# Patient Record
Sex: FEMALE | Race: WHITE | Employment: OTHER | URBAN - NONMETROPOLITAN AREA
[De-identification: names, ages, dates, MRNs, and addresses within clinical notes are randomized per-mention and may not be internally consistent; named-entity substitution may affect disease eponyms.]

---

## 2017-04-12 LAB
BUN BLDV-MCNC: 26 MG/DL
CALCIUM SERPL-MCNC: 10 MG/DL
CHLORIDE BLD-SCNC: 98 MMOL/L
CO2: 23.5 MMOL/L
CREAT SERPL-MCNC: 1.5 MG/DL
GFR CALCULATED: 37
GLUCOSE BLD-MCNC: 218 MG/DL
POTASSIUM SERPL-SCNC: 4.6 MMOL/L
SODIUM BLD-SCNC: 137 MMOL/L

## 2017-05-03 ENCOUNTER — OFFICE VISIT (OUTPATIENT)
Dept: NEPHROLOGY | Age: 67
End: 2017-05-03

## 2017-05-03 VITALS
SYSTOLIC BLOOD PRESSURE: 158 MMHG | HEART RATE: 65 BPM | DIASTOLIC BLOOD PRESSURE: 68 MMHG | OXYGEN SATURATION: 97 % | WEIGHT: 199 LBS | BODY MASS INDEX: 32.12 KG/M2

## 2017-05-03 DIAGNOSIS — N18.30 CHRONIC KIDNEY DISEASE, STAGE III (MODERATE) (HCC): Primary | ICD-10-CM

## 2017-05-03 PROCEDURE — 1090F PRES/ABSN URINE INCON ASSESS: CPT | Performed by: INTERNAL MEDICINE

## 2017-05-03 PROCEDURE — 4040F PNEUMOC VAC/ADMIN/RCVD: CPT | Performed by: INTERNAL MEDICINE

## 2017-05-03 PROCEDURE — 3017F COLORECTAL CA SCREEN DOC REV: CPT | Performed by: INTERNAL MEDICINE

## 2017-05-03 PROCEDURE — 3014F SCREEN MAMMO DOC REV: CPT | Performed by: INTERNAL MEDICINE

## 2017-05-03 PROCEDURE — 1123F ACP DISCUSS/DSCN MKR DOCD: CPT | Performed by: INTERNAL MEDICINE

## 2017-05-03 PROCEDURE — 99213 OFFICE O/P EST LOW 20 MIN: CPT | Performed by: INTERNAL MEDICINE

## 2017-05-03 PROCEDURE — 1036F TOBACCO NON-USER: CPT | Performed by: INTERNAL MEDICINE

## 2017-05-03 PROCEDURE — G8427 DOCREV CUR MEDS BY ELIG CLIN: HCPCS | Performed by: INTERNAL MEDICINE

## 2017-05-03 PROCEDURE — G8419 CALC BMI OUT NRM PARAM NOF/U: HCPCS | Performed by: INTERNAL MEDICINE

## 2017-05-03 PROCEDURE — G8400 PT W/DXA NO RESULTS DOC: HCPCS | Performed by: INTERNAL MEDICINE

## 2017-05-03 RX ORDER — CLONIDINE HYDROCHLORIDE 0.1 MG/1
0.2 TABLET ORAL 3 TIMES DAILY
Status: ON HOLD | COMMUNITY
Start: 2017-04-24 | End: 2018-12-07 | Stop reason: CLARIF

## 2017-05-03 RX ORDER — PRAVASTATIN SODIUM 40 MG
40 TABLET ORAL NIGHTLY
COMMUNITY

## 2017-05-03 RX ORDER — BUPROPION HYDROCHLORIDE 150 MG/1
150 TABLET ORAL EVERY MORNING
COMMUNITY
End: 2018-11-02 | Stop reason: ALTCHOICE

## 2017-05-03 RX ORDER — HYDRALAZINE HYDROCHLORIDE 25 MG/1
100 TABLET, FILM COATED ORAL 3 TIMES DAILY
Status: ON HOLD | COMMUNITY
Start: 2017-04-24 | End: 2018-12-07 | Stop reason: CLARIF

## 2017-05-03 RX ORDER — ZIPRASIDONE HYDROCHLORIDE 60 MG/1
40 CAPSULE ORAL DAILY
Status: ON HOLD | COMMUNITY
End: 2018-12-07 | Stop reason: CLARIF

## 2017-05-03 RX ORDER — BUSPIRONE HYDROCHLORIDE 10 MG/1
10 TABLET ORAL 2 TIMES DAILY
Status: ON HOLD | COMMUNITY
End: 2019-05-19

## 2017-05-03 RX ORDER — INSULIN ASPART 100 [IU]/ML
INJECTION, SOLUTION INTRAVENOUS; SUBCUTANEOUS
Status: ON HOLD | COMMUNITY
Start: 2017-04-25 | End: 2018-12-07 | Stop reason: DRUGHIGH

## 2017-05-03 RX ORDER — RANITIDINE 150 MG/1
TABLET ORAL
COMMUNITY
Start: 2017-05-02 | End: 2018-11-02 | Stop reason: ALTCHOICE

## 2017-05-03 RX ORDER — BETHANECHOL CHLORIDE 25 MG/1
25 TABLET ORAL 3 TIMES DAILY
COMMUNITY
Start: 2017-04-24 | End: 2018-11-02 | Stop reason: ALTCHOICE

## 2017-05-03 RX ORDER — ERGOCALCIFEROL 1.25 MG/1
50000 CAPSULE ORAL DAILY
COMMUNITY
Start: 2017-02-28 | End: 2018-11-02 | Stop reason: ALTCHOICE

## 2017-05-03 RX ORDER — ALLOPURINOL 300 MG/1
TABLET ORAL
COMMUNITY
Start: 2017-05-02 | End: 2017-05-03 | Stop reason: DRUGHIGH

## 2017-05-03 RX ORDER — INSULIN GLARGINE 100 [IU]/ML
INJECTION, SOLUTION SUBCUTANEOUS
COMMUNITY
End: 2018-11-02 | Stop reason: ALTCHOICE

## 2017-05-03 RX ORDER — HYDROXYZINE HYDROCHLORIDE 25 MG/1
25 TABLET, FILM COATED ORAL 2 TIMES DAILY PRN
Status: ON HOLD | COMMUNITY
End: 2018-12-11 | Stop reason: SDUPTHER

## 2017-05-03 ASSESSMENT — ENCOUNTER SYMPTOMS
NAUSEA: 0
SORE THROAT: 0
ABDOMINAL DISTENTION: 0
BLOOD IN STOOL: 0
COUGH: 0
SHORTNESS OF BREATH: 0
FACIAL SWELLING: 0
VOMITING: 0
RESPIRATORY NEGATIVE: 1
BACK PAIN: 0
CONSTIPATION: 0
CHEST TIGHTNESS: 0
COLOR CHANGE: 0
WHEEZING: 0
DIARRHEA: 0
ABDOMINAL PAIN: 0

## 2018-06-04 DIAGNOSIS — N18.30 CHRONIC KIDNEY DISEASE, STAGE III (MODERATE) (HCC): Primary | ICD-10-CM

## 2018-06-05 LAB
BUN BLDV-MCNC: 30 MG/DL
CALCIUM SERPL-MCNC: 8.9 MG/DL
CHLORIDE BLD-SCNC: 104 MMOL/L
CO2: 22 MMOL/L
CREAT SERPL-MCNC: 2.35 MG/DL
GFR CALCULATED: 21
GLUCOSE BLD-MCNC: 273 MG/DL
POTASSIUM SERPL-SCNC: 4 MMOL/L
SODIUM BLD-SCNC: 137 MMOL/L

## 2018-06-06 ENCOUNTER — NURSE TRIAGE (OUTPATIENT)
Dept: ADMINISTRATIVE | Age: 68
End: 2018-06-06

## 2018-10-30 LAB
BUN BLDV-MCNC: 38 MG/DL
CALCIUM SERPL-MCNC: 9.8 MG/DL
CHLORIDE BLD-SCNC: 95 MMOL/L
CO2: 29 MMOL/L
CREAT SERPL-MCNC: 2.8 MG/DL
GFR CALCULATED: 18
GLUCOSE BLD-MCNC: 227 MG/DL
POTASSIUM SERPL-SCNC: 4.5 MMOL/L
SODIUM BLD-SCNC: 139 MMOL/L

## 2018-11-02 ENCOUNTER — OFFICE VISIT (OUTPATIENT)
Dept: NEPHROLOGY | Age: 68
End: 2018-11-02
Payer: MEDICARE

## 2018-11-02 ENCOUNTER — TELEPHONE (OUTPATIENT)
Dept: NEPHROLOGY | Age: 68
End: 2018-11-02

## 2018-11-02 VITALS
HEART RATE: 61 BPM | OXYGEN SATURATION: 92 % | SYSTOLIC BLOOD PRESSURE: 138 MMHG | WEIGHT: 173.8 LBS | DIASTOLIC BLOOD PRESSURE: 78 MMHG | BODY MASS INDEX: 28.05 KG/M2

## 2018-11-02 DIAGNOSIS — I10 ESSENTIAL (PRIMARY) HYPERTENSION: Primary | ICD-10-CM

## 2018-11-02 DIAGNOSIS — E11.21 DIABETIC NEPHROPATHY WITH PROTEINURIA (HCC): ICD-10-CM

## 2018-11-02 DIAGNOSIS — N18.4 CKD (CHRONIC KIDNEY DISEASE), STAGE IV (HCC): ICD-10-CM

## 2018-11-02 PROCEDURE — G8400 PT W/DXA NO RESULTS DOC: HCPCS | Performed by: INTERNAL MEDICINE

## 2018-11-02 PROCEDURE — 4040F PNEUMOC VAC/ADMIN/RCVD: CPT | Performed by: INTERNAL MEDICINE

## 2018-11-02 PROCEDURE — G8484 FLU IMMUNIZE NO ADMIN: HCPCS | Performed by: INTERNAL MEDICINE

## 2018-11-02 PROCEDURE — 3046F HEMOGLOBIN A1C LEVEL >9.0%: CPT | Performed by: INTERNAL MEDICINE

## 2018-11-02 PROCEDURE — 1036F TOBACCO NON-USER: CPT | Performed by: INTERNAL MEDICINE

## 2018-11-02 PROCEDURE — 1123F ACP DISCUSS/DSCN MKR DOCD: CPT | Performed by: INTERNAL MEDICINE

## 2018-11-02 PROCEDURE — 2022F DILAT RTA XM EVC RTNOPTHY: CPT | Performed by: INTERNAL MEDICINE

## 2018-11-02 PROCEDURE — 1090F PRES/ABSN URINE INCON ASSESS: CPT | Performed by: INTERNAL MEDICINE

## 2018-11-02 PROCEDURE — 1101F PT FALLS ASSESS-DOCD LE1/YR: CPT | Performed by: INTERNAL MEDICINE

## 2018-11-02 PROCEDURE — G8427 DOCREV CUR MEDS BY ELIG CLIN: HCPCS | Performed by: INTERNAL MEDICINE

## 2018-11-02 PROCEDURE — 99213 OFFICE O/P EST LOW 20 MIN: CPT | Performed by: INTERNAL MEDICINE

## 2018-11-02 PROCEDURE — G8419 CALC BMI OUT NRM PARAM NOF/U: HCPCS | Performed by: INTERNAL MEDICINE

## 2018-11-02 PROCEDURE — 3017F COLORECTAL CA SCREEN DOC REV: CPT | Performed by: INTERNAL MEDICINE

## 2018-11-02 RX ORDER — GABAPENTIN 100 MG/1
100 CAPSULE ORAL 3 TIMES DAILY
Status: ON HOLD | COMMUNITY
End: 2018-12-17 | Stop reason: HOSPADM

## 2018-11-02 RX ORDER — LAMOTRIGINE 25 MG/1
50 TABLET ORAL 2 TIMES DAILY
Status: ON HOLD | COMMUNITY
End: 2018-12-17 | Stop reason: HOSPADM

## 2018-11-02 RX ORDER — PRAZOSIN HYDROCHLORIDE 2 MG/1
2 CAPSULE ORAL NIGHTLY
Status: ON HOLD | COMMUNITY
End: 2018-12-17 | Stop reason: HOSPADM

## 2018-11-02 RX ORDER — DULOXETIN HYDROCHLORIDE 30 MG/1
30 CAPSULE, DELAYED RELEASE ORAL DAILY
Status: ON HOLD | COMMUNITY
End: 2018-12-17 | Stop reason: HOSPADM

## 2018-11-02 RX ORDER — LEVOTHYROXINE SODIUM 0.03 MG/1
25 TABLET ORAL DAILY
COMMUNITY

## 2018-11-02 RX ORDER — ACETAMINOPHEN 160 MG
50000 TABLET,DISINTEGRATING ORAL
Status: ON HOLD | COMMUNITY
End: 2019-05-19

## 2018-11-02 RX ORDER — PANTOPRAZOLE SODIUM 40 MG/1
40 TABLET, DELAYED RELEASE ORAL DAILY
COMMUNITY
End: 2019-03-13

## 2018-11-02 RX ORDER — AMLODIPINE BESYLATE 10 MG/1
10 TABLET ORAL DAILY
Status: ON HOLD | COMMUNITY
End: 2018-12-17 | Stop reason: HOSPADM

## 2018-11-02 RX ORDER — MAGNESIUM OXIDE 400 MG/1
400 TABLET ORAL DAILY
COMMUNITY
End: 2019-03-13

## 2018-11-02 RX ORDER — OXYBUTYNIN CHLORIDE 5 MG/1
5 TABLET ORAL DAILY
Status: ON HOLD | COMMUNITY
End: 2018-12-07 | Stop reason: DRUGHIGH

## 2018-11-02 RX ORDER — FUROSEMIDE 20 MG/1
20 TABLET ORAL DAILY
Status: ON HOLD | COMMUNITY
End: 2018-12-07

## 2018-12-07 ENCOUNTER — APPOINTMENT (OUTPATIENT)
Dept: GENERAL RADIOLOGY | Age: 68
DRG: 208 | End: 2018-12-07
Attending: HOSPITALIST
Payer: MEDICARE

## 2018-12-07 ENCOUNTER — HOSPITAL ENCOUNTER (INPATIENT)
Age: 68
LOS: 10 days | Discharge: NURSING, REHAB OR LONG-TERM CARE | DRG: 208 | End: 2018-12-17
Attending: HOSPITALIST | Admitting: HOSPITALIST
Payer: MEDICARE
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
## 2018-12-07 PROBLEM — E11.65 UNCONTROLLED TYPE 2 DIABETES MELLITUS WITH HYPERGLYCEMIA (HCC): Chronic | Status: ACTIVE | Noted: 2018-12-07

## 2018-12-07 PROBLEM — I10 ESSENTIAL (PRIMARY) HYPERTENSION: Chronic | Status: ACTIVE | Noted: 2018-12-07

## 2018-12-07 PROBLEM — J96.90 RESPIRATORY FAILURE (HCC): Status: ACTIVE | Noted: 2018-12-07

## 2018-12-07 PROBLEM — E03.9 ACQUIRED HYPOTHYROIDISM: Chronic | Status: ACTIVE | Noted: 2018-12-07

## 2018-12-07 PROBLEM — I50.33 ACUTE ON CHRONIC DIASTOLIC HEART FAILURE (HCC): Status: ACTIVE | Noted: 2018-12-07

## 2018-12-07 PROBLEM — J96.01 ACUTE RESPIRATORY FAILURE WITH HYPOXIA (HCC): Status: ACTIVE | Noted: 2018-12-07

## 2018-12-07 PROBLEM — N17.9 AKI (ACUTE KIDNEY INJURY) (HCC): Status: ACTIVE | Noted: 2018-12-07

## 2018-12-07 PROBLEM — J81.0 ACUTE PULMONARY EDEMA (HCC): Status: ACTIVE | Noted: 2018-12-07

## 2018-12-07 LAB
ANION GAP SERPL CALCULATED.3IONS-SCNC: 14 MEQ/L (ref 8–16)
BUN BLDV-MCNC: 47 MG/DL (ref 7–22)
CALCIUM SERPL-MCNC: 8.9 MG/DL (ref 8.5–10.5)
CHLORIDE BLD-SCNC: 98 MEQ/L (ref 98–111)
CO2: 22 MEQ/L (ref 23–33)
CREAT SERPL-MCNC: 3.5 MG/DL (ref 0.4–1.2)
GFR SERPL CREATININE-BSD FRML MDRD: 13 ML/MIN/1.73M2
GLUCOSE BLD-MCNC: 130 MG/DL (ref 70–108)
GLUCOSE BLD-MCNC: 144 MG/DL (ref 70–108)
MRSA SCREEN RT-PCR: NEGATIVE
POTASSIUM SERPL-SCNC: 5.1 MEQ/L (ref 3.5–5.2)
PROCALCITONIN: 0.26 NG/ML (ref 0.01–0.09)
SODIUM BLD-SCNC: 134 MEQ/L (ref 135–145)
TSH SERPL DL<=0.05 MIU/L-ACNC: 1.6 UIU/ML (ref 0.4–4.2)
VANCOMYCIN RESISTANT ENTEROCOCCUS: NEGATIVE

## 2018-12-07 PROCEDURE — 2500000003 HC RX 250 WO HCPCS: Performed by: INTERNAL MEDICINE

## 2018-12-07 PROCEDURE — 6370000000 HC RX 637 (ALT 250 FOR IP): Performed by: NURSE PRACTITIONER

## 2018-12-07 PROCEDURE — 2709999900 HC NON-CHARGEABLE SUPPLY

## 2018-12-07 PROCEDURE — APPSS180 APP SPLIT SHARED TIME > 60 MINUTES: Performed by: NURSE PRACTITIONER

## 2018-12-07 PROCEDURE — 87641 MR-STAPH DNA AMP PROBE: CPT

## 2018-12-07 PROCEDURE — 6360000002 HC RX W HCPCS

## 2018-12-07 PROCEDURE — 6360000002 HC RX W HCPCS: Performed by: INTERNAL MEDICINE

## 2018-12-07 PROCEDURE — 6360000002 HC RX W HCPCS: Performed by: NURSE PRACTITIONER

## 2018-12-07 PROCEDURE — 87147 CULTURE TYPE IMMUNOLOGIC: CPT

## 2018-12-07 PROCEDURE — 2580000003 HC RX 258: Performed by: NURSE PRACTITIONER

## 2018-12-07 PROCEDURE — 71045 X-RAY EXAM CHEST 1 VIEW: CPT

## 2018-12-07 PROCEDURE — 94002 VENT MGMT INPAT INIT DAY: CPT

## 2018-12-07 PROCEDURE — 87205 SMEAR GRAM STAIN: CPT

## 2018-12-07 PROCEDURE — 87070 CULTURE OTHR SPECIMN AEROBIC: CPT

## 2018-12-07 PROCEDURE — 36556 INSERT NON-TUNNEL CV CATH: CPT | Performed by: INTERNAL MEDICINE

## 2018-12-07 PROCEDURE — C1751 CATH, INF, PER/CENT/MIDLINE: HCPCS

## 2018-12-07 PROCEDURE — 87081 CULTURE SCREEN ONLY: CPT

## 2018-12-07 PROCEDURE — 99223 1ST HOSP IP/OBS HIGH 75: CPT | Performed by: INTERNAL MEDICINE

## 2018-12-07 PROCEDURE — 82948 REAGENT STRIP/BLOOD GLUCOSE: CPT

## 2018-12-07 PROCEDURE — 84145 PROCALCITONIN (PCT): CPT

## 2018-12-07 PROCEDURE — 2580000003 HC RX 258: Performed by: INTERNAL MEDICINE

## 2018-12-07 PROCEDURE — 02HV33Z INSERTION OF INFUSION DEVICE INTO SUPERIOR VENA CAVA, PERCUTANEOUS APPROACH: ICD-10-PCS | Performed by: INTERNAL MEDICINE

## 2018-12-07 PROCEDURE — 36415 COLL VENOUS BLD VENIPUNCTURE: CPT

## 2018-12-07 PROCEDURE — 87500 VANOMYCIN DNA AMP PROBE: CPT

## 2018-12-07 PROCEDURE — 84443 ASSAY THYROID STIM HORMONE: CPT

## 2018-12-07 PROCEDURE — 2000000000 HC ICU R&B

## 2018-12-07 PROCEDURE — 5A1945Z RESPIRATORY VENTILATION, 24-96 CONSECUTIVE HOURS: ICD-10-PCS | Performed by: INTERNAL MEDICINE

## 2018-12-07 PROCEDURE — 89220 SPUTUM SPECIMEN COLLECTION: CPT

## 2018-12-07 PROCEDURE — 80048 BASIC METABOLIC PNL TOTAL CA: CPT

## 2018-12-07 PROCEDURE — 36556 INSERT NON-TUNNEL CV CATH: CPT

## 2018-12-07 PROCEDURE — 87086 URINE CULTURE/COLONY COUNT: CPT

## 2018-12-07 RX ORDER — SODIUM CHLORIDE 0.9 % (FLUSH) 0.9 %
10 SYRINGE (ML) INJECTION PRN
Status: DISCONTINUED | OUTPATIENT
Start: 2018-12-07 | End: 2018-12-17 | Stop reason: HOSPADM

## 2018-12-07 RX ORDER — DOCUSATE SODIUM 100 MG/1
100 CAPSULE, LIQUID FILLED ORAL 2 TIMES DAILY
Status: DISCONTINUED | OUTPATIENT
Start: 2018-12-07 | End: 2018-12-17 | Stop reason: HOSPADM

## 2018-12-07 RX ORDER — HEPARIN SODIUM 5000 [USP'U]/ML
5000 INJECTION, SOLUTION INTRAVENOUS; SUBCUTANEOUS EVERY 8 HOURS SCHEDULED
Status: DISCONTINUED | OUTPATIENT
Start: 2018-12-07 | End: 2018-12-17 | Stop reason: HOSPADM

## 2018-12-07 RX ORDER — FUROSEMIDE 20 MG/1
20 TABLET ORAL DAILY
Status: ON HOLD | COMMUNITY
End: 2018-12-17

## 2018-12-07 RX ORDER — BUSPIRONE HYDROCHLORIDE 10 MG/1
10 TABLET ORAL 3 TIMES DAILY PRN
Status: ON HOLD | COMMUNITY
End: 2018-12-11

## 2018-12-07 RX ORDER — LEVOTHYROXINE SODIUM 0.03 MG/1
25 TABLET ORAL DAILY
Status: DISCONTINUED | OUTPATIENT
Start: 2018-12-07 | End: 2018-12-17 | Stop reason: HOSPADM

## 2018-12-07 RX ORDER — ACETAMINOPHEN 325 MG/1
650 TABLET ORAL EVERY 4 HOURS PRN
Status: DISCONTINUED | OUTPATIENT
Start: 2018-12-07 | End: 2018-12-17 | Stop reason: HOSPADM

## 2018-12-07 RX ORDER — PROPOFOL 10 MG/ML
10 INJECTION, EMULSION INTRAVENOUS
Status: DISCONTINUED | OUTPATIENT
Start: 2018-12-07 | End: 2018-12-09

## 2018-12-07 RX ORDER — CLONIDINE HYDROCHLORIDE 0.2 MG/1
0.2 TABLET ORAL 3 TIMES DAILY
COMMUNITY
End: 2019-01-04 | Stop reason: ALTCHOICE

## 2018-12-07 RX ORDER — HYDRALAZINE HYDROCHLORIDE 100 MG/1
50 TABLET, FILM COATED ORAL 2 TIMES DAILY
Status: ON HOLD | COMMUNITY
End: 2019-05-23 | Stop reason: HOSPADM

## 2018-12-07 RX ORDER — DEXTROSE MONOHYDRATE 25 G/50ML
12.5 INJECTION, SOLUTION INTRAVENOUS PRN
Status: DISCONTINUED | OUTPATIENT
Start: 2018-12-07 | End: 2018-12-17 | Stop reason: HOSPADM

## 2018-12-07 RX ORDER — INSULIN GLARGINE 100 [IU]/ML
5 INJECTION, SOLUTION SUBCUTANEOUS 2 TIMES DAILY
Status: DISCONTINUED | OUTPATIENT
Start: 2018-12-07 | End: 2018-12-08

## 2018-12-07 RX ORDER — PRAZOSIN HYDROCHLORIDE 1 MG/1
1 CAPSULE ORAL 2 TIMES DAILY
Status: DISCONTINUED | OUTPATIENT
Start: 2018-12-07 | End: 2018-12-15

## 2018-12-07 RX ORDER — HYDRALAZINE HYDROCHLORIDE 25 MG/1
25 TABLET, FILM COATED ORAL 3 TIMES DAILY
Status: DISCONTINUED | OUTPATIENT
Start: 2018-12-07 | End: 2018-12-09

## 2018-12-07 RX ORDER — ONDANSETRON 2 MG/ML
4 INJECTION INTRAMUSCULAR; INTRAVENOUS EVERY 6 HOURS PRN
Status: DISCONTINUED | OUTPATIENT
Start: 2018-12-07 | End: 2018-12-13

## 2018-12-07 RX ORDER — DEXTROSE MONOHYDRATE 50 MG/ML
100 INJECTION, SOLUTION INTRAVENOUS PRN
Status: DISCONTINUED | OUTPATIENT
Start: 2018-12-07 | End: 2018-12-17 | Stop reason: HOSPADM

## 2018-12-07 RX ORDER — OXYBUTYNIN CHLORIDE 5 MG/1
5 TABLET, EXTENDED RELEASE ORAL DAILY
Status: ON HOLD | COMMUNITY
End: 2018-12-17 | Stop reason: HOSPADM

## 2018-12-07 RX ORDER — PROPOFOL 10 MG/ML
INJECTION, EMULSION INTRAVENOUS
Status: DISPENSED
Start: 2018-12-07 | End: 2018-12-07

## 2018-12-07 RX ORDER — ZIPRASIDONE HYDROCHLORIDE 40 MG/1
40 CAPSULE ORAL DAILY
COMMUNITY
End: 2019-03-13

## 2018-12-07 RX ORDER — LORAZEPAM 2 MG/ML
INJECTION INTRAMUSCULAR
Status: COMPLETED
Start: 2018-12-07 | End: 2018-12-07

## 2018-12-07 RX ORDER — NICOTINE POLACRILEX 4 MG
15 LOZENGE BUCCAL PRN
Status: DISCONTINUED | OUTPATIENT
Start: 2018-12-07 | End: 2018-12-17 | Stop reason: HOSPADM

## 2018-12-07 RX ORDER — HYDRALAZINE HYDROCHLORIDE 20 MG/ML
10 INJECTION INTRAMUSCULAR; INTRAVENOUS EVERY 6 HOURS PRN
Status: DISCONTINUED | OUTPATIENT
Start: 2018-12-07 | End: 2018-12-17 | Stop reason: HOSPADM

## 2018-12-07 RX ORDER — ONDANSETRON 4 MG/1
4 TABLET, FILM COATED ORAL EVERY 4 HOURS PRN
Status: ON HOLD | COMMUNITY
End: 2019-06-11 | Stop reason: SDUPTHER

## 2018-12-07 RX ORDER — BUMETANIDE 0.25 MG/ML
2 INJECTION, SOLUTION INTRAMUSCULAR; INTRAVENOUS ONCE
Status: COMPLETED | OUTPATIENT
Start: 2018-12-07 | End: 2018-12-07

## 2018-12-07 RX ORDER — SODIUM CHLORIDE 0.9 % (FLUSH) 0.9 %
10 SYRINGE (ML) INJECTION EVERY 12 HOURS SCHEDULED
Status: DISCONTINUED | OUTPATIENT
Start: 2018-12-07 | End: 2018-12-17 | Stop reason: HOSPADM

## 2018-12-07 RX ADMIN — PROPOFOL 45 MCG/KG/MIN: 10 INJECTION, EMULSION INTRAVENOUS at 11:27

## 2018-12-07 RX ADMIN — PRAZOSIN HYDROCHLORIDE 1 MG: 1 CAPSULE ORAL at 20:40

## 2018-12-07 RX ADMIN — LORAZEPAM: 2 INJECTION INTRAMUSCULAR; INTRAVENOUS at 09:41

## 2018-12-07 RX ADMIN — PROPOFOL 25 MCG/KG/MIN: 10 INJECTION, EMULSION INTRAVENOUS at 21:23

## 2018-12-07 RX ADMIN — Medication 1 UNITS: at 16:32

## 2018-12-07 RX ADMIN — HYDRALAZINE HYDROCHLORIDE 10 MG: 20 INJECTION INTRAMUSCULAR; INTRAVENOUS at 15:21

## 2018-12-07 RX ADMIN — INSULIN GLARGINE 5 UNITS: 100 INJECTION, SOLUTION SUBCUTANEOUS at 20:50

## 2018-12-07 RX ADMIN — HEPARIN SODIUM 5000 UNITS: 5000 INJECTION INTRAVENOUS; SUBCUTANEOUS at 16:32

## 2018-12-07 RX ADMIN — BUMETANIDE 0.2 MG/HR: 0.25 INJECTION INTRAMUSCULAR; INTRAVENOUS at 15:21

## 2018-12-07 RX ADMIN — HEPARIN SODIUM 5000 UNITS: 5000 INJECTION INTRAVENOUS; SUBCUTANEOUS at 22:13

## 2018-12-07 RX ADMIN — Medication 10 ML: at 20:41

## 2018-12-07 RX ADMIN — LEVOTHYROXINE SODIUM 25 MCG: 25 TABLET ORAL at 16:32

## 2018-12-07 RX ADMIN — PROPOFOL 45 MCG/KG/MIN: 10 INJECTION, EMULSION INTRAVENOUS at 08:58

## 2018-12-07 RX ADMIN — BUMETANIDE 2 MG: 0.25 INJECTION INTRAMUSCULAR; INTRAVENOUS at 15:21

## 2018-12-07 RX ADMIN — HYDRALAZINE HYDROCHLORIDE 25 MG: 25 TABLET, FILM COATED ORAL at 17:41

## 2018-12-07 RX ADMIN — HYDRALAZINE HYDROCHLORIDE 25 MG: 25 TABLET, FILM COATED ORAL at 20:40

## 2018-12-07 ASSESSMENT — PULMONARY FUNCTION TESTS
PIF_VALUE: 31
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
## 2018-12-08 ENCOUNTER — APPOINTMENT (OUTPATIENT)
Dept: GENERAL RADIOLOGY | Age: 68
DRG: 208 | End: 2018-12-08
Attending: HOSPITALIST
Payer: MEDICARE

## 2018-12-08 LAB
ALBUMIN SERPL-MCNC: 2.6 G/DL (ref 3.5–5.1)
ALLEN TEST: POSITIVE
ALP BLD-CCNC: 127 U/L (ref 38–126)
ALT SERPL-CCNC: 9 U/L (ref 11–66)
ANION GAP SERPL CALCULATED.3IONS-SCNC: 12 MEQ/L (ref 8–16)
AST SERPL-CCNC: 12 U/L (ref 5–40)
BASE EXCESS (CALCULATED): 1.5 MMOL/L (ref -2.5–2.5)
BASOPHILS # BLD: 0.4 %
BASOPHILS ABSOLUTE: 0 THOU/MM3 (ref 0–0.1)
BILIRUB SERPL-MCNC: 0.3 MG/DL (ref 0.3–1.2)
BILIRUBIN DIRECT: < 0.2 MG/DL (ref 0–0.3)
BUN BLDV-MCNC: 48 MG/DL (ref 7–22)
CALCIUM SERPL-MCNC: 8.9 MG/DL (ref 8.5–10.5)
CHLORIDE BLD-SCNC: 98 MEQ/L (ref 98–111)
CHOLESTEROL, TOTAL: 133 MG/DL (ref 100–199)
CO2: 24 MEQ/L (ref 23–33)
COLLECTED BY:: ABNORMAL
CREAT SERPL-MCNC: 3.6 MG/DL (ref 0.4–1.2)
DEVICE: ABNORMAL
EOSINOPHIL # BLD: 1.6 %
EOSINOPHILS ABSOLUTE: 0.2 THOU/MM3 (ref 0–0.4)
ERYTHROCYTE [DISTWIDTH] IN BLOOD BY AUTOMATED COUNT: 14.6 % (ref 11.5–14.5)
ERYTHROCYTE [DISTWIDTH] IN BLOOD BY AUTOMATED COUNT: 46.3 FL (ref 35–45)
GFR SERPL CREATININE-BSD FRML MDRD: 13 ML/MIN/1.73M2
GLUCOSE BLD-MCNC: 103 MG/DL (ref 70–108)
GLUCOSE BLD-MCNC: 125 MG/DL (ref 70–108)
GLUCOSE BLD-MCNC: 133 MG/DL (ref 70–108)
GLUCOSE BLD-MCNC: 150 MG/DL (ref 70–108)
GLUCOSE BLD-MCNC: 67 MG/DL (ref 70–108)
GLUCOSE BLD-MCNC: 71 MG/DL (ref 70–108)
GLUCOSE BLD-MCNC: 71 MG/DL (ref 70–108)
HCO3: 25 MMOL/L (ref 23–28)
HCT VFR BLD CALC: 27.5 % (ref 37–47)
HDLC SERPL-MCNC: 56 MG/DL
HEMOGLOBIN: 9 GM/DL (ref 12–16)
IFIO2: 30
IMMATURE GRANS (ABS): 0.04 THOU/MM3 (ref 0–0.07)
IMMATURE GRANULOCYTES: 0.3 %
LDL CHOLESTEROL CALCULATED: 57 MG/DL
LV EF: 53 %
LVEF MODALITY: NORMAL
LYMPHOCYTES # BLD: 13.8 %
LYMPHOCYTES ABSOLUTE: 1.6 THOU/MM3 (ref 1–4.8)
MAGNESIUM: 2.2 MG/DL (ref 1.6–2.4)
MCH RBC QN AUTO: 28.6 PG (ref 26–33)
MCHC RBC AUTO-ENTMCNC: 32.7 GM/DL (ref 32.2–35.5)
MCV RBC AUTO: 87.3 FL (ref 81–99)
MODE: ABNORMAL
MONOCYTES # BLD: 9 %
MONOCYTES ABSOLUTE: 1.1 THOU/MM3 (ref 0.4–1.3)
NUCLEATED RED BLOOD CELLS: 0 /100 WBC
O2 SATURATION: 94 %
PCO2: 34 MMHG (ref 35–45)
PH BLOOD GAS: 7.48 (ref 7.35–7.45)
PIP: 32 CMH2O
PLATELET # BLD: 343 THOU/MM3 (ref 130–400)
PMV BLD AUTO: 10.9 FL (ref 9.4–12.4)
PO2: 67 MMHG (ref 71–104)
POTASSIUM SERPL-SCNC: 4.4 MEQ/L (ref 3.5–5.2)
PRO-BNP: 2003 PG/ML (ref 0–900)
RBC # BLD: 3.15 MILL/MM3 (ref 4.2–5.4)
SEG NEUTROPHILS: 74.9 %
SEGMENTED NEUTROPHILS ABSOLUTE COUNT: 8.9 THOU/MM3 (ref 1.8–7.7)
SET PEEP: 10 MMHG
SET RESPIRATORY RATE: 12 BPM
SODIUM BLD-SCNC: 134 MEQ/L (ref 135–145)
SOURCE, BLOOD GAS: ABNORMAL
TOTAL PROTEIN: 4.9 G/DL (ref 6.1–8)
TRIGL SERPL-MCNC: 98 MG/DL (ref 0–199)
WBC # BLD: 11.9 THOU/MM3 (ref 4.8–10.8)

## 2018-12-08 PROCEDURE — 80061 LIPID PANEL: CPT

## 2018-12-08 PROCEDURE — 85025 COMPLETE CBC W/AUTO DIFF WBC: CPT

## 2018-12-08 PROCEDURE — APPSS180 APP SPLIT SHARED TIME > 60 MINUTES: Performed by: NURSE PRACTITIONER

## 2018-12-08 PROCEDURE — 2000000000 HC ICU R&B

## 2018-12-08 PROCEDURE — C9113 INJ PANTOPRAZOLE SODIUM, VIA: HCPCS | Performed by: NURSE PRACTITIONER

## 2018-12-08 PROCEDURE — 99291 CRITICAL CARE FIRST HOUR: CPT | Performed by: INTERNAL MEDICINE

## 2018-12-08 PROCEDURE — 93306 TTE W/DOPPLER COMPLETE: CPT

## 2018-12-08 PROCEDURE — 6360000002 HC RX W HCPCS

## 2018-12-08 PROCEDURE — 6360000002 HC RX W HCPCS: Performed by: NURSE PRACTITIONER

## 2018-12-08 PROCEDURE — 82948 REAGENT STRIP/BLOOD GLUCOSE: CPT

## 2018-12-08 PROCEDURE — 36600 WITHDRAWAL OF ARTERIAL BLOOD: CPT

## 2018-12-08 PROCEDURE — 80053 COMPREHEN METABOLIC PANEL: CPT

## 2018-12-08 PROCEDURE — 36415 COLL VENOUS BLD VENIPUNCTURE: CPT

## 2018-12-08 PROCEDURE — 83880 ASSAY OF NATRIURETIC PEPTIDE: CPT

## 2018-12-08 PROCEDURE — 36592 COLLECT BLOOD FROM PICC: CPT

## 2018-12-08 PROCEDURE — 83735 ASSAY OF MAGNESIUM: CPT

## 2018-12-08 PROCEDURE — 82803 BLOOD GASES ANY COMBINATION: CPT

## 2018-12-08 PROCEDURE — 6360000002 HC RX W HCPCS: Performed by: INTERNAL MEDICINE

## 2018-12-08 PROCEDURE — 71045 X-RAY EXAM CHEST 1 VIEW: CPT

## 2018-12-08 PROCEDURE — 2700000000 HC OXYGEN THERAPY PER DAY

## 2018-12-08 PROCEDURE — 2709999900 HC NON-CHARGEABLE SUPPLY

## 2018-12-08 PROCEDURE — 94003 VENT MGMT INPAT SUBQ DAY: CPT

## 2018-12-08 PROCEDURE — 6370000000 HC RX 637 (ALT 250 FOR IP): Performed by: NURSE PRACTITIONER

## 2018-12-08 PROCEDURE — 82248 BILIRUBIN DIRECT: CPT

## 2018-12-08 PROCEDURE — 2580000003 HC RX 258: Performed by: NURSE PRACTITIONER

## 2018-12-08 RX ORDER — PANTOPRAZOLE SODIUM 40 MG/10ML
40 INJECTION, POWDER, LYOPHILIZED, FOR SOLUTION INTRAVENOUS DAILY
Status: DISCONTINUED | OUTPATIENT
Start: 2018-12-08 | End: 2018-12-12

## 2018-12-08 RX ORDER — FENTANYL CITRATE 50 UG/ML
25 INJECTION, SOLUTION INTRAMUSCULAR; INTRAVENOUS
Status: DISCONTINUED | OUTPATIENT
Start: 2018-12-08 | End: 2018-12-17 | Stop reason: HOSPADM

## 2018-12-08 RX ORDER — FENTANYL CITRATE 50 UG/ML
50 INJECTION, SOLUTION INTRAMUSCULAR; INTRAVENOUS
Status: DISCONTINUED | OUTPATIENT
Start: 2018-12-08 | End: 2018-12-17 | Stop reason: HOSPADM

## 2018-12-08 RX ORDER — FENTANYL CITRATE 50 UG/ML
INJECTION, SOLUTION INTRAMUSCULAR; INTRAVENOUS
Status: COMPLETED
Start: 2018-12-08 | End: 2018-12-08

## 2018-12-08 RX ADMIN — HEPARIN SODIUM 5000 UNITS: 5000 INJECTION INTRAVENOUS; SUBCUTANEOUS at 23:42

## 2018-12-08 RX ADMIN — HEPARIN SODIUM 5000 UNITS: 5000 INJECTION INTRAVENOUS; SUBCUTANEOUS at 06:39

## 2018-12-08 RX ADMIN — HYDRALAZINE HYDROCHLORIDE 25 MG: 25 TABLET, FILM COATED ORAL at 08:06

## 2018-12-08 RX ADMIN — Medication 1 UNITS: at 23:42

## 2018-12-08 RX ADMIN — Medication 10 ML: at 23:45

## 2018-12-08 RX ADMIN — PANTOPRAZOLE SODIUM 40 MG: 40 INJECTION, POWDER, FOR SOLUTION INTRAVENOUS at 14:23

## 2018-12-08 RX ADMIN — HEPARIN SODIUM 5000 UNITS: 5000 INJECTION INTRAVENOUS; SUBCUTANEOUS at 14:25

## 2018-12-08 RX ADMIN — FENTANYL CITRATE 50 MCG: 50 INJECTION, SOLUTION INTRAMUSCULAR; INTRAVENOUS at 12:29

## 2018-12-08 RX ADMIN — FENTANYL CITRATE 25 MCG: 50 INJECTION, SOLUTION INTRAMUSCULAR; INTRAVENOUS at 16:20

## 2018-12-08 RX ADMIN — PRAZOSIN HYDROCHLORIDE 1 MG: 1 CAPSULE ORAL at 23:39

## 2018-12-08 RX ADMIN — DEXTROSE MONOHYDRATE 12.5 G: 25 INJECTION, SOLUTION INTRAVENOUS at 05:57

## 2018-12-08 RX ADMIN — HYDRALAZINE HYDROCHLORIDE 25 MG: 25 TABLET, FILM COATED ORAL at 23:39

## 2018-12-08 RX ADMIN — Medication 10 ML: at 08:06

## 2018-12-08 RX ADMIN — PRAZOSIN HYDROCHLORIDE 1 MG: 1 CAPSULE ORAL at 08:06

## 2018-12-08 RX ADMIN — PROPOFOL 25 MCG/KG/MIN: 10 INJECTION, EMULSION INTRAVENOUS at 11:30

## 2018-12-08 RX ADMIN — PROPOFOL 25 MCG/KG/MIN: 10 INJECTION, EMULSION INTRAVENOUS at 03:19

## 2018-12-08 RX ADMIN — HYDRALAZINE HYDROCHLORIDE 25 MG: 25 TABLET, FILM COATED ORAL at 14:29

## 2018-12-08 RX ADMIN — LEVOTHYROXINE SODIUM 25 MCG: 25 TABLET ORAL at 08:06

## 2018-12-08 RX ADMIN — PROPOFOL 25 MCG/KG/MIN: 10 INJECTION, EMULSION INTRAVENOUS at 18:02

## 2018-12-08 ASSESSMENT — PULMONARY FUNCTION TESTS
PIF_VALUE: 27
PIF_VALUE: 28
PIF_VALUE: 26
PIF_VALUE: 31
PIF_VALUE: 32
PIF_VALUE: 27
PIF_VALUE: 27

## 2018-12-08 ASSESSMENT — PAIN SCALES - GENERAL
PAINLEVEL_OUTOF10: 7
PAINLEVEL_OUTOF10: 5

## 2018-12-08 NOTE — PROGRESS NOTES
Care Facility       [] Other-    Chief Complaint: shortness of breath    Hospital Course:   pt lives at [address] [facility] home (admitted Nov 28, 2018 ); on 12/5/2018 she related to shortness of breath which increased; F nursing notes reviewed and appears issues with oxygenation on 12/1/2018 (88% on RA) and Lasix increased to 40 mg daily from 20 mg daily on 12/3/2018; she went to [facility] and was admitted; she was found to be in HF and was diuresed with Lasix 40 mg IVP TID; her creatinine was at 3.24 and baseline 2.3-2.8; ~0300 she desatted into the 70's and was intubated; she was started on a Diprovan gtt and ws sent to [facility] for eval; no family here; pt is sedated and intubated upon arrival here.     Per H&P from [facility] she has had ~21 kg weight gain; she follows with Dr George Santos for nephrology and Dr Erica Pozo for cardiology.  Echo from 1/2/2018 noted with EF 60%    She was seen by Dr. George Santos on 11/2/2018; per his note creatinine at 3.5 at [facility] stay; weight on 11/2/2018 was 78.8 kg; at that appt she was only taking Lasix 20 mg PO daily and that was continued      12/8-->remains on vent; TF started; son at bedside and states the past few days that he spoke to her she c/o shortness of breath and states on 12/6 that pt was unable to speak in complete sentences 2nd to severe dyspnea; voiding very well; echo report received from [facility] from 1/2/2018     Subjective (past 24 hours): sedated and intubated but follows all commands    SUPPLEMENTAL O2:       Vent:  Intubation Date:   Vent Day: # 2  Vent Settings:  Vent Mode: PCV+ Rate Set: 12 bmp/ / /FiO2 : 30 %      CURRENT PARENTERAL VASOACTIVE / INOTROPIC AGENTS:  [x] None Vasopressors:  [] Norepinephrine  [] Dopamine  [] Phenylephrine  [] Vasopressin  [] Epinephrine  [] Other: Antihypertensives  [] Ca Channel Antagonist:  [] Beta-blocker:  [] ACE-I:  [] ARB:  [] Nitroglycerin  [] Nitroprusside  [] Other:     SEDATION/ANALGESIA (Specifiy):

## 2018-12-09 ENCOUNTER — APPOINTMENT (OUTPATIENT)
Dept: GENERAL RADIOLOGY | Age: 68
DRG: 208 | End: 2018-12-09
Attending: HOSPITALIST
Payer: MEDICARE

## 2018-12-09 LAB
ALLEN TEST: POSITIVE
ALLEN TEST: POSITIVE
ANION GAP SERPL CALCULATED.3IONS-SCNC: 14 MEQ/L (ref 8–16)
BASE EXCESS (CALCULATED): 2.2 MMOL/L (ref -2.5–2.5)
BASE EXCESS (CALCULATED): 3.2 MMOL/L (ref -2.5–2.5)
BUN BLDV-MCNC: 50 MG/DL (ref 7–22)
CALCIUM SERPL-MCNC: 8.5 MG/DL (ref 8.5–10.5)
CHLORIDE BLD-SCNC: 97 MEQ/L (ref 98–111)
CO2: 25 MEQ/L (ref 23–33)
COLLECTED BY:: ABNORMAL
COLLECTED BY:: NORMAL
CREAT SERPL-MCNC: 3.3 MG/DL (ref 0.4–1.2)
DEVICE: ABNORMAL
DEVICE: NORMAL
ERYTHROCYTE [DISTWIDTH] IN BLOOD BY AUTOMATED COUNT: 14.5 % (ref 11.5–14.5)
ERYTHROCYTE [DISTWIDTH] IN BLOOD BY AUTOMATED COUNT: 46.6 FL (ref 35–45)
GFR SERPL CREATININE-BSD FRML MDRD: 14 ML/MIN/1.73M2
GLUCOSE BLD-MCNC: 135 MG/DL (ref 70–108)
GLUCOSE BLD-MCNC: 138 MG/DL (ref 70–108)
GLUCOSE BLD-MCNC: 153 MG/DL (ref 70–108)
GLUCOSE BLD-MCNC: 165 MG/DL (ref 70–108)
GLUCOSE BLD-MCNC: 204 MG/DL (ref 70–108)
GRAM STAIN RESULT: NORMAL
HCO3: 27 MMOL/L (ref 23–28)
HCO3: 27 MMOL/L (ref 23–28)
HCT VFR BLD CALC: 26.3 % (ref 37–47)
HEMOGLOBIN: 8.6 GM/DL (ref 12–16)
IFIO2: 30
IFIO2: 30
MAGNESIUM: 2.1 MG/DL (ref 1.6–2.4)
MCH RBC QN AUTO: 28.9 PG (ref 26–33)
MCHC RBC AUTO-ENTMCNC: 32.7 GM/DL (ref 32.2–35.5)
MCV RBC AUTO: 88.3 FL (ref 81–99)
MODE: ABNORMAL
MODE: NORMAL
O2 SATURATION: 96 %
O2 SATURATION: 98 %
PCO2: 38 MMHG (ref 35–45)
PCO2: 41 MMHG (ref 35–45)
PH BLOOD GAS: 7.43 (ref 7.35–7.45)
PH BLOOD GAS: 7.46 (ref 7.35–7.45)
PHOSPHORUS: 4.1 MG/DL (ref 2.4–4.7)
PIP: 28 CMH2O
PLATELET # BLD: 325 THOU/MM3 (ref 130–400)
PMV BLD AUTO: 10.6 FL (ref 9.4–12.4)
PO2: 102 MMHG (ref 71–104)
PO2: 81 MMHG (ref 71–104)
POTASSIUM SERPL-SCNC: 3.8 MEQ/L (ref 3.5–5.2)
RBC # BLD: 2.98 MILL/MM3 (ref 4.2–5.4)
RESPIRATORY CULTURE: NORMAL
SET PEEP: 10 MMHG
SET PEEP: 5 MMHG
SET PRESS SUPP: 5 CMH2O
SET RESPIRATORY RATE: 12 BPM
SET RESPIRATORY RATE: 24 BPM
SET TIDAL VOLUME: 293 ML
SODIUM BLD-SCNC: 136 MEQ/L (ref 135–145)
SOURCE, BLOOD GAS: ABNORMAL
SOURCE, BLOOD GAS: NORMAL
URINE CULTURE, ROUTINE: NORMAL
WBC # BLD: 8.6 THOU/MM3 (ref 4.8–10.8)

## 2018-12-09 PROCEDURE — 99291 CRITICAL CARE FIRST HOUR: CPT | Performed by: INTERNAL MEDICINE

## 2018-12-09 PROCEDURE — 36600 WITHDRAWAL OF ARTERIAL BLOOD: CPT

## 2018-12-09 PROCEDURE — C9113 INJ PANTOPRAZOLE SODIUM, VIA: HCPCS | Performed by: NURSE PRACTITIONER

## 2018-12-09 PROCEDURE — 2580000003 HC RX 258: Performed by: INTERNAL MEDICINE

## 2018-12-09 PROCEDURE — 2500000003 HC RX 250 WO HCPCS: Performed by: INTERNAL MEDICINE

## 2018-12-09 PROCEDURE — 36592 COLLECT BLOOD FROM PICC: CPT

## 2018-12-09 PROCEDURE — 36415 COLL VENOUS BLD VENIPUNCTURE: CPT

## 2018-12-09 PROCEDURE — 94003 VENT MGMT INPAT SUBQ DAY: CPT

## 2018-12-09 PROCEDURE — 6360000002 HC RX W HCPCS: Performed by: INTERNAL MEDICINE

## 2018-12-09 PROCEDURE — 83735 ASSAY OF MAGNESIUM: CPT

## 2018-12-09 PROCEDURE — 2709999900 HC NON-CHARGEABLE SUPPLY

## 2018-12-09 PROCEDURE — 6370000000 HC RX 637 (ALT 250 FOR IP): Performed by: NURSE PRACTITIONER

## 2018-12-09 PROCEDURE — 82948 REAGENT STRIP/BLOOD GLUCOSE: CPT

## 2018-12-09 PROCEDURE — 2000000000 HC ICU R&B

## 2018-12-09 PROCEDURE — 71045 X-RAY EXAM CHEST 1 VIEW: CPT

## 2018-12-09 PROCEDURE — 84100 ASSAY OF PHOSPHORUS: CPT

## 2018-12-09 PROCEDURE — 6360000002 HC RX W HCPCS: Performed by: NURSE PRACTITIONER

## 2018-12-09 PROCEDURE — APPSS180 APP SPLIT SHARED TIME > 60 MINUTES: Performed by: NURSE PRACTITIONER

## 2018-12-09 PROCEDURE — 80048 BASIC METABOLIC PNL TOTAL CA: CPT

## 2018-12-09 PROCEDURE — 85027 COMPLETE CBC AUTOMATED: CPT

## 2018-12-09 PROCEDURE — 82803 BLOOD GASES ANY COMBINATION: CPT

## 2018-12-09 PROCEDURE — 2580000003 HC RX 258: Performed by: NURSE PRACTITIONER

## 2018-12-09 RX ORDER — CLONIDINE HYDROCHLORIDE 0.2 MG/1
0.2 TABLET ORAL 3 TIMES DAILY
Status: DISCONTINUED | OUTPATIENT
Start: 2018-12-09 | End: 2018-12-14

## 2018-12-09 RX ORDER — HYDRALAZINE HYDROCHLORIDE 50 MG/1
50 TABLET, FILM COATED ORAL 3 TIMES DAILY
Status: DISCONTINUED | OUTPATIENT
Start: 2018-12-09 | End: 2018-12-17 | Stop reason: HOSPADM

## 2018-12-09 RX ORDER — INSULIN GLARGINE 100 [IU]/ML
5 INJECTION, SOLUTION SUBCUTANEOUS 2 TIMES DAILY
Status: DISCONTINUED | OUTPATIENT
Start: 2018-12-09 | End: 2018-12-17 | Stop reason: HOSPADM

## 2018-12-09 RX ADMIN — INSULIN LISPRO 2 UNITS: 100 INJECTION, SOLUTION INTRAVENOUS; SUBCUTANEOUS at 20:03

## 2018-12-09 RX ADMIN — HYDRALAZINE HYDROCHLORIDE 10 MG: 20 INJECTION INTRAMUSCULAR; INTRAVENOUS at 10:08

## 2018-12-09 RX ADMIN — PANTOPRAZOLE SODIUM 40 MG: 40 INJECTION, POWDER, FOR SOLUTION INTRAVENOUS at 08:02

## 2018-12-09 RX ADMIN — Medication 10 ML: at 20:08

## 2018-12-09 RX ADMIN — FENTANYL CITRATE 25 MCG: 50 INJECTION, SOLUTION INTRAMUSCULAR; INTRAVENOUS at 08:02

## 2018-12-09 RX ADMIN — PRAZOSIN HYDROCHLORIDE 1 MG: 1 CAPSULE ORAL at 08:11

## 2018-12-09 RX ADMIN — LEVOTHYROXINE SODIUM 25 MCG: 25 TABLET ORAL at 06:53

## 2018-12-09 RX ADMIN — CLONIDINE HYDROCHLORIDE 0.2 MG: 0.2 TABLET ORAL at 20:03

## 2018-12-09 RX ADMIN — CLONIDINE HYDROCHLORIDE 0.2 MG: 0.2 TABLET ORAL at 16:07

## 2018-12-09 RX ADMIN — Medication 1 UNITS: at 10:55

## 2018-12-09 RX ADMIN — DOCUSATE SODIUM 100 MG: 100 CAPSULE, LIQUID FILLED ORAL at 20:02

## 2018-12-09 RX ADMIN — PROPOFOL 25 MCG/KG/MIN: 10 INJECTION, EMULSION INTRAVENOUS at 00:33

## 2018-12-09 RX ADMIN — HEPARIN SODIUM 5000 UNITS: 5000 INJECTION INTRAVENOUS; SUBCUTANEOUS at 14:31

## 2018-12-09 RX ADMIN — HEPARIN SODIUM 5000 UNITS: 5000 INJECTION INTRAVENOUS; SUBCUTANEOUS at 21:29

## 2018-12-09 RX ADMIN — FENTANYL CITRATE 50 MCG: 50 INJECTION, SOLUTION INTRAMUSCULAR; INTRAVENOUS at 00:33

## 2018-12-09 RX ADMIN — HYDRALAZINE HYDROCHLORIDE 25 MG: 25 TABLET, FILM COATED ORAL at 14:32

## 2018-12-09 RX ADMIN — ONDANSETRON 4 MG: 2 INJECTION INTRAMUSCULAR; INTRAVENOUS at 18:59

## 2018-12-09 RX ADMIN — HYDRALAZINE HYDROCHLORIDE 25 MG: 25 TABLET, FILM COATED ORAL at 08:11

## 2018-12-09 RX ADMIN — INSULIN LISPRO 2 UNITS: 100 INJECTION, SOLUTION INTRAVENOUS; SUBCUTANEOUS at 18:18

## 2018-12-09 RX ADMIN — HYDRALAZINE HYDROCHLORIDE 50 MG: 50 TABLET, FILM COATED ORAL at 20:02

## 2018-12-09 RX ADMIN — INSULIN GLARGINE 5 UNITS: 100 INJECTION, SOLUTION SUBCUTANEOUS at 20:03

## 2018-12-09 RX ADMIN — HEPARIN SODIUM 5000 UNITS: 5000 INJECTION INTRAVENOUS; SUBCUTANEOUS at 05:55

## 2018-12-09 RX ADMIN — PRAZOSIN HYDROCHLORIDE 1 MG: 1 CAPSULE ORAL at 20:03

## 2018-12-09 RX ADMIN — Medication 10 ML: at 08:02

## 2018-12-09 RX ADMIN — BUMETANIDE 0.2 MG/HR: 0.25 INJECTION INTRAMUSCULAR; INTRAVENOUS at 18:55

## 2018-12-09 ASSESSMENT — PAIN SCALES - GENERAL
PAINLEVEL_OUTOF10: 0
PAINLEVEL_OUTOF10: 0
PAINLEVEL_OUTOF10: 4

## 2018-12-09 ASSESSMENT — PULMONARY FUNCTION TESTS
PIF_VALUE: 9
PIF_VALUE: 27
PIF_VALUE: 27

## 2018-12-09 NOTE — PROGRESS NOTES
Psych       [x] SNF       [] [facility]       [] Other-    Chief Complaint: shortness of breath    Hospital Course:   pt lives at [address] [facility] home (admitted Nov 28, 2018 ); on 12/5/2018 she related to shortness of breath which increased; [facility] nursing notes reviewed and appears issues with oxygenation on 12/1/2018 (88% on RA) and Lasix increased to 40 mg daily from 20 mg daily on 12/3/2018; she went to [facility] and was admitted; she was found to be in HF and was diuresed with Lasix 40 mg IVP TID; her creatinine was at 3.24 and baseline 2.3-2.8; ~0300 she desatted into the 70's and was intubated; she was started on a Diprovan gtt and ws sent to [address] [place] [address] for eval; no family here; pt is sedated and intubated upon arrival here.     Per H&P from [facility] she has had ~21 kg weight gain; she follows with Dr Ricardo Duverney for nephrology and Dr Jory Gant for cardiology.  Echo from 1/2/2018 noted with EF 60%    She was seen by Dr. Ricardo Duverney on 11/2/2018; per his note creatinine at 3.5 at [facility] stay; weight on 11/2/2018 was 78.8 kg; at that appt she was only taking Lasix 20 mg PO daily and that was continued      12/8-->remains on vent; TF started; son at bedside and states the past few days that he spoke to her she c/o shortness of breath and states on 12/6 that pt was unable to speak in complete sentences 2nd to severe dyspnea; voiding very well; [facility] 12/9-->had a good night; diuresed well; on a spontan breathing now     Subjective (past 24 hours): sedated and intubated but follows all commands; does not appear near as puffy as past 2 days    SUPPLEMENTAL O2:       Vent:  Intubation Date:   Vent Day: # 3  Vent Settings:  Vent Mode: PCV+ Rate Set: 12 bmp/ / /FiO2 : 30 %      CURRENT PARENTERAL VASOACTIVE / INOTROPIC AGENTS:  [x] None Vasopressors:  [] Norepinephrine  [] Dopamine  [] Phenylephrine  [] Vasopressin  [] Epinephrine  [] Other:

## 2018-12-10 ENCOUNTER — APPOINTMENT (OUTPATIENT)
Dept: GENERAL RADIOLOGY | Age: 68
DRG: 208 | End: 2018-12-10
Attending: HOSPITALIST
Payer: MEDICARE

## 2018-12-10 ENCOUNTER — APPOINTMENT (OUTPATIENT)
Dept: ULTRASOUND IMAGING | Age: 68
DRG: 208 | End: 2018-12-10
Attending: HOSPITALIST
Payer: MEDICARE

## 2018-12-10 LAB
ANION GAP SERPL CALCULATED.3IONS-SCNC: 13 MEQ/L (ref 8–16)
BUN BLDV-MCNC: 46 MG/DL (ref 7–22)
CALCIUM IONIZED: 1.19 MMOL/L (ref 1.12–1.32)
CALCIUM SERPL-MCNC: 8.8 MG/DL (ref 8.5–10.5)
CHLORIDE BLD-SCNC: 99 MEQ/L (ref 98–111)
CO2: 28 MEQ/L (ref 23–33)
CREAT SERPL-MCNC: 3.1 MG/DL (ref 0.4–1.2)
ERYTHROCYTE [DISTWIDTH] IN BLOOD BY AUTOMATED COUNT: 14.2 % (ref 11.5–14.5)
ERYTHROCYTE [DISTWIDTH] IN BLOOD BY AUTOMATED COUNT: 46.1 FL (ref 35–45)
GFR SERPL CREATININE-BSD FRML MDRD: 15 ML/MIN/1.73M2
GLUCOSE BLD-MCNC: 101 MG/DL (ref 70–108)
GLUCOSE BLD-MCNC: 106 MG/DL (ref 70–108)
GLUCOSE BLD-MCNC: 128 MG/DL (ref 70–108)
GLUCOSE BLD-MCNC: 138 MG/DL (ref 70–108)
GLUCOSE BLD-MCNC: 141 MG/DL (ref 70–108)
GLUCOSE BLD-MCNC: 215 MG/DL (ref 70–108)
GLUCOSE, FLUID: 137 MG/DL
HCT VFR BLD CALC: 29.7 % (ref 37–47)
HEMOGLOBIN: 9.7 GM/DL (ref 12–16)
LD, FLUID: 64 U/L
LD: 161 U/L (ref 100–190)
MAGNESIUM: 2 MG/DL (ref 1.6–2.4)
MCH RBC QN AUTO: 28.6 PG (ref 26–33)
MCHC RBC AUTO-ENTMCNC: 32.7 GM/DL (ref 32.2–35.5)
MCV RBC AUTO: 87.6 FL (ref 81–99)
MRSA SCREEN: NORMAL
PHOSPHORUS: 4 MG/DL (ref 2.4–4.7)
PLATELET # BLD: 340 THOU/MM3 (ref 130–400)
PMV BLD AUTO: 10.3 FL (ref 9.4–12.4)
POTASSIUM SERPL-SCNC: 3.9 MEQ/L (ref 3.5–5.2)
PROTEIN FLUID: 1.8 GM/DL
RBC # BLD: 3.39 MILL/MM3 (ref 4.2–5.4)
SODIUM BLD-SCNC: 140 MEQ/L (ref 135–145)
TOTAL PROTEIN: 5.2 G/DL (ref 6.1–8)
WBC # BLD: 9.5 THOU/MM3 (ref 4.8–10.8)

## 2018-12-10 PROCEDURE — C9113 INJ PANTOPRAZOLE SODIUM, VIA: HCPCS | Performed by: NURSE PRACTITIONER

## 2018-12-10 PROCEDURE — 84100 ASSAY OF PHOSPHORUS: CPT

## 2018-12-10 PROCEDURE — 36592 COLLECT BLOOD FROM PICC: CPT

## 2018-12-10 PROCEDURE — 6360000002 HC RX W HCPCS: Performed by: NURSE PRACTITIONER

## 2018-12-10 PROCEDURE — 82948 REAGENT STRIP/BLOOD GLUCOSE: CPT

## 2018-12-10 PROCEDURE — 87075 CULTR BACTERIA EXCEPT BLOOD: CPT

## 2018-12-10 PROCEDURE — 97162 PT EVAL MOD COMPLEX 30 MIN: CPT

## 2018-12-10 PROCEDURE — 82945 GLUCOSE OTHER FLUID: CPT

## 2018-12-10 PROCEDURE — G8987 SELF CARE CURRENT STATUS: HCPCS

## 2018-12-10 PROCEDURE — G8978 MOBILITY CURRENT STATUS: HCPCS

## 2018-12-10 PROCEDURE — C1729 CATH, DRAINAGE: HCPCS

## 2018-12-10 PROCEDURE — 87205 SMEAR GRAM STAIN: CPT

## 2018-12-10 PROCEDURE — 2580000003 HC RX 258: Performed by: NURSE PRACTITIONER

## 2018-12-10 PROCEDURE — 36415 COLL VENOUS BLD VENIPUNCTURE: CPT

## 2018-12-10 PROCEDURE — 83735 ASSAY OF MAGNESIUM: CPT

## 2018-12-10 PROCEDURE — 87070 CULTURE OTHR SPECIMN AEROBIC: CPT

## 2018-12-10 PROCEDURE — G8979 MOBILITY GOAL STATUS: HCPCS

## 2018-12-10 PROCEDURE — 2060000000 HC ICU INTERMEDIATE R&B

## 2018-12-10 PROCEDURE — 6370000000 HC RX 637 (ALT 250 FOR IP): Performed by: NURSE PRACTITIONER

## 2018-12-10 PROCEDURE — 32555 ASPIRATE PLEURA W/ IMAGING: CPT

## 2018-12-10 PROCEDURE — G8988 SELF CARE GOAL STATUS: HCPCS

## 2018-12-10 PROCEDURE — 99233 SBSQ HOSP IP/OBS HIGH 50: CPT | Performed by: INTERNAL MEDICINE

## 2018-12-10 PROCEDURE — 97166 OT EVAL MOD COMPLEX 45 MIN: CPT

## 2018-12-10 PROCEDURE — 85027 COMPLETE CBC AUTOMATED: CPT

## 2018-12-10 PROCEDURE — 82330 ASSAY OF CALCIUM: CPT

## 2018-12-10 PROCEDURE — 84157 ASSAY OF PROTEIN OTHER: CPT

## 2018-12-10 PROCEDURE — 32555 ASPIRATE PLEURA W/ IMAGING: CPT | Performed by: INTERNAL MEDICINE

## 2018-12-10 PROCEDURE — 0W993ZZ DRAINAGE OF RIGHT PLEURAL CAVITY, PERCUTANEOUS APPROACH: ICD-10-PCS | Performed by: INTERNAL MEDICINE

## 2018-12-10 PROCEDURE — 89050 BODY FLUID CELL COUNT: CPT

## 2018-12-10 PROCEDURE — 97110 THERAPEUTIC EXERCISES: CPT

## 2018-12-10 PROCEDURE — 71045 X-RAY EXAM CHEST 1 VIEW: CPT

## 2018-12-10 PROCEDURE — 83615 LACTATE (LD) (LDH) ENZYME: CPT

## 2018-12-10 PROCEDURE — 80048 BASIC METABOLIC PNL TOTAL CA: CPT

## 2018-12-10 PROCEDURE — 2709999900 HC NON-CHARGEABLE SUPPLY

## 2018-12-10 PROCEDURE — 84155 ASSAY OF PROTEIN SERUM: CPT

## 2018-12-10 PROCEDURE — 2700000000 HC OXYGEN THERAPY PER DAY

## 2018-12-10 PROCEDURE — 97530 THERAPEUTIC ACTIVITIES: CPT

## 2018-12-10 RX ORDER — POLYETHYLENE GLYCOL 3350 17 G/17G
17 POWDER, FOR SOLUTION ORAL DAILY
Status: DISCONTINUED | OUTPATIENT
Start: 2018-12-10 | End: 2018-12-17 | Stop reason: HOSPADM

## 2018-12-10 RX ADMIN — CLONIDINE HYDROCHLORIDE 0.2 MG: 0.2 TABLET ORAL at 12:58

## 2018-12-10 RX ADMIN — HYDRALAZINE HYDROCHLORIDE 50 MG: 50 TABLET, FILM COATED ORAL at 12:58

## 2018-12-10 RX ADMIN — ACETAMINOPHEN 650 MG: 325 TABLET ORAL at 16:21

## 2018-12-10 RX ADMIN — LEVOTHYROXINE SODIUM 25 MCG: 25 TABLET ORAL at 06:18

## 2018-12-10 RX ADMIN — HYDRALAZINE HYDROCHLORIDE 10 MG: 20 INJECTION INTRAMUSCULAR; INTRAVENOUS at 07:03

## 2018-12-10 RX ADMIN — INSULIN LISPRO 2 UNITS: 100 INJECTION, SOLUTION INTRAVENOUS; SUBCUTANEOUS at 21:58

## 2018-12-10 RX ADMIN — DOCUSATE SODIUM 100 MG: 100 CAPSULE, LIQUID FILLED ORAL at 19:38

## 2018-12-10 RX ADMIN — INSULIN GLARGINE 5 UNITS: 100 INJECTION, SOLUTION SUBCUTANEOUS at 08:27

## 2018-12-10 RX ADMIN — HEPARIN SODIUM 5000 UNITS: 5000 INJECTION INTRAVENOUS; SUBCUTANEOUS at 21:43

## 2018-12-10 RX ADMIN — HYDRALAZINE HYDROCHLORIDE 50 MG: 50 TABLET, FILM COATED ORAL at 19:37

## 2018-12-10 RX ADMIN — CLONIDINE HYDROCHLORIDE 0.2 MG: 0.2 TABLET ORAL at 19:37

## 2018-12-10 RX ADMIN — POLYETHYLENE GLYCOL 3350 17 G: 17 POWDER, FOR SOLUTION ORAL at 16:21

## 2018-12-10 RX ADMIN — Medication 10 ML: at 19:39

## 2018-12-10 RX ADMIN — Medication 10 ML: at 08:27

## 2018-12-10 RX ADMIN — PRAZOSIN HYDROCHLORIDE 1 MG: 1 CAPSULE ORAL at 08:26

## 2018-12-10 RX ADMIN — HYDRALAZINE HYDROCHLORIDE 50 MG: 50 TABLET, FILM COATED ORAL at 08:26

## 2018-12-10 RX ADMIN — DOCUSATE SODIUM 100 MG: 100 CAPSULE, LIQUID FILLED ORAL at 08:26

## 2018-12-10 RX ADMIN — HEPARIN SODIUM 5000 UNITS: 5000 INJECTION INTRAVENOUS; SUBCUTANEOUS at 06:18

## 2018-12-10 RX ADMIN — ACETAMINOPHEN 650 MG: 325 TABLET ORAL at 04:18

## 2018-12-10 RX ADMIN — INSULIN GLARGINE 5 UNITS: 100 INJECTION, SOLUTION SUBCUTANEOUS at 21:57

## 2018-12-10 RX ADMIN — PANTOPRAZOLE SODIUM 40 MG: 40 INJECTION, POWDER, FOR SOLUTION INTRAVENOUS at 08:26

## 2018-12-10 RX ADMIN — PRAZOSIN HYDROCHLORIDE 1 MG: 1 CAPSULE ORAL at 19:37

## 2018-12-10 RX ADMIN — CLONIDINE HYDROCHLORIDE 0.2 MG: 0.2 TABLET ORAL at 08:26

## 2018-12-10 RX ADMIN — HYDRALAZINE HYDROCHLORIDE 10 MG: 20 INJECTION INTRAMUSCULAR; INTRAVENOUS at 01:09

## 2018-12-10 ASSESSMENT — PAIN SCALES - GENERAL
PAINLEVEL_OUTOF10: 5
PAINLEVEL_OUTOF10: 8

## 2018-12-10 ASSESSMENT — PAIN DESCRIPTION - PAIN TYPE
TYPE: CHRONIC PAIN
TYPE: CHRONIC PAIN

## 2018-12-10 ASSESSMENT — PAIN DESCRIPTION - ORIENTATION
ORIENTATION: LOWER
ORIENTATION: LOWER

## 2018-12-10 ASSESSMENT — PAIN DESCRIPTION - LOCATION
LOCATION: BACK
LOCATION: BACK

## 2018-12-10 NOTE — PROCEDURES
Thoracentesis Procedure Note    Pre-operative Diagnosis: Pleural Effusion, right     Post-operative Diagnosis: Same    Indications: Bilateral pleural effusions     US interpretation:  US was utilized to identify pocket of fluid. Procedure Details:  Informed consent was obtained. The risks of the procedure were discussed including: infection, bleeding, pain, pneumothorax and failure to remove fluid at all. Under sterile conditions the patient was properly positioned. A standard kit was used with Chorhexidine solution and sterile drapes. The entry site had been previously identified by ultrasound and marked. 2% Lidocaine was used to anesthetize the rib space at the entry site. A 16 gauge Angiocath on a syringe was inserted into the right pleural space above the rib margin with good return of fluid followed by catheter advancement and removal of needle. Fluid was removed through the catheter connected to a vacutainer bottle without any difficulties. No blood was noted. After the fluid was drained to completion, the catheter was removed and pressure held over the entry site. A Band-aid was applied to the wound and the procedure completed. EBL < 5 ml. Findings: 1200 ml of slightly yellow pleural fluid was removed. Fluid Color:  Clear and yellow    Complications:  None; patient tolerated the procedure well.           Condition: Stable      Electronically signed by     Antonio Lambert MD on 12/10/2018 at 2:25 PM

## 2018-12-10 NOTE — FLOWSHEET NOTE
1324: Dr. Bienvenido Song at bedside preparing for Thoracentesis. 1336: thoracentesis complete. X-ray ordered.  1.2L removed

## 2018-12-10 NOTE — PROGRESS NOTES
Arrived to [phone] from ICU. IV Bumex at 2 ml/hour. Oriented to room and call light in reach.  No complaints offered

## 2018-12-11 ENCOUNTER — APPOINTMENT (OUTPATIENT)
Dept: ULTRASOUND IMAGING | Age: 68
DRG: 208 | End: 2018-12-11
Attending: HOSPITALIST
Payer: MEDICARE

## 2018-12-11 LAB
ANION GAP SERPL CALCULATED.3IONS-SCNC: 9 MEQ/L (ref 8–16)
BODY FLUID RBC: < 2000 /CUMM
BUN BLDV-MCNC: 42 MG/DL (ref 7–22)
CALCIUM SERPL-MCNC: 8.7 MG/DL (ref 8.5–10.5)
CHARACTER, BODY FLUID: NORMAL
CHLORIDE BLD-SCNC: 94 MEQ/L (ref 98–111)
CO2: 31 MEQ/L (ref 23–33)
COLOR: YELLOW
CREAT SERPL-MCNC: 3 MG/DL (ref 0.4–1.2)
GFR SERPL CREATININE-BSD FRML MDRD: 16 ML/MIN/1.73M2
GLUCOSE BLD-MCNC: 120 MG/DL (ref 70–108)
GLUCOSE BLD-MCNC: 128 MG/DL (ref 70–108)
GLUCOSE BLD-MCNC: 147 MG/DL (ref 70–108)
GLUCOSE BLD-MCNC: 182 MG/DL (ref 70–108)
GLUCOSE BLD-MCNC: 245 MG/DL (ref 70–108)
MESOTHELIAL CELLS BODY FLUID: NORMAL
MONONUCLEAR CELLS BODY FLUID: 86.1 %
PATHOLOGIST REVIEW: NORMAL
POLYMORPHONUCLEAR CELLS BODY FLUID: 13.9 %
POTASSIUM SERPL-SCNC: 3.7 MEQ/L (ref 3.5–5.2)
SODIUM BLD-SCNC: 134 MEQ/L (ref 135–145)
SPECIMEN: NORMAL
TOTAL NUCLEATED CELLS BODY FLUID: 338 /CUMM (ref 0–500)
TOTAL VOLUME RECEIVED BODY FLUID: 22 ML

## 2018-12-11 PROCEDURE — 97530 THERAPEUTIC ACTIVITIES: CPT

## 2018-12-11 PROCEDURE — 80048 BASIC METABOLIC PNL TOTAL CA: CPT

## 2018-12-11 PROCEDURE — 97110 THERAPEUTIC EXERCISES: CPT

## 2018-12-11 PROCEDURE — 6370000000 HC RX 637 (ALT 250 FOR IP): Performed by: NURSE PRACTITIONER

## 2018-12-11 PROCEDURE — 2060000000 HC ICU INTERMEDIATE R&B

## 2018-12-11 PROCEDURE — 94667 MNPJ CHEST WALL 1ST: CPT

## 2018-12-11 PROCEDURE — C9113 INJ PANTOPRAZOLE SODIUM, VIA: HCPCS | Performed by: NURSE PRACTITIONER

## 2018-12-11 PROCEDURE — 2500000003 HC RX 250 WO HCPCS: Performed by: INTERNAL MEDICINE

## 2018-12-11 PROCEDURE — 99233 SBSQ HOSP IP/OBS HIGH 50: CPT | Performed by: INTERNAL MEDICINE

## 2018-12-11 PROCEDURE — 2709999900 HC NON-CHARGEABLE SUPPLY

## 2018-12-11 PROCEDURE — 32555 ASPIRATE PLEURA W/ IMAGING: CPT | Performed by: INTERNAL MEDICINE

## 2018-12-11 PROCEDURE — 32555 ASPIRATE PLEURA W/ IMAGING: CPT

## 2018-12-11 PROCEDURE — 36592 COLLECT BLOOD FROM PICC: CPT

## 2018-12-11 PROCEDURE — 82948 REAGENT STRIP/BLOOD GLUCOSE: CPT

## 2018-12-11 PROCEDURE — 36415 COLL VENOUS BLD VENIPUNCTURE: CPT

## 2018-12-11 PROCEDURE — 6360000002 HC RX W HCPCS: Performed by: NURSE PRACTITIONER

## 2018-12-11 PROCEDURE — 2580000003 HC RX 258: Performed by: NURSE PRACTITIONER

## 2018-12-11 PROCEDURE — 0W9B3ZZ DRAINAGE OF LEFT PLEURAL CAVITY, PERCUTANEOUS APPROACH: ICD-10-PCS | Performed by: INTERNAL MEDICINE

## 2018-12-11 PROCEDURE — 6370000000 HC RX 637 (ALT 250 FOR IP): Performed by: INTERNAL MEDICINE

## 2018-12-11 PROCEDURE — 99222 1ST HOSP IP/OBS MODERATE 55: CPT | Performed by: INTERNAL MEDICINE

## 2018-12-11 RX ORDER — LIDOCAINE HYDROCHLORIDE 20 MG/ML
15 INJECTION, SOLUTION INFILTRATION; PERINEURAL ONCE
Status: COMPLETED | OUTPATIENT
Start: 2018-12-11 | End: 2018-12-11

## 2018-12-11 RX ORDER — ISOSORBIDE DINITRATE 20 MG/1
20 TABLET ORAL 3 TIMES DAILY
Status: DISCONTINUED | OUTPATIENT
Start: 2018-12-11 | End: 2018-12-11

## 2018-12-11 RX ORDER — HYDROXYZINE PAMOATE 25 MG/1
25 CAPSULE ORAL 2 TIMES DAILY PRN
COMMUNITY
End: 2019-03-13

## 2018-12-11 RX ADMIN — HYDRALAZINE HYDROCHLORIDE 50 MG: 50 TABLET, FILM COATED ORAL at 08:23

## 2018-12-11 RX ADMIN — INSULIN LISPRO 1 UNITS: 100 INJECTION, SOLUTION INTRAVENOUS; SUBCUTANEOUS at 20:44

## 2018-12-11 RX ADMIN — CLONIDINE HYDROCHLORIDE 0.2 MG: 0.2 TABLET ORAL at 08:23

## 2018-12-11 RX ADMIN — LIDOCAINE HYDROCHLORIDE 10 ML: 20 INJECTION, SOLUTION INFILTRATION; PERINEURAL at 13:30

## 2018-12-11 RX ADMIN — POLYETHYLENE GLYCOL 3350 17 G: 17 POWDER, FOR SOLUTION ORAL at 08:26

## 2018-12-11 RX ADMIN — CLONIDINE HYDROCHLORIDE 0.2 MG: 0.2 TABLET ORAL at 14:34

## 2018-12-11 RX ADMIN — INSULIN GLARGINE 5 UNITS: 100 INJECTION, SOLUTION SUBCUTANEOUS at 22:27

## 2018-12-11 RX ADMIN — Medication 10 ML: at 08:24

## 2018-12-11 RX ADMIN — LEVOTHYROXINE SODIUM 25 MCG: 25 TABLET ORAL at 07:07

## 2018-12-11 RX ADMIN — ISOSORBIDE DINITRATE 20 MG: 20 TABLET ORAL at 09:57

## 2018-12-11 RX ADMIN — PRAZOSIN HYDROCHLORIDE 1 MG: 1 CAPSULE ORAL at 08:23

## 2018-12-11 RX ADMIN — INSULIN GLARGINE 5 UNITS: 100 INJECTION, SOLUTION SUBCUTANEOUS at 08:28

## 2018-12-11 RX ADMIN — PRAZOSIN HYDROCHLORIDE 1 MG: 1 CAPSULE ORAL at 20:42

## 2018-12-11 RX ADMIN — PANTOPRAZOLE SODIUM 40 MG: 40 INJECTION, POWDER, FOR SOLUTION INTRAVENOUS at 08:23

## 2018-12-11 RX ADMIN — ACETAMINOPHEN 650 MG: 325 TABLET ORAL at 20:42

## 2018-12-11 RX ADMIN — Medication 10 ML: at 20:42

## 2018-12-11 RX ADMIN — DOCUSATE SODIUM 100 MG: 100 CAPSULE, LIQUID FILLED ORAL at 20:42

## 2018-12-11 RX ADMIN — INSULIN LISPRO 2 UNITS: 100 INJECTION, SOLUTION INTRAVENOUS; SUBCUTANEOUS at 16:54

## 2018-12-11 RX ADMIN — INSULIN LISPRO 4 UNITS: 100 INJECTION, SOLUTION INTRAVENOUS; SUBCUTANEOUS at 11:55

## 2018-12-11 RX ADMIN — HYDRALAZINE HYDROCHLORIDE 10 MG: 20 INJECTION INTRAMUSCULAR; INTRAVENOUS at 02:56

## 2018-12-11 RX ADMIN — DOCUSATE SODIUM 100 MG: 100 CAPSULE, LIQUID FILLED ORAL at 08:26

## 2018-12-11 RX ADMIN — HEPARIN SODIUM 5000 UNITS: 5000 INJECTION INTRAVENOUS; SUBCUTANEOUS at 20:44

## 2018-12-11 RX ADMIN — ACETAMINOPHEN 650 MG: 325 TABLET ORAL at 00:05

## 2018-12-11 RX ADMIN — CLONIDINE HYDROCHLORIDE 0.2 MG: 0.2 TABLET ORAL at 20:42

## 2018-12-11 RX ADMIN — HYDRALAZINE HYDROCHLORIDE 50 MG: 50 TABLET, FILM COATED ORAL at 20:42

## 2018-12-11 RX ADMIN — HEPARIN SODIUM 5000 UNITS: 5000 INJECTION INTRAVENOUS; SUBCUTANEOUS at 14:13

## 2018-12-11 RX ADMIN — ONDANSETRON 4 MG: 2 INJECTION INTRAMUSCULAR; INTRAVENOUS at 00:05

## 2018-12-11 ASSESSMENT — PAIN SCALES - GENERAL
PAINLEVEL_OUTOF10: 0
PAINLEVEL_OUTOF10: 0
PAINLEVEL_OUTOF10: 5
PAINLEVEL_OUTOF10: 2

## 2018-12-11 ASSESSMENT — ENCOUNTER SYMPTOMS
EYES NEGATIVE: 1
SORE THROAT: 0
BACK PAIN: 0
COUGH: 1
NAUSEA: 0
SHORTNESS OF BREATH: 1
VOMITING: 0

## 2018-12-11 NOTE — CONSULTS
warm and dry. No rash noted. She is not diaphoretic. No erythema. Psychiatric: She has a normal mood and affect. Her behavior is normal.   Vitals reviewed. Vitals:    12/11/18 1138   BP: 125/60   Pulse: 72   Resp: 16   Temp: 99 °F (37.2 °C)   SpO2: 93%     Labs, Radiology and Tests       Recent Labs      12/09/18   0552  12/10/18   0510   WBC  8.6  9.5   RBC  2.98*  3.39*   HGB  8.6*  9.7*   HCT  26.3*  29.7*   MCV  88.3  87.6   MCH  28.9  28.6   MCHC  32.7  32.7   PLT  325  340     Recent Labs      12/09/18   0552  12/10/18   0510  12/10/18   1450  12/11/18   0620   NA  136  140   --   134*   K  3.8  3.9   --   3.7   CL  97*  99   --   94*   CO2  25  28   --   31   BUN  50*  46*   --   42*   CREATININE  3.3*  3.1*   --   3.0*   CALCIUM  8.5  8.8   --   8.7   PROT   --    --   5.2*   --        Radiology :Chest x-ray reviewed by me shows bilateral pleural effusion left greater than right, improving pulmonary vascular congestion. Other : All laboratory data have been reviewed and records from my office have been reviewed and noted that patient's based and creatinine in October has stabilized around 2.8    Echocardiogram-12/18 shows an ejection fraction 50-55%    Assessment    Renal - Mild acute kidney injury on chronic kidney disease mostly secondary to cardiorenal etiology  - Overall renal function appears to be stable at baseline.  - Patient's volume status is doing well. Her weight is down significantly.        -     We'll stop the Bumex drip later this afternoon and start intermittent dosing may be from tomorrow based on the lab work    Mild hyponatremia overall stable  Acid-base status stable however the bicarbonate is rising to 31 still reasonable limits.   This discontinuation of the diuretic drip will help  Anemia stable  Congestive heart failure-currently resolved plan as mentioned above  Essential hypertension running well continue current medications  Diabetes mellitus  meds reviewed and D/W

## 2018-12-12 LAB
ALBUMIN SERPL-MCNC: 2.6 G/DL (ref 3.5–5.1)
ALP BLD-CCNC: 105 U/L (ref 38–126)
ALT SERPL-CCNC: 8 U/L (ref 11–66)
ANION GAP SERPL CALCULATED.3IONS-SCNC: 9 MEQ/L (ref 8–16)
AST SERPL-CCNC: 16 U/L (ref 5–40)
BASOPHILS # BLD: 0.5 %
BASOPHILS ABSOLUTE: 0.1 THOU/MM3 (ref 0–0.1)
BILIRUB SERPL-MCNC: 0.3 MG/DL (ref 0.3–1.2)
BUN BLDV-MCNC: 40 MG/DL (ref 7–22)
CALCIUM SERPL-MCNC: 8.5 MG/DL (ref 8.5–10.5)
CHLORIDE BLD-SCNC: 93 MEQ/L (ref 98–111)
CO2: 32 MEQ/L (ref 23–33)
CREAT SERPL-MCNC: 2.9 MG/DL (ref 0.4–1.2)
EOSINOPHIL # BLD: 5.6 %
EOSINOPHILS ABSOLUTE: 0.6 THOU/MM3 (ref 0–0.4)
ERYTHROCYTE [DISTWIDTH] IN BLOOD BY AUTOMATED COUNT: 14 % (ref 11.5–14.5)
ERYTHROCYTE [DISTWIDTH] IN BLOOD BY AUTOMATED COUNT: 45 FL (ref 35–45)
GFR SERPL CREATININE-BSD FRML MDRD: 16 ML/MIN/1.73M2
GLUCOSE BLD-MCNC: 133 MG/DL (ref 70–108)
GLUCOSE BLD-MCNC: 136 MG/DL (ref 70–108)
GLUCOSE BLD-MCNC: 154 MG/DL (ref 70–108)
GLUCOSE BLD-MCNC: 166 MG/DL (ref 70–108)
GLUCOSE BLD-MCNC: 208 MG/DL (ref 70–108)
HCT VFR BLD CALC: 27.5 % (ref 37–47)
HEMOGLOBIN: 8.9 GM/DL (ref 12–16)
IMMATURE GRANS (ABS): 0.1 THOU/MM3 (ref 0–0.07)
IMMATURE GRANULOCYTES: 1 %
LYMPHOCYTES # BLD: 17 %
LYMPHOCYTES ABSOLUTE: 1.8 THOU/MM3 (ref 1–4.8)
MAGNESIUM: 1.7 MG/DL (ref 1.6–2.4)
MCH RBC QN AUTO: 28.6 PG (ref 26–33)
MCHC RBC AUTO-ENTMCNC: 32.4 GM/DL (ref 32.2–35.5)
MCV RBC AUTO: 88.4 FL (ref 81–99)
MONOCYTES # BLD: 8.6 %
MONOCYTES ABSOLUTE: 0.9 THOU/MM3 (ref 0.4–1.3)
NUCLEATED RED BLOOD CELLS: 0 /100 WBC
PLATELET # BLD: 323 THOU/MM3 (ref 130–400)
PMV BLD AUTO: 10.6 FL (ref 9.4–12.4)
POTASSIUM SERPL-SCNC: 3.8 MEQ/L (ref 3.5–5.2)
RBC # BLD: 3.11 MILL/MM3 (ref 4.2–5.4)
SEG NEUTROPHILS: 67.3 %
SEGMENTED NEUTROPHILS ABSOLUTE COUNT: 6.9 THOU/MM3 (ref 1.8–7.7)
SODIUM BLD-SCNC: 134 MEQ/L (ref 135–145)
TOTAL PROTEIN: 5 G/DL (ref 6.1–8)
WBC # BLD: 10.3 THOU/MM3 (ref 4.8–10.8)

## 2018-12-12 PROCEDURE — 97116 GAIT TRAINING THERAPY: CPT

## 2018-12-12 PROCEDURE — 6370000000 HC RX 637 (ALT 250 FOR IP): Performed by: NURSE PRACTITIONER

## 2018-12-12 PROCEDURE — 2580000003 HC RX 258: Performed by: NURSE PRACTITIONER

## 2018-12-12 PROCEDURE — 6370000000 HC RX 637 (ALT 250 FOR IP): Performed by: INTERNAL MEDICINE

## 2018-12-12 PROCEDURE — 97110 THERAPEUTIC EXERCISES: CPT

## 2018-12-12 PROCEDURE — 85025 COMPLETE CBC W/AUTO DIFF WBC: CPT

## 2018-12-12 PROCEDURE — 99233 SBSQ HOSP IP/OBS HIGH 50: CPT | Performed by: INTERNAL MEDICINE

## 2018-12-12 PROCEDURE — 82948 REAGENT STRIP/BLOOD GLUCOSE: CPT

## 2018-12-12 PROCEDURE — 99232 SBSQ HOSP IP/OBS MODERATE 35: CPT | Performed by: INTERNAL MEDICINE

## 2018-12-12 PROCEDURE — 36592 COLLECT BLOOD FROM PICC: CPT

## 2018-12-12 PROCEDURE — 80053 COMPREHEN METABOLIC PANEL: CPT

## 2018-12-12 PROCEDURE — 83735 ASSAY OF MAGNESIUM: CPT

## 2018-12-12 PROCEDURE — 36415 COLL VENOUS BLD VENIPUNCTURE: CPT

## 2018-12-12 PROCEDURE — 2060000000 HC ICU INTERMEDIATE R&B

## 2018-12-12 PROCEDURE — 6360000002 HC RX W HCPCS: Performed by: NURSE PRACTITIONER

## 2018-12-12 PROCEDURE — 94760 N-INVAS EAR/PLS OXIMETRY 1: CPT

## 2018-12-12 RX ORDER — PANTOPRAZOLE SODIUM 40 MG/1
40 TABLET, DELAYED RELEASE ORAL
Status: DISCONTINUED | OUTPATIENT
Start: 2018-12-12 | End: 2018-12-17 | Stop reason: HOSPADM

## 2018-12-12 RX ORDER — FUROSEMIDE 40 MG/1
40 TABLET ORAL DAILY
Status: DISCONTINUED | OUTPATIENT
Start: 2018-12-13 | End: 2018-12-13

## 2018-12-12 RX ORDER — ISOSORBIDE DINITRATE 20 MG/1
20 TABLET ORAL 3 TIMES DAILY
Status: DISCONTINUED | OUTPATIENT
Start: 2018-12-12 | End: 2018-12-13

## 2018-12-12 RX ADMIN — ISOSORBIDE DINITRATE 20 MG: 20 TABLET ORAL at 14:34

## 2018-12-12 RX ADMIN — LEVOTHYROXINE SODIUM 25 MCG: 25 TABLET ORAL at 05:04

## 2018-12-12 RX ADMIN — CLONIDINE HYDROCHLORIDE 0.2 MG: 0.2 TABLET ORAL at 09:10

## 2018-12-12 RX ADMIN — PRAZOSIN HYDROCHLORIDE 1 MG: 1 CAPSULE ORAL at 09:10

## 2018-12-12 RX ADMIN — ONDANSETRON 4 MG: 2 INJECTION INTRAMUSCULAR; INTRAVENOUS at 22:16

## 2018-12-12 RX ADMIN — DOCUSATE SODIUM 100 MG: 100 CAPSULE, LIQUID FILLED ORAL at 20:52

## 2018-12-12 RX ADMIN — HYDRALAZINE HYDROCHLORIDE 50 MG: 50 TABLET, FILM COATED ORAL at 20:48

## 2018-12-12 RX ADMIN — HYDRALAZINE HYDROCHLORIDE 50 MG: 50 TABLET, FILM COATED ORAL at 09:10

## 2018-12-12 RX ADMIN — CLONIDINE HYDROCHLORIDE 0.2 MG: 0.2 TABLET ORAL at 20:48

## 2018-12-12 RX ADMIN — ISOSORBIDE DINITRATE 20 MG: 20 TABLET ORAL at 09:10

## 2018-12-12 RX ADMIN — ONDANSETRON 4 MG: 2 INJECTION INTRAMUSCULAR; INTRAVENOUS at 14:32

## 2018-12-12 RX ADMIN — HYDRALAZINE HYDROCHLORIDE 50 MG: 50 TABLET, FILM COATED ORAL at 14:34

## 2018-12-12 RX ADMIN — ISOSORBIDE DINITRATE 20 MG: 20 TABLET ORAL at 20:48

## 2018-12-12 RX ADMIN — PANTOPRAZOLE SODIUM 40 MG: 40 TABLET, DELAYED RELEASE ORAL at 09:37

## 2018-12-12 RX ADMIN — HEPARIN SODIUM 5000 UNITS: 5000 INJECTION INTRAVENOUS; SUBCUTANEOUS at 23:34

## 2018-12-12 RX ADMIN — ACETAMINOPHEN 650 MG: 325 TABLET ORAL at 22:15

## 2018-12-12 RX ADMIN — PRAZOSIN HYDROCHLORIDE 1 MG: 1 CAPSULE ORAL at 20:48

## 2018-12-12 RX ADMIN — HYDRALAZINE HYDROCHLORIDE 10 MG: 20 INJECTION INTRAMUSCULAR; INTRAVENOUS at 03:27

## 2018-12-12 RX ADMIN — INSULIN LISPRO 4 UNITS: 100 INJECTION, SOLUTION INTRAVENOUS; SUBCUTANEOUS at 12:17

## 2018-12-12 RX ADMIN — CLONIDINE HYDROCHLORIDE 0.2 MG: 0.2 TABLET ORAL at 14:34

## 2018-12-12 RX ADMIN — Medication 10 ML: at 20:51

## 2018-12-12 RX ADMIN — INSULIN GLARGINE 5 UNITS: 100 INJECTION, SOLUTION SUBCUTANEOUS at 09:10

## 2018-12-12 RX ADMIN — ACETAMINOPHEN 650 MG: 325 TABLET ORAL at 03:30

## 2018-12-12 RX ADMIN — DOCUSATE SODIUM 100 MG: 100 CAPSULE, LIQUID FILLED ORAL at 09:10

## 2018-12-12 RX ADMIN — HEPARIN SODIUM 5000 UNITS: 5000 INJECTION INTRAVENOUS; SUBCUTANEOUS at 05:04

## 2018-12-12 RX ADMIN — INSULIN LISPRO 1 UNITS: 100 INJECTION, SOLUTION INTRAVENOUS; SUBCUTANEOUS at 20:49

## 2018-12-12 RX ADMIN — HEPARIN SODIUM 5000 UNITS: 5000 INJECTION INTRAVENOUS; SUBCUTANEOUS at 14:34

## 2018-12-12 RX ADMIN — Medication 10 ML: at 09:37

## 2018-12-12 RX ADMIN — INSULIN GLARGINE 5 UNITS: 100 INJECTION, SOLUTION SUBCUTANEOUS at 22:14

## 2018-12-12 RX ADMIN — ONDANSETRON 4 MG: 2 INJECTION INTRAMUSCULAR; INTRAVENOUS at 14:34

## 2018-12-12 RX ADMIN — INSULIN LISPRO 2 UNITS: 100 INJECTION, SOLUTION INTRAVENOUS; SUBCUTANEOUS at 17:11

## 2018-12-12 RX ADMIN — POLYETHYLENE GLYCOL 3350 17 G: 17 POWDER, FOR SOLUTION ORAL at 09:10

## 2018-12-12 ASSESSMENT — PAIN DESCRIPTION - DESCRIPTORS
DESCRIPTORS: ACHING;DISCOMFORT
DESCRIPTORS: ACHING;DISCOMFORT

## 2018-12-12 ASSESSMENT — PAIN DESCRIPTION - ORIENTATION
ORIENTATION: ANTERIOR
ORIENTATION: ANTERIOR

## 2018-12-12 ASSESSMENT — PAIN DESCRIPTION - FREQUENCY
FREQUENCY: CONTINUOUS
FREQUENCY: INTERMITTENT

## 2018-12-12 ASSESSMENT — PAIN SCALES - GENERAL
PAINLEVEL_OUTOF10: 5
PAINLEVEL_OUTOF10: 1
PAINLEVEL_OUTOF10: 0
PAINLEVEL_OUTOF10: 5
PAINLEVEL_OUTOF10: 0

## 2018-12-12 ASSESSMENT — PAIN DESCRIPTION - LOCATION
LOCATION: NECK
LOCATION: NECK

## 2018-12-12 ASSESSMENT — PAIN DESCRIPTION - PAIN TYPE
TYPE: ACUTE PAIN
TYPE: ACUTE PAIN

## 2018-12-12 ASSESSMENT — PAIN DESCRIPTION - ONSET
ONSET: ON-GOING
ONSET: PROGRESSIVE

## 2018-12-12 ASSESSMENT — PAIN DESCRIPTION - PROGRESSION
CLINICAL_PROGRESSION: GRADUALLY WORSENING
CLINICAL_PROGRESSION: NOT CHANGED

## 2018-12-12 NOTE — PROGRESS NOTES
Lantus. 7. Essential HTN, uncontrolled. Improved. on Bumex gtt; on Hydralazine, Minipress, clonidine, and Isordil. 8. Mild MVR/TVR  9. Hypothyroidism. Euthyroid, treated. 10. Normocytic anemia. HH stable. 11. Leukocytosis, resolved. 12. Hyponatremia, resolved. 13. History of Seizure Disorder, New-onset in January 2018.  Type of seizure activity unknown. Continue to monitor off seizure medications at this time. 14. Obesity with BMI 31.4  15. Constipation, add Miralax. Dispo: just weaned to RA today, will monitor overnight and if respiratory status remains appropriate will discharge tomorrow to nursing home in [facility]".        Electronically signed by Madison Echevarria MD on 12/12/2018 at 10:26 AM

## 2018-12-13 LAB
ALBUMIN SERPL-MCNC: 2.8 G/DL (ref 3.5–5.1)
ALP BLD-CCNC: 108 U/L (ref 38–126)
ALT SERPL-CCNC: 12 U/L (ref 11–66)
AMMONIA: 21 UMOL/L (ref 11–60)
ANION GAP SERPL CALCULATED.3IONS-SCNC: 11 MEQ/L (ref 8–16)
AST SERPL-CCNC: 19 U/L (ref 5–40)
BASOPHILS # BLD: 0.7 %
BASOPHILS ABSOLUTE: 0.1 THOU/MM3 (ref 0–0.1)
BILIRUB SERPL-MCNC: 0.3 MG/DL (ref 0.3–1.2)
BILIRUBIN DIRECT: < 0.2 MG/DL (ref 0–0.3)
BUN BLDV-MCNC: 40 MG/DL (ref 7–22)
CALCIUM SERPL-MCNC: 8.8 MG/DL (ref 8.5–10.5)
CHLORIDE BLD-SCNC: 94 MEQ/L (ref 98–111)
CO2: 30 MEQ/L (ref 23–33)
CREAT SERPL-MCNC: 3.1 MG/DL (ref 0.4–1.2)
EOSINOPHIL # BLD: 4.4 %
EOSINOPHILS ABSOLUTE: 0.4 THOU/MM3 (ref 0–0.4)
ERYTHROCYTE [DISTWIDTH] IN BLOOD BY AUTOMATED COUNT: 13.7 % (ref 11.5–14.5)
ERYTHROCYTE [DISTWIDTH] IN BLOOD BY AUTOMATED COUNT: 44.5 FL (ref 35–45)
GFR SERPL CREATININE-BSD FRML MDRD: 15 ML/MIN/1.73M2
GLUCOSE BLD-MCNC: 145 MG/DL (ref 70–108)
GLUCOSE BLD-MCNC: 150 MG/DL (ref 70–108)
GLUCOSE BLD-MCNC: 162 MG/DL (ref 70–108)
GLUCOSE BLD-MCNC: 187 MG/DL (ref 70–108)
GLUCOSE BLD-MCNC: 204 MG/DL (ref 70–108)
HCT VFR BLD CALC: 28 % (ref 37–47)
HEMOGLOBIN: 8.9 GM/DL (ref 12–16)
IMMATURE GRANS (ABS): 0.11 THOU/MM3 (ref 0–0.07)
IMMATURE GRANULOCYTES: 1.1 %
LYMPHOCYTES # BLD: 18.7 %
LYMPHOCYTES ABSOLUTE: 1.8 THOU/MM3 (ref 1–4.8)
MAGNESIUM: 1.8 MG/DL (ref 1.6–2.4)
MCH RBC QN AUTO: 28.1 PG (ref 26–33)
MCHC RBC AUTO-ENTMCNC: 31.8 GM/DL (ref 32.2–35.5)
MCV RBC AUTO: 88.3 FL (ref 81–99)
MONOCYTES # BLD: 7.5 %
MONOCYTES ABSOLUTE: 0.7 THOU/MM3 (ref 0.4–1.3)
NUCLEATED RED BLOOD CELLS: 0 /100 WBC
PLATELET # BLD: 329 THOU/MM3 (ref 130–400)
PMV BLD AUTO: 11.2 FL (ref 9.4–12.4)
POTASSIUM SERPL-SCNC: 4 MEQ/L (ref 3.5–5.2)
RBC # BLD: 3.17 MILL/MM3 (ref 4.2–5.4)
SEG NEUTROPHILS: 67.6 %
SEGMENTED NEUTROPHILS ABSOLUTE COUNT: 6.6 THOU/MM3 (ref 1.8–7.7)
SODIUM BLD-SCNC: 135 MEQ/L (ref 135–145)
TOTAL PROTEIN: 5.3 G/DL (ref 6.1–8)
WBC # BLD: 9.7 THOU/MM3 (ref 4.8–10.8)

## 2018-12-13 PROCEDURE — 2709999900 HC NON-CHARGEABLE SUPPLY

## 2018-12-13 PROCEDURE — 2580000003 HC RX 258: Performed by: NURSE PRACTITIONER

## 2018-12-13 PROCEDURE — 6370000000 HC RX 637 (ALT 250 FOR IP): Performed by: NURSE PRACTITIONER

## 2018-12-13 PROCEDURE — 83735 ASSAY OF MAGNESIUM: CPT

## 2018-12-13 PROCEDURE — 80053 COMPREHEN METABOLIC PANEL: CPT

## 2018-12-13 PROCEDURE — 99233 SBSQ HOSP IP/OBS HIGH 50: CPT | Performed by: INTERNAL MEDICINE

## 2018-12-13 PROCEDURE — 82140 ASSAY OF AMMONIA: CPT

## 2018-12-13 PROCEDURE — 6370000000 HC RX 637 (ALT 250 FOR IP): Performed by: INTERNAL MEDICINE

## 2018-12-13 PROCEDURE — 6360000002 HC RX W HCPCS: Performed by: NURSE PRACTITIONER

## 2018-12-13 PROCEDURE — 82948 REAGENT STRIP/BLOOD GLUCOSE: CPT

## 2018-12-13 PROCEDURE — 82248 BILIRUBIN DIRECT: CPT

## 2018-12-13 PROCEDURE — 99232 SBSQ HOSP IP/OBS MODERATE 35: CPT | Performed by: INTERNAL MEDICINE

## 2018-12-13 PROCEDURE — 36592 COLLECT BLOOD FROM PICC: CPT

## 2018-12-13 PROCEDURE — 85025 COMPLETE CBC W/AUTO DIFF WBC: CPT

## 2018-12-13 PROCEDURE — 2060000000 HC ICU INTERMEDIATE R&B

## 2018-12-13 PROCEDURE — 6360000002 HC RX W HCPCS: Performed by: INTERNAL MEDICINE

## 2018-12-13 PROCEDURE — 36415 COLL VENOUS BLD VENIPUNCTURE: CPT

## 2018-12-13 RX ORDER — FUROSEMIDE 40 MG/1
40 TABLET ORAL DAILY
Status: DISCONTINUED | OUTPATIENT
Start: 2018-12-15 | End: 2018-12-17 | Stop reason: HOSPADM

## 2018-12-13 RX ORDER — ONDANSETRON 2 MG/ML
4 INJECTION INTRAMUSCULAR; INTRAVENOUS EVERY 6 HOURS PRN
Status: DISCONTINUED | OUTPATIENT
Start: 2018-12-13 | End: 2018-12-15

## 2018-12-13 RX ADMIN — CLONIDINE HYDROCHLORIDE 0.2 MG: 0.2 TABLET ORAL at 21:58

## 2018-12-13 RX ADMIN — Medication 10 ML: at 21:59

## 2018-12-13 RX ADMIN — HEPARIN SODIUM 5000 UNITS: 5000 INJECTION INTRAVENOUS; SUBCUTANEOUS at 21:56

## 2018-12-13 RX ADMIN — POLYETHYLENE GLYCOL 3350 17 G: 17 POWDER, FOR SOLUTION ORAL at 06:53

## 2018-12-13 RX ADMIN — CLONIDINE HYDROCHLORIDE 0.2 MG: 0.2 TABLET ORAL at 09:38

## 2018-12-13 RX ADMIN — HEPARIN SODIUM 5000 UNITS: 5000 INJECTION INTRAVENOUS; SUBCUTANEOUS at 14:40

## 2018-12-13 RX ADMIN — ACETAMINOPHEN 650 MG: 325 TABLET ORAL at 12:35

## 2018-12-13 RX ADMIN — DOCUSATE SODIUM 100 MG: 100 CAPSULE, LIQUID FILLED ORAL at 21:58

## 2018-12-13 RX ADMIN — Medication 10 ML: at 09:38

## 2018-12-13 RX ADMIN — ISOSORBIDE DINITRATE 20 MG: 20 TABLET ORAL at 09:38

## 2018-12-13 RX ADMIN — ONDANSETRON 4 MG: 2 INJECTION INTRAMUSCULAR; INTRAVENOUS at 08:35

## 2018-12-13 RX ADMIN — INSULIN GLARGINE 5 UNITS: 100 INJECTION, SOLUTION SUBCUTANEOUS at 21:55

## 2018-12-13 RX ADMIN — ISOSORBIDE DINITRATE 20 MG: 20 TABLET ORAL at 14:40

## 2018-12-13 RX ADMIN — PRAZOSIN HYDROCHLORIDE 1 MG: 1 CAPSULE ORAL at 21:58

## 2018-12-13 RX ADMIN — INSULIN LISPRO 2 UNITS: 100 INJECTION, SOLUTION INTRAVENOUS; SUBCUTANEOUS at 09:36

## 2018-12-13 RX ADMIN — HYDRALAZINE HYDROCHLORIDE 50 MG: 50 TABLET, FILM COATED ORAL at 14:40

## 2018-12-13 RX ADMIN — HYDRALAZINE HYDROCHLORIDE 10 MG: 20 INJECTION INTRAMUSCULAR; INTRAVENOUS at 05:22

## 2018-12-13 RX ADMIN — FUROSEMIDE 40 MG: 40 TABLET ORAL at 09:38

## 2018-12-13 RX ADMIN — DOCUSATE SODIUM 100 MG: 100 CAPSULE, LIQUID FILLED ORAL at 06:53

## 2018-12-13 RX ADMIN — INSULIN LISPRO 1 UNITS: 100 INJECTION, SOLUTION INTRAVENOUS; SUBCUTANEOUS at 21:55

## 2018-12-13 RX ADMIN — LEVOTHYROXINE SODIUM 25 MCG: 25 TABLET ORAL at 05:37

## 2018-12-13 RX ADMIN — HYDRALAZINE HYDROCHLORIDE 50 MG: 50 TABLET, FILM COATED ORAL at 09:38

## 2018-12-13 RX ADMIN — INSULIN GLARGINE 5 UNITS: 100 INJECTION, SOLUTION SUBCUTANEOUS at 09:36

## 2018-12-13 RX ADMIN — CLONIDINE HYDROCHLORIDE 0.2 MG: 0.2 TABLET ORAL at 14:40

## 2018-12-13 RX ADMIN — PRAZOSIN HYDROCHLORIDE 1 MG: 1 CAPSULE ORAL at 09:38

## 2018-12-13 RX ADMIN — PANTOPRAZOLE SODIUM 40 MG: 40 TABLET, DELAYED RELEASE ORAL at 05:37

## 2018-12-13 RX ADMIN — HEPARIN SODIUM 5000 UNITS: 5000 INJECTION INTRAVENOUS; SUBCUTANEOUS at 05:37

## 2018-12-13 RX ADMIN — ONDANSETRON 4 MG: 2 INJECTION INTRAMUSCULAR; INTRAVENOUS at 21:58

## 2018-12-13 RX ADMIN — INSULIN LISPRO 4 UNITS: 100 INJECTION, SOLUTION INTRAVENOUS; SUBCUTANEOUS at 17:54

## 2018-12-13 RX ADMIN — HYDRALAZINE HYDROCHLORIDE 50 MG: 50 TABLET, FILM COATED ORAL at 21:58

## 2018-12-13 ASSESSMENT — PAIN SCALES - GENERAL
PAINLEVEL_OUTOF10: 5
PAINLEVEL_OUTOF10: 0
PAINLEVEL_OUTOF10: 6

## 2018-12-13 ASSESSMENT — PAIN DESCRIPTION - ORIENTATION: ORIENTATION: LEFT

## 2018-12-13 ASSESSMENT — PAIN DESCRIPTION - LOCATION: LOCATION: HIP

## 2018-12-13 ASSESSMENT — PAIN DESCRIPTION - PAIN TYPE: TYPE: ACUTE PAIN

## 2018-12-13 NOTE — PROGRESS NOTES
Consulted to assess patients buttocks. Patient noted to be sitting up in the chair. 2 assist with walker and gait belt to stand. Noted to bilateral buttocks chronic discoloration and MASD. Will recommend zinc cream and a waffle cushion to the chair. Will sign off.

## 2018-12-13 NOTE — PROGRESS NOTES
9. 7   HGB  8.9*  8.9*   HCT  27.5*  28.0*   PLT  323  329     Recent Labs      12/11/18   0620  12/12/18   0322  12/13/18   0523   NA  134*  134*  135   K  3.7  3.8  4.0   CL  94*  93*  94*   CO2  31  32  30   BUN  42*  40*  40*   CREATININE  3.0*  2.9*  3.1*   CALCIUM  8.7  8.5  8.8     Recent Labs      12/12/18   0322   AST  16   ALT  8*   BILITOT  0.3   ALKPHOS  105     No results for input(s): INR in the last 72 hours. No results for input(s): Nakita Stein in the last 72 hours. Urinalysis:    Lab Results   Component Value Date    NITRU NEGATIVE 06/07/2016    WBCUA 0-2 06/07/2016    BACTERIA NONE 06/07/2016    RBCUA 0-2 06/07/2016    BLOODU TRACE 06/07/2016    GLUCOSEU >= 1000 06/07/2016       Radiology:  US THORACENTESIS   Final Result   Small pleural effusion left side. **This report has been created using voice recognition software. It may contain minor errors which are inherent in voice recognition technology. **      Final report electronically signed by Dr. Sarah Ryan on 12/11/2018 3:46 PM      [address]   Final Result      There is a moderate right-sided pleural effusion. Please also refer to the operative report for further details. **This report has been created using voice recognition software. It may contain minor errors which are inherent in voice recognition technology. **      Final report electronically signed by Dr. Anton Castorena on 12/10/2018 7:11 PM      XR CHEST PORTABLE   Final Result   Small bilateral pleural effusions. Retrocardiac opacity atelectasis versus infiltrate. No pneumothorax. **This report has been created using voice recognition software. It may contain minor errors which are inherent in voice recognition technology. **      Final report electronically signed by Dr. Roger Little on 12/10/2018 2:36 PM      XR CHEST PORTABLE   Final Result   Worsening congestive failure and atelectasis of the lung bases following extubation and removal of nasogastric tube. **This report has been created using voice recognition software. It may contain minor errors which are inherent in voice recognition technology. **      Final report electronically signed by Dr. Wilfredo Delgado on 12/10/2018 6:28 AM      XR CHEST PORTABLE   Final Result   Stable findings of diffuse moderate severe congestive heart failure with basilar atelectasis and pleural effusions. Stable location and  appearance of supportive devices         **This report has been created using voice recognition software. It may contain minor errors which are inherent in voice recognition technology. **      Final report electronically signed by Dr. Wilfredo Delgado on 12/9/2018 6:16 AM      XR CHEST PORTABLE   Final Result   1. Persistent diffuse congestive failure. 2. Stable position of supportive devices. **This report has been created using voice recognition software. It may contain minor errors which are inherent in voice recognition technology. **      Final report electronically signed by Dr. Wilfredo Delgado on 12/8/2018 4:59 AM      XR CHEST PORTABLE   Final Result   1. The distal tip of the endotracheal tube is 2.9 cm above the level the rubi. 2. The distal tip of the esophageal tube projects over the gastric body. 3. The distal tip of a right subclavian central venous catheter projects over the superior vena cava. 4. There are bilateral perihilar infiltrates suggesting edema and or pneumonia, small bilateral pleural effusions and additional opacity at both lung bases suggesting atelectasis and/or infiltrates. There are air bronchograms within the left infrahilar    region. **This report has been created using voice recognition software. It may contain minor errors which are inherent in voice recognition technology. **      Final report electronically signed by Dr. Tammie Moraes on 12/7/2018 10:25 AM          Diet: DIET CARB CONTROL;   Dietary

## 2018-12-14 LAB
ANION GAP SERPL CALCULATED.3IONS-SCNC: 13 MEQ/L (ref 8–16)
BASOPHILS # BLD: 0.7 %
BASOPHILS ABSOLUTE: 0.1 THOU/MM3 (ref 0–0.1)
BUN BLDV-MCNC: 39 MG/DL (ref 7–22)
CALCIUM SERPL-MCNC: 9.1 MG/DL (ref 8.5–10.5)
CHLORIDE BLD-SCNC: 94 MEQ/L (ref 98–111)
CO2: 29 MEQ/L (ref 23–33)
CREAT SERPL-MCNC: 2.7 MG/DL (ref 0.4–1.2)
EOSINOPHIL # BLD: 4 %
EOSINOPHILS ABSOLUTE: 0.5 THOU/MM3 (ref 0–0.4)
ERYTHROCYTE [DISTWIDTH] IN BLOOD BY AUTOMATED COUNT: 13.9 % (ref 11.5–14.5)
ERYTHROCYTE [DISTWIDTH] IN BLOOD BY AUTOMATED COUNT: 44.8 FL (ref 35–45)
GFR SERPL CREATININE-BSD FRML MDRD: 18 ML/MIN/1.73M2
GLUCOSE BLD-MCNC: 159 MG/DL (ref 70–108)
GLUCOSE BLD-MCNC: 160 MG/DL (ref 70–108)
GLUCOSE BLD-MCNC: 179 MG/DL (ref 70–108)
GLUCOSE BLD-MCNC: 211 MG/DL (ref 70–108)
GLUCOSE BLD-MCNC: 221 MG/DL (ref 70–108)
HCT VFR BLD CALC: 29.6 % (ref 37–47)
HEMOGLOBIN: 9.5 GM/DL (ref 12–16)
IMMATURE GRANS (ABS): 0.12 THOU/MM3 (ref 0–0.07)
IMMATURE GRANULOCYTES: 1.1 %
LYMPHOCYTES # BLD: 15.9 %
LYMPHOCYTES ABSOLUTE: 1.8 THOU/MM3 (ref 1–4.8)
MAGNESIUM: 1.8 MG/DL (ref 1.6–2.4)
MCH RBC QN AUTO: 28.4 PG (ref 26–33)
MCHC RBC AUTO-ENTMCNC: 32.1 GM/DL (ref 32.2–35.5)
MCV RBC AUTO: 88.4 FL (ref 81–99)
MONOCYTES # BLD: 7.1 %
MONOCYTES ABSOLUTE: 0.8 THOU/MM3 (ref 0.4–1.3)
NUCLEATED RED BLOOD CELLS: 0 /100 WBC
PHOSPHORUS: 3.5 MG/DL (ref 2.4–4.7)
PLATELET # BLD: 364 THOU/MM3 (ref 130–400)
PMV BLD AUTO: 11.5 FL (ref 9.4–12.4)
POTASSIUM SERPL-SCNC: 3.8 MEQ/L (ref 3.5–5.2)
RBC # BLD: 3.35 MILL/MM3 (ref 4.2–5.4)
SEG NEUTROPHILS: 71.2 %
SEGMENTED NEUTROPHILS ABSOLUTE COUNT: 8 THOU/MM3 (ref 1.8–7.7)
SODIUM BLD-SCNC: 136 MEQ/L (ref 135–145)
WBC # BLD: 11.3 THOU/MM3 (ref 4.8–10.8)

## 2018-12-14 PROCEDURE — 83735 ASSAY OF MAGNESIUM: CPT

## 2018-12-14 PROCEDURE — 99232 SBSQ HOSP IP/OBS MODERATE 35: CPT | Performed by: INTERNAL MEDICINE

## 2018-12-14 PROCEDURE — 82948 REAGENT STRIP/BLOOD GLUCOSE: CPT

## 2018-12-14 PROCEDURE — 85025 COMPLETE CBC W/AUTO DIFF WBC: CPT

## 2018-12-14 PROCEDURE — 2580000003 HC RX 258: Performed by: NURSE PRACTITIONER

## 2018-12-14 PROCEDURE — 2060000000 HC ICU INTERMEDIATE R&B

## 2018-12-14 PROCEDURE — 6370000000 HC RX 637 (ALT 250 FOR IP): Performed by: NURSE PRACTITIONER

## 2018-12-14 PROCEDURE — 6360000002 HC RX W HCPCS: Performed by: INTERNAL MEDICINE

## 2018-12-14 PROCEDURE — 80048 BASIC METABOLIC PNL TOTAL CA: CPT

## 2018-12-14 PROCEDURE — 84100 ASSAY OF PHOSPHORUS: CPT

## 2018-12-14 PROCEDURE — 2709999900 HC NON-CHARGEABLE SUPPLY

## 2018-12-14 PROCEDURE — 6370000000 HC RX 637 (ALT 250 FOR IP): Performed by: INTERNAL MEDICINE

## 2018-12-14 PROCEDURE — 6360000002 HC RX W HCPCS: Performed by: NURSE PRACTITIONER

## 2018-12-14 PROCEDURE — 99233 SBSQ HOSP IP/OBS HIGH 50: CPT | Performed by: INTERNAL MEDICINE

## 2018-12-14 PROCEDURE — 36415 COLL VENOUS BLD VENIPUNCTURE: CPT

## 2018-12-14 RX ORDER — CLONIDINE HYDROCHLORIDE 0.2 MG/1
0.2 TABLET ORAL EVERY 8 HOURS
Status: DISCONTINUED | OUTPATIENT
Start: 2018-12-14 | End: 2018-12-17 | Stop reason: HOSPADM

## 2018-12-14 RX ORDER — ISOSORBIDE DINITRATE 20 MG/1
20 TABLET ORAL 3 TIMES DAILY
Status: DISCONTINUED | OUTPATIENT
Start: 2018-12-14 | End: 2018-12-17 | Stop reason: HOSPADM

## 2018-12-14 RX ADMIN — Medication 10 ML: at 21:45

## 2018-12-14 RX ADMIN — HYDRALAZINE HYDROCHLORIDE 50 MG: 50 TABLET, FILM COATED ORAL at 10:17

## 2018-12-14 RX ADMIN — Medication 10 ML: at 10:17

## 2018-12-14 RX ADMIN — HYDRALAZINE HYDROCHLORIDE 50 MG: 50 TABLET, FILM COATED ORAL at 14:30

## 2018-12-14 RX ADMIN — ONDANSETRON 4 MG: 2 INJECTION INTRAMUSCULAR; INTRAVENOUS at 05:21

## 2018-12-14 RX ADMIN — HYDRALAZINE HYDROCHLORIDE 10 MG: 20 INJECTION INTRAMUSCULAR; INTRAVENOUS at 10:16

## 2018-12-14 RX ADMIN — INSULIN LISPRO 2 UNITS: 100 INJECTION, SOLUTION INTRAVENOUS; SUBCUTANEOUS at 21:42

## 2018-12-14 RX ADMIN — ONDANSETRON 4 MG: 2 INJECTION INTRAMUSCULAR; INTRAVENOUS at 18:26

## 2018-12-14 RX ADMIN — PRAZOSIN HYDROCHLORIDE 1 MG: 1 CAPSULE ORAL at 10:17

## 2018-12-14 RX ADMIN — PRAZOSIN HYDROCHLORIDE 1 MG: 1 CAPSULE ORAL at 21:41

## 2018-12-14 RX ADMIN — ACETAMINOPHEN 650 MG: 325 TABLET ORAL at 21:42

## 2018-12-14 RX ADMIN — HYDRALAZINE HYDROCHLORIDE 10 MG: 20 INJECTION INTRAMUSCULAR; INTRAVENOUS at 05:23

## 2018-12-14 RX ADMIN — LEVOTHYROXINE SODIUM 25 MCG: 25 TABLET ORAL at 05:27

## 2018-12-14 RX ADMIN — ACETAMINOPHEN 650 MG: 325 TABLET ORAL at 05:39

## 2018-12-14 RX ADMIN — PANTOPRAZOLE SODIUM 40 MG: 40 TABLET, DELAYED RELEASE ORAL at 05:27

## 2018-12-14 RX ADMIN — INSULIN GLARGINE 5 UNITS: 100 INJECTION, SOLUTION SUBCUTANEOUS at 21:42

## 2018-12-14 RX ADMIN — ISOSORBIDE DINITRATE 20 MG: 20 TABLET ORAL at 14:30

## 2018-12-14 RX ADMIN — DOCUSATE SODIUM 100 MG: 100 CAPSULE, LIQUID FILLED ORAL at 21:42

## 2018-12-14 RX ADMIN — CLONIDINE HYDROCHLORIDE 0.2 MG: 0.2 TABLET ORAL at 23:27

## 2018-12-14 RX ADMIN — CLONIDINE HYDROCHLORIDE 0.2 MG: 0.2 TABLET ORAL at 06:18

## 2018-12-14 RX ADMIN — HEPARIN SODIUM 5000 UNITS: 5000 INJECTION INTRAVENOUS; SUBCUTANEOUS at 21:42

## 2018-12-14 RX ADMIN — ISOSORBIDE DINITRATE 20 MG: 20 TABLET ORAL at 23:27

## 2018-12-14 RX ADMIN — HEPARIN SODIUM 5000 UNITS: 5000 INJECTION INTRAVENOUS; SUBCUTANEOUS at 05:30

## 2018-12-14 RX ADMIN — HYDRALAZINE HYDROCHLORIDE 50 MG: 50 TABLET, FILM COATED ORAL at 21:41

## 2018-12-14 RX ADMIN — HEPARIN SODIUM 5000 UNITS: 5000 INJECTION INTRAVENOUS; SUBCUTANEOUS at 14:30

## 2018-12-14 RX ADMIN — INSULIN GLARGINE 5 UNITS: 100 INJECTION, SOLUTION SUBCUTANEOUS at 10:16

## 2018-12-14 RX ADMIN — DOCUSATE SODIUM 100 MG: 100 CAPSULE, LIQUID FILLED ORAL at 10:16

## 2018-12-14 RX ADMIN — POLYETHYLENE GLYCOL 3350 17 G: 17 POWDER, FOR SOLUTION ORAL at 10:16

## 2018-12-14 ASSESSMENT — PAIN SCALES - GENERAL
PAINLEVEL_OUTOF10: 5
PAINLEVEL_OUTOF10: 6

## 2018-12-14 ASSESSMENT — PAIN DESCRIPTION - FREQUENCY: FREQUENCY: INTERMITTENT

## 2018-12-14 ASSESSMENT — PAIN DESCRIPTION - DESCRIPTORS: DESCRIPTORS: HEADACHE

## 2018-12-14 ASSESSMENT — PAIN DESCRIPTION - ONSET: ONSET: ON-GOING

## 2018-12-14 ASSESSMENT — PAIN DESCRIPTION - PROGRESSION: CLINICAL_PROGRESSION: GRADUALLY WORSENING

## 2018-12-14 ASSESSMENT — PAIN DESCRIPTION - PAIN TYPE: TYPE: ACUTE PAIN

## 2018-12-14 ASSESSMENT — PAIN DESCRIPTION - LOCATION: LOCATION: HEAD

## 2018-12-14 NOTE — PROGRESS NOTES
Hospitalist Progress Note    Patient:  Marilu Solders      Unit/Bed:4K-17/017-A    YOB: 1950    MRN: [id number]       Acct: [de-identified]     PCP: [id number]    Date of Admission: 12/7/2018    Chief Complaint: [address] Course:  79year old female pt who lives at [address] [place] [address] (admitted Nov 28, 2018 ); on 12/5/2018 she related to shortness of breath which increased; ECF nursing notes reviewed and appears issues with oxygenation on 12/1/2018 (88% on RA) and Lasix increased to 40 mg daily from 20 mg daily on 12/3/2018; she went to [facility] and was admitted; she was found to be in HF and was diuresed with Lasix 40 mg IVP TID; her creatinine was at 3.24 and baseline 2.3-2.8; ~0300 she desatted into the 70's and was intubated; she was started on a Diprovan gtt and ws sent to [facility] for eval; no family here; pt is sedated and intubated upon arrival here.     Per H&P from [facility] she has had ~21 kg weight gain; she follows with Dr Karrie Greenfield for nephrology and Dr Karen Bailey for cardiology. Echo from 1/2/2018 noted with EF 60%.     Pt started on bumex drip on 12/7, diuresed well. Extubated on 12/9. Found to have bilateral pleural effusions. Had right thoracentesis with 1.2L removed on 12/10. Weaning down on supplemental O2. Left thoracentesis on 12/11 with removal of 400cc fluid.      Weaned off supplemental O2 today (12/12). Will continue to monitor respiratory status. On 12/14- HTN urgency with BP recordings 216/82. No symptoms of lightheadedness, dizziness, chest pain or sob. Start Isordil 20 TID. Appreciate nephrology recs for BP control. Subjective: no acute events overnight. Improvement in nausea. Elevated BP.        Medications:  Reviewed    Infusion Medications    dextrose       Scheduled Medications    isosorbide dinitrate  20 mg Oral TID    [START ON 12/15/2018] furosemide  40 mg Oral Daily    pantoprazole  40 mg Oral QAM AC    polyethylene glycol  17 g Oral created using voice recognition software. It may contain minor errors which are inherent in voice recognition technology. **      Final report electronically signed by Dr. Vanessa Bailey on 12/7/2018 10:25 AM          Diet: DIET CARB CONTROL; Dietary Nutrition Supplements: Other Oral Supplement (see comment)    DVT prophylaxis: [] Lovenox                                 [] SCDs                                 [x] SQ Heparin                                 [] Encourage ambulation           [] Already on Anticoagulation     Disposition:    [] Home       [] TCU       [] Rehab       [] Psych       [x] SNF       [] [facility]       [] Other-    Code Status: Full Code    PT/OT Eval Status: SNF     Assessment/Plan:    Anticipated Discharge in : 12/15     Active Hospital Problems    Diagnosis Date Noted    Other fluid overload [E87.79]     Acute respiratory failure with hypoxia ([address] [J96.01] 12/07/2018    Acute pulmonary edema ([address] [J81.0] 12/07/2018    Acute on chronic diastolic heart failure ([address] [I50.33] 12/07/2018    LOUISE (acute kidney injury) ([address] [N17.9] 12/07/2018    Uncontrolled type 2 diabetes mellitus with hyperglycemia ([address] [E11.65] 12/07/2018    Essential (primary) hypertension [I10] 12/07/2018    Acquired hypothyroidism [E03.9] 12/07/2018       Assessment/Plan:     1. Acute hypoxic respiratory failure secondary to pulmonary edema and volume overload, improved. Intubated 12/7, extubated 12/9. Weaned off supplemental O2 on 12/12. 2. Acute Pulmonary Edema--Bumex gtt started 12/7 and D/C on 12/11. UOP of neg 14.5L   3. Bilateral pleural effusion. Right greater than left status post thoracentesis at bedside 12/10 with 1.2 liters tapped. Fluid specimen send for interpretation. Left thoracentesis on 12/11 with 400cc removed. 4. Acute on Chronic Diastolic HF--EF from 42/6836 was 50-55%; no BB due to bradycardia. -14.5 Liter since admission. Weight down 6.4 kg since admission.    5.  LOUISE on CKD

## 2018-12-15 LAB
ANAEROBIC CULTURE: NORMAL
ANION GAP SERPL CALCULATED.3IONS-SCNC: 13 MEQ/L (ref 8–16)
BASOPHILS # BLD: 0.9 %
BASOPHILS ABSOLUTE: 0.1 THOU/MM3 (ref 0–0.1)
BODY FLUID CULTURE, STERILE: NORMAL
BUN BLDV-MCNC: 37 MG/DL (ref 7–22)
CALCIUM SERPL-MCNC: 9.1 MG/DL (ref 8.5–10.5)
CHLORIDE BLD-SCNC: 96 MEQ/L (ref 98–111)
CO2: 28 MEQ/L (ref 23–33)
CREAT SERPL-MCNC: 3 MG/DL (ref 0.4–1.2)
EOSINOPHIL # BLD: 3.7 %
EOSINOPHILS ABSOLUTE: 0.4 THOU/MM3 (ref 0–0.4)
ERYTHROCYTE [DISTWIDTH] IN BLOOD BY AUTOMATED COUNT: 13.9 % (ref 11.5–14.5)
ERYTHROCYTE [DISTWIDTH] IN BLOOD BY AUTOMATED COUNT: 45.6 FL (ref 35–45)
GFR SERPL CREATININE-BSD FRML MDRD: 16 ML/MIN/1.73M2
GLUCOSE BLD-MCNC: 170 MG/DL (ref 70–108)
GLUCOSE BLD-MCNC: 180 MG/DL (ref 70–108)
GLUCOSE BLD-MCNC: 180 MG/DL (ref 70–108)
GLUCOSE BLD-MCNC: 185 MG/DL (ref 70–108)
GLUCOSE BLD-MCNC: 231 MG/DL (ref 70–108)
GRAM STAIN RESULT: NORMAL
HCT VFR BLD CALC: 27.1 % (ref 37–47)
HEMOGLOBIN: 8.7 GM/DL (ref 12–16)
IMMATURE GRANS (ABS): 0.11 THOU/MM3 (ref 0–0.07)
IMMATURE GRANULOCYTES: 1.2 %
LYMPHOCYTES # BLD: 17.4 %
LYMPHOCYTES ABSOLUTE: 1.7 THOU/MM3 (ref 1–4.8)
MAGNESIUM: 1.8 MG/DL (ref 1.6–2.4)
MCH RBC QN AUTO: 28.9 PG (ref 26–33)
MCHC RBC AUTO-ENTMCNC: 32.1 GM/DL (ref 32.2–35.5)
MCV RBC AUTO: 90 FL (ref 81–99)
MONOCYTES # BLD: 8.1 %
MONOCYTES ABSOLUTE: 0.8 THOU/MM3 (ref 0.4–1.3)
NUCLEATED RED BLOOD CELLS: 0 /100 WBC
PLATELET # BLD: 349 THOU/MM3 (ref 130–400)
PMV BLD AUTO: 11.4 FL (ref 9.4–12.4)
POTASSIUM SERPL-SCNC: 3.9 MEQ/L (ref 3.5–5.2)
RBC # BLD: 3.01 MILL/MM3 (ref 4.2–5.4)
SEG NEUTROPHILS: 68.7 %
SEGMENTED NEUTROPHILS ABSOLUTE COUNT: 6.5 THOU/MM3 (ref 1.8–7.7)
SODIUM BLD-SCNC: 137 MEQ/L (ref 135–145)
WBC # BLD: 9.5 THOU/MM3 (ref 4.8–10.8)

## 2018-12-15 PROCEDURE — 6370000000 HC RX 637 (ALT 250 FOR IP): Performed by: INTERNAL MEDICINE

## 2018-12-15 PROCEDURE — 6360000002 HC RX W HCPCS: Performed by: NURSE PRACTITIONER

## 2018-12-15 PROCEDURE — 80048 BASIC METABOLIC PNL TOTAL CA: CPT

## 2018-12-15 PROCEDURE — 6370000000 HC RX 637 (ALT 250 FOR IP): Performed by: NURSE PRACTITIONER

## 2018-12-15 PROCEDURE — 82948 REAGENT STRIP/BLOOD GLUCOSE: CPT

## 2018-12-15 PROCEDURE — 2060000000 HC ICU INTERMEDIATE R&B

## 2018-12-15 PROCEDURE — 99232 SBSQ HOSP IP/OBS MODERATE 35: CPT | Performed by: INTERNAL MEDICINE

## 2018-12-15 PROCEDURE — 2709999900 HC NON-CHARGEABLE SUPPLY

## 2018-12-15 PROCEDURE — 99233 SBSQ HOSP IP/OBS HIGH 50: CPT | Performed by: INTERNAL MEDICINE

## 2018-12-15 PROCEDURE — 6360000002 HC RX W HCPCS: Performed by: INTERNAL MEDICINE

## 2018-12-15 PROCEDURE — 85025 COMPLETE CBC W/AUTO DIFF WBC: CPT

## 2018-12-15 PROCEDURE — 2580000003 HC RX 258: Performed by: NURSE PRACTITIONER

## 2018-12-15 PROCEDURE — 2580000003 HC RX 258: Performed by: INTERNAL MEDICINE

## 2018-12-15 PROCEDURE — 83735 ASSAY OF MAGNESIUM: CPT

## 2018-12-15 PROCEDURE — 36415 COLL VENOUS BLD VENIPUNCTURE: CPT

## 2018-12-15 RX ORDER — NIFEDIPINE 60 MG/1
60 TABLET, EXTENDED RELEASE ORAL DAILY
Status: DISCONTINUED | OUTPATIENT
Start: 2018-12-15 | End: 2018-12-16

## 2018-12-15 RX ORDER — SODIUM CHLORIDE 9 MG/ML
INJECTION, SOLUTION INTRAVENOUS CONTINUOUS
Status: DISCONTINUED | OUTPATIENT
Start: 2018-12-15 | End: 2018-12-16

## 2018-12-15 RX ORDER — ONDANSETRON 2 MG/ML
4 INJECTION INTRAMUSCULAR; INTRAVENOUS EVERY 4 HOURS PRN
Status: DISCONTINUED | OUTPATIENT
Start: 2018-12-15 | End: 2018-12-16

## 2018-12-15 RX ADMIN — HYDRALAZINE HYDROCHLORIDE 50 MG: 50 TABLET, FILM COATED ORAL at 08:53

## 2018-12-15 RX ADMIN — DOCUSATE SODIUM 100 MG: 100 CAPSULE, LIQUID FILLED ORAL at 19:53

## 2018-12-15 RX ADMIN — HYDRALAZINE HYDROCHLORIDE 10 MG: 20 INJECTION INTRAMUSCULAR; INTRAVENOUS at 17:40

## 2018-12-15 RX ADMIN — INSULIN LISPRO 2 UNITS: 100 INJECTION, SOLUTION INTRAVENOUS; SUBCUTANEOUS at 17:37

## 2018-12-15 RX ADMIN — Medication 10 ML: at 08:43

## 2018-12-15 RX ADMIN — POLYETHYLENE GLYCOL 3350 17 G: 17 POWDER, FOR SOLUTION ORAL at 08:53

## 2018-12-15 RX ADMIN — ONDANSETRON 4 MG: 2 INJECTION INTRAMUSCULAR; INTRAVENOUS at 02:59

## 2018-12-15 RX ADMIN — INSULIN GLARGINE 5 UNITS: 100 INJECTION, SOLUTION SUBCUTANEOUS at 21:00

## 2018-12-15 RX ADMIN — DOCUSATE SODIUM 100 MG: 100 CAPSULE, LIQUID FILLED ORAL at 08:53

## 2018-12-15 RX ADMIN — HEPARIN SODIUM 5000 UNITS: 5000 INJECTION INTRAVENOUS; SUBCUTANEOUS at 21:00

## 2018-12-15 RX ADMIN — Medication 10 ML: at 19:54

## 2018-12-15 RX ADMIN — ONDANSETRON 4 MG: 2 INJECTION INTRAMUSCULAR; INTRAVENOUS at 08:41

## 2018-12-15 RX ADMIN — CLONIDINE HYDROCHLORIDE 0.2 MG: 0.2 TABLET ORAL at 21:00

## 2018-12-15 RX ADMIN — INSULIN LISPRO 4 UNITS: 100 INJECTION, SOLUTION INTRAVENOUS; SUBCUTANEOUS at 12:09

## 2018-12-15 RX ADMIN — INSULIN LISPRO 1 UNITS: 100 INJECTION, SOLUTION INTRAVENOUS; SUBCUTANEOUS at 21:00

## 2018-12-15 RX ADMIN — HEPARIN SODIUM 5000 UNITS: 5000 INJECTION INTRAVENOUS; SUBCUTANEOUS at 15:04

## 2018-12-15 RX ADMIN — ACETAMINOPHEN 650 MG: 325 TABLET ORAL at 03:04

## 2018-12-15 RX ADMIN — HYDRALAZINE HYDROCHLORIDE 50 MG: 50 TABLET, FILM COATED ORAL at 15:04

## 2018-12-15 RX ADMIN — ISOSORBIDE DINITRATE 20 MG: 20 TABLET ORAL at 15:04

## 2018-12-15 RX ADMIN — NIFEDIPINE 60 MG: 60 TABLET, FILM COATED, EXTENDED RELEASE ORAL at 21:00

## 2018-12-15 RX ADMIN — HEPARIN SODIUM 5000 UNITS: 5000 INJECTION INTRAVENOUS; SUBCUTANEOUS at 06:02

## 2018-12-15 RX ADMIN — ISOSORBIDE DINITRATE 20 MG: 20 TABLET ORAL at 08:53

## 2018-12-15 RX ADMIN — ONDANSETRON 4 MG: 2 INJECTION INTRAMUSCULAR; INTRAVENOUS at 15:03

## 2018-12-15 RX ADMIN — HYDRALAZINE HYDROCHLORIDE 50 MG: 50 TABLET, FILM COATED ORAL at 19:54

## 2018-12-15 RX ADMIN — ISOSORBIDE DINITRATE 20 MG: 20 TABLET ORAL at 19:53

## 2018-12-15 RX ADMIN — INSULIN GLARGINE 5 UNITS: 100 INJECTION, SOLUTION SUBCUTANEOUS at 08:54

## 2018-12-15 RX ADMIN — FUROSEMIDE 40 MG: 40 TABLET ORAL at 08:53

## 2018-12-15 RX ADMIN — INSULIN LISPRO 2 UNITS: 100 INJECTION, SOLUTION INTRAVENOUS; SUBCUTANEOUS at 08:54

## 2018-12-15 RX ADMIN — SODIUM CHLORIDE: 9 INJECTION, SOLUTION INTRAVENOUS at 12:06

## 2018-12-15 RX ADMIN — PANTOPRAZOLE SODIUM 40 MG: 40 TABLET, DELAYED RELEASE ORAL at 06:02

## 2018-12-15 RX ADMIN — LEVOTHYROXINE SODIUM 25 MCG: 25 TABLET ORAL at 06:02

## 2018-12-15 ASSESSMENT — PAIN SCALES - GENERAL
PAINLEVEL_OUTOF10: 0
PAINLEVEL_OUTOF10: 0
PAINLEVEL_OUTOF10: 6

## 2018-12-15 ASSESSMENT — PAIN DESCRIPTION - PAIN TYPE: TYPE: ACUTE PAIN

## 2018-12-15 ASSESSMENT — PAIN DESCRIPTION - FREQUENCY: FREQUENCY: INTERMITTENT

## 2018-12-15 ASSESSMENT — PAIN DESCRIPTION - LOCATION: LOCATION: HEAD

## 2018-12-15 ASSESSMENT — PAIN DESCRIPTION - DESCRIPTORS: DESCRIPTORS: HEADACHE

## 2018-12-15 ASSESSMENT — PAIN DESCRIPTION - ONSET: ONSET: ON-GOING

## 2018-12-15 ASSESSMENT — PAIN DESCRIPTION - PROGRESSION: CLINICAL_PROGRESSION: NOT CHANGED

## 2018-12-15 NOTE — PROGRESS NOTES
and/or infiltrates. There are air bronchograms within the left infrahilar    region. **This report has been created using voice recognition software. It may contain minor errors which are inherent in voice recognition technology. **      Final report electronically signed by Dr. Eli Davenport on 12/7/2018 10:25 AM          Diet: DIET CARB CONTROL; Dietary Nutrition Supplements: Other Oral Supplement (see comment)    DVT prophylaxis: [] Lovenox                                 [] SCDs                                 [x] SQ Heparin                                 [] Encourage ambulation           [] Already on Anticoagulation     Disposition:    [] Home       [] TCU       [] Rehab       [] Psych       [x] SNF       [] [facility]       [] Other-    Code Status: Full Code    PT/OT Eval Status: SNF     Assessment/Plan:    Anticipated Discharge in : 12/17     Active Hospital Problems    Diagnosis Date Noted    Other fluid overload [E87.79]     Acute respiratory failure with hypoxia ([address] [J96.01] 12/07/2018    Acute pulmonary edema ([address] [J81.0] 12/07/2018    Acute on chronic diastolic heart failure ([address] [I50.33] 12/07/2018    LOUISE (acute kidney injury) ([address] [N17.9] 12/07/2018    Uncontrolled type 2 diabetes mellitus with hyperglycemia ([address] [E11.65] 12/07/2018    Essential (primary) hypertension [I10] 12/07/2018    Acquired hypothyroidism [E03.9] 12/07/2018          Assessment/Plan:     1. Acute hypoxic respiratory failure secondary to pulmonary edema and volume overload, improved. Intubated 12/7, extubated 12/9. Weaned off supplemental O2 on 12/12. Resolved. 2. Acute Pulmonary Edema--Bumex gtt started 12/7 and D/C on 12/11. UOP of neg 14.5L. On PO Lasix 40 qd. RESOLVED. 3. Bilateral pleural effusion. Right greater than left status post thoracentesis at bedside 12/10 with 1.2 liters tapped. Fluid specimen send for interpretation. Left thoracentesis on 12/11 with 400cc removed.    4. Acute

## 2018-12-15 NOTE — PROGRESS NOTES
[facility]         Renal Inpatient Follow-Up note         12/15/2018 9:09 AM    Pt Name:   Marilu Bean  YOB: 1950  Attending:   Jam Dockery MD    Chief Complaint : Marilu Bean is a 79 y.o. female being followed by nephrology for LOUISE/CKD    Interval History :   Patient seen and examined by me. No distress  The patient was relaxing in bed. She complains of nausea and vomiting. She is unable to maintain   Adequate nutrition and hydration. Her eGFR has gotten worse. Scheduled Medications :    isosorbide dinitrate  20 mg Oral TID    cloNIDine  0.2 mg Oral Q8H    furosemide  40 mg Oral Daily    pantoprazole  40 mg Oral QAM AC    polyethylene glycol  17 g Oral Daily    insulin lispro  0-12 Units Subcutaneous TID WC    insulin lispro  0-6 Units Subcutaneous Nightly    hydrALAZINE  50 mg Oral TID    insulin glargine  5 Units Subcutaneous BID    levothyroxine  25 mcg Oral Daily    sodium chloride flush  10 mL Intravenous 2 times per day    heparin (porcine)  5,000 Units Subcutaneous 3 times per day    docusate sodium  100 mg Oral BID      sodium chloride      dextrose         Vitals :  BP (!) 192/58 Comment: manual  Pulse (!) 49   Temp 99 °F (37.2 °C) (Oral)   Resp 16   Ht 5' 5\" (1.651 m)   Wt 171 lb 11.2 oz (77.9 kg)   SpO2 99%   BMI 28.57 kg/m²     24HR INTAKE/OUTPUT:      Intake/Output Summary (Last 24 hours) at 12/15/18 0909  Last data filed at 12/15/18 0310   Gross per 24 hour   Intake             1550 ml   Output              750 ml   Net              800 ml     Last 3 weights  Wt Readings from Last 3 Encounters:   12/14/18 171 lb 11.2 oz (77.9 kg)   11/02/18 173 lb 12.8 oz (78.8 kg)   05/03/17 199 lb (90.3 kg)           Physical Exam :  General Appearance:  Well developed.  No distress  Mouth/Throat:  Oral mucosa moist  Neck:  Supple, no JVD  Lungs:  Breath sounds: clear  Heart[de-identified]  S1,S2 heard  Abdomen:  Soft, non - tender  Musculoskeletal: Physical Therapy Aquatic Flow Sheet   Date:  2018    Patient Name:  Benny Wright    :  1949    Restrictions/Precautions:    Pertinent Medical History:  Back pain, RA, Parkinson, Scoliosis,    Diagnosis:  Cervical Spondylosis  Treatment Diagnosis:      Insurance/Certification information: medicare  Referring Physician:  Dr Dolores Phillips of care signed (Y/N):      Visit# / total visits:    Pain level: 7/10 Left shoulder/UT      G-Code (if applicable):      Date G-Code Applied:        Progress Note: []  Yes  [x]  No  Next due by: Visit #10:      Subjective: My whole body is bad today. Objective:  Observation:  See eval  Test measurements:      Key  B= Belt DB= Dumbells T= Theratube   G=Gloves N= Noodles W= Weights   P= Paddles WW=Water Walker K= Kickboard        Transfers:   steps with bilat handrails      % Immersion: 75%            Ambulation:   Exercises UE:      Forwards 4+2 with UE circles Shoulder Shrugs 10    Lateral 2 with UE ab/ad Shoulder circles 10    Marching 1 Scapular Retraction  10    Retro   Rolling  10    [place]  Push Downs 10     IR/ER 10     Punching 10   Stretching:  Rowing 10   Gastroc/Soleus   Elbow Flex/Ext 10   Hamstring   Shldr Flex/Ext  10   Knee flex stretch   Shldr Abd/Add 10   Piriformis   Horiz Abd/Add  10   Hip flexor    Arm Circles  10 x 2   SKTC   PNF Diagonals    DKTC  UE oscillations f/b, s/s    ITB   Wall Push-ups 10   Quad  Figure 8's    Mid back   Buoy pull/push downs    UT  Tband rows    Rhom x30 sec L/R Tband lats    Post Shoulder  Lumbar Rot w/ paddles    Pec  10 sec x 3 (at steps) Small ball pull downs                   diagonals 10x   Upper back 10 sec x 3        Cervical:       AROM Flex 5      AROM Extension     LEs exercises:   AROM Side Bending 5 L/R   Marching   AROM Rotation 5 R/L   Heel Raises   Chin tucks    Toe Raises   Chin nods     Squats        Hamstring Curls        Hip Flexion   Balance:      Hip Abduction  SLS    Hip Circles  Tandem stance    TA set  NBOS eyes open    Glut Set  NBOS eyes closed    Hip Extension  Hand to Opposite Knee    Hip Adduction    Box Step     Hip IR   Noodle Stance     Hip ER  Stop/Go Gait    Fig 8's  Switch Gait                Seated:  Functional:    Ankle Pumps  Step up forward    Ankle circles  Step up lateral    Knee flex & ext  Step down    Hip Abd & Adduction  [place] squats    Bicycle   Crate Lifts    Add Set with ball  Lunges forward    LX stab with med ball throws  Lunges lateral    Ankle INV  Lunges retro    Ankle EV  Lower ab curl with noodle      Upper ab curl with ball      Med ball straight lifts      Med ball diagonal lifts      Hydrorider          Noodle:      Leg Press  Deep Water:    Noodle hang at wall  Jog    Noodle hang deep water  Jumping Jacks    Noodle Bicycle  Heel to toe      Hand to opposite knee      Cross country skier      Rocking Horse    Verbal cues for proper posture, exercise technique and exercise without increase pain. Timed Code Treatment Minutes:  30    Total Treatment Minutes:  30    Treatment/Activity Tolerance:  [x] Patient tolerated treatment well [] Patient limited by fatique  [] Patient limited by pain  [] Patient limited by other medical complications  [] Other:     Pain after aquatics: 6/10    Patient Education:  Pt will be mailed aquatic HEP, 30 day pass and pool schedule. Pt questions were answered and pt verbalized an understanding.       Plan:   [] Continue per plan of care [] Alter current plan (see comments)  [] Plan of care initiated [] Hold pending MD visit [x] Discharge    Plan for Next Session:      D/C per PT plan  Electronically signed by: Summer Barr, PTA [id number]

## 2018-12-16 LAB
ALBUMIN SERPL-MCNC: 3.2 G/DL (ref 3.5–5.1)
ALP BLD-CCNC: 113 U/L (ref 38–126)
ALT SERPL-CCNC: 13 U/L (ref 11–66)
ANION GAP SERPL CALCULATED.3IONS-SCNC: 13 MEQ/L (ref 8–16)
AST SERPL-CCNC: 20 U/L (ref 5–40)
BASOPHILS # BLD: 0.6 %
BASOPHILS ABSOLUTE: 0.1 THOU/MM3 (ref 0–0.1)
BILIRUB SERPL-MCNC: 0.3 MG/DL (ref 0.3–1.2)
BUN BLDV-MCNC: 34 MG/DL (ref 7–22)
CALCIUM SERPL-MCNC: 9.3 MG/DL (ref 8.5–10.5)
CHLORIDE BLD-SCNC: 96 MEQ/L (ref 98–111)
CO2: 26 MEQ/L (ref 23–33)
CREAT SERPL-MCNC: 2.8 MG/DL (ref 0.4–1.2)
EOSINOPHIL # BLD: 4 %
EOSINOPHILS ABSOLUTE: 0.5 THOU/MM3 (ref 0–0.4)
ERYTHROCYTE [DISTWIDTH] IN BLOOD BY AUTOMATED COUNT: 14.1 % (ref 11.5–14.5)
ERYTHROCYTE [DISTWIDTH] IN BLOOD BY AUTOMATED COUNT: 45.5 FL (ref 35–45)
GFR SERPL CREATININE-BSD FRML MDRD: 17 ML/MIN/1.73M2
GLUCOSE BLD-MCNC: 174 MG/DL (ref 70–108)
GLUCOSE BLD-MCNC: 180 MG/DL (ref 70–108)
GLUCOSE BLD-MCNC: 200 MG/DL (ref 70–108)
GLUCOSE BLD-MCNC: 202 MG/DL (ref 70–108)
GLUCOSE BLD-MCNC: 223 MG/DL (ref 70–108)
HCT VFR BLD CALC: 29.8 % (ref 37–47)
HEMOGLOBIN: 9.6 GM/DL (ref 12–16)
IMMATURE GRANS (ABS): 0.17 THOU/MM3 (ref 0–0.07)
IMMATURE GRANULOCYTES: 1.4 %
LYMPHOCYTES # BLD: 16.2 %
LYMPHOCYTES ABSOLUTE: 1.9 THOU/MM3 (ref 1–4.8)
MAGNESIUM: 1.8 MG/DL (ref 1.6–2.4)
MCH RBC QN AUTO: 28.5 PG (ref 26–33)
MCHC RBC AUTO-ENTMCNC: 32.2 GM/DL (ref 32.2–35.5)
MCV RBC AUTO: 88.4 FL (ref 81–99)
MONOCYTES # BLD: 7.4 %
MONOCYTES ABSOLUTE: 0.9 THOU/MM3 (ref 0.4–1.3)
NUCLEATED RED BLOOD CELLS: 0 /100 WBC
PHOSPHORUS: 3.7 MG/DL (ref 2.4–4.7)
PLATELET # BLD: 399 THOU/MM3 (ref 130–400)
PMV BLD AUTO: 11.3 FL (ref 9.4–12.4)
POTASSIUM SERPL-SCNC: 3.9 MEQ/L (ref 3.5–5.2)
RBC # BLD: 3.37 MILL/MM3 (ref 4.2–5.4)
SEG NEUTROPHILS: 70.4 %
SEGMENTED NEUTROPHILS ABSOLUTE COUNT: 8.3 THOU/MM3 (ref 1.8–7.7)
SODIUM BLD-SCNC: 135 MEQ/L (ref 135–145)
TOTAL PROTEIN: 6 G/DL (ref 6.1–8)
WBC # BLD: 11.8 THOU/MM3 (ref 4.8–10.8)

## 2018-12-16 PROCEDURE — 6360000002 HC RX W HCPCS: Performed by: NURSE PRACTITIONER

## 2018-12-16 PROCEDURE — 6360000002 HC RX W HCPCS: Performed by: INTERNAL MEDICINE

## 2018-12-16 PROCEDURE — 99233 SBSQ HOSP IP/OBS HIGH 50: CPT | Performed by: INTERNAL MEDICINE

## 2018-12-16 PROCEDURE — 99232 SBSQ HOSP IP/OBS MODERATE 35: CPT | Performed by: INTERNAL MEDICINE

## 2018-12-16 PROCEDURE — 84100 ASSAY OF PHOSPHORUS: CPT

## 2018-12-16 PROCEDURE — 6370000000 HC RX 637 (ALT 250 FOR IP): Performed by: INTERNAL MEDICINE

## 2018-12-16 PROCEDURE — 36592 COLLECT BLOOD FROM PICC: CPT

## 2018-12-16 PROCEDURE — 83735 ASSAY OF MAGNESIUM: CPT

## 2018-12-16 PROCEDURE — 80053 COMPREHEN METABOLIC PANEL: CPT

## 2018-12-16 PROCEDURE — 2060000000 HC ICU INTERMEDIATE R&B

## 2018-12-16 PROCEDURE — 85025 COMPLETE CBC W/AUTO DIFF WBC: CPT

## 2018-12-16 PROCEDURE — 6370000000 HC RX 637 (ALT 250 FOR IP): Performed by: NURSE PRACTITIONER

## 2018-12-16 PROCEDURE — 82948 REAGENT STRIP/BLOOD GLUCOSE: CPT

## 2018-12-16 PROCEDURE — 36415 COLL VENOUS BLD VENIPUNCTURE: CPT

## 2018-12-16 PROCEDURE — 2580000003 HC RX 258: Performed by: NURSE PRACTITIONER

## 2018-12-16 PROCEDURE — 2580000003 HC RX 258: Performed by: INTERNAL MEDICINE

## 2018-12-16 RX ORDER — NIFEDIPINE 90 MG/1
90 TABLET, EXTENDED RELEASE ORAL DAILY
Status: DISCONTINUED | OUTPATIENT
Start: 2018-12-16 | End: 2018-12-17 | Stop reason: HOSPADM

## 2018-12-16 RX ADMIN — PANTOPRAZOLE SODIUM 40 MG: 40 TABLET, DELAYED RELEASE ORAL at 06:49

## 2018-12-16 RX ADMIN — INSULIN GLARGINE 5 UNITS: 100 INJECTION, SOLUTION SUBCUTANEOUS at 08:29

## 2018-12-16 RX ADMIN — ONDANSETRON 1 MG/HR: 2 INJECTION INTRAMUSCULAR; INTRAVENOUS at 12:14

## 2018-12-16 RX ADMIN — DOCUSATE SODIUM 100 MG: 100 CAPSULE, LIQUID FILLED ORAL at 21:25

## 2018-12-16 RX ADMIN — INSULIN LISPRO 4 UNITS: 100 INJECTION, SOLUTION INTRAVENOUS; SUBCUTANEOUS at 12:34

## 2018-12-16 RX ADMIN — HEPARIN SODIUM 5000 UNITS: 5000 INJECTION INTRAVENOUS; SUBCUTANEOUS at 06:35

## 2018-12-16 RX ADMIN — ISOSORBIDE DINITRATE 20 MG: 20 TABLET ORAL at 08:25

## 2018-12-16 RX ADMIN — INSULIN GLARGINE 5 UNITS: 100 INJECTION, SOLUTION SUBCUTANEOUS at 21:25

## 2018-12-16 RX ADMIN — HYDRALAZINE HYDROCHLORIDE 50 MG: 50 TABLET, FILM COATED ORAL at 08:25

## 2018-12-16 RX ADMIN — HYDRALAZINE HYDROCHLORIDE 50 MG: 50 TABLET, FILM COATED ORAL at 14:30

## 2018-12-16 RX ADMIN — ISOSORBIDE DINITRATE 20 MG: 20 TABLET ORAL at 21:25

## 2018-12-16 RX ADMIN — POLYETHYLENE GLYCOL 3350 17 G: 17 POWDER, FOR SOLUTION ORAL at 08:25

## 2018-12-16 RX ADMIN — HEPARIN SODIUM 5000 UNITS: 5000 INJECTION INTRAVENOUS; SUBCUTANEOUS at 21:30

## 2018-12-16 RX ADMIN — NIFEDIPINE 90 MG: 90 TABLET, EXTENDED RELEASE ORAL at 10:22

## 2018-12-16 RX ADMIN — ONDANSETRON 4 MG: 2 INJECTION INTRAMUSCULAR; INTRAVENOUS at 02:16

## 2018-12-16 RX ADMIN — Medication 10 ML: at 08:25

## 2018-12-16 RX ADMIN — INSULIN LISPRO 4 UNITS: 100 INJECTION, SOLUTION INTRAVENOUS; SUBCUTANEOUS at 17:35

## 2018-12-16 RX ADMIN — FUROSEMIDE 40 MG: 40 TABLET ORAL at 08:25

## 2018-12-16 RX ADMIN — INSULIN LISPRO 4 UNITS: 100 INJECTION, SOLUTION INTRAVENOUS; SUBCUTANEOUS at 08:30

## 2018-12-16 RX ADMIN — CLONIDINE HYDROCHLORIDE 0.2 MG: 0.2 TABLET ORAL at 06:34

## 2018-12-16 RX ADMIN — CLONIDINE HYDROCHLORIDE 0.2 MG: 0.2 TABLET ORAL at 21:32

## 2018-12-16 RX ADMIN — ISOSORBIDE DINITRATE 20 MG: 20 TABLET ORAL at 14:30

## 2018-12-16 RX ADMIN — HYDRALAZINE HYDROCHLORIDE 50 MG: 50 TABLET, FILM COATED ORAL at 21:25

## 2018-12-16 RX ADMIN — LEVOTHYROXINE SODIUM 25 MCG: 25 TABLET ORAL at 06:35

## 2018-12-16 RX ADMIN — ACETAMINOPHEN 650 MG: 325 TABLET ORAL at 08:25

## 2018-12-16 RX ADMIN — HEPARIN SODIUM 5000 UNITS: 5000 INJECTION INTRAVENOUS; SUBCUTANEOUS at 14:31

## 2018-12-16 RX ADMIN — ONDANSETRON 4 MG: 2 INJECTION INTRAMUSCULAR; INTRAVENOUS at 08:11

## 2018-12-16 RX ADMIN — HYDRALAZINE HYDROCHLORIDE 10 MG: 20 INJECTION INTRAMUSCULAR; INTRAVENOUS at 04:59

## 2018-12-16 RX ADMIN — INSULIN LISPRO 1 UNITS: 100 INJECTION, SOLUTION INTRAVENOUS; SUBCUTANEOUS at 21:25

## 2018-12-16 RX ADMIN — DOCUSATE SODIUM 100 MG: 100 CAPSULE, LIQUID FILLED ORAL at 08:25

## 2018-12-16 RX ADMIN — CLONIDINE HYDROCHLORIDE 0.2 MG: 0.2 TABLET ORAL at 14:30

## 2018-12-16 ASSESSMENT — PAIN DESCRIPTION - DESCRIPTORS: DESCRIPTORS: HEADACHE

## 2018-12-16 ASSESSMENT — PAIN SCALES - GENERAL
PAINLEVEL_OUTOF10: 0
PAINLEVEL_OUTOF10: 0
PAINLEVEL_OUTOF10: 8
PAINLEVEL_OUTOF10: 0
PAINLEVEL_OUTOF10: 4

## 2018-12-16 ASSESSMENT — PAIN DESCRIPTION - PAIN TYPE: TYPE: ACUTE PAIN

## 2018-12-16 ASSESSMENT — PAIN DESCRIPTION - LOCATION: LOCATION: HEAD

## 2018-12-16 NOTE — PROGRESS NOTES
elevated. Started Procardia 90. Subjective: improvement in nausea and headaches. Denies any chest pain, sob or palpitations. Medications:  Reviewed    Infusion Medications    sodium chloride 75 mL/hr at 12/15/18 1206    dextrose       Scheduled Medications    NIFEdipine  90 mg Oral Daily    isosorbide dinitrate  20 mg Oral TID    cloNIDine  0.2 mg Oral Q8H    furosemide  40 mg Oral Daily    pantoprazole  40 mg Oral QAM AC    polyethylene glycol  17 g Oral Daily    insulin lispro  0-12 Units Subcutaneous TID WC    insulin lispro  0-6 Units Subcutaneous Nightly    hydrALAZINE  50 mg Oral TID    insulin glargine  5 Units Subcutaneous BID    levothyroxine  25 mcg Oral Daily    sodium chloride flush  10 mL Intravenous 2 times per day    heparin (porcine)  5,000 Units Subcutaneous 3 times per day    docusate sodium  100 mg Oral BID     PRN Meds: ondansetron, fentanNYL **OR** fentanNYL, hydrALAZINE, sodium chloride flush, acetaminophen, glucose, dextrose, glucagon (rDNA), dextrose      Intake/Output Summary (Last 24 hours) at 12/16/18 2143  Last data filed at 12/16/18 0502   Gross per 24 hour   Intake           2480.1 ml   Output             3000 ml   Net           -519.9 ml       Diet:  DIET CARB CONTROL; Dietary Nutrition Supplements: Other Oral Supplement (see comment)    Exam:  BP (!) 172/60   Pulse 62   Temp 99 °F (37.2 °C) (Oral)   Resp 18   Ht 5' 5\" (1.651 m)   Wt 173 lb 4.8 oz (78.6 kg)   SpO2 95%   BMI 28.84 kg/m²     General appearance: No apparent distress, appears stated age and cooperative. HEENT: Pupils equal, round, and reactive to light. Conjunctivae/corneas clear. Neck: Supple, with full range of motion. No jugular venous distention. Trachea midline. Respiratory:  Normal respiratory effort. Clear to auscultation, bilaterally without Rales/Wheezes/Rhonchi. Cardiovascular: Regular rate and rhythm with normal S1/S2 without murmurs, rubs or gallops.   Abdomen: Soft, non-tender, non-distended with normal bowel sounds. Musculoskeletal: passive and active ROM x 4 extremities. Skin: Skin color, texture, turgor normal.  No rashes or lesions. Neurologic:  Neurovascularly intact without any focal sensory/motor deficits. Cranial nerves: II-XII intact, grossly non-focal.  Psychiatric: Alert and oriented, thought content appropriate, normal insight  Capillary Refill: Brisk,< 3 seconds   Peripheral Pulses: +2 palpable, equal bilaterally       Labs:   Recent Labs      12/14/18   0535  12/15/18   0558  12/16/18   0450   WBC  11.3*  9.5  11.8*   HGB  9.5*  8.7*  9.6*   HCT  29.6*  27.1*  29.8*   PLT  364  349  399     Recent Labs      12/14/18   0535  12/15/18   0558  12/16/18   0450   NA  136  137  135   K  3.8  3.9  3.9   CL  94*  96*  96*   CO2  29  28  26   BUN  39*  37*  34*   CREATININE  2.7*  3.0*  2.8*   CALCIUM  9.1  9.1  9.3   PHOS  3.5   --   3.7     Recent Labs      12/13/18   1454  12/16/18   0450   AST  19  20   ALT  12  13   BILIDIR  <0.2   --    BILITOT  0.3  0.3   ALKPHOS  108  113     No results for input(s): INR in the last 72 hours. No results for input(s): Rishi Priscilla in the last 72 hours. Urinalysis:    Lab Results   Component Value Date    NITRU NEGATIVE 06/07/2016    WBCUA 0-2 06/07/2016    BACTERIA NONE 06/07/2016    RBCUA 0-2 06/07/2016    BLOODU TRACE 06/07/2016    GLUCOSEU >= 1000 06/07/2016       Radiology:  US THORACENTESIS   Final Result   Small pleural effusion left side. **This report has been created using voice recognition software. It may contain minor errors which are inherent in voice recognition technology. **      Final report electronically signed by Dr. Jacobo Marrero on 12/11/2018 3:46 PM      [address]   Final Result      There is a moderate right-sided pleural effusion. Please also refer to the operative report for further details. **This report has been created using voice recognition software.  It may [employer]. Plan for SNF discharge tomorrow (12/17).           Electronically signed by Elan Patel MD on 12/16/2018 at 7:28 AM

## 2018-12-16 NOTE — PLAN OF CARE
Problem: Falls - Risk of:  Goal: Will remain free from falls  Will remain free from falls   Outcome: Met This Shift  No falls    Problem: Risk for Impaired Skin Integrity  Goal: Tissue integrity - skin and mucous membranes  Structural intactness and normal physiological function of skin and  mucous membranes. Outcome: Ongoing  No skin breakdown noted    Problem: Nutrition  Goal: Optimal nutrition therapy  Outcome: Ongoing  Tolerating diet well. Denies nausea    Problem: Discharge Planning:  Goal: Participates in care planning  Participates in care planning   Outcome: Ongoing  Plan is to discharge to [facility] with son possibly tomorrow? Problem: Fluid Volume - Imbalance:  Goal: Absence of imbalanced fluid volume signs and symptoms  Absence of imbalanced fluid volume signs and symptoms   Outcome: Ongoing  Patient voiding well. Perez cathter remains in for strict output monitoring    Problem: Gas Exchange - Impaired:  Goal: Levels of oxygenation will improve  Levels of oxygenation will improve   Outcome: Ongoing  Oxygen weaned off and patient oxygen saturation greater than 90% on room air    Problem: Pain:  Goal: Pain level will decrease  Pain level will decrease   Outcome: Ongoing  Denies pain at this time    Problem: Infection - Central Venous Catheter-Associated Bloodstream Infection:  Goal: Will show no infection signs and symptoms  Will show no infection signs and symptoms   Outcome: Ongoing  Patient afebrile and no signs of infection noted    Problem: Urinary Elimination:  Goal: Signs and symptoms of infection will decrease  Signs and symptoms of infection will decrease   Outcome: Ongoing  No signs of infection noted    Comments: Care plan reviewed with patient and patient verbalizes understanding of the plan of care and contribute to goal setting.
Problem: Falls - Risk of:  Goal: Will remain free from falls  Will remain free from falls   Outcome: Met This Shift  No falls this shift  Goal: Absence of physical injury  Absence of physical injury   Outcome: Met This Shift  No physical injury this shift    Problem: Risk for Impaired Skin Integrity  Goal: Tissue integrity - skin and mucous membranes  Structural intactness and normal physiological function of skin and  mucous membranes. Outcome: Met This Shift  Structural intactness and normal physiological function of skin and mucous membranes remains unchanged from baseline    Problem: Nutrition  Goal: Optimal nutrition therapy  Outcome: Not Met This Shift  Pt remains npo this shift    Problem: Discharge Planning:  Goal: Participates in care planning  Participates in care planning  Outcome: Ongoing  Pt is sedated and no family present this shift  Goal: Discharged to appropriate level of care  Discharged to appropriate level of care  Outcome: Not Met This Shift  Remains in icu. Problem: Airway Clearance - Ineffective:  Goal: Ability to maintain a clear airway will improve  Ability to maintain a clear airway will improve  Outcome: Met This Shift  Clear airway maintained with endotracheal tube in place. Problem: Aspiration:  Goal: Absence of aspiration  Absence of aspiration  Outcome: Met This Shift  No aspiration this shift    Problem: Fluid Volume - Imbalance:  Goal: Absence of imbalanced fluid volume signs and symptoms  Absence of imbalanced fluid volume signs and symptoms  Outcome: Ongoing  Monitoring fluid balance closely    Problem: Gas Exchange - Impaired:  Goal: Levels of oxygenation will improve  Levels of oxygenation will improve  Outcome: Met This Shift  Level of oxygenation optimal on ventilator. Comments: Care plan reviewed with family. Family verbalize understanding of the plan of care and contribute to goal setting. Patient sedated, unable to verbalize.
Problem: Falls - Risk of:  Goal: Will remain free from falls  Will remain free from falls   Outcome: Ongoing  Fall risk wrist band and fall sign in place. Patient demonstrates proper use of call light at this time. Problem: Risk for Impaired Skin Integrity  Goal: Tissue integrity - skin and mucous membranes  Structural intactness and normal physiological function of skin and  mucous membranes. Outcome: Met This Shift  No new skin breakdown noted. Patient continues to be turned and repositioned every two hours. Problem: Nutrition  Goal: Optimal nutrition therapy  Outcome: Ongoing  Adequate nutritional intake noted. Problem: Impaired respiratory status  Goal: Will be able to breathe spontaneously, without ventilator support  Will be able to breathe spontaneously, without ventilator support     Outcome: Met This Shift  Patient pox above 94% on 1 L 02 nasal cannula. Problem: Pain:  Goal: Pain level will decrease  Pain level will decrease   Outcome: Ongoing  Pain Assessment: 0-10  Pain Level: 2   Pain goal:  1  Is pain goal met at this time? Yes  Pain Intervention(s): Medication (see eMar)  Additional interventions to be implemented: medications tylenol and position change      Comments: Care plan reviewed with patient. Patient verbalizes understanding of the plan of care and contributes to goal setting.
Problem: Falls - Risk of:  Goal: Will remain free from falls  Will remain free from falls   Outcome: Ongoing  Remains free from falls  Remains in bed this shift  Goal: Absence of physical injury  Absence of physical injury   Outcome: Ongoing  Remains free from injury    Problem: Risk for Impaired Skin Integrity  Goal: Tissue integrity - skin and mucous membranes  Structural intactness and normal physiological function of skin and  mucous membranes. Outcome: Ongoing  Remains free from any new skin breakdown. Problem: Nutrition  Goal: Optimal nutrition therapy  Outcome: Ongoing  Patient extubated - tolerating oral intake now. Problem: Discharge Planning:  Goal: Participates in care planning  Participates in care planning   Outcome: Ongoing  Patient and son involved and updated on plan of care. Goal: Discharged to appropriate level of care  Discharged to appropriate level of care   Outcome: Ongoing  Patient remains in ICU    Problem: Airway Clearance - Ineffective:  Goal: Ability to maintain a clear airway will improve  Ability to maintain a clear airway will improve   Outcome: Ongoing  Patient extubated and able to clear own secretions and maintain airway. Problem: Aspiration:  Goal: Absence of aspiration  Absence of aspiration   Outcome: Ongoing  Patient remains free from signs of aspiration post extubation. Problem: Fluid Volume - Imbalance:  Goal: Absence of imbalanced fluid volume signs and symptoms  Absence of imbalanced fluid volume signs and symptoms   Outcome: Ongoing  Continues to diurese well. Problem: Gas Exchange - Impaired:  Goal: Levels of oxygenation will improve  Levels of oxygenation will improve   Outcome: Ongoing  Extubated to 2L NC and maintaining oxygenation well. Comments: Care plan reviewed with patient and son at bedside. Patient and son verbalize understanding of the plan of care and contribute to goal setting.
Problem: Risk for Impaired Skin Integrity  Goal: Tissue integrity - skin and mucous membranes  Structural intactness and normal physiological function of skin and  mucous membranes. Outcome: Ongoing  Patient turns self with help every two hours. Patient taught to change position frequently to prevent breakdown. No new redness noted on pressure points such as elbows, back of head, heels, shoulder blades, or coccyx at this time. Will continue to monitor. Problem: Nutrition  Goal: Optimal nutrition therapy  Outcome: Ongoing  Patient eating three meals a day. Appetite intact. Patient tolerating PO fluids. Problem: Discharge Planning:  Goal: Participates in care planning  Participates in care planning   Outcome: Ongoing  Patient plans to return to [facility] when medically stable. Will continue to assess for additional needs. Problem: Musculor/Skeletal Functional Status  Goal: Absence of falls  Outcome: Ongoing  Patient alert and oriented x4. Bed alarmed armed. Bed wheels locked. Bedside table in reach. Patient up with assistance when ambulating. Patient verbalizes and demonstrates the use of the call light. Hourly rounding being completed. Problem: Infection - Central Venous Catheter-Associated Bloodstream Infection:  Goal: Will show no infection signs and symptoms  Will show no infection signs and symptoms   Outcome: Ongoing  Patient afebrile this shift. Last WBC count 9.7. Problem: Pain Control  Goal: Maintain pain level at or below patient's acceptable level (or 5 if patient is unable to determine acceptable level)  Outcome: Ongoing  Patient denies any pain so far this shift. Current pain level 0/10, goal is no pain. Will continue to assess with hourly rounding. Problem: Cardiovascular  Goal: No DVT, peripheral vascular complications  Outcome: Ongoing  Patient has no signs/ symptoms of DVT at this time. Patient receiving sq heparin at this time. Will continue to monitor.
Exchange - Impaired:  Goal: Levels of oxygenation will improve  Levels of oxygenation will improve   Outcome: Ongoing  Patient on 2LNC and satting very well. May be able to wean off today. Problem: Impaired respiratory status  Goal: Will be able to breathe spontaneously, without ventilator support  Will be able to breathe spontaneously, without ventilator support     Outcome: Ongoing  Lungs clear diminished and no issues at this time. Will continue to monitor. Comments: Care plan reviewed with patient. Patient verbalize understanding of the plan of care and contribute to goal setting.
interventions to be implemented: medications Tylenol, Fentanyl, position change and rest        Problem: Cardiovascular  Goal: No DVT, peripheral vascular complications  Outcome: Ongoing  No signs or symptoms of DVT this shift, remains on SQ heparin, will continue to monitor. Goal: Hemodynamic stability  Outcome: Ongoing  HR remains in the 50s, BP remains stable in 150s, will continue to administer BP medications as ordered, will continue to monitor.     Problem: Respiratory  Goal: No pulmonary complications  Outcome: Ongoing  Breathing without difficulty on room air, will continue to monitor. Goal: O2 Sat > 90%  Outcome: Ongoing  Pulse ox remains above 90% on room air, will continue to monitor.     Problem: GI  Goal: No bowel complications  Outcome: Ongoing  Pt had a few bowel movements yesterday but states her stool remains hard, continue to give colace and miralax.     Problem:   Goal: Adequate urinary output  Outcome: Ongoing  Pt has good urine output this shift, remains on PO lasix and IV fluid discontinued, will continue to monitor.       Comments: Care plan reviewed with patient and family. Patient and family verbalize understanding of the plan of care and contribute to goal setting.

## 2018-12-16 NOTE — PROGRESS NOTES
ml     Admission weight: 202 lb 9.6 oz (91.9 kg)  Wt Readings from Last 3 Encounters:   12/16/18 173 lb 4.8 oz (78.6 kg)   11/02/18 173 lb 12.8 oz (78.8 kg)   05/03/17 199 lb (90.3 kg)     Body mass index is 28.84 kg/m². Physical examination    General:  Alert and cooperative with exam  HEENT:  Head: Normocephalic, no lesions, without obvious abnormality. Neck:   no adenopathy, no carotid bruit and no JVD  Lungs:  clear to auscultation bilaterally  Heart:  regular rate and rhythm, S1, S2 normal, no murmur, click, rub or gallop  Abdomen:  soft, non-tender; bowel sounds normal; no masses,  no organomegaly  Extremities:  extremities normal, atraumatic, no cyanosis or edema  Neurologic:  Mental status: Alert, oriented, thought content appropriate  Psy:                 No evidence of depression. Mood is stable. Skin:                Warm and dry. No lesions or rashes noted. Muscles:         Hand  and leg strength are equal and strong bilaterally. Lab Data  CBC:   Recent Labs      12/14/18   0535  12/15/18   0558  12/16/18   0450   WBC  11.3*  9.5  11.8*   HGB  9.5*  8.7*  9.6*   PLT  364  349  399     BMP:  Recent Labs      12/14/18   0535  12/15/18   0558  12/16/18   0450   NA  136  137  135   K  3.8  3.9  3.9   CL  94*  96*  96*   CO2  29  28  26   BUN  39*  37*  34*   CREATININE  2.7*  3.0*  2.8*   GLUCOSE  160*  170*  180*   CALCIUM  9.1  9.1  9.3   MG  1.8  1.8  1.8   PHOS  3.5   --   3.7     TSH: No results for input(s): TSH in the last 72 hours. HgBa1c: No results for input(s): LABA1C in the last 72 hours. Hepatic: Recent Labs      12/13/18   1454  12/16/18   0450   LABALBU  2.8*  3.2*   AST  19  20   ALT  12  13   BILITOT  0.3  0.3   ALKPHOS  108  113     Troponin: No results for input(s): TROPONINI in the last 72 hours. BNP: No results for input(s): BNP in the last 72 hours. Lipids: No results for input(s): CHOL, HDL in the last 72 hours.     Invalid input(s): LDLCALCU  INR: No results

## 2018-12-17 VITALS
DIASTOLIC BLOOD PRESSURE: 69 MMHG | SYSTOLIC BLOOD PRESSURE: 165 MMHG | BODY MASS INDEX: 29.09 KG/M2 | HEART RATE: 51 BPM | TEMPERATURE: 98.8 F | OXYGEN SATURATION: 95 % | HEIGHT: 65 IN | RESPIRATION RATE: 16 BRPM | WEIGHT: 174.6 LBS

## 2018-12-17 LAB
ALBUMIN SERPL-MCNC: 2.6 G/DL (ref 3.5–5.1)
ALP BLD-CCNC: 88 U/L (ref 38–126)
ALT SERPL-CCNC: 12 U/L (ref 11–66)
ANION GAP SERPL CALCULATED.3IONS-SCNC: 11 MEQ/L (ref 8–16)
AST SERPL-CCNC: 15 U/L (ref 5–40)
BILIRUB SERPL-MCNC: 0.3 MG/DL (ref 0.3–1.2)
BUN BLDV-MCNC: 36 MG/DL (ref 7–22)
CALCIUM SERPL-MCNC: 8.7 MG/DL (ref 8.5–10.5)
CHLORIDE BLD-SCNC: 100 MEQ/L (ref 98–111)
CO2: 27 MEQ/L (ref 23–33)
CREAT SERPL-MCNC: 2.8 MG/DL (ref 0.4–1.2)
ERYTHROCYTE [DISTWIDTH] IN BLOOD BY AUTOMATED COUNT: 14.2 % (ref 11.5–14.5)
ERYTHROCYTE [DISTWIDTH] IN BLOOD BY AUTOMATED COUNT: 45.7 FL (ref 35–45)
GFR SERPL CREATININE-BSD FRML MDRD: 17 ML/MIN/1.73M2
GLUCOSE BLD-MCNC: 112 MG/DL (ref 70–108)
GLUCOSE BLD-MCNC: 121 MG/DL (ref 70–108)
GLUCOSE BLD-MCNC: 224 MG/DL (ref 70–108)
HCT VFR BLD CALC: 26.2 % (ref 37–47)
HEMOGLOBIN: 8.7 GM/DL (ref 12–16)
MAGNESIUM: 1.7 MG/DL (ref 1.6–2.4)
MCH RBC QN AUTO: 29.6 PG (ref 26–33)
MCHC RBC AUTO-ENTMCNC: 33.2 GM/DL (ref 32.2–35.5)
MCV RBC AUTO: 89.1 FL (ref 81–99)
PLATELET # BLD: 357 THOU/MM3 (ref 130–400)
PMV BLD AUTO: 11 FL (ref 9.4–12.4)
POTASSIUM SERPL-SCNC: 4.2 MEQ/L (ref 3.5–5.2)
RBC # BLD: 2.94 MILL/MM3 (ref 4.2–5.4)
SODIUM BLD-SCNC: 138 MEQ/L (ref 135–145)
TOTAL PROTEIN: 4.7 G/DL (ref 6.1–8)
WBC # BLD: 11.7 THOU/MM3 (ref 4.8–10.8)

## 2018-12-17 PROCEDURE — 6360000002 HC RX W HCPCS: Performed by: NURSE PRACTITIONER

## 2018-12-17 PROCEDURE — 6370000000 HC RX 637 (ALT 250 FOR IP): Performed by: INTERNAL MEDICINE

## 2018-12-17 PROCEDURE — 6370000000 HC RX 637 (ALT 250 FOR IP): Performed by: NURSE PRACTITIONER

## 2018-12-17 PROCEDURE — 36415 COLL VENOUS BLD VENIPUNCTURE: CPT

## 2018-12-17 PROCEDURE — 99232 SBSQ HOSP IP/OBS MODERATE 35: CPT | Performed by: INTERNAL MEDICINE

## 2018-12-17 PROCEDURE — 99239 HOSP IP/OBS DSCHRG MGMT >30: CPT | Performed by: INTERNAL MEDICINE

## 2018-12-17 PROCEDURE — 2580000003 HC RX 258: Performed by: NURSE PRACTITIONER

## 2018-12-17 PROCEDURE — 80053 COMPREHEN METABOLIC PANEL: CPT

## 2018-12-17 PROCEDURE — 97116 GAIT TRAINING THERAPY: CPT

## 2018-12-17 PROCEDURE — 83735 ASSAY OF MAGNESIUM: CPT

## 2018-12-17 PROCEDURE — 2709999900 HC NON-CHARGEABLE SUPPLY

## 2018-12-17 PROCEDURE — 82948 REAGENT STRIP/BLOOD GLUCOSE: CPT

## 2018-12-17 PROCEDURE — 85027 COMPLETE CBC AUTOMATED: CPT

## 2018-12-17 PROCEDURE — 97110 THERAPEUTIC EXERCISES: CPT

## 2018-12-17 RX ORDER — POLYETHYLENE GLYCOL 3350 17 G/17G
17 POWDER, FOR SOLUTION ORAL DAILY PRN
Qty: 527 G | Refills: 1 | DISCHARGE
Start: 2018-12-17 | End: 2019-01-16

## 2018-12-17 RX ORDER — NIFEDIPINE 90 MG/1
90 TABLET, EXTENDED RELEASE ORAL DAILY
DISCHARGE
Start: 2018-12-18 | End: 2019-03-13

## 2018-12-17 RX ORDER — ACETAMINOPHEN AND CODEINE PHOSPHATE 300; 30 MG/1; MG/1
1 TABLET ORAL EVERY 6 HOURS PRN
Qty: 12 TABLET | Refills: 0 | Status: SHIPPED | OUTPATIENT
Start: 2018-12-17 | End: 2018-12-20

## 2018-12-17 RX ORDER — NIFEDIPINE 90 MG/1
90 TABLET, EXTENDED RELEASE ORAL DAILY
Qty: 30 TABLET | Refills: 3 | Status: CANCELLED | OUTPATIENT
Start: 2018-12-18

## 2018-12-17 RX ORDER — ISOSORBIDE DINITRATE 20 MG/1
20 TABLET ORAL 3 TIMES DAILY
Refills: 0 | DISCHARGE
Start: 2018-12-17 | End: 2020-01-01 | Stop reason: HOSPADM

## 2018-12-17 RX ORDER — PSEUDOEPHEDRINE HCL 30 MG
100 TABLET ORAL 2 TIMES DAILY
DISCHARGE
Start: 2018-12-17 | End: 2019-03-13

## 2018-12-17 RX ORDER — FUROSEMIDE 20 MG/1
20 TABLET ORAL DAILY
DISCHARGE
Start: 2018-12-17 | End: 2018-12-17 | Stop reason: HOSPADM

## 2018-12-17 RX ORDER — FUROSEMIDE 40 MG/1
40 TABLET ORAL DAILY
Qty: 30 TABLET | Refills: 3 | DISCHARGE
Start: 2018-12-18 | End: 2019-03-13

## 2018-12-17 RX ADMIN — CLONIDINE HYDROCHLORIDE 0.2 MG: 0.2 TABLET ORAL at 06:00

## 2018-12-17 RX ADMIN — FUROSEMIDE 40 MG: 40 TABLET ORAL at 08:43

## 2018-12-17 RX ADMIN — POLYETHYLENE GLYCOL 3350 17 G: 17 POWDER, FOR SOLUTION ORAL at 08:43

## 2018-12-17 RX ADMIN — HYDRALAZINE HYDROCHLORIDE 50 MG: 50 TABLET, FILM COATED ORAL at 08:43

## 2018-12-17 RX ADMIN — HEPARIN SODIUM 5000 UNITS: 5000 INJECTION INTRAVENOUS; SUBCUTANEOUS at 06:02

## 2018-12-17 RX ADMIN — PANTOPRAZOLE SODIUM 40 MG: 40 TABLET, DELAYED RELEASE ORAL at 05:57

## 2018-12-17 RX ADMIN — ISOSORBIDE DINITRATE 20 MG: 20 TABLET ORAL at 08:43

## 2018-12-17 RX ADMIN — LEVOTHYROXINE SODIUM 25 MCG: 25 TABLET ORAL at 06:00

## 2018-12-17 RX ADMIN — INSULIN GLARGINE 5 UNITS: 100 INJECTION, SOLUTION SUBCUTANEOUS at 08:44

## 2018-12-17 RX ADMIN — Medication 10 ML: at 08:43

## 2018-12-17 RX ADMIN — INSULIN LISPRO 4 UNITS: 100 INJECTION, SOLUTION INTRAVENOUS; SUBCUTANEOUS at 12:42

## 2018-12-17 RX ADMIN — DOCUSATE SODIUM 100 MG: 100 CAPSULE, LIQUID FILLED ORAL at 08:43

## 2018-12-17 RX ADMIN — NIFEDIPINE 90 MG: 90 TABLET, EXTENDED RELEASE ORAL at 08:43

## 2018-12-17 ASSESSMENT — PAIN SCALES - GENERAL
PAINLEVEL_OUTOF10: 0

## 2018-12-17 NOTE — PROGRESS NOTES
[address] [address]  INPATIENT PHYSICAL THERAPY  DAILY NOTE  [facility] ICU STEPDOWN TELEMETRY 4K - 4K-17/017-A    Time In: 0940  Time Out: 1009  Timed Code Treatment Minutes: 29 Minutes  Minutes: 29          Date: 2018  Patient Name: Addi Mayes,  Gender:  female        MRN: [id number]  : 1950  (79 y.o.)     Referring Practitioner: Gabriela Camara CNP  Diagnosis: Respiratory failure  Additional Pertinent Hx: pt lives at [address] [place] [address]; on 2018 she related to shortness of breath which increased; she went to [facility] and was admitted; she was found to be in HF and was diuresed with Lasix 40 mg IVP TID; her creatinine was at 3.24 and baseline 2.3-2.8; ~0300 she desatted into the 70's and was intubated; she was started on a Diprovan gtt and ws sent to [facility] for eval. Pt extubated on 18. Past Medical History:   Diagnosis Date    Anemia     Anemia in chronic kidney disease(285.21)     Bipolar 1 disorder (HCC)     CHF (congestive heart failure) (HCC)     Chronic kidney disease, stage III (moderate) (HCC)     Depression     GERD (gastroesophageal reflux disease)     Headache(784.0)     Hyperkalemia     Hyperlipidemia     Hypertension     Hypothyroid     Neurogenic bladder     Seizure ([facility] [address] 2018    Type II or unspecified type diabetes mellitus without mention of complication, not stated as uncontrolled      Past Surgical History:   Procedure Laterality Date    BACK SURGERY      CERVICAL FUSION      CHOLECYSTECTOMY      FRACTURE SURGERY         Restrictions/Precautions:  General Precautions, Fall Risk                            Prior Level of Function:  ADL Assistance: Needs assistance  Homemaking Assistance: Needs assistance (staff completes all)  Ambulation Assistance: Needs assistance  Transfer Assistance: Needs assistance  Additional Comments: Pt reported had been able to walk in room and at times to/from dining handley with assist X1 and use of RW.  Son

## 2018-12-17 NOTE — PROGRESS NOTES
This RN removed the patients CVC. Held pressure for 10 minutes post removal. No complications.   Tele removed  Patient packed  [facility] here to transport

## 2018-12-17 NOTE — PROGRESS NOTES
Recent Labs      12/15/18   0558  12/16/18   0450  12/17/18   0600   WBC  9.5  11.8*  11.7*   RBC  3.01*  3.37*  2.94*   HGB  8.7*  9.6*  8.7*   HCT  27.1*  29.8*  26.2*   PLT  349  399  357     Last 3 CMP  Recent Labs      12/15/18   0558  12/16/18   0450  12/17/18   0600   NA  137  135  138   K  3.9  3.9  4.2   CL  96*  96*  100   CO2  28  26  27   BUN  37*  34*  36*   CREATININE  3.0*  2.8*  2.8*   CALCIUM  9.1  9.3  8.7   LABALBU   --   3.2*  2.6*   BILITOT   --   0.3  0.3             Assessment / Plan   Renal - Mild acute kidney injury on chronic kidney disease mostly secondary to cardiorenal etiology  · Overall renal function appears to be stable at baseline. · Patient's volume status is doing well. · Continue lasix and follow BMP     Mild hyponatremia overall stable, 136  Anemia stable  Congestive heart failure-currently resolved. Continue lasix  Essential hypertension running well. Continue current meds  Diabetes mellitus  Nausea - resolved  meds reviewed and D/W patient     LIANNE Orantes D.  [facility].

## 2018-12-26 LAB
AVERAGE GLUCOSE: NORMAL
BASOPHILS ABSOLUTE: ABNORMAL /ΜL
BASOPHILS RELATIVE PERCENT: ABNORMAL %
BUN BLDV-MCNC: 45 MG/DL
CALCIUM SERPL-MCNC: 9.1 MG/DL
CHLORIDE BLD-SCNC: 102 MMOL/L
CO2: 25 MMOL/L
CREAT SERPL-MCNC: 2.8 MG/DL
EOSINOPHILS ABSOLUTE: ABNORMAL /ΜL
EOSINOPHILS RELATIVE PERCENT: ABNORMAL %
GFR CALCULATED: 17
GLUCOSE BLD-MCNC: 229 MG/DL
HBA1C MFR BLD: 7.1 %
HCT VFR BLD CALC: 28.3 % (ref 36–46)
HEMOGLOBIN: 9.4 G/DL (ref 12–16)
LYMPHOCYTES ABSOLUTE: ABNORMAL /ΜL
LYMPHOCYTES RELATIVE PERCENT: ABNORMAL %
MAGNESIUM: 1.9 MG/DL
MCH RBC QN AUTO: ABNORMAL PG
MCHC RBC AUTO-ENTMCNC: ABNORMAL G/DL
MCV RBC AUTO: ABNORMAL FL
MONOCYTES ABSOLUTE: ABNORMAL /ΜL
MONOCYTES RELATIVE PERCENT: ABNORMAL %
NEUTROPHILS ABSOLUTE: ABNORMAL /ΜL
NEUTROPHILS RELATIVE PERCENT: ABNORMAL %
PLATELET # BLD: 397 K/ΜL
PMV BLD AUTO: ABNORMAL FL
POTASSIUM SERPL-SCNC: 4.6 MMOL/L
RBC # BLD: 3.24 10^6/ΜL
SODIUM BLD-SCNC: 135 MMOL/L
WBC # BLD: 11.1 10^3/ML

## 2019-01-03 LAB
BUN BLDV-MCNC: 54 MG/DL
CALCIUM SERPL-MCNC: 8.6 MG/DL
CHLORIDE BLD-SCNC: 99 MMOL/L
CO2: 25 MMOL/L
CREAT SERPL-MCNC: 3.1 MG/DL
GFR CALCULATED: 15
GLUCOSE BLD-MCNC: 188 MG/DL
POTASSIUM SERPL-SCNC: 4.7 MMOL/L
SODIUM BLD-SCNC: 133 MMOL/L

## 2019-01-04 ENCOUNTER — OFFICE VISIT (OUTPATIENT)
Dept: NEPHROLOGY | Age: 69
End: 2019-01-04
Payer: MEDICARE

## 2019-01-04 VITALS
DIASTOLIC BLOOD PRESSURE: 70 MMHG | OXYGEN SATURATION: 98 % | BODY MASS INDEX: 28.79 KG/M2 | HEART RATE: 55 BPM | SYSTOLIC BLOOD PRESSURE: 178 MMHG | WEIGHT: 173 LBS

## 2019-01-04 DIAGNOSIS — E11.21 DIABETIC NEPHROPATHY WITH PROTEINURIA (HCC): Primary | ICD-10-CM

## 2019-01-04 DIAGNOSIS — I10 ESSENTIAL (PRIMARY) HYPERTENSION: Chronic | ICD-10-CM

## 2019-01-04 DIAGNOSIS — N18.4 STAGE 4 CHRONIC KIDNEY DISEASE (HCC): ICD-10-CM

## 2019-01-04 PROBLEM — N17.9 AKI (ACUTE KIDNEY INJURY) (HCC): Status: RESOLVED | Noted: 2018-12-07 | Resolved: 2019-01-04

## 2019-01-04 PROCEDURE — 1123F ACP DISCUSS/DSCN MKR DOCD: CPT | Performed by: INTERNAL MEDICINE

## 2019-01-04 PROCEDURE — 2022F DILAT RTA XM EVC RTNOPTHY: CPT | Performed by: INTERNAL MEDICINE

## 2019-01-04 PROCEDURE — 3017F COLORECTAL CA SCREEN DOC REV: CPT | Performed by: INTERNAL MEDICINE

## 2019-01-04 PROCEDURE — G8419 CALC BMI OUT NRM PARAM NOF/U: HCPCS | Performed by: INTERNAL MEDICINE

## 2019-01-04 PROCEDURE — G8427 DOCREV CUR MEDS BY ELIG CLIN: HCPCS | Performed by: INTERNAL MEDICINE

## 2019-01-04 PROCEDURE — G8482 FLU IMMUNIZE ORDER/ADMIN: HCPCS | Performed by: INTERNAL MEDICINE

## 2019-01-04 PROCEDURE — 1101F PT FALLS ASSESS-DOCD LE1/YR: CPT | Performed by: INTERNAL MEDICINE

## 2019-01-04 PROCEDURE — 4040F PNEUMOC VAC/ADMIN/RCVD: CPT | Performed by: INTERNAL MEDICINE

## 2019-01-04 PROCEDURE — 3046F HEMOGLOBIN A1C LEVEL >9.0%: CPT | Performed by: INTERNAL MEDICINE

## 2019-01-04 PROCEDURE — G8400 PT W/DXA NO RESULTS DOC: HCPCS | Performed by: INTERNAL MEDICINE

## 2019-01-04 PROCEDURE — 1111F DSCHRG MED/CURRENT MED MERGE: CPT | Performed by: INTERNAL MEDICINE

## 2019-01-04 PROCEDURE — 1090F PRES/ABSN URINE INCON ASSESS: CPT | Performed by: INTERNAL MEDICINE

## 2019-01-04 PROCEDURE — 99213 OFFICE O/P EST LOW 20 MIN: CPT | Performed by: INTERNAL MEDICINE

## 2019-01-04 PROCEDURE — 1036F TOBACCO NON-USER: CPT | Performed by: INTERNAL MEDICINE

## 2019-01-04 RX ORDER — ACETAMINOPHEN 325 MG/1
650 TABLET ORAL EVERY 6 HOURS PRN
COMMUNITY

## 2019-01-04 RX ORDER — ACETAMINOPHEN AND CODEINE PHOSPHATE 300; 30 MG/1; MG/1
1 TABLET ORAL EVERY 6 HOURS PRN
Status: ON HOLD | COMMUNITY
End: 2021-01-01 | Stop reason: SDUPTHER

## 2019-02-08 LAB
BUN BLDV-MCNC: 45 MG/DL
CALCIUM SERPL-MCNC: 9.2 MG/DL
CHLORIDE BLD-SCNC: 94 MMOL/L
CO2: 28 MMOL/L
CREAT SERPL-MCNC: 2.8 MG/DL
GFR CALCULATED: 17
GLUCOSE BLD-MCNC: 86 MG/DL
HCT VFR BLD CALC: 33.8 % (ref 36–46)
HEMOGLOBIN: 11.5 G/DL (ref 12–16)
PLATELET # BLD: 395 K/ΜL
POTASSIUM SERPL-SCNC: 3.6 MMOL/L
SEDIMENTATION RATE, ERYTHROCYTE: 53
SODIUM BLD-SCNC: 134 MMOL/L
WBC # BLD: 9.9 10^3/ML

## 2019-03-08 LAB
ALBUMIN SERPL-MCNC: 3.1 G/DL
ALP BLD-CCNC: 151 U/L
ALT SERPL-CCNC: 36 U/L
ANION GAP SERPL CALCULATED.3IONS-SCNC: NORMAL MMOL/L
AST SERPL-CCNC: 34 U/L
BILIRUB SERPL-MCNC: 0.4 MG/DL (ref 0.1–1.4)
BUN BLDV-MCNC: 38 MG/DL
CALCIUM SERPL-MCNC: 8.7 MG/DL
CHLORIDE BLD-SCNC: 106 MMOL/L
CO2: 25 MMOL/L
CREAT SERPL-MCNC: 2.2 MG/DL
FERRITIN: 987 NG/ML (ref 9–150)
GFR CALCULATED: 22
GLUCOSE BLD-MCNC: 158 MG/DL
IRON SATURATION: 28
IRON: 67
PHOSPHORUS: 4.6 MG/DL
POTASSIUM SERPL-SCNC: 4.2 MMOL/L
PTH INTACT: 71
SODIUM BLD-SCNC: 137 MMOL/L
TOTAL IRON BINDING CAPACITY: 236
TOTAL PROTEIN: 5.1
VITAMIN D 25-HYDROXY: 18
VITAMIN D2, 25 HYDROXY: NORMAL
VITAMIN D3,25 HYDROXY: NORMAL

## 2019-03-09 LAB
BILIRUBIN, URINE: NEGATIVE
BLOOD, URINE: NEGATIVE
CLARITY: CLEAR
COLOR: YELLOW
CREATININE URINE: 45 MG/DL
GLUCOSE URINE: ABNORMAL
KETONES, URINE: NEGATIVE
LEUKOCYTE ESTERASE, URINE: NEGATIVE
MICROALBUMIN/CREAT 24H UR: 249.7 MG/G{CREAT}
NITRITE, URINE: NEGATIVE
PH UA: 5 (ref 4.5–8)
PROTEIN UA: ABNORMAL
SPECIFIC GRAVITY, URINE: 1.01
UROBILINOGEN, URINE: NORMAL

## 2019-03-13 ENCOUNTER — OFFICE VISIT (OUTPATIENT)
Dept: NEPHROLOGY | Age: 69
End: 2019-03-13
Payer: MEDICARE

## 2019-03-13 VITALS
BODY MASS INDEX: 27.67 KG/M2 | OXYGEN SATURATION: 99 % | DIASTOLIC BLOOD PRESSURE: 81 MMHG | SYSTOLIC BLOOD PRESSURE: 170 MMHG | HEART RATE: 74 BPM | WEIGHT: 166.3 LBS

## 2019-03-13 DIAGNOSIS — E11.21 DIABETIC NEPHROPATHY WITH PROTEINURIA (HCC): ICD-10-CM

## 2019-03-13 DIAGNOSIS — N18.4 STAGE 4 CHRONIC KIDNEY DISEASE (HCC): Primary | ICD-10-CM

## 2019-03-13 DIAGNOSIS — I10 ESSENTIAL (PRIMARY) HYPERTENSION: ICD-10-CM

## 2019-03-13 PROCEDURE — 99213 OFFICE O/P EST LOW 20 MIN: CPT | Performed by: INTERNAL MEDICINE

## 2019-03-13 PROCEDURE — 1036F TOBACCO NON-USER: CPT | Performed by: INTERNAL MEDICINE

## 2019-03-13 PROCEDURE — G8419 CALC BMI OUT NRM PARAM NOF/U: HCPCS | Performed by: INTERNAL MEDICINE

## 2019-03-13 PROCEDURE — G8427 DOCREV CUR MEDS BY ELIG CLIN: HCPCS | Performed by: INTERNAL MEDICINE

## 2019-03-13 PROCEDURE — 3017F COLORECTAL CA SCREEN DOC REV: CPT | Performed by: INTERNAL MEDICINE

## 2019-03-13 PROCEDURE — 3046F HEMOGLOBIN A1C LEVEL >9.0%: CPT | Performed by: INTERNAL MEDICINE

## 2019-03-13 PROCEDURE — G8482 FLU IMMUNIZE ORDER/ADMIN: HCPCS | Performed by: INTERNAL MEDICINE

## 2019-03-13 PROCEDURE — 1101F PT FALLS ASSESS-DOCD LE1/YR: CPT | Performed by: INTERNAL MEDICINE

## 2019-03-13 PROCEDURE — 1123F ACP DISCUSS/DSCN MKR DOCD: CPT | Performed by: INTERNAL MEDICINE

## 2019-03-13 PROCEDURE — 4040F PNEUMOC VAC/ADMIN/RCVD: CPT | Performed by: INTERNAL MEDICINE

## 2019-03-13 PROCEDURE — G8400 PT W/DXA NO RESULTS DOC: HCPCS | Performed by: INTERNAL MEDICINE

## 2019-03-13 PROCEDURE — 2022F DILAT RTA XM EVC RTNOPTHY: CPT | Performed by: INTERNAL MEDICINE

## 2019-03-13 PROCEDURE — 1090F PRES/ABSN URINE INCON ASSESS: CPT | Performed by: INTERNAL MEDICINE

## 2019-03-13 RX ORDER — BUMETANIDE 1 MG/1
1 TABLET ORAL 2 TIMES DAILY
Qty: 60 TABLET | Refills: 3 | Status: ON HOLD | OUTPATIENT
Start: 2019-03-13 | End: 2019-05-19

## 2019-03-13 RX ORDER — FERROUS SULFATE 325(65) MG
325 TABLET ORAL 2 TIMES DAILY
COMMUNITY
End: 2019-11-26

## 2019-03-13 RX ORDER — GABAPENTIN 300 MG/1
300 CAPSULE ORAL DAILY
COMMUNITY
Start: 2018-07-31

## 2019-03-13 RX ORDER — LISINOPRIL 20 MG/1
20 TABLET ORAL DAILY
Qty: 90 TABLET | Refills: 1 | Status: SHIPPED | OUTPATIENT
Start: 2019-03-13 | End: 2019-04-10 | Stop reason: ALTCHOICE

## 2019-03-13 RX ORDER — AMLODIPINE BESYLATE 5 MG/1
5 TABLET ORAL DAILY
COMMUNITY
End: 2020-01-01 | Stop reason: HOSPADM

## 2019-03-13 RX ORDER — BUMETANIDE 1 MG/1
1 TABLET ORAL DAILY
COMMUNITY
End: 2019-03-13

## 2019-03-19 LAB
BUN BLDV-MCNC: 45 MG/DL
CALCIUM SERPL-MCNC: 9 MG/DL
CHLORIDE BLD-SCNC: 103 MMOL/L
CO2: 28 MMOL/L
CREAT SERPL-MCNC: 2.4 MG/DL
GFR CALCULATED: 20
GLUCOSE BLD-MCNC: 76 MG/DL
POTASSIUM SERPL-SCNC: 4.4 MMOL/L
SODIUM BLD-SCNC: 137 MMOL/L

## 2019-03-27 LAB
BUN BLDV-MCNC: 53 MG/DL
CALCIUM SERPL-MCNC: 8.8 MG/DL
CHLORIDE BLD-SCNC: 105 MMOL/L
CO2: 22 MMOL/L
CREAT SERPL-MCNC: 3.1 MG/DL
GFR CALCULATED: 15
GLUCOSE BLD-MCNC: 102 MG/DL
POTASSIUM SERPL-SCNC: 4.4 MMOL/L
SODIUM BLD-SCNC: 138 MMOL/L

## 2019-04-03 LAB — ANTIBODY: NONREACTIVE

## 2019-04-05 LAB
BUN BLDV-MCNC: 55 MG/DL
CALCIUM SERPL-MCNC: 8.1 MG/DL
CHLORIDE BLD-SCNC: 105 MMOL/L
CO2: 24 MMOL/L
CREAT SERPL-MCNC: 2.9 MG/DL
GFR CALCULATED: 16
GLUCOSE BLD-MCNC: 79 MG/DL
POTASSIUM SERPL-SCNC: 4.8 MMOL/L
SODIUM BLD-SCNC: 137 MMOL/L

## 2019-04-08 ENCOUNTER — TELEPHONE (OUTPATIENT)
Dept: NEPHROLOGY | Age: 69
End: 2019-04-08

## 2019-04-08 NOTE — TELEPHONE ENCOUNTER
----- Message from Facundo Simms MD sent at 4/8/2019 11:19 AM EDT -----  Check how often she is taking  / getting bumex at the [facility].  Her weight and if she has any swelling

## 2019-04-09 NOTE — TELEPHONE ENCOUNTER
Ok weight is better. Stop lisinopril and continue current bumex dose .  Update med list and  BMP next week

## 2019-04-09 NOTE — TELEPHONE ENCOUNTER
Bumex 1 mg po qd started 4/4/19  Weight 161 lb. on 4/2/19  1+pitting edema noted @ HS when removing socks.

## 2019-04-10 ENCOUNTER — TELEPHONE (OUTPATIENT)
Dept: NEPHROLOGY | Age: 69
End: 2019-04-10

## 2019-04-10 NOTE — TELEPHONE ENCOUNTER
Contacted [facility]. Spoke to [facility]. Updated on orders, verbalized understanding. BMP to be drawn next week.

## 2019-04-10 NOTE — TELEPHONE ENCOUNTER
Cydney Gambino  Female, 79 y.o., 1950  MRN:   [id number]  Weight:   166 lb 4.8 oz (75.4 kg)  Phone:   [phone] (M)  PCP:   Srinath Pulido  Pain Agreement:   Not on File  Allergies  Eggs Or Egg-derived Products  Aspirin  Pcn [Penicillins]  Vicodin [Hydrocodone-acetaminophen]  Vilazodone  Wellbutrin [Bupropion]  Health Maintenance:   Due  Primary Cvg:   MEDICARE/MEDICARE PART A AND B  Next Appt:   06/07/2019  Pt Comm Pref:   Phone [4]  Document More Detail >>   RE: Nurys Perdomo MD   Sent: Wed April 10, 2019  1:17 PM   To: Alejandro Marx MA          Message   I informed Christa Edge of this information at [address] at night      ----- Message -----   From: Alejandro Marx MA   Sent: 4/10/2019   1:08 PM   To: Thuy Perdomo MD   Subject: Scan                                               The image below was scanned by Alejandro Marx MA [[id number]] on 4/10/2019 at 1:08 PM to the following: Isaias Max [[id number]]:       Media Information                    Notice:          Scan on 4/10/2019  1:08 PM by Alejandro Marx MA: 04/10/2019-Nathalie Wagner              Description        04/10/2019-Nathalie Wagner              Patient        Isaias Max              Document Type  1       Correspondence

## 2019-04-15 LAB
BUN BLDV-MCNC: 53 MG/DL
CALCIUM SERPL-MCNC: 8.9 MG/DL
CHLORIDE BLD-SCNC: 105 MMOL/L
CO2: 23 MMOL/L
CREAT SERPL-MCNC: 2.9 MG/DL
GFR CALCULATED: 16
GLUCOSE BLD-MCNC: 86 MG/DL
POTASSIUM SERPL-SCNC: 4.6 MMOL/L
SODIUM BLD-SCNC: 135 MMOL/L

## 2019-04-24 ENCOUNTER — TELEPHONE (OUTPATIENT)
Dept: NEPHROLOGY | Age: 69
End: 2019-04-24

## 2019-04-24 NOTE — TELEPHONE ENCOUNTER
I called and spoke to [facility] at [facility] gave her the new orders from Dr. Julio César Palma

## 2019-04-24 NOTE — TELEPHONE ENCOUNTER
Bibi Go  Female, 79 y.o., 1950  MRN:   [id number]  Weight:   166 lb 4.8 oz (75.4 kg)  Phone:   [phone] (H)  PCP:   Rochelle Pulido  Pain Agreement:   Not on File  Allergies  Eggs Or Egg-derived Products  Aspirin  Pcn [Penicillins]  Vicodin [Hydrocodone-acetaminophen]  Vilazodone  Wellbutrin [Bupropion]  Health Maintenance:   Due  Primary Cvg:   MEDICARE/MEDICARE PART A AND B  Next Appt:   06/07/2019  Pt Comm Pref:   Phone [4]  Document More Detail >>   RE: Uriel Covington MD   Sent: Wed April 24, 2019 11:27 AM   To: Gabrielle Beaulieu MA   Cc: [facility] Clinical Staff          Message     Increase bumex to 2 mg PO BID and 1500 ml fluid restriction. BMP in 1 week      ----- Message -----   From: Gabrielle Beaulieu MA   Sent: 4/24/2019  10:51 AM   To: Rere Powell MD   Subject: Scan                                               The image below was scanned by Gabrielle Beaulieu MA [[id number]] on 4/24/2019 at 10:51 AM to the following: Jaja Victor [[id number]]:       Media Information                    Notice:          Scan on 4/24/2019 10:51 AM by Gabrielle Beaulieu MA: Fabien Allen [facility] Nurse note              Description        3/15/9629-FGLPEG [place] Nurse note              Patient        Jaja Victor              Document Type  1       Progress Notes

## 2019-05-01 LAB
BUN BLDV-MCNC: 71 MG/DL
CALCIUM SERPL-MCNC: 9.6 MG/DL
CHLORIDE BLD-SCNC: 103 MMOL/L
CO2: 25 MMOL/L
CREAT SERPL-MCNC: 3.3 MG/DL
GFR CALCULATED: 14
GLUCOSE BLD-MCNC: 68 MG/DL
POTASSIUM SERPL-SCNC: 4.3 MMOL/L
SODIUM BLD-SCNC: 138 MMOL/L

## 2019-05-06 ENCOUNTER — TELEPHONE (OUTPATIENT)
Dept: NEPHROLOGY | Age: 69
End: 2019-05-06

## 2019-05-19 ENCOUNTER — APPOINTMENT (OUTPATIENT)
Dept: GENERAL RADIOLOGY | Age: 69
DRG: 291 | End: 2019-05-19
Attending: INTERNAL MEDICINE
Payer: MEDICARE

## 2019-05-19 ENCOUNTER — HOSPITAL ENCOUNTER (INPATIENT)
Age: 69
LOS: 4 days | Discharge: NURSING, REHAB OR LONG-TERM CARE | DRG: 291 | End: 2019-05-23
Attending: INTERNAL MEDICINE | Admitting: HOSPITALIST
Payer: MEDICARE

## 2019-05-19 DIAGNOSIS — N18.4 CHRONIC KIDNEY DISEASE (CKD), STAGE IV (SEVERE) (HCC): Primary | ICD-10-CM

## 2019-05-19 PROBLEM — N19 RENAL FAILURE: Status: ACTIVE | Noted: 2019-05-19

## 2019-05-19 LAB
FERRITIN: 905 NG/ML (ref 10–291)
GLUCOSE BLD-MCNC: 103 MG/DL (ref 70–108)
GLUCOSE BLD-MCNC: 103 MG/DL (ref 70–108)
GLUCOSE BLD-MCNC: 118 MG/DL (ref 70–108)
GLUCOSE BLD-MCNC: 125 MG/DL (ref 70–108)
IRON: 30 UG/DL (ref 50–170)
MRSA SCREEN RT-PCR: NEGATIVE
PRO-BNP: 3592 PG/ML (ref 0–900)
TOTAL IRON BINDING CAPACITY: 196 UG/DL (ref 171–450)
TROPONIN T: 0.03 NG/ML
TSH SERPL DL<=0.05 MIU/L-ACNC: 3 UIU/ML (ref 0.4–4.2)
VANCOMYCIN RESISTANT ENTEROCOCCUS: NEGATIVE

## 2019-05-19 PROCEDURE — 6360000002 HC RX W HCPCS: Performed by: HOSPITALIST

## 2019-05-19 PROCEDURE — 6360000002 HC RX W HCPCS: Performed by: FAMILY MEDICINE

## 2019-05-19 PROCEDURE — 2700000000 HC OXYGEN THERAPY PER DAY

## 2019-05-19 PROCEDURE — 82728 ASSAY OF FERRITIN: CPT

## 2019-05-19 PROCEDURE — 84484 ASSAY OF TROPONIN QUANT: CPT

## 2019-05-19 PROCEDURE — 83880 ASSAY OF NATRIURETIC PEPTIDE: CPT

## 2019-05-19 PROCEDURE — 1200000003 HC TELEMETRY R&B

## 2019-05-19 PROCEDURE — 87081 CULTURE SCREEN ONLY: CPT

## 2019-05-19 PROCEDURE — 83540 ASSAY OF IRON: CPT

## 2019-05-19 PROCEDURE — 6370000000 HC RX 637 (ALT 250 FOR IP): Performed by: HOSPITALIST

## 2019-05-19 PROCEDURE — 36415 COLL VENOUS BLD VENIPUNCTURE: CPT

## 2019-05-19 PROCEDURE — 99222 1ST HOSP IP/OBS MODERATE 55: CPT | Performed by: INTERNAL MEDICINE

## 2019-05-19 PROCEDURE — 94761 N-INVAS EAR/PLS OXIMETRY MLT: CPT

## 2019-05-19 PROCEDURE — 82948 REAGENT STRIP/BLOOD GLUCOSE: CPT

## 2019-05-19 PROCEDURE — 87641 MR-STAPH DNA AMP PROBE: CPT

## 2019-05-19 PROCEDURE — 84443 ASSAY THYROID STIM HORMONE: CPT

## 2019-05-19 PROCEDURE — 99223 1ST HOSP IP/OBS HIGH 75: CPT | Performed by: HOSPITALIST

## 2019-05-19 PROCEDURE — 83550 IRON BINDING TEST: CPT

## 2019-05-19 PROCEDURE — 2580000003 HC RX 258: Performed by: HOSPITALIST

## 2019-05-19 PROCEDURE — 71045 X-RAY EXAM CHEST 1 VIEW: CPT

## 2019-05-19 PROCEDURE — 87500 VANOMYCIN DNA AMP PROBE: CPT

## 2019-05-19 RX ORDER — MECLIZINE HCL 12.5 MG/1
25 TABLET ORAL 3 TIMES DAILY
Status: DISCONTINUED | OUTPATIENT
Start: 2019-05-19 | End: 2019-05-23 | Stop reason: HOSPADM

## 2019-05-19 RX ORDER — OXYBUTYNIN CHLORIDE 5 MG/1
5 TABLET ORAL DAILY
Status: DISCONTINUED | OUTPATIENT
Start: 2019-05-19 | End: 2019-05-23 | Stop reason: HOSPADM

## 2019-05-19 RX ORDER — OXYBUTYNIN CHLORIDE 5 MG/1
5 TABLET ORAL DAILY
Status: ON HOLD | COMMUNITY
End: 2020-01-01 | Stop reason: SDUPTHER

## 2019-05-19 RX ORDER — AMLODIPINE BESYLATE 10 MG/1
10 TABLET ORAL DAILY
Status: DISCONTINUED | OUTPATIENT
Start: 2019-05-19 | End: 2019-05-23 | Stop reason: HOSPADM

## 2019-05-19 RX ORDER — DEXTROSE MONOHYDRATE 50 MG/ML
100 INJECTION, SOLUTION INTRAVENOUS PRN
Status: DISCONTINUED | OUTPATIENT
Start: 2019-05-19 | End: 2019-05-23 | Stop reason: HOSPADM

## 2019-05-19 RX ORDER — DULOXETIN HYDROCHLORIDE 30 MG/1
30 CAPSULE, DELAYED RELEASE ORAL DAILY
Status: DISCONTINUED | OUTPATIENT
Start: 2019-05-19 | End: 2019-05-23 | Stop reason: HOSPADM

## 2019-05-19 RX ORDER — DEXTROSE MONOHYDRATE 25 G/50ML
12.5 INJECTION, SOLUTION INTRAVENOUS PRN
Status: DISCONTINUED | OUTPATIENT
Start: 2019-05-19 | End: 2019-05-23 | Stop reason: HOSPADM

## 2019-05-19 RX ORDER — ISOSORBIDE DINITRATE 20 MG/1
20 TABLET ORAL 3 TIMES DAILY
Status: DISCONTINUED | OUTPATIENT
Start: 2019-05-19 | End: 2019-05-23 | Stop reason: HOSPADM

## 2019-05-19 RX ORDER — NICOTINE POLACRILEX 4 MG
15 LOZENGE BUCCAL PRN
Status: DISCONTINUED | OUTPATIENT
Start: 2019-05-19 | End: 2019-05-23 | Stop reason: HOSPADM

## 2019-05-19 RX ORDER — DULOXETIN HYDROCHLORIDE 30 MG/1
30 CAPSULE, DELAYED RELEASE ORAL DAILY
COMMUNITY
End: 2019-07-30 | Stop reason: ALTCHOICE

## 2019-05-19 RX ORDER — SODIUM CHLORIDE 0.9 % (FLUSH) 0.9 %
10 SYRINGE (ML) INJECTION PRN
Status: DISCONTINUED | OUTPATIENT
Start: 2019-05-19 | End: 2019-05-23 | Stop reason: HOSPADM

## 2019-05-19 RX ORDER — ONDANSETRON 4 MG/1
4 TABLET, FILM COATED ORAL EVERY 4 HOURS PRN
Status: DISCONTINUED | OUTPATIENT
Start: 2019-05-19 | End: 2019-05-23 | Stop reason: HOSPADM

## 2019-05-19 RX ORDER — FERROUS SULFATE 325(65) MG
325 TABLET ORAL 2 TIMES DAILY
Status: DISCONTINUED | OUTPATIENT
Start: 2019-05-19 | End: 2019-05-22

## 2019-05-19 RX ORDER — HYDRALAZINE HYDROCHLORIDE 50 MG/1
50 TABLET, FILM COATED ORAL 2 TIMES DAILY
Status: DISCONTINUED | OUTPATIENT
Start: 2019-05-19 | End: 2019-05-21

## 2019-05-19 RX ORDER — MECLIZINE HYDROCHLORIDE 25 MG/1
25 TABLET ORAL 3 TIMES DAILY
COMMUNITY

## 2019-05-19 RX ORDER — FLUTICASONE PROPIONATE 50 MCG
1 SPRAY, SUSPENSION (ML) NASAL DAILY
Status: ON HOLD | COMMUNITY
Start: 2019-05-15 | End: 2019-06-11

## 2019-05-19 RX ORDER — ONDANSETRON 2 MG/ML
4 INJECTION INTRAMUSCULAR; INTRAVENOUS EVERY 6 HOURS PRN
Status: DISCONTINUED | OUTPATIENT
Start: 2019-05-19 | End: 2019-05-23 | Stop reason: HOSPADM

## 2019-05-19 RX ORDER — HEPARIN SODIUM 5000 [USP'U]/ML
5000 INJECTION, SOLUTION INTRAVENOUS; SUBCUTANEOUS EVERY 8 HOURS
Status: DISCONTINUED | OUTPATIENT
Start: 2019-05-19 | End: 2019-05-23 | Stop reason: HOSPADM

## 2019-05-19 RX ORDER — PRAVASTATIN SODIUM 40 MG
40 TABLET ORAL NIGHTLY
Status: DISCONTINUED | OUTPATIENT
Start: 2019-05-19 | End: 2019-05-23 | Stop reason: HOSPADM

## 2019-05-19 RX ORDER — SODIUM CHLORIDE 0.9 % (FLUSH) 0.9 %
10 SYRINGE (ML) INJECTION EVERY 12 HOURS SCHEDULED
Status: DISCONTINUED | OUTPATIENT
Start: 2019-05-19 | End: 2019-05-23 | Stop reason: HOSPADM

## 2019-05-19 RX ORDER — FUROSEMIDE 10 MG/ML
40 INJECTION INTRAMUSCULAR; INTRAVENOUS 2 TIMES DAILY
Status: DISCONTINUED | OUTPATIENT
Start: 2019-05-19 | End: 2019-05-22

## 2019-05-19 RX ORDER — GABAPENTIN 100 MG/1
100 CAPSULE ORAL 3 TIMES DAILY
Status: DISCONTINUED | OUTPATIENT
Start: 2019-05-19 | End: 2019-05-23 | Stop reason: HOSPADM

## 2019-05-19 RX ORDER — LEVOTHYROXINE SODIUM 0.03 MG/1
25 TABLET ORAL DAILY
Status: DISCONTINUED | OUTPATIENT
Start: 2019-05-19 | End: 2019-05-23 | Stop reason: HOSPADM

## 2019-05-19 RX ORDER — FLUTICASONE PROPIONATE 50 MCG
1 SPRAY, SUSPENSION (ML) NASAL DAILY
Status: DISCONTINUED | OUTPATIENT
Start: 2019-05-19 | End: 2019-05-23 | Stop reason: HOSPADM

## 2019-05-19 RX ORDER — INSULIN GLARGINE 100 [IU]/ML
20 INJECTION, SOLUTION SUBCUTANEOUS NIGHTLY
Status: DISCONTINUED | OUTPATIENT
Start: 2019-05-19 | End: 2019-05-23

## 2019-05-19 RX ADMIN — AMLODIPINE BESYLATE 10 MG: 10 TABLET ORAL at 09:37

## 2019-05-19 RX ADMIN — MECLIZINE HYDROCHLORIDE 25 MG: 12.5 TABLET, FILM COATED ORAL at 21:29

## 2019-05-19 RX ADMIN — HYDRALAZINE HYDROCHLORIDE 50 MG: 50 TABLET, FILM COATED ORAL at 09:37

## 2019-05-19 RX ADMIN — HEPARIN SODIUM 5000 UNITS: 5000 INJECTION INTRAVENOUS; SUBCUTANEOUS at 17:00

## 2019-05-19 RX ADMIN — ISOSORBIDE DINITRATE 20 MG: 20 TABLET ORAL at 09:38

## 2019-05-19 RX ADMIN — HYDRALAZINE HYDROCHLORIDE 50 MG: 50 TABLET, FILM COATED ORAL at 21:29

## 2019-05-19 RX ADMIN — MECLIZINE HYDROCHLORIDE 25 MG: 12.5 TABLET, FILM COATED ORAL at 09:23

## 2019-05-19 RX ADMIN — HEPARIN SODIUM 5000 UNITS: 5000 INJECTION INTRAVENOUS; SUBCUTANEOUS at 23:57

## 2019-05-19 RX ADMIN — MECLIZINE HYDROCHLORIDE 25 MG: 12.5 TABLET, FILM COATED ORAL at 14:17

## 2019-05-19 RX ADMIN — Medication 10 ML: at 21:28

## 2019-05-19 RX ADMIN — FLUTICASONE PROPIONATE 1 SPRAY: 50 SPRAY, METERED NASAL at 09:38

## 2019-05-19 RX ADMIN — LEVOTHYROXINE SODIUM 25 MCG: 25 TABLET ORAL at 09:37

## 2019-05-19 RX ADMIN — Medication 10 ML: at 09:42

## 2019-05-19 RX ADMIN — FERROUS SULFATE TAB 325 MG (65 MG ELEMENTAL FE) 325 MG: 325 (65 FE) TAB at 09:37

## 2019-05-19 RX ADMIN — OXYBUTYNIN CHLORIDE 5 MG: 5 TABLET ORAL at 09:37

## 2019-05-19 RX ADMIN — HEPARIN SODIUM 5000 UNITS: 5000 INJECTION INTRAVENOUS; SUBCUTANEOUS at 09:38

## 2019-05-19 RX ADMIN — ISOSORBIDE DINITRATE 20 MG: 20 TABLET ORAL at 14:16

## 2019-05-19 RX ADMIN — GABAPENTIN 100 MG: 100 CAPSULE ORAL at 21:29

## 2019-05-19 RX ADMIN — GABAPENTIN 100 MG: 100 CAPSULE ORAL at 09:37

## 2019-05-19 RX ADMIN — DULOXETINE HYDROCHLORIDE 30 MG: 30 CAPSULE, DELAYED RELEASE ORAL at 09:37

## 2019-05-19 RX ADMIN — PRAVASTATIN SODIUM 40 MG: 40 TABLET ORAL at 21:29

## 2019-05-19 RX ADMIN — GABAPENTIN 100 MG: 100 CAPSULE ORAL at 14:16

## 2019-05-19 RX ADMIN — FUROSEMIDE 40 MG: 10 INJECTION, SOLUTION INTRAMUSCULAR; INTRAVENOUS at 09:23

## 2019-05-19 RX ADMIN — ISOSORBIDE DINITRATE 20 MG: 20 TABLET ORAL at 21:29

## 2019-05-19 RX ADMIN — FERROUS SULFATE TAB 325 MG (65 MG ELEMENTAL FE) 325 MG: 325 (65 FE) TAB at 21:29

## 2019-05-19 RX ADMIN — FUROSEMIDE 40 MG: 10 INJECTION, SOLUTION INTRAMUSCULAR; INTRAVENOUS at 21:28

## 2019-05-19 ASSESSMENT — PAIN SCALES - GENERAL
PAINLEVEL_OUTOF10: 8
PAINLEVEL_OUTOF10: 0

## 2019-05-19 ASSESSMENT — PAIN DESCRIPTION - ORIENTATION: ORIENTATION: UPPER;POSTERIOR

## 2019-05-19 ASSESSMENT — PAIN DESCRIPTION - PAIN TYPE: TYPE: ACUTE PAIN

## 2019-05-19 ASSESSMENT — PAIN DESCRIPTION - DESCRIPTORS: DESCRIPTORS: ACHING

## 2019-05-19 ASSESSMENT — PAIN DESCRIPTION - LOCATION: LOCATION: HEAD

## 2019-05-19 ASSESSMENT — PAIN DESCRIPTION - ONSET: ONSET: ON-GOING

## 2019-05-19 ASSESSMENT — PAIN DESCRIPTION - FREQUENCY: FREQUENCY: CONTINUOUS

## 2019-05-19 NOTE — CONSULTS
[address] [place] [address]                                  CONSULTATION    PATIENT NAME: Parish Hatch                     :        1950  MED REC NO:   [id number]                           ROOM:       0013  ACCOUNT NO:   [de-identified]                           ADMIT DATE: 2019  PROVIDER:     ANNMARIE Gibbs DATE:  2019    REQUESTING PROVIDER:  Kyara Catherine MD    REASON FOR CONSULTATION:  Chronic kidney disease and need for diuretic  management. HISTORY OF PRESENT ILLNESS:  This is a 73-year-old pleasant white female  with history of chronic kidney disease, stage 3; history of diabetes  mellitus; hypertension; thyroid disorder; chronic diastolic dysfunction  of the left ventricle who is chronically dependent on diuretics, who was  sent for evaluation of shortness of breath as well as progressively  worsening weight gain. The patient reports that recently her diuretics  were adjusted. Apparently, she gained 10 pounds over the last several  days and also had worsening shortness of breath over the last 2-3 days. She says that she found it difficult to carry out any activities. She  was easily getting short of breath on minimal exertion. She went to [facility] where she was evaluated and  transferred to [address] [place] [address] for further workup and management. At outside  hospital, the patient underwent chest x-ray which revealed pulmonary  edema with moderate right and small left pleural effusions. Blood work  done at outside hospital revealed that her serum creatinine was 3.47  with a BUN of 67. Creatinine on 2019 was 3.0 and generally stays  around 2.9 to 3.0. The patient reports her symptoms did not improve  over time. She says that recently her diuretics were stopped secondary  to renal failure. As stated above, any minimal exertion made her  symptoms worse.   She denies any alleviating factors. Also had  associated nausea and fatigue. Denies any chest pain. Denies any cough  or any sputum production. Denies any palpitations. Denies any  dizziness. PAST MEDICAL HISTORY:  Significant for chronic kidney disease, stage 4;  chronic diastolic dysfunction of the left ventricle; anemia of chronic  kidney disease; gastroesophageal reflux disease; diabetes mellitus;  hypertension; proteinuria. PAST SURGICAL HISTORY  Significant for back surgery, cholecystectomy. FAMILY HISTORY  Significant for diabetes and high blood pressure. SOCIAL HISTORY:  The patient denies any alcohol, smoking or illicit drug  abuse. She is a nursing home resident. HOME MEDICATIONS:  Include Cymbalta, Flonase, Ditropan, meclizine,  Neurontin, ferrous sulfate, Norvasc, Isordil, insulin per sliding scale,  hydralazine, Zofran, Synthroid, Levemir, Pravachol, Tylenol. REVIEW OF SYSTEM:  Positive for shortness of breath associated with  lower extremity swelling and weight gain. She also reports some  associated weakness and fatigue. Denies any fever or chills. No  headaches, no blurry vision. Denies any cough or any sputum production. Denies any chest pain. Denies any nausea, vomiting or diarrhea. Denies  any dysuria or any gross hematuria. Denies any numbness or any  tingling. Denies any hematochezia, melena or any epistaxis. All other  review of systems were reviewed and negative. CURRENT MEDICATIONS:  Pravastatin, levothyroxine, hydralazine,  isosorbide dinitrate, gabapentin, ferrous sulfate, Norvasc, duloxetine,  oxybutynin, Lasix 40 mg IV b.i.d., heparin subcu. ALLERGIES:  The patient is allergic to EGGS, ASPIRIN, PENICILLIN,  VICODIN, and VILAZODONE. She is also allergic to [facility] PHYSICAL EXAMINATION:  VITAL SIGNS:  Blood pressure is 175/72 with a heart rate of 72,  temperature is 97.8, respiratory rate is 18, pulse oximetry is 92%. .  GENERAL:  The patient is alert, awake, cooperative. She is on 3 liters. She is speaking full sentences without any problems. HEENT:  Reveals some pallor conjunctivae without any icteric sclera. Oral cavity appears moist.  NECK:  Supple without any JVD. LUNGS:  Air entry diminished at the bases without any use of accessory  muscles. HEART:  S1, S2.  ABDOMEN:  Soft, nontender without any distention. EXTREMITIES:  No peripheral pitting edema, but she does have some very  trace thigh edema. SKIN:  No evidence of maculopapular rash. Warm to touch. MUSCULOSKELETAL:  Moves all four extremities. NEUROLOGIC:  No slurred speech. No facial drooping. PSYCH:  Appears to have normal affect and mood. LABORATORY DATA:  Blood work done at outside hospital shows BUN of 67,  creatinine of 3.47. Sodium of 137, potassium 4.9, chloride of 104,  bicarb of 23. .  Beta natriuretic peptide is 316. White count of 14.9,  hemoglobin of 8.7, platelet count of 535. Chest x-ray done at outside  hospital reveals pulmonary edema with bilateral pleural effusions. The patient's old records were reviewed. Serum creatinine was 3.3 on  05/01/2019 and it was 3.1 on 03/27/2019. Beta natriuretic peptide is  3592 here at [address] [facility]. Old echo done in 12/12/2018 showed ejection fraction of 50% to 55%. Urinalysis showed 2+ protein without any blood. Urine  protein-creatinine ratio showed 5.5 gm of protein back in 03/2019. In summary, a 81-year-old female with history of chronic kidney disease,  stage 4; history of proteinuria likely secondary to diabetic  nephropathy; chronic diastolic dysfunction; hypertension; dyslipidemia;  anemia of chronic kidney disease who presents with complaints of  shortness of breath associated with bilateral lower extremity swelling  progressively worsening along with weight gain of 10 pounds. The  patient says symptoms started after her diuretics were  decreased/stopped.   The patient noted to have pulmonary edema at outside  hospital.  She was transferred to [address] [place] [address]. Nephrology has been asked  to see in consultation for further workup and management. ASSESSMENT/PLAN:  1. Chronic kidney disease, stage 4, likely secondary to underlying  hypertensive nephrosclerosis and diabetic nephropathy. The patient does  have nephrotic range proteinuria as of previous analysis. Her serum  creatinine usually runs in the range of around 2.8 to 3.1. This time  her serum creatinine is 3.4 which is higher than her baseline but still  close to baseline. Overall renal function has declined progressively  over time, again likely secondary to underlying diabetic nephropathy. 2.  Acute-on-chronic diastolic congestive heart failure with pulmonary  edema and pleural effusions. Symptomatically, the patient has responded  to IV diuretics. Her beta natriuretic peptide was elevated. Chest  x-ray showed pulmonary edema with bilateral pleural effusions. Continue  with IV Lasix 40 b.i.d.  I have started her on low-salt and fluid  restriction as well. We will monitor her renal function. At this time,  peripheral edema has improved but chest x-ray findings were concerning,  and therefore we will continue with IV Lasix. Consider repeating  another chest x-ray in 24-48 hours. Monitor weights and urine output. 3.  Diabetic nephropathy with nephrotic range proteinuria. 4.  Hypertension. Advised low-salt diet. Continue with Norvasc,  hydralazine, can also consider beta blockers if no contraindication. Avoid any ACE or ARBs for now. 5.  Anemia in chronic kidney disease. We will check iron studies. We  will check creatinine level. If iron studies are appropriate and blood  pressure remains controlled then we will consider  erythropoietin-stimulating agents. 6.   Hyperlipidemia. Plan of care was reviewed with the patient, discussed with the nursing  staff. Nephrology will follow.     Thank you for allowing me to participate in the care

## 2019-05-19 NOTE — PROGRESS NOTES
[facility] [place],Dr Torrez. 81 yr old w/worsening renal failure,pulmonary edema. Pt of Dr Huitron Nephew off bumex 1 wk ago,stage 4 RF.,10lb wt gain,SOB,no CP. CXR pulm edema, trop WNL,EKG sinus,BNP elevated 300 GFR 13. 1mg Bumex given. Vitals /81,P 68,o2 6L 98% (on home O2).

## 2019-05-19 NOTE — PROGRESS NOTES
Pt admitted to room 6K13 d/t renal failure and SOB. VSS. Alert and oriented x 4. Oriented to the room and educated the pt about the policy and procedure of the unit. Skin assessment completed by NOAH Cooney and Domingo Eagle RN. Pt voices no needs at this time.

## 2019-05-19 NOTE — H&P
Provider, MD   oxybutynin (DITROPAN) 5 MG tablet Take 5 mg by mouth daily   Yes Historical Provider, MD   meclizine (ANTIVERT) 25 MG tablet Take 25 mg by mouth 3 times daily   Yes Historical Provider, MD   gabapentin (NEURONTIN) 100 MG capsule Take 100 mg by mouth 3 times daily. 7/31/18  Yes Historical Provider, MD   ferrous sulfate 325 (65 Fe) MG tablet Take 325 mg by mouth 2 times daily   Yes Historical Provider, MD   amLODIPine (NORVASC) 10 MG tablet Take 10 mg by mouth daily   Yes Historical Provider, MD   acetaminophen-codeine (TYLENOL #3) 300-30 MG per tablet Take 1 tablet by mouth every 4 hours as needed for Pain. .   Yes Historical Provider, MD   isosorbide dinitrate (ISORDIL) 20 MG tablet Take 1 tablet by mouth 3 times daily 12/17/18  Yes Ramila Jones MD   insulin lispro (HUMALOG KWIKPEN) 100 UNIT/ML pen Inject 0-9 Units into the skin 4 times daily (before meals and nightly) Inject as per sliding scale:  140-199 = 1 unit,  200-249 = 3 units,  250-299 = 5 units,  300-349 = 7 units,  350-399 = 9 units,  400-999 = call MD   Yes Historical Provider, MD   hydrALAZINE (APRESOLINE) 100 MG tablet Take 50 mg by mouth 2 times daily    Yes Historical Provider, MD   ondansetron (ZOFRAN) 4 MG tablet Take 4 mg by mouth every 4 hours as needed for Nausea   Yes Historical Provider, MD   levothyroxine (SYNTHROID) 25 MCG tablet Take 25 mcg by mouth Daily   Yes Historical Provider, MD   insulin detemir (LEVEMIR) 100 UNIT/ML injection pen Inject 10 Units into the skin 2 times daily 10/24/18  Yes Historical Provider, MD   pravastatin (PRAVACHOL) 40 MG tablet Take 40 mg by mouth daily    Yes Historical Provider, MD   acetaminophen (TYLENOL) 325 MG tablet Take 650 mg by mouth every 6 hours as needed for Pain    Historical Provider, MD   blood glucose test strips (ASCENSIA AUTODISC VI;ONE TOUCH ULTRA TEST VI) strip Patient tests blood sugar three times per day.  Diagnosis DMII E11.9 7/18/18   Historical Provider, MD   Insulin Pen Needle 31G X 8 MM MISC 1 Device 6/6/18   Historical Provider, MD       Allergies:  Eggs or egg-derived products; Aspirin; Pcn [penicillins]; Vicodin [hydrocodone-acetaminophen]; Vilazodone; and Wellbutrin [bupropion]    Social History:      The patient currently lives at [facility]:   reports that she has never smoked. She has never used smokeless tobacco.  ETOH:   reports that she does not drink alcohol. Family History:      Reviewed in detail and negative for DM, CAD, Cancer, CVA. Positive as follows:        Problem Relation Age of Onset    High Blood Pressure Mother     Diabetes Mother     Cancer Mother        Diet:  No diet orders on file    REVIEW OF SYSTEMS:   Pertinent positives as noted in the HPI. All other systems reviewed and negative. PHYSICAL EXAM:    /74   Pulse 64   Temp 98.6 °F (37 °C) (Oral)   Resp 20   Ht 5' 6\" (1.676 m)   Wt 176 lb 6.4 oz (80 kg)   SpO2 96%   BMI 28.47 kg/m²     General appearance:  No apparent distress, appears stated age and cooperative. HEENT:  Normal cephalic, atraumatic without obvious deformity. Pupils equal, round, and reactive to light. Extra ocular muscles intact. Conjunctivae/corneas clear. Neck: Supple, with full range of motion. No jugular venous distention. Trachea midline. Respiratory:  Crackle at base, mild wheezing. Cardiovascular:  Regular rate and rhythm with normal S1/S2 without murmurs, rubs or gallops. Abdomen: Soft, non-tender, non-distended with normal bowel sounds. Musculoskeletal:  No clubbing, cyanosis or edema bilaterally. Full range of motion without deformity. Skin: Skin color, texture, turgor normal.  No rashes or lesions. Neurologic:  Neurovascularly intact without any focal sensory/motor deficits.  Cranial nerves: II-XII intact, grossly non-focal.  Psychiatric:  Alert and oriented, thought content appropriate, normal insight  Capillary Refill: Brisk,< 3 seconds   Peripheral Pulses: +2 palpable, equal bilaterally       Labs:     No results for input(s): WBC, HGB, HCT, PLT in the last 72 hours. No results for input(s): NA, K, CL, CO2, BUN, CREATININE, CALCIUM, PHOS in the last 72 hours. Invalid input(s): MAGNES  No results for input(s): AST, ALT, BILIDIR, BILITOT, ALKPHOS in the last 72 hours. No results for input(s): INR in the last 72 hours. No results for input(s): Floreen Pill in the last 72 hours. Urinalysis:      Lab Results   Component Value Date    NITRU Negative 03/09/2019    WBCUA 0-2 06/07/2016    BACTERIA NONE 06/07/2016    RBCUA 0-2 06/07/2016    BLOODU Negative 03/09/2019    GLUCOSEU 2+ 03/09/2019       Intake & Output:  No intake/output data recorded. No intake/output data recorded. Radiology:     No orders to display        DVT prophylaxis: Lovenox    Code Status: Prior      [address] [place] [address] Problems    Diagnosis Date Noted    Renal failure [N19] 05/19/2019       PLAN:  81 y.o. female who presented to [address] [place] [address] with bipolar, CKD IV, chronic systolic heart failure, DMII, HTN and lipidemia is admitted for fluid overloaded. 1) Fluid overloaded: Most likely secondary to diuretics being held secondary to worsening renal function. Chest xray disclosed congestion with bilateral effusion w/ right worse than left. ECHO done in 2018 disclosed EF 50%  Will restart diuretics. Will get trops. Supportive care. 2) Nonoliguric LOUISE with superimposed CKD IV: Possible progression of CKD vs cardiorenal syndrome. Will diuresis. Consult renal.    3) Chronic issues: Chronic normocytic anemia, DMII, HTN  Resume home meds. Thank you Tiffanie Zimmerman for the opportunity to be involved in this patient's care.     Electronically signed by Armando Myers MD on 5/19/2019 at 6:10 AM

## 2019-05-19 NOTE — LETTER
Beneficiary Notification Letter     This [place] Provider is Participating in an Innovative Payment and [address] [place] [address] [place] from Leonie Bender:   Tangela is participating in a Medicare initiative called the [facility] for [facility]15 Health system. You are receiving this letter because your health care provider has identified you as a patient who is receiving care through this initiative. Health care providers participating in the [facility] Health system, including Tangela, will work with Medicare to improve care for patients. Your Medicare rights have not been changed. You still have all the same Medicare rights and protections, including the right to choose which hospital, doctor, or other health care provider you see. However, because Tangela chose to participate in the [address] [place] [address], all Medicare beneficiaries who meet the eligibility criteria of this initiative will receive care under the initiative. If you do not wish to receive care under the Bundled Payments [facility] 1815 Health system, you must choose a health care provider that does not participate in this initiative for your care. Regardless of which health care provider you see, Medicare will continue to cover all of your medically necessary services. Bundled Payments for Care Improvement Advanced aims to help improve your care     The Bundled Payments [facility] 1815 Health system is an innovative Medicare initiative that encourages your doctors, hospitals, and other health care providers to work more closely together so you get better care during and following certain hospital stays.  In this initiative, doctors and hospitals may work closely with certain health care providers and suppliers that help patients recover after discharge from the hospital, including skilled nursing facilities, home health agencies, inpatient rehabilitation facilities, and long term care hospitals. [facility] is working closely with the doctors and other health care providers that care for you during and following your hospital stay and for a period of time after you leave the hospital. By working together, the health care providers are trying to more efficiently provide well-managed, high quality, patient-centered care as you undergo treatment. Hospitals, doctors, and other health care providers that care for you following a hospital stay may receive an additional payment for providing better, more coordinated health care. Medicare will monitor your care to make sure you and others get high quality care. Your feedback is important     Medicare may also ask you to answer a survey about the services and care you received from [facility]. The survey will be mailed to you. Your feedback will improve care for all people with Medicare who receive care from [facility]. Completion of this survey is optional.     Get more information     For more information about the Bundled Payments for [address] [place] [address], you can:    · Visit the CMS BPCI Advanced Website at http://floyd-russ.net/ initiatives/bpci-advanced   · Call the [facility] BPCI-A team at (954) 997-8060. · Call 1-800-MEDICARE (1-400.330.7737). TTY users can call 5-428.140.1868     If you have concerns or complaints about your care, talk to your health care provider, or contact your Beneficiary and Family Centered Quality Improvement Organization JESSA SULLIV[facility]). To get your CC-QIO's phone number, visit Medicare.gov/contacts or call 1-800-MEDICARE. · To find a different hospital, visit www. hospitalcompare.[facility].gov or call 1-800- MEDICARE (6-224.556.7384). TTY users should call 7-367.201.9741. · To find a different doctor, visit Medicare's Physician Compare website, ShoppableTapes.co.nz, or call 1-800-MEDICARE (639 6305). TTY users should call 5-506.374.1943. · To find a different skilled nursing facility, visit [place] website, https://www.DroneCast/, or call 1-800-MEDICARE (1- 384.489.7334). TTY users should call 8-989.688.7924. · To find a different long term care hospital, visit [facility] [address] Compare website, IntexyslogRdio.be, or call 1-800- MEDICARE (528 9909). TTY users should call 4-302.891.7256. · To find a different inpatient rehabilitation facility, visit [facility] [address] Compare website, www.medicare.gov/ inpatientrehabilitation facilitycompare, or call 1-800-MEDICARE (6-440.519.6899). TTY users should call 4- 591.671.8956. · To find a different home health agency, visit [address] website, www.medicare.gov/homehealthcompare, or call 1-800-MEDICARE (8-882- 385-1720). TTY users should call 7-792.456.7911.

## 2019-05-20 ENCOUNTER — APPOINTMENT (OUTPATIENT)
Dept: GENERAL RADIOLOGY | Age: 69
DRG: 291 | End: 2019-05-20
Attending: INTERNAL MEDICINE
Payer: MEDICARE

## 2019-05-20 LAB
ANION GAP SERPL CALCULATED.3IONS-SCNC: 17 MEQ/L (ref 8–16)
BASOPHILS # BLD: 0.6 %
BASOPHILS ABSOLUTE: 0.1 THOU/MM3 (ref 0–0.1)
BUN BLDV-MCNC: 64 MG/DL (ref 7–22)
CALCIUM SERPL-MCNC: 9.6 MG/DL (ref 8.5–10.5)
CHLORIDE BLD-SCNC: 100 MEQ/L (ref 98–111)
CHOLESTEROL, TOTAL: 171 MG/DL (ref 100–199)
CO2: 20 MEQ/L (ref 23–33)
CREAT SERPL-MCNC: 3.3 MG/DL (ref 0.4–1.2)
EOSINOPHIL # BLD: 3.3 %
EOSINOPHILS ABSOLUTE: 0.4 THOU/MM3 (ref 0–0.4)
ERYTHROCYTE [DISTWIDTH] IN BLOOD BY AUTOMATED COUNT: 13.9 % (ref 11.5–14.5)
ERYTHROCYTE [DISTWIDTH] IN BLOOD BY AUTOMATED COUNT: 46.2 FL (ref 35–45)
GFR SERPL CREATININE-BSD FRML MDRD: 14 ML/MIN/1.73M2
GLUCOSE BLD-MCNC: 113 MG/DL (ref 70–108)
GLUCOSE BLD-MCNC: 136 MG/DL (ref 70–108)
GLUCOSE BLD-MCNC: 143 MG/DL (ref 70–108)
GLUCOSE BLD-MCNC: 143 MG/DL (ref 70–108)
GLUCOSE BLD-MCNC: 175 MG/DL (ref 70–108)
GLUCOSE BLD-MCNC: 194 MG/DL (ref 70–108)
HCT VFR BLD CALC: 27 % (ref 37–47)
HDLC SERPL-MCNC: 66 MG/DL
HEMOGLOBIN: 9 GM/DL (ref 12–16)
IMMATURE GRANS (ABS): 0.05 THOU/MM3 (ref 0–0.07)
IMMATURE GRANULOCYTES: 0.4 %
LDL CHOLESTEROL CALCULATED: 75 MG/DL
LYMPHOCYTES # BLD: 13.1 %
LYMPHOCYTES ABSOLUTE: 1.6 THOU/MM3 (ref 1–4.8)
MAGNESIUM: 2.2 MG/DL (ref 1.6–2.4)
MCH RBC QN AUTO: 30.2 PG (ref 26–33)
MCHC RBC AUTO-ENTMCNC: 33.3 GM/DL (ref 32.2–35.5)
MCV RBC AUTO: 90.6 FL (ref 81–99)
MONOCYTES # BLD: 6.5 %
MONOCYTES ABSOLUTE: 0.8 THOU/MM3 (ref 0.4–1.3)
NUCLEATED RED BLOOD CELLS: 0 /100 WBC
PLATELET # BLD: 311 THOU/MM3 (ref 130–400)
PMV BLD AUTO: 10.4 FL (ref 9.4–12.4)
POTASSIUM SERPL-SCNC: 5.1 MEQ/L (ref 3.5–5.2)
PROCALCITONIN: 0.29 NG/ML (ref 0.01–0.09)
RBC # BLD: 2.98 MILL/MM3 (ref 4.2–5.4)
SEG NEUTROPHILS: 76.1 %
SEGMENTED NEUTROPHILS ABSOLUTE COUNT: 9.3 THOU/MM3 (ref 1.8–7.7)
SODIUM BLD-SCNC: 137 MEQ/L (ref 135–145)
TRIGL SERPL-MCNC: 148 MG/DL (ref 0–199)
WBC # BLD: 12.2 THOU/MM3 (ref 4.8–10.8)

## 2019-05-20 PROCEDURE — 6360000002 HC RX W HCPCS: Performed by: HOSPITALIST

## 2019-05-20 PROCEDURE — 97162 PT EVAL MOD COMPLEX 30 MIN: CPT

## 2019-05-20 PROCEDURE — 99233 SBSQ HOSP IP/OBS HIGH 50: CPT | Performed by: FAMILY MEDICINE

## 2019-05-20 PROCEDURE — 80061 LIPID PANEL: CPT

## 2019-05-20 PROCEDURE — 1200000003 HC TELEMETRY R&B

## 2019-05-20 PROCEDURE — 6370000000 HC RX 637 (ALT 250 FOR IP): Performed by: HOSPITALIST

## 2019-05-20 PROCEDURE — 84145 PROCALCITONIN (PCT): CPT

## 2019-05-20 PROCEDURE — 99232 SBSQ HOSP IP/OBS MODERATE 35: CPT | Performed by: INTERNAL MEDICINE

## 2019-05-20 PROCEDURE — 97110 THERAPEUTIC EXERCISES: CPT

## 2019-05-20 PROCEDURE — 97166 OT EVAL MOD COMPLEX 45 MIN: CPT

## 2019-05-20 PROCEDURE — 2580000003 HC RX 258: Performed by: HOSPITALIST

## 2019-05-20 PROCEDURE — 6360000002 HC RX W HCPCS: Performed by: FAMILY MEDICINE

## 2019-05-20 PROCEDURE — 71046 X-RAY EXAM CHEST 2 VIEWS: CPT

## 2019-05-20 PROCEDURE — 85025 COMPLETE CBC W/AUTO DIFF WBC: CPT

## 2019-05-20 PROCEDURE — 82948 REAGENT STRIP/BLOOD GLUCOSE: CPT

## 2019-05-20 PROCEDURE — 97535 SELF CARE MNGMENT TRAINING: CPT

## 2019-05-20 PROCEDURE — 36415 COLL VENOUS BLD VENIPUNCTURE: CPT

## 2019-05-20 PROCEDURE — 80048 BASIC METABOLIC PNL TOTAL CA: CPT

## 2019-05-20 PROCEDURE — 83735 ASSAY OF MAGNESIUM: CPT

## 2019-05-20 RX ADMIN — PRAVASTATIN SODIUM 40 MG: 40 TABLET ORAL at 21:46

## 2019-05-20 RX ADMIN — GABAPENTIN 100 MG: 100 CAPSULE ORAL at 09:25

## 2019-05-20 RX ADMIN — ISOSORBIDE DINITRATE 20 MG: 20 TABLET ORAL at 21:47

## 2019-05-20 RX ADMIN — ONDANSETRON HYDROCHLORIDE 4 MG: 4 TABLET, FILM COATED ORAL at 01:13

## 2019-05-20 RX ADMIN — HEPARIN SODIUM 5000 UNITS: 5000 INJECTION INTRAVENOUS; SUBCUTANEOUS at 16:47

## 2019-05-20 RX ADMIN — OXYBUTYNIN CHLORIDE 5 MG: 5 TABLET ORAL at 09:25

## 2019-05-20 RX ADMIN — MECLIZINE HYDROCHLORIDE 25 MG: 12.5 TABLET, FILM COATED ORAL at 09:31

## 2019-05-20 RX ADMIN — FLUTICASONE PROPIONATE 1 SPRAY: 50 SPRAY, METERED NASAL at 09:26

## 2019-05-20 RX ADMIN — FERROUS SULFATE TAB 325 MG (65 MG ELEMENTAL FE) 325 MG: 325 (65 FE) TAB at 21:47

## 2019-05-20 RX ADMIN — ISOSORBIDE DINITRATE 20 MG: 20 TABLET ORAL at 15:15

## 2019-05-20 RX ADMIN — INSULIN LISPRO 1 UNITS: 100 INJECTION, SOLUTION INTRAVENOUS; SUBCUTANEOUS at 16:46

## 2019-05-20 RX ADMIN — FERROUS SULFATE TAB 325 MG (65 MG ELEMENTAL FE) 325 MG: 325 (65 FE) TAB at 09:25

## 2019-05-20 RX ADMIN — Medication 10 ML: at 21:46

## 2019-05-20 RX ADMIN — Medication 10 ML: at 09:31

## 2019-05-20 RX ADMIN — INSULIN GLARGINE 20 UNITS: 100 INJECTION, SOLUTION SUBCUTANEOUS at 21:41

## 2019-05-20 RX ADMIN — FUROSEMIDE 40 MG: 10 INJECTION, SOLUTION INTRAMUSCULAR; INTRAVENOUS at 09:31

## 2019-05-20 RX ADMIN — GABAPENTIN 100 MG: 100 CAPSULE ORAL at 15:15

## 2019-05-20 RX ADMIN — MECLIZINE HYDROCHLORIDE 25 MG: 12.5 TABLET, FILM COATED ORAL at 15:18

## 2019-05-20 RX ADMIN — HYDRALAZINE HYDROCHLORIDE 50 MG: 50 TABLET, FILM COATED ORAL at 21:47

## 2019-05-20 RX ADMIN — AMLODIPINE BESYLATE 10 MG: 10 TABLET ORAL at 09:25

## 2019-05-20 RX ADMIN — INSULIN LISPRO 1 UNITS: 100 INJECTION, SOLUTION INTRAVENOUS; SUBCUTANEOUS at 12:24

## 2019-05-20 RX ADMIN — INSULIN LISPRO 1 UNITS: 100 INJECTION, SOLUTION INTRAVENOUS; SUBCUTANEOUS at 09:26

## 2019-05-20 RX ADMIN — ISOSORBIDE DINITRATE 20 MG: 20 TABLET ORAL at 09:25

## 2019-05-20 RX ADMIN — GABAPENTIN 100 MG: 100 CAPSULE ORAL at 21:47

## 2019-05-20 RX ADMIN — DULOXETINE HYDROCHLORIDE 30 MG: 30 CAPSULE, DELAYED RELEASE ORAL at 09:25

## 2019-05-20 RX ADMIN — FUROSEMIDE 40 MG: 10 INJECTION, SOLUTION INTRAMUSCULAR; INTRAVENOUS at 21:47

## 2019-05-20 RX ADMIN — HYDRALAZINE HYDROCHLORIDE 50 MG: 50 TABLET, FILM COATED ORAL at 09:25

## 2019-05-20 RX ADMIN — HEPARIN SODIUM 5000 UNITS: 5000 INJECTION INTRAVENOUS; SUBCUTANEOUS at 09:26

## 2019-05-20 RX ADMIN — MECLIZINE HYDROCHLORIDE 25 MG: 12.5 TABLET, FILM COATED ORAL at 21:46

## 2019-05-20 RX ADMIN — LEVOTHYROXINE SODIUM 25 MCG: 25 TABLET ORAL at 05:11

## 2019-05-20 ASSESSMENT — PAIN SCALES - GENERAL
PAINLEVEL_OUTOF10: 0

## 2019-05-20 NOTE — CARE COORDINATION
5/20/19, 9:56 AM    DISCHARGE BARRIERS    SW order received that pt is from [facility]. Full assessment deferred as pt is a long-term resident. SW confirmed with pt that she will return at discharge. ABIGAIL spoke with Hudson Litten with [facility] for [facility], pt is a medicaid bed hold and can return [address] under her medicare benefit if she meets criteria.

## 2019-05-20 NOTE — PROGRESS NOTES
Orientation Status: Within Functional Limits  Follows Commands: Within Functional Limits    Vision: Within Functional Limits    Hearing: Within functional limits         Pain:  Denies. Social/Functional History:    Type of Home: Facility([facility]- since December)  Home Layout: One level  Home Access: Level entry  Home Equipment: Rolling walker      Ambulation Assistance: Independent  Transfer Assistance: Independent        Additional Comments: Per pt, has been at [facility] since Dec, has been waiting to get into [facility] in [place] with son; pt amb with RW I'ly, was not doing PT just prior to admission; per pt, has R drop foot from previous back surgery      Objective:       RLE AROM: Exceptions  RLE General AROM: hip and knee 4/5, ankle 2-/5         LLE AROM : Exceptions  LLE General AROM: hip and knee 4/5, ankle 2-/5            RLE Tone: Normotonic  LLE Tone: Normotonic            Supine to Sit: Contact guard assistance  Sit to Supine: Stand by assistance    Transfers  Sit to Stand: Contact guard assistance  Stand to sit: Contact guard assistance       Ambulation 1  Surface: level tile  Device: Rolling Walker  Other Apparatus: O2  Assistance: Contact guard assistance  Quality of Gait: Pt amb with increased trunk flexion, steppage gait on R LE, decreased speed and step length, no LOB  Distance: ~60 feet around room              Exercises:  Comments: Pt performs supine B LE AROM: ankle pumps, heel slides and hip abd/add x 10 reps to increase strength for improved mobility. Activity Tolerance:  Activity Tolerance: Patient Tolerated treatment well;Patient limited by endurance    Treatment Initiated: See above exercises. Assessment: Body structures, Functions, Activity limitations: Decreased functional mobility , Decreased endurance, Decreased strength, Decreased balance  Assessment: Pt tolerates session well, limited by impaired endurance, weakness.   Pt states h/o B foot drop, R worse than L, from previous back surgery, NH looking into AFO's. PT to continue to progress strength and functional mobility to return to PLOF. Prognosis: Good    Clinical Presentation: Moderate - Evolving with Changing Characteristics: Pt tolerates session well, limited by impaired endurance, weakness. Pt states h/o B foot drop, R worse than L, from previous back surgery, NH looking into AFO's. PT to continue to progress strength and functional mobility to return to PLOF. Decision Making: High Complexitybased on patient assessment and decision making process of determining plan of care and establishing reasonable expectations for measurable functional outcomes    REQUIRES PT FOLLOW UP: Yes    Discharge Recommendations:  Discharge Recommendations: [address]    Patient Education:  Patient Education: POC    Equipment Recommendations:  Equipment Needed: No    Safety:  Type of devices: All fall risk precautions in place, Bed alarm in place, Call light within reach, Left in bed    Plan:  Times per week: 3-5 X GM  Current Treatment Recommendations: Strengthening, Gait Training, Patient/Caregiver Education & Training, Balance Training, Functional Mobility Training, Endurance Training, Transfer Training, Safety Education & Training    Goals:  Patient goals : to get better  Short term goals  Time Frame for Short term goals: by discharge  Short term goal 1: Pt to transfer sit <--> stand SBA for increased functional mobility. Short term goal 2: Pt to ambulate >75 feet with RW SBA for household ambulation. Long term goals  Time Frame for Long term goals : NA due to short length of stay. Evaluation Complexity: Based on the findings of patient history, examination, clinical presentation, and decision making during this evaluation, the evaluation of Shubham Espinosa  is of medium complexity.             AM-PAC Inpatient Mobility without Stair Climbing Raw Score : 15  AM-PAC Inpatient without Stair Climbing T-Scale Score : 43.03  Mobility Inpatient CMS 0-100% Score: 47.43  Mobility Inpatient without Stair CMS G-Code Modifier : CK

## 2019-05-20 NOTE — PROGRESS NOTES
Nutrition Assessment    Type and Reason for Visit: Initial, Consult(heart failure diet guidelines)    Nutrition Recommendations: Diet as per doctor. Nutrition Assessment:   Pt. nutritionally compromised AEB report of poor intake for the past few days due to trouble breathing. At risk for further nutrition compromise r/t admit with renal failure, CHF, underlying medical conditions (CKD, CHF, bipolar, DM). Reports she normally eats fairly well at [facility] and selects the lower sodium, lower potassium foods as able and knows to limit sweets for her blood sugar control. Educated patient on low sodium, carbohydrate control, fluid restriction and encouraged avoiding high potassium foods. Answered patient's questions and provided information sheets. Malnutrition Assessment:  · Malnutrition Status: No malnutrition    Nutrition Risk Level:  Moderate    Nutrient Needs:  · Estimated Daily Total Kcal: 9730-2596 kcals (20-23 kcal/kgm- 80kgm 5/20)  · Estimated Daily Protein (g): 60-72 grams protein (1-1.2 grams protein/kgm -ideal 60kgm) monitoring renal status    Nutrition Diagnosis:   · Problem: Inadequate oral intake  · Etiology: related to Insufficient energy/nutrient consumption     Signs and symptoms:  as evidenced by Diet history of poor intake    Objective Information:  · Nutrition-Focused Physical Findings: does best with soft foods due to dentures ill-fitting, medication includes lasix, insulin, zofran, current BUN 64, Creatinine 3.3, potassium 5.1, glucose 143; no BM yet since admit  · Wound Type: (scattered abrasions)  · Current Nutrition Therapies:  · Oral Diet Orders: 2gm Sodium, Fluid Restriction(2000ml)   · Oral Diet intake: %  · Anthropometric Measures:  · Ht: 5' 6\" (167.6 cm)   · Current Body Wt: 176 lb 14.4 oz (80.2 kg)(5/20; +1 pitting edema)  · Admission Body Wt: 176 lb 6.4 oz (80 kg)(5/19; +1 pitting edema)  · Usual Body Wt: (per EHR: 11/2/18 173# 12.8oz, 3/13/19 166# 4.8oz)  · Ideal Body Wt: 130 lb (59 kg),  · BMI Classification: BMI 25.0 - 29.9 Overweight    Nutrition Interventions:   Continue current diet  Continued Inpatient Monitoring, Coordination of Care, Education Completed(5/20 educated patient on low sodium, fluid restriction as per doctor order, carbohyrate controlled diet and reviewed high potassium foods to avoid, information sheets provided regarding low sodium, fluid restriction with RD contact information)    Nutrition Evaluation:   · Evaluation: Goals set   · Goals: Patient will consume 75% or more of meals during LOS.      · Monitoring: Meal Intake, Diet Tolerance, Skin Integrity, Weight, Pertinent Labs, Monitor Bowel Function, Patient/Family Education      Electronically signed by Renetta Olguin RD, LD on 5/20/19 at 1:35 PM    Contact Number: [phone]

## 2019-05-20 NOTE — PROGRESS NOTES
Hospitalist Progress Note      Patient:  Jose Andres      Unit/Bed:6K-13/013-A    YOB: 1950    MRN: [id number]       Acct: [de-identified]     PCP: [address]    Date of Admission: 5/19/2019    Assessment/Plan:    1. Acute on chronic diastolic systolic CHF -- apprec renal assist for diuresis also -- cont lasix 40 mg IV Bid started 5/19 and further mgmt per renal -- BNP up to 3592 on admission (higher than 12/2018) -- low salt, fluid restriction -- monitor I/O  -- last echo 12/2018 EF 50-55%, no WMA, mildly dilated LA, mild MR, mild TR  -- ?BB but HR in 60's -- no ACE/aRB due to CKD --> on isordil/hydralazine  -- TSH 5/19 WNL  2. Bilateral R>L pleural effusions -- some chronic component and states has had thoracenteses in past -- CXr 5/20/19 with moderate effusion right, tiny on left but overall improving -- cont IV lasix per renal and await recs if ??need to tap  -- per Care Everywhere pt has had right effusion back in 1/2019, 2/2019  3. Moderate right perihilar atelectasis and both bases -- noted per CXR 5/19 --> repeat XCR -- ?need atbx - PCT 0.  4. Elevated trop -- 0.033 on admission - no cp but sob and with DM - likely up due to CKD and fluid overload -- NOT MI --> ?any hx of ischemic w/u -- Echo 12/2018 no WMA, EF 50-55%  5. Metabolic acidosis -- ?due to CKD -- ?need bicarb tabs  6. CKD stage IV -- due to DM, HTN -- c/s renal apprec -- at baseline CKD with creat 3.3 5/20 with diuresis --- no LOUISE - baseline 2.8 - 3.1  7. Leukocytosis -- afeb, ?etiology -- chronically high per Care Everywhere -- was 14.9 on 5/18 at [facility] and improving to 12 on 5/20   -- CXR with right mid/basilar atelectasis vs infiltrate -- PCT  -- ?check u/a but no sx   8. Type 2 DM -- cont lantus, SSI -- monitor and adjust prn -- carb diet. Last A1C here 7.1 on 12/2018  9. Essential HTN -- cont norvasc 10 mg daily, hydralazine 75 mg tid, isordil 20 mg tid-- no ACE/ARB with CKD  10.  Chronic normocytic anemia -- cont po iron as iron low 5/19 -- likely due to CKD -- ? EVA -- hgb 8.7 on 5/18 at [facility] -> 9.0 on 5/20 here --> was 10's at [facility] 2/2019 but 8-10 prior  11. Diabetic gastroparesis -- anibal po - monitor  12. Hypothyroidism -- cont synthroid -- TSH 5/19 WNL  13. Hx neurogenic bladder  14. HLD -- cont statin -- Lipids 5/20/19 HLD 66, LDL 75, trig 148  15. Bipolar d/o -- at [facility] chronically -- no current meds and mood stable  16. Mild MR, mild TR -- per echo 12/2018  17. Hx seizure d/o -- no seizure meds but on neurontin  18. Chronic pain -- cont neurontin, cymbalta  19. Generalized weakness -- PT/OT c/s    Dispo  -- 5/20 --> apprec renal assist -- cont on IV lasix as clinically improving - cont monitor wts, CXR's. BP up - ?increase hydralazine - ?add BB but HR runs in 60's. Cont wean O2. Increase activity. Monitor BS, lytes, renal fxn, h/h. Monitor pleural effusion with diuresis per renal.        Chief Complaint: sob    Hospital Course: Dayanna Akhtar is a 81 y.o. female with bipolar, CKD IV, CHF, DMII, HTN and lipidemia is admitted from [facility] for acute on chronic systolic heart failure. She has been SOB for the past 2 days. Follows with Dr. Jackie Phillips for CKD stage IV and pt states her diuretics were recently stopped. Has had increase wts and edema. CXR with moderate right and small left pleural effusions, increased interstitial markings. Started on IV lasix on admission and diuresing.  -- consulted renal 5/19 as follows with Dr. Jackie Phillips and with CKD stage IV, edema to assist with fluid mgmt. -- pt resides at [facility] -> SS c/s for return        Subjective:   -- 5/20 --> pt lying almost flat sleeping comfortably when entered room -- wakes easily - states breathing is much better but not normal yet. Edema legs better. Denies cp. Denies abd pain, n/v.  Denies f/c.  Anibal po. On 2L O2 -> not on O2 at [facility]. Afebrile but temp 99.3 overnight. Ambulating without difficulty.   Last BM - PTA - none since 5/19/2019 7:01 AM          Diet: DIET LOW SODIUM 2 GM; 2000 ml    Perez: no    Microbiology:  MRSA (-)    Tele:  SR, no arrhythmias    DVT prophylaxis: [] Lovenox                                 [] SCDs                                 [x] SQ Heparin                                 [] Encourage ambulation           [] Already on Anticoagulation     Disposition:    [] Home       [] TCU       [] Rehab       [] Psych       [x] SNF       [] [place]       [] Other-    Code Status: Full Code    PT/OT Eval Status: following        Electronically signed by Lon Leiva MD on 5/20/2019 at 10:47 AM when pt evaluated - final note filed late

## 2019-05-20 NOTE — PROGRESS NOTES
[facility]  INPATIENT [facility]ER[facility]  [facility] RENAL TELEMETRY 6K  EVALUATION    Time:  Time In: 1100  Time Out: 1145  Timed Code Treatment Minutes: 30 Minutes  Minutes: 45          Date: 2019  Patient Name: Reza Simeon,   Gender: female      MRN: [id number]  : 1950  (81 y.o.)  Referring Practitioner: Dr. Mamie Syed   Diagnosis: renal failure  Additional Pertinent Hx: 81 y.o. female who presented to [address] [place] [address] with bipolar, CKD IV, chronic systolic heart failure, DMII, HTN and lipidemia is admitted for acute on chronic systolic heart failure. She has been SOB for the past 2 days. She has a mild productive cough. Nothing improved or worsened her SOB. She was recently taken off lasix secondary to worsening renal function. No fever or chill. She [facility] in [place] to [facility]. Restrictions/Precautions:  Restrictions/Precautions: Fall Risk  Position Activity Restriction  Other position/activity restrictions: B drop foot from previous back surgery    Subjective  Chart Reviewed: Yes(completed medical chart review )  Patient assessed for rehabilitation services?: Yes    Subjective: Pt lying in bed and agreeable to OT session. Pt initially hesistant. Pain:  Pain Assessment  Patient Currently in Pain: Denies    Social/Functional History:  Type of Home: Facility([facility] since December)  Home Layout: One level  Home Access: Level entry  Home Equipment: Rolling walker           ADL Assistance: Independent  Ambulation Assistance: Independent  Transfer Assistance: Independent          Additional Comments: Per pt, has been at [facility] since Dec, has been waiting to get into F in [place] with son; Pt reporting she was indep with her ADL tasks at [facility]. Pt stating she has started to participate in OT approx 1 week ago d/t having difficulty with her right hand and feeding herself. Pt stating she was getting better.  pt amb with [facility], was not doing PT just prior to admission; per pt, has R drop foot from previous back surgery    Cognition/Orientation:     Overall Cognitive Status: WNL    ADL;s:  Grooming: Stand by assistance(sitting EOB )  LE Dressing: Maximum assistance       Mobility:  Bed mobility  Supine to Sit: Minimal assistance(x2 trials from each side of bed)  Sit to Supine: Minimal assistance    Functional Mobility  Functional - Mobility Device: Rolling Walker  Assist Level: Contact guard assistance  Functional Mobility Comments: from EOB to chair with no LOB. Pt aware of her bilateral foot drop during. Apparatus Needs  Apparatus Needs: O2     Balance:  Balance  Sitting Balance: Contact guard assistance(progressing to SBA d/t initial complaints of being lightheaded and her head feeling \"wobbly\" )  Standing Balance: Contact guard assistance(x 2 trials with bilateral UE support )    Transfers:  Sit to stand: Contact guard assistance(from EOB on 2 trials )  Stand to sit: Contact guard assistance       Upper Extremity Assessment:Hand Dominance: Right  LUE AROM : WNL  RUE AROM : WNL    LUE Strength  LUE Strength Comment: bilateral UE general weakness and deconditioning noted throughout     RUE Tone: Normotonic  LUE Tone: Normotonic  Hand Assessment Comment  Hand Assessment Comment: Pt with ulnar nerve distribution deficitis in bilateral hands with clawing of right 4 and 5 and contracture of left 5th digit into felxion. Activity Tolerance: Patient limited by fatigue(increased lightheadedness upon change in position )  OT Eval completed with treatment Initiated. Pt requiring icnreased time sitting EOB d/t increased complaints of lightheadedness when changing of position. Pt with increased SOB as well with increased time for recovery. Pt educated on sfety awareness during mobility and on breathing tech as well.      Assessment:  Assessment: Pt dmeo decreased activity tolerance/enduance with ADL tasks and mobility this date equirign increased

## 2019-05-20 NOTE — PROGRESS NOTES
05/20/19  0558   WBC 12.2*   RBC 2.98*   HGB 9.0*   HCT 27.0*        Last 3 CMP  Recent Labs     05/20/19  0558      K 5.1      CO2 20*   BUN 64*   CREATININE 3.3*   CALCIUM 9.6             Assessment / Plan   Renal - LOUISE on CKD Vs progressive CKD  - overall renal Fx is stable,may be developing a new baseline  - CXR reviewed by me appear much better  - I doubt this is all CHD. However can continue lasix for now and monitor renal fx    Electrolytes - WNL, K slightly higher follow  Mild acidosis  Anemia of CKD  SOB - probably multifactorial including CHF / pleural effusion . May need thoracentesis  D/W patient, nurse. meds reviewed    LIANNE Florez D.  [facility].

## 2019-05-20 NOTE — DISCHARGE INSTR - COC
chronic diastolic heart failure (HCC) I50.33    Uncontrolled type 2 diabetes mellitus with hyperglycemia (HCC) E11.65    Essential (primary) hypertension I10    Acquired hypothyroidism E03.9    Other fluid overload E87.79    Renal failure N19    Diabetic nephropathy associated with type 2 diabetes mellitus (HCC) E11.21       Isolation/Infection:   Isolation          No Isolation            Nurse Assessment:  Last Vital Signs: BP (!) 178/68 Comment: manual  Pulse 76   Temp 98.5 °F (36.9 °C) (Oral)   Resp 18   Ht 5' 6\" (1.676 m)   Wt 176 lb 14.4 oz (80.2 kg)   SpO2 91%   BMI 28.55 kg/m²     Last documented pain score (0-10 scale): Pain Level: 0  Last Weight:   Wt Readings from Last 1 Encounters:   05/20/19 176 lb 14.4 oz (80.2 kg)     Mental Status:  oriented, alert, logical, thought processes intact and able to concentrate and follow conversation    IV Access:  - None    Nursing Mobility/ADLs:  Walking   Assisted  Transfer  Assisted  Bathing  Assisted  Dressing  Assisted  Toileting  Assisted  Feeding  Independent  Med Admin  Assisted  Med Delivery   none    Wound Care Documentation and Therapy:  Wound 12/12/18 Buttocks Inner;Mid;Left;Right;Medial Non-blanchable purple area to buttocks (Active)   Number of days: 158        Elimination:  Continence:   · Bowel: Yes  · Bladder: Yes  Urinary Catheter: None   Colostomy/Ileostomy/Ileal Conduit: No       Date of Last BM: 5/22/19    Intake/Output Summary (Last 24 hours) at 5/20/2019 0953  Last data filed at 5/20/2019 0455  Gross per 24 hour   Intake 890 ml   Output 400 ml   Net 490 ml     I/O last 3 completed shifts: In: 56 [P.O.:880; I.V.:10]  Out: 400 [Urine:400]    Safety Concerns:      At Risk for Falls    Impairments/Disabilities:      None    Nutrition Therapy:  Current Nutrition Therapy:   - Oral Diet:  General    Routes of Feeding: Oral  Liquids: No Restrictions  Daily Fluid Restriction: no  Last Modified Barium Swallow with Video (Video Swallowing Test): not done    Treatments at the Time of Hospital Discharge:   Respiratory Treatments: n/a  Oxygen Therapy:  Is on oxygen at 1L/min  Ventilator:    - No ventilator support    Rehab Therapies: Physical Therapy and Occupational Therapy  Weight Bearing Status/Restrictions: No weight bearing restirctions  Other Medical Equipment (for information only, NOT a DME order):  walker  Other Treatments: n/a    Patient's personal belongings (please select all that are sent with patient):  Kunal Corey RN SIGNATURE:  Electronically signed by Sunita Alexandre RN on 5/23/19 at 3:08 PM    CASE MANAGEMENT/SOCIAL WORK SECTION    Inpatient Status Date: 05/19/2019    Readmission Risk Assessment Score:  Readmission Risk              Risk of Unplanned Readmission:        21           Discharging to Facility/ Agency   · Name: [facility]  · [address] [address] [address] [facility]  · Phone:[phone]  · Fax:[phone]    Dialysis Facility (if applicable)   · Name:  · Address:  · Dialysis Schedule:  · Phone:  · Fax:    / signature: Electronically signed by DIANA Nath on 5/20/19 at 9:55 AM    PHYSICIAN SECTION    Prognosis: Fair    Condition at Discharge: Stable    Rehab Potential (if transferring to Rehab): Fair    Recommended Labs or Other Treatments After Discharge: [facility]    Physician Certification: I certify the above information and transfer of Jaye Estrella  is necessary for the continuing treatment of the diagnosis listed and that she requires [facility] for greater 30 days.      Update Admission H&P: Changes in H&P as follows - had thoracentesis    PHYSICIAN SIGNATURE:  Electronically signed by Modena Cockayne, MD on 5/23/19 at 3:57 PM

## 2019-05-20 NOTE — CARE COORDINATION
5/20/19, 7:49 AM      Zully Auguste       Admitted from: direct admit 5/19/2019/ [address] day: 1   Location: [facility] Reason for admit: Renal failure [N19] Status: IP   Admit order signed?: yes  PMH:  has a past medical history of Anemia, Anemia in chronic kidney disease(285.21), Arthritis, Asthma, Bipolar 1 disorder ([address] CHF (congestive heart failure) ([address] Chronic kidney disease, stage III (moderate) ([address] Depression, GERD (gastroesophageal reflux disease), Headache(784.0), Hyperkalemia, Hyperlipidemia, Hypertension, Hypothyroid, Neurogenic bladder, Pneumonia, Seizure ([facility] and Type II or unspecified type diabetes mellitus without mention of complication, not stated as uncontrolled. Medications:  Scheduled Meds:   pravastatin  40 mg Oral Nightly    levothyroxine  25 mcg Oral Daily    hydrALAZINE  50 mg Oral BID    insulin lispro  0-9 Units Subcutaneous 4x Daily AC & HS    isosorbide dinitrate  20 mg Oral TID    gabapentin  100 mg Oral TID    ferrous sulfate  325 mg Oral BID    amLODIPine  10 mg Oral Daily    DULoxetine  30 mg Oral Daily    fluticasone  1 spray Each Nare Daily    oxybutynin  5 mg Oral Daily    meclizine  25 mg Oral TID    sodium chloride flush  10 mL Intravenous 2 times per day    furosemide  40 mg Intravenous BID    insulin glargine  20 Units Subcutaneous Nightly    heparin (porcine)  5,000 Units Subcutaneous Q8H     Continuous Infusions:   dextrose        Pertinent Info/Orders/Treatment Plan:  Creat 3.3, CO2 20, PCT 0.29, WBC 12.2. IV lasix bid. Nephrology following. PT/OT. Diet: DIET LOW SODIUM 2 GM; 2000 ml   Smoking status:  reports that she has never smoked.  She has never used smokeless tobacco.   PCP: Dana Philippe  Readmission: no  Readmission Risk Score: 16%    Discharge Planning  Current Residence:  Nursing Home  Living Arrangements:  Other (Comment)(SNF)   Support Systems:  Children, Family Members, Friends/Neighbors, ECF/Assisted Living  Current

## 2019-05-20 NOTE — PLAN OF CARE
Nutrition Problem: Inadequate oral intake  Intervention: Food and/or Nutrient Delivery: Continue current diet  Nutritional Goals: Patient will consume 75% or more of meals during LOS.     Problem: Nutrition  Goal: Optimal nutrition therapy  Outcome: Ongoing

## 2019-05-20 NOTE — PLAN OF CARE
Problem: Pain:  Goal: Pain level will decrease  Description  Pain level will decrease  5/20/2019 0131 by Bety Jara RN  Outcome: Ongoing  Note:   The patient has had no complaints of pain this shift. 5/19/2019 1555 by Diego Montejo RN  Outcome: Ongoing  Note:   Denies any pain this shift  Goal: Control of acute pain  Description  Control of acute pain  Outcome: Ongoing  Goal: Control of chronic pain  Description  Control of chronic pain  Outcome: Ongoing     Problem: Falls - Risk of:  Goal: Will remain free from falls  Description  Will remain free from falls  5/20/2019 0131 by Bety Jara RN  Outcome: Ongoing  Note:   The patient has been free of falls this shift. Bed is in low position, 2/4 rails are up, and alarm is active. Call light is in reach, non-slip socks are on, and hourly rounding performed. 5/19/2019 1555 by Diego Montejo RN  Outcome: Ongoing  Note:   No falls this shift, call light in reach. Bed alarm on. Up with 1 assist and walker. PT/OT consulted  Goal: Absence of physical injury  Description  Absence of physical injury  Outcome: Ongoing     Problem: Tissue Perfusion - Renal, Altered:  Goal: Electrolytes within specified parameters  Description  Electrolytes within specified parameters  5/20/2019 0131 by Bety Jara RN  Outcome: Ongoing  Note:   Pt did get Lasix this shift to get the fluid off. Pt states she is starting to feel better. Nephrology is still on the case. 5/19/2019 1555 by Diego Montejo RN  Outcome: Ongoing  Note:   Monitoring labs, nephrology following.    Goal: Urine creatinine clearance will be within specified parameters  Description  Urine creatinine clearance will be within specified parameters  Outcome: Ongoing  Goal: Serum creatinine will be within specified parameters  Description  Serum creatinine will be within specified parameters  Outcome: Ongoing  Goal: Ability to achieve a balanced intake and output will improve  Description  Ability to achieve a balanced intake and output will improve  Outcome: Ongoing     Problem: Daily Care:  Goal: Daily care needs are met  Description  Daily care needs are met  Outcome: Ongoing     Problem: Skin Integrity:  Goal: Skin integrity will stabilize  Description  Skin integrity will stabilize  5/20/2019 0131 by Morgan Cueto RN  Outcome: Ongoing  Note:   No new skin breakdown this shift. 5/19/2019 1555 by Erci Redmond RN  Outcome: Ongoing  Note:   No new skin breakdown noted, pt turns and repositions self frequently     Problem: Discharge Planning:  Goal: Patients continuum of care needs are met  Description  Patients continuum of care needs are met  5/20/2019 0131 by Morgan Cueto RN  Outcome: Ongoing  Note:   Discharge is pending at this time. Will go back to [facility] upon discharge. 5/19/2019 1555 by Eric Redmond RN  Outcome: Ongoing  Note:   Plans on returning to [facility] at discharge.  assisting with discharge needs. Care plan reviewed with patient. The patient contributes and verbalizes understanding with plan of care.

## 2019-05-21 LAB
ANION GAP SERPL CALCULATED.3IONS-SCNC: 16 MEQ/L (ref 8–16)
BUN BLDV-MCNC: 68 MG/DL (ref 7–22)
CALCIUM SERPL-MCNC: 9.5 MG/DL (ref 8.5–10.5)
CHLORIDE BLD-SCNC: 102 MEQ/L (ref 98–111)
CO2: 22 MEQ/L (ref 23–33)
CREAT SERPL-MCNC: 3.3 MG/DL (ref 0.4–1.2)
GFR SERPL CREATININE-BSD FRML MDRD: 14 ML/MIN/1.73M2
GLUCOSE BLD-MCNC: 107 MG/DL (ref 70–108)
GLUCOSE BLD-MCNC: 122 MG/DL (ref 70–108)
GLUCOSE BLD-MCNC: 122 MG/DL (ref 70–108)
GLUCOSE BLD-MCNC: 206 MG/DL (ref 70–108)
GLUCOSE BLD-MCNC: 89 MG/DL (ref 70–108)
MAGNESIUM: 2.2 MG/DL (ref 1.6–2.4)
MRSA SCREEN: NORMAL
POTASSIUM SERPL-SCNC: 4.8 MEQ/L (ref 3.5–5.2)
SODIUM BLD-SCNC: 140 MEQ/L (ref 135–145)

## 2019-05-21 PROCEDURE — 1200000003 HC TELEMETRY R&B

## 2019-05-21 PROCEDURE — 6360000002 HC RX W HCPCS: Performed by: FAMILY MEDICINE

## 2019-05-21 PROCEDURE — 2700000000 HC OXYGEN THERAPY PER DAY

## 2019-05-21 PROCEDURE — 99232 SBSQ HOSP IP/OBS MODERATE 35: CPT | Performed by: FAMILY MEDICINE

## 2019-05-21 PROCEDURE — 97110 THERAPEUTIC EXERCISES: CPT

## 2019-05-21 PROCEDURE — 82948 REAGENT STRIP/BLOOD GLUCOSE: CPT

## 2019-05-21 PROCEDURE — 6360000002 HC RX W HCPCS: Performed by: PHYSICIAN ASSISTANT

## 2019-05-21 PROCEDURE — 6360000002 HC RX W HCPCS: Performed by: INTERNAL MEDICINE

## 2019-05-21 PROCEDURE — 6360000002 HC RX W HCPCS: Performed by: HOSPITALIST

## 2019-05-21 PROCEDURE — 99232 SBSQ HOSP IP/OBS MODERATE 35: CPT | Performed by: INTERNAL MEDICINE

## 2019-05-21 PROCEDURE — 83735 ASSAY OF MAGNESIUM: CPT

## 2019-05-21 PROCEDURE — 6370000000 HC RX 637 (ALT 250 FOR IP): Performed by: INTERNAL MEDICINE

## 2019-05-21 PROCEDURE — 97116 GAIT TRAINING THERAPY: CPT

## 2019-05-21 PROCEDURE — 80048 BASIC METABOLIC PNL TOTAL CA: CPT

## 2019-05-21 PROCEDURE — 2580000003 HC RX 258: Performed by: HOSPITALIST

## 2019-05-21 PROCEDURE — 36415 COLL VENOUS BLD VENIPUNCTURE: CPT

## 2019-05-21 PROCEDURE — 6370000000 HC RX 637 (ALT 250 FOR IP): Performed by: HOSPITALIST

## 2019-05-21 RX ORDER — HYDRALAZINE HYDROCHLORIDE 50 MG/1
50 TABLET, FILM COATED ORAL EVERY 8 HOURS SCHEDULED
Status: DISCONTINUED | OUTPATIENT
Start: 2019-05-21 | End: 2019-05-22

## 2019-05-21 RX ORDER — HYDRALAZINE HYDROCHLORIDE 20 MG/ML
10 INJECTION INTRAMUSCULAR; INTRAVENOUS ONCE
Status: COMPLETED | OUTPATIENT
Start: 2019-05-21 | End: 2019-05-21

## 2019-05-21 RX ADMIN — INSULIN GLARGINE 20 UNITS: 100 INJECTION, SOLUTION SUBCUTANEOUS at 21:49

## 2019-05-21 RX ADMIN — LEVOTHYROXINE SODIUM 25 MCG: 25 TABLET ORAL at 05:35

## 2019-05-21 RX ADMIN — Medication 10 ML: at 21:49

## 2019-05-21 RX ADMIN — ISOSORBIDE DINITRATE 20 MG: 20 TABLET ORAL at 21:48

## 2019-05-21 RX ADMIN — HEPARIN SODIUM 5000 UNITS: 5000 INJECTION INTRAVENOUS; SUBCUTANEOUS at 08:09

## 2019-05-21 RX ADMIN — HEPARIN SODIUM 5000 UNITS: 5000 INJECTION INTRAVENOUS; SUBCUTANEOUS at 21:48

## 2019-05-21 RX ADMIN — HYDRALAZINE HYDROCHLORIDE 10 MG: 20 INJECTION INTRAMUSCULAR; INTRAVENOUS at 05:35

## 2019-05-21 RX ADMIN — FERROUS SULFATE TAB 325 MG (65 MG ELEMENTAL FE) 325 MG: 325 (65 FE) TAB at 21:48

## 2019-05-21 RX ADMIN — GABAPENTIN 100 MG: 100 CAPSULE ORAL at 14:12

## 2019-05-21 RX ADMIN — HYDRALAZINE HYDROCHLORIDE 50 MG: 50 TABLET, FILM COATED ORAL at 08:09

## 2019-05-21 RX ADMIN — AMLODIPINE BESYLATE 10 MG: 10 TABLET ORAL at 08:09

## 2019-05-21 RX ADMIN — HYDRALAZINE HYDROCHLORIDE 50 MG: 50 TABLET, FILM COATED ORAL at 21:49

## 2019-05-21 RX ADMIN — FLUTICASONE PROPIONATE 1 SPRAY: 50 SPRAY, METERED NASAL at 08:10

## 2019-05-21 RX ADMIN — OXYBUTYNIN CHLORIDE 5 MG: 5 TABLET ORAL at 08:09

## 2019-05-21 RX ADMIN — GABAPENTIN 100 MG: 100 CAPSULE ORAL at 21:48

## 2019-05-21 RX ADMIN — HYDRALAZINE HYDROCHLORIDE 50 MG: 50 TABLET, FILM COATED ORAL at 14:12

## 2019-05-21 RX ADMIN — Medication 10 ML: at 08:21

## 2019-05-21 RX ADMIN — HEPARIN SODIUM 5000 UNITS: 5000 INJECTION INTRAVENOUS; SUBCUTANEOUS at 01:06

## 2019-05-21 RX ADMIN — MECLIZINE HYDROCHLORIDE 25 MG: 12.5 TABLET, FILM COATED ORAL at 21:49

## 2019-05-21 RX ADMIN — MECLIZINE HYDROCHLORIDE 25 MG: 12.5 TABLET, FILM COATED ORAL at 15:58

## 2019-05-21 RX ADMIN — FUROSEMIDE 40 MG: 10 INJECTION, SOLUTION INTRAMUSCULAR; INTRAVENOUS at 08:09

## 2019-05-21 RX ADMIN — PRAVASTATIN SODIUM 40 MG: 40 TABLET ORAL at 21:48

## 2019-05-21 RX ADMIN — INSULIN LISPRO 3 UNITS: 100 INJECTION, SOLUTION INTRAVENOUS; SUBCUTANEOUS at 21:49

## 2019-05-21 RX ADMIN — FERROUS SULFATE TAB 325 MG (65 MG ELEMENTAL FE) 325 MG: 325 (65 FE) TAB at 08:09

## 2019-05-21 RX ADMIN — MECLIZINE HYDROCHLORIDE 25 MG: 12.5 TABLET, FILM COATED ORAL at 08:09

## 2019-05-21 RX ADMIN — ISOSORBIDE DINITRATE 20 MG: 20 TABLET ORAL at 08:09

## 2019-05-21 RX ADMIN — HEPARIN SODIUM 5000 UNITS: 5000 INJECTION INTRAVENOUS; SUBCUTANEOUS at 15:55

## 2019-05-21 RX ADMIN — ISOSORBIDE DINITRATE 20 MG: 20 TABLET ORAL at 14:12

## 2019-05-21 RX ADMIN — FUROSEMIDE 40 MG: 10 INJECTION, SOLUTION INTRAMUSCULAR; INTRAVENOUS at 21:49

## 2019-05-21 RX ADMIN — DULOXETINE HYDROCHLORIDE 30 MG: 30 CAPSULE, DELAYED RELEASE ORAL at 08:09

## 2019-05-21 RX ADMIN — GABAPENTIN 100 MG: 100 CAPSULE ORAL at 08:09

## 2019-05-21 RX ADMIN — DARBEPOETIN ALFA 25 MCG: 25 INJECTION, SOLUTION INTRAVENOUS; SUBCUTANEOUS at 15:56

## 2019-05-21 ASSESSMENT — PAIN SCALES - GENERAL: PAINLEVEL_OUTOF10: 0

## 2019-05-21 NOTE — PROGRESS NOTES
Hospitalist Progress Note      Patient:  Riley Torres      Unit/Bed:6K-13/013-A    YOB: 1950    MRN: [id number]       Acct: [de-identified]     PCP: [id number]    Date of Admission: 5/19/2019    Assessment/Plan:    1. Acute on chronic diastolic systolic CHF -- apprec renal assist for diuresis also -- cont lasix 40 mg IV Bid started 5/19 and further mgmt per renal -- BNP up to 3592 on admission (higher than 12/2018) -- low salt, fluid restriction -- monitor I/O  -- last echo 12/2018 EF 50-55%, no WMA, mildly dilated LA, mild MR, mild TR  -- ?BB -- no ACE/aRB due to CKD --> on isordil/hydralazine  -- TSH 5/19 WNL  2. Bilateral R>L pleural effusions -- some chronic component and states has had thoracenteses in past -- CXr 5/20/19 with moderate effusion right, tiny on left but overall improving   -- d/w renal Dr. Trish Latif -> cont IV lasix 5/21 and repeat CXR 5/22 and if still effusion then tap - pt agreeable  -- per Care Everywhere pt has had right effusion back in 1/2019, 2/2019  3. Moderate right perihilar atelectasis and both bases -- noted per CXR 5/19 and 5/20 - ?due to effusions --> encourage IS  -- ?need atbx - PCT 0.29 but has CKD -- no atbx at this time --  afebrile  4. Elevated trop -- 0.033 on admission - no cp but sob and with DM - likely up due to CKD and fluid overload -- NOT MI --> ?any hx of ischemic w/u -- Echo 12/2018 no WMA, EF 50-55% -- no BB - ??add ASA -- cont isordil, hydralazine   5. Metabolic acidosis -- ?due to CKD -- mild - improving 5/21  6. CKD stage IV -- due to DM, HTN -- c/s renal apprec -- at baseline CKD with creat 3.3 5/20 and stable 3.3 on 5/21 with diuresis --- no LOUISE - baseline 2.8 - 3.1  7. Leukocytosis -- afeb, ?etiology -- chronically high per Care Everywhere -- was 14.9 on 5/18 at [facility] and improving to 12 on 5/20   -- CXR with right mid/basilar atelectasis vs infiltrate -- PCT  -- ?check u/a but no sx   8.  Type 2 DM -- cont lantus, SSI -- monitor and adjust follows with Dr. Mike Galan and with CKD stage IV, edema to assist with fluid mgmt. -- pt resides at [facility] ->  c/s for return    Subjective:   -- 5/21 --> pt states she continues to feel better. Breathing improving daily but not normal yet. Denies cp. Denies abd pain, n/v.  Denies f/c.  Isaiah po. On 1L. Afebrile. Ambulating without difficulty. Last BM - none since admission. Wt down 7 lbs. Medications:  Reviewed    Infusion Medications    dextrose       Scheduled Medications    hydrALAZINE  50 mg Oral 3 times per day    darbepoetin stephani-polysorbate  25 mcg Subcutaneous Once    pravastatin  40 mg Oral Nightly    levothyroxine  25 mcg Oral Daily    insulin lispro  0-9 Units Subcutaneous 4x Daily AC & HS    isosorbide dinitrate  20 mg Oral TID    gabapentin  100 mg Oral TID    ferrous sulfate  325 mg Oral BID    amLODIPine  10 mg Oral Daily    DULoxetine  30 mg Oral Daily    fluticasone  1 spray Each Nare Daily    oxybutynin  5 mg Oral Daily    meclizine  25 mg Oral TID    sodium chloride flush  10 mL Intravenous 2 times per day    furosemide  40 mg Intravenous BID    insulin glargine  20 Units Subcutaneous Nightly    heparin (porcine)  5,000 Units Subcutaneous Q8H     PRN Meds: ondansetron, sodium chloride flush, magnesium hydroxide, ondansetron, glucose, dextrose, glucagon (rDNA), dextrose      Intake/Output Summary (Last 24 hours) at 5/21/2019 1427  Last data filed at 5/21/2019 1350  Gross per 24 hour   Intake 2140 ml   Output 500 ml   Net 1640 ml       Diet:  DIET LOW SODIUM 2 GM; 2000 ml    Exam:  BP (!) 155/68   Pulse 69   Temp 98.5 °F (36.9 °C) (Oral)   Resp 17   Ht 5' 6\" (1.676 m)   Wt 169 lb 4.8 oz (76.8 kg)   SpO2 93%   BMI 27.33 kg/m²     General appearance: No apparent distress, appears stated age and cooperative. HEENT: Pupils equal, round, and reactive to light. Conjunctivae/corneas clear. Neck: Supple, with full range of motion. No jugular venous distention.  Trachea midline. Respiratory:  Normal respiratory effort. Diminished in right base with crackles right mid lung, left clear. No wheezes. No respiratory distress or accessory muscle use. Cardiovascular: Regular rate and rhythm with normal S1/S2, 2/6 SHANITA, no rubs or gallops. Abdomen: Soft, non-tender, non-distended with normal bowel sounds. No rebound or guarding  Musculoskeletal: No clubbing, cyanosis or edema bilaterally. Full range of motion without deformity. No calf tenderness palpation  Skin: Skin color, texture, turgor normal.  No rashes or lesions. Neurologic:  Neurovascularly intact without any focal sensory/motor deficits. Cranial nerves: II-XII intact, grossly non-focal.  Psychiatric: Alert and oriented, thought content appropriate, normal insight  Capillary Refill: Brisk,< 3 seconds   Peripheral Pulses: +2 palpable, equal bilaterally       Labs:   Recent Labs     05/20/19  0558   WBC 12.2*   HGB 9.0*   HCT 27.0*        Recent Labs     05/20/19  0558 05/21/19  0641    140   K 5.1 4.8    102   CO2 20* 22*   BUN 64* 68*   CREATININE 3.3* 3.3*   CALCIUM 9.6 9.5     No results for input(s): AST, ALT, BILIDIR, BILITOT, ALKPHOS in the last 72 hours. No results for input(s): INR in the last 72 hours. No results for input(s): Delcie Sidney in the last 72 hours. Urinalysis:      Lab Results   Component Value Date    NITRU Negative 03/09/2019    WBCUA 0-2 06/07/2016    BACTERIA NONE 06/07/2016    RBCUA 0-2 06/07/2016    BLOODU Negative 03/09/2019    GLUCOSEU 2+ 03/09/2019       Radiology:  XR CHEST STANDARD (2 VW)   Final Result   1. Borderline heart size. Moderate-sized effusion right side. 2. Moderate bibasilar atelectasis/pneumonia. Questionable tiny effusion left side. 3. Overall appearance of chest improved from prior. **This report has been created using voice recognition software. It may contain minor errors which are inherent in voice recognition technology. ** Final report electronically signed by Dr. Patel Wilson on 5/20/2019 8:23 AM      XR CHEST PORTABLE   Final Result   1. Borderline heart size. Small bilateral pleural effusions. 2. Moderate atelectasis/pneumonia right perihilar region and both lung bases. **This report has been created using voice recognition software. It may contain minor errors which are inherent in voice recognition technology. **      Final report electronically signed by Dr. Patel Wilson on 5/19/2019 7:01 AM          Diet: DIET LOW SODIUM 2 GM; 2000 ml    Perez: no    Microbiology:  none    Tele:  SR, no arrhythmias    DVT prophylaxis: [] Lovenox                                 [] SCDs                                 [x] SQ Heparin                                 [] Encourage ambulation           [] Already on Anticoagulation     Disposition:    [] Home       [] TCU       [] Rehab       [] Psych       [x] SNF       [] [place]       [] Other-    Code Status: Full Code    PT/OT Eval Status: following          Electronically signed by Collette Hunt MD on 5/21/2019 at 2:27 PM when pt evaluated - final note filed late

## 2019-05-21 NOTE — PLAN OF CARE
Care:  Goal: Daily care needs are met  Description  Daily care needs are met  5/21/2019 1022 by Bud Romano RN  Outcome: Ongoing  Note:   Patient performs ADLs with limited assistance. 5/21/2019 0201 by Leo Kelly RN  Outcome: Ongoing     Problem: Skin Integrity:  Goal: Skin integrity will stabilize  Description  Skin integrity will stabilize  5/21/2019 1022 by Bud Romano RN  Outcome: Ongoing  Note:   Patient free from skin breakdown. Patient turns self and makes frequent positional changes. Will continue to monitor. 5/21/2019 0201 by Leo Kelly RN  Outcome: Ongoing     Problem: Discharge Planning:  Goal: Patients continuum of care needs are met  Description  Patients continuum of care needs are met  5/21/2019 1022 by Bud Romano RN  Outcome: Ongoing  Note:   Patient plans to be discharged to [facility] rehab when medically stable. 5/21/2019 0201 by Leo Kelly RN  Outcome: Ongoing     Problem: Nutrition  Goal: Optimal nutrition therapy  5/21/2019 1022 by Bud Romano RN  Outcome: Ongoing  Note:   Patient on a low sodium diet diet. Eating 100% of meals. Will continue to monitor intake and output. 5/21/2019 0201 by Leo Kelly RN  Outcome: Ongoing     Care plan reviewed with patient. Patient verbalize understanding of the plan of care and contribute to goal setting.

## 2019-05-21 NOTE — PROGRESS NOTES
Physical Therapy   [address] [place] [address]  INPATIENT PHYSICAL THERAPY  DAILYNOTE  [facility] 6K - 6K-13/013-A    Time In: 9072  Time Out: 0915  Timed Code Treatment Minutes: 25 Minutes  Minutes: 25          Date: 2019  Patient Name: Blayne Field,  Gender:  female        MRN: [id number]  : 1950  (81 y.o.)     Referring Practitioner: Rona Shepard MD  Diagnosis: Renal failure  Additional Pertinent Hx: 81 y.o. female who presented to [address] [place] [address] with bipolar, CKD IV, chronic systolic heart failure, DMII, HTN and lipidemia is admitted for acute on chronic systolic heart failure. She has been SOB for the past 2 days. She has a mild productive cough. Nothing improved or worsened her SOB. She was recently taken off lasix secondary to worsening renal function. No fever or chill. She is transfer from [facility] in [place] to [facility].      Past Medical History:   Diagnosis Date    Anemia     Anemia in chronic kidney disease(285.21)     Arthritis     Asthma     Bipolar 1 disorder (HCC)     CHF (congestive heart failure) (HCC)     Chronic [facility], stage III (moderate) ([facility])     Depression     GERD (gastroesophageal reflux disease)     Headache(784.0)     Hyperkalemia     Hyperlipidemia     Hypertension     Hypothyroid     Neurogenic bladder     Pneumonia     Seizure ([address] 2018    Type II or unspecified type diabetes mellitus without mention of complication, not stated as uncontrolled      Past Surgical History:   Procedure Laterality Date    BACK SURGERY      CERVICAL FUSION      CHOLECYSTECTOMY      FRACTURE SURGERY         Restrictions/Precautions:  Fall Risk        Other position/activity restrictions: B drop foot from previous back surgery       Prior Level of Function:  ADL Assistance: Independent  Ambulation Assistance: Independent  Transfer Assistance: Independent  Additional Comments: Per pt, has been at [facility] since Dec, has been waiting to get into ECF in [place] with son; Pt reporting she was indep with her ADL tasks at SNF. Pt stating she has started to participate in OT approx 1 week ago d/t having difficulty with her right hand and feeding herself. Pt stating she was getting better. pt amb with RW I'ly, was not doing PT just prior to admission; per pt, has R drop foot from previous back surgery    Subjective:  Chart Reviewed: Yes  Family / Caregiver Present: No  Subjective: RN approved session. Pt resting in bedside chair upon arrival, pleasant and agreeable to therapy    Pain:  Denies. Social/Functional:  Type of Home: Facility([facility] since December)  Home Layout: One level  Home Access: Level entry  Home Equipment: Rolling walker     Objective:       Transfers  Sit to Stand: Contact guard assistance(bedside chair and toilet)  Stand to sit: Stand by assistance       Ambulation 1  Surface: level tile  Device: Rolling Walker  Other Apparatus: O2(2L)  Assistance: Contact guard assistance  Quality of Gait: Slightly decreased huseyin, decreased B step lengths. Fatigues quickly, limited by SOB. B foot drop  Distance: 70 feet x1       Balance  Comments: Dynamic  bathroom to manage clothing, complete vimal clean up, and to wash hands at sink all at Stand by Assist for safety. Exercises:  Exercises  Comments: Pt completed seated there ex including BLE ankle pumps, LAQ, marches, hip abd/add, glut set all x10 reps to increase strength for improved functional mobiltiy. Activity Tolerance:  Activity Tolerance: Patient Tolerated treatment well;Patient limited by endurance    Assessment: Body structures, Functions, Activity limitations: Decreased functional mobility , Decreased endurance, Decreased strength, Decreased balance  Assessment: Pt tolerated session well. Limited by decreased strength, decreased endurance and by SOB with gait. Would benefit from continued therapy at discharge.    Prognosis: Good REQUIRES PT FOLLOW UP: Yes    Discharge Recommendations:  Discharge Recommendations: [address]    Patient Education:  Patient Education: Gait technique. POC    Equipment Recommendations:  Equipment Needed: No    Safety:  Type of devices: All fall risk precautions in place, Call light within reach, Chair alarm in place, Left in chair, Gait belt, Nurse notified    Plan:  Times per week: 3-5 X GM  Current Treatment Recommendations: Strengthening, Gait Training, Patient/Caregiver Education & Training, Balance Training, Functional Mobility Training, Endurance Training, Transfer Training, Safety Education & Training    Goals:  Patient goals : to get better    Short term goals  Time Frame for Short term goals: by discharge  Short term goal 1: Pt to transfer sit <--> stand SBA for increased functional mobility. Short term goal 2: Pt to ambulate >75 feet with RW SBA for household ambulation. Long term goals  Time Frame for Long term goals : NA due to short length of stay. If patient is discharged prior to progress note completion, this note is to serve as the discharge note with all goals being unmet unless indicated otherwise.             AM-PAC Inpatient Mobility without Stair Climbing Raw Score : 15  AM-PAC Inpatient without Stair Climbing T-Scale Score : 43.03  Mobility Inpatient CMS 0-100% Score: 47.43  Mobility Inpatient without Stair CMS G-Code Modifier : TORIN

## 2019-05-21 NOTE — PROGRESS NOTES
HS    isosorbide dinitrate  20 mg Oral TID    gabapentin  100 mg Oral TID    ferrous sulfate  325 mg Oral BID    amLODIPine  10 mg Oral Daily    DULoxetine  30 mg Oral Daily    fluticasone  1 spray Each Nare Daily    oxybutynin  5 mg Oral Daily    meclizine  25 mg Oral TID    sodium chloride flush  10 mL Intravenous 2 times per day    furosemide  40 mg Intravenous BID    insulin glargine  20 Units Subcutaneous Nightly    heparin (porcine)  5,000 Units Subcutaneous Q8H     Meds prn: ondansetron, sodium chloride flush, magnesium hydroxide, ondansetron, glucose, dextrose, glucagon (rDNA), dextrose       Impression and Plan:  1. CKd IV with HTN and DM nephropathy  - creat mostly at baseline at this time  - clinically appears stable, no peripheral edema at this time    2. Acute on chronic diastolic CHF: no peripheral edema noted at this time  - on IV lasix, change to oral lasix starting tomorrow    3. DM nephropathy with proteinuria  4. HTN: continue with norvasc, hydralazine, increase hydralazine  5. Anemia in CKD: iron stores are okay, give one dose of aranesp 25 mcg SQ    Addendum  CXR reviewed:  Moderate pleural effusion  If no improvement then thoracocentesis    D/W patient   D/W Dr. Shantal Matos MD  [facility]

## 2019-05-21 NOTE — PLAN OF CARE
Problem: Pain:  Goal: Pain level will decrease  Description  Pain level will decrease  5/21/2019 0201 by Carson Higginbotham RN  Outcome: Ongoing  Patient denied any pain throughout the shift using the 0-10 pain scale. Problem: Falls - Risk of:  Goal: Will remain free from falls  Description  Will remain free from falls  5/21/2019 0201 by Carson Higginbotham RN  Outcome: Ongoing  Patient remained free from falls throughout the shift. Bed alarm on at all times, call light within reach, patietn up with walker. Problem: Daily Care:  Goal: Daily care needs are met  Description  Daily care needs are met  5/21/2019 0201 by Carson Higginbotham RN  Outcome: Ongoing  Patient assisting with daily cares as needed. Problem: Skin Integrity:  Goal: Skin integrity will stabilize  Description  Skin integrity will stabilize  5/21/2019 0201 by Carson Higginbotham RN  Outcome: Ongoing  No new skin breakdown noted throughout the shift. Patient able to turn and reposition self as needed. Problem: Discharge Planning:  Goal: Patients continuum of care needs are met  Description  Patients continuum of care needs are met  5/21/2019 0201 by Carson Higginbotham RN  Outcome: Ongoing  Patient plans to return to [facility] after discharge and then to move to [place] with family.  on case. Care plan reviewed with patient. Patient verbalize understanding of the plan of care and contribute to goal setting.

## 2019-05-22 ENCOUNTER — APPOINTMENT (OUTPATIENT)
Dept: GENERAL RADIOLOGY | Age: 69
DRG: 291 | End: 2019-05-22
Attending: INTERNAL MEDICINE
Payer: MEDICARE

## 2019-05-22 LAB
ANION GAP SERPL CALCULATED.3IONS-SCNC: 17 MEQ/L (ref 8–16)
BUN BLDV-MCNC: 68 MG/DL (ref 7–22)
CALCIUM SERPL-MCNC: 9.3 MG/DL (ref 8.5–10.5)
CHLORIDE BLD-SCNC: 100 MEQ/L (ref 98–111)
CO2: 20 MEQ/L (ref 23–33)
CREAT SERPL-MCNC: 3.5 MG/DL (ref 0.4–1.2)
GFR SERPL CREATININE-BSD FRML MDRD: 13 ML/MIN/1.73M2
GLUCOSE BLD-MCNC: 122 MG/DL (ref 70–108)
GLUCOSE BLD-MCNC: 152 MG/DL (ref 70–108)
GLUCOSE BLD-MCNC: 197 MG/DL (ref 70–108)
GLUCOSE BLD-MCNC: 205 MG/DL (ref 70–108)
GLUCOSE BLD-MCNC: 68 MG/DL (ref 70–108)
GLUCOSE BLD-MCNC: 77 MG/DL (ref 70–108)
GLUCOSE BLD-MCNC: 83 MG/DL (ref 70–108)
GLUCOSE BLD-MCNC: 92 MG/DL (ref 70–108)
HCT VFR BLD CALC: 27.7 % (ref 37–47)
HEMOGLOBIN: 9.1 GM/DL (ref 12–16)
MAGNESIUM: 2.2 MG/DL (ref 1.6–2.4)
POTASSIUM SERPL-SCNC: 4.4 MEQ/L (ref 3.5–5.2)
SODIUM BLD-SCNC: 137 MEQ/L (ref 135–145)

## 2019-05-22 PROCEDURE — 6370000000 HC RX 637 (ALT 250 FOR IP): Performed by: INTERNAL MEDICINE

## 2019-05-22 PROCEDURE — 85014 HEMATOCRIT: CPT

## 2019-05-22 PROCEDURE — 36415 COLL VENOUS BLD VENIPUNCTURE: CPT

## 2019-05-22 PROCEDURE — 6360000002 HC RX W HCPCS: Performed by: INTERNAL MEDICINE

## 2019-05-22 PROCEDURE — 6360000002 HC RX W HCPCS: Performed by: HOSPITALIST

## 2019-05-22 PROCEDURE — 71046 X-RAY EXAM CHEST 2 VIEWS: CPT

## 2019-05-22 PROCEDURE — 6370000000 HC RX 637 (ALT 250 FOR IP): Performed by: HOSPITALIST

## 2019-05-22 PROCEDURE — 99232 SBSQ HOSP IP/OBS MODERATE 35: CPT | Performed by: INTERNAL MEDICINE

## 2019-05-22 PROCEDURE — 1200000003 HC TELEMETRY R&B

## 2019-05-22 PROCEDURE — 85018 HEMOGLOBIN: CPT

## 2019-05-22 PROCEDURE — 99233 SBSQ HOSP IP/OBS HIGH 50: CPT | Performed by: INTERNAL MEDICINE

## 2019-05-22 PROCEDURE — 82948 REAGENT STRIP/BLOOD GLUCOSE: CPT

## 2019-05-22 PROCEDURE — 83735 ASSAY OF MAGNESIUM: CPT

## 2019-05-22 PROCEDURE — 80048 BASIC METABOLIC PNL TOTAL CA: CPT

## 2019-05-22 PROCEDURE — 2580000003 HC RX 258: Performed by: HOSPITALIST

## 2019-05-22 RX ORDER — FERROUS SULFATE 325(65) MG
325 TABLET ORAL
Status: DISCONTINUED | OUTPATIENT
Start: 2019-05-23 | End: 2019-05-23 | Stop reason: HOSPADM

## 2019-05-22 RX ADMIN — GABAPENTIN 100 MG: 100 CAPSULE ORAL at 22:07

## 2019-05-22 RX ADMIN — HYDRALAZINE HYDROCHLORIDE 75 MG: 50 TABLET, FILM COATED ORAL at 14:25

## 2019-05-22 RX ADMIN — MECLIZINE HYDROCHLORIDE 25 MG: 12.5 TABLET, FILM COATED ORAL at 08:02

## 2019-05-22 RX ADMIN — DULOXETINE HYDROCHLORIDE 30 MG: 30 CAPSULE, DELAYED RELEASE ORAL at 08:01

## 2019-05-22 RX ADMIN — INSULIN GLARGINE 20 UNITS: 100 INJECTION, SOLUTION SUBCUTANEOUS at 22:06

## 2019-05-22 RX ADMIN — ONDANSETRON 4 MG: 2 INJECTION INTRAMUSCULAR; INTRAVENOUS at 08:02

## 2019-05-22 RX ADMIN — HEPARIN SODIUM 5000 UNITS: 5000 INJECTION INTRAVENOUS; SUBCUTANEOUS at 08:02

## 2019-05-22 RX ADMIN — ISOSORBIDE DINITRATE 20 MG: 20 TABLET ORAL at 22:07

## 2019-05-22 RX ADMIN — AMLODIPINE BESYLATE 10 MG: 10 TABLET ORAL at 08:02

## 2019-05-22 RX ADMIN — HYDRALAZINE HYDROCHLORIDE 75 MG: 50 TABLET, FILM COATED ORAL at 22:07

## 2019-05-22 RX ADMIN — INSULIN LISPRO 3 UNITS: 100 INJECTION, SOLUTION INTRAVENOUS; SUBCUTANEOUS at 16:58

## 2019-05-22 RX ADMIN — DEXTROSE 15 G: 15 GEL ORAL at 07:18

## 2019-05-22 RX ADMIN — OXYBUTYNIN CHLORIDE 5 MG: 5 TABLET ORAL at 08:02

## 2019-05-22 RX ADMIN — PRAVASTATIN SODIUM 40 MG: 40 TABLET ORAL at 22:07

## 2019-05-22 RX ADMIN — ISOSORBIDE DINITRATE 20 MG: 20 TABLET ORAL at 08:12

## 2019-05-22 RX ADMIN — FUROSEMIDE 40 MG: 10 INJECTION, SOLUTION INTRAMUSCULAR; INTRAVENOUS at 08:02

## 2019-05-22 RX ADMIN — MECLIZINE HYDROCHLORIDE 25 MG: 12.5 TABLET, FILM COATED ORAL at 14:30

## 2019-05-22 RX ADMIN — GABAPENTIN 100 MG: 100 CAPSULE ORAL at 08:02

## 2019-05-22 RX ADMIN — Medication 10 ML: at 22:07

## 2019-05-22 RX ADMIN — HEPARIN SODIUM 5000 UNITS: 5000 INJECTION INTRAVENOUS; SUBCUTANEOUS at 14:25

## 2019-05-22 RX ADMIN — INSULIN LISPRO 1 UNITS: 100 INJECTION, SOLUTION INTRAVENOUS; SUBCUTANEOUS at 22:01

## 2019-05-22 RX ADMIN — LEVOTHYROXINE SODIUM 25 MCG: 25 TABLET ORAL at 05:37

## 2019-05-22 RX ADMIN — ISOSORBIDE DINITRATE 20 MG: 20 TABLET ORAL at 14:24

## 2019-05-22 RX ADMIN — Medication 10 ML: at 08:13

## 2019-05-22 RX ADMIN — GABAPENTIN 100 MG: 100 CAPSULE ORAL at 14:24

## 2019-05-22 RX ADMIN — FLUTICASONE PROPIONATE 1 SPRAY: 50 SPRAY, METERED NASAL at 08:02

## 2019-05-22 RX ADMIN — HYDRALAZINE HYDROCHLORIDE 50 MG: 50 TABLET, FILM COATED ORAL at 05:37

## 2019-05-22 RX ADMIN — MECLIZINE HYDROCHLORIDE 25 MG: 12.5 TABLET, FILM COATED ORAL at 22:07

## 2019-05-22 ASSESSMENT — PAIN SCALES - GENERAL
PAINLEVEL_OUTOF10: 0
PAINLEVEL_OUTOF10: 0

## 2019-05-22 ASSESSMENT — PAIN DESCRIPTION - ONSET: ONSET: ON-GOING

## 2019-05-22 ASSESSMENT — PAIN DESCRIPTION - LOCATION: LOCATION: HEAD

## 2019-05-22 ASSESSMENT — PAIN DESCRIPTION - PROGRESSION: CLINICAL_PROGRESSION: NOT CHANGED

## 2019-05-22 ASSESSMENT — PAIN DESCRIPTION - FREQUENCY: FREQUENCY: CONTINUOUS

## 2019-05-22 ASSESSMENT — PAIN DESCRIPTION - DESCRIPTORS: DESCRIPTORS: ACHING

## 2019-05-22 ASSESSMENT — PAIN - FUNCTIONAL ASSESSMENT: PAIN_FUNCTIONAL_ASSESSMENT: ACTIVITIES ARE NOT PREVENTED

## 2019-05-22 ASSESSMENT — PAIN DESCRIPTION - PAIN TYPE: TYPE: ACUTE PAIN

## 2019-05-22 ASSESSMENT — PAIN DESCRIPTION - ORIENTATION: ORIENTATION: UPPER;POSTERIOR

## 2019-05-22 NOTE — PROGRESS NOTES
[facility] [address]  OCCUPATIONAL THERAPY MISSED TREATMENT NOTE  [facility]LEMET[facility] 6K  6K-13/013-A      Date: 2019  Patient Name: Justo Zamora        CSN: [id number]   : 1950  (81 y.o.)  Gender: female   Referring Practitioner: Dr. Dominga Baker   Diagnosis: renal failure         REASON FOR MISSED TREATMENT:  Patient refused treatment.   Pt. Reports her sugar was very low earlier and is not feeling well at this time, try back as time allows

## 2019-05-22 NOTE — PROGRESS NOTES
Hospitalist Progress Note    Patient:  Jessica Devlin      Unit/Bed:6K-13/013-A    YOB: 1950    MRN: [id number]       Acct: [de-identified]     PCP: [id number]    Date of Admission: 5/19/2019    Reason for admission: acute on chronic diastolic chf    Expected discharge date:  ? Disposition: Home? Hospital Course: pt with ckd, prior adm 84.94 for diastolic chf, needed vent; presented with several day hx of increasing sob and was in chf again. She is diuresing and lost considerable wgt. She has normal lv fx. In the Last 24 hours: felt improved. She has right pleural effusion . 21.   Had prior thoracentesis 12.18    Medications:  Reviewed    Infusion Medications    dextrose       Scheduled Medications    [START ON 5/23/2019] ferrous sulfate  325 mg Oral Q48H    hydrALAZINE  75 mg Oral 3 times per day    pravastatin  40 mg Oral Nightly    levothyroxine  25 mcg Oral Daily    insulin lispro  0-9 Units Subcutaneous 4x Daily AC & HS    isosorbide dinitrate  20 mg Oral TID    gabapentin  100 mg Oral TID    amLODIPine  10 mg Oral Daily    DULoxetine  30 mg Oral Daily    fluticasone  1 spray Each Nostril Daily    oxybutynin  5 mg Oral Daily    meclizine  25 mg Oral TID    sodium chloride flush  10 mL Intravenous 2 times per day    furosemide  40 mg Intravenous BID    insulin glargine  20 Units Subcutaneous Nightly    heparin (porcine)  5,000 Units Subcutaneous Q8H     PRN Meds: ondansetron, sodium chloride flush, magnesium hydroxide, ondansetron, glucose, dextrose, glucagon (rDNA), dextrose      Intake/Output Summary (Last 24 hours) at 5/22/2019 0604  Last data filed at 5/22/2019 0353  Gross per 24 hour   Intake 1930 ml   Output 0 ml   Net 1930 ml       Diet:  DIET LOW SODIUM 2 GM; 2000 ml    Exam:  BP (!) 160/70   Pulse 70   Temp 97.5 °F (36.4 °C) (Oral)   Resp 18   Ht 5' 6\" (1.676 m)   Wt 168 lb 6.4 oz (76.4 kg)   SpO2 95%   BMI 27.18 kg/m²     Physical Examination: General appearance - chronically ill appearing  Mental status - alert, oriented to person, place, and time  Neck - supple, no significant adenopathy,HAS JVD  Chest - diminished breath sounds and dullness to percussion right  Heart - normal rate and regular rhythm, no gallops noted, systolic murmur 2/6 at 2nd right intercostal space and at lower left sternal border  Abdomen - soft, nontender, nondistended, no masses or organomegaly  Neurological - alert, oriented, normal speech, no focal findings or movement disorder noted  Musculoskeletal - no muscular tenderness noted  Extremities - no pedal edema noted  Skin - normal coloration and turgor, no rashes, no suspicious skin lesions noted    Labs:   Recent Labs     05/20/19  0558   WBC 12.2*   HGB 9.0*   HCT 27.0*        Recent Labs     05/20/19  0558 05/21/19  0641    140   K 5.1 4.8    102   CO2 20* 22*   BUN 64* 68*   CREATININE 3.3* 3.3*   CALCIUM 9.6 9.5     No results for input(s): AST, ALT, BILIDIR, BILITOT, ALKPHOS in the last 72 hours. No results for input(s): INR in the last 72 hours. No results for input(s): Shannon Reasoner in the last 72 hours. Microbiology:      Urinalysis:      Lab Results   Component Value Date    NITRU Negative 03/09/2019    WBCUA 0-2 06/07/2016    BACTERIA NONE 06/07/2016    RBCUA 0-2 06/07/2016    BLOODU Negative 03/09/2019    GLUCOSEU 2+ 03/09/2019       Radiology:  Xr Chest Standard (2 Vw)    Result Date: 5/20/2019  PROCEDURE: XR CHEST (2 VW) CLINICAL INFORMATION: shortness of breath COMPARISON: 5/19/2019 TECHNIQUE: PA and lateral views of the chest were obtained. 1. Borderline heart size. Moderate-sized effusion right side. 2. Moderate bibasilar atelectasis/pneumonia. Questionable tiny effusion left side. 3. Overall appearance of chest improved from prior. **This report has been created using voice recognition software.   It may contain minor errors which are inherent in voice recognition technology. ** Final report electronically signed by Dr. Joya Vicente on 5/20/2019 8:23 AM    Xr Chest Portable    Result Date: 5/19/2019  PROCEDURE: XR CHEST PORTABLE CLINICAL INFORMATION: SOB COMPARISON: 12/10/2018 TECHNIQUE: A single mobile view of the chest was obtained. 1. Borderline heart size. Small bilateral pleural effusions. 2. Moderate atelectasis/pneumonia right perihilar region and both lung bases. **This report has been created using voice recognition software. It may contain minor errors which are inherent in voice recognition technology. ** Final report electronically signed by Dr. Joya Vicente on 5/19/2019 7:01 AM       DVT prophylaxis: SQ Heparin     Code Status: Full Code    PT/OT Eval Status:  ordered      Tele:  Yes    Assessment/Plan:    Active Hospital Problems    Diagnosis Date Noted    Renal failure [N19] 05/19/2019    Diabetic nephropathy associated with type 2 diabetes mellitus ([address] [E11.21]     Acute on chronic diastolic heart failure ([address] [I50.33] 12/07/2018    Acute pulmonary edema ([address] [J81.0] 12/07/2018    Acute respiratory failure with hypoxia ([address] [J96.01] 12/07/2018    Essential (primary) hypertension [I10] 12/07/2018    Anemia in chronic renal disease [N18.9, D63.1]        1. Acute on chronic diastolic chf- continue diuretics; proposed thoracentesis if cxr not clearing. She is agree able  2. hbp - still high- will boost hydralazine  3.  Dm- sugars seem stable        Electronically signed by Cortez Jeong MD on 5/22/2019 at 6:04 AM

## 2019-05-22 NOTE — PLAN OF CARE
met  Description  Daily care needs are met  5/22/2019 0954 by Frandy Ro RN  Outcome: Ongoing  Note:   Patient able to perform ADLs with minimal assistance. 5/21/2019 2250 by Aracelis Bejarano RN  Outcome: Ongoing  Note:   Daily needs are met at this time, will continue to assist as needed. Problem: Skin Integrity:  Goal: Skin integrity will stabilize  Description  Skin integrity will stabilize  5/22/2019 0954 by Frandy Ro RN  Outcome: Ongoing  Note:   Patient free from skin breakdown. Patient turns self and makes frequent positional changes. Will continue to monitor. 5/21/2019 2250 by Aracelis Bejarano RN  Outcome: Ongoing  Note:   Pt has bruising and abrasions scattered. Coccyx red but blanches. Will continue to encourage pt to turn as tolerated and ambulate with assistance. Problem: Discharge Planning:  Goal: Patients continuum of care needs are met  Description  Patients continuum of care needs are met  5/22/2019 0954 by Frandy Ro RN  Outcome: Ongoing  Note:   Patient plans to be discharged to [facility] rehab when medically stable. 5/21/2019 2250 by Aracelis Bejarano RN  Outcome: Ongoing  Note:   Pt needs are met at this time, will continue to assist as needed in preparation for discharge. Problem: Nutrition  Goal: Optimal nutrition therapy  5/22/2019 0954 by Frandy Ro RN  Outcome: Ongoing  Note:   Patient on a low sodium diet. Eating 50% of meals. Will continue to monitor intake and output. 5/21/2019 2250 by Aracelis Bejarano RN  Outcome: Ongoing  Note:   Nutritional intake appears adequate, will continue to assess intake and output. Pt on fluid restriction. Care plan reviewed with patient. Patient verbalize understanding of the plan of care and contribute to goal setting.

## 2019-05-22 NOTE — CARE COORDINATION
5/22/19, 2:34 PM      [facility] day: 3  Location: [facility]13/013-A Reason for admit: Renal failure [N19]          05/22/19   CXR    Slight increased haziness of the right lung may indicate posterior layered pleural effusion or slight increase in the patient's right pleural effusion. Otherwise stable appearance        Treatment Plan of Care: added PT and OT, creat 3.5 (was 3.3), Heparin SQ, accu checks with ISS and ordered Lantus, daily weights, stopped Lasix, Tmax 99.2, fluid restrictions continue, nephrology following. PCP: Antoni Fleming [facility]  Readmission Risk Score: 22%      Discharge Plan: return to [facility] when medically stable.

## 2019-05-22 NOTE — CARE COORDINATION
[address] Transitions Interview     2019    Patient: India Guallpa Patient : 1950   MRN: [id number]  Reason for Admission: Renal failure [N19]  RARS: Readmission Risk Score: 22    Patient in BPCI bundle for Dx CHF ()    Met with: Nestor Moy, patient. Introduced self/role. Explained BPCI-A program and beneficiary letter. Patient verbalized understanding. Readmission Risk  Patient Active Problem List   Diagnosis    Chronic kidney disease, stage III (moderate) (HCC)    Anemia in chronic renal disease    Type 2 diabetes mellitus with insulin therapy [facility]    Chronic kidney disease (CKD), stage IV (severe) (HCC)    Diabetic nephropathy with proteinuria (HCC)    Acute respiratory failure with hypoxia (HCC)    Acute pulmonary edema (HCC)    Acute on chronic diastolic heart failure (HCC)    Uncontrolled type 2 diabetes mellitus with hyperglycemia (HCC)    Essential (primary) hypertension    Hypothyroidism    Other fluid overload    Renal failure    Diabetic nephropathy associated with type 2 diabetes mellitus (HCC)    Bilateral pleural effusion    Elevated troponin    Metabolic acidosis    Leukocytosis    Generalized weakness    Bipolar 1 disorder (HCC)    Mild mitral regurgitation    Mild tricuspid regurgitation    Chronic pain syndrome       Inpatient Assessment  Care Transitions Summary    Care Transitions Inpatient Review  Medication Review  Are you able to afford your medications?:  Yes  How often do you have difficulty taking your medications?:  I always take them as prescribed.   Housing Review  Are you an active caregiver in your home?:  No  Social Support  Do you have a ?:  No  Do you have a [address] [place] [address]?:  No  Durable Medical Equipment  Patient DME:  Ezra Barry  Functional Review  Ability to seek help/take action for Emergent/Urgent situations i.e. fire, crime, inclement weather or health crisis.:  Needs Assistance  Ability handle personal hygiene needs (bathing/dressing/grooming): Independent  Ability to manage medications:  Dependent  Ability to prepare food:  Dependent  Ability to maintain home (clean home, laundry):  Dependent  Ability to drive and/or has transportation:  Dependent  Ability to do shopping:  Dependent  Ability to manage finances:  Dependent  Is patient able to live independently?:  No  Hearing and Vision  Visual Impairment:  Visual impairment (Glasses/contacts)  Hearing Impairment:  None  Care Transitions Interventions  No Identified Needs       Patient is a direct admit from [facility] for report of SOB x 2 days and 10 lbs weight gain over the last several days. PMH includes CHF, DM, HTN, HLD, and CKD IV. Patient admitted for Renal failure [N19]. Consult with Nephrology. Case management, social work, registered dietician, and PT/OT following. Patient is a long-term resident of [facility]. Patient utilizes a walker for ambulation. Discharge plan is to return to [facility].      Follow Up  Future Appointments   Date Time Provider Lew Adams   6/7/2019  9:00 AM Tamara Vargas MD [facility] - [address] Maintenance Due   Topic Date Due    Breast cancer screen  04/25/2002    DEXA (modify frequency per FRAX score)  04/25/2017       Nichole Albrecht, RN  Care Transition Coordinator  [phone]  21

## 2019-05-22 NOTE — PLAN OF CARE
Problem: Pain:  Goal: Pain level will decrease  Description  Pain level will decrease  5/21/2019 2250 by Kamryn Dumont RN  Outcome: Ongoing  Note:   Pt denies pain a this time, will continue to assess using 0-10 pain scale. Pt goal is 0. Problem: Falls - Risk of:  Goal: Will remain free from falls  Description  Will remain free from falls  5/21/2019 2250 by Kamryn Dumont RN  Outcome: Ongoing  Note:   No falls so far this shift, call light and bedside table within reach. Bed in lowest position and alarm on, nonskid socks on bilaterally. Problem: Tissue Perfusion - Renal, Altered:  Goal: Electrolytes within specified parameters  Description  Electrolytes within specified parameters  5/21/2019 2250 by Kamryn Dumont RN  Outcome: Ongoing  Note:   Pt electrolytes within defined limits for pt. Will continue to assess with lab results. Problem: Tissue Perfusion - Renal, Altered:  Goal: Electrolytes within specified parameters  Description  Electrolytes within specified parameters  5/21/2019 2250 by Kamryn Dumont RN  Outcome: Ongoing  Note:   Pt electrolytes within defined limits for pt. Will continue to assess with lab results. Problem: Tissue Perfusion - Renal, Altered:  Goal: Serum creatinine will be within specified parameters  Description  Serum creatinine will be within specified parameters  Outcome: Ongoing  Note:   Creatinine remains elevated but pt receiving IV lasix. Will continue to assess. Problem: Tissue Perfusion - Renal, Altered:  Goal: Ability to achieve a balanced intake and output will improve  Description  Ability to achieve a balanced intake and output will improve  Outcome: Ongoing  Note:   Intake and output remains adequate at this time, will continue to assess.       Problem: Daily Care:  Goal: Daily care needs are met  Description  Daily care needs are met  5/21/2019 2250 by Kamryn Dumont RN  Outcome: Ongoing  Note:   Daily needs are met at this time, will continue to assist as needed. Problem: Skin Integrity:  Goal: Skin integrity will stabilize  Description  Skin integrity will stabilize  5/21/2019 2250 by Christopher Tavarez RN  Outcome: Ongoing  Note:   Pt has bruising and abrasions scattered. Coccyx red but blanches. Will continue to encourage pt to turn as tolerated and ambulate with assistance. Problem: Discharge Planning:  Goal: Patients continuum of care needs are met  Description  Patients continuum of care needs are met  5/21/2019 2250 by Christopher Tavarez RN  Outcome: Ongoing  Note:   Pt needs are met at this time, will continue to assist as needed in preparation for discharge. Problem: Nutrition  Goal: Optimal nutrition therapy  5/21/2019 2250 by Christopher Tavarez RN  Outcome: Ongoing  Note:   Nutritional intake appears adequate, will continue to assess intake and output. Pt on fluid restriction. Care plan reviewed with patient. Patient verbalize understanding of the plan of care and contribute to goal setting.

## 2019-05-22 NOTE — PROGRESS NOTES
[facility]         Renal Inpatient Follow-Up note         5/22/2019 7:57 AM    Pt Name:   Irma Raymundo  YOB: 1950  Attending:   Janneth Jon MD    Chief Complaint : Irma Raymundo is a 81 y.o. female being followed by nephrology for LEONA OWASSO /CKD    Interval History :   Patient seen and examined by me. No distress  Feels ok. No CPor SOb at this time      Scheduled Medications :    [START ON 5/23/2019] ferrous sulfate  325 mg Oral Q48H    hydrALAZINE  75 mg Oral 3 times per day    pravastatin  40 mg Oral Nightly    levothyroxine  25 mcg Oral Daily    insulin lispro  0-9 Units Subcutaneous 4x Daily AC & HS    isosorbide dinitrate  20 mg Oral TID    gabapentin  100 mg Oral TID    amLODIPine  10 mg Oral Daily    DULoxetine  30 mg Oral Daily    fluticasone  1 spray Each Nostril Daily    oxybutynin  5 mg Oral Daily    meclizine  25 mg Oral TID    sodium chloride flush  10 mL Intravenous 2 times per day    furosemide  40 mg Intravenous BID    insulin glargine  20 Units Subcutaneous Nightly    heparin (porcine)  5,000 Units Subcutaneous Q8H      dextrose         Vitals :  BP (!) 177/85   Pulse 75   Temp 98.1 °F (36.7 °C) (Oral)   Resp 20   Ht 5' 6\" (1.676 m)   Wt 168 lb 6.4 oz (76.4 kg)   SpO2 93%   BMI 27.18 kg/m²     24HR INTAKE/OUTPUT:      Intake/Output Summary (Last 24 hours) at 5/22/2019 0757  Last data filed at 5/22/2019 0353  Gross per 24 hour   Intake 1930 ml   Output 0 ml   Net 1930 ml     Last 3 weights  Wt Readings from Last 3 Encounters:   05/22/19 168 lb 6.4 oz (76.4 kg)   03/13/19 166 lb 4.8 oz (75.4 kg)   01/04/19 173 lb (78.5 kg)           Physical Exam :  General Appearance:  Well developed.  No distress  Mouth/Throat:  Oral mucosa moist  Neck:  Supple, no JVD  Lungs:  Breath sounds: diminished mainly rt side  Heart[de-identified]  S1,S2 heard  Abdomen:  Soft, non - tender  Musculoskeletal:  Edema - none         Last 3 CBC   Recent Labs     05/20/19  0504 WBC 12.2*   RBC 2.98*   HGB 9.0*   HCT 27.0*        Last 3 CMP  Recent Labs     05/20/19  0558 05/21/19  0641    140   K 5.1 4.8    102   CO2 20* 22*   BUN 64* 68*   CREATININE 3.3* 3.3*   CALCIUM 9.6 9.5             Assessment / Plan   Renal - LOUISE on CKD Vs progressive CKD  - overall renal Fx is stable/ slightly worse   - CXR reviewed shows effusion  - no sig CHF for now. Weight down to her baseline.  - decrease lasix to 40 po daily    Electrolytes - WNL,   Mild acidosis - better  Anemia of CKD  SOB - probably multifactorial including CHF / pleural effusion . May need thoracentesis  D/W patient, nurse. meds reviewed    LIANNE Crawley D.  [facility].

## 2019-05-23 ENCOUNTER — APPOINTMENT (OUTPATIENT)
Dept: GENERAL RADIOLOGY | Age: 69
DRG: 291 | End: 2019-05-23
Attending: INTERNAL MEDICINE
Payer: MEDICARE

## 2019-05-23 ENCOUNTER — APPOINTMENT (OUTPATIENT)
Dept: ULTRASOUND IMAGING | Age: 69
DRG: 291 | End: 2019-05-23
Attending: INTERNAL MEDICINE
Payer: MEDICARE

## 2019-05-23 VITALS
WEIGHT: 171.3 LBS | OXYGEN SATURATION: 90 % | BODY MASS INDEX: 27.53 KG/M2 | HEIGHT: 66 IN | DIASTOLIC BLOOD PRESSURE: 68 MMHG | HEART RATE: 67 BPM | RESPIRATION RATE: 18 BRPM | TEMPERATURE: 98.8 F | SYSTOLIC BLOOD PRESSURE: 152 MMHG

## 2019-05-23 LAB
ANION GAP SERPL CALCULATED.3IONS-SCNC: 14 MEQ/L (ref 8–16)
BODY FLUID RBC: 2000 /CUMM
BUN BLDV-MCNC: 69 MG/DL (ref 7–22)
CALCIUM SERPL-MCNC: 9.4 MG/DL (ref 8.5–10.5)
CHARACTER, BODY FLUID: NORMAL
CHLORIDE BLD-SCNC: 100 MEQ/L (ref 98–111)
CO2: 22 MEQ/L (ref 23–33)
COLOR: YELLOW
CREAT SERPL-MCNC: 3.4 MG/DL (ref 0.4–1.2)
GFR SERPL CREATININE-BSD FRML MDRD: 13 ML/MIN/1.73M2
GLUCOSE BLD-MCNC: 100 MG/DL (ref 70–108)
GLUCOSE BLD-MCNC: 119 MG/DL (ref 70–108)
GLUCOSE BLD-MCNC: 66 MG/DL (ref 70–108)
GLUCOSE BLD-MCNC: 67 MG/DL (ref 70–108)
MAGNESIUM: 2.2 MG/DL (ref 1.6–2.4)
MESOTHELIAL CELLS BODY FLUID: NORMAL
MONONUCLEAR CELLS BODY FLUID: 93.3 %
PATHOLOGIST REVIEW: NORMAL
POLYMORPHONUCLEAR CELLS BODY FLUID: 6.7 %
POTASSIUM SERPL-SCNC: 5 MEQ/L (ref 3.5–5.2)
SODIUM BLD-SCNC: 136 MEQ/L (ref 135–145)
SPECIMEN: NORMAL
TOTAL NUCLEATED CELLS BODY FLUID: 278 /CUMM (ref 0–500)
TOTAL VOLUME RECEIVED BODY FLUID: NORMAL ML

## 2019-05-23 PROCEDURE — 87205 SMEAR GRAM STAIN: CPT

## 2019-05-23 PROCEDURE — 2580000003 HC RX 258: Performed by: HOSPITALIST

## 2019-05-23 PROCEDURE — 83735 ASSAY OF MAGNESIUM: CPT

## 2019-05-23 PROCEDURE — 87070 CULTURE OTHR SPECIMN AEROBIC: CPT

## 2019-05-23 PROCEDURE — 71045 X-RAY EXAM CHEST 1 VIEW: CPT

## 2019-05-23 PROCEDURE — 97116 GAIT TRAINING THERAPY: CPT

## 2019-05-23 PROCEDURE — 6370000000 HC RX 637 (ALT 250 FOR IP): Performed by: INTERNAL MEDICINE

## 2019-05-23 PROCEDURE — 99233 SBSQ HOSP IP/OBS HIGH 50: CPT | Performed by: INTERNAL MEDICINE

## 2019-05-23 PROCEDURE — 0W993ZX DRAINAGE OF RIGHT PLEURAL CAVITY, PERCUTANEOUS APPROACH, DIAGNOSTIC: ICD-10-PCS | Performed by: RADIOLOGY

## 2019-05-23 PROCEDURE — 6370000000 HC RX 637 (ALT 250 FOR IP): Performed by: HOSPITALIST

## 2019-05-23 PROCEDURE — 80048 BASIC METABOLIC PNL TOTAL CA: CPT

## 2019-05-23 PROCEDURE — 82948 REAGENT STRIP/BLOOD GLUCOSE: CPT

## 2019-05-23 PROCEDURE — 36415 COLL VENOUS BLD VENIPUNCTURE: CPT

## 2019-05-23 PROCEDURE — 94761 N-INVAS EAR/PLS OXIMETRY MLT: CPT

## 2019-05-23 PROCEDURE — 6360000002 HC RX W HCPCS: Performed by: INTERNAL MEDICINE

## 2019-05-23 PROCEDURE — 2700000000 HC OXYGEN THERAPY PER DAY

## 2019-05-23 PROCEDURE — 32555 ASPIRATE PLEURA W/ IMAGING: CPT

## 2019-05-23 PROCEDURE — 89050 BODY FLUID CELL COUNT: CPT

## 2019-05-23 PROCEDURE — 87075 CULTR BACTERIA EXCEPT BLOOD: CPT

## 2019-05-23 PROCEDURE — 99232 SBSQ HOSP IP/OBS MODERATE 35: CPT | Performed by: INTERNAL MEDICINE

## 2019-05-23 PROCEDURE — 97110 THERAPEUTIC EXERCISES: CPT

## 2019-05-23 RX ORDER — DOXAZOSIN MESYLATE 1 MG/1
1 TABLET ORAL DAILY
Qty: 30 TABLET | Refills: 3 | Status: CANCELLED | DISCHARGE
Start: 2019-05-24

## 2019-05-23 RX ORDER — DOXAZOSIN MESYLATE 1 MG/1
1 TABLET ORAL DAILY
Status: DISCONTINUED | OUTPATIENT
Start: 2019-05-23 | End: 2019-05-23 | Stop reason: HOSPADM

## 2019-05-23 RX ORDER — HYDRALAZINE HYDROCHLORIDE 100 MG/1
100 TABLET, FILM COATED ORAL EVERY 8 HOURS SCHEDULED
Qty: 90 TABLET | Refills: 3 | Status: ON HOLD | DISCHARGE
Start: 2019-05-23 | End: 2020-01-01 | Stop reason: SDUPTHER

## 2019-05-23 RX ORDER — INSULIN GLARGINE 100 [IU]/ML
16 INJECTION, SOLUTION SUBCUTANEOUS NIGHTLY
Status: DISCONTINUED | OUTPATIENT
Start: 2019-05-23 | End: 2019-05-23 | Stop reason: HOSPADM

## 2019-05-23 RX ORDER — BUMETANIDE 1 MG/1
1 TABLET ORAL DAILY
Qty: 60 TABLET | Refills: 0 | Status: ON HOLD | DISCHARGE
Start: 2019-05-25 | End: 2019-06-20 | Stop reason: HOSPADM

## 2019-05-23 RX ORDER — HYDRALAZINE HYDROCHLORIDE 50 MG/1
100 TABLET, FILM COATED ORAL EVERY 8 HOURS SCHEDULED
Status: DISCONTINUED | OUTPATIENT
Start: 2019-05-23 | End: 2019-05-23 | Stop reason: HOSPADM

## 2019-05-23 RX ORDER — INSULIN GLARGINE 100 [IU]/ML
16 INJECTION, SOLUTION SUBCUTANEOUS NIGHTLY
Qty: 1 VIAL | Refills: 3 | DISCHARGE
Start: 2019-05-23

## 2019-05-23 RX ORDER — FERROUS SULFATE 325(65) MG
325 TABLET ORAL
Qty: 30 TABLET | Refills: 3 | DISCHARGE
Start: 2019-05-25

## 2019-05-23 RX ORDER — DOXAZOSIN MESYLATE 1 MG/1
1 TABLET ORAL DAILY
Qty: 30 TABLET | Refills: 3 | Status: ON HOLD | DISCHARGE
Start: 2019-05-24 | End: 2020-01-24

## 2019-05-23 RX ADMIN — GABAPENTIN 100 MG: 100 CAPSULE ORAL at 07:27

## 2019-05-23 RX ADMIN — DOXAZOSIN 1 MG: 1 TABLET ORAL at 13:09

## 2019-05-23 RX ADMIN — OXYBUTYNIN CHLORIDE 5 MG: 5 TABLET ORAL at 07:27

## 2019-05-23 RX ADMIN — AMLODIPINE BESYLATE 10 MG: 10 TABLET ORAL at 07:27

## 2019-05-23 RX ADMIN — HYDRALAZINE HYDROCHLORIDE 75 MG: 50 TABLET, FILM COATED ORAL at 03:52

## 2019-05-23 RX ADMIN — MECLIZINE HYDROCHLORIDE 25 MG: 12.5 TABLET, FILM COATED ORAL at 13:09

## 2019-05-23 RX ADMIN — GABAPENTIN 100 MG: 100 CAPSULE ORAL at 13:09

## 2019-05-23 RX ADMIN — FERROUS SULFATE TAB 325 MG (65 MG ELEMENTAL FE) 325 MG: 325 (65 FE) TAB at 07:27

## 2019-05-23 RX ADMIN — DULOXETINE HYDROCHLORIDE 30 MG: 30 CAPSULE, DELAYED RELEASE ORAL at 07:27

## 2019-05-23 RX ADMIN — FLUTICASONE PROPIONATE 1 SPRAY: 50 SPRAY, METERED NASAL at 07:28

## 2019-05-23 RX ADMIN — ISOSORBIDE DINITRATE 20 MG: 20 TABLET ORAL at 13:09

## 2019-05-23 RX ADMIN — LEVOTHYROXINE SODIUM 25 MCG: 25 TABLET ORAL at 03:52

## 2019-05-23 RX ADMIN — MECLIZINE HYDROCHLORIDE 25 MG: 12.5 TABLET, FILM COATED ORAL at 07:27

## 2019-05-23 RX ADMIN — ISOSORBIDE DINITRATE 20 MG: 20 TABLET ORAL at 07:27

## 2019-05-23 RX ADMIN — Medication 10 ML: at 07:28

## 2019-05-23 RX ADMIN — HEPARIN SODIUM 5000 UNITS: 5000 INJECTION INTRAVENOUS; SUBCUTANEOUS at 00:49

## 2019-05-23 ASSESSMENT — PAIN SCALES - GENERAL
PAINLEVEL_OUTOF10: 0
PAINLEVEL_OUTOF10: 0

## 2019-05-23 NOTE — DISCHARGE SUMMARY
Hospital Medicine Discharge Summary      Patient Identification:   Sonja Lala   : 1950  MRN: [id number]   Account: [de-identified]      Patient's PCP: [address] [place] [address] Date: 2019     Discharge Date:   2019      Admitting Physician: Hermila Suárez DO     Discharge Physician: Shae James MD     Discharge Diagnoses: Active Hospital Problems    Diagnosis Date Noted    Bilateral pleural effusion [J90]     Elevated troponin [Q71.6]     Metabolic acidosis [D85.7]     Leukocytosis [D72.829]     Generalized weakness [R53.1]     Bipolar 1 disorder (HCC) [F31.9]     Mild mitral regurgitation [I34.0]     Mild tricuspid regurgitation [I07.1]     Chronic pain syndrome [G89.4]     Renal failure [N19] 2019    Diabetic nephropathy associated with type 2 diabetes mellitus ([address] [E11.21]     Acute on chronic diastolic heart failure ([address] [I50.33] 2018    Acute pulmonary edema ([address] [J81.0] 2018    Acute respiratory failure with hypoxia ([address] [J96.01] 2018    Essential (primary) hypertension [I10] 2018    Hypothyroidism [E03.9] 2018    Chronic kidney disease (CKD), stage IV (severe) ([address] [N18.4] 2018    Anemia in chronic renal disease [N18.9, D63.1]     Type 2 diabetes mellitus with insulin therapy ([address] [E11.9, Z79.4]        The patient was seen and examined on day of discharge and this discharge summary is in conjunction with any daily progress note from day of discharge. Hospital Course:   Sonja Lala is a 81 y.o. female admitted to [address] [place] [address] on 2019 for progressive sob and weight gain. The pt has a hx of ckd, dm, followed by Dr Luke Ramos. She presented to [facility] and was transferred here. She was gradually diuresed and lost 5 lb. Her creatinine did not change substantially. She underwent thoracentesis on her last day and had one liter removed. She felt improved.     She is being discharged to a nursing home and will follow up with Dr Andree Lam. He will decide on her discharge diuretic. She has normal left ventricular function on her echo so her fluid accumulation would appear to be a combination of her renal dysfunction and diastolic dysfunction. .              Exam:     Vitals:  Vitals:    05/23/19 0823 05/23/19 1133 05/23/19 1304 05/23/19 1521   BP:  (!) 160/83 (!) 164/71 (!) 152/68   Pulse:  71 64 67   Resp:  16 18 18   Temp:  98.6 °F (37 °C)  98.8 °F (37.1 °C)   TempSrc:  Oral  Oral   SpO2: 93% 92% 95% 90%   Weight:       Height:         Weight: Weight: 171 lb 4.8 oz (77.7 kg)     24 hour intake/output:    Intake/Output Summary (Last 24 hours) at 5/23/2019 1631  Last data filed at 5/23/2019 1349  Gross per 24 hour   Intake 1300 ml   Output 525 ml   Net 775 ml         General appearance:  No apparent distress, appears stated age and cooperative. HEENT:  Normal cephalic, atraumatic without obvious deformity. Pupils equal, round, and reactive to light. Extra ocular muscles intact. Conjunctivae/corneas clear. Neck: Supple, with full range of motion. No jugular venous distention. Trachea midline. Respiratory:  Normal respiratory effort. Clear to auscultation, bilaterally without Rales/Wheezes/Rhonchi. Cardiovascular:  Regular rate and rhythm with normal S1/S2 without murmurs, rubs or gallops. Abdomen: Soft, non-tender, non-distended with normal bowel sounds. Musculoskeletal:  No clubbing, cyanosis or edema bilaterally. Full range of motion without deformity. Skin: Skin color, texture, turgor normal.  No rashes or lesions. Neurologic:  Neurovascularly intact without any focal sensory/motor deficits. Cranial nerves: II-XII intact, grossly non-focal.  Psychiatric:  Alert and oriented, thought content appropriate, normal insight      Labs:  For convenience and continuity at follow-up the following most recent labs are provided:      CBC:    Lab Results   Component Value Date    WBC 12.2 electronically signed by Dr. Ellyn Montgomery on 5/20/2019 8:23 AM      XR CHEST PORTABLE   Final Result   1. Borderline heart size. Small bilateral pleural effusions. 2. Moderate atelectasis/pneumonia right perihilar region and both lung bases. **This report has been created using voice recognition software. It may contain minor errors which are inherent in voice recognition technology. **      Final report electronically signed by Dr. Ellyn Montgomery on 5/19/2019 7:01 AM             Consults:     IP CONSULT TO DIETITIAN  IP CONSULT TO NEPHROLOGY  IP CONSULT TO SOCIAL WORK  IP CONSULT TO SOCIAL WORK    Disposition: SNF  Condition at Discharge: Stable    Code Status:  Full Code     Patient Instructions:    Discharge lab work: bmp  Activity: activity as tolerated  Diet: DIET LOW SODIUM 2 GM; 2000 ml      Follow-up visits:   Christine Dale  [address] [facility] [address] [facility]  [phone]           [address]  [phone]             Discharge Medications:      Celia Cuevas   [place] Medication Instructions GRS:[id number]    Printed on:05/23/19 1631   Medication Information                      acetaminophen (TYLENOL) 325 MG tablet  Take 650 mg by mouth every 6 hours as needed for Pain             acetaminophen-codeine (TYLENOL #3) 300-30 MG per tablet  Take 1 tablet by mouth every 4 hours as needed for Pain. Henna Vargas amLODIPine (NORVASC) 10 MG tablet  Take 10 mg by mouth daily             blood glucose test strips (ASCENSIA AUTODISC VI;ONE TOUCH ULTRA TEST VI) strip  Patient tests blood sugar three times per day.  Diagnosis DMII E11.9             bumetanide (BUMEX) 1 MG tablet  Take 1 tablet by mouth daily             doxazosin (CARDURA) 1 MG tablet  Take 1 tablet by mouth daily             DULoxetine (CYMBALTA) 30 MG extended release capsule  Take 30 mg by mouth daily             ferrous sulfate 325 (65 Fe) MG tablet  Take 325 mg by mouth 2 times daily             fluticasone (FLONASE) 50 MCG/ACT nasal spray  1 spray by Each Nare route daily             gabapentin (NEURONTIN) 100 MG capsule  Take 100 mg by mouth 3 times daily. hydrALAZINE (APRESOLINE) 100 MG tablet  Take 1 tablet by mouth every 8 hours             insulin detemir (LEVEMIR) 100 UNIT/ML injection pen  Inject 10 Units into the skin 2 times daily             insulin lispro (HUMALOG KWIKPEN) 100 UNIT/ML pen  Inject 0-9 Units into the skin 4 times daily (before meals and nightly) Inject as per sliding scale:  140-199 = 1 unit,  200-249 = 3 units,  250-299 = 5 units,  300-349 = 7 units,  350-399 = 9 units,  400-999 = call MD             Insulin Pen Needle 31G X 8 MM MISC  1 Device             isosorbide dinitrate (ISORDIL) 20 MG tablet  Take 1 tablet by mouth 3 times daily             levothyroxine (SYNTHROID) 25 MCG tablet  Take 25 mcg by mouth Daily             meclizine (ANTIVERT) 25 MG tablet  Take 25 mg by mouth 3 times daily             ondansetron (ZOFRAN) 4 MG tablet  Take 4 mg by mouth every 4 hours as needed for Nausea             oxybutynin (DITROPAN) 5 MG tablet  Take 5 mg by mouth daily             pravastatin (PRAVACHOL) 40 MG tablet  Take 40 mg by mouth daily                  Time Spent on discharge is more than 45 minutes in the examination, evaluation, counseling and review of medications and discharge plan. Signed: Thank you Dylon Mcgee for the opportunity to be involved in this patient's care.     Electronically signed by Sergey Betts MD on 5/23/2019 at 4:31 PM

## 2019-05-23 NOTE — PLAN OF CARE
Problem: Pain:  Goal: Pain level will decrease  Description  Pain level will decrease  Outcome: Ongoing  Patient denied any pain throughout the shift using the 0-10 pain scale. PRN medications given as needed and repositioning encouraged. Problem: Falls - Risk of:  Goal: Will remain free from falls  Description  Will remain free from falls  Outcome: Ongoing  Patient remained free from falls throughout the shift. Bed alarm on at all times, call light within reach, 2/4 siderails up. Patient uses walker to ambulate with standby assist.     Problem: Tissue Perfusion - Renal, Altered:  Goal: Electrolytes within specified parameters  Description  Electrolytes within specified parameters  Outcome: Ongoing  Patient not receiving IV fluids. Continue to monitor labs. AM labs pending. Problem: Skin Integrity:  Goal: Skin integrity will stabilize  Description  Skin integrity will stabilize  Outcome: Ongoing  No evidence of new skin breakdown throughout the shift. Patient able to reposition self as needed with pillow support. Patient is continent of urine and stool. Problem: Discharge Planning:  Goal: Patients continuum of care needs are met  Description  Patients continuum of care needs are met  Outcome: Ongoing  Patient plans to return back to [facility] at discharge for rehab. Care plan reviewed with patient. Patient verbalize understanding of the plan of care and contribute to goal setting.

## 2019-05-23 NOTE — PROGRESS NOTES
get into ECF in [place] with son; Pt reporting she was indep with her ADL tasks at SNF. Pt stating she has started to participate in OT approx 1 week ago d/t having difficulty with her right hand and feeding herself. Pt stating she was getting better. pt amb with RW I'ly, was not doing PT just prior to admission; per pt, has R drop foot from previous back surgery    Subjective:     Subjective: RN approved session. pt in bed upon arrival and agrees to therapy. pt very pleasant and cooperative    Pain:  Denies. Social/Functional:  Type of Home: Facility([facility] since December)  Home Layout: One level  Home Access: Level entry  Home Equipment: Rolling walker     Objective:  Rolling to Right: Minimal assistance  Supine to Sit: Minimal assistance  Scooting: Stand by assistance(to EOB)    Transfers  Sit to Stand: Contact guard assistance(cue for hand placement)  Stand to sit: Contact guard assistance(cue for eccentric control)       Ambulation 1  Surface: level tile  Device: Rolling Walker  Other Apparatus: O2(2L)  Assistance: Contact guard assistance  Quality of Gait: Slightly decreased huseyin, decreased B step lengths. Fatigues quickly, limited by SOB. B foot drop  Distance: ~55'x1       Exercises:  Exercises  Comments: supine BLE AROM x10 reps ankle pump, quad sets, glute sets, heel slides, hip ab/add, SLR     Activity Tolerance:  Activity Tolerance: Patient Tolerated treatment well;Patient limited by endurance    Assessment: Body structures, Functions, Activity limitations: Decreased functional mobility , Decreased endurance, Decreased strength, Decreased balance  Assessment: pt tolerated session well. pt demos decreased strength, endurance, balance and fatigued quickly with mobility. pt needing rest brekas throughout session. pt will benefit from cont PT at this time.   Prognosis: Good     REQUIRES PT FOLLOW UP: Yes    Discharge Recommendations:  Discharge Recommendations: Subacute/Skilled Nursing Facility    Patient Education:  Patient Education: POC, therex, gait, transfers    Equipment Recommendations:  Equipment Needed: No    Safety:  Type of devices: All fall risk precautions in place, Call light within reach, Chair alarm in place, Left in chair, Gait belt, Nurse notified    Plan:  Times per week: 3-5 X GM  Current Treatment Recommendations: Strengthening, Gait Training, Patient/Caregiver Education & Training, Balance Training, Functional Mobility Training, Endurance Training, Transfer Training, Safety Education & Training    Goals:  Patient goals : to get better    Short term goals  Time Frame for Short term goals: by discharge  Short term goal 1: Pt to transfer sit <--> stand SBA for increased functional mobility. Short term goal 2: Pt to ambulate >75 feet with RW SBA for household ambulation. Long term goals  Time Frame for Long term goals : NA due to short length of stay. If patient is discharged prior to progress note completion, this note is to serve as the discharge note with all goals being unmet unless indicated otherwise.

## 2019-05-23 NOTE — PROGRESS NOTES
Hospitalist Progress Note    Patient:  Jaye Estrella      Unit/Bed:6K-13/013-A    YOB: 1950    MRN: [id number]       Acct: [de-identified]     PCP: [address]    Date of Admission: 5/19/2019    Reason for admission: acute on chronic diastolic chf    Expected discharge date:  ? Disposition: Home? Hospital Course: pt with ckd, prior adm 94.04 for diastolic chf, needed vent; presented with several day hx of increasing sob and was in chf again. She is diuresing and lost considerable wgt. She has normal lv fx. In the Last 24 hours: felt improved. She has right pleural effusion . Maria Teresa Gurrola Had prior thoracentesis 12.18. Still has considerable fluid on right.   Diuretic stopped per Nephrology 5.22    Medications:  Reviewed    Infusion Medications    dextrose       Scheduled Medications    insulin glargine  16 Units Subcutaneous Nightly    ferrous sulfate  325 mg Oral Q48H    hydrALAZINE  75 mg Oral 3 times per day    pravastatin  40 mg Oral Nightly    levothyroxine  25 mcg Oral Daily    insulin lispro  0-9 Units Subcutaneous 4x Daily AC & HS    isosorbide dinitrate  20 mg Oral TID    gabapentin  100 mg Oral TID    amLODIPine  10 mg Oral Daily    DULoxetine  30 mg Oral Daily    fluticasone  1 spray Each Nostril Daily    oxybutynin  5 mg Oral Daily    meclizine  25 mg Oral TID    sodium chloride flush  10 mL Intravenous 2 times per day    [Held by provider] heparin (porcine)  5,000 Units Subcutaneous Q8H     PRN Meds: ondansetron, sodium chloride flush, magnesium hydroxide, ondansetron, glucose, dextrose, glucagon (rDNA), dextrose      Intake/Output Summary (Last 24 hours) at 5/23/2019 0555  Last data filed at 5/23/2019 0332  Gross per 24 hour   Intake 930 ml   Output 300 ml   Net 630 ml       Diet:  DIET LOW SODIUM 2 GM; 2000 ml    Exam:  BP (!) 174/70   Pulse 68   Temp 98.5 °F (36.9 °C) (Oral)   Resp 18   Ht 5' 6\" (1.676 m)   Wt 171 lb 4.8 oz (77.7 kg)   SpO2 95%   BMI 27.65 kg/m²     Physical Examination: General appearance - chronically ill appearing  Mental status - alert, oriented to person, place, and time  Neck - supple, no significant adenopathy,HAS JVD  Chest - diminished breath sounds and dullness to percussion right  Heart - normal rate and regular rhythm, no gallops noted, systolic murmur 2/6 at 2nd right intercostal space and at lower left sternal border  Abdomen - soft, nontender, nondistended, no masses or organomegaly  Neurological - alert, oriented, normal speech, no focal findings or movement disorder noted  Musculoskeletal - no muscular tenderness noted  Extremities - no pedal edema noted  Skin - normal coloration and turgor, no rashes, no suspicious skin lesions noted    Labs:   Recent Labs     05/20/19  0558 05/22/19  0739   WBC 12.2*  --    HGB 9.0* 9.1*   HCT 27.0* 27.7*     --      Recent Labs     05/20/19  0558 05/21/19  0641 05/22/19  0739    140 137   K 5.1 4.8 4.4    102 100   CO2 20* 22* 20*   BUN 64* 68* 68*   CREATININE 3.3* 3.3* 3.5*   CALCIUM 9.6 9.5 9.3     No results for input(s): AST, ALT, BILIDIR, BILITOT, ALKPHOS in the last 72 hours. No results for input(s): INR in the last 72 hours. No results for input(s): Willia Beverage in the last 72 hours. Microbiology:      Urinalysis:      Lab Results   Component Value Date    NITRU Negative 03/09/2019    WBCUA 0-2 06/07/2016    BACTERIA NONE 06/07/2016    RBCUA 0-2 06/07/2016    BLOODU Negative 03/09/2019    GLUCOSEU 2+ 03/09/2019       Radiology:  Xr Chest Standard (2 Vw)    Result Date: 5/20/2019  PROCEDURE: XR CHEST (2 VW) CLINICAL INFORMATION: shortness of breath COMPARISON: 5/19/2019 TECHNIQUE: PA and lateral views of the chest were obtained. 1. Borderline heart size. Moderate-sized effusion right side. 2. Moderate bibasilar atelectasis/pneumonia. Questionable tiny effusion left side. 3. Overall appearance of chest improved from prior.  **This report has been created using voice recognition software. It may contain minor errors which are inherent in voice recognition technology. ** Final report electronically signed by Dr. Joya Vicente on 5/20/2019 8:23 AM    Xr Chest Portable    Result Date: 5/19/2019  PROCEDURE: XR CHEST PORTABLE CLINICAL INFORMATION: SOB COMPARISON: 12/10/2018 TECHNIQUE: A single mobile view of the chest was obtained. 1. Borderline heart size. Small bilateral pleural effusions. 2. Moderate atelectasis/pneumonia right perihilar region and both lung bases. **This report has been created using voice recognition software. It may contain minor errors which are inherent in voice recognition technology. ** Final report electronically signed by Dr. Joya Vicente on 5/19/2019 7:01 AM       DVT prophylaxis: SQ Heparin     Code Status: Full Code    PT/OT Eval Status:  ordered      Tele:  Yes    Assessment/Plan:    Active Hospital Problems    Diagnosis Date Noted    Bilateral pleural effusion [J90]     Elevated troponin [T50.5]     Metabolic acidosis [H48.2]     Leukocytosis [D72.829]     Generalized weakness [R53.1]     Bipolar 1 disorder ([address] [F31.9]     Mild mitral regurgitation [I34.0]     Mild tricuspid regurgitation [I07.1]     Chronic pain syndrome [G89.4]     Renal failure [N19] 05/19/2019    Diabetic nephropathy associated with type 2 diabetes mellitus ([address] [E11.21]     Acute on chronic diastolic heart failure ([address] [I50.33] 12/07/2018    Acute pulmonary edema ([address] [J81.0] 12/07/2018    Acute respiratory failure with hypoxia ([address] [J96.01] 12/07/2018    Essential (primary) hypertension [I10] 12/07/2018    Hypothyroidism [E03.9] 12/07/2018    Chronic kidney disease (CKD), stage IV (severe) ([address] [N18.4] 11/02/2018    Anemia in chronic renal disease [N18.9, D63.1]     Type 2 diabetes mellitus with insulin therapy ([address] [E11.9, Z79.4]        1. Acute on chronic diastolic chf- continue diuretics; proposed thoracentesis if cxr not clearing. She is agree able. Will try arrange today  2. hbp - still high- will boost hydralazine  3. Dm- sugars seem stable.   Get low in ams; will cut down dose        Electronically signed by Aliya Alvarenga MD on 5/23/2019 at 5:55 AM

## 2019-05-23 NOTE — DISCHARGE INSTR - DIET
Percent Daily Values for sodium. · Be aware that sodium can come in forms other than salt, including monosodium glutamate (MSG), sodium citrate, and sodium bicarbonate (baking soda). MSG is often added to Asian food. When you eat out, you can sometimes ask for food without MSG or added salt. Buy low-sodium foods  · Buy foods that are labeled \"unsalted\" (no salt added), \"sodium-free\" (less than 5 mg of sodium per serving), or \"low-sodium\" (less than 140 mg of sodium per serving). Foods labeled \"reduced-sodium\" and \"light sodium\" may still have too much sodium. Be sure to read the label to see how much sodium you are getting. · Buy fresh vegetables, or frozen vegetables without added sauces. Buy low-sodium versions of canned vegetables, soups, and other canned goods. Prepare low-sodium meals  · Cut back on the amount of salt you use in cooking. This will help you adjust to the taste. Do not add salt after cooking. One teaspoon of salt has about 2,300 mg of sodium. · Take the salt shaker off the table. · Flavor your food with garlic, lemon juice, onion, vinegar, herbs, and spices. Do not use soy sauce, lite soy sauce, steak sauce, onion salt, garlic salt, celery salt, mustard, or ketchup on your food. · Use low-sodium salad dressings, sauces, and ketchup. Or make your own salad dressings and sauces without adding salt. · Use less salt (or none) when recipes call for it. You can often use half the salt a recipe calls for without losing flavor. Other foods such as rice, pasta, and grains do not need added salt. · Rinse canned vegetables, and cook them in fresh water. This removes some--but not all--of the salt. · Avoid water that is naturally high in sodium or that has been treated with water softeners, which add sodium. Call your local water company to find out the sodium content of your water supply. If you buy bottled water, read the label and choose a sodium-free brand.   Avoid high-sodium foods  · Avoid eating:  ? Smoked, cured, salted, and canned meat, fish, and poultry. ? Ham, carey, hot dogs, and luncheon meats. ? Regular, hard, and processed cheese and regular peanut butter. ? Crackers with salted tops, and other salted snack foods such as pretzels, chips, and salted popcorn. ? Frozen prepared meals, unless labeled low-sodium. ? Canned and dried soups, broths, and bouillon, unless labeled sodium-free or low-sodium. ? Canned vegetables, unless labeled sodium-free or low-sodium. ? [employer] fries, pizza, tacos, and other fast foods. ? Pickles, olives, ketchup, and other condiments, especially soy sauce, unless labeled sodium-free or low-sodium. Where can you learn more? Go to https://chpepiceweb.GetAutoBids. org and sign in to your [employer] account. Enter Y689 in the [employer] box to learn more about \"Low Sodium Diet (2,000 Milligram): Care Instructions. \"     If you do not have an account, please click on the \"Sign Up Now\" link. Current as of: November 7, 2018  Content Version: 12.0  © 7845-8179 [employer], Incorporated. Care instructions adapted under license by [facility] ([facility]). If you have questions about a medical condition or this instruction, always ask your healthcare professional. [place] [address] any warranty or liability for your use of this information.

## 2019-05-23 NOTE — PROGRESS NOTES
A Ultrasound guided right thoracentesis  was performed without complication. Please see PACS for full report.

## 2019-05-23 NOTE — CARE COORDINATION
5/23/19, 11:47 AM    Discharge plan discussed by  and . Discharge plan reviewed with patient/ family. Patient/ family verbalize understanding of discharge plan and are in agreement with plan. Understanding was demonstrated using the teach back method. IMM Letter  IMM Letter given to Patient/Family/Significant other/Guardian/POA/by[de-identified] Taisha Rucker CM  IMM Letter date given[de-identified] 05/23/19  IMM Letter time given[de-identified] 1525     Patient is an anticipated discharge today/tomorrow returning to [facility], skilled medicare. Transportation forms completed for ambulette. RN updated. Blue envelope on chart.

## 2019-05-23 NOTE — PROGRESS NOTES
[facility]         Renal Inpatient Follow-Up note         5/23/2019 2:22 PM    Pt Name:   Sole Bran:   1950  Attending:   Sherlyn Golden MD    Chief Complaint : Cordelia Bartholomew is a 81 y.o. female being followed by nephrology for [facility] \[facility] [place] [facility]\""O /CKD    Interval History :   Patient seen and examined by me. No distress  Feels ok. No CPor SOB at this time   Had a thoracentesis with 1.2 L fluid removal     Scheduled Medications :    insulin glargine  16 Units Subcutaneous Nightly    hydrALAZINE  100 mg Oral 3 times per day    doxazosin  1 mg Oral Daily    ferrous sulfate  325 mg Oral Q48H    pravastatin  40 mg Oral Nightly    levothyroxine  25 mcg Oral Daily    insulin lispro  0-9 Units Subcutaneous 4x Daily AC & HS    isosorbide dinitrate  20 mg Oral TID    gabapentin  100 mg Oral TID    amLODIPine  10 mg Oral Daily    DULoxetine  30 mg Oral Daily    fluticasone  1 spray Each Nostril Daily    oxybutynin  5 mg Oral Daily    meclizine  25 mg Oral TID    sodium chloride flush  10 mL Intravenous 2 times per day    [Held by provider] heparin (porcine)  5,000 Units Subcutaneous Q8H      dextrose         Vitals :  BP (!) 164/71   Pulse 64   Temp 98.6 °F (37 °C) (Oral)   Resp 18   Ht 5' 6\" (1.676 m)   Wt 171 lb 4.8 oz (77.7 kg)   SpO2 95%   BMI 27.65 kg/m²     24HR INTAKE/OUTPUT:      Intake/Output Summary (Last 24 hours) at 5/23/2019 1422  Last data filed at 5/23/2019 1349  Gross per 24 hour   Intake 1620 ml   Output 825 ml   Net 795 ml     Last 3 weights  Wt Readings from Last 3 Encounters:   05/23/19 171 lb 4.8 oz (77.7 kg)   03/13/19 166 lb 4.8 oz (75.4 kg)   01/04/19 173 lb (78.5 kg)           Physical Exam :  General Appearance:  Well developed.  No distress  Mouth/Throat:  Oral mucosa moist  Neck:  Supple, no JVD  Lungs:  Breath sounds: clear   Heart[de-identified]  S1,S2 heard  Abdomen:  Soft, non - tender  Musculoskeletal:  Edema - none         Last 3 CBC   Recent Labs

## 2019-05-28 LAB
ANAEROBIC CULTURE: NORMAL
BODY FLUID CULTURE, STERILE: NORMAL
GRAM STAIN RESULT: NORMAL

## 2019-06-06 ENCOUNTER — APPOINTMENT (OUTPATIENT)
Dept: GENERAL RADIOLOGY | Age: 69
DRG: 291 | End: 2019-06-06
Attending: INTERNAL MEDICINE
Payer: MEDICARE

## 2019-06-06 ENCOUNTER — APPOINTMENT (OUTPATIENT)
Dept: ULTRASOUND IMAGING | Age: 69
DRG: 291 | End: 2019-06-06
Attending: INTERNAL MEDICINE
Payer: MEDICARE

## 2019-06-06 ENCOUNTER — HOSPITAL ENCOUNTER (INPATIENT)
Age: 69
LOS: 7 days | Discharge: NURSING, REHAB OR LONG-TERM CARE | DRG: 291 | End: 2019-06-13
Attending: INTERNAL MEDICINE | Admitting: INTERNAL MEDICINE
Payer: MEDICARE

## 2019-06-06 DIAGNOSIS — I50.33 ACUTE ON CHRONIC DIASTOLIC CHF (CONGESTIVE HEART FAILURE) (HCC): Primary | ICD-10-CM

## 2019-06-06 DIAGNOSIS — J90 BILATERAL PLEURAL EFFUSION: ICD-10-CM

## 2019-06-06 LAB
GLUCOSE BLD-MCNC: 144 MG/DL (ref 70–108)
GLUCOSE BLD-MCNC: 284 MG/DL (ref 70–108)
TROPONIN T: 0.03 NG/ML
TROPONIN T: 0.03 NG/ML

## 2019-06-06 PROCEDURE — 6360000002 HC RX W HCPCS: Performed by: INTERNAL MEDICINE

## 2019-06-06 PROCEDURE — 2580000003 HC RX 258: Performed by: INTERNAL MEDICINE

## 2019-06-06 PROCEDURE — 36415 COLL VENOUS BLD VENIPUNCTURE: CPT

## 2019-06-06 PROCEDURE — 2709999900 HC NON-CHARGEABLE SUPPLY

## 2019-06-06 PROCEDURE — 99222 1ST HOSP IP/OBS MODERATE 55: CPT | Performed by: INTERNAL MEDICINE

## 2019-06-06 PROCEDURE — 99223 1ST HOSP IP/OBS HIGH 75: CPT | Performed by: INTERNAL MEDICINE

## 2019-06-06 PROCEDURE — 2060000000 HC ICU INTERMEDIATE R&B

## 2019-06-06 PROCEDURE — 84484 ASSAY OF TROPONIN QUANT: CPT

## 2019-06-06 PROCEDURE — 32555 ASPIRATE PLEURA W/ IMAGING: CPT

## 2019-06-06 PROCEDURE — 71045 X-RAY EXAM CHEST 1 VIEW: CPT

## 2019-06-06 PROCEDURE — 2500000003 HC RX 250 WO HCPCS: Performed by: INTERNAL MEDICINE

## 2019-06-06 PROCEDURE — 82948 REAGENT STRIP/BLOOD GLUCOSE: CPT

## 2019-06-06 PROCEDURE — 6370000000 HC RX 637 (ALT 250 FOR IP): Performed by: INTERNAL MEDICINE

## 2019-06-06 RX ORDER — DEXTROSE MONOHYDRATE 25 G/50ML
12.5 INJECTION, SOLUTION INTRAVENOUS PRN
Status: DISCONTINUED | OUTPATIENT
Start: 2019-06-06 | End: 2019-06-13 | Stop reason: HOSPADM

## 2019-06-06 RX ORDER — ONDANSETRON 2 MG/ML
4 INJECTION INTRAMUSCULAR; INTRAVENOUS EVERY 6 HOURS PRN
Status: DISCONTINUED | OUTPATIENT
Start: 2019-06-06 | End: 2019-06-06 | Stop reason: SDUPTHER

## 2019-06-06 RX ORDER — AMLODIPINE BESYLATE 10 MG/1
10 TABLET ORAL DAILY
Status: DISCONTINUED | OUTPATIENT
Start: 2019-06-06 | End: 2019-06-13 | Stop reason: HOSPADM

## 2019-06-06 RX ORDER — DOXYCYCLINE HYCLATE 100 MG
100 TABLET ORAL 2 TIMES DAILY
Status: ON HOLD | COMMUNITY
Start: 2019-06-04 | End: 2019-06-13 | Stop reason: HOSPADM

## 2019-06-06 RX ORDER — LEVOTHYROXINE SODIUM 0.03 MG/1
25 TABLET ORAL DAILY
Status: DISCONTINUED | OUTPATIENT
Start: 2019-06-06 | End: 2019-06-13 | Stop reason: HOSPADM

## 2019-06-06 RX ORDER — ISOSORBIDE DINITRATE 20 MG/1
20 TABLET ORAL 3 TIMES DAILY
Status: DISCONTINUED | OUTPATIENT
Start: 2019-06-06 | End: 2019-06-13 | Stop reason: HOSPADM

## 2019-06-06 RX ORDER — BUMETANIDE 0.25 MG/ML
1 INJECTION, SOLUTION INTRAMUSCULAR; INTRAVENOUS DAILY
Status: DISCONTINUED | OUTPATIENT
Start: 2019-06-06 | End: 2019-06-07

## 2019-06-06 RX ORDER — SODIUM CHLORIDE 0.9 % (FLUSH) 0.9 %
10 SYRINGE (ML) INJECTION EVERY 12 HOURS SCHEDULED
Status: DISCONTINUED | OUTPATIENT
Start: 2019-06-06 | End: 2019-06-06 | Stop reason: SDUPTHER

## 2019-06-06 RX ORDER — SODIUM CHLORIDE 0.9 % (FLUSH) 0.9 %
10 SYRINGE (ML) INJECTION PRN
Status: DISCONTINUED | OUTPATIENT
Start: 2019-06-06 | End: 2019-06-06 | Stop reason: SDUPTHER

## 2019-06-06 RX ORDER — HYDRALAZINE HYDROCHLORIDE 50 MG/1
100 TABLET, FILM COATED ORAL EVERY 8 HOURS
Status: DISCONTINUED | OUTPATIENT
Start: 2019-06-06 | End: 2019-06-13 | Stop reason: HOSPADM

## 2019-06-06 RX ORDER — DEXTROSE MONOHYDRATE 50 MG/ML
100 INJECTION, SOLUTION INTRAVENOUS PRN
Status: DISCONTINUED | OUTPATIENT
Start: 2019-06-06 | End: 2019-06-13 | Stop reason: HOSPADM

## 2019-06-06 RX ORDER — PRAVASTATIN SODIUM 40 MG
40 TABLET ORAL NIGHTLY
Status: DISCONTINUED | OUTPATIENT
Start: 2019-06-06 | End: 2019-06-13 | Stop reason: HOSPADM

## 2019-06-06 RX ORDER — INSULIN GLARGINE 100 [IU]/ML
20 INJECTION, SOLUTION SUBCUTANEOUS NIGHTLY
Status: DISCONTINUED | OUTPATIENT
Start: 2019-06-06 | End: 2019-06-11

## 2019-06-06 RX ORDER — DULOXETIN HYDROCHLORIDE 30 MG/1
30 CAPSULE, DELAYED RELEASE ORAL DAILY
Status: DISCONTINUED | OUTPATIENT
Start: 2019-06-06 | End: 2019-06-13 | Stop reason: HOSPADM

## 2019-06-06 RX ORDER — HYDROCODONE BITARTRATE AND ACETAMINOPHEN 5; 325 MG/1; MG/1
1 TABLET ORAL EVERY 6 HOURS PRN
Status: DISCONTINUED | OUTPATIENT
Start: 2019-06-06 | End: 2019-06-06

## 2019-06-06 RX ORDER — FAMOTIDINE 10 MG
10 TABLET ORAL 2 TIMES DAILY
Status: ON HOLD | COMMUNITY
End: 2020-01-01 | Stop reason: HOSPADM

## 2019-06-06 RX ORDER — FLUTICASONE PROPIONATE 50 MCG
1 SPRAY, SUSPENSION (ML) NASAL DAILY
Status: DISCONTINUED | OUTPATIENT
Start: 2019-06-06 | End: 2019-06-13 | Stop reason: HOSPADM

## 2019-06-06 RX ORDER — SODIUM CHLORIDE 0.9 % (FLUSH) 0.9 %
10 SYRINGE (ML) INJECTION EVERY 12 HOURS SCHEDULED
Status: DISCONTINUED | OUTPATIENT
Start: 2019-06-06 | End: 2019-06-13 | Stop reason: HOSPADM

## 2019-06-06 RX ORDER — HEPARIN SODIUM 5000 [USP'U]/ML
5000 INJECTION, SOLUTION INTRAVENOUS; SUBCUTANEOUS EVERY 8 HOURS SCHEDULED
Status: DISCONTINUED | OUTPATIENT
Start: 2019-06-06 | End: 2019-06-13 | Stop reason: HOSPADM

## 2019-06-06 RX ORDER — HEPARIN SODIUM 5000 [USP'U]/ML
5000 INJECTION, SOLUTION INTRAVENOUS; SUBCUTANEOUS EVERY 8 HOURS SCHEDULED
Status: DISCONTINUED | OUTPATIENT
Start: 2019-06-06 | End: 2019-06-06 | Stop reason: SDUPTHER

## 2019-06-06 RX ORDER — NICOTINE POLACRILEX 4 MG
15 LOZENGE BUCCAL PRN
Status: DISCONTINUED | OUTPATIENT
Start: 2019-06-06 | End: 2019-06-13 | Stop reason: HOSPADM

## 2019-06-06 RX ORDER — ONDANSETRON 2 MG/ML
4 INJECTION INTRAMUSCULAR; INTRAVENOUS EVERY 6 HOURS PRN
Status: DISCONTINUED | OUTPATIENT
Start: 2019-06-06 | End: 2019-06-13 | Stop reason: HOSPADM

## 2019-06-06 RX ORDER — OXYBUTYNIN CHLORIDE 5 MG/1
5 TABLET ORAL DAILY
Status: DISCONTINUED | OUTPATIENT
Start: 2019-06-06 | End: 2019-06-13 | Stop reason: HOSPADM

## 2019-06-06 RX ORDER — SODIUM CHLORIDE 0.9 % (FLUSH) 0.9 %
10 SYRINGE (ML) INJECTION PRN
Status: DISCONTINUED | OUTPATIENT
Start: 2019-06-06 | End: 2019-06-13 | Stop reason: HOSPADM

## 2019-06-06 RX ORDER — GABAPENTIN 100 MG/1
100 CAPSULE ORAL 3 TIMES DAILY
Status: DISCONTINUED | OUTPATIENT
Start: 2019-06-06 | End: 2019-06-13 | Stop reason: HOSPADM

## 2019-06-06 RX ORDER — MECLIZINE HCL 12.5 MG/1
25 TABLET ORAL 3 TIMES DAILY
Status: DISCONTINUED | OUTPATIENT
Start: 2019-06-06 | End: 2019-06-13 | Stop reason: HOSPADM

## 2019-06-06 RX ORDER — ACETAMINOPHEN 325 MG/1
650 TABLET ORAL EVERY 6 HOURS PRN
Status: DISCONTINUED | OUTPATIENT
Start: 2019-06-06 | End: 2019-06-13 | Stop reason: HOSPADM

## 2019-06-06 RX ADMIN — HEPARIN SODIUM 5000 UNITS: 5000 INJECTION INTRAVENOUS; SUBCUTANEOUS at 21:56

## 2019-06-06 RX ADMIN — MECLIZINE HYDROCHLORIDE 25 MG: 12.5 TABLET, FILM COATED ORAL at 16:39

## 2019-06-06 RX ADMIN — HYDRALAZINE HYDROCHLORIDE 100 MG: 50 TABLET ORAL at 23:54

## 2019-06-06 RX ADMIN — MECLIZINE HYDROCHLORIDE 25 MG: 12.5 TABLET, FILM COATED ORAL at 22:02

## 2019-06-06 RX ADMIN — GABAPENTIN 100 MG: 100 CAPSULE ORAL at 21:56

## 2019-06-06 RX ADMIN — DULOXETINE HYDROCHLORIDE 30 MG: 30 CAPSULE, DELAYED RELEASE ORAL at 16:40

## 2019-06-06 RX ADMIN — Medication 10 ML: at 21:56

## 2019-06-06 RX ADMIN — INSULIN GLARGINE 20 UNITS: 100 INJECTION, SOLUTION SUBCUTANEOUS at 21:57

## 2019-06-06 RX ADMIN — PRAVASTATIN SODIUM 40 MG: 40 TABLET ORAL at 21:56

## 2019-06-06 RX ADMIN — FLUTICASONE PROPIONATE 1 SPRAY: 50 SPRAY, METERED NASAL at 16:39

## 2019-06-06 RX ADMIN — INSULIN LISPRO 5 UNITS: 100 INJECTION, SOLUTION INTRAVENOUS; SUBCUTANEOUS at 16:45

## 2019-06-06 RX ADMIN — INSULIN LISPRO 1 UNITS: 100 INJECTION, SOLUTION INTRAVENOUS; SUBCUTANEOUS at 21:57

## 2019-06-06 RX ADMIN — ISOSORBIDE DINITRATE 20 MG: 20 TABLET ORAL at 21:56

## 2019-06-06 RX ADMIN — ACETAMINOPHEN 650 MG: 325 TABLET ORAL at 22:19

## 2019-06-06 RX ADMIN — OXYBUTYNIN CHLORIDE 5 MG: 5 TABLET ORAL at 16:40

## 2019-06-06 RX ADMIN — HEPARIN SODIUM 5000 UNITS: 5000 INJECTION INTRAVENOUS; SUBCUTANEOUS at 16:41

## 2019-06-06 RX ADMIN — AMLODIPINE BESYLATE 10 MG: 10 TABLET ORAL at 16:40

## 2019-06-06 RX ADMIN — ISOSORBIDE DINITRATE 20 MG: 20 TABLET ORAL at 16:39

## 2019-06-06 RX ADMIN — HYDRALAZINE HYDROCHLORIDE 100 MG: 50 TABLET ORAL at 16:40

## 2019-06-06 RX ADMIN — GABAPENTIN 100 MG: 100 CAPSULE ORAL at 16:40

## 2019-06-06 RX ADMIN — BUMETANIDE 1 MG: 0.25 INJECTION INTRAMUSCULAR; INTRAVENOUS at 21:56

## 2019-06-06 ASSESSMENT — ENCOUNTER SYMPTOMS
BLOOD IN STOOL: 0
BACK PAIN: 0
VOMITING: 0
SORE THROAT: 0
DIARRHEA: 0
SHORTNESS OF BREATH: 1
ABDOMINAL PAIN: 0
EYES NEGATIVE: 1
COUGH: 1
NAUSEA: 1
EYE PAIN: 0

## 2019-06-06 ASSESSMENT — PAIN SCALES - GENERAL
PAINLEVEL_OUTOF10: 0
PAINLEVEL_OUTOF10: 4
PAINLEVEL_OUTOF10: 1

## 2019-06-06 NOTE — CONSULTS
[facility]          [facility]        [address]        [place] [facility]        [phone]           Inpatient Initial consult note         6/6/2019 1:18 PM    Patient Name:   Alana Holstein:    1950  Primary Care Physician:  Bryon Leslie     History Obtained From:  patient     Consultation requested by : Henderson Sandifer, DO    Consultation requested for  : Evaluation of   SOB/ CHF and fluid overload in CKD patient     History of presentingillness   Mohit Haynes is a 81 y.o.   female with Past Medical History as stated below presented with a chief complaint of No chief complaint on file. on 6/6/2019 . Patient presented with chief complaints of SOB which has started since 4 days, gradually getting worse, moderate severity, associated with nausea and decreased urine output . She presented to [facility] where she was found to have large pleural effusion and also some CHF and so she was transferred here. She had a thoracentesis done this AM and 1.5 L fluid removed and her SOB has improved to some degree.     She is a CKD 4/5 patient  And her creatinine normally fluctuates close to 3.0     Past History      Past Medical History:   Diagnosis Date    Anemia     Anemia in chronic kidney disease(285.21)     Arthritis     Asthma     Bipolar 1 disorder (HCC)     CHF (congestive heart failure) (HCC)     Chronic kidney disease, stage III (moderate) (HCC)     Depression     GERD (gastroesophageal reflux disease)     Headache(784.0)     Hyperkalemia     Hyperlipidemia     Hypertension     Hypothyroid     Neurogenic bladder     Pneumonia     Seizure ([facility] [address] 01/2018    Type II or unspecified type diabetes mellitus without mention of complication, not stated as uncontrolled      Past Surgical History:   Procedure Laterality Date    BACK SURGERY      CERVICAL FUSION      CHOLECYSTECTOMY      FRACTURE SURGERY       Social History Socioeconomic History    Marital status:      Spouse name: Not on file    Number of children: Not on file    Years of education: Not on file    Highest education level: Not on file   Occupational History    Not on file   Social Needs    Financial resource strain: Not on file    Food insecurity:     Worry: Not on file     Inability: Not on file    Transportation needs:     Medical: Not on file     Non-medical: Not on file   Tobacco Use    Smoking status: Never Smoker    Smokeless tobacco: Never Used   Substance and Sexual Activity    Alcohol use: No    Drug use: No    Sexual activity: Not on file   Lifestyle    Physical activity:     Days per week: Not on file     Minutes per session: Not on file    Stress: Not on file   Relationships    Social connections:     Talks on phone: Not on file     Gets together: Not on file     Attends Orthodoxy service: Not on file     Active member of club or organization: Not on file     Attends meetings of clubs or organizations: Not on file     Relationship status: Not on file    Intimate partner violence:     Fear of current or ex partner: Not on file     Emotionally abused: Not on file     Physically abused: Not on file     Forced sexual activity: Not on file   Other Topics Concern    Not on file   Social History Narrative    Not on file     Family History   Problem Relation Age of Onset    High Blood Pressure Mother     Diabetes Mother     Cancer Mother      Medications & Allergies      Prior to Admission medications    Medication Sig Start Date End Date Taking?  Authorizing Provider   doxazosin (CARDURA) 1 MG tablet Take 1 tablet by mouth daily 5/24/19   Nicky Guadaluep MD   bumetanide (BUMEX) 1 MG tablet Take 1 tablet by mouth daily 5/25/19   Nicky Guadalupe MD   hydrALAZINE (APRESOLINE) 100 MG tablet Take 1 tablet by mouth every 8 hours 5/23/19   Nicky Guadalupe MD   DULoxetine (CYMBALTA) 30 MG extended release capsule Take 30 mg by mouth daily Historical Provider, MD   fluticasone [facility]) 50 MCG/ACT nasal spray 1 spray by Each Nare route daily 5/15/19 5/28/19  Historical Provider, MD   oxybutynin (DITROPAN) 5 MG tablet Take 5 mg by mouth daily    Historical Provider, MD   meclizine (ANTIVERT) 25 MG tablet Take 25 mg by mouth 3 times daily    Historical Provider, MD   gabapentin (NEURONTIN) 100 MG capsule Take 100 mg by mouth 3 times daily. 7/31/18   Historical Provider, MD   ferrous sulfate 325 (65 Fe) MG tablet Take 325 mg by mouth 2 times daily    Historical Provider, MD   amLODIPine (NORVASC) 10 MG tablet Take 10 mg by mouth daily    Historical Provider, MD   acetaminophen-codeine (TYLENOL #3) 300-30 MG per tablet Take 1 tablet by mouth every 4 hours as needed for Pain. Amy Mendez Historical Provider, MD   acetaminophen (TYLENOL) 325 MG tablet Take 650 mg by mouth every 6 hours as needed for Pain    Historical Provider, MD   isosorbide dinitrate (ISORDIL) 20 MG tablet Take 1 tablet by mouth 3 times daily 12/17/18   Benita Viveros MD   insulin lispro (HUMALOG KWIKPEN) 100 UNIT/ML pen Inject 0-9 Units into the skin 4 times daily (before meals and nightly) Inject as per sliding scale:  140-199 = 1 unit,  200-249 = 3 units,  250-299 = 5 units,  300-349 = 7 units,  350-399 = 9 units,  400-999 = call MD    Historical Provider, MD   ondansetron (ZOFRAN) 4 MG tablet Take 4 mg by mouth every 4 hours as needed for Nausea    Historical Provider, MD   levothyroxine (SYNTHROID) 25 MCG tablet Take 25 mcg by mouth Daily    Historical Provider, MD   insulin detemir (LEVEMIR) 100 UNIT/ML injection pen Inject 10 Units into the skin 2 times daily 10/24/18   Historical Provider, MD   blood glucose test strips (ASCENSIA AUTODISC VI;ONE TOUCH ULTRA TEST VI) strip Patient tests blood sugar three times per day.  Diagnosis DMII E11.9 7/18/18   Historical Provider, MD   Insulin Pen Needle 31G X 8 MM MISC 1 Device 6/6/18   Historical Provider, MD   pravastatin (PRAVACHOL) 40 MG tablet Take 40 mg by mouth daily     Historical Provider, MD     Allergies: Eggs or egg-derived products; Aspirin; Pcn [penicillins]; Vicodin [hydrocodone-acetaminophen]; Vilazodone; and Wellbutrin [bupropion]  IP meds : Scheduled Meds:   sodium chloride flush  10 mL Intravenous 2 times per day    heparin (porcine)  5,000 Units Subcutaneous 3 times per day     Continuous Infusions:  Review of Systems Physical Exam   Review of Systems   Constitutional: Positive for fatigue. Negative for chills and fever. HENT: Negative for ear pain and sore throat. Eyes: Negative. Negative for pain. Respiratory: Positive for cough and shortness of breath. Cardiovascular: Negative for chest pain and leg swelling. Gastrointestinal: Positive for nausea. Negative for abdominal pain, blood in stool, diarrhea and vomiting. Genitourinary: Negative for dysuria, frequency and hematuria. Musculoskeletal: Negative for back pain and neck pain. Skin: Negative for rash and wound. Neurological: Negative for dizziness and headaches. Psychiatric/Behavioral: Negative for agitation and confusion. Physical Exam   Constitutional: She is oriented to person, place, and time. She appears well-developed and well-nourished. No distress. HENT:   Right Ear: External ear normal.   Left Ear: External ear normal.   Nose: Nose normal.   Mouth/Throat: Oropharynx is clear and moist.   Eyes: Conjunctivae are normal. Right eye exhibits no discharge. Left eye exhibits no discharge. No scleral icterus. Neck: Normal range of motion. Neck supple. No JVD present. No thyromegaly present. Cardiovascular: Normal rate, regular rhythm and normal heart sounds. No murmur heard. Pulmonary/Chest: Effort normal. No stridor. No respiratory distress. She has rales (noted B/L). She exhibits no tenderness. Abdominal: Soft. Bowel sounds are normal. There is no tenderness. Musculoskeletal: She exhibits no edema or tenderness.    Neurological: She is alert and oriented to person, place, and time. Skin: Skin is warm and dry. No rash noted. She is not diaphoretic. No erythema. Psychiatric: She has a normal mood and affect. Her behavior is normal.   Vitals reviewed. Vitals:    06/06/19 1148   BP:    Pulse: 73   Resp: 28   Temp: 98.2 °F (36.8 °C)   SpO2: 93%     Labs, Radiology and Tests     Labs - K 4.2, creat 3.55    Radiology : CXR moderate to large rt pleural effusion and small left pleural effusion and pulmonary edema    Other : old lab data shows patients creatinine at baseline is around 3-3.5    Assessment    Renal - CKD stage 5 - overall renal FX appears stable at baseline  - Volume status also looks well in fact much better than it has been in the past at 65 pounds  - CXR shows may be mild congestion, will start on some IV diuretics and monitor renal FX        -     No acute need for RRT at this time      Electrolytes - WNL  Essential Hypertension - reasonable control  SOB -  Multifactorial, primarily pleural effusion  And may be mild CHF add bumex 1 mg IV daily for now  Pleural effusion - S/P thoracentesis and pulmonary has been consulted  Diabetes EMllitus  meds reviewed and D/W patient and nursing staff      **This report has been created using voice recognition software. It maycontain minor  errors which are inherent in voice recognition technology. **    Rolando Rivers M.D  [facility].

## 2019-06-06 NOTE — PROGRESS NOTES
Nutrition Assessment    Type and Reason for Visit: Initial, Consult, Patient Education(Consult for CHF ed. Poor intake reported)    Nutrition Recommendations:   Diet per Dr. Fawn Plascencia tid. Recommend weight check. Admit weight is stated. Nutrition Assessment:   Pt. nutritionally compromised AEB as report nausea & belly pain & last time was able to eat was Monday( 6/3/19 ). At risk for further nutrition compromise r/t hx of CKD, CHF, bipolar, DM, recent thoracentesis admitted with large recurrent pleural effusion, CHF,  short of breath s/p thoracentesis today with 1.4 L removed. Glucose 66, 100,119, BUN 69, Cr 3.4, K+ 5.  meds include zofran. Pt seen & reports unable po so far, but did request Clear supplement. Will start Ensure Clear tid & monitor ability for po. Pt reports understands  low Na+ diet , CHO controlled & fluid restriction diet  & tries to avoid high K+ foods. Pt from [facility]. Malnutrition Assessment:  · Malnutrition Status: At risk for malnutrition  · Context: Acute illness or injury  · Findings of the 6 clinical characteristics of malnutrition (Minimum of 2 out of 6 clinical characteristics is required to make the diagnosis of moderate or severe Protein Calorie Malnutrition based on AND/ASPEN Guidelines):  1. Energy Intake-Less than or equal to 50% of estimated energy requirement(since 6/3/17 per pt ), Greater than or equal to 5 days    Nutrition Risk Level: Moderate    Nutrient Needs:  · Estimated Daily Total Kcal: ~1870 kcals ( 25 kcals/kg on stated weight of 74.8 kg)  · Estimated Daily Protein (g): ~65 grams ( 1.1 gram protein/kg on IBW of 59 kg)  Monitor renal status    Nutrition Diagnosis:   · Problem: Inadequate energy intake  · Etiology: related to (nausea & belly pain)     Signs and symptoms:  as evidenced by Patient report of    Objective Information:  · Nutrition-Focused Physical Findings: Pt reports poor intake recently & not been able to eat anyting since Monday.  Pt report dentures do not fit right since she has lost weight over time. Per pt last bm was Tuesday. Pt report nausea yet  · Wound Type: None(per pt)  · Current Nutrition Therapies:  · Oral Diet Orders: (DB CHO control 2 grams Na+)   · Oral Diet intake: 0%  · Oral Nutrition Supplement (ONS) Orders: Clear Liquid Oral Supplement(Ensure Clear tid ( per or request)  )  · ONS intake: (to start)  · Anthropometric Measures:  · Ht: 5' 6\" (167.6 cm)   · Current Body Wt: 165 lb (74.8 kg)(stated)  · Admission Body Wt: 165 lb (74.8 kg)(stated)  · Usual Body Wt: 176 lb 5.9 oz (80 kg)(5/19 +1 edema)  · % Weight Change:  ,  note recent thoracentesis including this admit  · Ideal Body Wt: 130 lb (59 kg), BMI Classification: BMI 25.0 - 29.9 Overweight(26.7)    Nutrition Interventions:   Start ONS  Continued Inpatient Monitoring, Coordination of Care, Education Completed(Low Na+ FR restricted diet for CHF.  Encouraged Diabetic guidelines )    Nutrition Evaluation:   · Evaluation: Goals set   · Goals: 75% or more of meal intake during LOS    · Monitoring: Nutrition Progression, Meal Intake, Supplement Intake, Diet Tolerance, Skin Integrity, Weight, Pertinent Labs, Nausea or Vomiting, Monitor Bowel Function      Electronically signed by Lashonda Cee RD, LD on 6/6/19 at 2:24 PM    Contact Number: [phone]

## 2019-06-06 NOTE — CONSULTS
[facility]    Patient - Jose Andres   MRN -  [id number]   Kiara # - [de-identified]   - 1950      Date of Admission -  2019  6:35 AM  Date of evaluation -  2019  Room - 4B-011-A   Hospital Day - [facility],  Primary Care Physician - Vernon Pavon   Chief Complaint   Pleural effusion  Active Hospital Problem List      Active Hospital Problems    Diagnosis Date Noted    Pleural effusion [J90] 2019     HPI   Jose Andres is a 81 y.o. female transferred from outside hospital for SOB and pleural effusion. Never smoker, has been tapped twice before over the last few weeks in the same side, apparently a transudate. Was DC from this hospital last week at that time shed had a right  thoracentesis of 1 L, studies not sent, today a 1.4L tap was done ordered by primary team before we saw pt, chemistries  not requested. Merlin Heman has CKD 5 and CHF with preserved EF    Past Medical History         Diagnosis Date    Anemia     Anemia in chronic kidney disease(285.21)     Arthritis     Asthma     Bipolar 1 disorder ([address]     CHF (congestive heart failure) (HCC)     Chronic kidney disease, stage III (moderate) (HCC)     Depression     GERD (gastroesophageal reflux disease)     Headache(784.0)     Hyperkalemia     Hyperlipidemia     Hypertension     Hypothyroid     Neurogenic bladder     Pneumonia     Seizure ([address] 2018    Type II or unspecified type diabetes mellitus without mention of complication, not stated as uncontrolled       Past Surgical History           Procedure Laterality Date    BACK SURGERY      CERVICAL FUSION      CHOLECYSTECTOMY      FRACTURE SURGERY       Diet    DIET CARB CONTROL; Dietary Nutrition Supplements: Clear Liquid Oral Supplement  Allergies    Eggs or egg-derived products; Aspirin; Pcn [penicillins];  Vicodin [hydrocodone-acetaminophen]; Vilazodone; and Wellbutrin [bupropion]  Social History     Social History     Socioeconomic History    Marital status:      Spouse name: Not on file    Number of children: Not on file    Years of education: Not on file    Highest education level: Not on file   Occupational History    Not on file   Social Needs    Financial resource strain: Not on file    Food insecurity:     Worry: Not on file     Inability: Not on file    Transportation needs:     Medical: Not on file     Non-medical: Not on file   Tobacco Use    Smoking status: Never Smoker    Smokeless tobacco: Never Used   Substance and Sexual Activity    Alcohol use: No    Drug use: No    Sexual activity: Not on file   Lifestyle    Physical activity:     Days per week: Not on file     Minutes per session: Not on file    Stress: Not on file   Relationships    Social connections:     Talks on phone: Not on file     Gets together: Not on file     Attends [employer] service: Not on file     Active member of club or organization: Not on file     Attends meetings of clubs or organizations: Not on file     Relationship status: Not on file    Intimate partner violence:     Fear of current or ex partner: Not on file     Emotionally abused: Not on file     Physically abused: Not on file     Forced sexual activity: Not on file   Other Topics Concern    Not on file   Social History Narrative    Not on file     Family History          Problem Relation Age of Onset    High Blood Pressure Mother     Diabetes Mother     Cancer Mother      Sleep History    N/A  ROS    General/Constitutional: poor appetite, wt loss  HENT: Negative. Eyes: Negative. Upper respiratory tract: No nasal stuffiness or post nasal drip. Lower respiratory tract/ lungs: SOB, chest pressure  Cardiovascular: No palpitations, chest pain or edema. Gastrointestinal: No nausea or vomiting. Neurological: No focal neurological weakness. Extremities: No tenderness.   Musculoskeletal: no complaints  Genitourinary: No complaints. Hematological: Negative. Denies easy buising  Skin: No itching. Meds    Current Medications    amLODIPine  10 mg Oral Daily    DULoxetine  30 mg Oral Daily    fluticasone  1 spray Each Nostril Daily    gabapentin  100 mg Oral TID    hydrALAZINE  100 mg Oral Q8H    insulin lispro  0-9 Units Subcutaneous 4x Daily AC & HS    isosorbide dinitrate  20 mg Oral TID    levothyroxine  25 mcg Oral Daily    meclizine  25 mg Oral TID    oxybutynin  5 mg Oral Daily    pravastatin  40 mg Oral Nightly    sodium chloride flush  10 mL Intravenous 2 times per day    heparin (porcine)  5,000 Units Subcutaneous 3 times per day    insulin glargine  20 Units Subcutaneous Nightly     acetaminophen, magnesium hydroxide, glucose, dextrose, glucagon (rDNA), dextrose, sodium chloride flush, ondansetron  IV Drips/Infusions   dextrose       Vitals    Vitals    height is 5' 6\" (1.676 m) and weight is 165 lb (74.8 kg). Her axillary temperature is 97.3 °F (36.3 °C). Her blood pressure is 178/64 (abnormal) and her pulse is 74. Her respiration is 16 and oxygen saturation is 94%. O2 Flow Rate (L/min): 4 L/min  I/O    Intake/Output Summary (Last 24 hours) at 6/6/2019 1722  Last data filed at 6/6/2019 1400  Gross per 24 hour   Intake 0 ml   Output --   Net 0 ml     Patient Vitals for the past 96 hrs (Last 3 readings):   Weight   06/06/19 0620 165 lb (74.8 kg)     Exam   Constitutional: Patient appears moderately built and moderately nourished. Head: Normocephalic and atraumatic. Mouth/Throat: Oropharynx is clear and moist.  No oral thrush. Eyes: Conjunctivae are normal. Pupils are equal, round, and reactive to light. No scleral icterus. Neck: Neck supple. No JVD or tracheal deviation present. Cardiovascular: S4 gallop, RRR  Pulmonary/Chest: Diminished BS right base, no peripheral edema    Abdominal: Soft. Bowel sounds audible.  No distension or tenderness to palp  Musculoskeletal: Moves all extremities  Lymphadenopathy:  No cervical adenopathy. Neurological: Patient is alert and oriented to person, place, and time. Skin: Skin is warm and dry. Labs   ABG  Lab Results   Component Value Date    PH 7.43 12/09/2018    PO2 81 12/09/2018    PCO2 41 12/09/2018    HCO3 27 12/09/2018    O2SAT 96 12/09/2018     Lab Results   Component Value Date    IFIO2 30 12/09/2018    MODE CPAP/PS 12/09/2018    SETTIDVOL 293 12/09/2018    SETPEEP 5.0 12/09/2018     CBC  No results for input(s): WBC, RBC, HGB, HCT, MCV, MCH, MCHC, RDW, PLT, MPV in the last 72 hours. BMP  No results for input(s): NA, K, CL, CO2, BUN, CREATININE, GLUCOSE, MG, PHOS, CALCIUM, IONCA, MG in the last 72 hours. LFT  No results for input(s): AST, ALT, ALB, BILITOT, ALKPHOS, LIPASE in the last 72 hours. Invalid input(s): AMYLASE  TROP  Lab Results   Component Value Date    TROPONINT 0.029 06/06/2019    TROPONINT 0.033 05/19/2019    TROPONINT < 0.010 06/07/2016     BNP  Lab Results   Component Value Date    PROBNP 3592.0 05/19/2019    PROBNP 2003.0 12/08/2018    PROBNP 403.3 06/07/2016     D-Dimer  No results found for: DDIMER  Lactic Acid  No results for input(s): LACTA in the last 72 hours. INR  No results for input(s): INR, PROTIME in the last 72 hours. PTT  No results for input(s): APTT in the last 72 hours. Glucose  Recent Labs     06/06/19  1642   POCGLU 284*     UA No results for input(s): SPECGRAV, PHUR, COLORU, CLARITYU, MUCUS, PROTEINU, BLOODU, RBCUA, WBCUA, BACTERIA, NITRU, GLUCOSEU, BILIRUBINUR, UROBILINOGEN, KETUA, LABCAST, LABCASTTY, AMORPHOS in the last 72 hours. Invalid input(s): CRYSTALS. PFTs     Echo     Conclusions      Summary   Technically difficult study with suboptimal views   Left ventricle size is normal.   Systolic function was low-normal.   Ejection fraction was estimated at 50-55%.  There were no regional left   ventricular wall motion abnormalities and wall thickness was within normal   limits.  Sera Hernandez dilated left atrium.   Calcification of the mitral valve noted.   Mild mitral regurgitation is present.   Mild tricuspid regurgitation.      Signature      ----------------------------------------------------------------   Electronically signed by Tea Carbone MD (Interpreting   physician) on 12/08/2018 at 03:42 PM   ----------------------------------------------------------------     Cultures    Procalcitonin  Lab Results   Component Value Date    PROCAL 0.29 05/20/2019    PROCAL 0.26 12/07/2018        Radiology    CXR  1. The patient is status post large volume right thoracentesis with aspiration of 1.4 L of clear yellow fluid. There is no pneumothorax. There are bilateral perihilar and the basilar infiltrates.               **This report has been created using voice recognition software.  It may contain minor errors which are inherent in voice recognition technology. **       Final report electronically signed by Dr. Diane Templeton on 6/6/2019 1:30 PM       CT Scans  N/A  (See actual reports for details)    Assessment   HFpEF  Recurrent pleural effusion  on the right ( at least R>>>L) initial chemistry (12/10/18) c/w transudate, chemistry not obtain in last tap, unilaterality not typical for CHF/CKD. S/P 1.4 L right thoracentesis with improvement  Recommendations   Repeat CXR and monitor for re accumulation  If pleural effusion recurs will ask IR to tap and send for usual studies. Start Bumex for volume control  If fluid re accumulates quickly a tunneled pleural catheter would be indicated for effusion management. Will monitor. Thank you for the consult and allowing us to participate in the care of your patient. Case discussed with nurse and patient/family. Questions and concerns addressed. Meds and Orders reviewed.     Electronically signed by     Pauly Chicas MD on 6/6/2019 at 5:22 PM

## 2019-06-06 NOTE — CARE COORDINATION
19, 8:23 AM      Shubham Espinosa       Admitted from: [address]  2019/ [address] day: 0   Location: [facility] Reason for admit: Pleural effusion [J90] Status: inpatient  Admit order signed?: yes  PMH:  has a past medical history of Anemia, Anemia in chronic kidney disease(285.21), Arthritis, Asthma, Bipolar 1 disorder ([address] CHF (congestive heart failure) ([address] Chronic kidney disease, stage III (moderate) ([address] Depression, GERD (gastroesophageal reflux disease), Headache(784.0), Hyperkalemia, Hyperlipidemia, Hypertension, Hypothyroid, Neurogenic bladder, Pneumonia, Seizure ([address] and Type II or unspecified type diabetes mellitus without mention of complication, not stated as uncontrolled. Procedure: 19 planning thoracentesis by IR  Pertinent abnormal Imagin19  CXR   large right and small left pleural effusions. Medications:  Scheduled Meds:  Continuous Infusions:   Pertinent Info/Orders/Treatment Plan:  Planning limited Echo, consult pulmonary, telemetry NSR 60's, creatinine 3.4, accu checks as ordered with meds as ordered, WBC 12.2, Hgb 9.0, hct 27.0, (recheck 9.1/hct 27.7) , SQ Heparin, hold Lasix,     Transfer from [facility], presents with increasing SOB, was discharged from here on May 23rd following admission for same,and a  thorocentesis. Beginning Monday of this week she started experiencing increasing SOB, lung sounds demonstrate very little air movement. Diet: No diet orders on file   Smoking status:  reports that she has never smoked. She has never used smokeless tobacco.   PCP: [address]  Readmission: yes  Patient has been readmitted within 14 days. Patient went to f/u appointment? yes  If yes, was it within 7 days? yes  Patient was able to fill prescriptions? Yes from [facility]  Patient is taking medications as prescribed? yes  Cause for readmission?  Pleural effusion    Readmission Risk Score: 12%    Discharge Planning  Current Residence:  Nursing Home  Living Arrangements:  Alone   Support Systems:  Children  Current Services PTA:   from [facility]  Potential Assistance Needed:  Extended Care Facility  Potential Assistance Purchasing Medications:  No  Does patient want to participate in local refill/ meds to beds program?  No  Type of Home Care Services:  None  Patient [facility] to be [facility]  Expected Discharge date:  06/08/19  Follow Up Appointment: Best Day/ Time: Friday AM    Discharge Plan: Patient is from [facility], spoke with Maurizio Motley and she shared she started oxygen last Thursday at 1L/min and now is requiring 4L/min. SW to follow for discharge plan.      Evaluation: yes

## 2019-06-06 NOTE — PROGRESS NOTES
Formulation and discussion of sedation / procedure plans, risks, benefits, side effects and alternatives with patient and/or responsible adult completed.     Electronically signed by Irineo Mcqueen MD on 6/6/2019 at 1:27 PM

## 2019-06-06 NOTE — H&P
History & Physical        Patient:  Thierry Martinez  YOB: 1950    MRN: [id number]     Acct: [de-identified]    PCP: [address]    Date of Admission: 6/6/2019    Date of Service: Pt seen/examined on 06/06/19 and Admitted to Inpatient with expected LOS greater than two midnights due to medical therapy. Chief Complaint:  JONATHAN x 1 week     History Of Present Illness:   81 y.o. female who presented to [address] [place] [address] as a transfer from  [facility] with shortness of breath and nausea,  Patient was discharged from [facility] last week for similar complaints and after undergoing right thoracenteses where they had Approximately 1 L of fluid, which appeared to be transudate   At discharge when she arrived home  she started having mild difficulty breathing which has progressively worsened over the last 1 week and yesterday she felt very short of breath to the point that she was unable to perform any activities of daily living and felt very nauseous for which she she presented to the closest facility at [facility] and was later transferred to this hospital early this morning.   She denies any chest pain or palpitations  Her last echo in Dec 2018 showed LVEF of 55%     Past Medical History:          Diagnosis Date    Anemia     Anemia in chronic kidney disease(285.21)     Arthritis     Asthma     Bipolar 1 disorder ([address]     CHF (congestive heart failure) (HCC)     Chronic kidney disease, stage III (moderate) (HCC)     Depression     GERD (gastroesophageal reflux disease)     Headache(784.0)     Hyperkalemia     Hyperlipidemia     Hypertension     Hypothyroid     Neurogenic bladder     Pneumonia     Seizure ([address] 01/2018    Type II or unspecified type diabetes mellitus without mention of complication, not stated as uncontrolled        Past Surgical History:          Procedure Laterality Date    BACK SURGERY      CERVICAL FUSION      CHOLECYSTECTOMY      FRACTURE SURGERY         Medications Prior to Admission:      Prior to Admission medications    Medication Sig Start Date End Date Taking? Authorizing Provider   doxazosin (CARDURA) 1 MG tablet Take 1 tablet by mouth daily 5/24/19   Iron Ferrell MD   bumetanide (BUMEX) 1 MG tablet Take 1 tablet by mouth daily 5/25/19   Iron Ferrell MD   hydrALAZINE (APRESOLINE) 100 MG tablet Take 1 tablet by mouth every 8 hours 5/23/19   Iron Ferrell MD   DULoxetine (CYMBALTA) 30 MG extended release capsule Take 30 mg by mouth daily    Historical Provider, MD   fluticasone (FLONASE) 50 MCG/ACT nasal spray 1 spray by Each Nare route daily 5/15/19 5/28/19  Historical Provider, MD   oxybutynin (DITROPAN) 5 MG tablet Take 5 mg by mouth daily    Historical Provider, MD   meclizine (ANTIVERT) 25 MG tablet Take 25 mg by mouth 3 times daily    Historical Provider, MD   gabapentin (NEURONTIN) 100 MG capsule Take 100 mg by mouth 3 times daily. 7/31/18   Historical Provider, MD   ferrous sulfate 325 (65 Fe) MG tablet Take 325 mg by mouth 2 times daily    Historical Provider, MD   amLODIPine (NORVASC) 10 MG tablet Take 10 mg by mouth daily    Historical Provider, MD   acetaminophen-codeine (TYLENOL #3) 300-30 MG per tablet Take 1 tablet by mouth every 4 hours as needed for Pain. Marisa Shepard     Historical Provider, MD   acetaminophen (TYLENOL) 325 MG tablet Take 650 mg by mouth every 6 hours as needed for Pain    Historical Provider, MD   isosorbide dinitrate (ISORDIL) 20 MG tablet Take 1 tablet by mouth 3 times daily 12/17/18   Marjorie Lewis MD   insulin lispro (HUMALOG KWIKPEN) 100 UNIT/ML pen Inject 0-9 Units into the skin 4 times daily (before meals and nightly) Inject as per sliding scale:  140-199 = 1 unit,  200-249 = 3 units,  250-299 = 5 units,  300-349 = 7 units,  350-399 = 9 units,  305-963 = call MD    Historical Provider, MD   ondansetron (ZOFRAN) 4 MG tablet Take 4 mg by mouth every 4 hours as needed for Nausea    Historical Provider, MD   levothyroxine (SYNTHROID) 25 MCG tablet Take 25 mcg by mouth Daily    Historical Provider, MD   insulin detemir (LEVEMIR) 100 UNIT/ML injection pen Inject 10 Units into the skin 2 times daily 10/24/18   Historical Provider, MD   blood glucose test strips (ASCENSIA AUTODISC VI;ONE TOUCH ULTRA TEST VI) strip Patient tests blood sugar three times per day. Diagnosis DMII E11.9 7/18/18   Historical Provider, MD   Insulin Pen Needle 31G X 8 MM MISC 1 Device 6/6/18   Historical Provider, MD   pravastatin (PRAVACHOL) 40 MG tablet Take 40 mg by mouth daily     Historical Provider, MD       Allergies:  Eggs or egg-derived products; Aspirin; Pcn [penicillins]; Vicodin [hydrocodone-acetaminophen]; Vilazodone; and Wellbutrin [bupropion]    Social History:      The patient currently lives at home     TOBACCO:   reports that she has never smoked. She has never used smokeless tobacco.  ETOH:   reports that she does not drink alcohol. Family History:       Reviewed in detail and negative for DM, CAD, Cancer, CVA. Positive as follows:        Problem Relation Age of Onset    High Blood Pressure Mother     Diabetes Mother     Cancer Mother        Diet:  No diet orders on file    REVIEW OF SYSTEMS:   Pertinent positives as noted in the HPI. All other systems reviewed and negative. PHYSICAL EXAM:    BP (!) 169/73   Pulse 73   Temp 97.4 °F (36.3 °C) (Oral)   Resp 20   Wt 165 lb (74.8 kg)   SpO2 90%   BMI 26.63 kg/m²     General appearance:  Significant shortness breath noted but able to maintain O2 sats on 4L by NC, uses accessory muscles of respiration   HEENT:  Normal cephalic, atraumatic without obvious deformity. Pupils equal, round, and reactive to light. Extra ocular muscles intact. Conjunctivae/corneas clear. Neck: Supple, with full range of motion. No jugular venous distention. Trachea midline. Respiratory: moderate distress.   Breath sounds decreased bilaterally at the bases infrascapular region right greater than left  Cardiovascular:  Regular rate and rhythm with normal S1/S2 without murmurs, rubs or gallops. Abdomen: Soft, non-tender, non-distended with normal bowel sounds. Musculoskeletal:  No clubbing, cyanosis or edema bilaterally. Full range of motion without deformity. Skin: Skin color, texture, turgor normal.  No rashes or lesions. Neurologic:  Neurovascularly intact without any focal sensory/motor deficits. Cranial nerves: II-XII intact, grossly non-focal.  Psychiatric:  Alert and oriented, thought content appropriate, normal insight  Capillary Refill: Brisk,< 3 seconds   Peripheral Pulses: +2 palpable, equal bilaterally       Labs:     Urinalysis:      Lab Results   Component Value Date    NITRU Negative 03/09/2019    WBCUA 0-2 06/07/2016    BACTERIA NONE 06/07/2016    RBCUA 0-2 06/07/2016    BLOODU Negative 03/09/2019    GLUCOSEU 2+ 03/09/2019       EKG:  I have reviewed the EKG with the following interpretation: NSR, with nonspecific ST-T wave changes        DVT prophylaxis: [] Lovenox                                 [] SCDs                                 [x] SQ Heparin                                 [] Encourage ambulation           [] Already on Anticoagulation    Code Status: Prior      PT/OT Eval Status: cons\ult  Disposition:    [] Home       [] TCU       [x] Rehab       [] Psych       [] SNF       [] [facility]       [] Other-    ASSESSMENT/PLAN:    1. Acute exacerbation of congestive heart failure with preserved LV ejection fraction, however will order a repeat echocardiogram since it has been more than 6 months from last study   2. Large recurrent right pleural effusion, will  order thoracentesis  under radiology guidance, will also consult pulmonary for possible pleurodeses  3. Type 2 diabetes continue home regimen of insulin and frequent monitoring of blood sugars and regular insulin to scale per hospital protocol  4.   Acute on chronic renal insufficiency consult nephrology for further evaluation and treatment will hold Lasix at the present time and use that to discretion of nephrologist.        Thank you [address] for the opportunity to be involved in this patient's care.     Electronically signed by Catherine Booker MD on 6/6/2019 at 8:14 AM

## 2019-06-06 NOTE — CARE COORDINATION
6/6/19, 11:28 AM    DISCHARGE BARRIERS    Order received due to patient from [facility]. ABIGAIL spoke with patient, states she has been at [facility] since Dec and plans to return there at discharge. Patient states she uses [facility] for 166.00 one way. Patient states if [facility] is cheaper she would like to use them. ABIGAIL called [facility], cost would be 150.00 to go to [facility]. ABIGAIL spoke with Urban Begun in Admissions for [place], ok for patient to return to [facility].

## 2019-06-06 NOTE — PROGRESS NOTES
Transfer  Report from [facility]  Referring Physician Dr Che Gooden Physician Dr Kellie Cruz  Patient Condition: pt presents with increasing SOB. Was discharged from here on May 23rd following admission for same,and a  thorocentesis. Beginning Monday of this week she started experiencing increasing SOB, lung sounds demonstrate very little air movement, Chest x-ray shows large right and small left pleural effusions. Labs hgb 8.8 BUN 62, creat 3.5 WBC 11 all other labs unremarkable. ABG's pH 7.33 PCO2 41, PO2 60 Bicarb 21 on 4 Liters oxygen.             Potential floor/room:  Vitals B/P 188/74    P 60  R 24     P02  100 % 4L   T 98.3              Oxygen needs 4L  IV Drips  Fax Face Sheet  Accepted on behalf of Dr Kellie Cruz to 4B11 for Pleural Effusion

## 2019-06-06 NOTE — CARE COORDINATION
6/6/19, 11:28 AM    DISCHARGE BARRIERS    Order received due to patient from [facility]. ABIGAIL spoke with patient, states she has been at [facility] since Dec and plans to return there at discharge. Patient states she uses [facility] for 166.00 one way. Patient states if [facility] is cheaper she would like to use them. ABIGAIL called [facility], cost would be 150.00 to go to [facility]. ABIGAIL spoke with Jazmin Bui in Admissions for [place], ok for patient to return to [facility].

## 2019-06-06 NOTE — PLAN OF CARE
Problem: Nutrition  Goal: Optimal nutrition therapy  Outcome: Ongoing   Nutrition Problem: Inadequate energy intake  Intervention: Food and/or Nutrient Delivery: Start ONS  Nutritional Goals: 75% or more of meal intake during LOS

## 2019-06-06 NOTE — BRIEF OP NOTE
Post Procedure Progress Note      6/6/2019  Pre-Procedure Diagnosis: Right pleural effusion  Post-Procedure Diagnosis: Same  Physician: Irineo Mcqueen MD  Anesthesia: 2% lidocaine local  Procedure Performed: Ultrasound guided right thoracentesis  Specimen Removed: 1.4 liters clear yellow pleural fluid  Disposition of Specimen: Hospital disposal  Estimated Blood Loss: None  Complications: None

## 2019-06-07 ENCOUNTER — APPOINTMENT (OUTPATIENT)
Dept: GENERAL RADIOLOGY | Age: 69
DRG: 291 | End: 2019-06-07
Attending: INTERNAL MEDICINE
Payer: MEDICARE

## 2019-06-07 ENCOUNTER — APPOINTMENT (OUTPATIENT)
Dept: CT IMAGING | Age: 69
DRG: 291 | End: 2019-06-07
Attending: INTERNAL MEDICINE
Payer: MEDICARE

## 2019-06-07 LAB
ANION GAP SERPL CALCULATED.3IONS-SCNC: 17 MEQ/L (ref 8–16)
BASOPHILS # BLD: 0.7 %
BASOPHILS ABSOLUTE: 0.1 THOU/MM3 (ref 0–0.1)
BUN BLDV-MCNC: 66 MG/DL (ref 7–22)
CALCIUM SERPL-MCNC: 9.3 MG/DL (ref 8.5–10.5)
CHLORIDE BLD-SCNC: 102 MEQ/L (ref 98–111)
CO2: 21 MEQ/L (ref 23–33)
CREAT SERPL-MCNC: 3.8 MG/DL (ref 0.4–1.2)
EOSINOPHIL # BLD: 2.9 %
EOSINOPHILS ABSOLUTE: 0.4 THOU/MM3 (ref 0–0.4)
ERYTHROCYTE [DISTWIDTH] IN BLOOD BY AUTOMATED COUNT: 13.2 % (ref 11.5–14.5)
ERYTHROCYTE [DISTWIDTH] IN BLOOD BY AUTOMATED COUNT: 43.4 FL (ref 35–45)
GFR SERPL CREATININE-BSD FRML MDRD: 12 ML/MIN/1.73M2
GLUCOSE BLD-MCNC: 172 MG/DL (ref 70–108)
GLUCOSE BLD-MCNC: 198 MG/DL (ref 70–108)
GLUCOSE BLD-MCNC: 263 MG/DL (ref 70–108)
GLUCOSE BLD-MCNC: 75 MG/DL (ref 70–108)
GLUCOSE BLD-MCNC: 91 MG/DL (ref 70–108)
HCT VFR BLD CALC: 27.9 % (ref 37–47)
HEMOGLOBIN: 9.4 GM/DL (ref 12–16)
IMMATURE GRANS (ABS): 0.05 THOU/MM3 (ref 0–0.07)
IMMATURE GRANULOCYTES: 0.4 %
LYMPHOCYTES # BLD: 13.2 %
LYMPHOCYTES ABSOLUTE: 1.7 THOU/MM3 (ref 1–4.8)
MAGNESIUM: 2.1 MG/DL (ref 1.6–2.4)
MCH RBC QN AUTO: 30.2 PG (ref 26–33)
MCHC RBC AUTO-ENTMCNC: 33.7 GM/DL (ref 32.2–35.5)
MCV RBC AUTO: 89.7 FL (ref 81–99)
MONOCYTES # BLD: 7.5 %
MONOCYTES ABSOLUTE: 0.9 THOU/MM3 (ref 0.4–1.3)
NUCLEATED RED BLOOD CELLS: 0 /100 WBC
PLATELET # BLD: 357 THOU/MM3 (ref 130–400)
PMV BLD AUTO: 10.4 FL (ref 9.4–12.4)
POTASSIUM SERPL-SCNC: 4.5 MEQ/L (ref 3.5–5.2)
PROCALCITONIN: 0.27 NG/ML (ref 0.01–0.09)
RBC # BLD: 3.11 MILL/MM3 (ref 4.2–5.4)
SEG NEUTROPHILS: 75.3 %
SEGMENTED NEUTROPHILS ABSOLUTE COUNT: 9.5 THOU/MM3 (ref 1.8–7.7)
SODIUM BLD-SCNC: 140 MEQ/L (ref 135–145)
WBC # BLD: 12.6 THOU/MM3 (ref 4.8–10.8)

## 2019-06-07 PROCEDURE — 6360000002 HC RX W HCPCS: Performed by: INTERNAL MEDICINE

## 2019-06-07 PROCEDURE — APPSS30 APP SPLIT SHARED TIME 16-30 MINUTES: Performed by: NURSE PRACTITIONER

## 2019-06-07 PROCEDURE — 2500000003 HC RX 250 WO HCPCS: Performed by: INTERNAL MEDICINE

## 2019-06-07 PROCEDURE — 2060000000 HC ICU INTERMEDIATE R&B

## 2019-06-07 PROCEDURE — 36415 COLL VENOUS BLD VENIPUNCTURE: CPT

## 2019-06-07 PROCEDURE — 2580000003 HC RX 258: Performed by: INTERNAL MEDICINE

## 2019-06-07 PROCEDURE — 83735 ASSAY OF MAGNESIUM: CPT

## 2019-06-07 PROCEDURE — 99232 SBSQ HOSP IP/OBS MODERATE 35: CPT | Performed by: FAMILY MEDICINE

## 2019-06-07 PROCEDURE — 93307 TTE W/O DOPPLER COMPLETE: CPT

## 2019-06-07 PROCEDURE — 71250 CT THORAX DX C-: CPT

## 2019-06-07 PROCEDURE — 99232 SBSQ HOSP IP/OBS MODERATE 35: CPT | Performed by: INTERNAL MEDICINE

## 2019-06-07 PROCEDURE — 6370000000 HC RX 637 (ALT 250 FOR IP): Performed by: INTERNAL MEDICINE

## 2019-06-07 PROCEDURE — 86225 DNA ANTIBODY NATIVE: CPT

## 2019-06-07 PROCEDURE — 2709999900 HC NON-CHARGEABLE SUPPLY

## 2019-06-07 PROCEDURE — 82948 REAGENT STRIP/BLOOD GLUCOSE: CPT

## 2019-06-07 PROCEDURE — 2700000000 HC OXYGEN THERAPY PER DAY

## 2019-06-07 PROCEDURE — 85025 COMPLETE CBC W/AUTO DIFF WBC: CPT

## 2019-06-07 PROCEDURE — 84145 PROCALCITONIN (PCT): CPT

## 2019-06-07 PROCEDURE — 71045 X-RAY EXAM CHEST 1 VIEW: CPT

## 2019-06-07 PROCEDURE — 86038 ANTINUCLEAR ANTIBODIES: CPT

## 2019-06-07 PROCEDURE — 83516 IMMUNOASSAY NONANTIBODY: CPT

## 2019-06-07 PROCEDURE — 80048 BASIC METABOLIC PNL TOTAL CA: CPT

## 2019-06-07 RX ADMIN — OXYBUTYNIN CHLORIDE 5 MG: 5 TABLET ORAL at 09:03

## 2019-06-07 RX ADMIN — PRAVASTATIN SODIUM 40 MG: 40 TABLET ORAL at 21:46

## 2019-06-07 RX ADMIN — CEFEPIME HYDROCHLORIDE 1 G: 1 INJECTION, POWDER, FOR SOLUTION INTRAMUSCULAR; INTRAVENOUS at 18:17

## 2019-06-07 RX ADMIN — ISOSORBIDE DINITRATE 20 MG: 20 TABLET ORAL at 09:03

## 2019-06-07 RX ADMIN — ACETAMINOPHEN 650 MG: 325 TABLET ORAL at 16:59

## 2019-06-07 RX ADMIN — HEPARIN SODIUM 5000 UNITS: 5000 INJECTION INTRAVENOUS; SUBCUTANEOUS at 14:21

## 2019-06-07 RX ADMIN — HYDRALAZINE HYDROCHLORIDE 100 MG: 50 TABLET ORAL at 16:59

## 2019-06-07 RX ADMIN — INSULIN GLARGINE 20 UNITS: 100 INJECTION, SOLUTION SUBCUTANEOUS at 21:46

## 2019-06-07 RX ADMIN — MECLIZINE HYDROCHLORIDE 25 MG: 12.5 TABLET, FILM COATED ORAL at 21:46

## 2019-06-07 RX ADMIN — HYDRALAZINE HYDROCHLORIDE 100 MG: 50 TABLET ORAL at 09:03

## 2019-06-07 RX ADMIN — GABAPENTIN 100 MG: 100 CAPSULE ORAL at 09:03

## 2019-06-07 RX ADMIN — INSULIN LISPRO 1 UNITS: 100 INJECTION, SOLUTION INTRAVENOUS; SUBCUTANEOUS at 21:47

## 2019-06-07 RX ADMIN — Medication 10 ML: at 09:04

## 2019-06-07 RX ADMIN — Medication 10 ML: at 21:47

## 2019-06-07 RX ADMIN — INSULIN LISPRO 1 UNITS: 100 INJECTION, SOLUTION INTRAVENOUS; SUBCUTANEOUS at 13:06

## 2019-06-07 RX ADMIN — LEVOTHYROXINE SODIUM 25 MCG: 25 TABLET ORAL at 06:24

## 2019-06-07 RX ADMIN — GABAPENTIN 100 MG: 100 CAPSULE ORAL at 21:46

## 2019-06-07 RX ADMIN — AMLODIPINE BESYLATE 10 MG: 10 TABLET ORAL at 09:03

## 2019-06-07 RX ADMIN — GABAPENTIN 100 MG: 100 CAPSULE ORAL at 14:20

## 2019-06-07 RX ADMIN — DULOXETINE HYDROCHLORIDE 30 MG: 30 CAPSULE, DELAYED RELEASE ORAL at 09:03

## 2019-06-07 RX ADMIN — INSULIN LISPRO 5 UNITS: 100 INJECTION, SOLUTION INTRAVENOUS; SUBCUTANEOUS at 18:13

## 2019-06-07 RX ADMIN — MECLIZINE HYDROCHLORIDE 25 MG: 12.5 TABLET, FILM COATED ORAL at 09:03

## 2019-06-07 RX ADMIN — MECLIZINE HYDROCHLORIDE 25 MG: 12.5 TABLET, FILM COATED ORAL at 14:20

## 2019-06-07 RX ADMIN — ISOSORBIDE DINITRATE 20 MG: 20 TABLET ORAL at 21:46

## 2019-06-07 RX ADMIN — BUMETANIDE 1 MG: 0.25 INJECTION INTRAMUSCULAR; INTRAVENOUS at 09:03

## 2019-06-07 RX ADMIN — AZITHROMYCIN MONOHYDRATE 500 MG: 500 INJECTION, POWDER, LYOPHILIZED, FOR SOLUTION INTRAVENOUS at 16:53

## 2019-06-07 RX ADMIN — HEPARIN SODIUM 5000 UNITS: 5000 INJECTION INTRAVENOUS; SUBCUTANEOUS at 21:46

## 2019-06-07 RX ADMIN — ISOSORBIDE DINITRATE 20 MG: 20 TABLET ORAL at 14:20

## 2019-06-07 RX ADMIN — HEPARIN SODIUM 5000 UNITS: 5000 INJECTION INTRAVENOUS; SUBCUTANEOUS at 06:24

## 2019-06-07 ASSESSMENT — PAIN SCALES - GENERAL
PAINLEVEL_OUTOF10: 0
PAINLEVEL_OUTOF10: 0

## 2019-06-07 NOTE — PROGRESS NOTES
[facility]    Patient - Scherrie Bamberger   MRN -  [id number]   [facility] # - [de-identified]   - 1950      Date of Admission -  2019  6:35 AM  Date of evaluation -  2019  Room - 4A-012-A   Hospital Day - 1  Consulting - Herma Buerger, MD Primary Care Physician - [address]   Chief Complaint   Pleural effusion  Active Hospital Problem List      Active Hospital Problems    Diagnosis Date Noted    Pleural effusion [J90] 2019    Stage 5 chronic kidney disease ([address] [N18.5]     (HFpEF) heart failure with preserved ejection fraction ([address] [I50.30]     Essential hypertension [I10] 2018     HPI   Scherrie Bamberger is a 81 y.o. female transferred from outside hospital for SOB and pleural effusion. Never smoker, has been tapped twice before over the last few weeks in the same side, apparently a transudate. Was DC from this hospital last week at that time shed had a right  thoracentesis of 1 L, studies not sent, today a 1.4L tap was done ordered by primary team before we saw pt, chemistries  not requested.   Guido has CKD 5 and CHF with preserved EF    Past 24 Hours:  -Chart reviewed  -No acute distress on 4LPM  -low UO  -right thoracentesis 19 1.3L removed  -denies any increased SOB, fever chills    Past Medical History         Diagnosis Date    Anemia     Anemia in chronic kidney disease(285.21)     Arthritis     Asthma     Bipolar 1 disorder ([address]     CHF (congestive heart failure) (HCC)     Chronic kidney disease, stage III (moderate) (HCC)     Depression     GERD (gastroesophageal reflux disease)     Gout     Headache(784.0)     Hyperkalemia     Hyperlipidemia     Hypertension     Hypothyroid     Neurogenic bladder     Pneumonia     Seizure ([address] 2018    Type II or unspecified type diabetes mellitus without mention of complication, not stated as uncontrolled       Past Surgical History           Procedure Laterality Date    BACK SURGERY      CERVICAL FUSION      CHOLECYSTECTOMY      COLONOSCOPY      ENDOSCOPY, COLON, DIAGNOSTIC      FRACTURE SURGERY       Diet    DIET CARB CONTROL; Dietary Nutrition Supplements: Clear Liquid Oral Supplement  Allergies    Eggs or egg-derived products; Aspirin; Pcn [penicillins];  Vicodin [hydrocodone-acetaminophen]; Vilazodone; and Wellbutrin [bupropion]  Social History     Social History     Socioeconomic History    Marital status:      Spouse name: Not on file    Number of children: 1    Years of education: Not on file    Highest education level: Not on file   Occupational History    Not on file   Social Needs    Financial resource strain: Not on file    Food insecurity:     Worry: Not on file     Inability: Not on file    Transportation needs:     Medical: Not on file     Non-medical: Not on file   Tobacco Use    Smoking status: Never Smoker    Smokeless tobacco: Never Used   Substance and Sexual Activity    Alcohol use: No    Drug use: No    Sexual activity: Not on file   Lifestyle    Physical activity:     Days per week: Not on file     Minutes per session: Not on file    Stress: Not on file   Relationships    Social connections:     Talks on phone: Not on file     Gets together: Not on file     Attends Buddhism service: Not on file     Active member of club or organization: Not on file     Attends meetings of clubs or organizations: Not on file     Relationship status: Not on file    Intimate partner violence:     Fear of current or ex partner: Not on file     Emotionally abused: Not on file     Physically abused: Not on file     Forced sexual activity: Not on file   Other Topics Concern    Not on file   Social History Narrative    Not on file     Family History          Problem Relation Age of Onset    High Blood Pressure Mother     Diabetes Mother     Cancer Mother     Heart Attack Mother     Stroke Father     High Blood Pressure Father     Anemia Father Sleep History    N/A    Meds    Current Medications    amLODIPine  10 mg Oral Daily    DULoxetine  30 mg Oral Daily    fluticasone  1 spray Each Nostril Daily    gabapentin  100 mg Oral TID    hydrALAZINE  100 mg Oral Q8H    insulin lispro  0-9 Units Subcutaneous 4x Daily AC & HS    isosorbide dinitrate  20 mg Oral TID    levothyroxine  25 mcg Oral Daily    meclizine  25 mg Oral TID    oxybutynin  5 mg Oral Daily    pravastatin  40 mg Oral Nightly    sodium chloride flush  10 mL Intravenous 2 times per day    heparin (porcine)  5,000 Units Subcutaneous 3 times per day    insulin glargine  20 Units Subcutaneous Nightly    bumetanide  1 mg Intravenous Daily     acetaminophen, magnesium hydroxide, glucose, dextrose, glucagon (rDNA), dextrose, sodium chloride flush, ondansetron  IV Drips/Infusions   dextrose       Vitals    Vitals    height is 5' 6\" (1.676 m) and weight is 158 lb 3.2 oz (71.8 kg). Her oral temperature is 98.5 °F (36.9 °C). Her blood pressure is 181/73 (abnormal) and her pulse is 71. Her respiration is 18 and oxygen saturation is 93%. O2 Flow Rate (L/min): 4 L/min  I/O    Intake/Output Summary (Last 24 hours) at 6/7/2019 1040  Last data filed at 6/7/2019 0637  Gross per 24 hour   Intake 400 ml   Output 75 ml   Net 325 ml     Patient Vitals for the past 96 hrs (Last 3 readings):   Weight   06/07/19 0340 158 lb 3.2 oz (71.8 kg)   06/06/19 0620 165 lb (74.8 kg)     Exam   Physical Exam   Constitutional: No distress on O2 4LPM per NC. Patient appears moderately built and moderately nourished. Pleasant  Head: Normocephalic and atraumatic. Mouth/Throat: Oropharynx is clear and moist.  No oral thrush. MP IV  Eyes: Conjunctivae are normal. Pupils are equal, round. No scleral icterus. Neck: Neck supple. No tracheal deviation present. No JVD seen  Cardiovascular: S4 gallop. No peripheral edema  Pulmonary/Chest: Normal effort with bilateral air entry, crackles R>L, no wheezing .  No stridor. No respiratory distress. Patient exhibits no tenderness. Abdominal: Soft. Bowel sounds audible. No distension or tenderness to palp. Musculoskeletal: Moves all extremities  Neurological: Patient is alert and oriented to person, place, and time. Skin: Warm and dry. No cyanosis. No bruising or bleeding. Labs   ABG  Lab Results   Component Value Date    PH 7.43 12/09/2018    PO2 81 12/09/2018    PCO2 41 12/09/2018    HCO3 27 12/09/2018    O2SAT 96 12/09/2018     Lab Results   Component Value Date    IFIO2 30 12/09/2018    MODE CPAP/PS 12/09/2018    SETTIDVOL 293 12/09/2018    SETPEEP 5.0 12/09/2018     CBC  Recent Labs     06/07/19  0344   WBC 12.6*   RBC 3.11*   HGB 9.4*   HCT 27.9*   MCV 89.7   MCH 30.2   MCHC 33.7      MPV 10.4      BMP  Recent Labs     06/07/19  0344      K 4.5      CO2 21*   BUN 66*   CREATININE 3.8*   GLUCOSE 75   MG 2.1   CALCIUM 9.3     LFT  No results for input(s): AST, ALT, ALB, BILITOT, ALKPHOS, LIPASE in the last 72 hours. Invalid input(s): AMYLASE  TROP  Lab Results   Component Value Date    TROPONINT 0.033 06/06/2019    TROPONINT 0.029 06/06/2019    TROPONINT 0.033 05/19/2019     BNP  Lab Results   Component Value Date    PROBNP 3592.0 05/19/2019    PROBNP 2003.0 12/08/2018    PROBNP 403.3 06/07/2016     D-Dimer  No results found for: DDIMER  Lactic Acid  No results for input(s): LACTA in the last 72 hours. INR  No results for input(s): INR, PROTIME in the last 72 hours. PTT  No results for input(s): APTT in the last 72 hours. Glucose  Recent Labs     06/06/19  1642 06/06/19  2152 06/07/19  0851   POCGLU 284* 144* 91     UA No results for input(s): SPECGRAV, PHUR, COLORU, CLARITYU, MUCUS, PROTEINU, BLOODU, RBCUA, WBCUA, BACTERIA, NITRU, GLUCOSEU, BILIRUBINUR, UROBILINOGEN, KETUA, LABCAST, LABCASTTY, AMORPHOS in the last 72 hours. Invalid input(s): CRYSTALS.       PFTs     Echo     Conclusions      Summary   Technically difficult study with accumulates quickly a tunneled pleural catheter would be indicated for effusion management. - IF repeat thoracentesis needed will send pleural fluid to routine analysis including PH, total protein, LDH, triglycerides, Amylase, cell count, Cytology with cell block, Gram stain and cultures and  Fungal cultures. Please send simultaneous serum total protein and LDH for comparison.  -Diuresis per nephrology; low UO  -Strict I&O with daily weights  -ECHO today   -Will monitor. -DVT prophylaxis: SCDs    Case discussed with nurse and patient/family. Questions and concerns addressed. Meds and Orders reviewed. Electronically signed by     YESENIA Austin CNP on 6/7/2019 at 10:40 AM        I have seen and examined the patient independently. Face to face evaluation and examination was performed. The above evaluation and note has been reviewed. Labs and radiographs were reviewed. I Have discussed with Roberto Garza about this patient in detail. D/w Patient's R.N. and specific instructions given. Patient issues addressed. The above assessment and plan has been reviewed. Please see my modifications mentioned below.        Alyssa Johnson MD 6/7/2019 11:44 AM

## 2019-06-07 NOTE — FLOWSHEET NOTE
06/07/19 0645   Provider Notification   Reason for Communication Evaluate   Provider Name [facility]   Provider Notification Physician Assistant   Method of Communication Secure Message   Response Other (Comment)   Notification Time [phone]   informed to monitor.

## 2019-06-07 NOTE — PROGRESS NOTES
Educated patient regarding heart failure management by explaining the importance of obtaining daily weights at the same time every day with approximately the same amount of clothing, how to recognize symptoms, low sodium diet and taking prescribed medications as ordered. Patient was given our heart failure booklet, a weight chart and low sodium diet sheet. Patient was receptive to the education given and verbalized understanding. Patient states she lives in the nursing home and they are supposed to be weighing her and contacting Dr. Desi Sosa office but states they are not very compliant about this.  She also follows with Dr. Fatimah Bonilla for cardiology and will continue to follow with him after discharge

## 2019-06-07 NOTE — FLOWSHEET NOTE
Pt is a [employer] 81year old female who is on 2A. She spoke of her health condition and her wishes to get better so that she is able to move near where her only son lives. ([address] [facility]) Pt stated she will be going to a nursing home to get strong and learn how to walk with her brace. She is grateful that the  came to see her and that there is spiritual care here.        06/07/19 1000   Encounter Summary   Services provided to: Patient   Referral/Consult From: [address] in [facility] Completed   Continue Visiting Yes  (6/7)   Complexity of Encounter Moderate   Length of Encounter 15 minutes   Spiritual/Orthodoxy   Type Spiritual support   Assessment Approachable   Intervention Prayer;Nurtured hope   Outcome Engaged in conversation;Encouraged

## 2019-06-07 NOTE — PLAN OF CARE
Problem: Falls - Risk of:  Goal: Will remain free from falls  Description  Will remain free from falls  Outcome: Ongoing   Discussed use of alarms and hourly rounding with patient. Belongings within reach including call light. Up with gait belt, non skid footwear, walker and 1 assist.  Problem: Tissue Perfusion - Cardiopulmonary, Altered:  Goal: Hemodynamic stability will improve  Description  Hemodynamic stability will improve  Outcome: Ongoing   Continue to monitor oxygen needs. Wean Oxygen as patient tolerates. Encouraged to deep breath. Problem: Discharge Planning:  Goal: Participates in care planning  Description  Participates in care planning  Outcome: Ongoing   Plans return to [facility]  Problem: Activity Intolerance:  Goal: Ability to tolerate increased activity will improve  Description  Ability to tolerate increased activity will improve  Outcome: Ongoing   Increasing activity as tolerated with shortness of breath. Oxygen per NC. Problem: Pain:  Goal: Pain level will decrease  Description  Pain level will decrease  Outcome: Ongoing   0-10 pain scale explained and utilized. Goal of 1 out of 10 with occasional muscular pain. Problem: Serum Glucose Level - Abnormal:  Goal: Ability to maintain appropriate glucose levels will improve to within specified parameters  Description  Ability to maintain appropriate glucose levels will improve to within specified parameters  Outcome: Ongoing   Chems ACHS with insulins as ordered. Problem: Skin Integrity - Impaired:  Goal: Absence of new skin breakdown  Description  Absence of new skin breakdown  Outcome: Ongoing   Encouraged/assisted with turns as needed. Skin intact at this time. Care plan reviewed with patient. Patient verbalize understanding of the plan of care and contribute to goal setting.

## 2019-06-07 NOTE — PROGRESS NOTES
This RN perfect served text message Dr. Sosa Schmitt the results of CT of chest.  Awaiting response.

## 2019-06-07 NOTE — PROGRESS NOTES
Hospitalist Progress Note      Patient:  India Abts      Unit/Bed:4A-12/012-A    YOB: 1950    MRN: [id number]       Acct: [de-identified]     PCP: [address]    Date of Admission: 6/6/2019    Assessment/Plan:    1. Acute on chronic diastolic CHF -- apprec renal assist for diuresis also-- low salt, fluid restriction  -- s/p bumex 1 mg IV 6/6 - 6/7 -> creat kandice and not much improvement in effusions thus stopped per Dr. Chin Fess 6/7 --> ?dialysis - no peripheral edema  -- last echo 12/2018 EF 50-55%, no WMA, mildly dilated LA, mild MR, mild TR --> limited echo 6/7/19 normal EF 55%, no other abn noted  -- ?BB -- no ACE/aRB due to CKD --> on isordil/hydralazine  -- TSH 5/19 WNL  2. Bilateral R>L pleural effusions, recurrent -- apprec pulm and renal assist -- CT chest 6/7/19 (p) per pulm  -- s/p right thoracentesis 6/6/19 = 1.4 L - prior studies c/w transudate last admission 5/2019 (none sent this admission)   -- s/p bumex 1 mg daily 6/6 - 6/7 --> stopped per renal for rising creatinine  -- per Care Everywhere pt has had right effusion back in 12/2018, 1/2019, 2/2019  -- limited echo 6/7/19 EF 55%, LV fxn normal--> similar to 12/2018 echo  3. Moderate right perihilar atelectasis and both bases -- likely due to effusions --> encourage IS -- ?need to r/o other rheum process --> per pulm 6/7 LUIS (p), histone IgG (p), anti-dsDNA (p) -- doubt infection -- PCT stable in 0.2's as was similar in 5/2019  4. CKD stage now 5 -- due to DM, HTN -- c/s renal apprec -- creat 3.8 on 6/7 with diuretics -> renal stopped 6/7 and monitor creat    - was mid 3's during admission 5/20 - 5/23 -- baseline CKD with baseline 2.8 - 3.1  5. Acute hypoxic respiratory failure -- not on O2 at [facility] -- now up to 4L -- due to pleural effusions and atelectasis -- wean O2 as able - apprec pulm assist -- encourage IS  6. Metabolic acidosis -- due to CKD likely - ?need bicarb - per renal  7.  Leukocytosis -- afeb, ?etiology -- up and down - similar last admission 5/2019 -- afebrile -- chronic component per chart review -- PCT slightly elevated chronically also in 0.2's - monitor off atbx  8. Chronic elevated trops -- due to renal dysfunction -- no cp - only sob but with above -- trops 6/6 at baseline 0.033 which is what was in 5/2019  -- no ASA - hold until if need further procedures  -- limited echo 6/7/19 (p)  9. Type 2 DM -- cont lantus 20 units, SSI -- monitor and adjust prn -- carb diet.  Last A1C here 7.1 on 12/2018  10. Essential HTN -- cont norvasc 10 mg daily, hydralazine 100 mg tid, isordil 20 mg tid -- diuretics per renal 6/6 - 6/7 and stopped --  no ACE/ARB with CKD -- monitor and adjust prn   11. Chronic normocytic anemia -- cont po iron as iron low 5/19 -- likely due to CKD -- ??aranesp per renal -- stable in 9's -- was 9.1 on 5/22 and stable 9.4 on 6/7/19 --> was 10's at [facility] 2/2019 but 8-10 prior  12. Diabetic gastroparesis -- anibal po - monitor  13. Hypothyroidism -- cont synthroid -- TSH 5/19 WNL  14. Hx neurogenic bladder -- ditropan  15. HLD -- cont statin -- Lipids 5/20/19 HLD 66, LDL 75, trig 148  16. Chronic dizziness -- on antivert  17. Bipolar d/o -- at [facility] chronically -- no current meds and mood stable  18. Mild MR, mild TR -- per echo 12/2018  19. Hx seizure d/o -- no seizure meds but on neurontin  20. Chronic pain -- cont neurontin, cymbalta  21. Constipation -- added miralax prn 6/9  22. Generalized weakness -- PT/OT c/s     Dispo  -- 6/7 --> apprec renal and pulm assist -- per renal stopped diuretics this am - per pulm plan CT to assess effusions -- ?need pleurX, ?need dialysis but no other findings of fluid overload -- cont increase activity, encourage IS, wean O2 as able as not on at [facility]. Cont monitor renal fxn, lytes, BP, h/h.   To return to [facility] when ok with consultants.           Chief Complaint: [address] Course: Justo Zamora is a 81 y.o. female with bipolar, CKD IV, CHF, DMII, HTN and lipidemia [place] [facility] [place] chronic systolic heart failure, recent admission 5/19 - 5/23 for similar c/o of sob, weight gain -- had thoracentesis during that admission also  -- pulmonary consulted for recurrent bilateral pleural effusions --> s/p right thoracentesis 6/ 6 = 1.4L removed   -- renal consulted for CKD stage IV and fluid overload - not much improvement with diuretics     Subjective:   -- 6/7 --> pt states she is breathing better. Denies any pain. Still more sob than normal - doesn't normally wear O2 at ECF - on 4L O2. .  Denies cp, sob. Denies abd pain, n/v.  Denies f/c.  Isaiah po. Afebrile. Up to bathroom w/o lightheaded/dizziness. +TRAN. Last BM -- none since admission      Medications:  Reviewed    Infusion Medications    dextrose       Scheduled Medications    amLODIPine  10 mg Oral Daily    DULoxetine  30 mg Oral Daily    fluticasone  1 spray Each Nostril Daily    gabapentin  100 mg Oral TID    hydrALAZINE  100 mg Oral Q8H    insulin lispro  0-9 Units Subcutaneous 4x Daily AC & HS    isosorbide dinitrate  20 mg Oral TID    levothyroxine  25 mcg Oral Daily    meclizine  25 mg Oral TID    oxybutynin  5 mg Oral Daily    pravastatin  40 mg Oral Nightly    sodium chloride flush  10 mL Intravenous 2 times per day    heparin (porcine)  5,000 Units Subcutaneous 3 times per day    insulin glargine  20 Units Subcutaneous Nightly    bumetanide  1 mg Intravenous Daily     PRN Meds: acetaminophen, magnesium hydroxide, glucose, dextrose, glucagon (rDNA), dextrose, sodium chloride flush, ondansetron      Intake/Output Summary (Last 24 hours) at 6/7/2019 1126  Last data filed at 6/7/2019 0318  Gross per 24 hour   Intake 400 ml   Output 75 ml   Net 325 ml       Diet:  DIET CARB CONTROL;   Dietary Nutrition Supplements: Clear Liquid Oral Supplement    Exam:  BP (!) 181/73   Pulse 71   Temp 98.5 °F (36.9 °C) (Oral)   Resp 18   Ht 5' 6\" (1.676 m)   Wt 158 lb 3.2 oz (71.8 kg)   SpO2 93% BMI 25.53 kg/m²     General appearance: No apparent distress, appears older than stated age and cooperative. HEENT: Pupils equal, round, and reactive to light. Conjunctivae/corneas clear. Neck: Supple, with full range of motion. No jugular venous distention. Trachea midline. Respiratory:  Normal respiratory effort.  +crackles right mid/base, left base very diminished, no wheezes. No respiratory distress or accessory muscle use. Cardiovascular: Regular rate and rhythm with normal S1/S2, +2/6 SHANITA, no rubs or gallops. Abdomen: Soft, non-tender, non-distended with normal bowel sounds. No rebound or guarding  Musculoskeletal: No clubbing, cyanosis or edema bilaterally. Full range of motion without deformity. No calf tenderness palpation  Skin: Skin color, texture, turgor normal.  No rashes or lesions. Neurologic:  Neurovascularly intact without any focal sensory/motor deficits. Cranial nerves: II-XII intact, grossly non-focal.  Psychiatric: Alert and oriented, thought content appropriate, normal insight  Capillary Refill: Brisk,< 3 seconds   Peripheral Pulses: +2 palpable, equal bilaterally       Labs:   Recent Labs     06/07/19  0344   WBC 12.6*   HGB 9.4*   HCT 27.9*        Recent Labs     06/07/19  0344      K 4.5      CO2 21*   BUN 66*   CREATININE 3.8*   CALCIUM 9.3     No results for input(s): AST, ALT, BILIDIR, BILITOT, ALKPHOS in the last 72 hours. No results for input(s): INR in the last 72 hours. No results for input(s): Jaden Milch in the last 72 hours. Urinalysis:      Lab Results   Component Value Date    NITRU Negative 03/09/2019    WBCUA 0-2 06/07/2016    BACTERIA NONE 06/07/2016    RBCUA 0-2 06/07/2016    BLOODU Negative 03/09/2019    GLUCOSEU 2+ 03/09/2019       Radiology:  XR CHEST PORTABLE   Final Result   1. Redemonstration of changes compatible with congestive heart failure. 2. Bilateral effusions right greater than left.       **This report has been created using voice recognition software. It may contain minor errors which are inherent in voice recognition technology. **      Final report electronically signed by Dr. Luis A Arthur on 6/7/2019 2:19 AM      US THORACENTESIS   Final Result   1. Uncomplicated ultrasound guided therapeutic thoracentesis. **This report has been created using voice recognition software. It may contain minor errors which are inherent in voice recognition technology. **      Final report electronically signed by Dr. Fredo Mcelroy on 6/6/2019 1:28 PM      XR CHEST 1 VW   Final Result   1. The patient is status post large volume right thoracentesis with aspiration of 1.4 L of clear yellow fluid. There is no pneumothorax. There are bilateral perihilar and the basilar infiltrates. **This report has been created using voice recognition software. It may contain minor errors which are inherent in voice recognition technology. **      Final report electronically signed by Dr. Fredo Mcelroy on 6/6/2019 1:30 PM      CT Chest WO Contrast    (Results Pending)       Diet: DIET CARB CONTROL;   Dietary Nutrition Supplements: Clear Liquid Oral Supplement    Perez: no    Microbiology:  none    Tele:  SR,  No arrhythmias    DVT prophylaxis: [] Lovenox                                 [] SCDs                                 [x] SQ Heparin                                 [] Encourage ambulation           [] Already on Anticoagulation     Disposition:    [] Home       [] TCU       [] Rehab       [] Psych       [x] SNF       [] [place]       [] Other-    Code Status: Full Code    PT/OT Eval Status: monitor for need        Electronically signed by VIRY FELIPE MD on 6/7/2019 at 11:26 AM when pt evaluated - final note filed late

## 2019-06-07 NOTE — CARE COORDINATION
6/7/19, 10:56 AM    DISCHARGE BARRIERS  Spoke with patient regarding transport back to [facility]. Discussed her getting a bill from [employer] for $166. SW suggested she speak with the ECF SW regarding the bill. She is Medicare and Medicaid and a long term resident at [facility]. Her transportation should be covered. SW called and spoke with CM at [facility]. She told SW that all of the patients transportation should be covered and she does not know any reason it would not be covered. SW will set up [facility] at discharge. Made Arlin Stearns at [facility] aware John Chang is a potential discharge for today. 6/7/19, 1:58 PM    Discharge plan discussed by  and . Discharge plan reviewed with patient/ family. Patient/ family verbalize understanding of discharge plan and are in agreement with plan. Understanding was demonstrated using the teach back method. Services After Discharge  Services At/After Discharge: In [facility], Nursing Services, OT, PT, Aide Services(Nathalie Wagner/ [facility])   IMM Letter  IMM Letter given to Patient/Family/Significant other/Guardian/POA/by[de-identified]   IMM Letter date given[de-identified] 06/07/19  IMM Letter time given[de-identified] 46  John Chang is a potential discharge back to [facility] over the weekend. She will return under her medicaid benefit. She will be transported by [facility]. Discharge instructions and transportation forms are attached to patients blue discharge packet. Spoke with Arlin Stearns in admissions at the facility to make her aware of potential weekend discharge.

## 2019-06-07 NOTE — PROGRESS NOTES
06/07/19  0344   WBC 12.6*   RBC 3.11*   HGB 9.4*   HCT 27.9*        Last 3 CMP  Recent Labs     06/07/19  0344      K 4.5      CO2 21*   BUN 66*   CREATININE 3.8*   CALCIUM 9.3        Assessment / Plan   Renal - CKD stage 5 - overall renal FX appears stable at baseline  · Volume status also looks well in fact much better than it has been in the past at 165 pounds  · CXR shows may be mild congestion, SOB improved. Creat worsening  · Stop diuretics and BMP in AM  · No acute need for RRT at this time        Electrolytes - WNL  Essential Hypertension - reasonable control  SOB -  Multifactorial, primarily pleural effusion  And may be mild CHF. bumex on hold  Pleural effusion - S/P thoracentesis and pulmonary has been consulted  Diabetes Mellitus  meds reviewed and D/W patient and nursing staff. LIANNE Becerra D.  [facility].

## 2019-06-07 NOTE — DISCHARGE INSTR - COC
Continuity of Care Form    Patient Name: Nidia Miller   :  1950  MRN:  [id number]    [facility] date:  2019  Discharge date:  19    Code Status Order: Full Code   Advance Directives:   [facility] Documentation     Date/Time Healthcare Directive Type of Healthcare Directive Copy in [address] Agent's Name Healthcare Agent's Phone Number    [phone]  Yes, patient has an advance directive for healthcare treatment  Durable power of  for health care  No, copy requested from family  Healthcare power of   sheila Bertrand  [phone]          Admitting Physician:  Sheela Connelly DO  PCP: Jen Munguia    Discharging Nurse: Marli Unit/Room#: 4A-12/012-A  Discharging Unit Phone Number: [phone]    Emergency Contact:   Extended Emergency Contact Information  Primary Emergency Contact: Jimmy Richmond [address] Phone: [phone]  Mobile Phone: [phone]  Relation: Child  Secondary Emergency Contact: rosa [address] [facility] Phone: [phone]  Relation: Other    Past Surgical History:  Past Surgical History:   Procedure Laterality Date    BACK SURGERY      CERVICAL FUSION      CHOLECYSTECTOMY      COLONOSCOPY      ENDOSCOPY, COLON, DIAGNOSTIC      FRACTURE SURGERY         Immunization History:   Immunization History   Administered Date(s) Administered    Influenza Whole 10/10/2018    Influenza, High Dose (Fluzone 65 yrs and older) 2017, 2018    Pneumococcal Vaccine, unspecified formulation 2016       Active Problems:  Patient Active Problem List   Diagnosis Code    Chronic kidney disease, stage III (moderate) (HCC) N18.3    Anemia in chronic renal disease N18.9, D63.1    Type 2 diabetes mellitus with insulin therapy ([address] E11.9, Z79.4    Chronic kidney disease (CKD), stage IV (severe) ([address] N18.4    Diabetic nephropathy with proteinuria ([address] E11.21    Acute respiratory failure with hypoxia (HCC) J96.01    Acute pulmonary edema (HCC) J81.0    Acute on chronic diastolic heart failure ([facility]) I50.33    Uncontrolled type 2 diabetes mellitus with hyperglycemia (HCC) E11.65    Essential hypertension I10    Hypothyroidism E03.9    Other fluid overload E87.79    Renal failure N19    Diabetic nephropathy associated with type 2 diabetes mellitus ([facility]) E11.21    Bilateral pleural effusion J90    Elevated troponin A51.7    Metabolic acidosis J59.2    Leukocytosis D72.829    Generalized weakness R53.1    Bipolar 1 disorder (HCC) F31.9    Mild mitral regurgitation I34.0    Mild tricuspid regurgitation I07.1    Chronic pain syndrome G89.4    Pleural effusion J90    Stage 5 chronic kidney disease ([facility]) N18.5    (HFpEF) heart failure with preserved ejection fraction (HCC) I50.30       Isolation/Infection:   Isolation          No Isolation            Nurse Assessment:  Last Vital Signs: BP (!) 181/73   Pulse 71   Temp 98.5 °F (36.9 °C) (Oral)   Resp 18   Ht 5' 6\" (1.676 m)   Wt 158 lb 3.2 oz (71.8 kg)   SpO2 93%   BMI 25.53 kg/m²     Last documented pain score (0-10 scale): Pain Level: 1  Last Weight:   Wt Readings from Last 1 Encounters:   06/07/19 158 lb 3.2 oz (71.8 kg)     Mental Status:  {IP PT MENTAL STATUS:81279}    IV Access:  - None    Nursing Mobility/ADLs:  Walking   Assisted  Transfer  Assisted  Bathing  Assisted  Dressing  Assisted  Toileting  Assisted  Feeding  Independent  Med Admin  Independent  Med Delivery   whole    Wound Care Documentation and Therapy:  Wound 12/12/18 Buttocks Inner;Mid;Left;Right;Medial Non-blanchable purple area to buttocks (Active)   Number of days: 176        Elimination:  Continence:   · Bowel: No  · Bladder: No  Urinary Catheter: None   Colostomy/Ileostomy/Ileal Conduit: No       Date of Last BM: 6/12/19    Intake/Output Summary (Last 24 hours) at 6/7/2019 1033  Last data filed at 6/7/2019 0637  Gross per 24 hour Intake 400 ml   Output 75 ml   Net 325 ml     I/O last 3 completed shifts: In: 400 [P.O.:400]  Out: 75 [Urine:75]    Safety Concerns: At Risk for Falls    Impairments/Disabilities:      Drop foot-right side    Nutrition Therapy:  Current Nutrition Therapy:   - Oral Diet:  General    Routes of Feeding: Oral  Liquids: No Restrictions  Daily Fluid Restriction: no  Last Modified Barium Swallow with Video (Video Swallowing Test): not done    Treatments at the Time of Hospital Discharge:   Respiratory Treatments: none  Oxygen Therapy:  is on oxygen at 1 L/min per nasal cannula. Ventilator:    - No ventilator support    Rehab Therapies: Physical Therapy and Occupational Therapy  Weight Bearing Status/Restrictions: No weight bearing restirctions  Other Medical Equipment (for information only, NOT a DME order):  walker  Other Treatments: Call patient's cardiologist and nephrologist if patient has more than a 3 pound weight gain a 24 hour period. Patient's personal belongings (please select all that are sent with patient):  Glasses, Hearing Aides bilateral, Dentures upper and lower    RN SIGNATURE:  Electronically signed by Brie Blake RN on 6/13/19 at 4:30 PM    CASE MANAGEMENT/SOCIAL WORK SECTION    Inpatient Status Date: 6/6/2019    Readmission Risk Assessment Score:  Readmission Risk              Risk of Unplanned Readmission:        24           Discharging to Facility/ Agency   · Name: [facility]   · [address] [place] [address] [address]  · [phone]  · Fax:[phone]    Dialysis Facility (if applicable)   · Name:  · Address:  · Dialysis Schedule:  · Phone:  · Fax:    / signature: Electronically signed by DIANA Segura on 6/7/19 at 10:35 AM    PHYSICIAN SECTION    Prognosis: Good    Condition at Discharge: Stable    Rehab Potential (if transferring to Rehab): Good    Recommended Labs or Other Treatments After Discharge: rpt CXR    Physician Certification: I certify the above information and transfer of Maryam Mendez  is necessary for the continuing treatment of the diagnosis listed and that she requires [facility] for less than 30 days.      Update Admission H&P: No change in H&P    PHYSICIAN SIGNATURE:  Electronically signed by Lynda Natarajan MD on 6/13/19 at 4:41 PM

## 2019-06-07 NOTE — CARE COORDINATION
6/7/19, 1:51 PM      Andrzej Sparrow day: 1  Location: [address] [place], [place] [address] Reason for admit: Pleural effusion [J90]   Procedure: CT Chest WO Contrast    Impression:       1. Moderate-sized left pleural effusion. Bibasilar atelectasis/pneumonia. Groundglass and right mid and upper lung fields, consistent with pneumonia versus pulmonary edema. 2. Enlarged pulmonary arteries, consistent with pulmonary arterial hypertension. Treatment Plan of Care: IV Bumex, Pulmonology following, incentive spirometry, possible IR intervention, thoracentesis. PCP: Aj Drake [facility]  Readmission Risk Score: 24%  Discharge Plan: Return to [facility].

## 2019-06-07 NOTE — PLAN OF CARE
Problem: Falls - Risk of:  Goal: Will remain free from falls  Description  Will remain free from falls  Outcome: Ongoing  Note:   Call light in reach, bed in lowest position, and bed alarm activated. Education given on use of call light before ambulation and when in need of assistance. Patient expressed understanding. Hourly visual checks performed and charted. Toileting offered to patient. No falls this shift, at any time. Arm band and falling star in place. Will continue to monitor. Problem: Tissue Perfusion - Cardiopulmonary, Altered:  Goal: Hemodynamic stability will improve  Description  Hemodynamic stability will improve  Outcome: Ongoing  Note:   Monitoring blood pressures and adjusting medications. Problem: Discharge Planning:  Goal: Participates in care planning  Description  Participates in care planning  Outcome: Ongoing  Note:   Planning on going back to [facility] when medically stable. Problem: Activity Intolerance:  Goal: Ability to tolerate increased activity will improve  Description  Ability to tolerate increased activity will improve  Outcome: Ongoing  Note:   Up with 1 assistance. Problem: Pain:  Goal: Pain level will decrease  Description  Pain level will decrease  Outcome: Ongoing  Note:   Patient given PRN Tylenol for her aches. Problem: Serum Glucose Level - Abnormal:  Goal: Ability to maintain appropriate glucose levels will improve to within specified parameters  Description  Ability to maintain appropriate glucose levels will improve to within specified parameters  Outcome: Ongoing  Note:   Monitoring blood glucose and administering insulin per sliding scale. Problem: Skin Integrity - Impaired:  Goal: Absence of new skin breakdown  Description  Absence of new skin breakdown  Outcome: Ongoing  Note:   Patient is encouraged to turn and reposition.

## 2019-06-08 LAB
ANION GAP SERPL CALCULATED.3IONS-SCNC: 14 MEQ/L (ref 8–16)
BUN BLDV-MCNC: 71 MG/DL (ref 7–22)
CALCIUM SERPL-MCNC: 8.7 MG/DL (ref 8.5–10.5)
CHLORIDE BLD-SCNC: 98 MEQ/L (ref 98–111)
CO2: 20 MEQ/L (ref 23–33)
CREAT SERPL-MCNC: 4.1 MG/DL (ref 0.4–1.2)
GFR SERPL CREATININE-BSD FRML MDRD: 11 ML/MIN/1.73M2
GLUCOSE BLD-MCNC: 122 MG/DL (ref 70–108)
GLUCOSE BLD-MCNC: 138 MG/DL (ref 70–108)
GLUCOSE BLD-MCNC: 141 MG/DL (ref 70–108)
GLUCOSE BLD-MCNC: 191 MG/DL (ref 70–108)
GLUCOSE BLD-MCNC: 191 MG/DL (ref 70–108)
GLUCOSE BLD-MCNC: 64 MG/DL (ref 70–108)
GLUCOSE BLD-MCNC: 69 MG/DL (ref 70–108)
MAGNESIUM: 1.9 MG/DL (ref 1.6–2.4)
POTASSIUM SERPL-SCNC: 4.2 MEQ/L (ref 3.5–5.2)
SODIUM BLD-SCNC: 132 MEQ/L (ref 135–145)

## 2019-06-08 PROCEDURE — 2580000003 HC RX 258: Performed by: INTERNAL MEDICINE

## 2019-06-08 PROCEDURE — 6370000000 HC RX 637 (ALT 250 FOR IP): Performed by: INTERNAL MEDICINE

## 2019-06-08 PROCEDURE — 99233 SBSQ HOSP IP/OBS HIGH 50: CPT | Performed by: FAMILY MEDICINE

## 2019-06-08 PROCEDURE — 87205 SMEAR GRAM STAIN: CPT

## 2019-06-08 PROCEDURE — 36415 COLL VENOUS BLD VENIPUNCTURE: CPT

## 2019-06-08 PROCEDURE — 80048 BASIC METABOLIC PNL TOTAL CA: CPT

## 2019-06-08 PROCEDURE — 6360000002 HC RX W HCPCS: Performed by: INTERNAL MEDICINE

## 2019-06-08 PROCEDURE — 94640 AIRWAY INHALATION TREATMENT: CPT

## 2019-06-08 PROCEDURE — 99232 SBSQ HOSP IP/OBS MODERATE 35: CPT | Performed by: INTERNAL MEDICINE

## 2019-06-08 PROCEDURE — 83735 ASSAY OF MAGNESIUM: CPT

## 2019-06-08 PROCEDURE — 82948 REAGENT STRIP/BLOOD GLUCOSE: CPT

## 2019-06-08 PROCEDURE — 1200000003 HC TELEMETRY R&B

## 2019-06-08 PROCEDURE — 2700000000 HC OXYGEN THERAPY PER DAY

## 2019-06-08 PROCEDURE — 6370000000 HC RX 637 (ALT 250 FOR IP): Performed by: PHYSICIAN ASSISTANT

## 2019-06-08 RX ORDER — IPRATROPIUM BROMIDE AND ALBUTEROL SULFATE 2.5; .5 MG/3ML; MG/3ML
1 SOLUTION RESPIRATORY (INHALATION) EVERY 4 HOURS PRN
Status: DISCONTINUED | OUTPATIENT
Start: 2019-06-08 | End: 2019-06-13 | Stop reason: HOSPADM

## 2019-06-08 RX ADMIN — GABAPENTIN 100 MG: 100 CAPSULE ORAL at 15:19

## 2019-06-08 RX ADMIN — PRAVASTATIN SODIUM 40 MG: 40 TABLET ORAL at 21:53

## 2019-06-08 RX ADMIN — DULOXETINE HYDROCHLORIDE 30 MG: 30 CAPSULE, DELAYED RELEASE ORAL at 09:31

## 2019-06-08 RX ADMIN — INSULIN LISPRO 1 UNITS: 100 INJECTION, SOLUTION INTRAVENOUS; SUBCUTANEOUS at 13:48

## 2019-06-08 RX ADMIN — HYDRALAZINE HYDROCHLORIDE 100 MG: 50 TABLET ORAL at 09:31

## 2019-06-08 RX ADMIN — MECLIZINE HYDROCHLORIDE 25 MG: 12.5 TABLET, FILM COATED ORAL at 09:30

## 2019-06-08 RX ADMIN — Medication 10 ML: at 09:30

## 2019-06-08 RX ADMIN — INSULIN LISPRO 1 UNITS: 100 INJECTION, SOLUTION INTRAVENOUS; SUBCUTANEOUS at 21:53

## 2019-06-08 RX ADMIN — HYDRALAZINE HYDROCHLORIDE 100 MG: 50 TABLET ORAL at 23:53

## 2019-06-08 RX ADMIN — HEPARIN SODIUM 5000 UNITS: 5000 INJECTION INTRAVENOUS; SUBCUTANEOUS at 07:36

## 2019-06-08 RX ADMIN — MECLIZINE HYDROCHLORIDE 25 MG: 12.5 TABLET, FILM COATED ORAL at 16:55

## 2019-06-08 RX ADMIN — ISOSORBIDE DINITRATE 20 MG: 20 TABLET ORAL at 15:19

## 2019-06-08 RX ADMIN — ISOSORBIDE DINITRATE 20 MG: 20 TABLET ORAL at 21:53

## 2019-06-08 RX ADMIN — HYDRALAZINE HYDROCHLORIDE 100 MG: 50 TABLET ORAL at 00:48

## 2019-06-08 RX ADMIN — Medication 10 ML: at 21:54

## 2019-06-08 RX ADMIN — HEPARIN SODIUM 5000 UNITS: 5000 INJECTION INTRAVENOUS; SUBCUTANEOUS at 21:53

## 2019-06-08 RX ADMIN — LEVOTHYROXINE SODIUM 25 MCG: 25 TABLET ORAL at 07:36

## 2019-06-08 RX ADMIN — HEPARIN SODIUM 5000 UNITS: 5000 INJECTION INTRAVENOUS; SUBCUTANEOUS at 15:19

## 2019-06-08 RX ADMIN — IPRATROPIUM BROMIDE AND ALBUTEROL SULFATE 1 AMPULE: .5; 3 SOLUTION RESPIRATORY (INHALATION) at 23:39

## 2019-06-08 RX ADMIN — OXYBUTYNIN CHLORIDE 5 MG: 5 TABLET ORAL at 09:30

## 2019-06-08 RX ADMIN — GABAPENTIN 100 MG: 100 CAPSULE ORAL at 21:53

## 2019-06-08 RX ADMIN — AMLODIPINE BESYLATE 10 MG: 10 TABLET ORAL at 09:31

## 2019-06-08 RX ADMIN — CEFEPIME HYDROCHLORIDE 1 G: 1 INJECTION, POWDER, FOR SOLUTION INTRAMUSCULAR; INTRAVENOUS at 04:42

## 2019-06-08 RX ADMIN — GABAPENTIN 100 MG: 100 CAPSULE ORAL at 09:30

## 2019-06-08 RX ADMIN — ISOSORBIDE DINITRATE 20 MG: 20 TABLET ORAL at 09:30

## 2019-06-08 RX ADMIN — MECLIZINE HYDROCHLORIDE 25 MG: 12.5 TABLET, FILM COATED ORAL at 21:53

## 2019-06-08 RX ADMIN — INSULIN GLARGINE 20 UNITS: 100 INJECTION, SOLUTION SUBCUTANEOUS at 21:53

## 2019-06-08 RX ADMIN — HYDRALAZINE HYDROCHLORIDE 100 MG: 50 TABLET ORAL at 16:55

## 2019-06-08 ASSESSMENT — PAIN SCALES - GENERAL: PAINLEVEL_OUTOF10: 0

## 2019-06-08 NOTE — PLAN OF CARE
Problem: Falls - Risk of:  Goal: Will remain free from falls  Description  Will remain free from falls  6/8/2019 0624 by Jessika Verma RN  Outcome: Ongoing  Note:   Bed in lowest position and locked. Bed alarm activated. Educated on use of call light when in need of assistance- expressed understanding. Visual checks performed and charted. No falls during shift at this time. Armband and falling star in place. Call light within reach. Transfers with one assist and walker. 6/7/2019 1857 by Malena Miller RN  Outcome: Ongoing  Note:   Call light in reach, bed in lowest position, and bed alarm activated. Education given on use of call light before ambulation and when in need of assistance. Patient expressed understanding. Hourly visual checks performed and charted. Toileting offered to patient. No falls this shift, at any time. Arm band and falling star in place. Will continue to monitor. Problem: Tissue Perfusion - Cardiopulmonary, Altered:  Goal: Hemodynamic stability will improve  Description  Hemodynamic stability will improve  6/8/2019 0624 by Jessika Verma RN  Outcome: Ongoing  Note:   Vitals:    06/08/19 0355   BP: 136/63   Pulse: 65   Resp: 16   Temp: 97.6 °F (36.4 °C)   SpO2: 96%     Continuing to monitor VS q4hr and PRN.  6/7/2019 1857 by Malena Miller RN  Outcome: Ongoing  Note:   Monitoring blood pressures and adjusting medications. Problem: Discharge Planning:  Goal: Participates in care planning  Description  Participates in care planning  6/8/2019 0624 by Jessika Verma RN  Outcome: Ongoing  Note:   Patient is from [facility], plans to return at discharge. Patient can return at any time over the weekend, blue packet is filled out. 6/7/2019 1857 by Malena Miller RN  Outcome: Ongoing  Note:   Planning on going back to [facility] when medically stable. Problem:  Activity Intolerance:  Goal: Ability to tolerate increased activity will improve  Description  Ability to tolerate increased activity will improve  6/8/2019 0624 by Cesar Hughes RN  Outcome: Ongoing  Note:   Patient abulates with one assist and walker, continues to work with therapy. Equal strength in extremities x4.  6/7/2019 1857 by Ana Carter RN  Outcome: Ongoing  Note:   Up with 1 assistance. Problem: Pain:  Goal: Pain level will decrease  Description  Pain level will decrease  6/8/2019 0624 by Cesar Hughes RN  Outcome: Ongoing  Note:   Patient able to use 0-10 pain scale. Denies pain at this time. Pain complaints as documented. Agreeable to take PRN pain medications. Pain goal of \"no pain\". 6/7/2019 1857 by Ana Carter RN  Outcome: Ongoing  Note:   Patient given PRN Tylenol for her aches. Problem: Serum Glucose Level - Abnormal:  Goal: Ability to maintain appropriate glucose levels will improve to within specified parameters  Description  Ability to maintain appropriate glucose levels will improve to within specified parameters  6/8/2019 0624 by Cesar Hughes RN  Outcome: Ongoing  Note:   Patient's chem with labs was 59. Patient was provided orange juice. Repeat chem was __. Continuing to monitor. Chem ACHS.  6/7/2019 1857 by Ana Carter RN  Outcome: Ongoing  Note:   Monitoring blood glucose and administering insulin per sliding scale. Problem: Skin Integrity - Impaired:  Goal: Absence of new skin breakdown  Description  Absence of new skin breakdown  6/8/2019 0624 by Cesar Hughes RN  Outcome: Ongoing  Note:      06/08/19 0444   Skin Color/Condition   Skin Color Appropriate for ethnicity   Skin Condition/Temp Warm;Dry   Skin Integrity   Location Scattered   Skin Integrity Bruising; Abrasion   Multiple Skin Integrity Sites Yes   Skin Integrity Site 2   Skin Integrity Location 2 Redness  (Blanches)   Location 2 Buttocks   No signs of new skin breakdown with each assessment. Skin remains warm, dry, intact. Mucous membranes pink & moist. Patient is able to turn self. 6/7/2019 1857 by Alexei Tang, RN  Outcome: Ongoing  Note:   Patient is encouraged to turn and reposition.

## 2019-06-08 NOTE — PROGRESS NOTES
[facility]    Patient - Noemi Tanner   MRN -  [id number]   [facility] # - [de-identified]   - 1950      Date of Admission -  2019  6:35 AM  Date of evaluation -  2019  Room - 4A-012-A   Hospital Day - 2  Consulting - Ping Perez MD Primary Care Physician - [address] [place], [place] [address] [address]   Chief Complaint   Pleural effusion  Active Hospital Problem List      Active Hospital Problems    Diagnosis Date Noted    Pleural effusion [J90] 2019    Stage 5 chronic kidney disease ([address] [N18.5]     (HFpEF) heart failure with preserved ejection fraction ([address] [I50.30]     Essential hypertension [I10] 2018     HPI   Noemi Tanner is a 81 y.o. female transferred from outside hospital for SOB and pleural effusion. Never smoker, has been tapped twice before over the last few weeks in the same side, apparently a transudate. Was DC from this hospital last week at that time shed had a right  thoracentesis of 1 L, studies not sent, today a 1.4L tap was done ordered by primary team before we saw pt, chemistries  not requested.   Usha Villa has CKD 5 and CHF with preserved EF    Past 24 Hours:  -Chart reviewed  -No acute distress on 4LPM  -denies any increased SOB, fever chills    Past Medical History         Diagnosis Date    Anemia     Anemia in chronic kidney disease(285.21)     Arthritis     Asthma     Bipolar 1 disorder ([address]     CHF (congestive heart failure) (HCC)     Chronic kidney disease, stage III (moderate) (HCC)     Depression     GERD (gastroesophageal reflux disease)     Gout     Headache(784.0)     Hyperkalemia     Hyperlipidemia     Hypertension     Hypothyroid     Neurogenic bladder     Pneumonia     Seizure ([address] 2018    Type II or unspecified type diabetes mellitus without mention of complication, not stated as uncontrolled       Past Surgical History           Procedure Laterality Date    BACK SURGERY      CERVICAL FUSION      CHOLECYSTECTOMY      COLONOSCOPY      ENDOSCOPY, COLON, DIAGNOSTIC      FRACTURE SURGERY       Diet    DIET CARB CONTROL; Dietary Nutrition Supplements: Clear Liquid Oral Supplement  Allergies    Eggs or egg-derived products; Aspirin; Pcn [penicillins];  Vicodin [hydrocodone-acetaminophen]; Vilazodone; and Wellbutrin [bupropion]  Social History     Social History     Socioeconomic History    Marital status:      Spouse name: Not on file    Number of children: 1    Years of education: Not on file    Highest education level: Not on file   Occupational History    Not on file   Social Needs    Financial resource strain: Not on file    Food insecurity:     Worry: Not on file     Inability: Not on file    Transportation needs:     Medical: Not on file     Non-medical: Not on file   Tobacco Use    Smoking status: Never Smoker    Smokeless tobacco: Never Used   Substance and Sexual Activity    Alcohol use: No    Drug use: No    Sexual activity: Not on file   Lifestyle    Physical activity:     Days per week: Not on file     Minutes per session: Not on file    Stress: Not on file   Relationships    Social connections:     Talks on phone: Not on file     Gets together: Not on file     Attends Spiritism service: Not on file     Active member of club or organization: Not on file     Attends meetings of clubs or organizations: Not on file     Relationship status: Not on file    Intimate partner violence:     Fear of current or ex partner: Not on file     Emotionally abused: Not on file     Physically abused: Not on file     Forced sexual activity: Not on file   Other Topics Concern    Not on file   Social History Narrative    Not on file     Family History          Problem Relation Age of Onset    High Blood Pressure Mother     Diabetes Mother     Cancer Mother     Heart Attack Mother     Stroke Father     High Blood Pressure Father     Anemia Father      Sleep History    N/A    Meds Current Medications    cefepime  1 g Intravenous Q12H    azithromycin  500 mg Intravenous Q24H    amLODIPine  10 mg Oral Daily    DULoxetine  30 mg Oral Daily    fluticasone  1 spray Each Nostril Daily    gabapentin  100 mg Oral TID    hydrALAZINE  100 mg Oral Q8H    insulin lispro  0-9 Units Subcutaneous 4x Daily AC & HS    isosorbide dinitrate  20 mg Oral TID    levothyroxine  25 mcg Oral Daily    meclizine  25 mg Oral TID    oxybutynin  5 mg Oral Daily    pravastatin  40 mg Oral Nightly    sodium chloride flush  10 mL Intravenous 2 times per day    heparin (porcine)  5,000 Units Subcutaneous 3 times per day    insulin glargine  20 Units Subcutaneous Nightly     acetaminophen, magnesium hydroxide, glucose, dextrose, glucagon (rDNA), dextrose, sodium chloride flush, ondansetron  IV Drips/Infusions   dextrose       Vitals    Vitals    height is 5' 6\" (1.676 m) and weight is 163 lb 14.4 oz (74.3 kg). Her oral temperature is 99.1 °F (37.3 °C). Her blood pressure is 144/66 (abnormal) and her pulse is 72. Her respiration is 16 and oxygen saturation is 92%. O2 Flow Rate (L/min): 2 L/min  I/O    Intake/Output Summary (Last 24 hours) at 6/8/2019 1329  Last data filed at 6/8/2019 0444  Gross per 24 hour   Intake 1460 ml   Output 900 ml   Net 560 ml     Patient Vitals for the past 96 hrs (Last 3 readings):   Weight   06/08/19 0355 163 lb 14.4 oz (74.3 kg)   06/07/19 0340 158 lb 3.2 oz (71.8 kg)   06/06/19 0620 165 lb (74.8 kg)     Exam   Physical Exam   Constitutional: No distress on O2 4LPM per NC. Patient appears moderately built and moderately nourished. Pleasant  Head: Normocephalic and atraumatic. Mouth/Throat: Oropharynx is clear and moist.  No oral thrush. MP IV  Eyes: Conjunctivae are normal. Pupils are equal, round. No scleral icterus. Neck: Neck supple. No tracheal deviation present. No JVD seen  Cardiovascular: S4 gallop.   No peripheral edema  Pulmonary/Chest: Normal effort with bilateral air entry, crackles R>L, no wheezing . No stridor. No respiratory distress. Patient exhibits no tenderness. Abdominal: Soft. Bowel sounds audible. No distension or tenderness to palp. Musculoskeletal: Moves all extremities  Neurological: Patient is alert and oriented to person, place, and time. Skin: Warm and dry. No cyanosis. No bruising or bleeding. Labs   ABG  Lab Results   Component Value Date    PH 7.43 12/09/2018    PO2 81 12/09/2018    PCO2 41 12/09/2018    HCO3 27 12/09/2018    O2SAT 96 12/09/2018     Lab Results   Component Value Date    IFIO2 30 12/09/2018    MODE CPAP/PS 12/09/2018    SETTIDVOL 293 12/09/2018    SETPEEP 5.0 12/09/2018     CBC  Recent Labs     06/07/19  0344   WBC 12.6*   RBC 3.11*   HGB 9.4*   HCT 27.9*   MCV 89.7   MCH 30.2   MCHC 33.7      MPV 10.4      BMP  Recent Labs     06/07/19  0344 06/08/19  0357    132*   K 4.5 4.2    98   CO2 21* 20*   BUN 66* 71*   CREATININE 3.8* 4.1*   GLUCOSE 75 64*   MG 2.1 1.9   CALCIUM 9.3 8.7     LFT  No results for input(s): AST, ALT, ALB, BILITOT, ALKPHOS, LIPASE in the last 72 hours. Invalid input(s): AMYLASE  TROP  Lab Results   Component Value Date    TROPONINT 0.033 06/06/2019    TROPONINT 0.029 06/06/2019    TROPONINT 0.033 05/19/2019     BNP  Lab Results   Component Value Date    PROBNP 3592.0 05/19/2019    PROBNP 2003.0 12/08/2018    PROBNP 403.3 06/07/2016     D-Dimer  No results found for: DDIMER  Lactic Acid  No results for input(s): LACTA in the last 72 hours. INR  No results for input(s): INR, PROTIME in the last 72 hours. PTT  No results for input(s): APTT in the last 72 hours. Glucose  Recent Labs     06/08/19  0630 06/08/19  0824 06/08/19  1308   POCGLU 141* 122* 191*     UA No results for input(s): SPECGRAV, PHUR, COLORU, CLARITYU, MUCUS, PROTEINU, BLOODU, RBCUA, WBCUA, BACTERIA, NITRU, GLUCOSEU, BILIRUBINUR, UROBILINOGEN, KETUA, LABCAST, LABCASTTY, AMORPHOS in the last 72 hours.     Invalid input(s): CRYSTALS. PFTs     Echo     Conclusions      Summary   Technically difficult study with suboptimal views   Left ventricle size is normal.   Systolic function was low-normal.   Ejection fraction was estimated at 50-55%. There were no regional left   ventricular wall motion abnormalities and wall thickness was within normal   limits.   Mildly dilated left atrium.   Calcification of the mitral valve noted.   Mild mitral regurgitation is present.   Mild tricuspid regurgitation.      Signature      ----------------------------------------------------------------   Electronically signed by Haider Billings MD (Interpreting   physician) on 12/08/2018 at 03:42 PM   ----------------------------------------------------------------     Cultures    Procalcitonin  Lab Results   Component Value Date    PROCAL 0.27 06/07/2019    PROCAL 0.29 05/20/2019    PROCAL 0.26 12/07/2018        Radiology    CXR  6/7/2019   XR CHEST PORTABLE   1. Redemonstration of changes compatible with congestive heart failure. 2. Bilateral effusions right greater than left. 6/6/19  1. The patient is status post large volume right thoracentesis with aspiration of 1.4 L of clear yellow fluid. There is no pneumothorax. There are bilateral perihilar and the basilar infiltrates.               **This report has been created using voice recognition software.  It may contain minor errors which are inherent in voice recognition technology. **       Final report electronically signed by Dr. Héctor Reyna on 6/6/2019 1:30 PM       CT Scans  6/7/19  CT Chest w/o contrast    Assessment   HFpEF  Recurrent pleural effusion  on the right ( at least R>>>L) initial chemistry (12/10/18) c/w transudate, chemistry not obtain in last tap, unilaterality not typical for CHF/CKD.   S/P 1.4 L right thoracentesis with improvement; no fluid sent  Second had smoke exposure for 17 years; no PFT  CKD IV  Full Code  Recommendations   -Monitor spO2 to keep > 90%  -Titrate O2 to keep > 90% wean as tolerated  Repeat thoracentesis as labs are needed to be sent;   - l send pleural fluid to routine analysis including PH, total protein, LDH, triglycerides, Amylase, cell count, Cytology with cell block, Gram stain and cultures and  Fungal cultures. Please send simultaneous serum total protein and LDH for comparison. - Follow up auto immune workup. Procalcitonin is low, no sign of pneumonia  -Diuresis per nephrology; low UO  -Strict I&O with daily weights   -Will monitor. -DVT prophylaxis: SCDs    Case discussed with nurse and patient/family. Questions and concerns addressed. Meds and Orders reviewed.     Electronically signed by     Carl Smith MD on 6/8/2019 at 1:29 PM

## 2019-06-08 NOTE — PROGRESS NOTES
Pt transferred to  6K11  From . IV site free of s/s of infection or infiltration. Vital signs obtained. Assessment and data collection initiated. Two nurse skin assessment performed by DONATO Cam RN and Alicia Su RN. Oriented to room. Policies and procedures for  explained. All questions answered with no further questions at this time. Fall prevention and safety brochure discussed with patient. Bed alarm on. Call light in reach.

## 2019-06-08 NOTE — PROGRESS NOTES
Hospitalist Progress Note      Patient:  Samson [facility]      Unit/Bed:4A-12/012-A    YOB: 1950    MRN: [id number]       Acct: [de-identified]     PCP: [address]    Date of Admission: 6/6/2019    Assessment/Plan:    1. Acute on chronic diastolic CHF -- apprec renal assist for diuresis also-- low salt, fluid restriction  -- s/p bumex 1 mg IV 6/6 - 6/7 -> creat kandice to 4.1 on 6/8 and not much improvement in effusions thus stopped per Dr. Nj Flor 6/7 --> discussing possible need for dialysis - ?inpt vs outpt  -- last echo 12/2018 EF 50-55%, no WMA, mildly dilated LA, mild MR, mild TR --> limited echo 6/7/19 normal EF 55%, no other abn noted  -- ?BB -- no ACE/aRB due to CKD --> on isordil/hydralazine  -- TSH 5/19 WNL  2. Bilateral R>L pleural effusions, recurrent -- apprec pulm and renal assist -- CT chest 6/7/19 = Moderate-sized left pleural effusion. Bibasilar atelectasis/pneumonia. [place] and right mid and upper lung fields, consistent with pneumonia versus pulmonary edema  -- s/p right thoracentesis 6/6/19 = 1.4 L - prior studies c/w transudate last admission 5/2019 (none sent this admission)   -- ?thorcentesis of Left with CT findings 6/7 -> await pulm recs 6/8  -- s/p bumex 1 mg daily 6/6 - 6/7 --> stopped per renal for rising creatinine  -- per Care Everywhere pt has had right effusion back in 12/2018, 1/2019, 2/2019  -- limited echo 6/7/19 EF 55%, LV fxn normal--> similar to 12/2018 echo  -- per pulm started zmax 6/7, cefepime 6/7  3. Moderate right perihilar atelectasis and both bases -- likely due to effusions --> encourage IS -- ?need to r/o other rheum process --> per pulm 6/7 LUIS (p), histone IgG (p), anti-dsDNA (p)  4.  CKD stage now 5 -- due to DM, HTN -- c/s renal apprec -- creat up to 4.1 on 6/8 with diuretics -> stopped diuretics 6/7 per renal and monitor  -- was mid 3's during admission 5/20 - 5/23 -- baseline CKD with baseline 2.8 - 3.1  -- creat worsens with diuretics  -- d/w Dr. Debby Vera 6/8 and likely needs dialysis - HD vs PD  5. Acute hypoxic respiratory failure -- on 4L O2 -- not on O2 at [facility] -- due to pleural effusions and atelectasis -- not pneumonia -- encourage IS, wean O2 as able -- further mgmt per pulm -- no cp - limited echo 6/7/19 normal EF  6. Hyponatremia -- per renal with above -- trending down 6/8 to 132 -- ?due to diuretics -> and was 140 on 6/7 -- s/p bumex 6/6- 6/7  7. Metabolic acidosis -- due to CKD likely - ?need bicarb - per renal  8. Leukocytosis -- afeb, ?etiology -- up and down - similar last admission 5/2019 -- afebrile -- chronic component per chart review -- PCT slightly elevated chronically also in 0.2's   -- per pulm started zmax 6/7, cefepime 6/7 --> ??need to continue  8. Chronic elevated trops -- due to renal dysfunction -- no cp - only sob but with above -- trops 6/6 at baseline 0.033 which is what was in 5/2019  -- no ASA - hold until if need further procedures  -- limited echo 6/7/19 = EF 55%, LV fxn normal  9. Type 2 DM -- cont lantus 20 units, SSI -- monitor and adjust prn -- carb diet.  Last A1C here 7.1 on 12/2018  10. Essential HTN -- cont norvasc 10 mg daily, hydralazine 100 mg tid, isordil 20 mg tid-- no ACE/ARB with CKD  11. Chronic normocytic anemia -- cont po iron as iron low 5/19 -- likely due to CKD -- ??aranesp per renal -- stable in 9's -- was 9.1 on 5/22 --> was 10's at [facility] 2/2019 but 8-10 prior  12. ?pulmonary HTN -- noted per ct chest 6/7/19   13. Diabetic gastroparesis -- anibal po - monitor  14. Hypothyroidism -- cont synthroid -- TSH 5/19 WNL  15. Hx neurogenic bladder -- ditropan  16. HLD -- cont statin -- Lipids 5/20/19 HLD 66, LDL 75, trig 148  17. Chronic dizziness -- on antivert  18. moderate old compression fracture T11 level with prior vertebroplasty  19. Bipolar d/o -- at [facility] chronically -- no current meds and mood stable  20. Mild MR, mild TR -- per echo 12/2018  21.  Hx seizure d/o -- no seizure meds but on has to take miralax at times. Medications:  Reviewed    Infusion Medications    dextrose       Scheduled Medications    cefepime  1 g Intravenous Q12H    azithromycin  500 mg Intravenous Q24H    amLODIPine  10 mg Oral Daily    DULoxetine  30 mg Oral Daily    fluticasone  1 spray Each Nostril Daily    gabapentin  100 mg Oral TID    hydrALAZINE  100 mg Oral Q8H    insulin lispro  0-9 Units Subcutaneous 4x Daily AC & HS    isosorbide dinitrate  20 mg Oral TID    levothyroxine  25 mcg Oral Daily    meclizine  25 mg Oral TID    oxybutynin  5 mg Oral Daily    pravastatin  40 mg Oral Nightly    sodium chloride flush  10 mL Intravenous 2 times per day    heparin (porcine)  5,000 Units Subcutaneous 3 times per day    insulin glargine  20 Units Subcutaneous Nightly     PRN Meds: acetaminophen, magnesium hydroxide, glucose, dextrose, glucagon (rDNA), dextrose, sodium chloride flush, ondansetron      Intake/Output Summary (Last 24 hours) at 6/8/2019 0827  Last data filed at 6/8/2019 0444  Gross per 24 hour   Intake 1460 ml   Output 900 ml   Net 560 ml       Diet:  DIET CARB CONTROL; Dietary Nutrition Supplements: Clear Liquid Oral Supplement    Exam:  /63   Pulse 65   Temp 97.6 °F (36.4 °C) (Oral)   Resp 16   Ht 5' 6\" (1.676 m)   Wt 163 lb 14.4 oz (74.3 kg)   SpO2 96%   BMI 26.45 kg/m²     General appearance: No apparent distress, appears stated age and cooperative. HEENT: Pupils equal, round, and reactive to light. Conjunctivae/corneas clear. Neck: Supple, with full range of motion. No jugular venous distention. Trachea midline. Respiratory:  Normal respiratory effort.+crackles 1/2 up left side posteriorly, right diminished base. No respiratory distress or accessory muscle use. Cardiovascular: Regular rate and rhythm with normal S1/S2, 2/6 SHANITA, no rubs or gallops. Abdomen: Soft, non-tender, non-distended with normal bowel sounds.   No rebound or guarding  Musculoskeletal: No clubbing, which are inherent in voice recognition technology. **      Final report electronically signed by Dr. Stacy May on 6/7/2019 2:19 AM      US THORACENTESIS   Final Result   1. Uncomplicated ultrasound guided therapeutic thoracentesis. **This report has been created using voice recognition software. It may contain minor errors which are inherent in voice recognition technology. **      Final report electronically signed by Dr. Emaline Boas on 6/6/2019 1:28 PM      XR CHEST 1 VW   Final Result   1. The patient is status post large volume right thoracentesis with aspiration of 1.4 L of clear yellow fluid. There is no pneumothorax. There are bilateral perihilar and the basilar infiltrates. **This report has been created using voice recognition software. It may contain minor errors which are inherent in voice recognition technology. **      Final report electronically signed by Dr. Emaline Boas on 6/6/2019 1:30 PM          Diet: DIET CARB CONTROL;   Dietary Nutrition Supplements: Clear Liquid Oral Supplement    Perez: no    Microbiology:  none    Tele:  SR, no arrhythmias    DVT prophylaxis: [] Lovenox                                 [] SCDs                                 [x] SQ Heparin                                 [] Encourage ambulation           [] Already on Anticoagulation     Disposition:    [] Home       [] TCU       [] Rehab       [] Psych       [x] SNF       [] [place]       [] Other-    Code Status: Full Code    PT/OT Eval Status: consulted        Electronically signed by Meribeth Mcardle, MD on 6/8/2019 at 8:27 AM when pt evaluated - final note filed late

## 2019-06-08 NOTE — PROGRESS NOTES
[facility]    Renal inpatient Progress Note  6/8/2019 9:56 AM      Pt Name:   Sukhi Mcgregor  YOB: 1950  Attending:   Panchito Gates MD    Chief Complaint:   Sukhi Mcgregor is a 81 y.o. female being followed by nephrology for LEONA OWASSO / CKD and SOB     Interval History : patient seen and examined by me. feels well. No cp . SOB is much better. Not much UOP     Scheduled Medications :   cefepime  1 g Intravenous Q12H    azithromycin  500 mg Intravenous Q24H    amLODIPine  10 mg Oral Daily    DULoxetine  30 mg Oral Daily    fluticasone  1 spray Each Nostril Daily    gabapentin  100 mg Oral TID    hydrALAZINE  100 mg Oral Q8H    insulin lispro  0-9 Units Subcutaneous 4x Daily AC & HS    isosorbide dinitrate  20 mg Oral TID    levothyroxine  25 mcg Oral Daily    meclizine  25 mg Oral TID    oxybutynin  5 mg Oral Daily    pravastatin  40 mg Oral Nightly    sodium chloride flush  10 mL Intravenous 2 times per day    heparin (porcine)  5,000 Units Subcutaneous 3 times per day    insulin glargine  20 Units Subcutaneous Nightly      dextrose          Vitals :  BP (!) 141/75   Pulse 67   Temp 97.9 °F (36.6 °C) (Oral)   Resp 18   Ht 5' 6\" (1.676 m)   Wt 163 lb 14.4 oz (74.3 kg)   SpO2 97%   BMI 26.45 kg/m²     24HR INTAKE/OUTPUT:      Intake/Output Summary (Last 24 hours) at 6/8/2019 0956  Last data filed at 6/8/2019 0444  Gross per 24 hour   Intake 1460 ml   Output 900 ml   Net 560 ml     Last 3 weights  Wt Readings from Last 3 Encounters:   06/08/19 163 lb 14.4 oz (74.3 kg)   05/23/19 171 lb 4.8 oz (77.7 kg)   03/13/19 166 lb 4.8 oz (75.4 kg)        Physical Exam :  General Appearance:  Well developed.  No distress  Mouth/Throat:  Oral mucosa moist  Neck:  Supple, no JVD  Lungs:  Breath sounds: mild rales noted  Heart[de-identified]  S1,S2 heard  Abdomen:  Soft, non - tender  Musculoskeletal:  Edema - none     Last 3 CBC  Recent Labs     06/07/19  0344   WBC 12.6*   RBC 3.11*   HGB 9.4* HCT 27.9*        Last 3 CMP  Recent Labs     06/07/19  0344 06/08/19  0357    132*   K 4.5 4.2    98   CO2 21* 20*   BUN 66* 71*   CREATININE 3.8* 4.1*   CALCIUM 9.3 8.7        Assessment / Plan   Renal - CKD stage 5 - overall renal FX appears stable at baseline  · Volume status also looks well in fact much better than it has been in the past at 165 pounds  · CXR shows may be mild congestion, SOB improved. Creat worsening, 4.1  · She has had multiple admissions in the past as well for similar complaints  · We will watch her renal Fx over the weekend and mostly might consider starting her on RRT  · No ASA/plavix in anticipation for the catheter placement  · Spoke with her about PD as well. She lives in a NH. PD would be an option .       Electrolytes - mild hyponatremia  Essential Hypertension - reasonable control  SOB -  Multifactorial, primarily pleural effusion  And may be mild CHF. bumex on hold. Pleural effusion - S/P thoracentesis and pulmonary has been consulted  Diabetes Mellitus  meds reviewed and D/W patient and nursing staff. LIANNE Richardson D.  [facility].

## 2019-06-09 ENCOUNTER — APPOINTMENT (OUTPATIENT)
Dept: ULTRASOUND IMAGING | Age: 69
DRG: 291 | End: 2019-06-09
Attending: INTERNAL MEDICINE
Payer: MEDICARE

## 2019-06-09 ENCOUNTER — APPOINTMENT (OUTPATIENT)
Dept: GENERAL RADIOLOGY | Age: 69
DRG: 291 | End: 2019-06-09
Attending: INTERNAL MEDICINE
Payer: MEDICARE

## 2019-06-09 LAB
ALBUMIN FLUID: 1 GM/DL
ALBUMIN SERPL-MCNC: 3.5 G/DL (ref 3.5–5.1)
ALP BLD-CCNC: 74 U/L (ref 38–126)
ALT SERPL-CCNC: < 5 U/L (ref 11–66)
AMYLASE FLUID: 13 U/L
ANION GAP SERPL CALCULATED.3IONS-SCNC: 18 MEQ/L (ref 8–16)
AST SERPL-CCNC: 9 U/L (ref 5–40)
BILIRUB SERPL-MCNC: 0.4 MG/DL (ref 0.3–1.2)
BILIRUBIN DIRECT: < 0.2 MG/DL (ref 0–0.3)
BUN BLDV-MCNC: 74 MG/DL (ref 7–22)
CALCIUM SERPL-MCNC: 9.3 MG/DL (ref 8.5–10.5)
CHLORIDE BLD-SCNC: 94 MEQ/L (ref 98–111)
CO2: 21 MEQ/L (ref 23–33)
CREAT SERPL-MCNC: 3.4 MG/DL (ref 0.4–1.2)
GFR SERPL CREATININE-BSD FRML MDRD: 13 ML/MIN/1.73M2
GLUCOSE BLD-MCNC: 144 MG/DL (ref 70–108)
GLUCOSE BLD-MCNC: 157 MG/DL (ref 70–108)
GLUCOSE BLD-MCNC: 161 MG/DL (ref 70–108)
GLUCOSE BLD-MCNC: 80 MG/DL (ref 70–108)
GLUCOSE BLD-MCNC: 88 MG/DL (ref 70–108)
GLUCOSE, FLUID: 136 MG/DL
LD, FLUID: 63 U/L
LD: 202 U/L (ref 100–190)
MAGNESIUM: 2 MG/DL (ref 1.6–2.4)
PH FLUID: 7.52
POTASSIUM SERPL-SCNC: 3.9 MEQ/L (ref 3.5–5.2)
PROTEIN FLUID: 1.8 GM/DL
SODIUM BLD-SCNC: 133 MEQ/L (ref 135–145)
SPECIMEN SOURCE: NORMAL
TOTAL PROTEIN: 6.6 G/DL (ref 6.1–8)

## 2019-06-09 PROCEDURE — 87075 CULTR BACTERIA EXCEPT BLOOD: CPT

## 2019-06-09 PROCEDURE — 83615 LACTATE (LD) (LDH) ENZYME: CPT

## 2019-06-09 PROCEDURE — 80053 COMPREHEN METABOLIC PANEL: CPT

## 2019-06-09 PROCEDURE — 6370000000 HC RX 637 (ALT 250 FOR IP): Performed by: FAMILY MEDICINE

## 2019-06-09 PROCEDURE — 71045 X-RAY EXAM CHEST 1 VIEW: CPT

## 2019-06-09 PROCEDURE — 82150 ASSAY OF AMYLASE: CPT

## 2019-06-09 PROCEDURE — 2580000003 HC RX 258: Performed by: INTERNAL MEDICINE

## 2019-06-09 PROCEDURE — 83986 ASSAY PH BODY FLUID NOS: CPT

## 2019-06-09 PROCEDURE — 1200000003 HC TELEMETRY R&B

## 2019-06-09 PROCEDURE — 82945 GLUCOSE OTHER FLUID: CPT

## 2019-06-09 PROCEDURE — 84157 ASSAY OF PROTEIN OTHER: CPT

## 2019-06-09 PROCEDURE — 2709999900 HC NON-CHARGEABLE SUPPLY

## 2019-06-09 PROCEDURE — 89050 BODY FLUID CELL COUNT: CPT

## 2019-06-09 PROCEDURE — 82248 BILIRUBIN DIRECT: CPT

## 2019-06-09 PROCEDURE — 88112 CYTOPATH CELL ENHANCE TECH: CPT

## 2019-06-09 PROCEDURE — 87070 CULTURE OTHR SPECIMN AEROBIC: CPT

## 2019-06-09 PROCEDURE — 99232 SBSQ HOSP IP/OBS MODERATE 35: CPT | Performed by: INTERNAL MEDICINE

## 2019-06-09 PROCEDURE — 6370000000 HC RX 637 (ALT 250 FOR IP): Performed by: INTERNAL MEDICINE

## 2019-06-09 PROCEDURE — 82042 OTHER SOURCE ALBUMIN QUAN EA: CPT

## 2019-06-09 PROCEDURE — 82948 REAGENT STRIP/BLOOD GLUCOSE: CPT

## 2019-06-09 PROCEDURE — 88305 TISSUE EXAM BY PATHOLOGIST: CPT

## 2019-06-09 PROCEDURE — 83735 ASSAY OF MAGNESIUM: CPT

## 2019-06-09 PROCEDURE — 99232 SBSQ HOSP IP/OBS MODERATE 35: CPT | Performed by: FAMILY MEDICINE

## 2019-06-09 PROCEDURE — 32555 ASPIRATE PLEURA W/ IMAGING: CPT

## 2019-06-09 PROCEDURE — 36415 COLL VENOUS BLD VENIPUNCTURE: CPT

## 2019-06-09 PROCEDURE — 82570 ASSAY OF URINE CREATININE: CPT

## 2019-06-09 PROCEDURE — 6360000002 HC RX W HCPCS: Performed by: INTERNAL MEDICINE

## 2019-06-09 RX ORDER — SENNA PLUS 8.6 MG/1
1 TABLET ORAL NIGHTLY
Status: DISCONTINUED | OUTPATIENT
Start: 2019-06-09 | End: 2019-06-13 | Stop reason: HOSPADM

## 2019-06-09 RX ORDER — POLYETHYLENE GLYCOL 3350 17 G/17G
17 POWDER, FOR SOLUTION ORAL DAILY PRN
Status: DISCONTINUED | OUTPATIENT
Start: 2019-06-09 | End: 2019-06-13 | Stop reason: HOSPADM

## 2019-06-09 RX ADMIN — Medication 10 ML: at 20:43

## 2019-06-09 RX ADMIN — DULOXETINE HYDROCHLORIDE 30 MG: 30 CAPSULE, DELAYED RELEASE ORAL at 08:39

## 2019-06-09 RX ADMIN — SENNOSIDES 8.6 MG: 8.6 TABLET, FILM COATED ORAL at 20:43

## 2019-06-09 RX ADMIN — INSULIN GLARGINE 20 UNITS: 100 INJECTION, SOLUTION SUBCUTANEOUS at 20:35

## 2019-06-09 RX ADMIN — GABAPENTIN 100 MG: 100 CAPSULE ORAL at 20:43

## 2019-06-09 RX ADMIN — MECLIZINE HYDROCHLORIDE 25 MG: 12.5 TABLET, FILM COATED ORAL at 08:39

## 2019-06-09 RX ADMIN — ACETAMINOPHEN 650 MG: 325 TABLET ORAL at 17:48

## 2019-06-09 RX ADMIN — HEPARIN SODIUM 5000 UNITS: 5000 INJECTION INTRAVENOUS; SUBCUTANEOUS at 20:35

## 2019-06-09 RX ADMIN — LEVOTHYROXINE SODIUM 25 MCG: 25 TABLET ORAL at 07:28

## 2019-06-09 RX ADMIN — OXYBUTYNIN CHLORIDE 5 MG: 5 TABLET ORAL at 08:39

## 2019-06-09 RX ADMIN — ISOSORBIDE DINITRATE 20 MG: 20 TABLET ORAL at 08:39

## 2019-06-09 RX ADMIN — ISOSORBIDE DINITRATE 20 MG: 20 TABLET ORAL at 15:11

## 2019-06-09 RX ADMIN — INSULIN LISPRO 1 UNITS: 100 INJECTION, SOLUTION INTRAVENOUS; SUBCUTANEOUS at 20:35

## 2019-06-09 RX ADMIN — POLYETHYLENE GLYCOL (3350) 17 G: 17 POWDER, FOR SOLUTION ORAL at 11:57

## 2019-06-09 RX ADMIN — HYDRALAZINE HYDROCHLORIDE 100 MG: 50 TABLET ORAL at 08:39

## 2019-06-09 RX ADMIN — INSULIN LISPRO 1 UNITS: 100 INJECTION, SOLUTION INTRAVENOUS; SUBCUTANEOUS at 17:50

## 2019-06-09 RX ADMIN — HEPARIN SODIUM 5000 UNITS: 5000 INJECTION INTRAVENOUS; SUBCUTANEOUS at 14:54

## 2019-06-09 RX ADMIN — INSULIN LISPRO 1 UNITS: 100 INJECTION, SOLUTION INTRAVENOUS; SUBCUTANEOUS at 11:58

## 2019-06-09 RX ADMIN — ISOSORBIDE DINITRATE 20 MG: 20 TABLET ORAL at 20:43

## 2019-06-09 RX ADMIN — PRAVASTATIN SODIUM 40 MG: 40 TABLET ORAL at 20:43

## 2019-06-09 RX ADMIN — AMLODIPINE BESYLATE 10 MG: 10 TABLET ORAL at 08:39

## 2019-06-09 RX ADMIN — MECLIZINE HYDROCHLORIDE 25 MG: 12.5 TABLET, FILM COATED ORAL at 20:43

## 2019-06-09 RX ADMIN — Medication 10 ML: at 08:41

## 2019-06-09 RX ADMIN — HYDRALAZINE HYDROCHLORIDE 100 MG: 50 TABLET ORAL at 15:11

## 2019-06-09 RX ADMIN — GABAPENTIN 100 MG: 100 CAPSULE ORAL at 14:54

## 2019-06-09 RX ADMIN — MECLIZINE HYDROCHLORIDE 25 MG: 12.5 TABLET, FILM COATED ORAL at 15:11

## 2019-06-09 RX ADMIN — GABAPENTIN 100 MG: 100 CAPSULE ORAL at 08:39

## 2019-06-09 ASSESSMENT — PAIN DESCRIPTION - PAIN TYPE: TYPE: ACUTE PAIN

## 2019-06-09 ASSESSMENT — PAIN DESCRIPTION - ORIENTATION: ORIENTATION: RIGHT

## 2019-06-09 ASSESSMENT — PAIN DESCRIPTION - PROGRESSION: CLINICAL_PROGRESSION: GRADUALLY IMPROVING

## 2019-06-09 ASSESSMENT — PAIN DESCRIPTION - ONSET: ONSET: SUDDEN

## 2019-06-09 ASSESSMENT — PAIN SCALES - GENERAL
PAINLEVEL_OUTOF10: 0
PAINLEVEL_OUTOF10: 5
PAINLEVEL_OUTOF10: 10

## 2019-06-09 ASSESSMENT — PAIN DESCRIPTION - LOCATION: LOCATION: HEAD

## 2019-06-09 ASSESSMENT — PAIN DESCRIPTION - DESCRIPTORS: DESCRIPTORS: SHARP

## 2019-06-09 ASSESSMENT — PAIN - FUNCTIONAL ASSESSMENT: PAIN_FUNCTIONAL_ASSESSMENT: ACTIVITIES ARE NOT PREVENTED

## 2019-06-09 ASSESSMENT — PAIN DESCRIPTION - FREQUENCY: FREQUENCY: CONTINUOUS

## 2019-06-09 NOTE — PROGRESS NOTES
diuretics  -- d/w Dr. Kasey Figueredo 6/8 and likely needs dialysis - HD vs PD  5. Acute hypoxic resp failure -- due to effusions -- not on O2 at [facility] [place] and up to 4L and weaning to 2L 6/9 am but dropped to 80's on 1L --> encourage activity, IS -- monitor -- pulm also following. 6. Hyponatremia -- per renal with above -- trending down 6/8 to 132 and stable 133 on 6/9 --> and was 140 on 6/7 -- s/p bumex 6/6- 6/7  7. Metabolic acidosis -- due to CKD likely - ?need bicarb - per renal  8. Leukocytosis -- afeb, ?etiology -- up and down - similar last admission 5/2019 -- afebrile -- chronic component per chart review -- PCT slightly elevated chronically also in 0.2's   -- s/p zmax 6/7, cefepime 6/7 - 6/8 per pulm  8. Chronic elevated trops -- due to renal dysfunction -- no cp - only sob but with above -- trops 6/6 at baseline 0.033 which is what was in 5/2019  -- no ASA - hold until if need further procedures  -- limited echo 6/7/19 = EF 55%, LV fxn normal  9. Type 2 DM -- cont lantus 20 units, SSI -- monitor and adjust prn -- carb diet. Last A1C here 7.1 on 12/2018  10. Essential HTN -- cont norvasc 10 mg daily, hydralazine 100 mg tid, isordil 20 mg tid-- no ACE/ARB with CKD  11. Chronic normocytic anemia -- cont po iron as iron low 5/19 -- likely due to CKD -- ??aranesp per renal -- stable in 9's -- was 9.1 on 5/22 --> was 10's at [facility] 2/2019 but 8-10 prior  12. Diabetic gastroparesis -- anibal po - monitor  13. Hypothyroidism -- cont synthroid -- TSH 5/19 WNL  14. Hx neurogenic bladder -- ditropan  15. HLD -- cont statin -- Lipids 5/20/19 HLD 66, LDL 75, trig 148  16. Chronic dizziness -- on antivert  17. Bipolar d/o -- at [facility] -- no current meds and mood stable  18. Mild MR, mild TR -- per echo 12/2018  19. Hx seizure d/o -- no seizure meds but on neurontin  20. Chronic pain -- cont neurontin, cymbalta  21.  Constipation -- added miralax prn 6/9  22. Generalized weakness -- PT/OT c/s    Dispo  -- 6/9 --> apprec consultants -- s/p left thoracentesis this am 500 mL - cont monitor - s/p right  Tap 6/6 --> await further pulm and renal recs -- not responsive to diuretics thus likely needs dialysis - d/w Dr. Sampson Kinney yest - ?need HD catheter prior to d/c;  Resides in [place] for long time - c/s PT/OT. Cont monitor lytes, u/o, BP, BS, h/h. Wean O2 as able. Chief Complaint: sob    Hospital Course: Justin Babcock is a 81 y.o. female with bipolar, CKD IV, CHF, DMII, HTN and lipidemia is admitted from [facility] for acute on chronic systolic heart failure, recent admission 5/19 - 5/23 for similar c/o of sob, weight gain. -- pulmonary consulted for recurrent bilateral pleural effusions --> s/p right thoracentesis 6/ - left thoracentesis 6/9 = 500 mL blood tinged removed  -- renal consulted for CKD stage IV and fluid overload - not much improvement with diuretics         Subjective:   -- 6/9 --> pt just back from left thoracentesis - feeling little better and breathing little better - still on 2L -> was taken down to 1L while in room and dropped to 87%. Denies cp. Denies abd pain, n/v.  Denies f/c.  Isaiah po. Tmax 100 on 6/8 at 1750. Weak. No lightheaded/dizziness.   Last BM -- none since admission - states last was last tuesday      Medications:  Reviewed    Infusion Medications    dextrose       Scheduled Medications    amLODIPine  10 mg Oral Daily    DULoxetine  30 mg Oral Daily    fluticasone  1 spray Each Nostril Daily    gabapentin  100 mg Oral TID    hydrALAZINE  100 mg Oral Q8H    insulin lispro  0-9 Units Subcutaneous 4x Daily AC & HS    isosorbide dinitrate  20 mg Oral TID    levothyroxine  25 mcg Oral Daily    meclizine  25 mg Oral TID    oxybutynin  5 mg Oral Daily    pravastatin  40 mg Oral Nightly    sodium chloride flush  10 mL Intravenous 2 times per day    heparin (porcine)  5,000 Units Subcutaneous 3 times per day    insulin glargine  20 Units Subcutaneous Nightly     PRN Meds: ipratropium-albuterol, acetaminophen, magnesium hydroxide, glucose, dextrose, glucagon (rDNA), dextrose, sodium chloride flush, ondansetron      Intake/Output Summary (Last 24 hours) at 6/9/2019 1026  Last data filed at 6/9/2019 0340  Gross per 24 hour   Intake 710 ml   Output 0 ml   Net 710 ml       Diet:  DIET CARB CONTROL; Dietary Nutrition Supplements: Clear Liquid Oral Supplement    Exam:  BP (!) 188/74   Pulse 74   Temp 98.4 °F (36.9 °C) (Oral)   Resp 22   Ht 5' 6\" (1.676 m)   Wt 167 lb (75.8 kg)   SpO2 94%   BMI 26.95 kg/m²     General appearance: No apparent distress, appears older than stated age and cooperative. HEENT: Pupils equal, round, and reactive to light. Conjunctivae/corneas clear. Neck: Supple, with full range of motion. No jugular venous distention. Trachea midline. Respiratory:  Normal respiratory effort. +crackles with inspiration left base, right diminished in base, no wheezes, no rhonchi. No respiratory distress or accessory muscle use. Cardiovascular: Regular rate and rhythm with normal S1/S2 without murmurs, rubs or gallops. Abdomen: Soft, non-tender, non-distended with normal bowel sounds. No rebound or guarding  Musculoskeletal: No clubbing, cyanosis or edema bilaterally. Full range of motion without deformity. No calf tenderness palpation  Skin: Skin color, texture, turgor normal.  No rashes or lesions. Pale. Neurologic:  Neurovascularly intact without any focal sensory/motor deficits.  Cranial nerves: II-XII intact, grossly non-focal.  Psychiatric: Alert and oriented, thought content appropriate  Capillary Refill: Brisk,< 3 seconds   Peripheral Pulses: +2 palpable, equal bilaterally       Labs:   Recent Labs     06/07/19  0344   WBC 12.6*   HGB 9.4*   HCT 27.9*        Recent Labs     06/07/19  0344 06/08/19  0357 06/09/19  0536    132* 133*   K 4.5 4.2 3.9    98 94*   CO2 21* 20* 21*   BUN 66* 71* 74*   CREATININE 3.8* 4.1* 3.4*   CALCIUM 9.3 8.7 9.3 Recent Labs     06/09/19  0536   AST 9   ALT <5*   BILIDIR <0.2   BILITOT 0.4   ALKPHOS 74     No results for input(s): INR in the last 72 hours. No results for input(s): Shadi Rodriguez in the last 72 hours. Urinalysis:      Lab Results   Component Value Date    NITRU Negative 03/09/2019    WBCUA 0-2 06/07/2016    BACTERIA NONE 06/07/2016    RBCUA 0-2 06/07/2016    BLOODU Negative 03/09/2019    GLUCOSEU 2+ 03/09/2019       Radiology:  CT Chest WO Contrast   Final Result   1. Moderate-sized left pleural effusion. Bibasilar atelectasis/pneumonia. Groundglass and right mid and upper lung fields, consistent with pneumonia versus pulmonary edema. 2. Enlarged pulmonary arteries, consistent with pulmonary arterial hypertension. **This report has been created using voice recognition software. It may contain minor errors which are inherent in voice recognition technology. **      Final report electronically signed by Dr. Edgar Spears on 6/7/2019 1:03 PM      XR CHEST PORTABLE   Final Result   1. Redemonstration of changes compatible with congestive heart failure. 2. Bilateral effusions right greater than left. **This report has been created using voice recognition software. It may contain minor errors which are inherent in voice recognition technology. **      Final report electronically signed by Dr. Roxana Rocha on 6/7/2019 2:19 AM      US THORACENTESIS   Final Result   1. Uncomplicated ultrasound guided therapeutic thoracentesis. **This report has been created using voice recognition software. It may contain minor errors which are inherent in voice recognition technology. **      Final report electronically signed by Dr. Dennis Nguyen on 6/6/2019 1:28 PM      XR CHEST 1 VW   Final Result   1. The patient is status post large volume right thoracentesis with aspiration of 1.4 L of clear yellow fluid. There is no pneumothorax.  There are bilateral perihilar and the basilar

## 2019-06-09 NOTE — PROGRESS NOTES
[facility]    Patient - Selma Jimenez   MRN -  [id number]   [facility] [[facility]-identified[facility]   - 1950      Date of Admission -  2019  6:35 AM  Date of evaluation -  2019  Room - -011-A   Hospital Day - 3  Consulting - Seven Mcelroy MD Primary Care Physician - [address]   Chief Complaint   Pleural effusion  Active Hospital Problem List      Active Hospital Problems    Diagnosis Date Noted    Pleural effusion [J90] 2019    Stage 5 chronic kidney disease ([address] [N18.5]     (HFpEF) heart failure with preserved ejection fraction ([address] [I50.30]     Essential hypertension [I10] 2018     HPI   Selma Jimenez is a 81 y.o. female transferred from outside hospital for SOB and pleural effusion. Never smoker, has been tapped twice before over the last few weeks in the same side, apparently a transudate. Was DC from this hospital last week at that time shed had a right  thoracentesis of 1 L, studies not sent, today a 1.4L tap was done ordered by primary team before we saw pt, chemistries  not requested.   Wil Whitehead has CKD 5 and CHF with preserved EF    Past 24 Hours:  -Chart reviewed  -No acute distress on 4LPM  -denies any increased SOB, fever chills  - She had thoracentesis and showed  Pleural fluid LDH: 63.            Serum LDH: 202        Ratio: 31 %  Pleural fluid Protein: 1.8        Serum protein: 6.6     Ratio: 27 %  Pleural fluid Glucose: 136   PH:7.52  Cell count: Mono: 92 %, Poly:7 %           Past Medical History         Diagnosis Date    Anemia     Anemia in chronic kidney disease(285.21)     Arthritis     Asthma     Bipolar 1 disorder (HCC)     CHF (congestive heart failure) (HCC)     Chronic kidney disease, stage III (moderate) (HCC)     Depression     GERD (gastroesophageal reflux disease)     Gout     Headache(784.0)     Hyperkalemia     Hyperlipidemia     Hypertension     Hypothyroid     Neurogenic bladder     Pneumonia  Seizure [facility] 01/2018    Type II or unspecified type diabetes mellitus without mention of complication, not stated as uncontrolled       Past Surgical History           Procedure Laterality Date    BACK SURGERY      CERVICAL FUSION      CHOLECYSTECTOMY      COLONOSCOPY      ENDOSCOPY, COLON, DIAGNOSTIC      FRACTURE SURGERY       Diet    DIET CARB CONTROL; Dietary Nutrition Supplements: Clear Liquid Oral Supplement  Allergies    Eggs or egg-derived products; Aspirin; Pcn [penicillins];  Vicodin [hydrocodone-acetaminophen]; Vilazodone; and Wellbutrin [bupropion]  Social History     Social History     Socioeconomic History    Marital status:      Spouse name: Not on file    Number of children: 1    Years of education: Not on file    Highest education level: Not on file   Occupational History    Not on file   Social Needs    Financial resource strain: Not on file    Food insecurity:     Worry: Not on file     Inability: Not on file    Transportation needs:     Medical: Not on file     Non-medical: Not on file   Tobacco Use    Smoking status: Never Smoker    Smokeless tobacco: Never Used   Substance and Sexual Activity    Alcohol use: No    Drug use: No    Sexual activity: Not on file   Lifestyle    Physical activity:     Days per week: Not on file     Minutes per session: Not on file    Stress: Not on file   Relationships    Social connections:     Talks on phone: Not on file     Gets together: Not on file     Attends [employer] service: Not on file     Active member of club or organization: Not on file     Attends meetings of clubs or organizations: Not on file     Relationship status: Not on file    Intimate partner violence:     Fear of current or ex partner: Not on file     Emotionally abused: Not on file     Physically abused: Not on file     Forced sexual activity: Not on file   Other Topics Concern    Not on file   Social History Narrative    Not on file     Family History Problem Relation Age of Onset    High Blood Pressure Mother     Diabetes Mother     Cancer Mother     Heart Attack Mother     Stroke Father     High Blood Pressure Father     Anemia Father      Sleep History    N/A    Meds    Current Medications    senna  1 tablet Oral Nightly    amLODIPine  10 mg Oral Daily    DULoxetine  30 mg Oral Daily    fluticasone  1 spray Each Nostril Daily    gabapentin  100 mg Oral TID    hydrALAZINE  100 mg Oral Q8H    insulin lispro  0-9 Units Subcutaneous 4x Daily AC & HS    isosorbide dinitrate  20 mg Oral TID    levothyroxine  25 mcg Oral Daily    meclizine  25 mg Oral TID    oxybutynin  5 mg Oral Daily    pravastatin  40 mg Oral Nightly    sodium chloride flush  10 mL Intravenous 2 times per day    heparin (porcine)  5,000 Units Subcutaneous 3 times per day    insulin glargine  20 Units Subcutaneous Nightly     polyethylene glycol, ipratropium-albuterol, acetaminophen, glucose, dextrose, glucagon (rDNA), dextrose, sodium chloride flush, ondansetron  IV Drips/Infusions   dextrose       Vitals    Vitals    height is 5' 6\" (1.676 m) and weight is 167 lb (75.8 kg). Her axillary temperature is 97.7 °F (36.5 °C). Her blood pressure is 154/98 (abnormal) and her pulse is 75. Her respiration is 20 and oxygen saturation is 94%. O2 Flow Rate (L/min): 2 L/min  I/O    Intake/Output Summary (Last 24 hours) at 6/9/2019 1320  Last data filed at 6/9/2019 0340  Gross per 24 hour   Intake 710 ml   Output 0 ml   Net 710 ml     Patient Vitals for the past 96 hrs (Last 3 readings):   Weight   06/09/19 0323 167 lb (75.8 kg)   06/08/19 0355 163 lb 14.4 oz (74.3 kg)   06/07/19 0340 158 lb 3.2 oz (71.8 kg)     Exam   Physical Exam   Constitutional: No distress on O2 4LPM per NC. Patient appears moderately built and moderately nourished. Pleasant  Head: Normocephalic and atraumatic. Mouth/Throat: Oropharynx is clear and moist.  No oral thrush.  MP IV  Eyes: Conjunctivae are normal. Pupils are equal, round. No scleral icterus. Neck: Neck supple. No tracheal deviation present. No JVD seen  Cardiovascular: S4 gallop. No peripheral edema  Pulmonary/Chest: Normal effort with bilateral air entry, crackles R>L, no wheezing . No stridor. No respiratory distress. Patient exhibits no tenderness. Abdominal: Soft. Bowel sounds audible. No distension or tenderness to palp. Musculoskeletal: Moves all extremities  Neurological: Patient is alert and oriented to person, place, and time. Skin: Warm and dry. No cyanosis. No bruising or bleeding. Labs   ABG  Lab Results   Component Value Date    PH 7.43 12/09/2018    PO2 81 12/09/2018    PCO2 41 12/09/2018    HCO3 27 12/09/2018    O2SAT 96 12/09/2018     Lab Results   Component Value Date    IFIO2 30 12/09/2018    MODE CPAP/PS 12/09/2018    SETTIDVOL 293 12/09/2018    SETPEEP 5.0 12/09/2018     CBC  Recent Labs     06/07/19  0344   WBC 12.6*   RBC 3.11*   HGB 9.4*   HCT 27.9*   MCV 89.7   MCH 30.2   MCHC 33.7      MPV 10.4      BMP  Recent Labs     06/07/19  0344 06/08/19  0357 06/09/19  0536    132* 133*   K 4.5 4.2 3.9    98 94*   CO2 21* 20* 21*   BUN 66* 71* 74*   CREATININE 3.8* 4.1* 3.4*   GLUCOSE 75 64* 80   MG 2.1 1.9 2.0   CALCIUM 9.3 8.7 9.3     LFT  Recent Labs     06/09/19  0536   AST 9   ALT <5*   BILITOT 0.4   ALKPHOS 74     TROP  Lab Results   Component Value Date    TROPONINT 0.033 06/06/2019    TROPONINT 0.029 06/06/2019    TROPONINT 0.033 05/19/2019     BNP  Lab Results   Component Value Date    PROBNP 3592.0 05/19/2019    PROBNP 2003.0 12/08/2018    PROBNP 403.3 06/07/2016     D-Dimer  No results found for: DDIMER  Lactic Acid  No results for input(s): LACTA in the last 72 hours. INR  No results for input(s): INR, PROTIME in the last 72 hours. PTT  No results for input(s): APTT in the last 72 hours.   Glucose  Recent Labs     06/08/19 2005 06/09/19  0610 06/09/19  1100   POCGLU 191* 88 144*     UA Recent Labs     06/09/19  1000   COLORU ORANGE   . PFTs     Echo     Conclusions      Summary   Technically difficult study with suboptimal views   Left ventricle size is normal.   Systolic function was low-normal.   Ejection fraction was estimated at 50-55%. There were no regional left   ventricular wall motion abnormalities and wall thickness was within normal   limits.   Mildly dilated left atrium.   Calcification of the mitral valve noted.   Mild mitral regurgitation is present.   Mild tricuspid regurgitation.      Signature      ----------------------------------------------------------------   Electronically signed by Gi Peñaloza MD (Interpreting   physician) on 12/08/2018 at 03:42 PM   ----------------------------------------------------------------     Cultures    Procalcitonin  Lab Results   Component Value Date    PROCAL 0.27 06/07/2019    PROCAL 0.29 05/20/2019    PROCAL 0.26 12/07/2018        Radiology    CXR  6/7/2019   XR CHEST PORTABLE   1. Redemonstration of changes compatible with congestive heart failure. 2. Bilateral effusions right greater than left. 6/6/19  1. The patient is status post large volume right thoracentesis with aspiration of 1.4 L of clear yellow fluid. There is no pneumothorax. There are bilateral perihilar and the basilar infiltrates.               **This report has been created using voice recognition software.  It may contain minor errors which are inherent in voice recognition technology. **       Final report electronically signed by Dr. Jose Luis Miller on 6/6/2019 1:30 PM       CT Scans  6/7/19  CT Chest w/o contrast    Assessment   HFpEF  Recurrent transudative pleural effusion  on the right ( at least R>>>L) initial chemistry (12/10/18) c/w transudate, chemistry not obtain in last tap, unilaterality not typical for CHF/CKD.   S/P 1.4 L right thoracentesis with improvement; no fluid sent  Second had smoke exposure for 17 years; no PFT  CKD IV  Full Code  Recommendations   -Monitor spO2 to keep > 90%  -Titrate O2 to keep > 90% wean as tolerated  - The pleural fluid is transudative. The patient is not responding well to diuresis and it was held. The patient has recurrent admission secondary to SOB secondary to pleuraf fluid and CHF. If the patient does not respond to diuresis, consider Hemodialysis. Discussed with Nephrology.  -Strict I&O with daily weights   -Will monitor. -DVT prophylaxis: SCDs    Case discussed with nurse and patient/family. Questions and concerns addressed. Meds and Orders reviewed.     Electronically signed by     Jordyn Peter MD on 6/9/2019 at 1:20 PM

## 2019-06-09 NOTE — H&P
Formulation and discussion of sedation / procedure plans, risks, benefits, side effects and alternatives with patient and/or responsible adult completed.     Electronically signed by Char Moreno MD on 6/9/2019 at 10:07 AM

## 2019-06-10 LAB
ANA SCREEN: NORMAL
ANION GAP SERPL CALCULATED.3IONS-SCNC: 19 MEQ/L (ref 8–16)
BODY FLUID RBC: NORMAL /CUMM
BUN BLDV-MCNC: 73 MG/DL (ref 7–22)
CALCIUM SERPL-MCNC: 9.5 MG/DL (ref 8.5–10.5)
CHARACTER, BODY FLUID: NORMAL
CHLORIDE BLD-SCNC: 97 MEQ/L (ref 98–111)
CO2: 19 MEQ/L (ref 23–33)
COLOR: NORMAL
CREAT SERPL-MCNC: 3.4 MG/DL (ref 0.4–1.2)
DSDNA ANTIBODY: NORMAL
GFR SERPL CREATININE-BSD FRML MDRD: 13 ML/MIN/1.73M2
GLUCOSE BLD-MCNC: 172 MG/DL (ref 70–108)
GLUCOSE BLD-MCNC: 173 MG/DL (ref 70–108)
GLUCOSE BLD-MCNC: 83 MG/DL (ref 70–108)
GLUCOSE BLD-MCNC: 84 MG/DL (ref 70–108)
GLUCOSE BLD-MCNC: 92 MG/DL (ref 70–108)
MAGNESIUM: 2.2 MG/DL (ref 1.6–2.4)
MESOTHELIAL CELLS BODY FLUID: NORMAL
MONONUCLEAR CELLS BODY FLUID: 92.6 %
PATHOLOGIST REVIEW: NORMAL
POLYMORPHONUCLEAR CELLS BODY FLUID: 7.4 %
POTASSIUM SERPL-SCNC: 4.1 MEQ/L (ref 3.5–5.2)
SODIUM BLD-SCNC: 135 MEQ/L (ref 135–145)
SPECIMEN: NORMAL
TOTAL NUCLEATED CELLS BODY FLUID: 402 /CUMM (ref 0–500)
TOTAL VOLUME RECEIVED BODY FLUID: 83 ML

## 2019-06-10 PROCEDURE — 83735 ASSAY OF MAGNESIUM: CPT

## 2019-06-10 PROCEDURE — 99232 SBSQ HOSP IP/OBS MODERATE 35: CPT | Performed by: NURSE PRACTITIONER

## 2019-06-10 PROCEDURE — APPSS30 APP SPLIT SHARED TIME 16-30 MINUTES: Performed by: NURSE PRACTITIONER

## 2019-06-10 PROCEDURE — 97535 SELF CARE MNGMENT TRAINING: CPT

## 2019-06-10 PROCEDURE — 36415 COLL VENOUS BLD VENIPUNCTURE: CPT

## 2019-06-10 PROCEDURE — 6370000000 HC RX 637 (ALT 250 FOR IP): Performed by: INTERNAL MEDICINE

## 2019-06-10 PROCEDURE — 2700000000 HC OXYGEN THERAPY PER DAY

## 2019-06-10 PROCEDURE — 1200000003 HC TELEMETRY R&B

## 2019-06-10 PROCEDURE — 99232 SBSQ HOSP IP/OBS MODERATE 35: CPT | Performed by: INTERNAL MEDICINE

## 2019-06-10 PROCEDURE — 2580000003 HC RX 258: Performed by: INTERNAL MEDICINE

## 2019-06-10 PROCEDURE — 97116 GAIT TRAINING THERAPY: CPT

## 2019-06-10 PROCEDURE — 97166 OT EVAL MOD COMPLEX 45 MIN: CPT

## 2019-06-10 PROCEDURE — 97530 THERAPEUTIC ACTIVITIES: CPT

## 2019-06-10 PROCEDURE — 82948 REAGENT STRIP/BLOOD GLUCOSE: CPT

## 2019-06-10 PROCEDURE — 97163 PT EVAL HIGH COMPLEX 45 MIN: CPT

## 2019-06-10 PROCEDURE — 6360000002 HC RX W HCPCS: Performed by: INTERNAL MEDICINE

## 2019-06-10 PROCEDURE — 94760 N-INVAS EAR/PLS OXIMETRY 1: CPT

## 2019-06-10 PROCEDURE — 80048 BASIC METABOLIC PNL TOTAL CA: CPT

## 2019-06-10 RX ORDER — BUMETANIDE 1 MG/1
1 TABLET ORAL DAILY
Status: DISCONTINUED | OUTPATIENT
Start: 2019-06-10 | End: 2019-06-13 | Stop reason: HOSPADM

## 2019-06-10 RX ADMIN — Medication 10 ML: at 20:15

## 2019-06-10 RX ADMIN — LEVOTHYROXINE SODIUM 25 MCG: 25 TABLET ORAL at 05:57

## 2019-06-10 RX ADMIN — OXYBUTYNIN CHLORIDE 5 MG: 5 TABLET ORAL at 09:05

## 2019-06-10 RX ADMIN — HEPARIN SODIUM 5000 UNITS: 5000 INJECTION INTRAVENOUS; SUBCUTANEOUS at 20:45

## 2019-06-10 RX ADMIN — HYDRALAZINE HYDROCHLORIDE 100 MG: 50 TABLET ORAL at 09:05

## 2019-06-10 RX ADMIN — AMLODIPINE BESYLATE 10 MG: 10 TABLET ORAL at 09:05

## 2019-06-10 RX ADMIN — FLUTICASONE PROPIONATE 1 SPRAY: 50 SPRAY, METERED NASAL at 09:05

## 2019-06-10 RX ADMIN — HYDRALAZINE HYDROCHLORIDE 100 MG: 50 TABLET ORAL at 01:18

## 2019-06-10 RX ADMIN — MECLIZINE HYDROCHLORIDE 25 MG: 12.5 TABLET, FILM COATED ORAL at 14:35

## 2019-06-10 RX ADMIN — GABAPENTIN 100 MG: 100 CAPSULE ORAL at 09:05

## 2019-06-10 RX ADMIN — BUMETANIDE 1 MG: 1 TABLET ORAL at 16:30

## 2019-06-10 RX ADMIN — ISOSORBIDE DINITRATE 20 MG: 20 TABLET ORAL at 20:33

## 2019-06-10 RX ADMIN — PRAVASTATIN SODIUM 40 MG: 40 TABLET ORAL at 20:33

## 2019-06-10 RX ADMIN — ISOSORBIDE DINITRATE 20 MG: 20 TABLET ORAL at 14:35

## 2019-06-10 RX ADMIN — MECLIZINE HYDROCHLORIDE 25 MG: 12.5 TABLET, FILM COATED ORAL at 09:05

## 2019-06-10 RX ADMIN — HEPARIN SODIUM 5000 UNITS: 5000 INJECTION INTRAVENOUS; SUBCUTANEOUS at 05:57

## 2019-06-10 RX ADMIN — HYDRALAZINE HYDROCHLORIDE 100 MG: 50 TABLET ORAL at 16:30

## 2019-06-10 RX ADMIN — INSULIN GLARGINE 20 UNITS: 100 INJECTION, SOLUTION SUBCUTANEOUS at 20:45

## 2019-06-10 RX ADMIN — GABAPENTIN 100 MG: 100 CAPSULE ORAL at 14:35

## 2019-06-10 RX ADMIN — MECLIZINE HYDROCHLORIDE 25 MG: 12.5 TABLET, FILM COATED ORAL at 20:44

## 2019-06-10 RX ADMIN — ISOSORBIDE DINITRATE 20 MG: 20 TABLET ORAL at 09:05

## 2019-06-10 RX ADMIN — DULOXETINE HYDROCHLORIDE 30 MG: 30 CAPSULE, DELAYED RELEASE ORAL at 09:05

## 2019-06-10 RX ADMIN — INSULIN LISPRO 1 UNITS: 100 INJECTION, SOLUTION INTRAVENOUS; SUBCUTANEOUS at 20:45

## 2019-06-10 RX ADMIN — GABAPENTIN 100 MG: 100 CAPSULE ORAL at 20:33

## 2019-06-10 RX ADMIN — Medication 10 ML: at 09:12

## 2019-06-10 ASSESSMENT — PAIN DESCRIPTION - LOCATION: LOCATION: BACK

## 2019-06-10 ASSESSMENT — PAIN DESCRIPTION - PAIN TYPE: TYPE: ACUTE PAIN

## 2019-06-10 ASSESSMENT — PAIN SCALES - GENERAL
PAINLEVEL_OUTOF10: 0
PAINLEVEL_OUTOF10: 0

## 2019-06-10 NOTE — PROGRESS NOTES
Hospitalist Progress Note      Patient:  Nicky De Guzman      Unit/Bed:6K-11/011-A    YOB: 1950    MRN: [id number]       Acct: [de-identified]     PCP: [id number]    Date of Admission: 6/6/2019    Assessment/Plan:    1. Acute on chronic diastolic CHF. Daily weights, low salt, fluid restriction. -- Not tolerating diureses. s/p bumex 1 mg IV 6/6 - 6/7 -> creat kandice and not much improvement in effusions thus stopped per Dr. Camryn Coker 6/7 --> discussing possible need for dialysis  -- last echo 12/2018 EF 50-55%, no WMA, mildly dilated LA, mild MR, mild TR --> limited echo 6/7/19 normal EF 55%, no other abn noted  -- ?BB -- no ACE/aRB due to CKD --> on isordil/hydralazine  -- TSH 5/19 WNL. 2. Bilateral R>L pleural effusions, recurrent. Transudative -- apprec pulm and renal assist -- CT chest 6/7/19 = Moderate-sized left pleural effusion. Bibasilar atelectasis/pneumonia. Groundglass and right mid and upper lung fields, consistent with pneumonia versus pulmonary edema. -- s/p right thoracentesis 6/6/19 = 1.4 L - prior studies c/w transudate last admission 5/2019 (none sent this admission)   -- s/p left thoracentesis 6/9/19 = 500 mL ->   -- s/p bumex 1 mg daily 6/6 - 6/7 --> stopped per renal for rising creatinine  -- per Care Everywhere pt has had right effusion back in 12/2018, 1/2019, 2/2019  -- limited echo 6/7/19 EF 55%, LV fxn normal--> similar to 12/2018 echo  3. Acute hypoxic respiratory failure secondary to #1/2. Wean oxygen as able. 4. Moderate right perihilar atelectasis and both bases -- likely due to effusions --> encourage IS -- ?need to r/o other rheum process --> per pulm 6/7 LUIS (p), histone IgG (p), anti-dsDNA (p)  -- s/p zmax x 1 on 6/7 per pulm, s/p cefepime 6/7 - 6/8 per pulm --> no cx done on right pleural fluid  5.  CKD stage now 5 -- due to DM, HTN -- c/s renal apprec -- creat up to 4.1 on 6/8 with diuretics -> stopped and creat back to 3.4 on 6/9 - was mid 3's during admission 5/20 - 5/23 -- baseline CKD with baseline 2.8 - 3.1.  -- creat worsens with diuretics  -- d/w Dr. Cely Beck 6/8 and likely needs dialysis - CAPD cath to be placed and start in 2-3 weeks. 6. Hyponatremia, resolved. 7. Metabolic acidosis -- due to CKD. 8. Leukocytosis -- afeb, ?etiology -- up and down - similar last admission 5/2019 -- afebrile -- chronic component per chart review -- PCT slightly elevated chronically also in 0.2's   -- s/p zmax 6/7, cefepime 6/7 - 6/8 per pulm  8. Chronic elevated trops -- due to renal dysfunction -- no cp - only sob but with above -- trops 6/6 at baseline 0.033 which is what was in 5/2019  -- no ASA - hold until if need further procedures  -- limited echo 6/7/19 = EF 55%, LV fxn normal  9. Type 2 DM -- 24 hr BS trend has been acceptable. Cont lantus 20 units, SSI -- monitor and adjust prn -- carb diet. Last A1C 7.1 12/2018  10. Essential HTN -- cont norvasc 10 mg daily, hydralazine 100 mg tid, isordil 20 mg tid. 11. Chronic normocytic anemia -- cont po iron as iron low 5/19 -- likely due to CKD -- ??aranesp per renal -- stable in 9's -- was 9.1 on 5/22 --> was 10's at [facility] 2/2019 but 8-10 prior  12. Diabetic gastroparesis -- anibal po - monitor  13. Hypothyroidism -- cont synthroid -- TSH 5/19 WNL  14. Hx neurogenic bladder -- ditropan  15. HLD -- cont statin -- Lipids 5/20/19 HLD 66, LDL 75, trig 148  16. Chronic dizziness -- on antivert  17. Bipolar d/o -- at [facility] chronically -- no current meds and mood stable  18. Mild MR, mild TR -- per echo 12/2018  19. Hx seizure d/o -- no seizure meds but on neurontin  20. Chronic pain -- cont neurontin, cymbalta  21. Constipation -- added miralax prn 6/9  22. Generalized weakness -- PT/OT c/s    Dispo: scheduled for PD catheter placement for tomorrow at 1300.        Chief Complaint: sob    Hospital Course: Shubham Espinosa is a 81 y.o. female with bipolar, CKD IV, CHF, DMII, HTN and lipidemia is admitted from [facility] for acute on chronic systolic heart failure, recent admission 5/19 - 5/23 for similar c/o of sob, weight gain. -- pulmonary consulted for recurrent bilateral pleural effusions --> s/p right thoracentesis 6/ - left thoracentesis 6/9 = 500 mL blood tinged removed  -- renal consulted for CKD stage IV and fluid overload - not much improvement with diuretics     -- 6/9 --> apprec consultants -- s/p left thoracentesis this am 500 mL - cont monitor - s/p right  Tap 6/6 --> await further pulm and renal recs -- not responsive to diuretics thus likely needs dialysis - d/w Dr. Carmen Reading yest - ?need HD catheter prior to d/c;  Resides in [facility] for long time - c/s PT/OT. Cont monitor lytes, u/o, BP, BS, h/h. Wean O2 as able. Subjective:       Medications:  Reviewed    Infusion Medications    dextrose       Scheduled Medications    bumetanide  1 mg Oral Daily    senna  1 tablet Oral Nightly    amLODIPine  10 mg Oral Daily    DULoxetine  30 mg Oral Daily    fluticasone  1 spray Each Nostril Daily    gabapentin  100 mg Oral TID    hydrALAZINE  100 mg Oral Q8H    insulin lispro  0-9 Units Subcutaneous 4x Daily AC & HS    isosorbide dinitrate  20 mg Oral TID    levothyroxine  25 mcg Oral Daily    meclizine  25 mg Oral TID    oxybutynin  5 mg Oral Daily    pravastatin  40 mg Oral Nightly    sodium chloride flush  10 mL Intravenous 2 times per day    heparin (porcine)  5,000 Units Subcutaneous 3 times per day    insulin glargine  20 Units Subcutaneous Nightly     PRN Meds: polyethylene glycol, ipratropium-albuterol, acetaminophen, glucose, dextrose, glucagon (rDNA), dextrose, sodium chloride flush, ondansetron      Intake/Output Summary (Last 24 hours) at 6/10/2019 1640  Last data filed at 6/10/2019 1439  Gross per 24 hour   Intake 830 ml   Output --   Net 830 ml       Diet:  DIET CARB CONTROL;   Dietary Nutrition Supplements: Clear Liquid Oral Supplement    Exam:  BP (!) 142/64   Pulse 65   Temp 98.5 °F (36.9 °C) (Oral) 1. Poor inflation of lungs. Mild cardiomegaly. Moderate atelectasis/pneumonia left perihilar region, right infrahilar region, and both lung bases. Small effusion right side. 2. No residual effusion on the left. No pneumothorax is seen. Final report electronically signed by Dr. Rosemary Thorpe on 6/9/2019 10:27 AM      US THORACENTESIS   Final Result   Status post thoracentesis            **This report has been created using voice recognition software. It may contain minor errors which are inherent in voice recognition technology. **      Final report electronically signed by Dr. Rosemary Thorpe on 6/9/2019 10:46 AM      CT Chest WO Contrast   Final Result   1. Moderate-sized left pleural effusion. Bibasilar atelectasis/pneumonia. Groundglass and right mid and upper lung fields, consistent with pneumonia versus pulmonary edema. 2. Enlarged pulmonary arteries, consistent with pulmonary arterial hypertension. **This report has been created using voice recognition software. It may contain minor errors which are inherent in voice recognition technology. **      Final report electronically signed by Dr. Rosemary Thorpe on 6/7/2019 1:03 PM      XR CHEST PORTABLE   Final Result   1. Redemonstration of changes compatible with congestive heart failure. 2. Bilateral effusions right greater than left. **This report has been created using voice recognition software. It may contain minor errors which are inherent in voice recognition technology. **      Final report electronically signed by Dr. Vance Tyson on 6/7/2019 2:19 AM      US THORACENTESIS   Final Result   1. Uncomplicated ultrasound guided therapeutic thoracentesis. **This report has been created using voice recognition software. It may contain minor errors which are inherent in voice recognition technology. **      Final report electronically signed by Dr. Tabitha Wren on 6/6/2019 1:28 PM      XR CHEST 1 VW   Final Result   1.  The patient is status post large volume right thoracentesis with aspiration of 1.4 L of clear yellow fluid. There is no pneumothorax. There are bilateral perihilar and the basilar infiltrates. **This report has been created using voice recognition software. It may contain minor errors which are inherent in voice recognition technology. **      Final report electronically signed by Dr. Héctor Reyna on 6/6/2019 1:30 PM      IR INSERT CAPD COMPLETE    (Results Pending)       Diet: DIET CARB CONTROL;   Dietary Nutrition Supplements: Clear Liquid Oral Supplement    Perez: no    Microbiology:  Left pleural fluid cx 6/9 (p)    Tele:  SR -- had burst of tachyarrhythmia to 130 at about 620 am 6/9    DVT prophylaxis: [] Lovenox                                 [] SCDs                                 [x] SQ Heparin                                 [] Encourage ambulation           [] Already on Anticoagulation     Disposition:    [] Home       [] TCU       [] Rehab       [] Psych       [x] SNF       [] [place]       [] Other-    Code Status: Full Code    PT/OT Eval Status: consulted        Electronically signed by YESENIA Weinberg CNP on 6/10/2019 at 4:40 PM

## 2019-06-10 NOTE — PROGRESS NOTES
[facility]  INPATIENT OCCUPATIONAL THERAPY  [facility] RENAL TELEMETRY 6K  EVALUATION      Time In: 3934  Time Out: 1353  Timed Code Treatment Minutes: 10 Minutes  Minutes: 18          Date: 6/10/2019  Patient Name: Justo Zamora,   Gender: female      MRN: [id number]  : 1950  (81 y.o.)  Referring Practitioner: Dr. Dominga Baker  Diagnosis: pleural effusions  Additional Pertinent Hx: Per EMR: \"Sarah Noyola is a 81 y.o. female transferred from outside hospital for SOB and pleural effusion. Was DC from this hospital last week at that time shed had a right  thoracentesis of 1 L, studies not sent, today a 1.4L tap was done ordered by primary team before we saw pt, chemistries  not requested. \"     Restrictions/Precautions:  Restrictions/Precautions: Fall Risk    Subjective  Patient assessed for rehabilitation services?: Yes    Subjective: Pt in bed and agreeable to OT. pleasant and cooperative, on 2L of O2, min SOB with tasks this date    Pain:   denies    Social/Functional History:  Type of Home: Facility([facility])  Home Layout: One level           ADL Assistance: Independent  Ambulation Assistance: Needs assistance(supervision/SBA)  Transfer Assistance: Needs assistance(supervision/SBA)          Additional Comments: Pt reports that she plans to return to [facility] post hospital stay. Pt reports that she was an I ambulator in her room with walker prior to recent admission at the end of May. Pt notes that she was getting therapy prior to recent hospital admission.      Cognition/Orientation:  Overall Orientation Status: Within Functional Limits  Cognition Comment: slow processing    ADL;s:  Grooming: Contact guard assistance(standing at sink to comb hair and wash hands)  LE Dressing: Contact guard assistance(to don  depends, )  Toileting: Contact guard assistance(clothing management)       Functional Mobility:  Bed mobility  Rolling to Right: Contact guard assistance  Supine to Sit: Contact guard walker safety, navigating O2    Equipment Recommendations:  Equipment Needed: No    Plan:  Times per week: 3-5X  Current Treatment Recommendations: Strengthening, Balance Training, Functional Mobility Training, Safety Education & Training, Self-Care / ADL, Endurance Training    Goals:  Patient goals : to get stronger  Short term goals  Time Frame for Short term goals: by discharge  Short term goal 1: Pt will complete BADL routine wtih S and EC strategies prn for ease wtih bathing/dressing tasks  Short term goal 2: Pt will complete standing ADL with S for > 3 minutes wtih 2 hand release and no LOB for ease with sink side grooming  Short term goal 3: Pt will complete functional mobility HH distances with S and RW with no vc for safety to improve ease with getting to/from toilet  Short term goal 4: Pt will complete functional trnasfers with S and no vc for safety including to/from toilet  Short term goal 5: Pt will demo indep wtih BUE strengthening HEP to increase UB strength needed for ADLs  Long term goals  Time Frame for Long term goals : No LTG established d/t short ELOS    Following session, patient left in safe position with all fall risk precautions in place.

## 2019-06-10 NOTE — CARE COORDINATION
6/10/19, 1:48 PM      Celestino Powell day: 4  Location: [facility]11/011A Reason for admit: Pleural effusion [J90]   Procedure:   6/6 right thoracentesis: 1.4L removed  6/9 Left thoracentesis: 500mls removed  Treatment Plan of Care: Hospitalist, nephro, pulm following. ASA/plavix on hold in case HD/PD cath is placed. 97% on 2L NC. Nebs. PCP: Helio Bettencourt [address] [place] [address]  Readmission Risk Score: 23%  Discharge Plan: Plans to return to [facility]. No precert needed.

## 2019-06-10 NOTE — PROGRESS NOTES
[facility]    Patient - Colonel Sales   MRN -  [id number]   Kiara # - [de-identified]   - 1950      Date of Admission -  2019  6:35 AM  Date of evaluation -  6/10/2019  Room - -[facility]-A   Hospital Day - 4  Consulting - Edy Anderson, * Primary Care Physician - [address]   Chief Complaint   Pleural effusion  Active Hospital Problem List      Active Hospital Problems    Diagnosis Date Noted    Bilateral atelectasis [J98.11]     Hyponatremia [E87.1]     Chronic anemia [D64.9]     Hyperlipidemia [E78.5]     Constipation [K59.00]     Pleural effusion [J90] 2019    Stage 5 chronic kidney disease ([address] [N18.5]     (HFpEF) heart failure with preserved ejection fraction ([address] [I50.30]     Essential hypertension [I10] 2018    Acute on chronic diastolic CHF (congestive heart failure) ([address] [I50.33] 2018     HPI   Colonel Sales is a 81 y.o. female transferred from outside hospital for SOB and pleural effusion. Never smoker, has been tapped twice before over the last few weeks in the same side, apparently a transudate. Was DC from this hospital last week at that time shed had a right  thoracentesis of 1 L, studies not sent, today a 1.4L tap was done ordered by primary team before we saw pt, chemistries  not requested.   Gisselle Hernández has CKD 5 and CHF with preserved EF    Past Medical History   -L thoracentesis on 19 removed 500 mL blood tinged serous fluid  -CXR post thoracentesis reviewed  -Reports SOB improved  All other systems reviewed  Past Medical History         Diagnosis Date    Anemia     Anemia in chronic kidney disease(285.21)     Arthritis     Asthma     Bipolar 1 disorder (HCC)     CHF (congestive heart failure) (HCC)     Chronic kidney disease, stage III (moderate) (HCC)     Depression     GERD (gastroesophageal reflux disease)     Gout     Headache(784.0)     Hyperkalemia     Hyperlipidemia     Hypertension     Hypothyroid     Neurogenic bladder     Pneumonia     Seiz[facility] 01/2018    Type II or unspecified type diabetes mellitus without mention of complication, not stated as uncontrolled       Past Surgical History           Procedure Laterality Date    BACK SURGERY      CERVICAL FUSION      CHOLECYSTECTOMY      COLONOSCOPY      ENDOSCOPY, COLON, DIAGNOSTIC      FRACTURE SURGERY       Diet    DIET CARB CONTROL; Dietary Nutrition Supplements: Clear Liquid Oral Supplement  Allergies    Eggs or egg-derived products; Aspirin; Pcn [penicillins];  Vicodin [hydrocodone-acetaminophen]; Vilazodone; and Wellbutrin [bupropion]  Social History     Social History     Socioeconomic History    Marital status:      Spouse name: Not on file    Number of children: 1    Years of education: Not on file    Highest education level: Not on file   Occupational History    Not on file   Social Needs    Financial resource strain: Not on file    Food insecurity:     Worry: Not on file     Inability: Not on file    Transportation needs:     Medical: Not on file     Non-medical: Not on file   Tobacco Use    Smoking status: Never Smoker    Smokeless tobacco: Never Used   Substance and Sexual Activity    Alcohol use: No    Drug use: No    Sexual activity: Not on file   Lifestyle    Physical activity:     Days per week: Not on file     Minutes per session: Not on file    Stress: Not on file   Relationships    Social connections:     Talks on phone: Not on file     Gets together: Not on file     Attends Gnosticism service: Not on file     Active member of club or organization: Not on file     Attends meetings of clubs or organizations: Not on file     Relationship status: Not on file    Intimate partner violence:     Fear of current or ex partner: Not on file     Emotionally abused: Not on file     Physically abused: Not on file     Forced sexual activity: Not on file   Other Topics Concern    Not on clear and moist.  No oral thrush. Eyes: Conjunctivae are normal. Pupils are equal, round. No scleral icterus. Neck: Neck supple. No tracheal deviation present. Cardiovascular: S1 and S2 with no murmur. No peripheral edema  Pulmonary/Chest: Normal effort with bilateral air entry, bibasilar rales. No stridor. No respiratory distress. Patient exhibits no tenderness. Abdominal: Soft. Bowel sounds audible. No distension or tenderness to palp. Musculoskeletal: Moves all extremities  Neurological: Patient is alert and oriented to person, place, and time. Skin: Warm and dry. Labs   ABG  Lab Results   Component Value Date    PH 7.43 12/09/2018    PO2 81 12/09/2018    PCO2 41 12/09/2018    HCO3 27 12/09/2018    O2SAT 96 12/09/2018     Lab Results   Component Value Date    IFIO2 30 12/09/2018    MODE CPAP/PS 12/09/2018    SETTIDVOL 293 12/09/2018    SETPEEP 5.0 12/09/2018     CBC  No results for input(s): WBC, RBC, HGB, HCT, MCV, MCH, MCHC, RDW, PLT, MPV in the last 72 hours. BMP  Recent Labs     06/08/19  0357 06/09/19  0536 06/10/19  0702   * 133* 135   K 4.2 3.9 4.1   CL 98 94* 97*   CO2 20* 21* 19*   BUN 71* 74* 73*   CREATININE 4.1* 3.4* 3.4*   GLUCOSE 64* 80 83   MG 1.9 2.0 2.2   CALCIUM 8.7 9.3 9.5     LFT  Recent Labs     06/09/19  0536   AST 9   ALT <5*   BILITOT 0.4   ALKPHOS 74     TROP  Lab Results   Component Value Date    TROPONINT 0.033 06/06/2019    TROPONINT 0.029 06/06/2019    TROPONINT 0.033 05/19/2019     BNP  Lab Results   Component Value Date    PROBNP 3592.0 05/19/2019    PROBNP 2003.0 12/08/2018    PROBNP 403.3 06/07/2016     D-Dimer  No results found for: DDIMER  Lactic Acid  No results for input(s): LACTA in the last 72 hours. INR  No results for input(s): INR, PROTIME in the last 72 hours. PTT  No results for input(s): APTT in the last 72 hours.   Glucose  Recent Labs     06/09/19  2016 06/10/19  0608 06/10/19  1146   POCGLU 161* 84 92     UA   Recent Labs     06/09/19  1000 fluid culture-No bacteria seen    Radiology    CXR  XR CHEST 1 VIEW   6/9/2019   1. Poor inflation of lungs. Mild cardiomegaly. Moderate atelectasis/pneumonia left perihilar region, right infrahilar region, and both lung bases. Small effusion right side. 2. No residual effusion on the left. No pneumothorax is seen. 6/7/2019   XR CHEST PORTABLE   1. Redemonstration of changes compatible with congestive heart failure. 2. Bilateral effusions right greater than left. 6/6/19  Right thoracentesis  1. The patient is status post large volume right thoracentesis with aspiration of 1.4 L of clear yellow fluid. There is no pneumothorax. There are bilateral perihilar and the basilar infiltrates.               **This report has been created using voice recognition software.  It may contain minor errors which are inherent in voice recognition technology. **       Final report electronically signed by Dr. Cyndra Simmonds on 6/6/2019 1:30 PM       CT Scans  6/7/19  CT Chest w/o contrast  FINDINGS:   Upper abdomen: Prior cholecystectomy. Mediastinum and radha: Enlarged pulmonary trunk, consistent with pulmonary arterial hypertension. Extensive coronary artery calcifications are present. There are several small subcentimeter mediastinal lymph nodes, likely reactive. No abnormally enlarged hilar or mediastinal lymph nodes are seen. Bones: Marked hypertrophic spurring is seen in the thoracic spine with several bulky bridging osteophytes. There is extensive calcification in the anterior spinal ligament. In addition, there is a moderate old compression fracture T11 level with evidence  for prior vertebroplasty. There appears slightly greater compression at this time than on the prior study. Lungs: Groundglass infiltrates right mid and upper lung fields, consistent with pneumonia/pulmonary edema. Moderate consolidation both lower lobes, worse on the right, consistent with pneumonia.  There are moderate size bilateral pleural effusions. Impression:  1. Moderate-sized left pleural effusion. Bibasilar atelectasis/pneumonia. Groundglass and right mid and upper lung fields, consistent with pneumonia versus pulmonary edema. 2. Enlarged pulmonary arteries, consistent with pulmonary arterial hypertension. Assessment   -Acute respiratory failure with hypoxia 2/2 B pleural effusion  -Recurrent transudative bilateral pleural effusions ( R>L) initial chemistry (12/10/18) c/w transudate, chemistry not obtain in last tap, unilaterality not typical for CHF/CKD.  -S/P 1.4 L right thoracentesis with improvement; no fluid sent for labs  -Second had smoke exposure for 17 years; no PFT available no respiratory issues prior to admission  -CKD IV  -Full Code  Recommendations   -Monitor SpO2 wean supplemental O2 to maintain SpO2 >90%  -Strict I&O with daily weights   -Will monitor.  -Recurrent Transudative pleural effusion covering physician discussed dialysis treatment with Nephrology service over the weekend planning PD catheter placement   -DVT prophylaxis: SCDs    Case discussed with nurse and patient/family. Questions and concerns addressed. Meds and Orders reviewed. Electronically signed by     YESENIA Hernandez CNP on 6/10/2019 at 12:48 PM    Addendum by Dr. Manuelito Neely MD:  I have seen and examined the patient independently. Face to face evaluation and examination was performed. The above evaluation and note has been reviewed. Labs and radiographs were reviewed. I Have discussed with Mr. Alondra Land CNP about this patient in detail. The above assessment and plan has been reviewed. Please see my modifications mentioned below. My modifications: On 2LPM via nasal cannula. For PD cath placement.     Zhao Ortega MD 6/10/2019 7:47 PM

## 2019-06-10 NOTE — PROGRESS NOTES
[facility]    Renal inpatient Progress Note  6/10/2019 8:06 AM      Pt Name:   Kathie Ram  YOB: 1950  Attending:   Marisol May, *    Chief Complaint:   Kathie Ram is a 81 y.o. female being followed by nephrology for LOUISE / CKD and SOB     Interval History : patient seen and examined by me. feels well. No cp . SOB is much better. Not much UOP  Had a thoracentesis done again this AM .     Scheduled Medications :   senna  1 tablet Oral Nightly    amLODIPine  10 mg Oral Daily    DULoxetine  30 mg Oral Daily    fluticasone  1 spray Each Nostril Daily    gabapentin  100 mg Oral TID    hydrALAZINE  100 mg Oral Q8H    insulin lispro  0-9 Units Subcutaneous 4x Daily AC & HS    isosorbide dinitrate  20 mg Oral TID    levothyroxine  25 mcg Oral Daily    meclizine  25 mg Oral TID    oxybutynin  5 mg Oral Daily    pravastatin  40 mg Oral Nightly    sodium chloride flush  10 mL Intravenous 2 times per day    heparin (porcine)  5,000 Units Subcutaneous 3 times per day    insulin glargine  20 Units Subcutaneous Nightly      dextrose          Vitals :  /67   Pulse 80   Temp 98.6 °F (37 °C) (Oral)   Resp 24   Ht 5' 6\" (1.676 m)   Wt 164 lb 11.2 oz (74.7 kg)   SpO2 92%   BMI 26.58 kg/m²     24HR INTAKE/OUTPUT:      Intake/Output Summary (Last 24 hours) at 6/10/2019 0806  Last data filed at 6/10/2019 0443  Gross per 24 hour   Intake 590 ml   Output --   Net 590 ml     Last 3 weights  Wt Readings from Last 3 Encounters:   06/10/19 164 lb 11.2 oz (74.7 kg)   05/23/19 171 lb 4.8 oz (77.7 kg)   03/13/19 166 lb 4.8 oz (75.4 kg)        Physical Exam :  General Appearance:  Well developed.  No distress  Mouth/Throat:  Oral mucosa moist  Neck:  Supple, no JVD  Lungs:  Breath sounds: mild rales noted  Heart[de-identified]  S1,S2 heard  Abdomen:  Soft, non - tender  Musculoskeletal:  Edema - none     Last 3 CBC  No results for input(s): WBC, RBC, HGB, HCT, PLT in the last 72 hours. Last 3 CMP  Recent Labs     06/08/19  0357 06/09/19  0536   * 133*   K 4.2 3.9   CL 98 94*   CO2 20* 21*   BUN 71* 74*   CREATININE 4.1* 3.4*   CALCIUM 8.7 9.3   LABALBU  --  3.5   BILITOT  --  0.4        Assessment / Plan   Renal - CKD stage 5 - overall renal FX appears stable at baseline  · Volume status also looks well in fact much better than it has been in the past at 165 pounds  · CXR shows may be mild congestion, SOB improved. Creat improved after holding diuretics  · She has had multiple admissions in the past as well for similar complaints  · Spoke with pulmonary the pleural fluid is a transudate and the patient might benefit from dialysis  . She is unable to tolerate diuretics as well. · Unable to tolerate diuretics  And so will schedule a PD cath placement and start PD in 2 - 3 weeks   .       Electrolytes - mild hyponatremia  Essential Hypertension - reasonable control  SOB -  Multifactorial, primarily pleural effusion  And may be mild CHF. bumex on hold. Pleural effusion - S/P thoracentesis and pulmonary has been consulted  Mild acidosis  Diabetes Mellitus  meds reviewed and D/W patient     LIANNE Salcido D.  [facility].

## 2019-06-10 NOTE — PLAN OF CARE
Problem: Falls - Risk of:  Goal: Will remain free from falls  Description  Will remain free from falls  6/9/2019 2250 by Roberta Sorto RN  Outcome: Ongoing   Call light within reach. Side rails up x2. Bed alarm on. Non skid slippers available. Problem: Falls - Risk of:  Goal: Absence of physical injury  Description  Absence of physical injury  6/9/2019 2250 by Roberta Sorto RN  Outcome: Ongoing   No evidence of physical injury at this time in the shift. Problem: Tissue Perfusion - Cardiopulmonary, Altered:  Goal: Hemodynamic stability will improve  Description  Hemodynamic stability will improve  6/9/2019 2250 by Roberta Sorto RN  Outcome: Ongoing     Vitals:    06/09/19 2015   BP: (!) 160/71   Pulse: 68   Resp: 24   Temp: 98.5 °F (36.9 °C)   SpO2: 94%       Problem: Discharge Planning:  Goal: Participates in care planning  Description  Participates in care planning  6/9/2019 2250 by Roberta Sorto RN  Outcome: Ongoing   Patient plans to be discharged to [facility] when medically stable. Problem: Activity Intolerance:  Goal: Ability to tolerate increased activity will improve  Description  Ability to tolerate increased activity will improve  6/9/2019 2250 by Roberta Sorto RN  Outcome: Ongoing   Patient is a one assist with a gait belt and a walker. Problem: Pain:  Goal: Pain level will decrease  Description  Pain level will decrease  6/9/2019 2250 by Roberta Sorto RN  Outcome: Ongoing   Patient free from pain this shift. Pain rated on 0-10 pain rating scale. Will continue to reassess. Pain goal is no pain    Problem: Skin Integrity - Impaired:  Goal: Absence of new skin breakdown  Description  Absence of new skin breakdown  6/9/2019 2250 by Roberta Sorto RN  Outcome: Ongoing   No evidence of new skin breakdown  Care plan reviewed with patient. Patient verbalize understanding of the plan of care and contribute to goal setting.

## 2019-06-10 NOTE — PROGRESS NOTES
[facility]  INPATIENT PHYSICAL THERAPY  EVALUATION  [facility] RENAL TELEMETRY 6K - 6K-11/011-A    Time In: 1001  Time Out: 1039  Timed Code Treatment Minutes: 23 Minutes  Minutes: 38          Date: 6/10/2019  Patient Name: Mary Olivas,  Gender:  female        MRN: [id number]  : 1950  (81 y.o.)      Referring Practitioner: Maureen Vera MD  Diagnosis: pleural effusion   Additional Pertinent Hx: Per EMR: \"Sarah Dallas is a 81 y.o. female transferred from outside hospital for SOB and pleural effusion. Was DC from this hospital last week at that time shed had a right  thoracentesis of 1 L, studies not sent, today a 1.4L tap was done ordered by primary team before we saw pt, chemistries  not requested. \"     Past Medical History:   Diagnosis Date    Anemia     Anemia in chronic kidney disease(285.21)     Arthritis     Asthma     Bipolar 1 disorder (HCC)     CHF (congestive heart failure) (HCC)     Chronic kidney disease, stage III (moderate) (HCC)     Depression     GERD (gastroesophageal reflux disease)     Gout     Headache(784.0)     Hyperkalemia     Hyperlipidemia     Hypertension     Hypothyroid     Neurogenic bladder     Pneumonia     Seizure ([facility] [address] 2018    Type II or unspecified type diabetes mellitus without mention of complication, not stated as uncontrolled      Past Surgical History:   Procedure Laterality Date    BACK SURGERY      CERVICAL FUSION      CHOLECYSTECTOMY      COLONOSCOPY      ENDOSCOPY, COLON, DIAGNOSTIC      FRACTURE SURGERY         Restrictions/Precautions:  Fall Risk       Subjective:  Chart Reviewed: Yes  Patient assessed for rehabilitation services?: Yes  Family / Caregiver Present: Yes  Subjective: RN approved PT session. Pt received in supine and was agreeable to PT interventions.      General:  Overall Orientation Status: Within Functional Limits  Follows Commands: Within Functional Limits    Vision: Impaired  Vision Exceptions: Wears glasses at all times    Hearing: Within functional limits         Pain:  Yes(value not rated). Pain Assessment  Pain Type: Acute pain  Pain Location: Back  Non-Pharmaceutical Pain Intervention(s): Repositioned  Response to Pain Intervention: Patient Satisfied       Social/Functional History:    Type of Home: Facility([facility])  Home Layout: One level                   Ambulation Assistance: Needs assistance(supervision/SBA)  Transfer Assistance: Needs assistance(supervision/SBA)          Additional Comments: Pt reports that she plans to return to [facility] post hospital stay. Pt reports that she was an I ambulator in her room with walker prior to recent admission at the end of May. Pt notes that she was getting therapy prior to recent hospital admission.        Objective:       RLE AROM: WFL         LLE AROM : WFL      Strength RLE: WFL  Comment: dorsiflexion: 3/5    Strength LLE: WFL  Comment: dorsiflexion: 3+/5       Sensation  Overall Sensation Status: WFL(pt denies N/T- not formally tested)            Balance  Sitting - Static: Good  Sitting - Dynamic: +, Fair  Standing - Static: Fair, +  Standing - Dynamic: Fair    Rolling to Right: Contact guard assistance  Supine to Sit: Contact guard assistance  Comment: HOB slightly elevated, heavy reliance on bed rail    Transfers  Sit to Stand: Contact guard assistance  Stand to sit: Stand by assistance  Comment: cueing for hand placement, slow descent for stand to sit transfer - pt demonstrates understanding        Ambulation 1  Surface: level tile  Device: Rolling Walker  Assistance: Contact guard assistance  Quality of Gait: slow gait, right foot drop (chronic), shorter step lengths bilat, unequal step lengths, decreased rigth heel strike, downard gaze  Distance: 60ft (O2 >92% throughout ambulation)  Comments: cueing provided for improved heel strike and improved knee extension with initial contact          Exercises:  Comments: pt instructed in supine LE exercises, 1x15: heel slides , SLR, hip abduction/adduction, in sitting: 1x5: ankle pumps, marches, LAQ - for improved functional strength          Activity Tolerance:  Activity Tolerance: Patient Tolerated treatment well    Treatment Initiated: Pt instructed in supine and seated LE exercises. Pt was encouraged to sit up in bedside chair with all of her meals and to ambulate 3x/day with staff. Pt completed gait training this date with a emphasis of right heel strike, improved knee extension with initial contact and postural awareness. Pt demonstrates understanding. Pt notes that the SNF plans on assisting her in ordering an AFO. Pt reports having foot drop for the past 5 years s/p a spinal surgery. Pt's O2 dropped to low 80s with exercises. Discussed pacing herself, energy conservation and breathing technique while completing exercise task. O2 >92% with ambulation. Pt on 2L at time of evaluation. All activity completed this date was performed to improve functional mobility and I, and to prepare for home d/c and safe community mobility. Assessment: Body structures, Functions, Activity limitations: Decreased functional mobility , Decreased balance, Decreased safe awareness, Decreased endurance, Decreased strength  Assessment: Pt demonstrates a decrease in baseline by way of transfers, bed mobility and ambulation. Pt will benefit from continued therapy services throughout admission and post hospital d/c for improved functional I and safety with mobility.    Prognosis: Good    Clinical Presentation: High - Unstable with Unpredictable Characteristics: see assessment:    Decision Making: High Complexitybased on patient assessment and decision making process of determining plan of care and establishing reasonable expectations for measurable functional outcomes    REQUIRES PT FOLLOW UP: Yes    Discharge Recommendations:  Discharge Recommendations: [address], Continue to assess pending progress    Patient Education:  Patient Education: role of PT, plan of care, HEP    Equipment Recommendations:  Equipment Needed: No    Safety:  Type of devices: All fall risk precautions in place, Gait belt, Left in chair, Chair alarm in place, Patient at risk for falls, Nurse notified, Call light within reach    Plan:  Times per week: 3-5XGM  Times per day: Daily  Specific instructions for Next Treatment: advance as tolerated   Current Treatment Recommendations: Strengthening, Transfer Training, Endurance Training, Balance Training, ROM, Gait Training, Home Exercise Program, Stair training, Functional Mobility Training, Neuromuscular Re-education    Goals:  Patient goals : return to apartment - in long term  Short term goals  Time Frame for Short term goals: by discharge  Short term goal 1: bed mobility with mod I, HOB flat, no rail for ease of getting in/out of bed  Short term goal 2: sit <> stand with mod I for ease of transfers   Short term goal 3: ambulate 150ft with use of LRAD and mod I for ease of ambulation to/from the dining handley at [facility]  Short term goal 4: negotiate curb step with LRAD and contact guard assist for safe community mobility   Long term goals  Time Frame for Long term goals : N/A secondary to short ELOS     Evaluation Complexity: Based on the findings of patient history, examination, clinical presentation, and decision making during this evaluation, the evaluation of Sukhi Mcgregor  is of high complexity.             AM-PAC Inpatient Mobility without Stair Climbing Raw Score : 15  AM-PAC Inpatient without Stair Climbing T-Scale Score : 43.03  Mobility Inpatient CMS 0-100% Score: 47.43  Mobility Inpatient without Stair CMS G-Code Modifier : CK

## 2019-06-11 ENCOUNTER — APPOINTMENT (OUTPATIENT)
Dept: INTERVENTIONAL RADIOLOGY/VASCULAR | Age: 69
DRG: 291 | End: 2019-06-11
Attending: INTERNAL MEDICINE
Payer: MEDICARE

## 2019-06-11 LAB
ANION GAP SERPL CALCULATED.3IONS-SCNC: 16 MEQ/L (ref 8–16)
BASOPHILS # BLD: 0.6 %
BASOPHILS ABSOLUTE: 0.1 THOU/MM3 (ref 0–0.1)
BUN BLDV-MCNC: 71 MG/DL (ref 7–22)
CALCIUM SERPL-MCNC: 9.4 MG/DL (ref 8.5–10.5)
CHLORIDE BLD-SCNC: 98 MEQ/L (ref 98–111)
CO2: 21 MEQ/L (ref 23–33)
CREAT SERPL-MCNC: 3 MG/DL (ref 0.4–1.2)
EOSINOPHIL # BLD: 3.3 %
EOSINOPHILS ABSOLUTE: 0.4 THOU/MM3 (ref 0–0.4)
ERYTHROCYTE [DISTWIDTH] IN BLOOD BY AUTOMATED COUNT: 13.1 % (ref 11.5–14.5)
ERYTHROCYTE [DISTWIDTH] IN BLOOD BY AUTOMATED COUNT: 42.3 FL (ref 35–45)
GFR SERPL CREATININE-BSD FRML MDRD: 16 ML/MIN/1.73M2
GLUCOSE BLD-MCNC: 56 MG/DL (ref 70–108)
GLUCOSE BLD-MCNC: 58 MG/DL (ref 70–108)
GLUCOSE BLD-MCNC: 59 MG/DL (ref 70–108)
GLUCOSE BLD-MCNC: 74 MG/DL (ref 70–108)
GLUCOSE BLD-MCNC: 76 MG/DL (ref 70–108)
GLUCOSE BLD-MCNC: 84 MG/DL (ref 70–108)
GLUCOSE BLD-MCNC: 97 MG/DL (ref 70–108)
HCT VFR BLD CALC: 28 % (ref 37–47)
HEMOGLOBIN: 9.4 GM/DL (ref 12–16)
HISTONE ANTIBODY IGG: NORMAL
IMMATURE GRANS (ABS): 0.06 THOU/MM3 (ref 0–0.07)
IMMATURE GRANULOCYTES: 0.5 %
LYMPHOCYTES # BLD: 9.9 %
LYMPHOCYTES ABSOLUTE: 1.2 THOU/MM3 (ref 1–4.8)
MAGNESIUM: 2.1 MG/DL (ref 1.6–2.4)
MCH RBC QN AUTO: 29.7 PG (ref 26–33)
MCHC RBC AUTO-ENTMCNC: 33.6 GM/DL (ref 32.2–35.5)
MCV RBC AUTO: 88.6 FL (ref 81–99)
MONOCYTES # BLD: 7.5 %
MONOCYTES ABSOLUTE: 0.9 THOU/MM3 (ref 0.4–1.3)
NUCLEATED RED BLOOD CELLS: 0 /100 WBC
PLATELET # BLD: 356 THOU/MM3 (ref 130–400)
PMV BLD AUTO: 10.9 FL (ref 9.4–12.4)
POTASSIUM SERPL-SCNC: 3.9 MEQ/L (ref 3.5–5.2)
RBC # BLD: 3.16 MILL/MM3 (ref 4.2–5.4)
SEG NEUTROPHILS: 78.2 %
SEGMENTED NEUTROPHILS ABSOLUTE COUNT: 9.6 THOU/MM3 (ref 1.8–7.7)
SODIUM BLD-SCNC: 135 MEQ/L (ref 135–145)
WBC # BLD: 12.3 THOU/MM3 (ref 4.8–10.8)

## 2019-06-11 PROCEDURE — 2500000003 HC RX 250 WO HCPCS

## 2019-06-11 PROCEDURE — 6360000004 HC RX CONTRAST MEDICATION: Performed by: RADIOLOGY

## 2019-06-11 PROCEDURE — 2709999900 HC NON-CHARGEABLE SUPPLY

## 2019-06-11 PROCEDURE — 0JH83XZ INSERTION OF TUNNELED VASCULAR ACCESS DEVICE INTO ABDOMEN SUBCUTANEOUS TISSUE AND FASCIA, PERCUTANEOUS APPROACH: ICD-10-PCS | Performed by: RADIOLOGY

## 2019-06-11 PROCEDURE — 80048 BASIC METABOLIC PNL TOTAL CA: CPT

## 2019-06-11 PROCEDURE — APPSS30 APP SPLIT SHARED TIME 16-30 MINUTES: Performed by: NURSE PRACTITIONER

## 2019-06-11 PROCEDURE — 85025 COMPLETE CBC W/AUTO DIFF WBC: CPT

## 2019-06-11 PROCEDURE — 2500000003 HC RX 250 WO HCPCS: Performed by: RADIOLOGY

## 2019-06-11 PROCEDURE — 6360000002 HC RX W HCPCS: Performed by: INTERNAL MEDICINE

## 2019-06-11 PROCEDURE — 83735 ASSAY OF MAGNESIUM: CPT

## 2019-06-11 PROCEDURE — 6370000000 HC RX 637 (ALT 250 FOR IP): Performed by: INTERNAL MEDICINE

## 2019-06-11 PROCEDURE — C1750 CATH, HEMODIALYSIS,LONG-TERM: HCPCS

## 2019-06-11 PROCEDURE — 6360000002 HC RX W HCPCS: Performed by: RADIOLOGY

## 2019-06-11 PROCEDURE — 1200000003 HC TELEMETRY R&B

## 2019-06-11 PROCEDURE — C1769 GUIDE WIRE: HCPCS

## 2019-06-11 PROCEDURE — 99232 SBSQ HOSP IP/OBS MODERATE 35: CPT | Performed by: NURSE PRACTITIONER

## 2019-06-11 PROCEDURE — 99232 SBSQ HOSP IP/OBS MODERATE 35: CPT | Performed by: INTERNAL MEDICINE

## 2019-06-11 PROCEDURE — 6370000000 HC RX 637 (ALT 250 FOR IP): Performed by: FAMILY MEDICINE

## 2019-06-11 PROCEDURE — 36415 COLL VENOUS BLD VENIPUNCTURE: CPT

## 2019-06-11 PROCEDURE — C1894 INTRO/SHEATH, NON-LASER: HCPCS

## 2019-06-11 PROCEDURE — 2580000003 HC RX 258: Performed by: RADIOLOGY

## 2019-06-11 PROCEDURE — 2580000003 HC RX 258: Performed by: INTERNAL MEDICINE

## 2019-06-11 PROCEDURE — 49418 INSERT TUN IP CATH PERC: CPT | Performed by: RADIOLOGY

## 2019-06-11 PROCEDURE — 82948 REAGENT STRIP/BLOOD GLUCOSE: CPT

## 2019-06-11 PROCEDURE — 6370000000 HC RX 637 (ALT 250 FOR IP): Performed by: NURSE PRACTITIONER

## 2019-06-11 PROCEDURE — 6360000002 HC RX W HCPCS

## 2019-06-11 RX ORDER — MIDAZOLAM HYDROCHLORIDE 1 MG/ML
0.5 INJECTION INTRAMUSCULAR; INTRAVENOUS ONCE
Status: COMPLETED | OUTPATIENT
Start: 2019-06-11 | End: 2019-06-11

## 2019-06-11 RX ORDER — CLINDAMYCIN PHOSPHATE 600 MG/50ML
600 INJECTION INTRAVENOUS
Status: COMPLETED | OUTPATIENT
Start: 2019-06-11 | End: 2019-06-11

## 2019-06-11 RX ORDER — CARVEDILOL 3.12 MG/1
3.12 TABLET ORAL 2 TIMES DAILY WITH MEALS
Status: DISCONTINUED | OUTPATIENT
Start: 2019-06-11 | End: 2019-06-13 | Stop reason: HOSPADM

## 2019-06-11 RX ORDER — INSULIN GLARGINE 100 [IU]/ML
15 INJECTION, SOLUTION SUBCUTANEOUS NIGHTLY
Status: DISCONTINUED | OUTPATIENT
Start: 2019-06-11 | End: 2019-06-13 | Stop reason: HOSPADM

## 2019-06-11 RX ORDER — BUPIVACAINE HYDROCHLORIDE 2.5 MG/ML
10 INJECTION, SOLUTION EPIDURAL; INFILTRATION; INTRACAUDAL ONCE
Status: COMPLETED | OUTPATIENT
Start: 2019-06-11 | End: 2019-06-11

## 2019-06-11 RX ORDER — ALPRAZOLAM 0.25 MG/1
0.25 TABLET ORAL ONCE
Status: COMPLETED | OUTPATIENT
Start: 2019-06-11 | End: 2019-06-11

## 2019-06-11 RX ORDER — ONDANSETRON 4 MG/1
4 TABLET, ORALLY DISINTEGRATING ORAL EVERY 8 HOURS PRN
COMMUNITY

## 2019-06-11 RX ORDER — SODIUM CHLORIDE 450 MG/100ML
INJECTION, SOLUTION INTRAVENOUS CONTINUOUS
Status: DISCONTINUED | OUTPATIENT
Start: 2019-06-11 | End: 2019-06-13 | Stop reason: HOSPADM

## 2019-06-11 RX ORDER — MIDAZOLAM HYDROCHLORIDE 1 MG/ML
1 INJECTION INTRAMUSCULAR; INTRAVENOUS ONCE
Status: COMPLETED | OUTPATIENT
Start: 2019-06-11 | End: 2019-06-11

## 2019-06-11 RX ADMIN — LEVOTHYROXINE SODIUM 25 MCG: 25 TABLET ORAL at 06:29

## 2019-06-11 RX ADMIN — Medication 10 ML: at 09:05

## 2019-06-11 RX ADMIN — GABAPENTIN 100 MG: 100 CAPSULE ORAL at 21:37

## 2019-06-11 RX ADMIN — HYDRALAZINE HYDROCHLORIDE 100 MG: 50 TABLET ORAL at 23:30

## 2019-06-11 RX ADMIN — AMLODIPINE BESYLATE 10 MG: 10 TABLET ORAL at 09:05

## 2019-06-11 RX ADMIN — MIDAZOLAM 0.5 MG: 1 INJECTION INTRAMUSCULAR; INTRAVENOUS at 13:40

## 2019-06-11 RX ADMIN — MECLIZINE HYDROCHLORIDE 25 MG: 12.5 TABLET, FILM COATED ORAL at 09:03

## 2019-06-11 RX ADMIN — ALPRAZOLAM 0.25 MG: 0.25 TABLET ORAL at 12:19

## 2019-06-11 RX ADMIN — HYDROMORPHONE HYDROCHLORIDE 0.5 MG: 1 INJECTION, SOLUTION INTRAMUSCULAR; INTRAVENOUS; SUBCUTANEOUS at 13:40

## 2019-06-11 RX ADMIN — HYDRALAZINE HYDROCHLORIDE 100 MG: 50 TABLET ORAL at 09:05

## 2019-06-11 RX ADMIN — CARVEDILOL 3.12 MG: 3.12 TABLET, FILM COATED ORAL at 12:35

## 2019-06-11 RX ADMIN — Medication 10 ML: at 09:09

## 2019-06-11 RX ADMIN — ISOSORBIDE DINITRATE 20 MG: 20 TABLET ORAL at 15:48

## 2019-06-11 RX ADMIN — HEPARIN SODIUM 5000 UNITS: 5000 INJECTION INTRAVENOUS; SUBCUTANEOUS at 21:41

## 2019-06-11 RX ADMIN — MIDAZOLAM 0.5 MG: 1 INJECTION INTRAMUSCULAR; INTRAVENOUS at 14:07

## 2019-06-11 RX ADMIN — BUPIVACAINE HYDROCHLORIDE 10 ML: 2.5 INJECTION, SOLUTION EPIDURAL; INFILTRATION; INTRACAUDAL; PERINEURAL at 14:12

## 2019-06-11 RX ADMIN — SENNOSIDES 8.6 MG: 8.6 TABLET, FILM COATED ORAL at 21:37

## 2019-06-11 RX ADMIN — CLINDAMYCIN PHOSPHATE 600 MG: 600 INJECTION, SOLUTION INTRAVENOUS at 12:37

## 2019-06-11 RX ADMIN — MECLIZINE HYDROCHLORIDE 25 MG: 12.5 TABLET, FILM COATED ORAL at 21:39

## 2019-06-11 RX ADMIN — Medication 10 ML: at 21:37

## 2019-06-11 RX ADMIN — ISOSORBIDE DINITRATE 20 MG: 20 TABLET ORAL at 09:05

## 2019-06-11 RX ADMIN — ISOSORBIDE DINITRATE 20 MG: 20 TABLET ORAL at 21:37

## 2019-06-11 RX ADMIN — GABAPENTIN 100 MG: 100 CAPSULE ORAL at 15:47

## 2019-06-11 RX ADMIN — HYDRALAZINE HYDROCHLORIDE 100 MG: 50 TABLET ORAL at 01:00

## 2019-06-11 RX ADMIN — BACITRACIN 50000 UNITS: 50000 INJECTION, POWDER, FOR SOLUTION INTRAMUSCULAR at 14:23

## 2019-06-11 RX ADMIN — MECLIZINE HYDROCHLORIDE 25 MG: 12.5 TABLET, FILM COATED ORAL at 15:47

## 2019-06-11 RX ADMIN — HYDROMORPHONE HYDROCHLORIDE 0.5 MG: 1 INJECTION, SOLUTION INTRAMUSCULAR; INTRAVENOUS; SUBCUTANEOUS at 13:31

## 2019-06-11 RX ADMIN — HYDRALAZINE HYDROCHLORIDE 100 MG: 50 TABLET ORAL at 15:48

## 2019-06-11 RX ADMIN — BUMETANIDE 1 MG: 1 TABLET ORAL at 09:03

## 2019-06-11 RX ADMIN — DULOXETINE HYDROCHLORIDE 30 MG: 30 CAPSULE, DELAYED RELEASE ORAL at 09:08

## 2019-06-11 RX ADMIN — IOHEXOL 40 ML: 240 INJECTION, SOLUTION INTRATHECAL; INTRAVASCULAR; INTRAVENOUS; ORAL at 14:40

## 2019-06-11 RX ADMIN — ONDANSETRON 4 MG: 2 INJECTION INTRAMUSCULAR; INTRAVENOUS at 09:05

## 2019-06-11 RX ADMIN — GABAPENTIN 100 MG: 100 CAPSULE ORAL at 09:05

## 2019-06-11 RX ADMIN — OXYBUTYNIN CHLORIDE 5 MG: 5 TABLET ORAL at 09:03

## 2019-06-11 RX ADMIN — MIDAZOLAM 0.5 MG: 1 INJECTION INTRAMUSCULAR; INTRAVENOUS at 13:31

## 2019-06-11 RX ADMIN — PRAVASTATIN SODIUM 40 MG: 40 TABLET ORAL at 21:37

## 2019-06-11 RX ADMIN — SODIUM CHLORIDE: 4.5 INJECTION, SOLUTION INTRAVENOUS at 12:31

## 2019-06-11 ASSESSMENT — PAIN SCALES - GENERAL
PAINLEVEL_OUTOF10: 0

## 2019-06-11 NOTE — PROGRESS NOTES
 Hyperkalemia     Hyperlipidemia     Hypertension     Hypothyroid     Neurogenic bladder     Pneumonia     Seizure ([facility] [address] 01/2018    Type II or unspecified type diabetes mellitus without mention of complication, not stated as uncontrolled       Past Surgical History           Procedure Laterality Date    BACK SURGERY      CERVICAL FUSION      CHOLECYSTECTOMY      COLONOSCOPY      ENDOSCOPY, COLON, DIAGNOSTIC      FRACTURE SURGERY       Diet    DIET CARB CONTROL; Dietary Nutrition Supplements: Clear Liquid Oral Supplement  Allergies    Eggs or egg-derived products; Aspirin; Naproxen; Pcn [penicillins];  Vicodin [hydrocodone-acetaminophen]; Vilazodone; and Wellbutrin [bupropion]  Social History     Social History     Socioeconomic History    Marital status:      Spouse name: Not on file    Number of children: 1    Years of education: Not on file    Highest education level: Not on file   Occupational History    Not on file   Social Needs    Financial resource strain: Not on file    Food insecurity:     Worry: Not on file     Inability: Not on file    Transportation needs:     Medical: Not on file     Non-medical: Not on file   Tobacco Use    Smoking status: Never Smoker    Smokeless tobacco: Never Used   Substance and Sexual Activity    Alcohol use: No    Drug use: No    Sexual activity: Not on file   Lifestyle    Physical activity:     Days per week: Not on file     Minutes per session: Not on file    Stress: Not on file   Relationships    Social connections:     Talks on phone: Not on file     Gets together: Not on file     Attends [employer] service: Not on file     Active member of club or organization: Not on file     Attends meetings of clubs or organizations: Not on file     Relationship status: Not on file    Intimate partner violence:     Fear of current or ex partner: Not on file     Emotionally abused: Not on file     Physically abused: Not on file     Forced sexual Constitutional: No distress on O2 2LPM per NC. Patient appears drowsy and chronically ill appearing  Mouth/Throat: Oropharynx is clear and moist.  No oral thrush. Neck: Neck supple. No tracheal deviation present. No JVD  Cardiovascular: SB. S1 and S2 with no murmur. No peripheral edema  Pulmonary/Chest: RRR. Normal effort with bilateral air entry, clear breath sounds slightly diminished around bases hard to hear and patient is very sleepy from procedure. No stridor. No respiratory distress. Patient exhibits no tenderness. No wheezing heard  Abdominal: Soft. Bowel sounds audible. No distension or tenderness to palp. PD catheter mid-left side  Musculoskeletal: Moves all extremities; weak  Neurological: Patient is alert and oriented to person, place, and time. Skin: Warm and dry. Pale. No bruising or bleeding. No cyanosis.    Labs   ABG  Lab Results   Component Value Date    PH 7.43 12/09/2018    PO2 81 12/09/2018    PCO2 41 12/09/2018    HCO3 27 12/09/2018    O2SAT 96 12/09/2018     Lab Results   Component Value Date    IFIO2 30 12/09/2018    MODE CPAP/PS 12/09/2018    SETTIDVOL 293 12/09/2018    SETPEEP 5.0 12/09/2018     CBC  Recent Labs     06/11/19  0605   WBC 12.3*   RBC 3.16*   HGB 9.4*   HCT 28.0*   MCV 88.6   MCH 29.7   MCHC 33.6      MPV 10.9      BMP  Recent Labs     06/09/19  0536 06/10/19  0702 06/11/19  0605   * 135 135   K 3.9 4.1 3.9   CL 94* 97* 98   CO2 21* 19* 21*   BUN 74* 73* 71*   CREATININE 3.4* 3.4* 3.0*   GLUCOSE 80 83 56*   MG 2.0 2.2 2.1   CALCIUM 9.3 9.5 9.4     LFT  Recent Labs     06/09/19  0536   AST 9   ALT <5*   BILITOT 0.4   ALKPHOS 74     TROP  Lab Results   Component Value Date    TROPONINT 0.033 06/06/2019    TROPONINT 0.029 06/06/2019    TROPONINT 0.033 05/19/2019     BNP  Lab Results   Component Value Date    PROBNP 3592.0 05/19/2019    PROBNP 2003.0 12/08/2018    PROBNP 403.3 06/07/2016     D-Dimer  No results found for: DDIMER  Lactic Acid  No results for input(s): LACTA in the last 72 hours. INR  No results for input(s): INR, PROTIME in the last 72 hours. PTT  No results for input(s): APTT in the last 72 hours. Glucose  Recent Labs     06/11/19  0646 06/11/19  1109 06/11/19  1612   POCGLU 76 97 74     UA   Recent Labs     06/09/19  1000   COLORU ORANGE   .    6/9/19 Left thoracentesis   Completed by IF  Removed 500 mL blood tinged serous  Results for Maribell Osorio (MRN [id number]) as of 6/10/2019 12:56   Ref. Range 6/9/2019 10:00   Pathologist Review Unknown JJS   Character, Body Fluid Unknown HAZY   Glucose, Fluid Latest Units: mg/dl 136   LD, Fluid Latest Units: U/L 63   pH, Fluid Unknown 7.52   Protein, Fluid Latest Units: gm/dl 1.8   Amylase, Fluid Latest Units: U/L 13   Albumin, Fluid Latest Units: gm/dl 1.0   Body Fluid RBC Latest Units: /cumm 30232   Mesothelial Cells Body Fluid Unknown 2+   Mononuclear Cells Body Fluid Latest Units: % 92.6   Polymorphonuclear Cells Body Fluid Latest Units: % 7.4   Total Nucleated cells Body Fluid Latest Ref Range: 0 - 500 /cumm 402   Total Volume Received Body Fluid Latest Units: ml 83.0     Serum Total Protein: 6.6  Serum LDH: 202    Total protein ratio i.e Pleural fluid total protein/ Serum Total protein: 1.8/6.6=0.27  LDH ratio i.e Pleural fluid LDH/ Serum LDH: 63/202=0.31    Body fluid culture-No bacteria seen  Cytology-FINAL RESULTS:     No malignant cells present. CELL BLOCK RESULTS:    No malignant cells present.     PFTs   None in EPIC  Echo    6/6/19   Summary   limited echo   Ejection fraction is visually estimated at 55%.   Overall left ventricular function is normal.   The aortic valve leaflets were not well visualized.   Aortic valve appears tricuspid.   Aortic valve leaflets are somewhat thickened.   Aortic valve leaflets are Moderately calcified.   No evidence of any pericardial effusion.      Signature      ----------------------------------------------------------------   Electronically signed by Ruiz Cordon MD (Interpreting   [id number]) on 06/07/2019 at 05:30 PM    Conclusions      Summary   Technically difficult study with suboptimal views   Left ventricle size is normal.   Systolic function was low-normal.   Ejection fraction was estimated at 50-55%. There were no regional left   ventricular wall motion abnormalities and wall thickness was within normal   limits.   Mildly dilated left atrium.   Calcification of the mitral valve noted.   Mild mitral regurgitation is present.   Mild tricuspid regurgitation.      Signature      ----------------------------------------------------------------   Electronically signed by Lopez Guaman MD (Interpreting   physician) on 12/08/2018 at 03:42 PM   ----------------------------------------------------------------     Cultures    Procalcitonin  Lab Results   Component Value Date    PROCAL 0.27 06/07/2019    PROCAL 0.29 05/20/2019    PROCAL 0.26 12/07/2018      Body fluid culture-No bacteria seen    Radiology    CXR  XR CHEST 1 VIEW   6/9/2019   1. Poor inflation of lungs. Mild cardiomegaly. Moderate atelectasis/pneumonia left perihilar region, right infrahilar region, and both lung bases. Small effusion right side. 2. No residual effusion on the left. No pneumothorax is seen. 6/7/2019   XR CHEST PORTABLE   1. Redemonstration of changes compatible with congestive heart failure. 2. Bilateral effusions right greater than left. 6/6/19  Right thoracentesis  1. The patient is status post large volume right thoracentesis with aspiration of 1.4 L of clear yellow fluid. There is no pneumothorax. There are bilateral perihilar and the basilar infiltrates.               **This report has been created using voice recognition software.  It may contain minor errors which are inherent in voice recognition technology. **       Final report electronically signed by Dr. Geni Quijano on 6/6/2019 1:30 PM       CT Scans  6/7/19  CT Chest w/o contrast  FINDINGS:   Upper abdomen: Prior cholecystectomy. Mediastinum and radha: Enlarged pulmonary trunk, consistent with pulmonary arterial hypertension. Extensive coronary artery calcifications are present. There are several small subcentimeter mediastinal lymph nodes, likely reactive. No abnormally enlarged hilar or mediastinal lymph nodes are seen. Bones: Marked hypertrophic spurring is seen in the thoracic spine with several bulky bridging osteophytes. There is extensive calcification in the anterior spinal ligament. In addition, there is a moderate old compression fracture T11 level with evidence  for prior vertebroplasty. There appears slightly greater compression at this time than on the prior study. Lungs: Groundglass infiltrates right mid and upper lung fields, consistent with pneumonia/pulmonary edema. Moderate consolidation both lower lobes, worse on the right, consistent with pneumonia. There are moderate size bilateral pleural effusions. Impression:  1. Moderate-sized left pleural effusion. Bibasilar atelectasis/pneumonia. Groundglass and right mid and upper lung fields, consistent with pneumonia versus pulmonary edema. 2. Enlarged pulmonary arteries, consistent with pulmonary arterial hypertension.      Assessment   -Acute respiratory failure with hypoxia 2/2 B pleural effusion  -Recurrent transudative bilateral pleural effusions ( R>L) initial chemistry (12/10/18) c/w transudate, chemistry not obtain in last tap, unilaterality not typical for CHF/CKD.  -S/P 1.4 L right thoracentesis with improvement; no fluid sent for labs  -6/11/19 cytology pleural fluid (-) malignant cells  -Second had smoke exposure for 17 years; no PFT available no respiratory issues prior to admission  -CKD IV; 6/11/19 PD catheter insertion  -Full Code  Recommendations   -Monitor SpO2 wean supplemental O2 to maintain SpO2 >90%  -Cultures (-)  -Cytology (-)  -Diuresis per nephrology  -Duonebs every 4 hours PRN SOB/wheezing  -PD catheter insertion today per IR; watch for sedation issues- very sleepy!!! Tolerated procedure well.   -Strict I&O with daily weights   -Will monitor. -DVT prophylaxis: Heparin    Case discussed with nurse and patient/family. Questions and concerns addressed. Meds and Orders reviewed. Electronically signed by   Ashley Cabot, APRN - CNP on 6/11/2019 at 4:29 PM    Addendum by Dr. Israel Montgomery MD:  I have seen and examined the patient independently. Face to face evaluation and examination was performed. The above evaluation and note has been reviewed. Labs and radiographs were reviewed. I Have discussed with Ms. Ashley Cabot, APRN- CNP about this patient in detail. The above assessment and plan has been reviewed. Please see my modifications mentioned below. My modifications:  She is on 1LPM via nasal cannula. Feels better.  -Dr. Kyung Ramos MD to resume care in Am.     Lizbeth Holm MD 6/11/2019 5:15 PM

## 2019-06-11 NOTE — PLAN OF CARE
Problem: Nutrition  Goal: Optimal nutrition therapy  Outcome: Ongoing  Note:   Patient eating % of meals, will continue to assess. Problem: Falls - Risk of:  Goal: Will remain free from falls  Description  Will remain free from falls  Outcome: Ongoing  Note:   No falls noted this shift. Patient ambulates with x1 staff assistance without difficulty. Family member at bedside, spent the night. Bed kept in low position. Safe environment maintained. Bedside table & call light in reach. Uses call light appropriately when needing assistance. Problem: Falls - Risk of:  Goal: Absence of physical injury  Description  Absence of physical injury  Outcome: Ongoing  Note:   No self harm noted. Safe environment provided, hourly rounding. Will continue to assess. Problem: Tissue Perfusion - Cardiopulmonary, Altered:  Goal: Hemodynamic stability will improve  Description  Hemodynamic stability will improve  Outcome: Ongoing  Note:   VSS, will continue to monitor. Problem: Discharge Planning:  Goal: Participates in care planning  Description  Participates in care planning  Outcome: Ongoing  Note:   Care plan reviewed with patient. Patient  verbalize understanding of the plan of care and contribute to goal setting. Problem: Activity Intolerance:  Goal: Ability to tolerate increased activity will improve  Description  Ability to tolerate increased activity will improve  Outcome: Ongoing  Note:   Patient tolerated up to bathroom well, with use of assistive device. Problem: Pain:  Goal: Pain level will decrease  Description  Pain level will decrease  Outcome: Ongoing  Note:   Patient does not complain of pain this shift. Pain goal is a 4/10.         Problem: Serum Glucose Level - Abnormal:  Goal: Ability to maintain appropriate glucose levels will improve to within specified parameters  Description  Ability to maintain appropriate glucose levels will improve to within specified parameters  Outcome: Ongoing     Problem: Skin Integrity - Impaired:  Goal: Absence of new skin breakdown  Description  Absence of new skin breakdown  Outcome: Ongoing  Note:   No new signs or symptoms of skin breakdown noted this shift, encouraging patient to turn and reposition self in bed q2h       Problem: Risk for Impaired Skin Integrity  Goal: Tissue integrity - skin and mucous membranes  Description  Structural intactness and normal physiological function of skin and  mucous membranes. Outcome: Ongoing  Note:   Patient mucous membranes, pink moist and intact. Will continue to assess. Problem: Pain:  Goal: Pain level will decrease  Description  Pain level will decrease  Outcome: Ongoing  Note:   Patient does not complain of pain this shift. Pain goal is a 4/10. Problem: Pain:  Goal: Control of acute pain  Description  Control of acute pain  Outcome: Ongoing  Note:   Patient does not complain of pain this shift. Will continue to monitor. Problem: Pain:  Goal: Control of chronic pain  Description  Control of chronic pain  Outcome: Ongoing  Note:   No complaints of chronic pain, will continue to monitor. Care plan reviewed with patient . Patient verbalize understanding of the plan of care and contribute to goal setting.

## 2019-06-11 NOTE — H&P
Formulation and discussion of sedation / procedure plans, risks, benefits, side effects and alternatives with patient and/or responsible adult completed.     Electronically signed by Tristin Gomez MD on 6/11/2019 at 1:30 PM

## 2019-06-11 NOTE — PROGRESS NOTES
Pharmacy Medication History Note      List of current medications patient is taking is complete. Source of information: [employer]    Changes made to medication list:  Medications removed (include reason, ex. therapy complete or physician discontinued):  Fluticasone nasal spray - not on ECF list    Medications added/doses adjusted:  Changed Zofran to ODT tabs    Other notes (ex. Recent course of antibiotics, Coumadin dosing):  Denies use of other OTC or herbal medications. Allergies reviewed.  Added naproxen to list per [facility] list      Electronically signed by Kerwin Sales [facility] on 6/11/2019 at 3:16 PM

## 2019-06-11 NOTE — PROGRESS NOTES
Patient resting in bed. Receiving medication for nausea. Patient denies any needs or questions at this time. Please call if needs arise.

## 2019-06-11 NOTE — FLOWSHEET NOTE
06/11/19 1310   Encounter Summary   Services provided to: Patient   Referral/Consult From: [address] [facility] [address] Completed   Continue Visiting Yes  (6/11/19)   Complexity of Encounter Moderate   Length of Encounter 15 minutes   Spiritual/Evangelical   Type Spiritual support   Assessment Calm; Approachable   Intervention Active listening;Nurtured hope   Outcome Comfort;Expressed gratitude   During my encounter with the patient, no family was present. I came to assess the pt's spiritual needs and emotional needs. The pt was in bed and shared that she was anointed by the visiting [place] and was coping. The pt also has children as support. The pt also receives spiritual care from [facility]. I offered emotional support and words of encouragement. Plan: Continued support would be helpful.

## 2019-06-11 NOTE — PROGRESS NOTES
Hospitalist Progress Note      Patient:  Julian Swanson      Unit/Bed:6K-11/011-A    YOB: 1950    MRN: [id number]       Acct: [de-identified]     PCP: [id number]    Date of Admission: 6/6/2019    Assessment/Plan:    1. Acute on chronic diastolic CHF. Daily weights, low salt, fluid restriction. -- did not tolerate increase in diureses. s/p bumex 1 mg IV 6/6 - 6/7 -> creat kandice and not much improvement in effusions thus stopped per Dr. Mcmahan Level 6/7. Oral Bumex continued. -- last echo 12/2018 EF 50-55%, no WMA, mildly dilated LA, mild MR, mild TR --> limited echo 6/7/19 normal EF 55%, no other abn noted  -- Add BB with uncontrolled HTN -- no ACE/aRB due to CKD --> on isordil/hydralazine  -- TSH 5/19 WNL. 2. Bilateral R>L pleural effusions, recurrent. Transudative -- apprec pulm and renal assist -- CT chest 6/7/19 = Moderate-sized left pleural effusion. Bibasilar atelectasis/pneumonia. Groundglass and right mid and upper lung fields, consistent with pneumonia versus pulmonary edema. -- s/p right thoracentesis 6/6/19 = 1.4 L - prior studies c/w transudate last admission 5/2019 (none sent this admission)   -- s/p left thoracentesis 6/9/19 = 500 mL ->   -- s/p bumex 1 mg daily 6/6 - 6/7 --> stopped per renal for rising creatinine  -- per Care Everywhere pt has had right effusion back in 12/2018, 1/2019, 2/2019  -- limited echo 6/7/19 EF 55%, LV fxn normal--> similar to 12/2018 echo  3. Acute hypoxic respiratory failure secondary to #1/2. Wean oxygen as able. Will likely continue on discharge to ECF. 4. Moderate right perihilar atelectasis and both bases -- likely due to effusions --> encourage IS -- ?need to r/o other rheum process --> per pulm 6/7 LUSI (p), histone IgG (p), anti-dsDNA (p)  -- s/p zmax x 1 on 6/7 per pulm, s/p cefepime 6/7 - 6/8 per pulm   5.  CKD stage now 5 -- due to DM, HTN -- c/s renal apprec -- creat up to 4.1 on 6/8 with diuretics -> stopped and creat back to 3.4 on 6/9 - was mid 3's during admission 5/20 - 5/23 -- baseline CKD with baseline 2.8 - 3.1.  -- creat worsens with diuretics  -- d/w Dr. Lupe Fowler 6/8 and likely needs dialysis - CAPD cath to be placed and start in 2-3 weeks. 6. Hyponatremia, resolved. 7. Metabolic acidosis -- due to CKD. 8. Leukocytosis -- afeb, ?etiology -- up and down - similar last admission 5/2019 -- afebrile -- chronic component per chart review -- PCT slightly elevated chronically also in 0.2's   -- s/p zmax 6/7, cefepime 6/7 - 6/8 per pulm  8. Chronic elevated trops -- due to renal dysfunction -- no cp - only sob but with above -- trops 6/6 at baseline 0.033 which is what was in 5/2019  -- no ASA - hold until if need further procedures  -- limited echo 6/7/19 = EF 55%, LV fxn normal  9. Type 2 DM, acute hypoglycemia this AM. BS 56. Decrease basal bolus regimen-- monitor and adjust prn -- carb diet. Last A1C 7.1 12/2018  10. Accelerated HTN on Essential HTN -- norvasc 10 mg daily, hydralazine 100 mg tid, isordil 20 mg tid. Add low dose BB. 11. Chronic normocytic anemia -- cont po iron as iron low 5/19 -- likely due to CKD -- ??aranesp per renal -- stable in 9's -- was 9.1 on 5/22 --> was 10's at [facility] 2/2019 but 8-10 prior  12. Diabetic gastroparesis -- anibal po - monitor  13. Hypothyroidism -- cont synthroid -- TSH 5/19 WNL  14. Hx neurogenic bladder -- ditropan  15. HLD -- cont statin -- Lipids 5/20/19 HLD 66, LDL 75, trig 148  16. Chronic dizziness -- on antivert  17. Bipolar d/o -- at [facility] chronically -- no current meds and mood stable  18. Mild MR, mild TR -- per echo 12/2018  19. Hx seizure d/o -- no seizure meds but on neurontin  20. Chronic pain -- cont neurontin, cymbalta  21. Constipation -- added miralax prn 6/9  22. Generalized weakness -- PT/OT c/s    Dispo: PD catheter placement for today at 1300.        Chief Complaint: sob    Hospital Course: Justine Calle is a 81 y.o. female with bipolar, CKD IV, CHF, DMII, HTN and lipidemia is admitted from [facility] for acute on chronic systolic heart failure, recent admission 5/19 - 5/23 for similar c/o of sob, weight gain. -- pulmonary consulted for recurrent bilateral pleural effusions --> s/p right thoracentesis 6/ - left thoracentesis 6/9 = 500 mL blood tinged removed  -- renal consulted for CKD stage IV and fluid overload - not much improvement with diuretics     -- 6/9 --> apprec consultants -- s/p left thoracentesis this am 500 mL - cont monitor - s/p right  Tap 6/6 --> await further pulm and renal recs -- not responsive to diuretics thus likely needs dialysis - d/w Dr. Sallie Hughes yest - ?need HD catheter prior to d/c;  Resides in [facility] for long time - c/s PT/OT. Cont monitor lytes, u/o, BP, BS, h/h. Wean O2 as able. Subjective: Nauseated this AM with BS in the 50's. Anxious for procedure this afternoon. Medications:  Reviewed    Infusion Medications    dextrose       Scheduled Medications    bumetanide  1 mg Oral Daily    senna  1 tablet Oral Nightly    amLODIPine  10 mg Oral Daily    DULoxetine  30 mg Oral Daily    fluticasone  1 spray Each Nostril Daily    gabapentin  100 mg Oral TID    hydrALAZINE  100 mg Oral Q8H    insulin lispro  0-9 Units Subcutaneous 4x Daily AC & HS    isosorbide dinitrate  20 mg Oral TID    levothyroxine  25 mcg Oral Daily    meclizine  25 mg Oral TID    oxybutynin  5 mg Oral Daily    pravastatin  40 mg Oral Nightly    sodium chloride flush  10 mL Intravenous 2 times per day    heparin (porcine)  5,000 Units Subcutaneous 3 times per day    insulin glargine  20 Units Subcutaneous Nightly     PRN Meds: polyethylene glycol, ipratropium-albuterol, acetaminophen, glucose, dextrose, glucagon (rDNA), dextrose, sodium chloride flush, ondansetron      Intake/Output Summary (Last 24 hours) at 6/11/2019 1114  Last data filed at 6/11/2019 0332  Gross per 24 hour   Intake 420 ml   Output --   Net 420 ml       Diet:  DIET CARB CONTROL;   Dietary Nutrition Supplements: Clear Liquid Oral Supplement    Exam:  BP (!) 187/84   Pulse 75   Temp 98.2 °F (36.8 °C) (Oral)   Resp 18   Ht 5' 6\" (1.676 m)   Wt 163 lb 11.2 oz (74.3 kg)   SpO2 90%   BMI 26.42 kg/m²     General appearance: No apparent distress, appears older than stated age and cooperative. HEENT: Pupils equal, round, and reactive to light. Conjunctivae/corneas clear. Neck: Supple, with full range of motion. No jugular venous distention. Trachea midline. Respiratory:  Normal respiratory effort. +crackles with inspiration left base, right diminished in base. No respiratory distress or accessory muscle use. Cardiovascular: Regular rate and rhythm with normal S1/S2 without murmurs, rubs or gallops. Abdomen: Soft, obese, non-tender, non-distended with normal bowel sounds. No rebound or guarding  Musculoskeletal: No clubbing, cyanosis or edema bilaterally. Full range of motion without deformity. No calf tenderness palpation  Skin: Skin color, texture, turgor normal.  No rashes or lesions. Pale. Neurologic:  Neurovascularly intact without any focal sensory/motor deficits. Cranial nerves: II-XII intact, grossly non-focal.  Psychiatric: Alert and oriented, thought content appropriate  Capillary Refill: Brisk,< 3 seconds   Peripheral Pulses: +2 palpable, equal bilaterally       Labs:   Recent Labs     06/11/19  0605   WBC 12.3*   HGB 9.4*   HCT 28.0*        Recent Labs     06/09/19  0536 06/10/19  0702 06/11/19  0605   * 135 135   K 3.9 4.1 3.9   CL 94* 97* 98   CO2 21* 19* 21*   BUN 74* 73* 71*   CREATININE 3.4* 3.4* 3.0*   CALCIUM 9.3 9.5 9.4     Recent Labs     06/09/19  0536   AST 9   ALT <5*   BILIDIR <0.2   BILITOT 0.4   ALKPHOS 74     No results for input(s): INR in the last 72 hours. No results for input(s): Ida Dark in the last 72 hours.     Urinalysis:      Lab Results   Component Value Date    NITRU Negative 03/09/2019    WBCUA 0-2 06/07/2016    BACTERIA NONE 06/07/2016    RBCUA 0-2 06/07/2016    BLOODU Negative 03/09/2019    GLUCOSEU 2+ 03/09/2019       Radiology:  XR CHEST 1 VW   Final Result   1. Poor inflation of lungs. Mild cardiomegaly. Moderate atelectasis/pneumonia left perihilar region, right infrahilar region, and both lung bases. Small effusion right side. 2. No residual effusion on the left. No pneumothorax is seen. Final report electronically signed by Dr. Brittani Spear on 6/9/2019 10:27 AM      US THORACENTESIS   Final Result   Status post thoracentesis            **This report has been created using voice recognition software. It may contain minor errors which are inherent in voice recognition technology. **      Final report electronically signed by Dr. Brittani Spear on 6/9/2019 10:46 AM      CT Chest WO Contrast   Final Result   1. Moderate-sized left pleural effusion. Bibasilar atelectasis/pneumonia. Groundglass and right mid and upper lung fields, consistent with pneumonia versus pulmonary edema. 2. Enlarged pulmonary arteries, consistent with pulmonary arterial hypertension. **This report has been created using voice recognition software. It may contain minor errors which are inherent in voice recognition technology. **      Final report electronically signed by Dr. Brittani Spear on 6/7/2019 1:03 PM      XR CHEST PORTABLE   Final Result   1. Redemonstration of changes compatible with congestive heart failure. 2. Bilateral effusions right greater than left. **This report has been created using voice recognition software. It may contain minor errors which are inherent in voice recognition technology. **      Final report electronically signed by Dr. Lucretia Aldrich on 6/7/2019 2:19 AM      US THORACENTESIS   Final Result   1. Uncomplicated ultrasound guided therapeutic thoracentesis. **This report has been created using voice recognition software.   It may contain minor errors which are inherent in voice recognition

## 2019-06-11 NOTE — OP NOTE
Department of Radiology  Post Procedure Progress Note      Pre-Procedure Diagnosis:  Renal Failure    Procedure Performed: CAPDCatheter insertion     Anesthesia: local , versed and dilaudid    Findings: successful    Immediate Complications:  None    Estimated Blood Loss: minimal    SEE DICTATED PROCEDURE NOTE FOR COMPLETE DETAILS.       Adry Tenorio MD   6/11/2019 2:41 PM

## 2019-06-11 NOTE — CARE COORDINATION
06/11/19  1:28 PM  Clinical update: Planning CAPD cath placement today-will not plan to start PD while inpatient per physician documentation. Hospitalist, pulm, nephro following. 97% on 2L NC-does not use home O2. IVF. Pain and nausea control. Nebs. Planning to return to [facility],  has notified staff of PD cath and capd in future.

## 2019-06-11 NOTE — PROGRESS NOTES
1318 pt to specials per bed for a capd catheter insertion. Procedure explained and consent signed per dr lim.monitor applied. Site rite/ ultrasound  and cautery at bedside. Grounding pad applied for cautery. 1325 pt to table. Skin prepped to the abdomen area. Dr lim at bedside. 1335  skin prepped and procedure started per dr lim to abdomen area  1341 procedure started per dr lim to left abdomen area   1413 pt placed catheter into abdomen area . Fluid instills easily and flowing back easily per dr lim   1418 argyle catheter 62.5 cm placed in the left abdomen area   1422 catheter in place and prepare to start suturing left abdominal incision per dr lim. 1442  procedure completed per dr lim to abdomen area . Tube site without redness, swelling to area. Incision line well approximated and sutured intact. Stay sutures intact to abdomen area. dermabond applied to incision line . steristrips intact. 4 x4 and large opsite applied. Cap on pd catheter. 1450 pt return to bed. Hob elevated. Pt stable with no complaints.

## 2019-06-11 NOTE — H&P
[facility] [place] SURGERY  Sedation/Analgesia History & Physical    Pt Name: Nicky De Guzman  MRN: [id number]  YOB: 1950  Provider Performing Procedure: Darian Cooney MD  Primary Care Physician: Dilan Freeman    PRE-PROCEDURE   DNR-CCA/DNR-CC []Yes [x]No  Brief History/Pre-Procedure Diagnosis: Renal failure           MEDICAL HISTORY  []CAD/Valve  []Liver Disease  []Lung Disease []Diabetes  []Hypertension []Renal Disease  []Additional information:       has a past medical history of Anemia, Anemia in chronic kidney disease(285.21), Arthritis, Asthma, Bipolar 1 disorder ([address] CHF (congestive heart failure) ([address] Chronic kidney disease, stage III (moderate) ([address] Depression, GERD (gastroesophageal reflux disease), Gout, Headache(784.0), Hyperkalemia, Hyperlipidemia, Hypertension, Hypothyroid, Neurogenic bladder, Pneumonia, Seizure ([address] and Type II or unspecified type diabetes mellitus without mention of complication, not stated as uncontrolled. SURGICAL HISTORY   has a past surgical history that includes fracture surgery; Cholecystectomy; cervical fusion; back surgery; Colonoscopy; and Endoscopy, colon, diagnostic.   Additional information:       ALLERGIES   Allergies as of 06/06/2019 - Review Complete 06/06/2019   Allergen Reaction Noted    Eggs or egg-derived products Nausea And Vomiting 12/06/2018    Aspirin  07/10/2012    Pcn [penicillins] Hives 06/01/2016    Vicodin [hydrocodone-acetaminophen]  06/01/2016    Vilazodone  12/11/2018    Wellbutrin [bupropion] Other (See Comments) 12/07/2018     Additional information:       MEDICATIONS   Coumadin Use Last 5 Days [x]No []Yes  Antiplatelet drug therapy use last 5 days  [x]No []Yes  Other anticoagulant use last 5 days  []No [x]Yes    Current Facility-Administered Medications:     carvedilol (COREG) tablet 3.125 mg, 3.125 mg, Oral, BID Yovani MORRISON APRN - CNP, 3.125 mg at 06/11/19 1235    insulin glargine (LANTUS) injection vial 15 Units, 15 Units, Subcutaneous, Nightly, Yovani Anderson, APRN - CNP    0.45 % sodium chloride infusion, , Intravenous, Continuous, Eligio Rodríguez MD, Last Rate: 20 mL/hr at 06/11/19 1231    midazolam (VERSED) injection 1 mg, 1 mg, Intravenous, Once, Eligio Rodríguez MD    HYDROmorphone (DILAUDID) injection 1 mg, 1 mg, Intravenous, Once, Eligio Rodríguez MD    bacitracin 50,000 Units in sodium chloride 0.9 % 500 mL irrigation, 50,000 Units, Topical, Once, Eligio Rodríguez MD    bumetanide [facility]) tablet 1 mg, 1 mg, Oral, Daily, Lily Contreras MD, 1 mg at 06/11/19 0903    polyethylene glycol (GLYCOLAX) packet 17 g, 17 g, Oral, Daily PRN, Kyara Catherine MD, 17 g at 06/09/19 1157    senna (SENOKOT) tablet 8.6 mg, 1 tablet, Oral, Nightly, Kyara Catherine MD, 8.6 mg at 06/09/19 2043    ipratropium-albuterol (DUONEB) nebulizer solution 1 ampule, 1 ampule, Inhalation, Q4H PRN, Osmany Gipson PA-C, 1 ampule at 06/08/19 2339    acetaminophen (TYLENOL) tablet 650 mg, 650 mg, Oral, Q6H PRN, Bradly Day MD, 650 mg at 06/09/19 1748    amLODIPine (NORVASC) tablet 10 mg, 10 mg, Oral, Daily, Bradly Day MD, 10 mg at 06/11/19 0905    DULoxetine (CYMBALTA) extended release capsule 30 mg, 30 mg, Oral, Daily, Bradly Day MD, 30 mg at 06/11/19 0908    fluticasone (FLONASE) 50 MCG/ACT nasal spray 1 spray, 1 spray, Each Nostril, Daily, Bradly Day MD, 1 spray at 06/10/19 0905    gabapentin (NEURONTIN) capsule 100 mg, 100 mg, Oral, TID, Bradly Day MD, 100 mg at 06/11/19 0905    hydrALAZINE (APRESOLINE) tablet 100 mg, 100 mg, Oral, Q8H, Bradly Day MD, 100 mg at 06/11/19 0905    insulin lispro (HUMALOG) injection vial 0-9 Units, 0-9 Units, Subcutaneous, 4x Daily AC & HS, Bradly Day MD, 1 Units at 06/10/19 2045    isosorbide dinitrate (ISORDIL) tablet 20 mg, 20 mg, Oral, TID, Bradly Day MD, 20 mg at 06/11/19 0905    levothyroxine (SYNTHROID) tablet 25 mcg, 25 mcg, Oral, Daily, Bradly Day MD, 25 mcg at 06/11/19 0629    meclizine (ANTIVERT) tablet 25 mg, 25 mg, Oral, TID, Judie Little MD, 25 mg at 06/11/19 0903    oxybutynin (DITROPAN) tablet 5 mg, 5 mg, Oral, Daily, Judie Little MD, 5 mg at 06/11/19 0903    pravastatin (PRAVACHOL) tablet 40 mg, 40 mg, Oral, Nightly, Judie Little MD, 40 mg at 06/10/19 2033    glucose (GLUTOSE) 40 % oral gel 15 g, 15 g, Oral, PRN, Judie Little MD    dextrose 50 % IV solution, 12.5 g, Intravenous, PRN, Judie Little MD    glucagon (rDNA) injection 1 mg, 1 mg, Intramuscular, PRN, Judie Little MD    dextrose 5 % solution, 100 mL/hr, Intravenous, PRN, Judie Little MD    sodium chloride flush 0.9 % injection 10 mL, 10 mL, Intravenous, 2 times per day, Judie Little MD, 10 mL at 06/11/19 0909    sodium chloride flush 0.9 % injection 10 mL, 10 mL, Intravenous, PRN, Judie Little MD, 10 mL at 06/11/19 0905    ondansetron (ZOFRAN) injection 4 mg, 4 mg, Intravenous, Q6H PRN, Judie Little MD, 4 mg at 06/11/19 0905    heparin (porcine) injection 5,000 Units, 5,000 Units, Subcutaneous, 3 times per day, Judie Little MD, 5,000 Units at 06/10/19 2045  Prior to Admission medications    Medication Sig Start Date End Date Taking?  Authorizing Provider   doxycycline hyclate (VIBRA-TABS) 100 MG tablet Take 100 mg by mouth 2 times daily 6/4/19 6/14/19 Yes Historical Provider, MD   famotidine (PEPCID) 10 MG tablet Take 10 mg by mouth 2 times daily   Yes Historical Provider, MD   OXYGEN Inhale 2-4 L into the lungs as needed (to keep sat> 90%)   Yes Historical Provider, MD   doxazosin (CARDURA) 1 MG tablet Take 1 tablet by mouth daily 5/24/19  Yes Iron Ferrell MD   bumetanide (BUMEX) 1 MG tablet Take 1 tablet by mouth daily 5/25/19  Yes Iron Ferrell MD   hydrALAZINE (APRESOLINE) 100 MG tablet Take 1 tablet by mouth every 8 hours 5/23/19  Yes Iron Ferrell MD   DULoxetine (CYMBALTA) 30 MG extended release capsule Take 30 mg by mouth daily   Yes Historical Provider, MD   oxybutynin (DITROPAN) 5 MG tablet Take 5 mg by mouth daily   Yes Historical Provider, MD   meclizine (ANTIVERT) 25 MG tablet Take 25 mg by mouth 3 times daily   Yes Historical Provider, MD   gabapentin (NEURONTIN) 100 MG capsule Take 100 mg by mouth 3 times daily. 7/31/18  Yes Historical Provider, MD   ferrous sulfate 325 (65 Fe) MG tablet Take 325 mg by mouth 2 times daily   Yes Historical Provider, MD   amLODIPine (NORVASC) 10 MG tablet Take 10 mg by mouth daily   Yes Historical Provider, MD   acetaminophen-codeine (TYLENOL #3) 300-30 MG per tablet Take 1 tablet by mouth every 4 hours as needed for Pain. .   Yes Historical Provider, MD   acetaminophen (TYLENOL) 325 MG tablet Take 650 mg by mouth every 6 hours as needed for Pain   Yes Historical Provider, MD   isosorbide dinitrate (ISORDIL) 20 MG tablet Take 1 tablet by mouth 3 times daily 12/17/18  Yes Nitin Christianson MD   insulin lispro (HUMALOG KWIKPEN) 100 UNIT/ML pen Inject 0-9 Units into the skin 4 times daily (before meals and nightly) Inject as per sliding scale:  140-199 = 1 unit,  200-249 = 3 units,  250-299 = 5 units,  300-349 = 7 units,  350-399 = 9 units,  400-999 = call MD   Yes Historical Provider, MD   ondansetron (ZOFRAN) 4 MG tablet Take 4 mg by mouth every 4 hours as needed for Nausea   Yes Historical Provider, MD   levothyroxine (SYNTHROID) 25 MCG tablet Take 25 mcg by mouth Daily   Yes Historical Provider, MD   insulin detemir (LEVEMIR) 100 UNIT/ML injection pen Inject 10 Units into the skin 2 times daily 10/24/18  Yes Historical Provider, MD   blood glucose test strips (ASCENSIA AUTODISC VI;ONE TOUCH ULTRA TEST VI) strip Patient tests blood sugar three times per day.  Diagnosis DMII E11.9 7/18/18  Yes Historical Provider, MD   pravastatin (PRAVACHOL) 40 MG tablet Take 40 mg by mouth daily    Yes Historical Provider, MD   fluticasone (FLONASE) 50 MCG/ACT nasal spray 1 spray by Each Nare route daily 5/15/19 5/28/19  Historical Provider, MD   Insulin Pen Needle 31G X 8 MM MISC 1 Device 6/6/18   Historical Provider, MD     Additional information:       VITAL SIGNS   Vitals:    06/11/19 1326   BP: (!) 141/48   Pulse: 64   Resp: 16   Temp:    SpO2: 96%       PHYSICAL:   Heart:  [x]Regular rate and rhythm  []Other:    Lungs:  [x]Clear    []Other:    Abdomen: [x]Soft    []Other:    Mental Status: [x]Alert & Oriented  []Other:      PLANNED PROCEDURE   []Biospy []Arteriogram              []Drainage   []Mediport Insertion  []Fistulogram []IV access       []Vertebroplasty / Augmentation  []IVC filter []Dialysis catheter []Biliary drainage  []Other: [x]CAPD Catheter []Nephrostomy Tube / Stent  SEDATION  Planned agent:[x]Midazolam []Meperidine []Sublimaze [x]Dilaudid []Morphine     []Diazepam  []Other:     ASA Classification:  []1 [x]2 []3 []4 []5  Class 1: A normal healthy patient  Class 2: Pt with mild to moderate systemic disease  Class 3: Severe systemic disease or disturbance  Class 4: Severe systemic disorders that are already life threatening. Class 5: Moribund pt with little chances of survival, for more than 24 hours. Mallampati I Airway Classification:   []1 []2 [x]3 []4    [x]Pre-procedure diagnostic studies complete and results available. Comment:    [x]Previous sedation/anesthesia experiences assessed. Comment:    [x]The patient is an appropriate candidate to undergo the planned procedure sedation and anesthesia. (Refer to nursing sedation/analgesia documentation record)  [x]Formulation and discussion of sedation/procedure plan, risks, and expectations with patient and/or responsible adult completed. [x]Patient examined immediately prior to the procedure.  (Refer to nursing sedation/analgesia documentation record)    Guy Choe MD  Electronically signed 6/11/2019 at 1:31 PM

## 2019-06-12 ENCOUNTER — APPOINTMENT (OUTPATIENT)
Dept: GENERAL RADIOLOGY | Age: 69
DRG: 291 | End: 2019-06-12
Attending: INTERNAL MEDICINE
Payer: MEDICARE

## 2019-06-12 ENCOUNTER — APPOINTMENT (OUTPATIENT)
Dept: ULTRASOUND IMAGING | Age: 69
DRG: 291 | End: 2019-06-12
Attending: INTERNAL MEDICINE
Payer: MEDICARE

## 2019-06-12 LAB
ANION GAP SERPL CALCULATED.3IONS-SCNC: 13 MEQ/L (ref 8–16)
BASOPHILS # BLD: 0.7 %
BASOPHILS ABSOLUTE: 0.1 THOU/MM3 (ref 0–0.1)
BODY FLUID RBC: < 2000 /CUMM
BUN BLDV-MCNC: 71 MG/DL (ref 7–22)
CALCIUM SERPL-MCNC: 9.2 MG/DL (ref 8.5–10.5)
CHARACTER, BODY FLUID: NORMAL
CHLORIDE BLD-SCNC: 97 MEQ/L (ref 98–111)
CO2: 22 MEQ/L (ref 23–33)
COLOR: YELLOW
CREAT SERPL-MCNC: 3.5 MG/DL (ref 0.4–1.2)
EOSINOPHIL # BLD: 3.3 %
EOSINOPHILS ABSOLUTE: 0.4 THOU/MM3 (ref 0–0.4)
ERYTHROCYTE [DISTWIDTH] IN BLOOD BY AUTOMATED COUNT: 13.3 % (ref 11.5–14.5)
ERYTHROCYTE [DISTWIDTH] IN BLOOD BY AUTOMATED COUNT: 44 FL (ref 35–45)
GFR SERPL CREATININE-BSD FRML MDRD: 13 ML/MIN/1.73M2
GLUCOSE BLD-MCNC: 101 MG/DL (ref 70–108)
GLUCOSE BLD-MCNC: 188 MG/DL (ref 70–108)
GLUCOSE BLD-MCNC: 208 MG/DL (ref 70–108)
GLUCOSE BLD-MCNC: 247 MG/DL (ref 70–108)
GLUCOSE BLD-MCNC: 69 MG/DL (ref 70–108)
GLUCOSE BLD-MCNC: 93 MG/DL (ref 70–108)
GLUCOSE BLD-MCNC: 94 MG/DL (ref 70–108)
HCT VFR BLD CALC: 26.5 % (ref 37–47)
HEMOGLOBIN: 8.7 GM/DL (ref 12–16)
IMMATURE GRANS (ABS): 0.07 THOU/MM3 (ref 0–0.07)
IMMATURE GRANULOCYTES: 0.5 %
LD, FLUID: 92 U/L
LD: 177 U/L (ref 100–190)
LYMPHOCYTES # BLD: 7.9 %
LYMPHOCYTES ABSOLUTE: 1 THOU/MM3 (ref 1–4.8)
MCH RBC QN AUTO: 29.7 PG (ref 26–33)
MCHC RBC AUTO-ENTMCNC: 32.8 GM/DL (ref 32.2–35.5)
MCV RBC AUTO: 90.4 FL (ref 81–99)
MONOCYTES # BLD: 7.2 %
MONOCYTES ABSOLUTE: 0.9 THOU/MM3 (ref 0.4–1.3)
MONONUCLEAR CELLS BODY FLUID: 90.8 %
NUCLEATED RED BLOOD CELLS: 0 /100 WBC
PATHOLOGIST REVIEW: NORMAL
PH FLUID: 7.52
PLATELET # BLD: 352 THOU/MM3 (ref 130–400)
PMV BLD AUTO: 10.9 FL (ref 9.4–12.4)
POLYMORPHONUCLEAR CELLS BODY FLUID: 9.2 %
POTASSIUM SERPL-SCNC: 4.7 MEQ/L (ref 3.5–5.2)
PROTEIN FLUID: 2.8 GM/DL
RBC # BLD: 2.93 MILL/MM3 (ref 4.2–5.4)
SEG NEUTROPHILS: 80.4 %
SEGMENTED NEUTROPHILS ABSOLUTE COUNT: 10.5 THOU/MM3 (ref 1.8–7.7)
SODIUM BLD-SCNC: 132 MEQ/L (ref 135–145)
SPECIMEN: NORMAL
TOTAL NUCLEATED CELLS BODY FLUID: 371 /CUMM (ref 0–500)
TOTAL PROTEIN: 6.6 G/DL (ref 6.1–8)
TOTAL VOLUME RECEIVED BODY FLUID: 83 ML
WBC # BLD: 13 THOU/MM3 (ref 4.8–10.8)

## 2019-06-12 PROCEDURE — 6370000000 HC RX 637 (ALT 250 FOR IP): Performed by: INTERNAL MEDICINE

## 2019-06-12 PROCEDURE — 85025 COMPLETE CBC W/AUTO DIFF WBC: CPT

## 2019-06-12 PROCEDURE — 6370000000 HC RX 637 (ALT 250 FOR IP): Performed by: FAMILY MEDICINE

## 2019-06-12 PROCEDURE — 84157 ASSAY OF PROTEIN OTHER: CPT

## 2019-06-12 PROCEDURE — 1200000003 HC TELEMETRY R&B

## 2019-06-12 PROCEDURE — 2580000003 HC RX 258: Performed by: INTERNAL MEDICINE

## 2019-06-12 PROCEDURE — 89050 BODY FLUID CELL COUNT: CPT

## 2019-06-12 PROCEDURE — 82948 REAGENT STRIP/BLOOD GLUCOSE: CPT

## 2019-06-12 PROCEDURE — 88305 TISSUE EXAM BY PATHOLOGIST: CPT

## 2019-06-12 PROCEDURE — APPSS30 APP SPLIT SHARED TIME 16-30 MINUTES: Performed by: NURSE PRACTITIONER

## 2019-06-12 PROCEDURE — 99232 SBSQ HOSP IP/OBS MODERATE 35: CPT | Performed by: NURSE PRACTITIONER

## 2019-06-12 PROCEDURE — 94761 N-INVAS EAR/PLS OXIMETRY MLT: CPT

## 2019-06-12 PROCEDURE — 71045 X-RAY EXAM CHEST 1 VIEW: CPT

## 2019-06-12 PROCEDURE — 6370000000 HC RX 637 (ALT 250 FOR IP): Performed by: NURSE PRACTITIONER

## 2019-06-12 PROCEDURE — 36415 COLL VENOUS BLD VENIPUNCTURE: CPT

## 2019-06-12 PROCEDURE — 99232 SBSQ HOSP IP/OBS MODERATE 35: CPT | Performed by: INTERNAL MEDICINE

## 2019-06-12 PROCEDURE — 32555 ASPIRATE PLEURA W/ IMAGING: CPT

## 2019-06-12 PROCEDURE — 87070 CULTURE OTHR SPECIMN AEROBIC: CPT

## 2019-06-12 PROCEDURE — 84155 ASSAY OF PROTEIN SERUM: CPT

## 2019-06-12 PROCEDURE — 83986 ASSAY PH BODY FLUID NOS: CPT

## 2019-06-12 PROCEDURE — 88112 CYTOPATH CELL ENHANCE TECH: CPT

## 2019-06-12 PROCEDURE — 87075 CULTR BACTERIA EXCEPT BLOOD: CPT

## 2019-06-12 PROCEDURE — 0W993ZZ DRAINAGE OF RIGHT PLEURAL CAVITY, PERCUTANEOUS APPROACH: ICD-10-PCS | Performed by: RADIOLOGY

## 2019-06-12 PROCEDURE — 83615 LACTATE (LD) (LDH) ENZYME: CPT

## 2019-06-12 PROCEDURE — 80048 BASIC METABOLIC PNL TOTAL CA: CPT

## 2019-06-12 PROCEDURE — 87205 SMEAR GRAM STAIN: CPT

## 2019-06-12 PROCEDURE — 2700000000 HC OXYGEN THERAPY PER DAY

## 2019-06-12 PROCEDURE — 6360000002 HC RX W HCPCS: Performed by: INTERNAL MEDICINE

## 2019-06-12 RX ADMIN — MECLIZINE HYDROCHLORIDE 25 MG: 12.5 TABLET, FILM COATED ORAL at 21:28

## 2019-06-12 RX ADMIN — PRAVASTATIN SODIUM 40 MG: 40 TABLET ORAL at 21:28

## 2019-06-12 RX ADMIN — HYDRALAZINE HYDROCHLORIDE 100 MG: 50 TABLET ORAL at 08:14

## 2019-06-12 RX ADMIN — AMLODIPINE BESYLATE 10 MG: 10 TABLET ORAL at 08:15

## 2019-06-12 RX ADMIN — ISOSORBIDE DINITRATE 20 MG: 20 TABLET ORAL at 14:23

## 2019-06-12 RX ADMIN — Medication 10 ML: at 08:23

## 2019-06-12 RX ADMIN — Medication 10 ML: at 04:17

## 2019-06-12 RX ADMIN — Medication 10 ML: at 21:28

## 2019-06-12 RX ADMIN — CARVEDILOL 3.12 MG: 3.12 TABLET, FILM COATED ORAL at 17:19

## 2019-06-12 RX ADMIN — ISOSORBIDE DINITRATE 20 MG: 20 TABLET ORAL at 21:28

## 2019-06-12 RX ADMIN — ACETAMINOPHEN 650 MG: 325 TABLET ORAL at 04:14

## 2019-06-12 RX ADMIN — SENNOSIDES 8.6 MG: 8.6 TABLET, FILM COATED ORAL at 21:28

## 2019-06-12 RX ADMIN — ISOSORBIDE DINITRATE 20 MG: 20 TABLET ORAL at 08:15

## 2019-06-12 RX ADMIN — GABAPENTIN 100 MG: 100 CAPSULE ORAL at 14:23

## 2019-06-12 RX ADMIN — INSULIN GLARGINE 15 UNITS: 100 INJECTION, SOLUTION SUBCUTANEOUS at 21:25

## 2019-06-12 RX ADMIN — OXYBUTYNIN CHLORIDE 5 MG: 5 TABLET ORAL at 08:15

## 2019-06-12 RX ADMIN — INSULIN LISPRO 3 UNITS: 100 INJECTION, SOLUTION INTRAVENOUS; SUBCUTANEOUS at 21:25

## 2019-06-12 RX ADMIN — CARVEDILOL 3.12 MG: 3.12 TABLET, FILM COATED ORAL at 07:44

## 2019-06-12 RX ADMIN — HYDRALAZINE HYDROCHLORIDE 100 MG: 50 TABLET ORAL at 23:54

## 2019-06-12 RX ADMIN — GABAPENTIN 100 MG: 100 CAPSULE ORAL at 08:15

## 2019-06-12 RX ADMIN — MECLIZINE HYDROCHLORIDE 25 MG: 12.5 TABLET, FILM COATED ORAL at 14:24

## 2019-06-12 RX ADMIN — HEPARIN SODIUM 5000 UNITS: 5000 INJECTION INTRAVENOUS; SUBCUTANEOUS at 21:28

## 2019-06-12 RX ADMIN — HEPARIN SODIUM 5000 UNITS: 5000 INJECTION INTRAVENOUS; SUBCUTANEOUS at 06:00

## 2019-06-12 RX ADMIN — DULOXETINE HYDROCHLORIDE 30 MG: 30 CAPSULE, DELAYED RELEASE ORAL at 08:16

## 2019-06-12 RX ADMIN — INSULIN LISPRO 3 UNITS: 100 INJECTION, SOLUTION INTRAVENOUS; SUBCUTANEOUS at 17:21

## 2019-06-12 RX ADMIN — BUMETANIDE 1 MG: 1 TABLET ORAL at 17:17

## 2019-06-12 RX ADMIN — INSULIN LISPRO 1 UNITS: 100 INJECTION, SOLUTION INTRAVENOUS; SUBCUTANEOUS at 11:42

## 2019-06-12 RX ADMIN — MECLIZINE HYDROCHLORIDE 25 MG: 12.5 TABLET, FILM COATED ORAL at 08:22

## 2019-06-12 RX ADMIN — GABAPENTIN 100 MG: 100 CAPSULE ORAL at 21:28

## 2019-06-12 RX ADMIN — HYDRALAZINE HYDROCHLORIDE 100 MG: 50 TABLET ORAL at 17:18

## 2019-06-12 RX ADMIN — LEVOTHYROXINE SODIUM 25 MCG: 25 TABLET ORAL at 07:44

## 2019-06-12 RX ADMIN — ONDANSETRON 4 MG: 2 INJECTION INTRAMUSCULAR; INTRAVENOUS at 04:15

## 2019-06-12 ASSESSMENT — PAIN SCALES - GENERAL
PAINLEVEL_OUTOF10: 7
PAINLEVEL_OUTOF10: 5
PAINLEVEL_OUTOF10: 8
PAINLEVEL_OUTOF10: 0

## 2019-06-12 ASSESSMENT — PAIN DESCRIPTION - PAIN TYPE: TYPE: SURGICAL PAIN

## 2019-06-12 ASSESSMENT — PAIN DESCRIPTION - LOCATION: LOCATION: ABDOMEN

## 2019-06-12 ASSESSMENT — PAIN DESCRIPTION - ORIENTATION: ORIENTATION: MID

## 2019-06-12 NOTE — PROGRESS NOTES
[facility]    Patient - Kathie Ram   MRN -  [id number]   [facility]   - 1950      Date of Admission -  2019  6:35 AM  Date of evaluation -  2019  Room - 6-011-A   Hospital Day - 6  Consulting - Christiano Anderson, * Primary Care Physician - [address]   Chief Complaint   Pleural effusion  Active Hospital Problem List      Active Hospital Problems    Diagnosis Date Noted    Bilateral atelectasis [J98.11]     Hyponatremia [E87.1]     Chronic anemia [D64.9]     Hyperlipidemia [E78.5]     Constipation [K59.00]     Pleural effusion [J90] 2019    Stage 5 chronic kidney disease ([address] [N18.5]     (HFpEF) heart failure with preserved ejection fraction ([address] [I50.30]     Essential hypertension [I10] 2018    Acute on chronic diastolic CHF (congestive heart failure) ([address] [I50.33] 2018     HPI   Kathie Ram is a 81 y.o. female transferred from outside hospital for SOB and pleural effusion. Never smoker, has been tapped twice before over the last few weeks in the same side, apparently a transudate. Was DC from this hospital last week at that time shed had a right  thoracentesis of 1 L, studies not sent, today a 1.4L tap was done ordered by primary team before we saw pt, chemistries  not requested.   Elida Angeles has CKD 5 and CHF with preserved EF    Past Medical History   -On 2 LPM via NC  -Reports Pain to abdomen s/p CAPD placement  -Using IS  -Increased SOB when OOB  -Weight is up 5 lbs confirmed on standing scale  -All other systems reviewed  Past Medical History         Diagnosis Date    Anemia     Anemia in chronic kidney disease(285.21)     Arthritis     Asthma     Bipolar 1 disorder (HCC)     CHF (congestive heart failure) (HCC)     Chronic kidney disease, stage III (moderate) (HCC)     Depression     GERD (gastroesophageal reflux disease)     Gout     Headache(784.0)     Hyperkalemia     Hyperlipidemia     Hypertension     Hypothyroid     Neurogenic bladder     Pneumonia     Seizure ([facility] 01/2018    Type II or unspecified type diabetes mellitus without mention of complication, not stated as uncontrolled       Past Surgical History           Procedure Laterality Date    BACK SURGERY      CERVICAL FUSION      CHOLECYSTECTOMY      COLONOSCOPY      ENDOSCOPY, COLON, DIAGNOSTIC      FRACTURE SURGERY       Diet    DIET CARB CONTROL; Dietary Nutrition Supplements: Clear Liquid Oral Supplement  Allergies    Eggs or egg-derived products; Aspirin; Naproxen; Pcn [penicillins];  Vicodin [hydrocodone-acetaminophen]; Vilazodone; and Wellbutrin [bupropion]  Social History     Social History     Socioeconomic History    Marital status:      Spouse name: Not on file    Number of children: 1    Years of education: Not on file    Highest education level: Not on file   Occupational History    Not on file   Social Needs    Financial resource strain: Not on file    Food insecurity:     Worry: Not on file     Inability: Not on file    Transportation needs:     Medical: Not on file     Non-medical: Not on file   Tobacco Use    Smoking status: Never Smoker    Smokeless tobacco: Never Used   Substance and Sexual Activity    Alcohol use: No    Drug use: No    Sexual activity: Not on file   Lifestyle    Physical activity:     Days per week: Not on file     Minutes per session: Not on file    Stress: Not on file   Relationships    Social connections:     Talks on phone: Not on file     Gets together: Not on file     Attends Shinto service: Not on file     Active member of club or organization: Not on file     Attends meetings of clubs or organizations: Not on file     Relationship status: Not on file    Intimate partner violence:     Fear of current or ex partner: Not on file     Emotionally abused: Not on file     Physically abused: Not on file     Forced sexual activity: Not on file Other Topics Concern    Not on file   Social History Narrative    Not on file     Family History          Problem Relation Age of Onset    High Blood Pressure Mother     Diabetes Mother     Cancer Mother     Heart Attack Mother     Stroke Father     High Blood Pressure Father     Anemia Father      Sleep History    N/A  Meds    Current Medications    carvedilol  3.125 mg Oral BID WC    insulin glargine  15 Units Subcutaneous Nightly    bumetanide  1 mg Oral Daily    senna  1 tablet Oral Nightly    amLODIPine  10 mg Oral Daily    DULoxetine  30 mg Oral Daily    fluticasone  1 spray Each Nostril Daily    gabapentin  100 mg Oral TID    hydrALAZINE  100 mg Oral Q8H    insulin lispro  0-9 Units Subcutaneous 4x Daily AC & HS    isosorbide dinitrate  20 mg Oral TID    levothyroxine  25 mcg Oral Daily    meclizine  25 mg Oral TID    oxybutynin  5 mg Oral Daily    pravastatin  40 mg Oral Nightly    sodium chloride flush  10 mL Intravenous 2 times per day    heparin (porcine)  5,000 Units Subcutaneous 3 times per day     polyethylene glycol, ipratropium-albuterol, acetaminophen, glucose, dextrose, glucagon (rDNA), dextrose, sodium chloride flush, ondansetron  IV Drips/Infusions   sodium chloride 20 mL/hr at 06/11/19 1231    dextrose       Vitals    Vitals    height is 5' 6\" (1.676 m) and weight is 172 lb 3.2 oz (78.1 kg). Her oral temperature is 97.4 °F (36.3 °C). Her blood pressure is 150/66 (abnormal) and her pulse is 58. Her respiration is 18 and oxygen saturation is 95%.      O2 Flow Rate (L/min): 2 L/min  I/O    Intake/Output Summary (Last 24 hours) at 6/12/2019 1222  Last data filed at 6/12/2019 0813  Gross per 24 hour   Intake 920 ml   Output --   Net 920 ml     Patient Vitals for the past 96 hrs (Last 3 readings):   Weight   06/12/19 0402 172 lb 3.2 oz (78.1 kg)   06/11/19 0332 163 lb 11.2 oz (74.3 kg)   06/10/19 0447 164 lb 11.2 oz (74.7 kg)     Exam   Physical Exam   Constitutional: No distress on 2 LPM O2 per NC. Patient appears moderately built and moderately nourished. Head: Normocephalic and atraumatic. Eyes: Conjunctivae are normal. Pupils are equal, round. No scleral icterus. Neck: Neck supple. No tracheal deviation present. Cardiovascular: S1 and S2 with no murmur. No peripheral edema  Pulmonary/Chest: Normal effort with bilateral air entry, noted rales to left base diminished at right base. No stridor. No respiratory distress. Patient exhibits no tenderness. Abdominal: Soft. Bowel sounds audible. No distension or tenderness to palp. PD catheter noted with dressing in place. Musculoskeletal: Moves all extremities  Neurological: Patient is alert and oriented to person, place, and time. Skin: Warm and dry. Labs   ABG  Lab Results   Component Value Date    PH 7.43 12/09/2018    PO2 81 12/09/2018    PCO2 41 12/09/2018    HCO3 27 12/09/2018    O2SAT 96 12/09/2018     Lab Results   Component Value Date    IFIO2 30 12/09/2018    MODE CPAP/PS 12/09/2018    SETTIDVOL 293 12/09/2018    SETPEEP 5.0 12/09/2018     CBC  Recent Labs     06/11/19  0605 06/12/19  0604   WBC 12.3* 13.0*   RBC 3.16* 2.93*   HGB 9.4* 8.7*   HCT 28.0* 26.5*   MCV 88.6 90.4   MCH 29.7 29.7   MCHC 33.6 32.8    352   MPV 10.9 10.9      BMP  Recent Labs     06/10/19  0702 06/11/19  0605 06/12/19  0604    135 132*   K 4.1 3.9 4.7   CL 97* 98 97*   CO2 19* 21* 22*   BUN 73* 71* 71*   CREATININE 3.4* 3.0* 3.5*   GLUCOSE 83 56* 93   MG 2.2 2.1  --    CALCIUM 9.5 9.4 9.2     LFT  No results for input(s): AST, ALT, ALB, BILITOT, ALKPHOS, LIPASE in the last 72 hours. Invalid input(s):   AMYLASE  TROP  Lab Results   Component Value Date    TROPONINT 0.033 06/06/2019    TROPONINT 0.029 06/06/2019    TROPONINT 0.033 05/19/2019     BNP  Lab Results   Component Value Date    PROBNP 3592.0 05/19/2019    PROBNP 2003.0 12/08/2018    PROBNP 403.3 06/07/2016     D-Dimer  No results found for: DDIMER  Lactic Acid  No thickened.   Aortic valve leaflets are Moderately calcified.   No evidence of any pericardial effusion.      Signature      ----------------------------------------------------------------   Electronically signed by Alicja Moraes MD (Interpreting  [address] [place] [address]) on 06/07/2019 at 05:30 PM    Conclusions      Summary   Technically difficult study with suboptimal views   Left ventricle size is normal.   Systolic function was low-normal.   Ejection fraction was estimated at 50-55%. There were no regional left   ventricular wall motion abnormalities and wall thickness was within normal   limits.   Mildly dilated left atrium.   Calcification of the mitral valve noted.   Mild mitral regurgitation is present.   Mild tricuspid regurgitation.      Signature      ----------------------------------------------------------------   Electronically signed by Janelle Cali MD (Interpreting   physician) on 12/08/2018 at 03:42 PM   ----------------------------------------------------------------     Cultures    Procalcitonin  Lab Results   Component Value Date    PROCAL 0.27 06/07/2019    PROCAL 0.29 05/20/2019    PROCAL 0.26 12/07/2018      Body fluid culture-No bacteria seen    Radiology    CXR  XR CHEST 1 VIEW   6/9/2019   1. Poor inflation of lungs. Mild cardiomegaly. Moderate atelectasis/pneumonia left perihilar region, right infrahilar region, and both lung bases. Small effusion right side. 2. No residual effusion on the left. No pneumothorax is seen. 6/7/2019   XR CHEST PORTABLE   1. Redemonstration of changes compatible with congestive heart failure. 2. Bilateral effusions right greater than left. 6/6/19  Right thoracentesis  1. The patient is status post large volume right thoracentesis with aspiration of 1.4 L of clear yellow fluid. There is no pneumothorax.  There are bilateral perihilar and the basilar infiltrates.               **This report has been created using voice recognition software.  It may contain minor errors which are inherent in voice recognition technology. **       Final report electronically signed by Dr. Dennis Nguyen on 6/6/2019 1:30 PM       CT Scans  6/7/19  CT Chest w/o contrast  FINDINGS:   Upper abdomen: Prior cholecystectomy. Mediastinum and radha: Enlarged pulmonary trunk, consistent with pulmonary arterial hypertension. Extensive coronary artery calcifications are present. There are several small subcentimeter mediastinal lymph nodes, likely reactive. No abnormally enlarged hilar or mediastinal lymph nodes are seen. Bones: Marked hypertrophic spurring is seen in the thoracic spine with several bulky bridging osteophytes. There is extensive calcification in the anterior spinal ligament. In addition, there is a moderate old compression fracture T11 level with evidence  for prior vertebroplasty. There appears slightly greater compression at this time than on the prior study. Lungs: Groundglass infiltrates right mid and upper lung fields, consistent with pneumonia/pulmonary edema. Moderate consolidation both lower lobes, worse on the right, consistent with pneumonia. There are moderate size bilateral pleural effusions. Impression:  1. Moderate-sized left pleural effusion. Bibasilar atelectasis/pneumonia. Groundglass and right mid and upper lung fields, consistent with pneumonia versus pulmonary edema. 2. Enlarged pulmonary arteries, consistent with pulmonary arterial hypertension.      Assessment   -Acute respiratory failure with hypoxia 2/2 B pleural effusion  -Recurrent transudative bilateral pleural effusions ( R>L) initial chemistry (12/10/18) c/w transudate, chemistry not obtain in last tap, unilaterality not typical for CHF/CKD.  -S/P 1.4 L right thoracentesis with improvement; no fluid sent for labs  -6/11/19 cytology pleural fluid (-) malignant cells  -Second had smoke exposure for 17 years; no PFT available no respiratory issues prior to admission  -CKD IV; 6/11/19 PD catheter insertion  -Full Code  Recommendations   -Monitor SpO2 wean supplemental O2 to maintain SpO2 >90% Baseline RA  -Schedule for Right US with thoracentesis  -Kike Rojo was educated and informed about planned thoracentesis procedure and its associated complications including but not limited to bleeding, infection, development of pneumothorax requiring placement of chest tube and prolonged hospital stay. She agreed to take risk. She verbalizes understanding of complications.  -Send pleural fluid to routine analysis including PH, total protein, LDH, cell count, Cytology with cell block, Gram stain and cultures. Please send simultaneous serum total protein and LDH for comparison.  -Diuresis per nephrology  -Duonebs every 4 hours PRN SOB/wheezing   -Strict I&O with daily weights   -DVT prophylaxis: Heparin    Case discussed with Dr. Jennifer Pinto CNP, nurse Maria G Edwards and patient. Questions and concerns addressed. Meds and Orders reviewed. Electronically signed by   YESENIA Willis - CNP on 6/12/2019 at 12:22 PM    If fluid accumulates again wouls ask IR to deploy TPC  Cannot place it without fluid  If DC, pulm will request ECF to get CXR and arrange for TPC if necessary    Patient seen and examined independently by me. Above discussed and I agree with Mavis Mcarthur CNP note Also see my additional comments. Labs, cultures, and radiographs when available were reviewed. Changes were made in the orders as necessary. I discussed patient concerns with Brad GARRETT and instructions were given. Respiratory care issues addressed. Please see our orders for the updated patient care plan.     Electronically signed by     Maria G Aldana MD on 6/12/2019 at 9:46 PM

## 2019-06-12 NOTE — CARE COORDINATION
[address] Transitions Interview     2019    Patient: Namrata Kapoor Patient : 1950   MRN: [id number]  Reason for Admission:   RARS: Readmission Risk Score: 27         Reviewed nH Predict Tool with patient and SW/CM. nH Predict Tool uploaded in the media tab. Recommendation is for SNF and discharge plan is for SNF      Readmission Risk  Patient Active Problem List   Diagnosis    Chronic kidney disease, stage III (moderate) (HCC)    Anemia in chronic renal disease    Type 2 diabetes mellitus with insulin therapy ([facility] [address]    Chronic kidney disease (CKD), stage IV (severe) (HCC)    Diabetic nephropathy with proteinuria (HCC)    Acute respiratory failure with hypoxia (HCC)    Acute pulmonary edema (HCC)    Acute on chronic diastolic CHF (congestive heart failure) (HCC)    Uncontrolled type 2 diabetes mellitus with hyperglycemia (HCC)    Essential hypertension    Hypothyroidism    Other fluid overload    Renal failure    Diabetic nephropathy associated with type 2 diabetes mellitus (HCC)    Bilateral pleural effusion    Elevated troponin    Metabolic acidosis    Leukocytosis    Generalized weakness    Bipolar 1 disorder (HCC)    Mild mitral regurgitation    Mild tricuspid regurgitation    Chronic pain syndrome    Pleural effusion    Stage 5 chronic kidney disease (HCC)    (HFpEF) heart failure with preserved ejection fraction (HCC)    Bilateral atelectasis    Hyponatremia    Chronic anemia    Hyperlipidemia    Constipation       Inpatient Assessment  Care Transitions Summary    Care Transitions Inpatient Review  Medication Review  Are you able to afford your medications?:  Yes  How often do you have difficulty taking your medications?:  I always take them as prescribed.   Housing Review  Are you an active caregiver in your home?:  No  Social Support  Durable Medical Equipment  Patient DME:  Rosie Bro  Functional Review  Ability to seek help/take action for Emergent/Urgent situations i.e. fire, crime, inclement weather or health crisis.:  Needs Assistance  Ability handle personal hygiene needs (bathing/dressing/grooming): Independent  Ability to manage medications:  Dependent  Ability to prepare food:  Dependent  Ability to maintain home (clean home, laundry):  Dependent  Ability to drive and/or has transportation:  Dependent  Ability to do shopping:  Dependent  Ability to manage finances:  Dependent  Is patient able to live independently?:  No  Hearing and Vision  Visual Impairment:  Visual impairment (Glasses/contacts)  Hearing Impairment:  None  Care Transitions Interventions         Follow Up  No future appointments.     Health Maintenance  Health Maintenance Due   Topic Date Due    Breast cancer screen  04/25/2002    DEXA (modify frequency per FRAX score)  04/25/2017       Jed Greenwood RN

## 2019-06-12 NOTE — FLOWSHEET NOTE
[address] [place] [address]  PHYSICAL THERAPY MISSED TREATMENT NOTE  ACUTE CARE  [facility] RENAL TELEMETRY 6K              Missed Treatment  Pt refusing tx session due to increased pain in LEs and dizziness, educated pt on importance and role of PT however pt cont to refuse.  Will try back per POC

## 2019-06-12 NOTE — BRIEF OP NOTE
Post Procedure Progress Note      6/12/2019  Pre-Procedure Diagnosis: Right pleural effusion  Post-Procedure Diagnosis: Same  Physician: Masoud Lei MD  Anesthesia: 2% lidocaine local  Procedure Performed: Ultrasound guided right thoracentesis  Specimen Removed: 1.5 liters clear yellow pleural fluid  Disposition of Specimen: 83 ml specimen sent to the lab  Estimated Blood Loss: None  Complications: None

## 2019-06-12 NOTE — FLOWSHEET NOTE
06/12/19 1542   Fall Risk Interventions   Hourly Visual Checks Awake  (returned from thoracentesis)   Bandaid on right back at thoracentesis site dry and intact.

## 2019-06-12 NOTE — PLAN OF CARE
Problem: Falls - Risk of:  Goal: Will remain free from falls  Description  Will remain free from falls  Outcome: Met This Shift  Note:   Patient up to the [facility] with 1 assist and use of walker. States she does get dizzy at times when standing. Goal: Absence of physical injury  Description  Absence of physical injury  Outcome: Met This Shift     Problem: Tissue Perfusion - Cardiopulmonary, Altered:  Goal: Hemodynamic stability will improve  Description  Hemodynamic stability will improve  Outcome: Met This Shift   Patient participating in plan of care and goal setting.

## 2019-06-12 NOTE — PROGRESS NOTES
Nephrology Progress Note    Patient - Kike Rojo   MRN -  [id number]   [place] # - [de-identified]      - 1950    81 y.o. Admit Date: 2019  Hospital Day: 6  [facility]  Date of evaluation -  2019    Subjective:   CC: shortness of breath   Denies sob.  1-2 lpm  BP ok  Mild tenderness at PD catheter placement  BP Range: Systolic (21ZYA), ATS:799 , Min:128 , FNQ:309      Diastolic (08NSA), RLL:99, Min:43, Max:89    Objective:   VITALS:  BP (!) 150/66   Pulse 58   Temp 97.4 °F (36.3 °C) (Oral)   Resp 18   Ht 5' 6\" (1.676 m)   Wt 172 lb 3.2 oz (78.1 kg)   SpO2 95%   BMI 27.79 kg/m²    Patient Vitals for the past 24 hrs:   BP Temp Temp src Pulse Resp SpO2 Weight   19 1134 (!) 150/66 97.4 °F (36.3 °C) Oral 58 18 95 % --   19 1047 -- -- -- -- -- 91 % --   19 1044 -- -- -- -- -- (!) 87 % --   19 0809 (!) 155/89 98.9 °F (37.2 °C) Oral 61 18 94 % --   19 0402 (!) 141/82 98.4 °F (36.9 °C) Oral 67 -- 92 % 172 lb 3.2 oz (78.1 kg)   19 2328 (!) 146/66 97.4 °F (36.3 °C) Oral 55 20 91 % --   19 2000 (!) 147/69 98 °F (36.7 °C) Oral 55 16 91 % --   19 1537 (!) 128/59 -- -- 52 16 95 % --   19 1530 (!) 162/70 97.4 °F (36.3 °C) Oral 58 18 98 % --   19 1448 (!) 135/51 -- -- 54 16 98 % --   19 1442 (!) 128/47 -- -- 54 16 94 % --   19 1435 (!) 136/46 -- -- 52 16 97 % --   19 1430 (!) 137/50 -- -- 54 16 98 % --   19 1423 (!) 136/47 -- -- 54 14 98 % --   19 1417 (!) 130/46 -- -- 52 16 95 % --   19 1410 (!) 134/48 -- -- 52 14 97 % --   19 1405 (!) 134/47 -- -- 52 14 96 % --   19 1400 (!) 134/46 -- -- 54 14 95 % --   19 1356 (!) 131/46 -- -- 54 14 95 % --   19 1350 (!) 130/51 -- -- 56 14 94 % --   19 1345 -- -- -- 56 16 93 % --   19 1342 (!) 135/46 -- -- 58 14 96 % --   19 1340 -- -- -- -- -- 97 % --   19 1338 (!) 144/43 -- -- 60 16 97 % --   19 1334 (!) 144/44 -- Subcutaneous 3 times per day     Labs:   Labs reviewed  Recent Labs     06/10/19  0702 06/11/19  0605 06/12/19  0604    135 132*   K 4.1 3.9 4.7   CL 97* 98 97*   CO2 19* 21* 22*   BUN 73* 71* 71*   CREATININE 3.4* 3.0* 3.5*   LABGLOM 13* 16* 13*   GLUCOSE 83 56* 93   MG 2.2 2.1  --    CALCIUM 9.5 9.4 9.2     Recent Labs     06/11/19  0605 06/12/19  0604   WBC 12.3* 13.0*   RBC 3.16* 2.93*   HGB 9.4* 8.7*   HCT 28.0* 26.5*   MCV 88.6 90.4   MCH 29.7 29.7    352     6/6/19   Summary   limited echo   Ejection fraction is visually estimated at 55%.   Overall left ventricular function is normal.   The aortic valve leaflets were not well visualized.   Aortic valve appears tricuspid.   Aortic valve leaflets are somewhat thickened.   Aortic valve leaflets are Moderately calcified.   No evidence of any pericardial effusion.     ASSESSMENT:  1. Chronic Kidney Disease Stage V. BUN/Creatinine overall stable. Volume status ok. Pt with ongoing and recurrent CHF with effusions. Ok to continue Bumex 1 mg daily. Will monitor volume status and labs closely. Peritoneal dialysis catheter placed today with planned initiation of Peritoneal dialysis in 2 weeks. 2. Acute on chronic diastolic HF with preserved EF 55%  3. Recurrent Bilateral R/L pleural effusions with hypoxia, s/p thoracentesis   4. Metabolic acidosis 2nd to CKD  5. Diabetes Mellitus Type II with nephrosclerosis with long term use of insulin   6. Essential Hypertension with nephrosclerosis  7. Anemia of chronic disease, Iron 30, Ferritin 905, Will give Venofer 300 mg today.      BMP in AM  Active Problems:    Acute on chronic diastolic CHF (congestive heart failure) (HCC)    Essential hypertension    Pleural effusion    Stage 5 chronic kidney disease (HCC)    (HFpEF) heart failure with preserved ejection fraction (HCC)    Bilateral atelectasis    Hyponatremia    Chronic anemia    Hyperlipidemia    Constipation  Resolved Problems:    * No resolved hospital

## 2019-06-12 NOTE — PROGRESS NOTES
Formulation and discussion of sedation / procedure plans, risks, benefits, side effects and alternatives with patient and/or responsible adult completed.     Electronically signed by Willow Santana MD on 6/12/2019 at 3:28 PM

## 2019-06-12 NOTE — PROGRESS NOTES
Hospitalist Progress Note      Patient:  Zully Auguste      Unit/Bed:6K-11/011-A    YOB: 1950    MRN: [id number]       Acct: [de-identified]     PCP: [id number]    Date of Admission: 6/6/2019    Assessment/Plan:    1. Acute on chronic diastolic CHF. Daily weights, low salt, fluid restriction. -- did not tolerate increase in diureses. s/p bumex 1 mg IV 6/6 - 6/7 -> creat kadnice and not much improvement in effusions thus stopped per Dr. Debby Vera 6/7. Oral Bumex 1 mg daily continued. -- last echo 12/2018 EF 50-55%, no WMA, mildly dilated LA, mild MR, mild TR --> limited echo 6/7/19 normal EF 55%, no other abn noted  -- Add BB with uncontrolled HTN -- no ACE/aRB due to CKD --> on isordil/hydralazine  -- TSH 5/19 WNL. -- up 5 pounds 6/12.  2. Bilateral R>L pleural effusions, recurrent. Transudative -- apprec pulm and renal assist -- CT chest 6/7/19 = Moderate-sized left pleural effusion. Bibasilar atelectasis/pneumonia. Groundglass and right mid and upper lung fields, consistent with pneumonia versus pulmonary edema. -- s/p right thoracentesis 6/6/19 = 1.4 L repeat thora 6/12 with 1.5 liters tapped  -- s/p left thoracentesis 6/9/19 = 500 mL    -- IR recommending pigtail cathter, will discuss with pulmonary. -- per Care Everywhere pt has had right effusion back in 12/2018, 1/2019, 2/2019  -- limited echo 6/7/19 EF 55%, LV fxn normal--> similar to 12/2018 echo  3. Acute hypoxic respiratory failure secondary to #1/2. Wean oxygen as able. Will likely continue on discharge to ECF. 4. Moderate right perihilar atelectasis and both bases -- likely due to effusions --> encourage IS -- ?need to r/o other rheum process --> per pulm 6/7 LUIS (p), histone IgG (p), anti-dsDNA (p)  -- s/p zmax x 1 on 6/7 per pulm, s/p cefepime 6/7 - 6/8 per pulm   5.  CKD stage now 5 -- due to DM, HTN -- c/s renal apprec -- creat up to 4.1 on 6/8 with diuretics -> stopped and creat back to 3.4 on 6/9 - was mid 3's during admission 5/20 - 5/23 -- baseline CKD with baseline 2.8 - 3.1.  -- creat worsens with diuretics  -- d/w Dr. Jackie Phillips 6/8 and likely needs dialysis - CAPD cath to be placed and start in 2-3 weeks. 6. Hyponatremia, resolved. 7. Metabolic acidosis -- due to CKD. 8. Leukocytosis -- afeb, ?etiology -- up and down - similar last admission 5/2019 -- afebrile -- chronic component per chart review -- PCT slightly elevated chronically also in 0.2's   -- s/p zmax 6/7, cefepime 6/7 - 6/8 per pulm  8. Chronic elevated trops -- due to renal dysfunction -- no cp - only sob but with above -- trops 6/6 at baseline 0.033 which is what was in 5/2019  -- no ASA - hold until if need further procedures  -- limited echo 6/7/19 = EF 55%, LV fxn normal  9. Type 2 DM, acute hypoglycemia this AM. BS 56. Decrease basal bolus regimen-- monitor and adjust prn -- carb diet. Last A1C 7.1 12/2018  10. Accelerated HTN on Essential HTN -- norvasc 10 mg daily, hydralazine 100 mg tid, isordil 20 mg tid. Add low dose BB. 11. Chronic normocytic anemia -- cont po iron as iron low 5/19 -- likely due to CKD -- ??aranesp per renal -- stable in 9's -- was 9.1 on 5/22 --> was 10's at [facility] 2/2019 but 8-10 prior  12. Diabetic gastroparesis -- anibal po - monitor  13. Hypothyroidism -- cont synthroid -- TSH 5/19 WNL  14. Hx neurogenic bladder -- ditropan  15. HLD -- cont statin -- Lipids 5/20/19 HLD 66, LDL 75, trig 148  16. Chronic dizziness -- on antivert  17. Bipolar d/o -- at [facility] chronically -- no current meds and mood stable  18. Mild MR, mild TR -- per echo 12/2018  19. Hx seizure d/o -- no seizure meds but on neurontin  20. Chronic pain -- cont neurontin, cymbalta  21. Constipation -- added miralax prn 6/9  22. Generalized weakness -- PT/OT c/s    Dispo: back to nursing home tomorrow.        Chief Complaint: sob    Hospital Course: Dayanna Akhtar is a 81 y.o. female with bipolar, CKD IV, CHF, DMII, HTN and lipidemia is admitted from [facility] for acute on chronic systolic heart failure, recent admission 5/19 - 5/23 for similar c/o of sob, weight gain. -- pulmonary consulted for recurrent bilateral pleural effusions --> s/p right thoracentesis 6/ - left thoracentesis 6/9 = 500 mL blood tinged removed  -- renal consulted for CKD stage IV and fluid overload - not much improvement with diuretics     -- 6/9 --> apprec consultants -- s/p left thoracentesis this am 500 mL - cont monitor - s/p right  Tap 6/6 --> await further pulm and renal recs -- not responsive to diuretics thus likely needs dialysis - d/w Dr. Laura Amaral yest - ?need HD catheter prior to d/c;  Resides in [facility] for long time - c/s PT/OT. Cont monitor lytes, u/o, BP, BS, h/h. Wean O2 as able. PD catheter placed 6/11    1.5 liters tapped from right pleural effusion by IR 6/12. Subjective: Nauseated this AM with BS in the 50's. Anxious for procedure this afternoon.        Medications:  Reviewed    Infusion Medications    sodium chloride 20 mL/hr at 06/11/19 1231    dextrose       Scheduled Medications    carvedilol  3.125 mg Oral BID WC    insulin glargine  15 Units Subcutaneous Nightly    bumetanide  1 mg Oral Daily    senna  1 tablet Oral Nightly    amLODIPine  10 mg Oral Daily    DULoxetine  30 mg Oral Daily    fluticasone  1 spray Each Nostril Daily    gabapentin  100 mg Oral TID    hydrALAZINE  100 mg Oral Q8H    insulin lispro  0-9 Units Subcutaneous 4x Daily AC & HS    isosorbide dinitrate  20 mg Oral TID    levothyroxine  25 mcg Oral Daily    meclizine  25 mg Oral TID    oxybutynin  5 mg Oral Daily    pravastatin  40 mg Oral Nightly    sodium chloride flush  10 mL Intravenous 2 times per day    heparin (porcine)  5,000 Units Subcutaneous 3 times per day     PRN Meds: polyethylene glycol, ipratropium-albuterol, acetaminophen, glucose, dextrose, glucagon (rDNA), dextrose, sodium chloride flush, ondansetron      Intake/Output Summary (Last 24 hours) at 6/12/2019 1619  Last data filed at 6/12/2019 1438  Gross per 24 hour   Intake 1040 ml   Output 400 ml   Net 640 ml       Diet:  DIET CARB CONTROL; Dietary Nutrition Supplements: Clear Liquid Oral Supplement    Exam:  /63   Pulse 53   Temp 98 °F (36.7 °C) (Oral)   Resp 16   Ht 5' 6\" (1.676 m)   Wt 168 lb 4.8 oz (76.3 kg)   SpO2 92%   BMI 27.16 kg/m²     General appearance: No apparent distress, appears older than stated age and cooperative. HEENT: Pupils equal, round, and reactive to light. Conjunctivae/corneas clear. Neck: Supple, with full range of motion. No jugular venous distention. Trachea midline. Respiratory:  Normal respiratory effort. +crackles bilaterally. No respiratory distress or accessory muscle use. Cardiovascular: Regular rate and rhythm with normal S1/S2 without murmurs, rubs or gallops. Abdomen: Soft, obese, non-tender, non-distended with normal bowel sounds. No rebound or guarding  Musculoskeletal: No clubbing, cyanosis or edema bilaterally. Full range of motion without deformity. No calf tenderness palpation  Skin: Skin color, texture, turgor normal.  No rashes or lesions. Pale. Neurologic:  Neurovascularly intact without any focal sensory/motor deficits. Cranial nerves: II-XII intact, grossly non-focal.  Psychiatric: Alert and oriented, thought content appropriate  Capillary Refill: Brisk,< 3 seconds   Peripheral Pulses: +2 palpable, equal bilaterally       Labs:   Recent Labs     06/11/19  0605 06/12/19  0604   WBC 12.3* 13.0*   HGB 9.4* 8.7*   HCT 28.0* 26.5*    352     Recent Labs     06/10/19  0702 06/11/19  0605 06/12/19  0604    135 132*   K 4.1 3.9 4.7   CL 97* 98 97*   CO2 19* 21* 22*   BUN 73* 71* 71*   CREATININE 3.4* 3.0* 3.5*   CALCIUM 9.5 9.4 9.2     No results for input(s): AST, ALT, BILIDIR, BILITOT, ALKPHOS in the last 72 hours. No results for input(s): INR in the last 72 hours.   No results for input(s): Elizabeth Specking in the last 72 hours.    Urinalysis:      Lab Results   Component Value Date    NITRU Negative 03/09/2019    WBCUA 0-2 06/07/2016    BACTERIA NONE 06/07/2016    RBCUA 0-2 06/07/2016    BLOODU Negative 03/09/2019    GLUCOSEU 2+ 03/09/2019       Radiology:  US THORACENTESIS   Final Result   1. Uncomplicated ultrasound guided diagnostic and therapeutic thoracentesis. **This report has been created using voice recognition software. It may contain minor errors which are inherent in voice recognition technology. **      Final report electronically signed by Dr. Neri Perez on 6/12/2019 3:40 PM      XR CHEST 1 VW   Final Result   1. The patient is status post ultrasound-guided right thoracentesis with aspiration of 1.5 L of pleural fluid. There is no residual pleural fluid. There is no pneumothorax. 2. There are left perihilar and left basilar infiltrates and mild atelectasis and/or infiltrate at the right lung base. There is no pulmonary vascular congestion. **This report has been created using voice recognition software. It may contain minor errors which are inherent in voice recognition technology. **      Final report electronically signed by Dr. Neri Perez on 6/12/2019 3:43 PM      IR INSERT CAPD COMPLETE   Final Result   Status post successful tunneled peritoneal dialysis  (CAPD)  catheter insertion. **This report has been created using voice recognition software. It may contain minor errors which are inherent in voice recognition technology. **      Final report electronically signed by Dr. Kathleen Rosas on 6/11/2019 3:15 PM      XR CHEST 1 VW   Final Result   1. Poor inflation of lungs. Mild cardiomegaly. Moderate atelectasis/pneumonia left perihilar region, right infrahilar region, and both lung bases. Small effusion right side. 2. No residual effusion on the left. No pneumothorax is seen.       Final report electronically signed by Dr. Kathleen Rosas on 6/9/2019 10:27 AM      US THORACENTESIS   Final Result   Status post thoracentesis            **This report has been created using voice recognition software. It may contain minor errors which are inherent in voice recognition technology. **      Final report electronically signed by Dr. Joya Vicente on 6/9/2019 10:46 AM      CT Chest WO Contrast   Final Result   1. Moderate-sized left pleural effusion. Bibasilar atelectasis/pneumonia. Groundglass and right mid and upper lung fields, consistent with pneumonia versus pulmonary edema. 2. Enlarged pulmonary arteries, consistent with pulmonary arterial hypertension. **This report has been created using voice recognition software. It may contain minor errors which are inherent in voice recognition technology. **      Final report electronically signed by Dr. Joya Vicente on 6/7/2019 1:03 PM      XR CHEST PORTABLE   Final Result   1. Redemonstration of changes compatible with congestive heart failure. 2. Bilateral effusions right greater than left. **This report has been created using voice recognition software. It may contain minor errors which are inherent in voice recognition technology. **      Final report electronically signed by Dr. Cyn Thomas on 6/7/2019 2:19 AM      US THORACENTESIS   Final Result   1. Uncomplicated ultrasound guided therapeutic thoracentesis. **This report has been created using voice recognition software. It may contain minor errors which are inherent in voice recognition technology. **      Final report electronically signed by Dr. Crista Richards on 6/6/2019 1:28 PM      XR CHEST 1 VW   Final Result   1. The patient is status post large volume right thoracentesis with aspiration of 1.4 L of clear yellow fluid. There is no pneumothorax. There are bilateral perihilar and the basilar infiltrates. **This report has been created using voice recognition software.   It may contain minor errors which are inherent in voice recognition technology. **      Final report electronically signed by Dr. Gladis Wakefield on 6/6/2019 1:30 PM          Diet: DIET CARB CONTROL;   Dietary Nutrition Supplements: Clear Liquid Oral Supplement    Perez: no      Tele:  SR -- had burst of tachyarrhythmia to 130 at about 620 am 6/9    DVT prophylaxis: [] Lovenox                                 [] SCDs                                 [x] SQ Heparin                                 [] Encourage ambulation           [] Already on Anticoagulation     Disposition:    [] Home       [] TCU       [] Rehab       [] Psych       [x] SNF       [] [place]       [] Other-    Code Status: Full Code    PT/OT Eval Status: consulted        Electronically signed by YESENIA Jhaveri CNP on 6/12/2019 at 4:19 PM

## 2019-06-12 NOTE — PLAN OF CARE
Problem: Nutrition  Goal: Optimal nutrition therapy  Outcome: Ongoing  Note:   Patient not eating today, had procedure and has not felt up to eating. Will continue to monitor. Problem: Falls - Risk of:  Goal: Will remain free from falls  Description  Will remain free from falls  Outcome: Ongoing  Note:   No falls noted this shift. Patient ambulates with x1 staff assistance without difficulty. Family member at bedside, spent the night. Bed kept in low position. Safe environment maintained. Bedside table & call light in reach. Uses call light appropriately when needing assistance. Problem: Falls - Risk of:  Goal: Absence of physical injury  Description  Absence of physical injury  Outcome: Ongoing  Note:   No self harm noted. Safe environment provided, hourly rounding. Will continue to assess. Problem: Tissue Perfusion - Cardiopulmonary, Altered:  Goal: Hemodynamic stability will improve  Description  Hemodynamic stability will improve  Outcome: Ongoing  Note:   VSS, will continue to monitor. Problem: Discharge Planning:  Goal: Participates in care planning  Description  Participates in care planning  Outcome: Ongoing  Note:   Care plan reviewed with patient. Patient  verbalize understanding of the plan of care and contribute to goal setting. Problem: Activity Intolerance:  Goal: Ability to tolerate increased activity will improve  Description  Ability to tolerate increased activity will improve  Outcome: Ongoing  Note:   Patient tolerated up to bathroom well, with use of assistive device. Problem: Pain:  Goal: Pain level will decrease  Description  Pain level will decrease  Outcome: Ongoing  Note:   Patient does not complain of pain this shift. Pain goal is a 4/10.          Problem: Serum Glucose Level - Abnormal:  Goal: Ability to maintain appropriate glucose levels will improve to within specified parameters  Description  Ability to maintain appropriate glucose levels will improve to within specified parameters  Outcome: Ongoing     Problem: Skin Integrity - Impaired:  Goal: Absence of new skin breakdown  Description  Absence of new skin breakdown  Outcome: Ongoing  Note:   No new signs or symptoms of skin breakdown noted this shift, encouraging patient to turn and reposition self in bed q2h        Problem: Risk for Impaired Skin Integrity  Goal: Tissue integrity - skin and mucous membranes  Description  Structural intactness and normal physiological function of skin and  mucous membranes. Outcome: Ongoing  Note:   Patient mucous membranes, pink moist and intact. Will continue to assess. Problem: Pain:  Goal: Pain level will decrease  Description  Pain level will decrease  Outcome: Ongoing  Note:   Patient does not complain of pain this shift. Pain goal is a 4/10. Problem: Pain:  Goal: Control of acute pain  Description  Control of acute pain  Outcome: Ongoing  Note:   Patient does not complain of pain this shift. Will continue to monitor. Problem: Pain:  Goal: Control of chronic pain  Description  Control of chronic pain  Outcome: Ongoing  Note:   No complaints of chronic pain, will continue to monitor. Care plan reviewed with patient . Patient verbalize understanding of the plan of care and contribute to goal setting.

## 2019-06-13 VITALS
TEMPERATURE: 97.9 F | BODY MASS INDEX: 26.12 KG/M2 | HEIGHT: 66 IN | RESPIRATION RATE: 19 BRPM | DIASTOLIC BLOOD PRESSURE: 63 MMHG | OXYGEN SATURATION: 95 % | WEIGHT: 162.5 LBS | HEART RATE: 53 BPM | SYSTOLIC BLOOD PRESSURE: 132 MMHG

## 2019-06-13 DIAGNOSIS — Z11.59 NEED FOR HEPATITIS B SCREENING TEST: Primary | ICD-10-CM

## 2019-06-13 LAB
GLUCOSE BLD-MCNC: 138 MG/DL (ref 70–108)
GLUCOSE BLD-MCNC: 166 MG/DL (ref 70–108)
GLUCOSE BLD-MCNC: 96 MG/DL (ref 70–108)
HBV SURFACE AB TITR SER: NEGATIVE {TITER}
HEPATITIS B CORE IGM ANTIBODY: NEGATIVE
HEPATITIS B SURFACE ANTIGEN: NEGATIVE
HEPATITIS C ANTIBODY: NEGATIVE

## 2019-06-13 PROCEDURE — 6370000000 HC RX 637 (ALT 250 FOR IP): Performed by: INTERNAL MEDICINE

## 2019-06-13 PROCEDURE — APPSS30 APP SPLIT SHARED TIME 16-30 MINUTES: Performed by: NURSE PRACTITIONER

## 2019-06-13 PROCEDURE — 82948 REAGENT STRIP/BLOOD GLUCOSE: CPT

## 2019-06-13 PROCEDURE — 97116 GAIT TRAINING THERAPY: CPT

## 2019-06-13 PROCEDURE — 6370000000 HC RX 637 (ALT 250 FOR IP): Performed by: FAMILY MEDICINE

## 2019-06-13 PROCEDURE — 36415 COLL VENOUS BLD VENIPUNCTURE: CPT

## 2019-06-13 PROCEDURE — 86706 HEP B SURFACE ANTIBODY: CPT

## 2019-06-13 PROCEDURE — 99232 SBSQ HOSP IP/OBS MODERATE 35: CPT | Performed by: INTERNAL MEDICINE

## 2019-06-13 PROCEDURE — 2700000000 HC OXYGEN THERAPY PER DAY

## 2019-06-13 PROCEDURE — 99239 HOSP IP/OBS DSCHRG MGMT >30: CPT | Performed by: NURSE PRACTITIONER

## 2019-06-13 PROCEDURE — 2580000003 HC RX 258: Performed by: INTERNAL MEDICINE

## 2019-06-13 PROCEDURE — 97110 THERAPEUTIC EXERCISES: CPT

## 2019-06-13 PROCEDURE — 6370000000 HC RX 637 (ALT 250 FOR IP): Performed by: NURSE PRACTITIONER

## 2019-06-13 PROCEDURE — 86705 HEP B CORE ANTIBODY IGM: CPT

## 2019-06-13 PROCEDURE — 87340 HEPATITIS B SURFACE AG IA: CPT

## 2019-06-13 PROCEDURE — 97535 SELF CARE MNGMENT TRAINING: CPT

## 2019-06-13 PROCEDURE — 6360000002 HC RX W HCPCS: Performed by: INTERNAL MEDICINE

## 2019-06-13 PROCEDURE — 86803 HEPATITIS C AB TEST: CPT

## 2019-06-13 RX ADMIN — Medication 10 ML: at 10:15

## 2019-06-13 RX ADMIN — OXYBUTYNIN CHLORIDE 5 MG: 5 TABLET ORAL at 10:11

## 2019-06-13 RX ADMIN — HYDRALAZINE HYDROCHLORIDE 100 MG: 50 TABLET ORAL at 10:11

## 2019-06-13 RX ADMIN — MECLIZINE HYDROCHLORIDE 25 MG: 12.5 TABLET, FILM COATED ORAL at 10:11

## 2019-06-13 RX ADMIN — LEVOTHYROXINE SODIUM 25 MCG: 25 TABLET ORAL at 06:16

## 2019-06-13 RX ADMIN — DULOXETINE HYDROCHLORIDE 30 MG: 30 CAPSULE, DELAYED RELEASE ORAL at 10:12

## 2019-06-13 RX ADMIN — GABAPENTIN 100 MG: 100 CAPSULE ORAL at 15:34

## 2019-06-13 RX ADMIN — POLYETHYLENE GLYCOL (3350) 17 G: 17 POWDER, FOR SOLUTION ORAL at 12:21

## 2019-06-13 RX ADMIN — HYDRALAZINE HYDROCHLORIDE 100 MG: 50 TABLET ORAL at 15:28

## 2019-06-13 RX ADMIN — FLUTICASONE PROPIONATE 1 SPRAY: 50 SPRAY, METERED NASAL at 10:13

## 2019-06-13 RX ADMIN — CARVEDILOL 3.12 MG: 3.12 TABLET, FILM COATED ORAL at 16:39

## 2019-06-13 RX ADMIN — MECLIZINE HYDROCHLORIDE 25 MG: 12.5 TABLET, FILM COATED ORAL at 15:28

## 2019-06-13 RX ADMIN — ISOSORBIDE DINITRATE 20 MG: 20 TABLET ORAL at 15:27

## 2019-06-13 RX ADMIN — GABAPENTIN 100 MG: 100 CAPSULE ORAL at 10:11

## 2019-06-13 RX ADMIN — CARVEDILOL 3.12 MG: 3.12 TABLET, FILM COATED ORAL at 10:11

## 2019-06-13 RX ADMIN — HEPARIN SODIUM 5000 UNITS: 5000 INJECTION INTRAVENOUS; SUBCUTANEOUS at 15:28

## 2019-06-13 RX ADMIN — HEPARIN SODIUM 5000 UNITS: 5000 INJECTION INTRAVENOUS; SUBCUTANEOUS at 06:16

## 2019-06-13 RX ADMIN — INSULIN LISPRO 1 UNITS: 100 INJECTION, SOLUTION INTRAVENOUS; SUBCUTANEOUS at 12:07

## 2019-06-13 RX ADMIN — BUMETANIDE 1 MG: 1 TABLET ORAL at 10:11

## 2019-06-13 RX ADMIN — AMLODIPINE BESYLATE 10 MG: 10 TABLET ORAL at 10:10

## 2019-06-13 RX ADMIN — ISOSORBIDE DINITRATE 20 MG: 20 TABLET ORAL at 10:11

## 2019-06-13 ASSESSMENT — PAIN SCALES - GENERAL
PAINLEVEL_OUTOF10: 0
PAINLEVEL_OUTOF10: 0

## 2019-06-13 NOTE — PROGRESS NOTES
Discharge teaching and instructions for diagnosis/procedure of pleural effusion completed with patient using teachback method. AVS reviewed. Printed prescriptions given to patient. Patient voiced understanding regarding prescriptions, follow up appointments, and care of self at home. Discharged in a wheelchair to  skilled nursing per EMS transportation (ambulette). Patient left in stable condition.

## 2019-06-13 NOTE — DISCHARGE SUMMARY
Hospital Medicine Discharge Summary      Patient Identification:   Blayne Field   : 1950  MRN: [id number]   Account: [de-identified]      Patient's PCP: [id number] Date: 2019     Discharge Date:   2019    Admitting Physician: Jose Todd DO     Discharge Physician: Bisi Toussaint, APRN - CNP     Discharge Diagnoses and Hospital Course. Blayne Field is a 81 y.o. female with bipolar, CKD IV, CHF, DMII, HTN and lipidemia is admitted from [facility] for acute on chronic systolic heart failure, recent admission  -  for similar c/o of sob, weight gain. 1. Acute on chronic diastolic CHF. Daily weights, low salt, fluid restriction. -- did not tolerate increase in diureses. s/p bumex 1 mg IV  -  -> creat kandice and not much improvement in effusions thus stopped per Dr. Jose Pacheco . Oral Bumex 1 mg daily continued per nephrology recs. -- last echo 2018 EF 50-55%, no WMA, mildly dilated LA, mild MR, mild TR --> limited echo 19 normal EF 55%, no other abn noted  -- Added BB with uncontrolled HTN -- no ACE/aRB due to CKD --> on isordil/hydralazine - cardura to restart on discharge. -- TSH  WNL. 2. Bilateral R>L pleural effusions, recurrent. Transudative --Pulm and renal followed -- CT chest 19 = Moderate-sized left pleural effusion. Bibasilar atelectasis/pneumonia. Groundglass and right mid and upper lung fields, consistent with pneumonia versus pulmonary edema. -- s/p right thoracentesis 19 = 1.4 L repeat thoracentesis  with 1.5 liters tapped. Repeat xray with no residual effusion.   -- s/p left thoracentesis 19 = 500 mL. -- IR recommending pigtail cathter, will discuss with pulmonary. -- per Care Everywhere pt has had right effusion back in 2018, 2019, 2019  -- limited echo 19 EF 55%, LV fxn normal--> similar to 2018 echo    Acute hypoxic respiratory failure secondary to #1/2. Wean oxygen as able. Continue on discharge to ECF. 3. Moderate right perihilar atelectasis and both bases --  due to effusions --> encourage IS -- ?need to r/o other rheum process --> per pulm 6/7 LUIS (p), histone IgG (p), anti-dsDNA (p)  -- s/p zmax x 1 on 6/7 per pulm, s/p cefepime 6/7 - 6/8 per pulm   5. CKD stage now 5 -- due to DM, HTN -- c/s renal apprec -- creat up to 4.1 on 6/8 with diuretics -> stopped and creat back to 3.4 on 6/9 - was mid 3's during admission 5/20 - 5/23 -- baseline CKD with baseline 2.8 - 3.1.  -- creat worsens with diuretics  -- CAPD cath placed 6/11 to start exchanges in 2-3 weeks. 6. Hyponatremia, resolved. 7. Metabolic acidosis -- due to CKD. 8. Leukocytosis -- afeb, ?etiology -- up and down - similar last admission 5/2019 -- afebrile -- chronic component per chart review -- PCT slightly elevated chronically also in 0.2's   -- s/p zmax 6/7, cefepime 6/7 - 6/8 per pulm  8. Chronic elevated trops -- due to renal dysfunction -- no cp - only sob but with above -- trops 6/6 at baseline 0.033 which is what was in 5/2019  --  9. Type 2 DM, acute hypoglycemia this AM. BS 56. Decrease basal bolus regimen-- monitor and adjust prn -- carb diet.  Last A1C 7.1 12/2018  9. Accelerated HTN on Essential HTN -- norvasc 10 mg daily, hydralazine 100 mg tid, isordil 20 mg tid. Add low dose BB.   10. Chronic normocytic anemia -- cont po iron as iron low 5/19 -- likely due to CKD -- ??aranesp per renal -- stable in 9's -- was 9.1 on 5/22 --> was 10's at [facility] 2/2019 but 8-10 prior  11. Diabetic gastroparesis -- anibal po - monitor  12. Hypothyroidism -- cont synthroid -- TSH 5/19 WNL  13. Hx neurogenic bladder -- ditropan  14. HLD -- cont statin -- Lipids 5/20/19 HLD 66, LDL 75, trig 148  15. Chronic dizziness -- on antivert  16. Bipolar d/o -- at [facility] -- no current meds and mood stable. 17. Mild MR, mild TR -- per echo 12/2018  18. Hx seizure d/o -- no seizure meds but on neurontin  19.  Chronic pain -- cont neurontin, cymbalta  20. Constipation -- added miralax prn 6/9  21. Generalized weakness -- PT/OT c/s       Following thoracentesis on 6/12 oxygenation and air exchange has improved. Ok to discharge to [facility] today per nephrology and pulmonary. Plan for repeat CXR in 5 days and follow up in the pulmonary clinic. CAPD to start in approximately 2 weeks. Exam:     Vitals:  Vitals:    06/12/19 2315 06/13/19 0420 06/13/19 1000 06/13/19 1157   BP: 136/65 (!) 141/64 (!) 158/69 125/63   Pulse: 55 57 59 55   Resp: 16 16 18 20   Temp: 98.5 °F (36.9 °C) 98.5 °F (36.9 °C) 99.1 °F (37.3 °C) 98.3 °F (36.8 °C)   TempSrc: Oral Oral Oral Oral   SpO2: 93% 95% 94% 96%   Weight:  162 lb 8 oz (73.7 kg)     Height:         Weight: Weight: 162 lb 8 oz (73.7 kg)     24 hour intake/output:    Intake/Output Summary (Last 24 hours) at 6/13/2019 1306  Last data filed at 6/13/2019 0420  Gross per 24 hour   Intake 820 ml   Output 500 ml   Net 320 ml         General appearance:  No apparent distress, appears stated age and cooperative. HEENT:  Normal cephalic, atraumatic without obvious deformity. Pupils equal, round, and reactive to light. Extra ocular muscles intact. Conjunctivae/corneas clear. Neck: Supple, with full range of motion. No jugular venous distention. Trachea midline. Respiratory:  Normal respiratory effort. diminished to auscultation. Air exchange improved. Crackles RLL. Cardiovascular:  Regular rate and rhythm with normal S1/S2 without murmurs, rubs or gallops. Abdomen: Soft, non-tender, non-distended with normal bowel sounds. Musculoskeletal:  No clubbing, cyanosis or edema bilaterally. Full range of motion without deformity. Skin: Skin color, texture, turgor normal.  No rashes or lesions. Neurologic:  Neurovascularly intact without any focal sensory/motor deficits.  Cranial nerves: II-XII intact, grossly non-focal.  Psychiatric:  Alert and oriented, thought content appropriate, normal insight  Capillary Refill: Brisk,< 3 seconds   Peripheral Pulses: +2 palpable, equal bilaterally       Labs: For convenience and continuity at follow-up the following most recent labs are provided:      CBC:    Lab Results   Component Value Date    WBC 13.0 06/12/2019    HGB 8.7 06/12/2019    HCT 26.5 06/12/2019     06/12/2019       Renal:    Lab Results   Component Value Date     06/12/2019    K 4.7 06/12/2019    CL 97 06/12/2019    CO2 22 06/12/2019    BUN 71 06/12/2019    CREATININE 3.5 06/12/2019    CALCIUM 9.2 06/12/2019    PHOS 4.6 03/08/2019         Significant Diagnostic Studies    Radiology:   US THORACENTESIS   Final Result   1. Uncomplicated ultrasound guided diagnostic and therapeutic thoracentesis. **This report has been created using voice recognition software. It may contain minor errors which are inherent in voice recognition technology. **      Final report electronically signed by Dr. Garret Conde on 6/12/2019 3:40 PM      XR CHEST 1 VW   Final Result   1. The patient is status post ultrasound-guided right thoracentesis with aspiration of 1.5 L of pleural fluid. There is no residual pleural fluid. There is no pneumothorax. 2. There are left perihilar and left basilar infiltrates and mild atelectasis and/or infiltrate at the right lung base. There is no pulmonary vascular congestion. **This report has been created using voice recognition software. It may contain minor errors which are inherent in voice recognition technology. **      Final report electronically signed by Dr. Garret Conde on 6/12/2019 3:43 PM      IR INSERT CAPD COMPLETE   Final Result   Status post successful tunneled peritoneal dialysis  (CAPD)  catheter insertion. **This report has been created using voice recognition software. It may contain minor errors which are inherent in voice recognition technology. **      Final report electronically signed by Dr. Con Cr on 6/11/2019 3:15 PM      XR CHEST 1 VW Final Result   1. Poor inflation of lungs. Mild cardiomegaly. Moderate atelectasis/pneumonia left perihilar region, right infrahilar region, and both lung bases. Small effusion right side. 2. No residual effusion on the left. No pneumothorax is seen. Final report electronically signed by Dr. Con Cr on 6/9/2019 10:27 AM      US THORACENTESIS   Final Result   Status post thoracentesis            **This report has been created using voice recognition software. It may contain minor errors which are inherent in voice recognition technology. **      Final report electronically signed by Dr. Con Cr on 6/9/2019 10:46 AM      CT Chest WO Contrast   Final Result   1. Moderate-sized left pleural effusion. Bibasilar atelectasis/pneumonia. Groundglass and right mid and upper lung fields, consistent with pneumonia versus pulmonary edema. 2. Enlarged pulmonary arteries, consistent with pulmonary arterial hypertension. **This report has been created using voice recognition software. It may contain minor errors which are inherent in voice recognition technology. **      Final report electronically signed by Dr. Con Cr on 6/7/2019 1:03 PM      XR CHEST PORTABLE   Final Result   1. Redemonstration of changes compatible with congestive heart failure. 2. Bilateral effusions right greater than left. **This report has been created using voice recognition software. It may contain minor errors which are inherent in voice recognition technology. **      Final report electronically signed by Dr. Houston Sanchez on 6/7/2019 2:19 AM      US THORACENTESIS   Final Result   1. Uncomplicated ultrasound guided therapeutic thoracentesis. **This report has been created using voice recognition software. It may contain minor errors which are inherent in voice recognition technology. **      Final report electronically signed by Dr. Garret Conde on 6/6/2019 1:28 PM      XR CHEST 1 VW   Final

## 2019-06-13 NOTE — PLAN OF CARE
Problem: Nutrition  Goal: Optimal nutrition therapy  6/13/2019 0626 by Devin Garcia RN  Outcome: Ongoing  Note:   Patient on a carb control diet. Problem: Tissue Perfusion - Cardiopulmonary, Altered:  Goal: Hemodynamic stability will improve  Description  Hemodynamic stability will improve  6/13/2019 0626 by Devin Garcia RN  Outcome: Ongoing  Note:   Vital signs have remained stable and within normal limits. Problem: Discharge Planning:  Goal: Participates in care planning  Description  Participates in care planning  6/13/2019 0626 by Devin Garcia RN  Outcome: Ongoing  Note:   Patient active in her plan of care. Problem: Discharge Planning:  Goal: Discharged to appropriate level of care  Description  Discharged to appropriate level of care  Outcome: Ongoing  Note:   Plan for possible discharge to [facility]. Problem: Serum Glucose Level - Abnormal:  Goal: Ability to maintain appropriate glucose levels will improve to within specified parameters  Description  Ability to maintain appropriate glucose levels will improve to within specified parameters  6/13/2019 0626 by Devin Garcia RN  Outcome: Ongoing  Note:   Checking patient's blood sugar AC&HS. Problem: Risk for Impaired Skin Integrity  Goal: Tissue integrity - skin and mucous membranes  Description  Structural intactness and normal physiological function of skin and  mucous membranes. 6/13/2019 0626 by Devin Garcia RN  Outcome: Ongoing  Note:   No new areas of skin breakdown noted tonight. Skin is warm and dry. Patient has multiple skin integrity issues (see doc flowsheets for list). Patient refusing to turn and reposition every 2 hours. Bilateral heels elevated on pillows. Problem: Musculor/Skeletal Functional Status  Goal: Absence of falls  Outcome: Ongoing  Note:   Patient has remained free from falls tonight. Call light and bedside table are within reach.  Patient alert and oriented and uses her call light appropriately. Patient ambulates with 1 assist to the [facility]. Fall precautions in place for her safety. Nurse checks on the patient every hour. Problem: Pain Control  Goal: Maintain pain level at or below patient's acceptable level (or 5 if patient is unable to determine acceptable level)  Outcome: Ongoing  Note:   Pain Assessment: 0-10  Pain Level: 0   Pain goal:  0   Is pain goal met at this time? Yes     Additional interventions to be implemented: rest    Patient has denied having any pain tonight. Problem: Cardiovascular  Goal: No DVT, peripheral vascular complications  Outcome: Ongoing  Note:   No signs or symptoms of DVT. Patient getting Heparin injections every 8 hours. Problem: Cardiovascular  Goal: Hemodynamic stability  Outcome: Ongoing  Note:   Vial signs have remained stable and within normal limits. Vitals being monitored every 4 hours and as needed. Continuous cardiac monitor in place. Heart rhythm is NSR/SB. Problem: Respiratory  Goal: No pulmonary complications  Outcome: Ongoing  Note:   Patient on 1 L NC with oxygen saturations above 92%. Patient gets SOB with ambulation. Lungs are clear in the upper lobes and clear/diminished in the bases. Patient wears no oxygen at the nursing home. Problem: Respiratory  Goal: O2 Sat > 90%  Outcome: Ongoing  Note:   Patient on 1 L NC with oxygen saturations above 92%. Problem: Respiratory  Goal: Supplemental O2 requirements decreased  Outcome: Ongoing  Note:   Patient on 1 L NC with oxygen saturations above 92%. Patient wears no oxygen at the nursing home-will attempt to wean supplemental oxygen. Problem:   Goal: Adequate urinary output  Outcome: Ongoing  Note:   Patient was incontinent of urine tonight. Care plan reviewed with patient. Patient verbalize understanding of the plan of care and contribute to goal setting.

## 2019-06-13 NOTE — CARE COORDINATION
Spoke with Dr. Jackie Phillips, he states patient will need to be seen in clinic on Monday to check PD cath and instill a little fluid, but will not start PD for about 2 weeks while cath matures. Explained that CM only does referrals for immediate starts to dialysis and PD. Contacted his office to review patient with them, Isabelle Pastor at the office states she will take care of the [facility] referral.  Received call from Allayne Bosworth at [facility], she states they will see patient on Monday. Updated Laura Woo, he will contact Sincerely Nathalie to schedule appt time.   Electronically signed by Truong Jarrell RN on 6/13/2019 at 2:23 PM

## 2019-06-13 NOTE — CARE COORDINATION
Spoke with patient and updated of plan to discharge back to [facility] this today. Patient is in agreement with this plan. Spoke with RN Bianca Diaz and set up transport for 5:30 pm by ambulette to the facility and RN is aware of discharge. Wilfredo Best son update on discharge an time. Discharge instructions on the chart. Number to fax AVS and last MAR and paper scripts is [phone] Call report to [phone].    6/13/19, 2:23 PM    Discharge plan discussed by  and . Discharge plan reviewed with patient/ family. Patient/ family verbalize understanding of discharge plan and are in agreement with plan. Understanding was demonstrated using the teach back method.    Services After Discharge  Services At/After Discharge: Nursing Services, In ambulette(Tatum Albert)   IMM Letter  IMM Letter given to Patient/Family/Significant other/Guardian/POA/by[de-identified] Da Delong CM   IMM Letter date given[de-identified] 06/13/19  IMM Letter time given[de-identified] 1626

## 2019-06-13 NOTE — PROGRESS NOTES
[address] RENAL TELEMETRY 6K  Occupational Therapy  Daily Note  Time:   Time In: 0061  Time Out: 1100  Timed Code Treatment Minutes: 40 Minutes  Minutes: 40          Date: 2019  Patient Name: Mishel Ojeda,   Gender: female      Room: [facility]011-A  MRN: [id number]  : 1950  (81 y.o.)  Referring Practitioner: Dr. Gavin Guthrie  Diagnosis: pleural effusions  Additional Pertinent Hx: Per EMR: \"Sarah Lynch is a 81 y.o. female transferred from outside hospital for SOB and pleural effusion. Was DC from this hospital last week at that time shed had a right  thoracentesis of 1 L, studies not sent, today a 1.4L tap was done ordered by primary team before we saw pt, chemistries  not requested. \"     Restrictions/Precautions:  Restrictions/Precautions: Fall Risk    SUBJECTIVE: Pt sitting up in bedside chair and very agreeable to OT session. Pt very thankful after session for completion of ADL tasks. PAIN: 0/10:     COGNITION: WNL Pt reporting she gets anxious at times as well with with new people    ADL:   Grooming: Stand By Assistance. seated at sink   Bathing: Moderate Assistance. sponge bathing for below knees and buttocks  Upper Extremity Dressing: Minimal Assistance. donning gown   Lower Extremity Dressing: Maximum Assistance. donning socks  Toileting: Contact Guard Assistance. Toilet Transfer: Contact Guard Assistance. [place] BALANCE:  Sitting Balance:  Stand By Assistance  Standing Balance: Contact Guard Assistance  during ADL tasks       TRANSFERS:  Sit to Stand:  [employer], with verbal cues. Stand to Sit: [employer], with verbal cues. FUNCTIONAL MOBILITY:  Assistive Device: Rolling Walker  Assist Level:  Contact Guard Assistance. Distance: To and from bathroom  With slow pace and cues for safety with O2 tubing        ASSESSMENT:  Activity Tolerance:  Patient tolerance of  treatment: fair.  Pt statign she was fatigued after completion of ADL tasks       Discharge Recommendations: Subacute/Skilled Nursing Facility  Equipment Recommendations: Equipment Needed: No  Plan: Times per week: 3-5X  Current Treatment Recommendations: Strengthening, Balance Training, Functional Mobility Training, Safety Education & Training, Self-Care / ADL, Endurance Training    Patient Education  Patient Education: Assistive Device Safetyduring ADL tasks     Goals  Short term goals  Time Frame for Short term goals: by discharge  Short term goal 1: Pt will complete BADL routine wtih S and EC strategies prn for ease wtih bathing/dressing tasks  Short term goal 2: Pt will complete standing ADL with S for > 3 minutes wtih 2 hand release and no LOB for ease with sink side grooming  Short term goal 3: Pt will complete functional mobility HH distances with S and RW with no vc for safety to improve ease with getting to/from toilet  Short term goal 4: Pt will complete functional trnasfers with S and no vc for safety including to/from toilet  Short term goal 5: Pt will demo indep wtih BUE strengthening HEP to increase UB strength needed for ADLs  Long term goals  Time Frame for Long term goals : No LTG established d/t short ELOS    Following session, patient left in safe position with all fall risk precautions in place.

## 2019-06-13 NOTE — PROGRESS NOTES
[facility]    Patient - Otto Cole   MRN -  [id number]   [facility] - [facility]de-identified[facility]   - 1950      Date of Admission -  2019  6:35 AM  Date of evaluation -  2019  Room - -011-A   Hospital Day - 7  Consulting - Harvey Anderson, * Primary Care Physician - [address]   Chief Complaint   Pleural effusion  Active Hospital Problem List      Active Hospital Problems    Diagnosis Date Noted    Bilateral atelectasis [J98.11]     Hyponatremia [E87.1]     Chronic anemia [D64.9]     Hyperlipidemia [E78.5]     Constipation [K59.00]     Pleural effusion [J90] 2019    Stage 5 chronic kidney disease ([address] [N18.5]     (HFpEF) heart failure with preserved ejection fraction ([address] [I50.30]     Essential hypertension [I10] 2018    Acute on chronic diastolic CHF (congestive heart failure) ([address] [I50.33] 2018     HPI   Otto Cole is a 81 y.o. female transferred from outside hospital for SOB and pleural effusion. Never smoker, has been tapped twice before over the last few weeks in the same side, apparently a transudate. Was DC from this hospital last week at that time shed had a right  thoracentesis of 1 L, studies not sent, today a 1.4L tap was done ordered by primary team before we saw pt, chemistries  not requested.   Geovanna Smart has CKD 5 and CHF with preserved EF    Past Medical History   -On 1 LPM via NC  -S/p R Thora removed 1.5 L   -Reports SOB greatly improved  -Minimal cough  -All other systems reviewed  Past Medical History         Diagnosis Date    Anemia     Anemia in chronic kidney disease(285.21)     Arthritis     Asthma     Bipolar 1 disorder (HCC)     CHF (congestive heart failure) (HCC)     Chronic kidney disease, stage III (moderate) (HCC)     Depression     GERD (gastroesophageal reflux disease)     Gout     Headache(784.0)     Hyperkalemia     Hyperlipidemia     Hypertension     Hypothyroid     Narrative    Not on file     Family History          Problem Relation Age of Onset    High Blood Pressure Mother     Diabetes Mother     Cancer Mother     Heart Attack Mother     Stroke Father     High Blood Pressure Father     Anemia Father      Sleep History    N/A  Meds    Current Medications    carvedilol  3.125 mg Oral BID WC    insulin glargine  15 Units Subcutaneous Nightly    bumetanide  1 mg Oral Daily    senna  1 tablet Oral Nightly    amLODIPine  10 mg Oral Daily    DULoxetine  30 mg Oral Daily    fluticasone  1 spray Each Nostril Daily    gabapentin  100 mg Oral TID    hydrALAZINE  100 mg Oral Q8H    insulin lispro  0-9 Units Subcutaneous 4x Daily AC & HS    isosorbide dinitrate  20 mg Oral TID    levothyroxine  25 mcg Oral Daily    meclizine  25 mg Oral TID    oxybutynin  5 mg Oral Daily    pravastatin  40 mg Oral Nightly    sodium chloride flush  10 mL Intravenous 2 times per day    heparin (porcine)  5,000 Units Subcutaneous 3 times per day     polyethylene glycol, ipratropium-albuterol, acetaminophen, glucose, dextrose, glucagon (rDNA), dextrose, sodium chloride flush, ondansetron  IV Drips/Infusions   sodium chloride Stopped (06/12/19 1900)    dextrose       Vitals    Vitals    height is 5' 6\" (1.676 m) and weight is 162 lb 8 oz (73.7 kg). Her oral temperature is 98.3 °F (36.8 °C). Her blood pressure is 125/63 and her pulse is 55. Her respiration is 20 and oxygen saturation is 96%. O2 Flow Rate (L/min): 1 L/min  I/O    Intake/Output Summary (Last 24 hours) at 6/13/2019 1245  Last data filed at 6/13/2019 0420  Gross per 24 hour   Intake 820 ml   Output 500 ml   Net 320 ml     Patient Vitals for the past 96 hrs (Last 3 readings):   Weight   06/13/19 0420 162 lb 8 oz (73.7 kg)   06/12/19 1134 168 lb 4.8 oz (76.3 kg)   06/12/19 0402 172 lb 3.2 oz (78.1 kg)     Exam   Physical Exam   Constitutional: No distress on O2 per NC.  Patient appears moderately built and  moderately nourished. Head: Normocephalic and atraumatic. Eyes: Conjunctivae are normal. Pupils are equal, round. No scleral icterus. Neck: Neck supple. No tracheal deviation present. Cardiovascular: S1 and S2 with no murmur. No peripheral edema  Pulmonary/Chest: Normal effort with bilateral air entry, noted faint bibasilar rales. No stridor. No respiratory distress. Patient exhibits no tenderness. Abdominal: Soft. Bowel sounds audible. No distension or tenderness to palp. PD catheter noted dressing in place. Musculoskeletal: Moves all extremities  Neurological: Patient is alert and oriented to person, place, and time. Skin: Warm and dry. Labs   ABG  Lab Results   Component Value Date    PH 7.43 12/09/2018    PO2 81 12/09/2018    PCO2 41 12/09/2018    HCO3 27 12/09/2018    O2SAT 96 12/09/2018     Lab Results   Component Value Date    IFIO2 30 12/09/2018    MODE CPAP/PS 12/09/2018    SETTIDVOL 293 12/09/2018    SETPEEP 5.0 12/09/2018     CBC  Recent Labs     06/11/19  0605 06/12/19  0604   WBC 12.3* 13.0*   RBC 3.16* 2.93*   HGB 9.4* 8.7*   HCT 28.0* 26.5*   MCV 88.6 90.4   MCH 29.7 29.7   MCHC 33.6 32.8    352   MPV 10.9 10.9      BMP  Recent Labs     06/11/19  0605 06/12/19  0604    132*   K 3.9 4.7   CL 98 97*   CO2 21* 22*   BUN 71* 71*   CREATININE 3.0* 3.5*   GLUCOSE 56* 93   MG 2.1  --    CALCIUM 9.4 9.2     LFT  No results for input(s): AST, ALT, ALB, BILITOT, ALKPHOS, LIPASE in the last 72 hours. Invalid input(s): AMYLASE  TROP  Lab Results   Component Value Date    TROPONINT 0.033 06/06/2019    TROPONINT 0.029 06/06/2019    TROPONINT 0.033 05/19/2019     BNP  Lab Results   Component Value Date    PROBNP 3592.0 05/19/2019    PROBNP 2003.0 12/08/2018    PROBNP 403.3 06/07/2016     D-Dimer  No results found for: DDIMER  Lactic Acid  No results for input(s): LACTA in the last 72 hours. INR  No results for input(s): INR, PROTIME in the last 72 hours. PTT  No results for input(s):  APTT in the last 72 hours. Glucose  Recent Labs     06/12/19  1958 06/13/19  0636 06/13/19  1107   POCGLU 247* 96 166*     UA   Recent Labs     06/12/19  1506   COLORU YELLOW   .    6/9/19 Left thoracentesis   Completed by IF  Removed 500 mL blood tinged serous  Results for Maribell Osorio (MRN [id number]) as of 6/10/2019 12:56   Ref. Range 6/9/2019 10:00   Pathologist Review Unknown JJS   Character, Body Fluid Unknown HAZY   Glucose, Fluid Latest Units: mg/dl 136   LD, Fluid Latest Units: U/L 63   pH, Fluid Unknown 7.52   Protein, Fluid Latest Units: gm/dl 1.8   Amylase, Fluid Latest Units: U/L 13   Albumin, Fluid Latest Units: gm/dl 1.0   Body Fluid RBC Latest Units: /cumm 34683   Mesothelial Cells Body Fluid Unknown 2+   Mononuclear Cells Body Fluid Latest Units: % 92.6   Polymorphonuclear Cells Body Fluid Latest Units: % 7.4   Total Nucleated cells Body Fluid Latest Ref Range: 0 - 500 /cumm 402   Total Volume Received Body Fluid Latest Units: ml 83.0     Serum Total Protein: 6.6  Serum LDH: 202    Total protein ratio i.e Pleural fluid total protein/ Serum Total protein: 1.8/6.6=0.27  LDH ratio i.e Pleural fluid LDH/ Serum LDH: 63/202=0.31    Body fluid culture-No bacteria seen  Cytology-FINAL RESULTS:     No malignant cells present. CELL BLOCK RESULTS:    No malignant cells present. 6/12/19Right Thoracentesis   By IR  Removed 1.5 L    Results for Maribell Osorio (MRN [id number]) as of 6/13/2019 12:48   Ref. Range 6/12/2019 15:06   Character, Body Fluid Unknown SL. HAZY   LD, Fluid Latest Units: U/L 92   pH, Fluid Unknown 7.52   Protein, Fluid Latest Units: gm/dl 2.8   Body Fluid RBC Latest Units: /cumm < 2000   Mononuclear Cells Body Fluid Latest Units: % 90.8   Polymorphonuclear Cells Body Fluid Latest Units: % 9.2   Total Nucleated cells Body Fluid Latest Ref Range: 0 - 500 /cumm 371   Total Volume Received Body Fluid Latest Units: ml 83.0   Serum Total Protein: 6.6  Serum LDH: 177    Total protein ratio i.e Pleural FIELDS: 1. The patient is status post ultrasound-guided right thoracentesis with aspiration of 1.5 L of pleural fluid. There is no residual pleural fluid. There is no pneumothorax. 2. There are left perihilar and left basilar infiltrates and mild atelectasis and/or infiltrate at the right lung base. There is no pulmonary vascular congestion. OTHER: None. PNEUMOTHORAX: None. OSSEOUS STRUCTURES: 1. No acute osseous abnormality. 2. The bony structures are osteopenic. 3. There are mild hypertrophic degenerative changes at the acromioclavicular articulations bilaterally and subcortical sclerosis and cystic change at the greater tuberosity bilaterally which in the right clinical setting can be associated with impingement. 4. There is mild levoscoliosis. Impression:  1. The patient is status post ultrasound-guided right thoracentesis with aspiration of 1.5 L of pleural fluid. There is no residual pleural fluid. There is no pneumothorax. 2. There are left perihilar and left basilar infiltrates and mild atelectasis and/or infiltrate at the right lung base. There is no pulmonary vascular congestion. XR CHEST 1 VIEW   6/9/2019   1. Poor inflation of lungs. Mild cardiomegaly. Moderate atelectasis/pneumonia left perihilar region, right infrahilar region, and both lung bases. Small effusion right side. 2. No residual effusion on the left. No pneumothorax is seen. 6/7/2019   XR CHEST PORTABLE   1. Redemonstration of changes compatible with congestive heart failure. 2. Bilateral effusions right greater than left. 6/6/19  Right thoracentesis  1. The patient is status post large volume right thoracentesis with aspiration of 1.4 L of clear yellow fluid. There is no pneumothorax. There are bilateral perihilar and the basilar infiltrates.               **This report has been created using voice recognition software.  It may contain minor errors which are inherent in voice recognition technology. **     Final report electronically signed by Dr. Emaline Boas on 6/6/2019 1:30 PM       CT Scans  6/7/19  CT Chest w/o contrast  FINDINGS:   Upper abdomen: Prior cholecystectomy. Mediastinum and radha: Enlarged pulmonary trunk, consistent with pulmonary arterial hypertension. Extensive coronary artery calcifications are present. There are several small subcentimeter mediastinal lymph nodes, likely reactive. No abnormally enlarged hilar or mediastinal lymph nodes are seen. Bones: Marked hypertrophic spurring is seen in the thoracic spine with several bulky bridging osteophytes. There is extensive calcification in the anterior spinal ligament. In addition, there is a moderate old compression fracture T11 level with evidence  for prior vertebroplasty. There appears slightly greater compression at this time than on the prior study. Lungs: Groundglass infiltrates right mid and upper lung fields, consistent with pneumonia/pulmonary edema. Moderate consolidation both lower lobes, worse on the right, consistent with pneumonia. There are moderate size bilateral pleural effusions. Impression:  1. Moderate-sized left pleural effusion. Bibasilar atelectasis/pneumonia. Groundglass and right mid and upper lung fields, consistent with pneumonia versus pulmonary edema. 2. Enlarged pulmonary arteries, consistent with pulmonary arterial hypertension.      Assessment   -Acute respiratory failure with hypoxia 2/2 B pleural effusion  -Recurrent transudative bilateral pleural effusions ( R>L) initial chemistry (12/10/18) c/w transudate, chemistry not obtain in last tap, unilaterality not typical for CHF/CKD.  -S/P  right thora 1.4 L- no fluid sent for labs; 6/12 R thora 1.5L transudate  -6/11/19 cytology pleural fluid (-) malignant cells  -Second had smoke exposure for 17 years; no PFT available no respiratory issues prior to admission  -CKD IV; 6/11/19 PD catheter insertion  -Full Code  Recommendations   -Monitor SpO2 wean supplemental O2 to maintain SpO2 >90% Baseline RA  -Follow pending cytology  -Diuresis per nephrology  -Duonebs every 4 hours PRN SOB/wheezing   -Strict I&O with daily weights   -DVT prophylaxis: Heparin  -Stable from Pulmonary standpoint for discharge  -Will plan for CXR in 5 days as outpatient and have ECF fax results to [facility] at [phone] Attention Dr. Maurizio Mora CNP to follow pleural effusions. Case discussed with Dr. Shaylee Jules, nurse and patient. Questions and concerns addressed. Meds and Orders reviewed.     Electronically signed by   YESENIA Ramirez - CNP on 6/13/2019 at 12:45 PM

## 2019-06-13 NOTE — PROGRESS NOTES
[facility]    Renal inpatient Progress Note  6/13/2019 1:18 PM      Pt Name:   Sukhi Mcgregor  YOB: 1950  Attending:   Javi Rashid, *    Chief Complaint:   Sukhi Mcgregor is a 81 y.o. female being followed by nephrology for LOUISE / CKD and SOB     Interval History : patient seen and examined by me. feels well. No cp or SOB. She is clinically doing much better. Scheduled Medications :   carvedilol  3.125 mg Oral BID WC    insulin glargine  15 Units Subcutaneous Nightly    bumetanide  1 mg Oral Daily    senna  1 tablet Oral Nightly    amLODIPine  10 mg Oral Daily    DULoxetine  30 mg Oral Daily    fluticasone  1 spray Each Nostril Daily    gabapentin  100 mg Oral TID    hydrALAZINE  100 mg Oral Q8H    insulin lispro  0-9 Units Subcutaneous 4x Daily AC & HS    isosorbide dinitrate  20 mg Oral TID    levothyroxine  25 mcg Oral Daily    meclizine  25 mg Oral TID    oxybutynin  5 mg Oral Daily    pravastatin  40 mg Oral Nightly    sodium chloride flush  10 mL Intravenous 2 times per day    heparin (porcine)  5,000 Units Subcutaneous 3 times per day      sodium chloride Stopped (06/12/19 1900)    dextrose          Vitals :  /63   Pulse 55   Temp 98.3 °F (36.8 °C) (Oral)   Resp 20   Ht 5' 6\" (1.676 m)   Wt 162 lb 8 oz (73.7 kg)   SpO2 96%   BMI 26.23 kg/m²     24HR INTAKE/OUTPUT:      Intake/Output Summary (Last 24 hours) at 6/13/2019 1318  Last data filed at 6/13/2019 0420  Gross per 24 hour   Intake 820 ml   Output 500 ml   Net 320 ml     Last 3 weights  Wt Readings from Last 3 Encounters:   06/13/19 162 lb 8 oz (73.7 kg)   05/23/19 171 lb 4.8 oz (77.7 kg)   03/13/19 166 lb 4.8 oz (75.4 kg)        Physical Exam :  General Appearance:  Well developed.  No distress  Mouth/Throat:  Oral mucosa moist  Neck:  Supple, no JVD  Lungs:  Breath sounds: mild rales noted  Heart[de-identified]  S1,S2 heard  Abdomen:  Soft, non - tender  Musculoskeletal:  Edema - none

## 2019-06-14 LAB
ANAEROBIC CULTURE: NORMAL
BODY FLUID CULTURE, STERILE: NORMAL
GRAM STAIN RESULT: NORMAL

## 2019-06-16 ENCOUNTER — HOSPITAL ENCOUNTER (INPATIENT)
Age: 69
LOS: 4 days | Discharge: NURSING, REHAB OR LONG-TERM CARE | DRG: 291 | End: 2019-06-20
Attending: EMERGENCY MEDICINE | Admitting: INTERNAL MEDICINE
Payer: MEDICARE

## 2019-06-16 ENCOUNTER — APPOINTMENT (OUTPATIENT)
Dept: GENERAL RADIOLOGY | Age: 69
DRG: 291 | End: 2019-06-16
Payer: MEDICARE

## 2019-06-16 DIAGNOSIS — I50.1 PULMONARY EDEMA WITH CONGESTIVE HEART FAILURE (HCC): ICD-10-CM

## 2019-06-16 DIAGNOSIS — E87.5 HYPERKALEMIA: ICD-10-CM

## 2019-06-16 DIAGNOSIS — J95.811 POSTPROCEDURAL PNEUMOTHORAX: ICD-10-CM

## 2019-06-16 DIAGNOSIS — N18.5 CHRONIC KIDNEY DISEASE (CKD), STAGE V (HCC): ICD-10-CM

## 2019-06-16 DIAGNOSIS — N18.9 ACUTE RENAL FAILURE SUPERIMPOSED ON CHRONIC KIDNEY DISEASE, UNSPECIFIED CKD STAGE, UNSPECIFIED ACUTE RENAL FAILURE TYPE (HCC): ICD-10-CM

## 2019-06-16 DIAGNOSIS — J90 CHRONIC BILATERAL PLEURAL EFFUSIONS: ICD-10-CM

## 2019-06-16 DIAGNOSIS — R06.02 SHORTNESS OF BREATH: Primary | ICD-10-CM

## 2019-06-16 DIAGNOSIS — N17.9 ACUTE RENAL FAILURE SUPERIMPOSED ON CHRONIC KIDNEY DISEASE, UNSPECIFIED CKD STAGE, UNSPECIFIED ACUTE RENAL FAILURE TYPE (HCC): ICD-10-CM

## 2019-06-16 LAB
ALLEN TEST: POSITIVE
ANION GAP SERPL CALCULATED.3IONS-SCNC: 17 MEQ/L (ref 8–16)
BASE EXCESS (CALCULATED): -5 MMOL/L (ref -2.5–2.5)
BASOPHILS # BLD: 0.4 %
BASOPHILS ABSOLUTE: 0.1 THOU/MM3 (ref 0–0.1)
BUN BLDV-MCNC: 95 MG/DL (ref 7–22)
CALCIUM SERPL-MCNC: 9.6 MG/DL (ref 8.5–10.5)
CHLORIDE BLD-SCNC: 93 MEQ/L (ref 98–111)
CO2: 18 MEQ/L (ref 23–33)
COLLECTED BY:: ABNORMAL
CREAT SERPL-MCNC: 6 MG/DL (ref 0.4–1.2)
DEVICE: ABNORMAL
EKG ATRIAL RATE: 55 BPM
EKG P AXIS: 28 DEGREES
EKG P-R INTERVAL: 118 MS
EKG Q-T INTERVAL: 422 MS
EKG QRS DURATION: 92 MS
EKG QTC CALCULATION (BAZETT): 403 MS
EKG R AXIS: 7 DEGREES
EKG T AXIS: 62 DEGREES
EKG VENTRICULAR RATE: 55 BPM
EOSINOPHIL # BLD: 0.3 %
EOSINOPHILS ABSOLUTE: 0 THOU/MM3 (ref 0–0.4)
ERYTHROCYTE [DISTWIDTH] IN BLOOD BY AUTOMATED COUNT: 13.4 % (ref 11.5–14.5)
ERYTHROCYTE [DISTWIDTH] IN BLOOD BY AUTOMATED COUNT: 43 FL (ref 35–45)
GFR SERPL CREATININE-BSD FRML MDRD: 7 ML/MIN/1.73M2
GLUCOSE BLD-MCNC: 156 MG/DL (ref 70–108)
HCO3: 19 MMOL/L (ref 23–28)
HCT VFR BLD CALC: 25.2 % (ref 37–47)
HEMOGLOBIN: 8.7 GM/DL (ref 12–16)
IFIO2: 6
IMMATURE GRANS (ABS): 0.13 THOU/MM3 (ref 0–0.07)
IMMATURE GRANULOCYTES: 0.8 %
LACTIC ACID, SEPSIS: 0.5 MMOL/L (ref 0.5–1.9)
LYMPHOCYTES # BLD: 5.4 %
LYMPHOCYTES ABSOLUTE: 0.9 THOU/MM3 (ref 1–4.8)
MCH RBC QN AUTO: 30.4 PG (ref 26–33)
MCHC RBC AUTO-ENTMCNC: 34.5 GM/DL (ref 32.2–35.5)
MCV RBC AUTO: 88.1 FL (ref 81–99)
MONOCYTES # BLD: 6 %
MONOCYTES ABSOLUTE: 1 THOU/MM3 (ref 0.4–1.3)
NUCLEATED RED BLOOD CELLS: 0 /100 WBC
O2 SATURATION: 93 %
OSMOLALITY CALCULATION: 289.7 MOSMOL/KG (ref 275–300)
PCO2: 33 MMHG (ref 35–45)
PH BLOOD GAS: 7.39 (ref 7.35–7.45)
PLATELET # BLD: 435 THOU/MM3 (ref 130–400)
PMV BLD AUTO: 11.7 FL (ref 9.4–12.4)
PO2: 66 MMHG (ref 71–104)
POTASSIUM REFLEX MAGNESIUM: 5.8 MEQ/L (ref 3.5–5.2)
PRO-BNP: 7690 PG/ML (ref 0–900)
PROCALCITONIN: 0.4 NG/ML (ref 0.01–0.09)
RBC # BLD: 2.86 MILL/MM3 (ref 4.2–5.4)
SEG NEUTROPHILS: 87.1 %
SEGMENTED NEUTROPHILS ABSOLUTE COUNT: 13.9 THOU/MM3 (ref 1.8–7.7)
SODIUM BLD-SCNC: 128 MEQ/L (ref 135–145)
SOURCE, BLOOD GAS: ABNORMAL
TROPONIN T: 0.05 NG/ML
WBC # BLD: 16 THOU/MM3 (ref 4.8–10.8)

## 2019-06-16 PROCEDURE — 80048 BASIC METABOLIC PNL TOTAL CA: CPT

## 2019-06-16 PROCEDURE — 71045 X-RAY EXAM CHEST 1 VIEW: CPT

## 2019-06-16 PROCEDURE — 83605 ASSAY OF LACTIC ACID: CPT

## 2019-06-16 PROCEDURE — 87040 BLOOD CULTURE FOR BACTERIA: CPT

## 2019-06-16 PROCEDURE — 93005 ELECTROCARDIOGRAM TRACING: CPT | Performed by: EMERGENCY MEDICINE

## 2019-06-16 PROCEDURE — 99285 EMERGENCY DEPT VISIT HI MDM: CPT

## 2019-06-16 PROCEDURE — 2580000003 HC RX 258: Performed by: EMERGENCY MEDICINE

## 2019-06-16 PROCEDURE — 1200000003 HC TELEMETRY R&B

## 2019-06-16 PROCEDURE — 94761 N-INVAS EAR/PLS OXIMETRY MLT: CPT

## 2019-06-16 PROCEDURE — 85025 COMPLETE CBC W/AUTO DIFF WBC: CPT

## 2019-06-16 PROCEDURE — 36600 WITHDRAWAL OF ARTERIAL BLOOD: CPT

## 2019-06-16 PROCEDURE — 82803 BLOOD GASES ANY COMBINATION: CPT

## 2019-06-16 PROCEDURE — 99223 1ST HOSP IP/OBS HIGH 75: CPT | Performed by: PHYSICIAN ASSISTANT

## 2019-06-16 PROCEDURE — 84145 PROCALCITONIN (PCT): CPT

## 2019-06-16 PROCEDURE — 36415 COLL VENOUS BLD VENIPUNCTURE: CPT

## 2019-06-16 PROCEDURE — 83880 ASSAY OF NATRIURETIC PEPTIDE: CPT

## 2019-06-16 PROCEDURE — 84484 ASSAY OF TROPONIN QUANT: CPT

## 2019-06-16 RX ORDER — FUROSEMIDE 10 MG/ML
80 INJECTION INTRAMUSCULAR; INTRAVENOUS ONCE
Status: COMPLETED | OUTPATIENT
Start: 2019-06-17 | End: 2019-06-17

## 2019-06-16 RX ORDER — ONDANSETRON 2 MG/ML
4 INJECTION INTRAMUSCULAR; INTRAVENOUS EVERY 30 MIN PRN
Status: DISCONTINUED | OUTPATIENT
Start: 2019-06-16 | End: 2019-06-17 | Stop reason: SDUPTHER

## 2019-06-16 RX ORDER — SODIUM CHLORIDE 9 MG/ML
INJECTION, SOLUTION INTRAVENOUS CONTINUOUS
Status: DISCONTINUED | OUTPATIENT
Start: 2019-06-16 | End: 2019-06-17

## 2019-06-16 RX ORDER — SODIUM CHLORIDE 9 MG/ML
INJECTION, SOLUTION INTRAVENOUS CONTINUOUS
Status: CANCELLED | OUTPATIENT
Start: 2019-06-17

## 2019-06-16 RX ADMIN — SODIUM CHLORIDE: 9 INJECTION, SOLUTION INTRAVENOUS at 23:22

## 2019-06-16 ASSESSMENT — ENCOUNTER SYMPTOMS
COUGH: 1
NAUSEA: 1
ABDOMINAL PAIN: 0
SHORTNESS OF BREATH: 1
BLOOD IN STOOL: 0
BACK PAIN: 0
VOMITING: 1
SORE THROAT: 0
WHEEZING: 0
DIARRHEA: 0

## 2019-06-17 PROBLEM — J96.21 ACUTE ON CHRONIC RESPIRATORY FAILURE WITH HYPOXIA (HCC): Status: ACTIVE | Noted: 2018-12-07

## 2019-06-17 PROBLEM — R93.89 INFILTRATE NOTED ON IMAGING STUDY: Status: ACTIVE | Noted: 2019-06-17

## 2019-06-17 PROBLEM — I50.1 PULMONARY EDEMA WITH CONGESTIVE HEART FAILURE (HCC): Status: ACTIVE | Noted: 2018-12-07

## 2019-06-17 PROBLEM — E87.5 HYPERKALEMIA: Status: ACTIVE | Noted: 2019-06-17

## 2019-06-17 LAB
ALLEN TEST: ABNORMAL
ANAEROBIC CULTURE: NORMAL
ANION GAP SERPL CALCULATED.3IONS-SCNC: 19 MEQ/L (ref 8–16)
ANION GAP SERPL CALCULATED.3IONS-SCNC: 20 MEQ/L (ref 8–16)
AVERAGE GLUCOSE: 96 MG/DL (ref 70–126)
BASE EXCESS (CALCULATED): -7.4 MMOL/L (ref -2.5–2.5)
BODY FLUID CULTURE, STERILE: NORMAL
BUN BLDV-MCNC: 103 MG/DL (ref 7–22)
BUN BLDV-MCNC: 95 MG/DL (ref 7–22)
CALCIUM SERPL-MCNC: 9 MG/DL (ref 8.5–10.5)
CALCIUM SERPL-MCNC: 9.5 MG/DL (ref 8.5–10.5)
CHLORIDE BLD-SCNC: 91 MEQ/L (ref 98–111)
CHLORIDE BLD-SCNC: 93 MEQ/L (ref 98–111)
CO2: 17 MEQ/L (ref 23–33)
CO2: 18 MEQ/L (ref 23–33)
COLLECTED BY:: ABNORMAL
CREAT SERPL-MCNC: 5.5 MG/DL (ref 0.4–1.2)
CREAT SERPL-MCNC: 6.4 MG/DL (ref 0.4–1.2)
DEVICE: ABNORMAL
GFR SERPL CREATININE-BSD FRML MDRD: 6 ML/MIN/1.73M2
GFR SERPL CREATININE-BSD FRML MDRD: 8 ML/MIN/1.73M2
GLUCOSE BLD-MCNC: 112 MG/DL (ref 70–108)
GLUCOSE BLD-MCNC: 118 MG/DL (ref 70–108)
GLUCOSE BLD-MCNC: 140 MG/DL (ref 70–108)
GLUCOSE BLD-MCNC: 154 MG/DL (ref 70–108)
GLUCOSE BLD-MCNC: 174 MG/DL (ref 70–108)
GLUCOSE BLD-MCNC: 185 MG/DL (ref 70–108)
GLUCOSE BLD-MCNC: 203 MG/DL (ref 70–108)
GRAM STAIN RESULT: NORMAL
HBA1C MFR BLD: 5.2 % (ref 4.4–6.4)
HCO3: 19 MMOL/L (ref 23–28)
IFIO2: 4
LACTIC ACID, SEPSIS: 0.5 MMOL/L (ref 0.5–1.9)
O2 SATURATION: 84 %
PCO2: 39 MMHG (ref 35–45)
PH BLOOD GAS: 7.29 (ref 7.35–7.45)
PO2: 54 MMHG (ref 71–104)
POTASSIUM SERPL-SCNC: 5.3 MEQ/L (ref 3.5–5.2)
POTASSIUM SERPL-SCNC: 5.9 MEQ/L (ref 3.5–5.2)
SODIUM BLD-SCNC: 129 MEQ/L (ref 135–145)
SODIUM BLD-SCNC: 129 MEQ/L (ref 135–145)
SOURCE, BLOOD GAS: ABNORMAL
TROPONIN T: 0.05 NG/ML
TROPONIN T: 0.06 NG/ML

## 2019-06-17 PROCEDURE — 97530 THERAPEUTIC ACTIVITIES: CPT

## 2019-06-17 PROCEDURE — 6360000002 HC RX W HCPCS: Performed by: PHYSICIAN ASSISTANT

## 2019-06-17 PROCEDURE — 6370000000 HC RX 637 (ALT 250 FOR IP): Performed by: PHYSICIAN ASSISTANT

## 2019-06-17 PROCEDURE — 82948 REAGENT STRIP/BLOOD GLUCOSE: CPT

## 2019-06-17 PROCEDURE — 99223 1ST HOSP IP/OBS HIGH 75: CPT | Performed by: INTERNAL MEDICINE

## 2019-06-17 PROCEDURE — 82803 BLOOD GASES ANY COMBINATION: CPT

## 2019-06-17 PROCEDURE — 97166 OT EVAL MOD COMPLEX 45 MIN: CPT

## 2019-06-17 PROCEDURE — 2700000000 HC OXYGEN THERAPY PER DAY

## 2019-06-17 PROCEDURE — 2580000003 HC RX 258: Performed by: PHYSICIAN ASSISTANT

## 2019-06-17 PROCEDURE — 6370000000 HC RX 637 (ALT 250 FOR IP): Performed by: INTERNAL MEDICINE

## 2019-06-17 PROCEDURE — 83605 ASSAY OF LACTIC ACID: CPT

## 2019-06-17 PROCEDURE — 83036 HEMOGLOBIN GLYCOSYLATED A1C: CPT

## 2019-06-17 PROCEDURE — 1200000003 HC TELEMETRY R&B

## 2019-06-17 PROCEDURE — 36600 WITHDRAWAL OF ARTERIAL BLOOD: CPT

## 2019-06-17 PROCEDURE — 36415 COLL VENOUS BLD VENIPUNCTURE: CPT

## 2019-06-17 PROCEDURE — 84484 ASSAY OF TROPONIN QUANT: CPT

## 2019-06-17 PROCEDURE — 93010 ELECTROCARDIOGRAM REPORT: CPT | Performed by: NUCLEAR MEDICINE

## 2019-06-17 PROCEDURE — 99233 SBSQ HOSP IP/OBS HIGH 50: CPT | Performed by: FAMILY MEDICINE

## 2019-06-17 PROCEDURE — 97535 SELF CARE MNGMENT TRAINING: CPT

## 2019-06-17 PROCEDURE — 6360000002 HC RX W HCPCS: Performed by: EMERGENCY MEDICINE

## 2019-06-17 PROCEDURE — 94760 N-INVAS EAR/PLS OXIMETRY 1: CPT

## 2019-06-17 PROCEDURE — 80048 BASIC METABOLIC PNL TOTAL CA: CPT

## 2019-06-17 RX ORDER — AMLODIPINE BESYLATE 10 MG/1
10 TABLET ORAL DAILY
Status: DISCONTINUED | OUTPATIENT
Start: 2019-06-17 | End: 2019-06-20 | Stop reason: HOSPADM

## 2019-06-17 RX ORDER — SODIUM CHLORIDE 0.9 % (FLUSH) 0.9 %
10 SYRINGE (ML) INJECTION EVERY 12 HOURS SCHEDULED
Status: DISCONTINUED | OUTPATIENT
Start: 2019-06-17 | End: 2019-06-20 | Stop reason: HOSPADM

## 2019-06-17 RX ORDER — INSULIN GLARGINE 100 [IU]/ML
10 INJECTION, SOLUTION SUBCUTANEOUS 2 TIMES DAILY
Status: DISCONTINUED | OUTPATIENT
Start: 2019-06-17 | End: 2019-06-19

## 2019-06-17 RX ORDER — LEVOTHYROXINE SODIUM 0.03 MG/1
25 TABLET ORAL DAILY
Status: DISCONTINUED | OUTPATIENT
Start: 2019-06-17 | End: 2019-06-20 | Stop reason: HOSPADM

## 2019-06-17 RX ORDER — DOCUSATE SODIUM 100 MG/1
100 CAPSULE, LIQUID FILLED ORAL 2 TIMES DAILY
Status: DISCONTINUED | OUTPATIENT
Start: 2019-06-17 | End: 2019-06-20 | Stop reason: HOSPADM

## 2019-06-17 RX ORDER — SODIUM CHLORIDE 1000 MG
1 TABLET, SOLUBLE MISCELLANEOUS
Status: DISCONTINUED | OUTPATIENT
Start: 2019-06-17 | End: 2019-06-17

## 2019-06-17 RX ORDER — SODIUM CHLORIDE 0.9 % (FLUSH) 0.9 %
10 SYRINGE (ML) INJECTION PRN
Status: DISCONTINUED | OUTPATIENT
Start: 2019-06-17 | End: 2019-06-20 | Stop reason: HOSPADM

## 2019-06-17 RX ORDER — FUROSEMIDE 10 MG/ML
20 INJECTION INTRAMUSCULAR; INTRAVENOUS DAILY
Status: DISCONTINUED | OUTPATIENT
Start: 2019-06-17 | End: 2019-06-17

## 2019-06-17 RX ORDER — ONDANSETRON 2 MG/ML
4 INJECTION INTRAMUSCULAR; INTRAVENOUS EVERY 6 HOURS PRN
Status: DISCONTINUED | OUTPATIENT
Start: 2019-06-17 | End: 2019-06-20 | Stop reason: HOSPADM

## 2019-06-17 RX ORDER — ACETAMINOPHEN 325 MG/1
650 TABLET ORAL EVERY 4 HOURS PRN
Status: DISCONTINUED | OUTPATIENT
Start: 2019-06-17 | End: 2019-06-20 | Stop reason: HOSPADM

## 2019-06-17 RX ORDER — SODIUM POLYSTYRENE SULFONATE 4.1 MEQ/G
15 POWDER, FOR SUSPENSION ORAL; RECTAL ONCE
Status: COMPLETED | OUTPATIENT
Start: 2019-06-17 | End: 2019-06-17

## 2019-06-17 RX ORDER — GABAPENTIN 100 MG/1
100 CAPSULE ORAL 3 TIMES DAILY
Status: DISCONTINUED | OUTPATIENT
Start: 2019-06-17 | End: 2019-06-20 | Stop reason: HOSPADM

## 2019-06-17 RX ORDER — DEXTROSE MONOHYDRATE 50 MG/ML
100 INJECTION, SOLUTION INTRAVENOUS PRN
Status: DISCONTINUED | OUTPATIENT
Start: 2019-06-17 | End: 2019-06-20 | Stop reason: HOSPADM

## 2019-06-17 RX ORDER — DEXTROSE MONOHYDRATE 25 G/50ML
12.5 INJECTION, SOLUTION INTRAVENOUS PRN
Status: DISCONTINUED | OUTPATIENT
Start: 2019-06-17 | End: 2019-06-20 | Stop reason: HOSPADM

## 2019-06-17 RX ORDER — PRAVASTATIN SODIUM 40 MG
40 TABLET ORAL NIGHTLY
Status: DISCONTINUED | OUTPATIENT
Start: 2019-06-17 | End: 2019-06-20 | Stop reason: HOSPADM

## 2019-06-17 RX ORDER — HYDRALAZINE HYDROCHLORIDE 50 MG/1
100 TABLET, FILM COATED ORAL EVERY 8 HOURS SCHEDULED
Status: DISCONTINUED | OUTPATIENT
Start: 2019-06-17 | End: 2019-06-20 | Stop reason: HOSPADM

## 2019-06-17 RX ORDER — DULOXETIN HYDROCHLORIDE 30 MG/1
30 CAPSULE, DELAYED RELEASE ORAL DAILY
Status: DISCONTINUED | OUTPATIENT
Start: 2019-06-17 | End: 2019-06-20 | Stop reason: HOSPADM

## 2019-06-17 RX ORDER — NICOTINE POLACRILEX 4 MG
15 LOZENGE BUCCAL PRN
Status: DISCONTINUED | OUTPATIENT
Start: 2019-06-17 | End: 2019-06-20 | Stop reason: HOSPADM

## 2019-06-17 RX ORDER — FAMOTIDINE 20 MG/1
10 TABLET, FILM COATED ORAL 2 TIMES DAILY
Status: DISCONTINUED | OUTPATIENT
Start: 2019-06-17 | End: 2019-06-20 | Stop reason: HOSPADM

## 2019-06-17 RX ORDER — ISOSORBIDE DINITRATE 20 MG/1
20 TABLET ORAL 3 TIMES DAILY
Status: DISCONTINUED | OUTPATIENT
Start: 2019-06-17 | End: 2019-06-20 | Stop reason: HOSPADM

## 2019-06-17 RX ORDER — HEPARIN SODIUM 5000 [USP'U]/ML
5000 INJECTION, SOLUTION INTRAVENOUS; SUBCUTANEOUS EVERY 8 HOURS SCHEDULED
Status: DISCONTINUED | OUTPATIENT
Start: 2019-06-17 | End: 2019-06-20 | Stop reason: HOSPADM

## 2019-06-17 RX ORDER — MECLIZINE HCL 12.5 MG/1
25 TABLET ORAL 3 TIMES DAILY
Status: DISCONTINUED | OUTPATIENT
Start: 2019-06-17 | End: 2019-06-20 | Stop reason: HOSPADM

## 2019-06-17 RX ORDER — OXYBUTYNIN CHLORIDE 5 MG/1
5 TABLET ORAL DAILY
Status: DISCONTINUED | OUTPATIENT
Start: 2019-06-17 | End: 2019-06-20 | Stop reason: HOSPADM

## 2019-06-17 RX ORDER — DOXAZOSIN MESYLATE 1 MG/1
1 TABLET ORAL DAILY
Status: DISCONTINUED | OUTPATIENT
Start: 2019-06-17 | End: 2019-06-20 | Stop reason: HOSPADM

## 2019-06-17 RX ORDER — FERROUS SULFATE 325(65) MG
325 TABLET ORAL 2 TIMES DAILY
Status: DISCONTINUED | OUTPATIENT
Start: 2019-06-17 | End: 2019-06-20 | Stop reason: HOSPADM

## 2019-06-17 RX ADMIN — PRAVASTATIN SODIUM 40 MG: 40 TABLET ORAL at 20:56

## 2019-06-17 RX ADMIN — SODIUM CHLORIDE TAB 1 GM 1 G: 1 TAB at 09:27

## 2019-06-17 RX ADMIN — AMLODIPINE BESYLATE 10 MG: 10 TABLET ORAL at 09:27

## 2019-06-17 RX ADMIN — DOXAZOSIN 1 MG: 1 TABLET ORAL at 09:28

## 2019-06-17 RX ADMIN — HYDRALAZINE HYDROCHLORIDE 100 MG: 50 TABLET, FILM COATED ORAL at 02:15

## 2019-06-17 RX ADMIN — FUROSEMIDE 80 MG: 10 INJECTION, SOLUTION INTRAMUSCULAR; INTRAVENOUS at 00:00

## 2019-06-17 RX ADMIN — ACETAMINOPHEN 650 MG: 325 TABLET ORAL at 11:43

## 2019-06-17 RX ADMIN — Medication 10 ML: at 20:56

## 2019-06-17 RX ADMIN — HEPARIN SODIUM 5000 UNITS: 5000 INJECTION, SOLUTION INTRAVENOUS; SUBCUTANEOUS at 09:30

## 2019-06-17 RX ADMIN — SODIUM POLYSTYRENE SULFONATE 15 G: 1 POWDER ORAL; RECTAL at 09:30

## 2019-06-17 RX ADMIN — GABAPENTIN 100 MG: 100 CAPSULE ORAL at 14:25

## 2019-06-17 RX ADMIN — ISOSORBIDE DINITRATE 20 MG: 20 TABLET ORAL at 09:27

## 2019-06-17 RX ADMIN — MECLIZINE HYDROCHLORIDE 25 MG: 12.5 TABLET, FILM COATED ORAL at 14:24

## 2019-06-17 RX ADMIN — DULOXETINE HYDROCHLORIDE 30 MG: 30 CAPSULE, DELAYED RELEASE ORAL at 09:27

## 2019-06-17 RX ADMIN — INSULIN GLARGINE 10 UNITS: 100 INJECTION, SOLUTION SUBCUTANEOUS at 09:30

## 2019-06-17 RX ADMIN — MECLIZINE HYDROCHLORIDE 25 MG: 12.5 TABLET, FILM COATED ORAL at 20:56

## 2019-06-17 RX ADMIN — MECLIZINE HYDROCHLORIDE 25 MG: 12.5 TABLET, FILM COATED ORAL at 09:26

## 2019-06-17 RX ADMIN — FAMOTIDINE 10 MG: 20 TABLET, FILM COATED ORAL at 02:14

## 2019-06-17 RX ADMIN — FERROUS SULFATE TAB 325 MG (65 MG ELEMENTAL FE) 325 MG: 325 (65 FE) TAB at 09:27

## 2019-06-17 RX ADMIN — ONDANSETRON 4 MG: 2 INJECTION INTRAMUSCULAR; INTRAVENOUS at 10:22

## 2019-06-17 RX ADMIN — Medication 10 ML: at 09:36

## 2019-06-17 RX ADMIN — FERROUS SULFATE TAB 325 MG (65 MG ELEMENTAL FE) 325 MG: 325 (65 FE) TAB at 02:15

## 2019-06-17 RX ADMIN — LEVOTHYROXINE SODIUM 25 MCG: 25 TABLET ORAL at 07:41

## 2019-06-17 RX ADMIN — GABAPENTIN 100 MG: 100 CAPSULE ORAL at 20:56

## 2019-06-17 RX ADMIN — ONDANSETRON 4 MG: 2 INJECTION INTRAMUSCULAR; INTRAVENOUS at 20:56

## 2019-06-17 RX ADMIN — ISOSORBIDE DINITRATE 20 MG: 20 TABLET ORAL at 20:56

## 2019-06-17 RX ADMIN — DOCUSATE SODIUM 100 MG: 100 CAPSULE, LIQUID FILLED ORAL at 20:56

## 2019-06-17 RX ADMIN — SODIUM POLYSTYRENE SULFONATE 15 G: 1 POWDER ORAL; RECTAL at 20:55

## 2019-06-17 RX ADMIN — SODIUM CHLORIDE TAB 1 GM 1 G: 1 TAB at 11:43

## 2019-06-17 RX ADMIN — FAMOTIDINE 10 MG: 20 TABLET, FILM COATED ORAL at 09:26

## 2019-06-17 RX ADMIN — INSULIN LISPRO 1 UNITS: 100 INJECTION, SOLUTION INTRAVENOUS; SUBCUTANEOUS at 02:18

## 2019-06-17 RX ADMIN — INSULIN LISPRO 2 UNITS: 100 INJECTION, SOLUTION INTRAVENOUS; SUBCUTANEOUS at 17:21

## 2019-06-17 RX ADMIN — ISOSORBIDE DINITRATE 20 MG: 20 TABLET ORAL at 14:25

## 2019-06-17 RX ADMIN — DOCUSATE SODIUM 100 MG: 100 CAPSULE, LIQUID FILLED ORAL at 09:26

## 2019-06-17 RX ADMIN — FERROUS SULFATE TAB 325 MG (65 MG ELEMENTAL FE) 325 MG: 325 (65 FE) TAB at 20:56

## 2019-06-17 RX ADMIN — PRAVASTATIN SODIUM 40 MG: 40 TABLET ORAL at 02:15

## 2019-06-17 RX ADMIN — GABAPENTIN 100 MG: 100 CAPSULE ORAL at 09:26

## 2019-06-17 RX ADMIN — HYDRALAZINE HYDROCHLORIDE 100 MG: 50 TABLET, FILM COATED ORAL at 07:41

## 2019-06-17 RX ADMIN — HYDRALAZINE HYDROCHLORIDE 100 MG: 50 TABLET, FILM COATED ORAL at 20:56

## 2019-06-17 RX ADMIN — HYDRALAZINE HYDROCHLORIDE 100 MG: 50 TABLET, FILM COATED ORAL at 14:25

## 2019-06-17 RX ADMIN — OXYBUTYNIN CHLORIDE 5 MG: 5 TABLET ORAL at 09:27

## 2019-06-17 RX ADMIN — ONDANSETRON 4 MG: 2 INJECTION INTRAMUSCULAR; INTRAVENOUS at 01:36

## 2019-06-17 RX ADMIN — FAMOTIDINE 10 MG: 20 TABLET, FILM COATED ORAL at 20:58

## 2019-06-17 ASSESSMENT — ENCOUNTER SYMPTOMS
EYE PAIN: 0
DIARRHEA: 0
VOMITING: 0
NAUSEA: 0
SHORTNESS OF BREATH: 0
COUGH: 0
SORE THROAT: 0
ABDOMINAL PAIN: 0
BACK PAIN: 0
EYES NEGATIVE: 1

## 2019-06-17 ASSESSMENT — PAIN SCALES - GENERAL
PAINLEVEL_OUTOF10: 5
PAINLEVEL_OUTOF10: 0
PAINLEVEL_OUTOF10: 3
PAINLEVEL_OUTOF10: 0

## 2019-06-17 ASSESSMENT — PAIN DESCRIPTION - LOCATION: LOCATION: LEG

## 2019-06-17 ASSESSMENT — PAIN DESCRIPTION - DESCRIPTORS: DESCRIPTORS: ACHING;SHARP

## 2019-06-17 ASSESSMENT — PAIN DESCRIPTION - ONSET: ONSET: ON-GOING

## 2019-06-17 ASSESSMENT — PAIN DESCRIPTION - ORIENTATION: ORIENTATION: RIGHT

## 2019-06-17 ASSESSMENT — PAIN DESCRIPTION - PAIN TYPE: TYPE: CHRONIC PAIN

## 2019-06-17 ASSESSMENT — PAIN DESCRIPTION - FREQUENCY: FREQUENCY: CONTINUOUS

## 2019-06-17 ASSESSMENT — PAIN - FUNCTIONAL ASSESSMENT: PAIN_FUNCTIONAL_ASSESSMENT: PREVENTS OR INTERFERES SOME ACTIVE ACTIVITIES AND ADLS

## 2019-06-17 ASSESSMENT — PAIN DESCRIPTION - PROGRESSION: CLINICAL_PROGRESSION: NOT CHANGED

## 2019-06-17 NOTE — ED NOTES
Patient transported to [facility]  by cart in stable condition. Patient monitored on cardiac telemetry. Patient on 6 LPM O2 via nasal canula. IV  line is patent.         Annie Jean, EMT-P  06/17/19 1416

## 2019-06-17 NOTE — PROGRESS NOTES
Nutrition Assessment    Type and Reason for Visit: Initial, Positive Nutrition Screen(unplanned wt. loss, poor po/appetite. )    Nutrition Recommendations:   Consider renal carb controlled diet as tolerated. MD to advise if okay to send Nepro twice/day ( K+ elevated, intake next to null)  Consider Folbee Plus. .    Nutrition Assessment:  Pt. moderately malnourished AEB mild fat & muscle loss. At risk for further nutrition compromise r/t SOB, Pulmonary Edema with CHF, Acute Renal Failure ( Was supposed to start PD today but ended up in hospital), Intake 0%. Consider renal carb controlled diet as tolerated. MD to advise if okay to send Nepro twice/day ( K+ elevated, intake next to null)  Consider Folbee Plus. .   Labs: (6/17) A1C 5.2, Na 129, K+ 5.3, BUN 95, Creatinine 5.5,  Trop T 0.06, (6/16) Hemoglobin 8.7, (5/19) Iron 30. Meds: Colace, Ferrous Sulfate, LAsix, Lantus, Humaog, Zofran. Malnutrition Assessment:  · Malnutrition Status: Meets the criteria for moderate malnutrition  · Context: Acute illness or injury  · Findings of the 6 clinical characteristics of malnutrition (Minimum of 2 out of 6 clinical characteristics is required to make the diagnosis of moderate or severe Protein Calorie Malnutrition based on AND/ASPEN Guidelines):  1. Energy Intake-Unable to assess,      2. Fat Loss-Mild subcutaneous fat loss, Orbital  3. Muscle Loss-Mild muscle mass loss, Temples (temporalis muscle)    Nutrition Risk Level: Moderate    Nutrient Needs:  · Estimated Daily Total Kcal: ~1863-kcals (25kcals/kgm admit wt. 74.5kgm 6/17)   · Estimated Daily Protein (g): ~60-75 grams (0.8-1 grams protein/kgm admit wt. 74.5kgm 6/17) (acute renal failure)    Nutrition Diagnosis:   · Problem:  Moderate malnutrition, In context of acute illness or injury  · Etiology: related to Renal dysfunction     Signs and symptoms:  as evidenced by Patient report of, Mild muscle loss, Mild loss of subcutaneous fat    Objective

## 2019-06-17 NOTE — PROGRESS NOTES
Pt admitted to  6K10 from ED. Complaints: Shortness of breath. IV none infusing into the antecubital left, condition patent and no redness. IV site free of s/s of infection or infiltration. Vital signs obtained. Assessment and data collection initiated. Two nurse skin assessment performed by Melissa Ricci and Kim Church RN. Oriented to room. Policies and procedures for  explained. All questions answered with no further questions at this time. Fall prevention and safety brochure discussed with patient. Bed alarm on. Call light in reach. The best day to schedule a follow up Dr appointment is:  Thursday a.m.

## 2019-06-17 NOTE — PROGRESS NOTES
Hospitalist Progress Note      Patient:  Dayanna Akhtar      Unit/Bed:6K-10/010-A    YOB: 1950    MRN: [id number]       Acct: [de-identified]     PCP: [address]    Date of Admission: 6/16/2019    Assessment/Plan:    1. Bilateral R>L pleural effusions, recurrent -- c/s renal as pt had CAPD catheter placed prior admission 6/11/19 and per pt to start dialysis soon --> pt known to Dr. Jackie Phillips   -- CXR 6/16 with mod/large right and small left effusions  -- s/p right thoracentesis 6/6/19 = 1.4 L - and repeat 6/12/19 = 1.5L --> recurrent mod/large 6/16 CXR --> likely needs tap again 6/17 and ?? PLEURX catheter as HD will take time to keep fluid off -> d/w Dr. Jackie Phillips 6/17  -- s/p lasix 80 mg x 1 in ER 6/16 -- hold further diuresis until seen by renal  -- hx s/p left thoracentesis 6/9/19 = 500 mL   -- limited echo 6/7/19 EF 55%, LV fxn normal--> similar to 12/2018 echo   -- hold diuretics with worsening renal fxn and await formal renal recs 6/17  2. Acute on chronic hypoxic resp failure -- due to effusions -- was not on O2 prior but was last admission with effusions   -- c/s pulm 6/16 with above   --> encourage activity, IS   - O2 and wean as able   -- ?need to r/o PE  -- trop up slightly 0.051 -> 0.046 --> prior were 0.03's prior admission -> ?up more due to LOUISE and effusions -- monitor  -- holding diuretics due to rising creat  3. LOUISE on CKD stage now 5 -- c/s renal and d/w Dr. Jackie Phillips -- creat up to 6.0 on admission 6/16 which was 3.5 on d/c 6/12 --> hold home bumex po --> monitor closely s/p lasix 80 mg IV given in ER 6/16 --> hold further diuretics --creat 6/17 (p)  -- PD catheter placed prior admission 6/12/19 --> await renal recs for ?start now but will take time to balance fluids  -- was mid 3's during admission 5/20 - 5/23 -- baseline CKD with baseline 2.8 - 3.1  -- creat worsened with diuretics last admission --> home bumex held   4.  Hyperkalemia -- per renal -- 5.8 on admission 6/16 -> repeat 6/17 (p) -- treat as needed - monitor closely  5. Hyponatremia -- per renal -- acute on chronic -- down to 128 on 6/16 from 132 on 6/12 --> repeat 6/17 (p) --  due to fluid issues and renal issues as above -- TSH 5/19/19 WNL  6. Acute on chronic diastolic CHF -- c/s renal 6/17 with above and assist with fluid mgmt and pulm c/s for effusions -- BNP higher than prev -> up to 7690 from 3592 on 6/19  -- PD catheter placed 6/12/19   -- ??need PleurX right effusion -> await pulm recs- d/w Dr. Renee Castanedafts and feels needs pleurX  -- last echo 12/2018 EF 50-55%, no WMA, mildly dilated LA, mild MR, mild TR --> limited echo 6/7/19 normal EF 55%, no other abn noted  -- NO BB due to bradcardia -- no ACE/aRB due to CKD --> on isordil/hydralazine  -- TSH 5/19 WNL  7. LLL atelectasis  -- likely due to effusions --> encourage IS, O2 prn  -unlikely pneumonia -> tx with atbx last admission  -> per pulm last admission 6/7 LUIS (-), histone IgG (-)  -- s/p atbx last admission ->> zmax x 1 on 6/7 per pulm, s/p cefepime 6/7 - 6/8 per pulm --> no cx done on right pleural fluid  -- PCT up to 0.40 on 6/14 from prior 0.20's --> likely related to renal dysfunction --> ?need atbx - afebrile but WBC up --> await pulm c/s 6/17  8. Metabolic acidosis -- due to renal dysfunction - ?need bicarb - c/s and await renal recs -- LA normal 6/16 and 6/17  9. Leukocytosis -- up to 16 on 6/16 from 12-13 last admission 6/2019 --> chronic component -- afeb, ?etiology -- up and down - similar last admission 6/2019 and 5/2019 -- afebrile -- chronic component per chart review -- PCT slightly elevated chronically also but slightly higher at 0.4 from 0.2's --> no atbx at this time -- monitor   -- s/p zmax 6/7, cefepime 6/7 - 6/8 per pul last admission  10.  Chronic elevated trops -- likely due to renal dysfunction -- no cp - only sob but with above -- trops up to 0.051 -> 0.046 --> prior were 0.03's prior admission -> ?up more due to LOUISE and effusions -- monitor -- no ASA - hold until if need further procedures  -- limited echo 6/7/19 = EF 55%, LV fxn normal  11. Type 2 DM -- cont lantus 10 units bid and SSI -- monitor and adjust prn -- carb diet. Last A1C 6/17/19 = 5.2  12. Essential HTN -- cont norvasc 10 mg daily, hydralazine 100 mg tid, isordil 20 mg tid, cardura 1 mg daily -- no ACE/ARB with CKD -- monitor and adjust prn  13. Chronic normocytic anemia -- cont po iron as iron low 5/19 -- likely due to CKD -- ??aranesp per renal -- stable in 8-9's - was 8.7 on 6/12 and stable at 8.7 on 6/12 --> was 10's at [facility] 2/2019 but 8-10 prior  14. Diabetic gastroparesis -- anibal po - monitor  15. Hypothyroidism -- cont synthroid -- TSH 5/19 WNL  16. Hx neurogenic bladder -- ditropan  17. HLD -- cont statin -- Lipids 5/20/19 HLD 66, LDL 75, trig 148  18. Chronic dizziness -- on antivert  19. Bipolar d/o -- at [facility] -- no current meds and mood stable  20. Mild MR, mild TR -- per echo 12/2018  21. Hx seizure d/o -- no seizure meds but on neurontin  22. Chronic pain -- cont neurontin, cymbalta  23. Constipation -- added miralax prn 6/9  24. Generalized weakness -- PT/OT c/s -- from ECF - SS consulted    Dispo  -- 6/17 --> again admitted for recurrent sob due to pleural effusion -> c/s renal Dr. Camryn Coker as pt just had PD catheter placed 6/12/19 and to assist with fluid mgmt given worsening renal fxn again this admission and recurrent effusions - d/w Dr. Camryn Coker - recommends PleurX for right effusion as initiation of dialysis will take time to balance fluid status. Pulmonary consult (p) and will await recommendations as well but likely needs thoracentesis with Pleurx placement as patient is dyspneic and hypoxic. Hold any diuretics until further eval by renal.  Continue to monitor BP, lites, renal function, BS. Await recs.   Hold and atbx at this time - unlikely elevated PCT due to infection and more likely LOUISE/CKD - monitor for fever - if fever will start atbx        Chief Complaint: sob    Hospital Course: Reza Simeon is a 81 y.o. female with bipolar, CKD V, diastolic CHF, DMII, HTN and lHLD is admitted with increasing sob again and recurrent mod/large right pleural effusion, LOUISE on CKD now stage V. She was recently admitted 6/6/2019-6/13/2019 for hypoxic respiratory failure due to pleural effusions and renal dysfunction. She also had a recent admission 5/19 - 5/23 for weight gain and shortness of breath. She required thoracentesis both in the May and most recent admission. Last right thoracentesis was 6/12/2019 of 1.5 L. Had a left thoracentesis on 6/9/2019 at 500 mL. Her renal function in ER is gone up to 6.0 was 3.5 on discharge 6/12. K up to 5.8. Her baseline creatinine is in the mid 3's. Was concerned with worsening renal function during most recent admission Dr. Andree Lam had a PD catheter placed on 6/11/2019 with future plans for outpatient PD. However patient returned with increasing shortness of breath and not feeling well. Was found to have LOUISE and recurrent moderate to large right pleural effusion. She was admitted to the hospitalist service again for further evaluation and treatment. Subjective:   -- 6/17/2019 --> pt c/o feeling sob and nauseated this am.  Looks ill. Denies cp. No abd pain but the nausea. +had a void just now - no dysuria. Denies fever but states had chills yesterday. No cough. Was up to 6L O2 in ER and down to 4L this am.  Denies diarrhea. NO melena/hematochezia.         Medications:  Reviewed    Infusion Medications    dextrose       Scheduled Medications    sodium chloride  1 g Oral TID WC    amLODIPine  10 mg Oral Daily    doxazosin  1 mg Oral Daily    DULoxetine  30 mg Oral Daily    famotidine  10 mg Oral BID    ferrous sulfate  325 mg Oral BID    gabapentin  100 mg Oral TID    hydrALAZINE  100 mg Oral 3 times per day    insulin glargine  10 Units Subcutaneous BID    isosorbide dinitrate  20 mg Oral TID   Cabrera levothyroxine  25 mcg Oral Daily    meclizine  25 mg Oral TID    oxybutynin  5 mg Oral Daily    pravastatin  40 mg Oral Nightly    insulin lispro  0-6 Units Subcutaneous TID     insulin lispro  0-3 Units Subcutaneous Nightly    furosemide  20 mg Intravenous Daily     PRN Meds: glucose, dextrose, glucagon (rDNA), dextrose, ondansetron      Intake/Output Summary (Last 24 hours) at 6/17/2019 0649  Last data filed at 6/17/2019 0322  Gross per 24 hour   Intake 50 ml   Output --   Net 50 ml       Diet:  No diet orders on file    Exam:  BP (!) 149/66   Pulse 53   Temp 98.1 °F (36.7 °C) (Oral)   Resp 20   Ht 5' 4\" (1.626 m)   Wt 165 lb 3.2 oz (74.9 kg)   SpO2 97%   BMI 28.36 kg/m²     General appearance: Looks ill, sob - looks uncomfortable;  +conversational dyspnea  HEENT: Pupils equal, round, and reactive to light. Conjunctivae/corneas clear. Neck: Supple, with full range of motion. +jugular venous distention. Trachea midline. Respiratory:  Normal respiratory effort. Very diminished/absent BS 1/2 up right side; Left with few crackles in left base. No wheezing, +mild accessory muscle use, + conversational dyspnea  Cardiovascular: Regular rate and rhythm with normal S1/S2 2/6 SHANITA at LLSB/LUSB, No rubs or gallops. Abdomen: Soft, non-tender, +PD catheter in med abd with bandage on but NO TTP around it  -- non-distended with normal bowel sounds. No rebound or guarding  Musculoskeletal: No clubbing, cyanosis,, trace BLE edema. Full range of motion without deformity. No calf tenderness palpation  Skin: Skin color, texture, turgor normal.  No rashes or lesions. Pale. Neurologic:  Neurovascularly intact without any focal sensory/motor deficits.  Cranial nerves: II-XII intact, grossly non-focal.  Psychiatric: Alert and oriented, thought content appropriate  Capillary Refill: Brisk,< 3 seconds   Peripheral Pulses: +2 palpable, equal bilaterally       Labs:   Recent Labs     06/16/19  2202   WBC 16.0*   HGB 8.7*   HCT 25.2*   *     Recent Labs     06/16/19  2202   *   K 5.8*   CL 93*   CO2 18*   BUN 95*   CREATININE 6.0*   CALCIUM 9.6     No results for input(s): AST, ALT, BILIDIR, BILITOT, ALKPHOS in the last 72 hours. No results for input(s): INR in the last 72 hours. No results for input(s): Jose Gal in the last 72 hours. Urinalysis:      Lab Results   Component Value Date    NITRU Negative 03/09/2019    WBCUA 0-2 06/07/2016    BACTERIA NONE 06/07/2016    RBCUA 0-2 06/07/2016    BLOODU Negative 03/09/2019    GLUCOSEU 2+ 03/09/2019       Radiology:  XR CHEST PORTABLE   Final Result      Moderate to large right and small left pleural effusions. Bilateral interstitial pulmonary edema versus atypical pneumonia. Left lower lobe atelectasis. **This report has been created using voice recognition software. It may contain minor errors which are inherent in voice recognition technology. **      Final report electronically signed by Dr. Jigna Galo on 6/16/2019 11:02 PM          Diet: No diet orders on file    Perez: no    Microbiology:  Blood cx 6/16 (p)    Tele:  SR    DVT prophylaxis: [] Lovenox                                 [] SCDs                                 [x] SQ Heparin                                 [] Encourage ambulation           [] Already on Anticoagulation     Disposition:    [] Home       [] TCU       [] Rehab       [] Psych       [x] SNF       [] [place]       [] Other-    Code Status: full    PT/OT Eval Status: consulted        Electronically signed by Joycelyn Ferreira MD on 6/17/2019 at 6:49 AM

## 2019-06-17 NOTE — ED NOTES
Vitals obtained. Patient resting in bed with even and unlabored respirations. Fluids started per orders. Patient denies any further needs and call light within reach. Patient's respiratory effort is still slightly labored but patient appears to be better on the 6L nasal cannula.       Ayo Nj RN  06/16/19 6082

## 2019-06-17 NOTE — ED TRIAGE NOTES
Patient presents to the ED from [facility] by [facility] with complaints of SOB. Patient stating that she became SOB around 1300 this afternoon and stated that they had to wait for transport to come to [address] for a few hours. Patient is labored breathing and currently on 6L NC and states that she was just prescribed to be put on 1L at the nursing home. Patient denies any CP. Patient had a dialysis catheter placed on 6/10 in this facility and is due to have her first dialysis on Monday. Patient is bradycardic upon arrival but is A&O x4. EKG completed.

## 2019-06-17 NOTE — CARE COORDINATION
19, 7:38 AM      Yanira Espinosa       Admitted from: ED  2130 Hospital day: 1   [facility] Reason for admit: SOB (shortness of breath) [R06.02] Status: Inpatient  Admit order signed?: yes  PMH:  has a past medical history of Anemia, Anemia in chronic kidney disease(285.21), Arthritis, Asthma, Bipolar 1 disorder ([address] CHF (congestive heart failure) ([address] Chronic kidney disease, stage III (moderate) ([address] Depression, GERD (gastroesophageal reflux disease), Gout, Headache(784.0), Hyperkalemia, Hyperlipidemia, Hypertension, Hypothyroid, Neurogenic bladder, Pneumonia, Seizure ([address] and Type II or unspecified type diabetes mellitus without mention of complication, not stated as uncontrolled. Procedure: na  Pertinent abnormal Imagin/16/19  PCXR  Moderate to large right and small left pleural effusions. Bilateral interstitial pulmonary edema versus atypical pneumonia. Left lower lobe atelectasis.       Medications:  Scheduled Meds:   sodium chloride flush  10 mL Intravenous 2 times per day    docusate sodium  100 mg Oral BID    heparin (porcine)  5,000 Units Subcutaneous 3 times per day    sodium chloride  1 g Oral TID WC    amLODIPine  10 mg Oral Daily    doxazosin  1 mg Oral Daily    DULoxetine  30 mg Oral Daily    famotidine  10 mg Oral BID    ferrous sulfate  325 mg Oral BID    gabapentin  100 mg Oral TID    hydrALAZINE  100 mg Oral 3 times per day    insulin glargine  10 Units Subcutaneous BID    isosorbide dinitrate  20 mg Oral TID    levothyroxine  25 mcg Oral Daily    meclizine  25 mg Oral TID    oxybutynin  5 mg Oral Daily    pravastatin  40 mg Oral Nightly    insulin lispro  0-6 Units Subcutaneous TID WC    insulin lispro  0-3 Units Subcutaneous Nightly     Continuous Infusions:   dextrose        Pertinent Info/Orders/Treatment Plan:  Nephrology and pulmonology consults, telemetry, accu checks as ordered, PT and PT evals, daily weights, strict I/O, ambulate, accu check as ordered, Kaexalate x 1, Lasix 80, daily weights. Diet: DIET CARB CONTROL;   Smoking status:  reports that she has never smoked. She has never used smokeless tobacco.   PCP: [address]  Readmission: yes    Patient has been readmitted within 3 days. Patient went to f/u appointment? From ECF  If yes, was it within 7 days? yes  Patient was able to fill prescriptions? yes  Patient is taking medications as prescribed? Yes   Cause for readmission? SOB/CHF      Readmission Risk Score: 25%    Discharge Planning  Current Residence:  Nursing Home  Living Arrangements:  Other (Comment)   Support Systems:  Family Members, Children, /  Current Services PTA:     Potential Assistance Needed:  N/A  Potential Assistance Purchasing Medications:  No  Does patient want to participate in local refill/ meds to beds program?  No  Type of Home Care Services:  None  Patient expects to be [facility] to:  [facility]  Expected Discharge date:  06/19/19  Follow Up Appointment: Best Day/ Time: Thursday AM    [facility].      Evaluation: yes

## 2019-06-17 NOTE — DISCHARGE INSTR - COC
chronic renal disease N18.9, D63.1    Type 2 diabetes mellitus with insulin therapy ([facility]) E11.9, Z79.4    Chronic kidney disease (CKD), stage IV (severe) ([facility]) N18.4    Diabetic nephropathy with proteinuria ([facility]) E11.21    Acute respiratory failure with hypoxia ([facility]) J96.01    Acute pulmonary edema ([facility]) J81.0    Acute on chronic diastolic CHF (congestive heart failure) ([facility]) I50.33    Uncontrolled type 2 diabetes mellitus with hyperglycemia ([facility]) E11.65    Essential hypertension I10    Hypothyroidism E03.9    Other fluid overload E87.79    Renal failure N19    Diabetic nephropathy associated with type 2 diabetes mellitus ([facility]) E11.21    Bilateral pleural effusion J90    Elevated troponin D86.5    Metabolic acidosis J39.7    Leukocytosis D72.829    Generalized weakness R53.1    Bipolar 1 disorder ([facility]) F31.9    Mild mitral regurgitation I34.0    Mild tricuspid regurgitation I07.1    Chronic pain syndrome G89.4    Pleural effusion J90    Stage 5 chronic kidney disease ([facility]) N18.5    (HFpEF) heart failure with preserved ejection fraction ([facility]) I50.30    Bilateral atelectasis J98.11    Hyponatremia E87.1    Chronic anemia D64.9    Hyperlipidemia E78.5    Constipation K59.00    SOB (shortness of breath) R06.02    Hyperkalemia E87.5    Infiltrate noted on imaging study R93.89       Isolation/Infection:   Isolation          No Isolation            Nurse Assessment:  Last Vital Signs: BP (!) 146/57   Pulse 53   Temp 98.1 °F (36.7 °C) (Oral)   Resp 20   Ht 5' 4\" (1.626 m)   Wt 165 lb 3.2 oz (74.9 kg)   SpO2 94%   BMI 28.36 kg/m²     Last documented pain score (0-10 scale): Pain Level: 0  Last Weight:   Wt Readings from Last 1 Encounters:   06/17/19 165 lb 3.2 oz (74.9 kg)     Mental Status:  oriented, alert, logical and thought processes intact    IV Access:  Dialysis Catheter  Pleurex Drain    Nursing Mobility/ADLs:  Walking   Assisted  Transfer  Assisted  Bathing  Assisted  Dressing RN on 6/20/19 at 12:48 PM    CASE MANAGEMENT/SOCIAL WORK SECTION    Inpatient Status Date: 06/16/2019    Readmission Risk Assessment Score:  Readmission Risk              Risk of Unplanned Readmission:        25           Discharging to Facility/ Agency   · Name: [facility]  · Address: [address]  · Phone:[phone]  · Fax:[phone]    Dialysis Facility (if applicable)   · Name:  · Address:  · Dialysis Schedule:  · Phone:  · Fax:    / signature: Electronically signed by DIANA Clifton on 6/17/19 at 9:09 AM    PHYSICIAN SECTION    Prognosis: Fair    Condition at Discharge: Stable    Rehab Potential (if transferring to Rehab): Fair    Recommended Labs or Other Treatments After Discharge:     Physician Certification: I certify the above information and transfer of Kathie Ram  is necessary for the continuing treatment of the diagnosis listed and that she requires [place] for greater 30 days.      Update Admission H&P: Changes in H&P as follows - now has pleurex catheter    PHYSICIAN SIGNATURE:  Electronically signed by Lily Wolf MD on 6/20/19 at 10:27 AM

## 2019-06-17 NOTE — H&P
History & Physical        Patient:  Cordelia Bartholomew  YOB: 1950    MRN: [id number]     Acct: [de-identified]    PCP: [address]    Date of Admission: 6/16/2019    Date of Service: Pt seen/examined on 06/16/19  and Admitted to Inpatient with expected LOS greater than two midnights due to medical therapy. Chief Complaint:  SOB secondary to fluid overload      History Of Present Illness:      81 y.o. female with PMH of CHF, CKD, HTN and DM II who presented to [address] [place] [address] with complaint of increased SOB. Pt poor historian, obtained from chart review and partially from pt. Pt was recently admitted to [facility] on 06/06 for the same problem. On 06/09, pt had a thoracentesis and had 500 mL removed. She was also seen on that day by nephrology, who determined that pt might benefit from dialysis, specifically PD. Pt states she had surgery for catheter placement last Monday 06/10. Since that time she has had worsening SOB. Bilateral pleural effusions remain persistent. Pt also notes cough, chest pain, nausea and emesis. Denies fever/chills/dysuria/urgencyfrequency and notes that she does not make much urine and has no further complaints at this time.      Past Medical History:          Diagnosis Date    Anemia     Anemia in chronic kidney disease(285.21)     Arthritis     Asthma     Bipolar 1 disorder (HCC)     CHF (congestive heart failure) (HCC)     Chronic kidney disease, stage III (moderate) ([facility])     Depression     GERD (gastroesophageal reflux disease)     Gout     Headache(784.0)     Hyperkalemia     Hyperlipidemia     Hypertension     Hypothyroid     Neurogenic bladder     Pneumonia     Seizure ([facility] [address] 01/2018    Type II or unspecified type diabetes mellitus without mention of complication, not stated as uncontrolled        Past Surgical History:          Procedure Laterality Date    BACK SURGERY      CERVICAL FUSION      CHOLECYSTECTOMY      COLONOSCOPY      and nightly) Inject as per sliding scale:  140-199 = 1 unit,  200-249 = 3 units,  250-299 = 5 units,  300-349 = 7 units,  350-399 = 9 units,  400-999 = call MD   Yes Historical Provider, MD   levothyroxine (SYNTHROID) 25 MCG tablet Take 25 mcg by mouth Daily   Yes Historical Provider, MD   insulin detemir (LEVEMIR) 100 UNIT/ML injection pen Inject 10 Units into the skin 2 times daily 10/24/18  Yes Historical Provider, MD   blood glucose test strips (ASCENSIA AUTODISC VI;ONE TOUCH ULTRA TEST VI) strip Patient tests blood sugar three times per day. Diagnosis DMII E11.9 7/18/18  Yes Historical Provider, MD   Insulin Pen Needle 31G X 8 MM MISC 1 Device 6/6/18  Yes Historical Provider, MD   pravastatin (PRAVACHOL) 40 MG tablet Take 40 mg by mouth nightly    Yes Historical Provider, MD       Allergies:  Eggs or egg-derived products; Aspirin; Naproxen; Pcn [penicillins]; Vicodin [hydrocodone-acetaminophen]; Vilazodone; and Wellbutrin [bupropion]    Social History:      The patient currently lives at a nursing home    TOBACCO:   reports that she has never smoked. She has never used smokeless tobacco.  ETOH:   reports that she does not drink alcohol. Family History:      Reviewed in detail and negative for DM, CAD, Cancer, CVA. Positive as follows:        Problem Relation Age of Onset    High Blood Pressure Mother     Diabetes Mother     Cancer Mother     Heart Attack Mother     Stroke Father     High Blood Pressure Father     Anemia Father        Diet:  No diet orders on file    REVIEW OF SYSTEMS:   Pertinent positives as noted in the HPI. All other systems reviewed and negative. PHYSICAL EXAM:    BP (!) 155/46   Pulse 56   Temp 98.6 °F (37 °C) (Oral)   Resp 21   Wt 160 lb (72.6 kg)   SpO2 99%   BMI 25.82 kg/m²     General appearance:  No apparent distress, appears stated age and cooperative. NC in place  HEENT:  Normal cephalic, atraumatic without obvious deformity.  Pupils equal, round, and reactive to light.  Extra ocular muscles intact. Conjunctivae/corneas clear. Neck: Supple, with full range of motion. No jugular venous distention. Trachea midline. Respiratory:  Normal respiratory effort. Diminished to auscultation bilaterally without Rales/Wheezes/Rhonchi. Cardiovascular:  Bradycardic and regular rhythm with normal S1/S2 without murmurs, rubs or gallops. Abdomen: Soft, non-tender, non-distended with normal bowel sounds. Musculoskeletal:  No clubbing, cyanosis or edema bilaterally. Full range of motion without deformity. Skin: Skin color, texture, turgor normal.  No rashes or lesions. Neurologic:  Neurovascularly intact without any focal sensory/motor deficits. Cranial nerves: II-XII intact, grossly non-focal.  Psychiatric:  Alert and oriented, thought content appropriate, normal insight  Capillary Refill: Brisk,< 3 seconds   Peripheral Pulses: +2 palpable, equal bilaterally       Labs:     Recent Labs     06/16/19  2202   WBC 16.0*   HGB 8.7*   HCT 25.2*   *     Recent Labs     06/16/19  2202   *   K 5.8*   CL 93*   CO2 18*   BUN 95*   CREATININE 6.0*   CALCIUM 9.6     No results for input(s): AST, ALT, BILIDIR, BILITOT, ALKPHOS in the last 72 hours. No results for input(s): INR in the last 72 hours. No results for input(s): Mena Georgina in the last 72 hours. Urinalysis:      Lab Results   Component Value Date    NITRU Negative 03/09/2019    WBCUA 0-2 06/07/2016    BACTERIA NONE 06/07/2016    RBCUA 0-2 06/07/2016    BLOODU Negative 03/09/2019    GLUCOSEU 2+ 03/09/2019       Intake & Output:  No intake/output data recorded. No intake/output data recorded.       Radiology:   EKG:  I have reviewed the EKG with the following interpretation:     \"Sinus bradycardia  Otherwise normal ECG  When compared with ECG of 07-JUN-2016 16:33,  Nonspecific T wave abnormality has improved in Anterolateral leads\"    CXR: I have reviewed the CXR with the following interpretation:     XR CHEST PORTABLE   Final Result      Moderate to large right and small left pleural effusions. Bilateral interstitial pulmonary edema versus atypical pneumonia. Left lower lobe atelectasis. **This report has been created using voice recognition software. It may contain minor errors which are inherent in voice recognition technology. **      Final report electronically signed by Dr. Aguila Guillaume on 6/16/2019 11:02 PM           DVT prophylaxis: heparin    Code Status: Prior      PT/OT Eval Status: on case     Jose Puri Problems    Diagnosis Date Noted    Hyperkalemia [E87.5] 06/17/2019    Infiltrate noted on imaging study [R93.89] 06/17/2019    SOB (shortness of breath) [R06.02] 06/16/2019    Hyponatremia [E87.1]     Elevated troponin [R74.8]     Uncontrolled type 2 diabetes mellitus with hyperglycemia (HCC) [E11.65] 12/07/2018    Acute on chronic diastolic CHF (congestive heart failure) ([facility] [address] [I50.33] 12/07/2018    Acute pulmonary edema ([facility] [address] [J81.0] 12/07/2018    Chronic kidney disease (CKD), stage IV (severe) ([facility] [address] [N18.4] 11/02/2018       PLAN:    1. SOB secondary to fluid overload in CKD   - BNP of 7690.0, CXR shows bilateral pleural effusions and pulmonary edema vs infiltrates  - appreciate pulmonary input as effusions appear to be persistent problem   - placed on 6K, consult to nephrology to begin peritoneal dialysis tomorrow  - Lasix in ED, continue for now, but appreciate nephrology input given that in past pt does not tolerate diuresis well  - Cr of 6.0 up from 3.5, BUN 95 up from 71 (06/12)    2. Hyperkalemia  - K of 5.8  - pt given Lasix in ED  - Kayexalate     3. Hyponatremia  - Na of 128, avoid IVFs given #1  -oral salt tabs, BMP    4. Elevated troponin   - chronic problem, 0.051 up from 0.033 (06/06)  - EKG unremarkable  - continue to trend     5.  Infiltrate on CXR  - edema vs infiltrate  - procal of .40 , lactic of 0.5, afebrile, WBC of 16.0  - blood cultures pending, no abx     6. DM II  - BS of 156, continue home insulin + SSI  - carb control diet, POCT glucose ac/hs        Thank you THOMAS Walker for the opportunity to be involved in this patient's care.     Electronically signed by Latesha Lewis PA-C on 6/16/2019 at 11:43 PM

## 2019-06-17 NOTE — CONSULTS
[facility]    Patient - Chapin Holly   MRN -  [id number]   [facility] # - [de-identified]   - 1950      Date of Admission -  2019  9:30 PM  Date of evaluation -  2019  [facility]   Hospital Day - Jeronimo Jacobson MD Primary Care Physician - [address] [place] [address]   Chief Complaint   Worsening SOB  Active Hospital Problem List      Active Hospital Problems    Diagnosis Date Noted    Hyperkalemia [E87.5] 2019    Infiltrate noted on imaging study [R93.89] 2019    SOB (shortness of breath) [R06.02] 2019    Hyponatremia [E87.1]     Elevated troponin [R74.8]     Uncontrolled type 2 diabetes mellitus with hyperglycemia (HCC) [E11.65] 2018    Acute on chronic diastolic CHF (congestive heart failure) ([address] [I50.33] 2018    Acute pulmonary edema ([address] [J81.0] 2018    Chronic kidney disease (CKD), stage IV (severe) ([address] [N18.4] 2018     THOM Holly is a 81 y.o. female with CHF, CKD, HTN, DM2, recurrent pleural effusions transudate, returns to ED from [facility] a few days after DC with re accumulation of pleural fluid, previous taps in 3 different occasions over the last few weeks.   CXR R>L pleural effusion  Past Medical History         Diagnosis Date    Anemia     Anemia in chronic kidney disease(285.21)     Arthritis     Asthma     Bipolar 1 disorder ([address]     CHF (congestive heart failure) (HCC)     Chronic kidney disease, stage III (moderate) (HCC)     Depression     GERD (gastroesophageal reflux disease)     Gout     Headache(784.0)     Hyperkalemia     Hyperlipidemia     Hypertension     Hypothyroid     Neurogenic bladder     Pneumonia     Seizure ([address] 2018    Type II or unspecified type diabetes mellitus without mention of complication, not stated as uncontrolled       Past Surgical History           Procedure Laterality Date    BACK SURGERY      CERVICAL FUSION      CHOLECYSTECTOMY      COLONOSCOPY      ENDOSCOPY, COLON, DIAGNOSTIC      FRACTURE SURGERY       Diet    DIET CARB CONTROL; Allergies    Eggs or egg-derived products; Aspirin; Naproxen; Pcn [penicillins]; Vicodin [hydrocodone-acetaminophen]; Vilazodone; and Wellbutrin [bupropion]  Social History     Social History     Socioeconomic History    Marital status:      Spouse name: Not on file    Number of children: 1    Years of education: Not on file    Highest education level: Not on file   Occupational History    Not on file   Social Needs    Financial resource strain: Not on file    Food insecurity:     Worry: Not on file     Inability: Not on file    Transportation needs:     Medical: Not on file     Non-medical: Not on file   Tobacco Use    Smoking status: Never Smoker    Smokeless tobacco: Never Used   Substance and Sexual Activity    Alcohol use: No    Drug use: No    Sexual activity: Not Currently   Lifestyle    Physical activity:     Days per week: Not on file     Minutes per session: Not on file    Stress: Not on file   Relationships    Social connections:     Talks on phone: Not on file     Gets together: Not on file     Attends [employer] service: Not on file     Active member of club or organization: Not on file     Attends meetings of clubs or organizations: Not on file     Relationship status: Not on file    Intimate partner violence:     Fear of current or ex partner: Not on file     Emotionally abused: Not on file     Physically abused: Not on file     Forced sexual activity: Not on file   Other Topics Concern    Not on file   Social History Narrative    Not on file     Family History          Problem Relation Age of Onset    High Blood Pressure Mother     Diabetes Mother     Cancer Mother     Heart Attack Mother     Stroke Father     High Blood Pressure Father     Anemia Father        ROS    General/Constitutional: No recent loss of weight or appetite changes.  No fever or chills. HENT: Negative. Eyes: Negative. Upper respiratory tract: No nasal stuffiness or post nasal drip. Lower respiratory tract/ lungs: positive SOB, denies cough, sputum production  Cardiovascular: No palpitations, chest pain, positive edema  Gastrointestinal: No nausea or vomiting. Neurological: No focal neurological weakness. Extremities: No tenderness. Musculoskeletal: no complaints  Genitourinary: No complaints. Hematological: Negative. Denies easy buising  Skin: No itching. Meds    Current Medications    sodium chloride flush  10 mL Intravenous 2 times per day    docusate sodium  100 mg Oral BID    heparin (porcine)  5,000 Units Subcutaneous 3 times per day    sodium chloride  1 g Oral TID WC    amLODIPine  10 mg Oral Daily    doxazosin  1 mg Oral Daily    DULoxetine  30 mg Oral Daily    famotidine  10 mg Oral BID    ferrous sulfate  325 mg Oral BID    gabapentin  100 mg Oral TID    hydrALAZINE  100 mg Oral 3 times per day    insulin glargine  10 Units Subcutaneous BID    isosorbide dinitrate  20 mg Oral TID    levothyroxine  25 mcg Oral Daily    meclizine  25 mg Oral TID    oxybutynin  5 mg Oral Daily    pravastatin  40 mg Oral Nightly    insulin lispro  0-6 Units Subcutaneous TID WC    insulin lispro  0-3 Units Subcutaneous Nightly     sodium chloride flush, ondansetron, acetaminophen, glucose, dextrose, glucagon (rDNA), dextrose  IV Drips/Infusions   dextrose       Vitals    Vitals    height is 5' 4\" (1.626 m) and weight is 165 lb 3.2 oz (74.9 kg). Her oral temperature is 97.9 °F (36.6 °C). Her blood pressure is 148/67 (abnormal) and her pulse is 56. Her respiration is 24 and oxygen saturation is 90%.      O2 Flow Rate (L/min): 4 L/min  I/O    Intake/Output Summary (Last 24 hours) at 6/17/2019 1216  Last data filed at 6/17/2019 0322  Gross per 24 hour   Intake 50 ml   Output --   Net 50 ml     Patient Vitals for the past 96 hrs (Last 3 readings):   Weight   06/17/19 results for input(s): LACTA in the last 72 hours. INR  No results for input(s): INR, PROTIME in the last 72 hours. PTT  No results for input(s): APTT in the last 72 hours. Glucose  Recent Labs     06/17/19  0213 06/17/19  0645 06/17/19  1056   POCGLU 140* 112* 185*     UA No results for input(s): SPECGRAV, PHUR, COLORU, CLARITYU, MUCUS, PROTEINU, BLOODU, RBCUA, WBCUA, BACTERIA, NITRU, GLUCOSEU, BILIRUBINUR, UROBILINOGEN, KETUA, LABCAST, LABCASTTY, AMORPHOS in the last 72 hours. Invalid input(s): CRYSTALS. PFTs   N/A  Echo        Summary   Technically difficult study with suboptimal views   Left ventricle size is normal.   Systolic function was low-normal.   Ejection fraction was estimated at 50-55%. There were no regional left   ventricular wall motion abnormalities and wall thickness was within normal   limits.   Mildly dilated left atrium.   Calcification of the mitral valve noted.   Mild mitral regurgitation is present.   Mild tricuspid regurgitation.      Signature      ----------------------------------------------------------------   Electronically signed by Cleotilde Aschoff MD (Interpreting   physician) on 12/08/2018 at 03:42 PM  Cultures    Procalcitonin  Lab Results   Component Value Date    PROCAL 0.40 06/16/2019    PROCAL 0.27 06/07/2019    PROCAL 0.29 05/20/2019         Radiology    CXR         Moderate to large right and small left pleural effusions. Bilateral interstitial pulmonary edema versus atypical pneumonia. Left lower lobe atelectasis.           **This report has been created using voice recognition software. It may contain minor errors which are inherent in voice recognition technology. **       Final report electronically signed by Dr. Larry Ornelas on 6/16/2019 11:02 PM           (See actual reports for details)    Assessment   Rapidly recurrent trasudative  pleural effusions due to systemic cause (CHF, CKD)  S/P multiple admissions and thoracentesis  Recommendations   Will ask IR to

## 2019-06-17 NOTE — ED NOTES
Bed: 008A  Expected date: 6/16/19  Expected time: 9:27 PM  Means of arrival: EMS  [facility]  [facility]16/[facility] 1922

## 2019-06-17 NOTE — PROGRESS NOTES
Patient complaining of shortness of breath and nausea. Oxygen sat 82% on 2L per nasal cannula. Oxygen increased to 4L and oxygen saturation came up to 99%. Vitals obtained and blood sugar taken.

## 2019-06-17 NOTE — PLAN OF CARE
Problem: Falls - Risk of:  Goal: Will remain free from falls  Description  Will remain free from falls  Outcome: Ongoing  Note:   No falls this shift, call light in reach. Bed alarm on. Up with 1 assist. PT/OT working with patient     Problem: Risk for Impaired Skin Integrity  Goal: Tissue integrity - skin and mucous membranes  Description  Structural intactness and normal physiological function of skin and  mucous membranes. Outcome: Ongoing  Note:   No new skin breakdown noted. Pt turns and repositions self frequently. Problem: DISCHARGE BARRIERS  Goal: Patient's continuum of care needs are met  6/17/2019 1617 by Denzel Powell RN  Outcome: Ongoing  Note:   Pt plans on returning to [facility] at discharge.  assisting with discharge needs    Care plan reviewed with patient. Patient verbalize understanding of the plan of care and contribute to goal setting.

## 2019-06-17 NOTE — ED PROVIDER NOTES
Hematological: Does not bruise/bleed easily. Psychiatric/Behavioral: Positive for dysphoric mood. Negative for suicidal ideas. The patient is not nervous/anxious. All other systems reviewed and are negative. PAST MEDICAL HISTORY    has a past medical history of Anemia, Anemia in chronic kidney disease(285.21), Arthritis, Asthma, Bipolar 1 disorder ([address] CHF (congestive heart failure) ([address] Chronic kidney disease, stage III (moderate) ([address] Depression, GERD (gastroesophageal reflux disease), Gout, Headache(784.0), Hyperkalemia, Hyperlipidemia, Hypertension, Hypothyroid, Neurogenic bladder, Pneumonia, Seizure ([address] and Type II or unspecified type diabetes mellitus without mention of complication, not stated as uncontrolled. SURGICAL HISTORY      has a past surgical history that includes fracture surgery; Cholecystectomy; cervical fusion; back surgery; Colonoscopy; and Endoscopy, colon, diagnostic. CURRENT MEDICATIONS       Previous Medications    ACETAMINOPHEN (TYLENOL) 325 MG TABLET    Take 650 mg by mouth every 6 hours as needed for Pain    ACETAMINOPHEN-CODEINE (TYLENOL #3) 300-30 MG PER TABLET    Take 1 tablet by mouth every 6 hours as needed for Pain. AMLODIPINE (NORVASC) 10 MG TABLET    Take 10 mg by mouth daily    BLOOD GLUCOSE TEST STRIPS (ASCENSIA AUTODISC VI;ONE TOUCH ULTRA TEST VI) STRIP    Patient tests blood sugar three times per day. Diagnosis DMII E11.9    BUMETANIDE (BUMEX) 1 MG TABLET    Take 1 tablet by mouth daily    DOXAZOSIN (CARDURA) 1 MG TABLET    Take 1 tablet by mouth daily    DULOXETINE (CYMBALTA) 30 MG EXTENDED RELEASE CAPSULE    Take 30 mg by mouth daily    FAMOTIDINE (PEPCID) 10 MG TABLET    Take 10 mg by mouth 2 times daily    FERROUS SULFATE 325 (65 FE) MG TABLET    Take 325 mg by mouth 2 times daily    GABAPENTIN (NEURONTIN) 100 MG CAPSULE    Take 100 mg by mouth 3 times daily.     HYDRALAZINE (APRESOLINE) 100 MG TABLET    Take 1 tablet by mouth every 8 hours INSULIN DETEMIR (LEVEMIR) 100 UNIT/ML INJECTION PEN    Inject 10 Units into the skin 2 times daily    INSULIN LISPRO (HUMALOG KWIKPEN) 100 UNIT/ML PEN    Inject 0-9 Units into the skin 4 times daily (before meals and nightly) Inject as per sliding scale:  140-199 = 1 unit,  200-249 = 3 units,  250-299 = 5 units,  300-349 = 7 units,  350-399 = 9 units,  400-999 = call MD    INSULIN PEN NEEDLE 31G X 8 MM MISC    1 Device    ISOSORBIDE DINITRATE (ISORDIL) 20 MG TABLET    Take 1 tablet by mouth 3 times daily    LEVOTHYROXINE (SYNTHROID) 25 MCG TABLET    Take 25 mcg by mouth Daily    MECLIZINE (ANTIVERT) 25 MG TABLET    Take 25 mg by mouth 3 times daily    ONDANSETRON (ZOFRAN-ODT) 4 MG DISINTEGRATING TABLET    Take 4 mg by mouth every 4 hours as needed for Nausea or Vomiting    OXYBUTYNIN (DITROPAN) 5 MG TABLET    Take 5 mg by mouth daily    OXYGEN    Inhale 2-4 L into the lungs as needed (to keep sat> 90%)    PRAVASTATIN (PRAVACHOL) 40 MG TABLET    Take 40 mg by mouth nightly        ALLERGIES     is allergic to eggs or egg-derived products; aspirin; naproxen; pcn [penicillins]; vicodin [hydrocodone-acetaminophen]; vilazodone; and wellbutrin [bupropion]. FAMILY HISTORY     indicated that her mother is . She indicated that her father is . family history includes Anemia in her father; Cancer in her mother; Diabetes in her mother; Heart Attack in her mother; High Blood Pressure in her father and mother; Stroke in her father. SOCIAL HISTORY      reports that she has never smoked. She has never used smokeless tobacco. She reports that she does not drink alcohol or use drugs. PHYSICAL EXAM     ED Triage Vitals [19 2143]   BP Temp Temp Source Pulse Resp SpO2 Height Weight   (!) 146/44 98.6 °F (37 °C) Oral 56 16 95 % -- 160 lb (72.6 kg)      Physical Exam   Constitutional: She is oriented to person, place, and time. She appears well-developed. She appears cachectic. She is cooperative.   Non-toxic appearance. She has a sickly appearance. She does not appear ill. Nasal cannula in place. HENT:   Head: Normocephalic. Right Ear: External ear normal. No drainage. Left Ear: External ear normal. No drainage. Nose: Nose normal. No epistaxis. Mouth/Throat: Mucous membranes are not dry and not cyanotic. Eyes: Conjunctivae and EOM are normal. Right eye exhibits no discharge. Left eye exhibits no discharge. No scleral icterus. Neck: Trachea normal, normal range of motion and phonation normal. Neck supple. No tracheal deviation present. Cardiovascular: Regular rhythm, normal heart sounds and intact distal pulses. Bradycardia present. Exam reveals no gallop and no friction rub. No murmur heard. Pulses:       Radial pulses are 2+ on the right side. Pulmonary/Chest: Effort normal. No stridor. No respiratory distress. She has decreased breath sounds. She has no wheezes. She has no rales. She exhibits no tenderness. Abdominal: Soft. Bowel sounds are normal. She exhibits no distension and no pulsatile midline mass. There is no tenderness. There is no rebound and no guarding. Peritoneal port in place    Musculoskeletal: Normal range of motion. She exhibits no edema or tenderness (No calf pain or tenderness. No evidence of DVT). Neurological: She is alert and oriented to person, place, and time. She has normal strength. She displays no tremor. She displays no seizure activity. Coordination normal. GCS eye subscore is 4. GCS verbal subscore is 5. GCS motor subscore is 6. Skin: Skin is warm and dry. No rash (on exposed surfaced) noted. She is not diaphoretic. No cyanosis or erythema. There is pallor. Psychiatric: She has a normal mood and affect. Her speech is normal and behavior is normal.   Nursing note and vitals reviewed.     DIFFERENTIAL DIAGNOSIS:   Including but not limited to fluid overload, CHF, pneumonia, chronic vs. Acute renal failure     DIAGNOSTIC RESULTS     EKG: All EKG's are interpreted by the Emergency Department Physicianwho either signs or Co-signs this chart in the absence of a cardiologist.    Simi Pan. Rate: 55 bpm  VA interval: 118 ms  QRS duration: 92 ms  QTc: 403 ms  P-R-T axes: 28, 7, 62  Sinus bradycardia   No STEMI     RADIOLOGY: non-plainfilm images(s) such as CT, Ultrasound and MRI are read by the radiologist.    XR CHEST PORTABLE   Final Result      Moderate to large right and small left pleural effusions. Bilateral interstitial pulmonary edema versus atypical pneumonia. Left lower lobe atelectasis. **This report has been created using voice recognition software. It may contain minor errors which are inherent in voice recognition technology. **      Final report electronically signed by Dr. Lula Muñoz on 6/16/2019 11:02 PM          [x] Visualized and interpreted by me   [x] Radiologist's Wet Read Report Reviewed   [] Discussed with Radiologist.    Liz Cheung:   Results for orders placed or performed during the hospital encounter of 06/16/19   CBC Auto Differential   Result Value Ref Range    WBC 16.0 (H) 4.8 - 10.8 thou/mm3    RBC 2.86 (L) 4.20 - 5.40 mill/mm3    Hemoglobin 8.7 (L) 12.0 - 16.0 gm/dl    Hematocrit 25.2 (L) 37.0 - 47.0 %    MCV 88.1 81.0 - 99.0 fL    MCH 30.4 26.0 - 33.0 pg    MCHC 34.5 32.2 - 35.5 gm/dl    RDW-CV 13.4 11.5 - 14.5 %    RDW-SD 43.0 35.0 - 45.0 fL    Platelets 801 (H) 647 - 400 thou/mm3    MPV 11.7 9.4 - 12.4 fL    Seg Neutrophils 87.1 %    Lymphocytes 5.4 %    Monocytes 6.0 %    Eosinophils 0.3 %    Basophils 0.4 %    Immature Granulocytes 0.8 %    Segs Absolute 13.9 (H) 1.8 - 7.7 thou/mm3    Lymphocytes # 0.9 (L) 1.0 - 4.8 thou/mm3    Monocytes # 1.0 0.4 - 1.3 thou/mm3    Eosinophils # 0.0 0.0 - 0.4 thou/mm3    Basophils # 0.1 0.0 - 0.1 thou/mm3    Immature Grans (Abs) 0.13 (H) 0.00 - 0.07 thou/mm3    nRBC 0 /100 wbc   Basic Metabolic Panel w/ Reflex to MG   Result Value Ref Range    Sodium 128 (L) 135 - 145 meq/L    Potassium reflex Magnesium 5.8 (H) 3.5 - 5.2 meq/L    Chloride 93 (L) 98 - 111 meq/L    CO2 18 (L) 23 - 33 meq/L    Glucose 156 (H) 70 - 108 mg/dL    BUN 95 (H) 7 - 22 mg/dL    CREATININE 6.0 (HH) 0.4 - 1.2 mg/dL    Calcium 9.6 8.5 - 10.5 mg/dL   Troponin   Result Value Ref Range    Troponin T 0.051 (A) ng/ml   Brain Natriuretic Peptide   Result Value Ref Range    Pro-BNP 7690.0 (H) 0.0 - 900.0 pg/mL   Blood Gas, Arterial   Result Value Ref Range    pH, Blood Gas 7.39 7.35 - 7.45    PCO2 33 (L) 35 - 45 mmhg    PO2 66 (L) 71 - 104 mmhg    HCO3 19 (L) 23 - 28 mmol/l    Base Excess (Calculated) -5.0 (L) -2.5 - 2.5 mmol/l    O2 Sat 93 %    IFIO2 6     DEVICE Cannula     Vince Test Positive     Source: R Radial     COLLECTED BY: [id number]    Lactate, Sepsis   Result Value Ref Range    Lactic Acid, Sepsis 0.5 0.5 - 1.9 mmol/L   Procalcitonin   Result Value Ref Range    Procalcitonin 0.40 (H) 0.01 - 0.09 ng/mL   Anion Gap   Result Value Ref Range    Anion Gap 17.0 (H) 8.0 - 16.0 meq/L   Glomerular Filtration Rate, Estimated   Result Value Ref Range    Est, Glom Filt Rate 7 (A) ml/min/1.73m2   Osmolality   Result Value Ref Range    Osmolality Calc 289.7 275.0 - 300 mOsmol/kg   EKG 12 Lead   Result Value Ref Range    Ventricular Rate 55 BPM    Atrial Rate 55 BPM    P-R Interval 118 ms    QRS Duration 92 ms    Q-T Interval 422 ms    QTc Calculation (Bazett) 403 ms    P Axis 28 degrees    R Axis 7 degrees    T Axis 62 degrees       EMERGENCY DEPARTMENT COURSE:   Vitals:    Vitals:    06/16/19 2319 06/16/19 2320 06/16/19 2321 06/16/19 2322   BP:       Pulse: 56 56 56 56   Resp: 16 17 17 21   Temp:       TempSrc:       SpO2: 98% 98% 99% 99%   Weight:           Orders Placed This Encounter   Medications    ondansetron (ZOFRAN) injection 4 mg    0.9 % sodium chloride infusion    furosemide (LASIX) injection 80 mg    sodium chloride tablet 1 g       Patient was seen and evaluated emergency department. History physical were completed.   Diagnostic labs and imaging studies are reviewed. Workup demonstrates pulmonary edema and acute on chronic renal failure with increasing pleural effusion on the right. She has significant hypoxia requiring 6 L to maintain her PO2 60s. The patient was treated with Zofran and Lasix in the emergency department. I recommended consideration for admission and contacted the hospitalist service, Dr. Maurizio Florentino. He will admit and assume further care and orders for patient at this time. CRITICAL CARE:  There was a high probability of clinically significant/life threatening deterioration in this patient's condition which required my urgent intervention. Total critical care time was 30 minutes. This excludes any time for separately reportable procedures. FINAL IMPRESSION      1. Shortness of breath    2. Pulmonary edema with congestive heart failure ([address]    3. Acute renal failure superimposed on chronic kidney disease, unspecified CKD stage, unspecified acute renal failure type [facility])          DISPOSITION/PLAN   DISPOSITION Admitted 06/16/2019 11:42:48 PM    PATIENT REFERRED TO:  Annette Layton DO  [facility], [address]  [phone]          Scribe:  Valerie Del Rio 6/16/19 9:59 PMScribing for and in the presence of Dr. Shelton Red M.D. Signed by: Pinky Spaulding, 06/17/19 12:09 AM    Provider:  I personally performed the services described in the documentation, reviewed and edited the documentation which was dictated to the scribe in my presence, and itaccurately records my words and actions.     Dr. Shelton Red M.D 6/16/19 12:09 AM       Shelton Red MD  06/17/19 0760

## 2019-06-17 NOTE — PROGRESS NOTES
Satisfied  Multiple Pain Sites: No    Social/Functional History:  Lives With: Alone  Type of Home: Facility([facility]ly [facility])  Home Layout: One level  Home Access: Level entry  Home Equipment: Rolling walker   Bathroom Shower/Tub: Walk-in shower  Bathroom Toilet: Standard  Bathroom Equipment: Grab bars in shower    Receives Help From: Other (comment)(staff as available)  ADL Assistance: Needs assistance  Homemaking Assistance: Needs assistance  Homemaking Responsibilities: No  Ambulation Assistance: Independent  Transfer Assistance: Independent    Active : No  Patient's  Info: Chan Barr provided for transportation needs  Occupation: Retired  Leisure & Hobbies: Reading, spending time with grandchildren  Additional Comments: Pt had needed more help with her self care in the days leading to admission. She did not use O2 until a couple of days prior to admission. Pt uses a rolling walker. Pt had been able to participate in groups at nursing home until recently. Cognition/Orientation:  Overall Orientation Status: Within Normal Limits  Overall Cognitive Status: WFL  Cognition Comment: Cues needed for problem solving new learning. ADL;s:  Grooming: Setup(Help needed for opening the bottle of mouthwash; pt also removed the upper denture from her mouth and replaced it after it had been rinsed for her)  LE Dressing: Maximum assistance(Donning slipper socks)  Vision - Basic Assessment  Prior Vision: Wears glasses only for reading    Functional Mobility:  Bed mobility  Rolling to Left: Contact guard assistance(cues for reaching across the bed for the rail)  Supine to Sit: Stand by assistance(using the bedrail with cues)  Scooting: Stand by assistance    Functional Mobility  Functional - Mobility Device: Rolling Walker  Activity: Other  Assist Level: Minimal assistance  Functional Mobility Comments: Walked 8 ft from the side of the bed across the room to the chair with unsteadiness noted.   Cues provided to walk slowly. Balance:  Balance  Sitting Balance: Contact guard assistance(pt had tendency to lean toward the side while sitting)  Standing Balance: Contact guard assistance(preparing to walk)  Standing Balance  Time: 40 seconds  Activity: pt waited to get her balance before taking any steps    Transfers:  Sit to stand: Contact guard assistance(from the edge of bed)  Stand to sit: Contact guard assistance(to the recliner chair with cues to reach back when sitting)       Upper Extremity Assessment:Hand Dominance: Right  LUE PROM: WNL  LUE AROM : WNL  Left Hand General PROM: Limited 5th finger PIP/DIP extension  Left Hand General AROM: Unable to extend 5th finger PIP and DIP jts  RUE PROM: WNL  RUE AROM : WNL  Right Hand PROM: WNL  Right Hand AROM: WNL    LUE Strength  L Hand General: 4-/5  LUE Strength Comment: 3+/5 deltoid; 3+/5 pectoral; 4-/5 biceps/triceps    RUE Strength  R Hand General: 4/5  RUE Strength Comment: 3+/5 deltoid; 3+/5 pectoral; 4-/5 biceps/triceps      Sensation  Overall Sensation Status: (Numbness or tingling in her L hand - medially)  Fine Motor Skills  Fine Motor Comment: Pt had some difficulty with opening her mouthwash bottle. Pt reports needing help with opening packets most of the time but can  use her cell phone. Activity Tolerance: Patient limited by fatigue, Patient limited by pain  Pt was  using 4L of O2. She could stand for up to 40 seconds while preparing to walk. She removed the O2 for a few mintues while wiping off her face. Pt reports back/leg pain which is relieved with her legs being elevated. Assessment:  Assessment: Patient would benefit from continued skilled OT services to address above deficits. She presents with decreased endurance, balance and limited functional mobility and ADLs secondary to decreased kidney functioning. Pt is supposed to start having peritoneal dialysis.   She was doing her own self care and participating in group activities up until a couple of weeks ago. She walked with a rolling walker in the nursing home. Pt needed CGA for walking at this time and had some unsteadiness. She needed help with lower body ADLs. Pt has 4L of O2 at this time and had no oxygen use prior to admission. Performance deficits / Impairments: Decreased functional mobility , Decreased ADL status, Decreased endurance, Decreased balance  Prognosis: Good  REQUIRES OT FOLLOW UP: Yes  Decision Making: Medium Complexity    Treatment Initiated:   Pt remained reclined in the chair at the end of session. She needed assist for safety while getting up out of bed and walking to the chair. She had nausea. She did agreed to try drinking apple juice at the end of session. Pt's nurse was informed. Pt's blood pressure was stable: 153/67 while in supine and 149/66 while in sitting. It remained stable while standing also. Encouraged pt take it slow when she is getting up to use the bathroom and to have a staff member with her at this time. Pt agreed. Discharge Recommendations:  Continue to assess pending progress, Patient would benefit from continued therapy after discharge    Patient Education:  Patient Education: OT POC; pt's goal; endurance training; importance of taking it slowly when she gets up out of bed and having staff with her at this time so she can be safe    Equipment Recommendations:  Equipment Needed: No    Plan:  Times per week: 3-5x  Current Treatment Recommendations: Functional Mobility Training, Endurance Training, Self-Care / ADL, Safety Education & Training, Balance Training  Plan Comment: Pt would benefit from continued OT when medically stable and discharged from Acute. OT recommended when returning to [facility].   Specific instructions for Next Treatment: Functional mobility; doing ADLs and standing safty; upper body ROM/light resistance exercises and endurance training    Goals:  Patient goals : \"I want to get better and be able to move to be closer to my son and his family. \" pt states. Short term goals  Time Frame for Short term goals: 1 week  Short term goal 1: Pt will demonstrate functional mobility walking to/from the bathroom with no > than CGA to prepare for doing sinkside grooming. Short term goal 2: Pt will complete dynamic standing activities for over 3 minutes while using 1-2 hand release with SBA to increase her endurance and safety doing ADLs. Short term goal 3: Pt will complete transfers to/from various surfaces with SBA and cues for hand placement to increase her safety for doing her toileting routine. Short term goal 4: Pt will complete a spongebath or dress her lower body with no > than CGA to increase her safety for returning to taking a shower. Long term goals  Time Frame for Long term goals : None secondary to short estimated length of stay. Following session, patient left in safe position with all fall risk precautions in place.

## 2019-06-17 NOTE — CONSULTS
ENDOSCOPY, COLON, DIAGNOSTIC      FRACTURE SURGERY       Social History     Socioeconomic History    Marital status:      Spouse name: Not on file    Number of children: 1    Years of education: Not on file    Highest education level: Not on file   Occupational History    Not on file   Social Needs    Financial resource strain: Not on file    Food insecurity:     Worry: Not on file     Inability: Not on file    Transportation needs:     Medical: Not on file     Non-medical: Not on file   Tobacco Use    Smoking status: Never Smoker    Smokeless tobacco: Never Used   Substance and Sexual Activity    Alcohol use: No    Drug use: No    Sexual activity: Not Currently   Lifestyle    Physical activity:     Days per week: Not on file     Minutes per session: Not on file    Stress: Not on file   Relationships    Social connections:     Talks on phone: Not on file     Gets together: Not on file     Attends Anabaptism service: Not on file     Active member of club or organization: Not on file     Attends meetings of clubs or organizations: Not on file     Relationship status: Not on file    Intimate partner violence:     Fear of current or ex partner: Not on file     Emotionally abused: Not on file     Physically abused: Not on file     Forced sexual activity: Not on file   Other Topics Concern    Not on file   Social History Narrative    Not on file     Family History   Problem Relation Age of Onset    High Blood Pressure Mother     Diabetes Mother     Cancer Mother     Heart Attack Mother     Stroke Father     High Blood Pressure Father     Anemia Father      Medications & Allergies      Prior to Admission medications    Medication Sig Start Date End Date Taking?  Authorizing Provider   ondansetron (ZOFRAN-ODT) 4 MG disintegrating tablet Take 4 mg by mouth every 4 hours as needed for Nausea or Vomiting   Yes Historical Provider, MD   famotidine (PEPCID) 10 MG tablet Take 10 mg by mouth 2 Historical Provider, MD   blood glucose test strips (ASCENSIA AUTODISC VI;ONE TOUCH ULTRA TEST VI) strip Patient tests blood sugar three times per day. Diagnosis DMII E11.9 7/18/18  Yes Historical Provider, MD   Insulin Pen Needle 31G X 8 MM MISC 1 Device 6/6/18  Yes Historical Provider, MD   pravastatin (PRAVACHOL) 40 MG tablet Take 40 mg by mouth nightly    Yes Historical Provider, MD     Allergies: Eggs or egg-derived products; Aspirin; Naproxen; Pcn [penicillins]; Vicodin [hydrocodone-acetaminophen]; Vilazodone; and Wellbutrin [bupropion]  IP meds : Scheduled Meds:   sodium chloride flush  10 mL Intravenous 2 times per day    docusate sodium  100 mg Oral BID    heparin (porcine)  5,000 Units Subcutaneous 3 times per day    sodium polystyrene  15 g Oral Once    sodium chloride  1 g Oral TID WC    amLODIPine  10 mg Oral Daily    doxazosin  1 mg Oral Daily    DULoxetine  30 mg Oral Daily    famotidine  10 mg Oral BID    ferrous sulfate  325 mg Oral BID    gabapentin  100 mg Oral TID    hydrALAZINE  100 mg Oral 3 times per day    insulin glargine  10 Units Subcutaneous BID    isosorbide dinitrate  20 mg Oral TID    levothyroxine  25 mcg Oral Daily    meclizine  25 mg Oral TID    oxybutynin  5 mg Oral Daily    pravastatin  40 mg Oral Nightly    insulin lispro  0-6 Units Subcutaneous TID WC    insulin lispro  0-3 Units Subcutaneous Nightly     Continuous Infusions:   dextrose       Review of Systems Physical Exam   Review of Systems   Constitutional: Positive for fatigue. Negative for chills and fever. HENT: Negative. Negative for ear pain and sore throat. Eyes: Negative. Negative for pain. Respiratory: Negative for cough and shortness of breath. Cardiovascular: Negative for chest pain and leg swelling. Gastrointestinal: Negative for abdominal pain, diarrhea, nausea and vomiting. Genitourinary: Negative for dysuria, frequency and hematuria.    Musculoskeletal: Negative for back pain and neck pain. Skin: Negative for rash. Neurological: Negative for dizziness and headaches. Psychiatric/Behavioral: Negative for agitation and confusion. Physical Exam   Constitutional: She is oriented to person, place, and time. She appears well-developed and well-nourished. No distress. HENT:   Right Ear: External ear normal.   Left Ear: External ear normal.   Nose: Nose normal.   Mouth/Throat: Oropharynx is clear and moist.   Eyes: Conjunctivae are normal. Right eye exhibits no discharge. Left eye exhibits no discharge. No scleral icterus. Neck: Normal range of motion. Neck supple. No JVD present. No thyromegaly present. Cardiovascular: Normal rate, regular rhythm and normal heart sounds. No murmur heard. Pulmonary/Chest: Effort normal. No stridor. No respiratory distress. She has rales. She exhibits no tenderness. Diminished bS more on the rt side   Abdominal: Soft. Bowel sounds are normal. There is no tenderness. Musculoskeletal: She exhibits no edema or tenderness. Neurological: She is alert and oriented to person, place, and time. Skin: Skin is warm and dry. No rash noted. She is not diaphoretic. No erythema. Psychiatric: She has a normal mood and affect. Her behavior is normal.   Vitals reviewed. Vitals:    06/17/19 0700   BP: (!) 146/57   Pulse:    Resp:    Temp:    SpO2: 94%     Labs, Radiology and Tests       Recent Labs     06/16/19  2202   WBC 16.0*   RBC 2.86*   HGB 8.7*   HCT 25.2*   MCV 88.1   MCH 30.4   MCHC 34.5   *     Recent Labs     06/16/19  2202   *   K 5.8*   CL 93*   CO2 18*   BUN 95*   CREATININE 6.0*   CALCIUM 9.6       Radiology : Chest x-ray-personally reviewed by me-shows bilateral pleural effusions right greater than the left mild prominence of pulmonary vasculature    Other : All laboratory data have been reviewed and noted her previous creatinines run close to 3.5.     Assessment    Renal -acute kidney injury chronic kidney disease stage V, probably prerenal to some degree. She was on diuretics  - We will hold the diuretics and might benefit from IV hydration  - BMP in the morning        -     No urgent need for renal replacement therapy. Hyperkalemia secondary to renal dysfunction we will recheck later this afternoon. Hypokalemia secondary to renal dysfunction and inability to excrete free water  Metabolic acidosis from renal dysfunction  Pleural effusion-apparently is deemed transudate and upon speaking with the pulmonologist during the last admission she might benefit from renal replacement therapy however she had recurrent pleural effusion within 3 days. Even though if we start peritoneal dialysis would not help in the immediate future so would prefer a Pleurx catheter in the time being for a few weeks. Anemia of chronic kidney disease  Diabetes mellitus  meds reviewed and D/W patient,  and also nursing staff   Stop salt tabs for now . **This report has been created using voice recognition software. It maycontain minor  errors which are inherent in voice recognition technology. **    Rolando Rivers M.D  [facility].

## 2019-06-17 NOTE — PROGRESS NOTES
Pharmacy Medication History Note      List of current medications patient is taking is complete. Source of information: [facility] Medication List     Changes made to medication list:  Medications removed (include reason, ex.  therapy complete or physician discontinued):  none    Medications added/doses adjusted:  none    Electronically signed by Emil Newberry [address] [place] [address] on 6/17/2019 at 2:09 PM

## 2019-06-17 NOTE — PLAN OF CARE
Problem: Nutrition  Goal: Optimal nutrition therapy  Outcome: Ongoing   Nutrition Problem: Moderate malnutrition, In context of acute illness or injury  Intervention: Food and/or Nutrient Delivery: (Consider renal carb controlled diet as tolerated)  Nutritional Goals: Pt. will consume 75% or more at meals during LOS.

## 2019-06-18 ENCOUNTER — APPOINTMENT (OUTPATIENT)
Dept: GENERAL RADIOLOGY | Age: 69
DRG: 291 | End: 2019-06-18
Payer: MEDICARE

## 2019-06-18 ENCOUNTER — APPOINTMENT (OUTPATIENT)
Dept: INTERVENTIONAL RADIOLOGY/VASCULAR | Age: 69
DRG: 291 | End: 2019-06-18
Payer: MEDICARE

## 2019-06-18 LAB
ANION GAP SERPL CALCULATED.3IONS-SCNC: 20 MEQ/L (ref 8–16)
BUN BLDV-MCNC: 103 MG/DL (ref 7–22)
CALCIUM SERPL-MCNC: 8.7 MG/DL (ref 8.5–10.5)
CHLORIDE BLD-SCNC: 93 MEQ/L (ref 98–111)
CO2: 17 MEQ/L (ref 23–33)
CREAT SERPL-MCNC: 6.2 MG/DL (ref 0.4–1.2)
ERYTHROCYTE [DISTWIDTH] IN BLOOD BY AUTOMATED COUNT: 13.4 % (ref 11.5–14.5)
ERYTHROCYTE [DISTWIDTH] IN BLOOD BY AUTOMATED COUNT: 44.6 FL (ref 35–45)
GFR SERPL CREATININE-BSD FRML MDRD: 7 ML/MIN/1.73M2
GLUCOSE BLD-MCNC: 108 MG/DL (ref 70–108)
GLUCOSE BLD-MCNC: 133 MG/DL (ref 70–108)
GLUCOSE BLD-MCNC: 154 MG/DL (ref 70–108)
GLUCOSE BLD-MCNC: 91 MG/DL (ref 70–108)
GLUCOSE BLD-MCNC: 97 MG/DL (ref 70–108)
HCT VFR BLD CALC: 25.5 % (ref 37–47)
HEMOGLOBIN: 8.4 GM/DL (ref 12–16)
MCH RBC QN AUTO: 30 PG (ref 26–33)
MCHC RBC AUTO-ENTMCNC: 32.9 GM/DL (ref 32.2–35.5)
MCV RBC AUTO: 91.1 FL (ref 81–99)
PLATELET # BLD: 391 THOU/MM3 (ref 130–400)
PMV BLD AUTO: 11.4 FL (ref 9.4–12.4)
POTASSIUM REFLEX MAGNESIUM: 5 MEQ/L (ref 3.5–5.2)
RBC # BLD: 2.8 MILL/MM3 (ref 4.2–5.4)
SODIUM BLD-SCNC: 130 MEQ/L (ref 135–145)
WBC # BLD: 14.2 THOU/MM3 (ref 4.8–10.8)

## 2019-06-18 PROCEDURE — 99232 SBSQ HOSP IP/OBS MODERATE 35: CPT | Performed by: INTERNAL MEDICINE

## 2019-06-18 PROCEDURE — 6370000000 HC RX 637 (ALT 250 FOR IP): Performed by: PHYSICIAN ASSISTANT

## 2019-06-18 PROCEDURE — 32550 INSERT PLEURAL CATH: CPT

## 2019-06-18 PROCEDURE — 1200000003 HC TELEMETRY R&B

## 2019-06-18 PROCEDURE — 2580000003 HC RX 258: Performed by: INTERNAL MEDICINE

## 2019-06-18 PROCEDURE — 6370000000 HC RX 637 (ALT 250 FOR IP): Performed by: RADIOLOGY

## 2019-06-18 PROCEDURE — 80048 BASIC METABOLIC PNL TOTAL CA: CPT

## 2019-06-18 PROCEDURE — 32551 INSERTION OF CHEST TUBE: CPT

## 2019-06-18 PROCEDURE — C1729 CATH, DRAINAGE: HCPCS

## 2019-06-18 PROCEDURE — 75989 ABSCESS DRAINAGE UNDER X-RAY: CPT

## 2019-06-18 PROCEDURE — 6360000002 HC RX W HCPCS: Performed by: PHYSICIAN ASSISTANT

## 2019-06-18 PROCEDURE — 0W9930Z DRAINAGE OF RIGHT PLEURAL CAVITY WITH DRAINAGE DEVICE, PERCUTANEOUS APPROACH: ICD-10-PCS | Performed by: RADIOLOGY

## 2019-06-18 PROCEDURE — 82948 REAGENT STRIP/BLOOD GLUCOSE: CPT

## 2019-06-18 PROCEDURE — 2709999900 HC NON-CHARGEABLE SUPPLY

## 2019-06-18 PROCEDURE — 2580000003 HC RX 258: Performed by: RADIOLOGY

## 2019-06-18 PROCEDURE — 71045 X-RAY EXAM CHEST 1 VIEW: CPT

## 2019-06-18 PROCEDURE — 2500000003 HC RX 250 WO HCPCS: Performed by: RADIOLOGY

## 2019-06-18 PROCEDURE — APPSS30 APP SPLIT SHARED TIME 16-30 MINUTES: Performed by: NURSE PRACTITIONER

## 2019-06-18 PROCEDURE — 99233 SBSQ HOSP IP/OBS HIGH 50: CPT | Performed by: FAMILY MEDICINE

## 2019-06-18 PROCEDURE — C1894 INTRO/SHEATH, NON-LASER: HCPCS

## 2019-06-18 PROCEDURE — 36415 COLL VENOUS BLD VENIPUNCTURE: CPT

## 2019-06-18 PROCEDURE — 6370000000 HC RX 637 (ALT 250 FOR IP)

## 2019-06-18 PROCEDURE — C1769 GUIDE WIRE: HCPCS

## 2019-06-18 PROCEDURE — 2580000003 HC RX 258: Performed by: PHYSICIAN ASSISTANT

## 2019-06-18 PROCEDURE — 85027 COMPLETE CBC AUTOMATED: CPT

## 2019-06-18 PROCEDURE — 6360000002 HC RX W HCPCS

## 2019-06-18 PROCEDURE — 2500000003 HC RX 250 WO HCPCS

## 2019-06-18 PROCEDURE — 6360000002 HC RX W HCPCS: Performed by: RADIOLOGY

## 2019-06-18 RX ORDER — MIDAZOLAM HYDROCHLORIDE 1 MG/ML
0.5 INJECTION INTRAMUSCULAR; INTRAVENOUS ONCE
Status: COMPLETED | OUTPATIENT
Start: 2019-06-18 | End: 2019-06-18

## 2019-06-18 RX ORDER — SODIUM CHLORIDE, SODIUM LACTATE, CALCIUM CHLORIDE, MAGNESIUM CHLORIDE AND DEXTROSE 1.5; 538; 448; 18.3; 5.08 G/100ML; MG/100ML; MG/100ML; MG/100ML; MG/100ML
1000 INJECTION, SOLUTION INTRAPERITONEAL ONCE
Status: COMPLETED | OUTPATIENT
Start: 2019-06-18 | End: 2019-06-18

## 2019-06-18 RX ORDER — SODIUM CHLORIDE 9 MG/ML
INJECTION, SOLUTION INTRAVENOUS CONTINUOUS
Status: DISCONTINUED | OUTPATIENT
Start: 2019-06-18 | End: 2019-06-20

## 2019-06-18 RX ORDER — FENTANYL CITRATE 50 UG/ML
25 INJECTION, SOLUTION INTRAMUSCULAR; INTRAVENOUS ONCE
Status: COMPLETED | OUTPATIENT
Start: 2019-06-18 | End: 2019-06-18

## 2019-06-18 RX ORDER — DIAPER,BRIEF,INFANT-TODD,DISP
EACH MISCELLANEOUS ONCE
Status: COMPLETED | OUTPATIENT
Start: 2019-06-18 | End: 2019-06-18

## 2019-06-18 RX ADMIN — DULOXETINE HYDROCHLORIDE 30 MG: 30 CAPSULE, DELAYED RELEASE ORAL at 09:08

## 2019-06-18 RX ADMIN — BACITRACIN ZINC 1 G: 500 OINTMENT TOPICAL at 08:04

## 2019-06-18 RX ADMIN — SODIUM CHLORIDE: 9 INJECTION, SOLUTION INTRAVENOUS at 11:14

## 2019-06-18 RX ADMIN — ISOSORBIDE DINITRATE 20 MG: 20 TABLET ORAL at 22:29

## 2019-06-18 RX ADMIN — HYDRALAZINE HYDROCHLORIDE 100 MG: 50 TABLET, FILM COATED ORAL at 13:47

## 2019-06-18 RX ADMIN — MECLIZINE HYDROCHLORIDE 25 MG: 12.5 TABLET, FILM COATED ORAL at 13:46

## 2019-06-18 RX ADMIN — FENTANYL CITRATE 25 MCG: 50 INJECTION INTRAMUSCULAR; INTRAVENOUS at 07:45

## 2019-06-18 RX ADMIN — HYDRALAZINE HYDROCHLORIDE 100 MG: 50 TABLET, FILM COATED ORAL at 22:29

## 2019-06-18 RX ADMIN — FERROUS SULFATE TAB 325 MG (65 MG ELEMENTAL FE) 325 MG: 325 (65 FE) TAB at 09:08

## 2019-06-18 RX ADMIN — Medication 10 ML: at 09:09

## 2019-06-18 RX ADMIN — ISOSORBIDE DINITRATE 20 MG: 20 TABLET ORAL at 09:08

## 2019-06-18 RX ADMIN — MECLIZINE HYDROCHLORIDE 25 MG: 12.5 TABLET, FILM COATED ORAL at 22:29

## 2019-06-18 RX ADMIN — HYDRALAZINE HYDROCHLORIDE 100 MG: 50 TABLET, FILM COATED ORAL at 05:21

## 2019-06-18 RX ADMIN — PRAVASTATIN SODIUM 40 MG: 40 TABLET ORAL at 22:29

## 2019-06-18 RX ADMIN — INSULIN GLARGINE 10 UNITS: 100 INJECTION, SOLUTION SUBCUTANEOUS at 09:20

## 2019-06-18 RX ADMIN — SODIUM CHLORIDE, SODIUM LACTATE, CALCIUM CHLORIDE, MAGNESIUM CHLORIDE AND DEXTROSE 1000 ML: 1.5; 538; 448; 18.3; 5.08 INJECTION, SOLUTION INTRAPERITONEAL at 09:43

## 2019-06-18 RX ADMIN — FAMOTIDINE 10 MG: 20 TABLET, FILM COATED ORAL at 22:30

## 2019-06-18 RX ADMIN — GABAPENTIN 100 MG: 100 CAPSULE ORAL at 22:29

## 2019-06-18 RX ADMIN — OXYBUTYNIN CHLORIDE 5 MG: 5 TABLET ORAL at 09:08

## 2019-06-18 RX ADMIN — MIDAZOLAM 0.5 MG: 1 INJECTION INTRAMUSCULAR; INTRAVENOUS at 07:45

## 2019-06-18 RX ADMIN — LEVOTHYROXINE SODIUM 25 MCG: 25 TABLET ORAL at 05:21

## 2019-06-18 RX ADMIN — DOXAZOSIN 1 MG: 1 TABLET ORAL at 09:08

## 2019-06-18 RX ADMIN — DOCUSATE SODIUM 100 MG: 100 CAPSULE, LIQUID FILLED ORAL at 22:30

## 2019-06-18 RX ADMIN — BACITRACIN 50000 UNITS: 50000 INJECTION, POWDER, FOR SOLUTION INTRAMUSCULAR at 08:00

## 2019-06-18 RX ADMIN — ISOSORBIDE DINITRATE 20 MG: 20 TABLET ORAL at 13:47

## 2019-06-18 RX ADMIN — INSULIN LISPRO 1 UNITS: 100 INJECTION, SOLUTION INTRAVENOUS; SUBCUTANEOUS at 16:16

## 2019-06-18 RX ADMIN — GABAPENTIN 100 MG: 100 CAPSULE ORAL at 09:08

## 2019-06-18 RX ADMIN — FAMOTIDINE 10 MG: 20 TABLET, FILM COATED ORAL at 09:13

## 2019-06-18 RX ADMIN — HEPARIN SODIUM 5000 UNITS: 5000 INJECTION, SOLUTION INTRAVENOUS; SUBCUTANEOUS at 22:30

## 2019-06-18 RX ADMIN — FERROUS SULFATE TAB 325 MG (65 MG ELEMENTAL FE) 325 MG: 325 (65 FE) TAB at 22:29

## 2019-06-18 RX ADMIN — MECLIZINE HYDROCHLORIDE 25 MG: 12.5 TABLET, FILM COATED ORAL at 09:13

## 2019-06-18 RX ADMIN — AMLODIPINE BESYLATE 10 MG: 10 TABLET ORAL at 09:08

## 2019-06-18 RX ADMIN — DOCUSATE SODIUM 100 MG: 100 CAPSULE, LIQUID FILLED ORAL at 09:13

## 2019-06-18 RX ADMIN — GABAPENTIN 100 MG: 100 CAPSULE ORAL at 13:47

## 2019-06-18 ASSESSMENT — PAIN SCALES - GENERAL
PAINLEVEL_OUTOF10: 0

## 2019-06-18 ASSESSMENT — PAIN DESCRIPTION - DESCRIPTORS: DESCRIPTORS: HEAVINESS

## 2019-06-18 NOTE — PLAN OF CARE
[facility]  PHYSICAL THERAPY MISSED TREATMENT NOTE  ACUTE CARE    Date: 2019  Patient Name: Kathie Ram        MRN: [id number]   : 1950  (80 y.o.)  Gender: female   Referring Practitioner: [facility], VANDANA  Diagnosis: SOB         REASON FOR MISSED TREATMENT:  Patient refused treatment. Pt refuses PT due to fatigue, will check back tomorrow.

## 2019-06-18 NOTE — PROGRESS NOTES
[facility]    Patient - Namrata Kapoor   MRN -  [id number]   [facility] # - [de-identified]   - 1950      Date of Admission -  2019  9:30 PM  Date of evaluation -  2019  [facility]   Hospital Day - 2  Consulting - Daniela Allison MD Primary Care Physician - [address]   Chief Complaint   SOB  Active Hospital Problem List      Active Hospital Problems    Diagnosis Date Noted    Hyperkalemia [E87.5] 2019    Infiltrate noted on imaging study [R93.89] 2019    Shortness of breath [R06.02] 2019    Hyponatremia [E87.1]     Atelectasis [J98.11]     Chronic bilateral pleural effusions [J90] 2019    Chronic kidney disease (CKD), stage V ([address] [N18.5]     Elevated troponin [R74.8]     Uncontrolled type 2 diabetes mellitus with hyperglycemia (HCC) [E11.65] 2018    Acute on chronic diastolic CHF (congestive heart failure) ([address] [I50.33] 2018    Pulmonary edema with congestive heart failure ([address] [I50.1] 2018    Acute on chronic respiratory failure with hypoxia (HCC) [J96.21] 2018    LOUISE (acute kidney injury) ([address] [N17.9] 2018    Chronic kidney disease (CKD), stage IV (severe) ([address] [N18.4] 2018     HPI   Namrata Kapoor is a 81 y.o. female with CHF, CKD, HTN, DM2, recurrent pleural effusions transudate, returns to ED from [facility] a few days after DC with re accumulation of pleural fluid, previous taps in 3 different occasions over the last few weeks.   CXR R>L pleural effusion    Past 24 hrs   -R Pleurx placed  -SOB improved  -On 4 LPM via NC  All other systems reviewed     Past Medical History         Diagnosis Date    Anemia     Anemia in chronic kidney disease(285.21)     Arthritis     Asthma     Bipolar 1 disorder (HCC)     CHF (congestive heart failure) (HCC)     Chronic kidney disease, stage III (moderate) (HCC)     Depression     GERD (gastroesophageal reflux disease)     Gout     Topics Concern    Not on file   Social History Narrative    Not on file     Family History          Problem Relation Age of Onset    High Blood Pressure Mother     Diabetes Mother     Cancer Mother     Heart Attack Mother     Stroke Father     High Blood Pressure Father     Anemia Father      Meds    Current Medications    sodium chloride flush  10 mL Intravenous 2 times per day    docusate sodium  100 mg Oral BID    heparin (porcine)  5,000 Units Subcutaneous 3 times per day    amLODIPine  10 mg Oral Daily    doxazosin  1 mg Oral Daily    DULoxetine  30 mg Oral Daily    famotidine  10 mg Oral BID    ferrous sulfate  325 mg Oral BID    gabapentin  100 mg Oral TID    hydrALAZINE  100 mg Oral 3 times per day    insulin glargine  10 Units Subcutaneous BID    isosorbide dinitrate  20 mg Oral TID    levothyroxine  25 mcg Oral Daily    meclizine  25 mg Oral TID    oxybutynin  5 mg Oral Daily    pravastatin  40 mg Oral Nightly    insulin lispro  0-6 Units Subcutaneous TID WC    insulin lispro  0-3 Units Subcutaneous Nightly     sodium chloride flush, ondansetron, acetaminophen, glucose, dextrose, glucagon (rDNA), dextrose  IV Drips/Infusions   sodium chloride 50 mL/hr at 06/18/19 1114    dextrose       Vitals    Vitals    height is 5' 4\" (1.626 m) and weight is 163 lb 3.2 oz (74 kg). Her oral temperature is 98.6 °F (37 °C). Her blood pressure is 153/67 (abnormal) and her pulse is 63. Her respiration is 18 and oxygen saturation is 95%. O2 Flow Rate (L/min): 4 L/min  I/O    Intake/Output Summary (Last 24 hours) at 6/18/2019 1713  Last data filed at 6/18/2019 1459  Gross per 24 hour   Intake 1168 ml   Output 1775 ml   Net -607 ml     Patient Vitals for the past 96 hrs (Last 3 readings):   Weight   06/18/19 0430 163 lb 3.2 oz (74 kg)   06/17/19 0122 165 lb 3.2 oz (74.9 kg)   06/16/19 2143 160 lb (72.6 kg)     Exam   Physical Exam   Constitutional: No distress on O2 per NC.  Patient appears Acid  No results for input(s): LACTA in the last 72 hours. INR  No results for input(s): INR, PROTIME in the last 72 hours. PTT  No results for input(s): APTT in the last 72 hours. Glucose  Recent Labs     06/18/19  0624 06/18/19  1054 06/18/19  1532   POCGLU 108 133* 154*     UA No results for input(s): SPECGRAV, PHUR, COLORU, CLARITYU, MUCUS, PROTEINU, BLOODU, RBCUA, WBCUA, BACTERIA, NITRU, GLUCOSEU, BILIRUBINUR, UROBILINOGEN, KETUA, LABCAST, LABCASTTY, AMORPHOS in the last 72 hours. Invalid input(s): CRYSTALS. PFTs   N/A  Echo        Summary   Technically difficult study with suboptimal views   Left ventricle size is normal.   Systolic function was low-normal.   Ejection fraction was estimated at 50-55%. There were no regional left   ventricular wall motion abnormalities and wall thickness was within normal   limits.   Mildly dilated left atrium.   Calcification of the mitral valve noted.   Mild mitral regurgitation is present.   Mild tricuspid regurgitation.      Signature      ----------------------------------------------------------------   Electronically signed by Haider Billings MD (Interpreting   physician) on 12/08/2018 at 03:42 PM  Cultures    Procalcitonin  Lab Results   Component Value Date    PROCAL 0.40 06/16/2019    PROCAL 0.27 06/07/2019    PROCAL 0.29 05/20/2019         Radiology    CXR  6/18/2019   FINDINGS: There has been interval placement of right-sided thoracic drainage catheter. There is a right-sided apical pneumothorax measuring up to 15 mm in weakness. Opacities are again noted at the right lung base which may be related to atelectasis or infiltrate. There are small bilateral pleural effusions. There is mild cardiomegaly. Visualized portions of the upper abdomen are within normal limits. The osseous structures are intact. No acute fractures or suspicious osseous lesions.    Impression:  Interval placement of a right-sided pleural drainage catheter with marked decreased size of pleural effusion. However, there has been development of a right-sided apical 15 mm pneumothorax. Moderate to large right and small left pleural effusions. Bilateral interstitial pulmonary edema versus atypical pneumonia. Left lower lobe atelectasis.           **This report has been created using voice recognition software. It may contain minor errors which are inherent in voice recognition technology. **       Final report electronically signed by Dr. Vernell Espinosa on 6/16/2019 11:02 PM           (See actual reports for details)    Assessment   -Rapidly recurrent trasudative  pleural effusions due to systemic cause (CHF, CKD) S/P R pleurx 6/18/19  -PTX post procedure  S/P multiple admissions and thoracentesis  -LOUISE on CKD stage 5 scheduled to start PD  -Metabolic acidosis from renal disease  Recommendations   -Monitor SpO2 wean supplemental O2 to maintain SpO2 >90%  -Order in place to have Pleurx drained daily with outputs recorded  -Repeat CXR in AM to follow PTX  -Nephrology following       Case discussed with nurse and patient/family. Questions and concerns addressed. Meds and Orders reviewed. Electronically signed by     YESENIA Milian - CNP on 6/18/2019 at 5:13 PM    TPC placed on the L, 700 mls of PF aspirated  Small PTX--asymptomatic, will repeat CXR in am  Drain TPC daily    Patient seen and examined independently by me. Above discussed and I agree with Charla Mac CNP note Also see my additional comments. Labs, cultures, and radiographs when available were reviewed. Changes were made in the orders as necessary. I discussed patient concerns with Sarah's R.NJacqueline and instructions were given. Respiratory care issues addressed. Please see our orders for the updated patient care plan.     Electronically signed by     Danielle Koo MD on 6/18/2019 at 5:35 PM

## 2019-06-18 NOTE — H&P
[address]  Sedation/Analgesia History & Physical    Pt Name: Irma Raymundo  MRN: [id number]  YOB: 1950  Provider Performing Procedure: Dylon Dee MD  Primary Care Physician: Cynthia Stacy    PRE-PROCEDURE   DNR-CCA/DNR-CC []Yes [x]No  Brief History/Pre-Procedure Diagnosis: Recurrent pleural effusion          MEDICAL HISTORY  []CAD/Valve  []Liver Disease  []Lung Disease []Diabetes  []Hypertension []Renal Disease  []Additional information:       has a past medical history of Anemia, Anemia in chronic kidney disease(285.21), Arthritis, Asthma, Bipolar 1 disorder ([address] CHF (congestive heart failure) ([address] Chronic kidney disease, stage III (moderate) ([address] Depression, GERD (gastroesophageal reflux disease), Gout, Headache(784.0), Hyperkalemia, Hyperlipidemia, Hypertension, Hypothyroid, Neurogenic bladder, Pneumonia, Seizure ([address] and Type II or unspecified type diabetes mellitus without mention of complication, not stated as uncontrolled. SURGICAL HISTORY   has a past surgical history that includes fracture surgery; Cholecystectomy; cervical fusion; back surgery; Colonoscopy; and Endoscopy, colon, diagnostic.   Additional information:       ALLERGIES   Allergies as of 06/16/2019 - Review Complete 06/16/2019   Allergen Reaction Noted    Eggs or egg-derived products Nausea And Vomiting 12/06/2018    Aspirin  07/10/2012    Naproxen  06/11/2019    Pcn [penicillins] Hives 06/01/2016    Vicodin [hydrocodone-acetaminophen]  06/01/2016    Vilazodone  12/11/2018    Wellbutrin [bupropion] Other (See Comments) 12/07/2018     Additional information:       MEDICATIONS   Coumadin Use Last 5 Days [x]No []Yes  Antiplatelet drug therapy use last 5 days  [x]No []Yes  Other anticoagulant use last 5 days  [x]No []Yes    Current Facility-Administered Medications:     dianeal lo-ivonne 1.5% 1,000 mL, 1,000 mL, Intraperitoneal, Once, Jane Wahl MD    sodium chloride flush 0.9 % injection 10 mL, 10 mL, Intravenous, 2 times per day, Hieu Ervin PA-C, 10 mL at 06/17/19 2056    sodium chloride flush 0.9 % injection 10 mL, 10 mL, Intravenous, PRN, Ayan Hackett PA-C    docusate sodium (COLACE) capsule 100 mg, 100 mg, Oral, BID, Rebecca Hackett PA-C, 100 mg at 06/17/19 2056    ondansetron (ZOFRAN) injection 4 mg, 4 mg, Intravenous, Q6H PRN, Rebecca Hackett PA-C, 4 mg at 06/17/19 2056    heparin (porcine) injection 5,000 Units, 5,000 Units, Subcutaneous, 3 times per day, Hieu Ervin PA-C, Stopped at 06/17/19 2054    acetaminophen (TYLENOL) tablet 650 mg, 650 mg, Oral, Q4H PRN, Rebecca Hackett PA-C, 650 mg at 06/17/19 1143    amLODIPine (NORVASC) tablet 10 mg, 10 mg, Oral, Daily, Rebecca Hackett PA-C, 10 mg at 06/17/19 4642    doxazosin (CARDURA) tablet 1 mg, 1 mg, Oral, Daily, Hieu Ervin PA-C, 1 mg at 06/17/19 3462    DULoxetine (CYMBALTA) extended release capsule 30 mg, 30 mg, Oral, Daily, Rebecca Hackett PA-C, 30 mg at 06/17/19 1948    famotidine (PEPCID) tablet 10 mg, 10 mg, Oral, BID, Rebecca Hackett PA-C, 10 mg at 06/17/19 2058    ferrous sulfate tablet 325 mg, 325 mg, Oral, BID, Rebecca Hackett PA-C, 325 mg at 06/17/19 2056    gabapentin (NEURONTIN) capsule 100 mg, 100 mg, Oral, TID, Hieu Ervin PA-C, 100 mg at 06/17/19 2056    hydrALAZINE (APRESOLINE) tablet 100 mg, 100 mg, Oral, 3 times per day, Hieu Ervin PA-C, 100 mg at 06/18/19 0521    insulin glargine (LANTUS) injection vial 10 Units, 10 Units, Subcutaneous, BID, Rebecca Hackett PA-C, 10 Units at 06/17/19 0930    isosorbide dinitrate (ISORDIL) tablet 20 mg, 20 mg, Oral, TID, Rebecca Hackett PA-C, 20 mg at 06/17/19 2056    levothyroxine (SYNTHROID) tablet 25 mcg, 25 mcg, Oral, Daily, Rebecca Hackett PA-C, 25 mcg at 06/18/19 0521    meclizine (ANTIVERT) tablet 25 mg, 25 mg, Oral, TID, Rebecca Hackett PA-C, 25 mg at 06/17/19 2056    oxybutynin (DITROPAN) tablet 5 mg, 5 mg, Oral, Daily, Ayan Baker mouth 2 times daily   Yes Historical Provider, MD   amLODIPine (NORVASC) 10 MG tablet Take 10 mg by mouth daily   Yes Historical Provider, MD   acetaminophen-codeine (TYLENOL #3) 300-30 MG per tablet Take 1 tablet by mouth every 6 hours as needed for Pain. Yes Historical Provider, MD   acetaminophen (TYLENOL) 325 MG tablet Take 650 mg by mouth every 6 hours as needed for Pain   Yes Historical Provider, MD   isosorbide dinitrate (ISORDIL) 20 MG tablet Take 1 tablet by mouth 3 times daily 12/17/18  Yes Brien Mo MD   insulin lispro (HUMALOG KWIKPEN) 100 UNIT/ML pen Inject 0-9 Units into the skin 4 times daily (before meals and nightly) Inject as per sliding scale:  140-199 = 1 unit,  200-249 = 3 units,  250-299 = 5 units,  300-349 = 7 units,  350-399 = 9 units,  400-999 = call MD   Yes Historical Provider, MD   levothyroxine (SYNTHROID) 25 MCG tablet Take 25 mcg by mouth Daily   Yes Historical Provider, MD   insulin detemir (LEVEMIR) 100 UNIT/ML injection pen Inject 10 Units into the skin 2 times daily 10/24/18  Yes Historical Provider, MD   blood glucose test strips (ASCENSIA AUTODISC VI;ONE TOUCH ULTRA TEST VI) strip Patient tests blood sugar three times per day.  Diagnosis DMII E11.9 7/18/18  Yes Historical Provider, MD   Insulin Pen Needle 31G X 8 MM MISC 1 Device 6/6/18  Yes Historical Provider, MD   pravastatin (PRAVACHOL) 40 MG tablet Take 40 mg by mouth nightly    Yes Historical Provider, MD     Additional information:       VITAL SIGNS   Vitals:    06/18/19 0734   BP: (!) 204/98   Pulse: 61   Resp: 23   Temp:    SpO2: (!) 89%       PHYSICAL:   Heart:  [x]Regular rate and rhythm  []Other:    Lungs:  [x]Clear    []Other:    Abdomen: [x]Soft    []Other:    Mental Status: [x]Alert & Oriented  []Other:      PLANNED PROCEDURE   []Biospy []Arteriogram              [x]Drainage   []Mediport Insertion  []Fistulogram []IV access       []Vertebroplasty / Augmentation  []IVC filter []Dialysis catheter []Biliary

## 2019-06-18 NOTE — PROGRESS NOTES
[facility]         Renal Inpatient Follow-Up note         6/18/2019 6:40 AM    Pt Name:   Jose Andres  YOB: 1950  Attending:   Deloris Shirley MD    Chief Complaint : Jose Andres is a 81 y.o. female being followed by nephrology for Preston /CKD and acidosis    Interval History :   Patient seen and examined by me. No distress  Feels well. Some SOB. No chest pain. She is going for a Pleurex cath placement this AM     Scheduled Medications :    sodium chloride flush  10 mL Intravenous 2 times per day    docusate sodium  100 mg Oral BID    heparin (porcine)  5,000 Units Subcutaneous 3 times per day    amLODIPine  10 mg Oral Daily    doxazosin  1 mg Oral Daily    DULoxetine  30 mg Oral Daily    famotidine  10 mg Oral BID    ferrous sulfate  325 mg Oral BID    gabapentin  100 mg Oral TID    hydrALAZINE  100 mg Oral 3 times per day    insulin glargine  10 Units Subcutaneous BID    isosorbide dinitrate  20 mg Oral TID    levothyroxine  25 mcg Oral Daily    meclizine  25 mg Oral TID    oxybutynin  5 mg Oral Daily    pravastatin  40 mg Oral Nightly    insulin lispro  0-6 Units Subcutaneous TID WC    insulin lispro  0-3 Units Subcutaneous Nightly      dextrose         Vitals :  BP (!) 153/67   Pulse 57   Temp 98.5 °F (36.9 °C) (Oral)   Resp 16   Ht 5' 4\" (1.626 m)   Wt 163 lb 3.2 oz (74 kg)   SpO2 92%   BMI 28.01 kg/m²     24HR INTAKE/OUTPUT:      Intake/Output Summary (Last 24 hours) at 6/18/2019 0640  Last data filed at 6/17/2019 2055  Gross per 24 hour   Intake 10 ml   Output --   Net 10 ml     Last 3 weights  Wt Readings from Last 3 Encounters:   06/18/19 163 lb 3.2 oz (74 kg)   06/13/19 162 lb 8 oz (73.7 kg)   05/23/19 171 lb 4.8 oz (77.7 kg)           Physical Exam :  General Appearance:  Well developed.  No distress  Mouth/Throat:  Oral mucosa moist  Neck:  Supple, no JVD  Lungs:  Breath sounds: diminished and mild rales noted   Heart[de-identified]  S1,S2 heard  Abdomen:  Soft, non - tender  Musculoskeletal:  Edema - none         Last 3 CBC   Recent Labs     06/16/19  2202 06/18/19  0532   WBC 16.0* 14.2*   RBC 2.86* 2.80*   HGB 8.7* 8.4*   HCT 25.2* 25.5*   * 391     Last 3 CMP  Recent Labs     06/17/19  0718 06/17/19  1805 06/18/19  0532   * 129* 130*   K 5.3* 5.9* 5.0   CL 91* 93* 93*   CO2 18* 17* 17*   BUN 95* 103* 103*   CREATININE 5.5* 6.4* 6.2*   CALCIUM 9.5 9.0 8.7             Assessment / Plan   Renal -acute kidney injury chronic kidney disease stage V, probably prerenal to some degree. She was on diuretics  · We will hold the diuretics . Mild improvement in renal FX  · As she is having pleurex cath this AM, will give some gentle IV hydration  · One quick exchange / flushing of catheter with 1000 ml. · May consider renal replacement therapy tonight     Hyperkalemia secondary to renal dysfunction. Better. Hyponatremia secondary to renal dysfunction and inability to excrete free water  Metabolic acidosis from renal dysfunction  Pleural effusion-apparently is deemed transudate and upon speaking with the pulmonologist during the last admission she might benefit from renal replacement therapy however she had recurrent pleural effusion within 3 days. Even though if we start peritoneal dialysis would not help in the immediate future . pleurex cath this AM  Anemia of chronic kidney disease  Diabetes mellitus  meds reviewed and D/W patient,  and also nursing staff     LIANNE Ortiz D.  [facility].

## 2019-06-18 NOTE — PLAN OF CARE
Problem: Falls - Risk of:  Goal: Will remain free from falls  Description  Will remain free from falls  6/17/2019 2247 by Lena Golden RN  Outcome: Ongoing  Note:   Patient in bed with bed alarm, call light within reach and being used appropriately. Non-skid socks on. Problem: Falls - Risk of:  Goal: Absence of physical injury  Description  Absence of physical injury  Outcome: Ongoing  Note:   Falling star and stay with me program in place. No signs of physical injury at this time. Problem: Risk for Impaired Skin Integrity  Goal: Tissue integrity - skin and mucous membranes  Description  Structural intactness and normal physiological function of skin and  mucous membranes. 6/17/2019 2247 by Lena Golden RN  Outcome: Ongoing  Note:   Patient has areas of pre-existing skin breakdown. Patient being turned and repositioned every 2 hours and as needed. No new areas of skin breakdown at this time. Problem: DISCHARGE BARRIERS  Goal: Patient's continuum of care needs are met  6/17/2019 2247 by Lena Golden RN  Outcome: Ongoing  Note:   Patient able to verbalize needs as they arise, all needs addressed during hourly rounding. Care plan reviewed with patient. Patient verbalize understanding of the plan of care and contribute to goal setting.

## 2019-06-18 NOTE — PROGRESS NOTES
Hospitalist Progress Note      Patient:  Mohit Haynes      Unit/Bed:6K-10/010-A    YOB: 1950    MRN: [id number]       Acct: [de-identified]     PCP: [id number]    Date of Admission: 6/16/2019    Assessment/Plan:    1. Bilateral R>L pleural effusions, recurrent -- c/s renal and pulm apprec -- per renal/pulm PleurX chest tube placed on right 6/18 with 1L removed and small right apical pneumothorax --> prior studies on prior admissions have been transudates  --  as pt had CAPD catheter placed prior admission 6/11/19 and per pt to start dialysis soon --> d/w Dr. Jose R Steve and uncertain if to do a rapid start this admission yet  -- s/p right thoracentesis 6/6/19 = 1.4 L - and repeat 6/12/19 = 1.5L --> recurrent mod/large 6/16 CXR --> pleurX placed 6/18 with 1L removed  -- s/p lasix 80 mg x 1 in ER 6/16 -- hold further diuresis until seen by renal  -- hx s/p left thoracentesis 6/9/19 = 500 mL  -- limited echo 6/7/19 EF 55%, LV fxn normal--> similar to 12/2018 echo  -- hold diuretics with worsening renal fxn and low IVF started 6/18  2. Acute on chronic hypoxic resp failure -- due to effusions -- was not on O2 prior but was last admission with effusions   -- c/s pulm 6/16 apprec  -- encourage activity, IS   -- O2 and wean as able   -- ?need to r/o PE  -- trop up slightly 0.051 -> 0.046 --> prior were 0.03's prior admission -> ?up more due to LOUISE and effusions -- monitor  -- holding diuretics due to rising creat  3.  LOUISE on CKD stage now 5 -- c/s renal and d/w Dr. Jose R Steve -- creat up to 6.0 on admission 6/16 and again up to 6.2 on 6/18 --> which was 3.5 on d/c 6/12   --> monitor closely s/p lasix 80 mg IV given in ER 6/16 --> hold further diuretics  -- low IVF started 6/18 per renal with elevated creat  -- PD catheter placed prior admission 6/12/19 --> await renal recs for ?start now but will take time to balance fluids  -- was mid 3's during admission 5/20 - 5/23 -- baseline CKD with baseline 2.8 - 3.1  -- creat worsened with diuretics last admission --> home bumex held   4. Pneumothorax right -> Due to pleurX placement -- per pulm - per f/u CXR 6/18 with 15 mm PTX right apical -- ?CVS c/s  5. Hyperkalemia -- per renal -- 5.8 on admission 6/16 and 5.3 on 6/17 -->s/p kayexalate 6/17 --> improving 6/18 -- treat as needed - monitor closely  6. Hyponatremia -- per renal -- acute on chronic -- down to 128 on 6/16 from 132 on 6/12 and up to 130 on 6/18 --> per renal started on low IVF 6/18  --  due to fluid issues and renal issues as above -- TSH 5/19/19 WNL  7. Acute on chronic diastolic CHF -- consulted renal with above and assist with fluid mgmt and pulm c/s for effusions -- BNP higher than prev -> up to 7690 from 3592 on 6/19  -- PD catheter placed 6/12/19   --  PleurX placed 6/18 for right effusion   -- last echo 12/2018 EF 50-55%, no WMA, mildly dilated LA, mild MR, mild TR --> limited echo 6/7/19 normal EF 55%, no other abn noted  -- NO BB due to bradcardia -- no ACE/aRB due to CKD --> on isordil/hydralazine  -- TSH 5/19 WNL  8. LLL atelectasis  -- likely due to effusions --> encourage IS, O2 prn  -unlikely pneumonia -> tx with atbx last admission  -> per pulm last admission 6/7 LUIS (-), histone IgG (-)  -- s/p atbx last admission ->> zmax x 1 on 6/7 per pulm, s/p cefepime 6/7 - 6/8 per pulm --> no cx done on right pleural fluid  -- PCT up to 0.40 on 6/14 from prior 0.20's --> likely related to renal dysfunction --> ?need atbx - afebrile but WBC up --> per pulm pulm c/s 6/17  9. Metabolic acidosis -- due to renal dysfunction - ?need bicarb - c/s and await renal recs -- LA normal 6/16 and 6/17  10.  Leukocytosis -- up to 16 on 6/16 from 12-13 last admission 6/2019 and down to 14.2 on 6/18 -- ?inflammatory with effusions --> chronic component -- afeb, ?etiology -- up and down - similar last admission 6/2019 and 5/2019 -- afebrile -- chronic component per chart review   -- PCT slightly elevated chronically also but slightly higher at 0.4 from 0.2's --> no atbx at this time -- monitor   -- s/p zmax 6/7, cefepime 6/7 - 6/8 per pulm last admission  11. Chronic elevated trops -- likely due to renal dysfunction -- no cp - only sob but with above -- trops up to 0.051 -> 0.046 --> prior were 0.03's prior admission -> ?up more due to LOUISE and effusions -- monitor -- no ASA - hold until if need further procedures  -- limited echo 6/7/19 = EF 55%, LV fxn normal  12. Type 2 DM -- cont lantus 10 units bid and SSI -- monitor and adjust prn -- carb diet. Last A1C 6/17/19 = 5.2  13. Essential HTN -- cont norvasc 10 mg daily, hydralazine 100 mg tid, isordil 20 mg tid, cardura 1 mg daily -- no ACE/ARB with CKD -- monitor and adjust prn  14. Chronic normocytic anemia -- cont po iron as iron low 5/19 -- likely due to CKD -- ??aranesp per renal -- stable in 8-9's - was 8.7 on 6/12 and stable at 8.4 on 6/18 --> was 10's at [facility] 2/2019 but 8-10 prior  15. Diabetic gastroparesis -- anibal po - monitor  16. Hypothyroidism -- cont synthroid -- TSH 5/19 WNL  17. Hx neurogenic bladder -- ditropan  18. HLD -- cont statin -- Lipids 5/20/19 HLD 66, LDL 75, trig 148  19. Chronic dizziness -- on antivert  20. Bipolar d/o -- at [facility] -- no current meds and mood stable  21. Mild MR, mild TR -- per echo 12/2018  22. Hx seizure d/o -- no seizure meds but on neurontin  23. Chronic pain -- cont neurontin, cymbalta  24. Constipation -- cont bowel meds  25. Moderate malnutrition -- apprec dietician assist -- ??ONS if ok with renal -- ? [place] -> await renal recs  26. Generalized weakness -- PT/OT c/s -- from ECF - SS consulted    Dispo  -- 6/18 --> apprec pulm and renal assist --> PleurX chest tube catheter placed this am and 1L removed again;  +small pneumothorax post procedure -> pulm following.   Cont monitor lytes - renal started low IVF for low Na and elevated creat above baseline - renal deciding on ?start HD during admission (catheter just placed 6/12) -- cont monitor resp status, wean O2 as able, encourage IS, monitor BP, lytes, renal fxn, u/o.      -- 6/17 --> again admitted for recurrent sob due to pleural effusion -> c/s renal Dr. Kermit Hart as pt just had PD catheter placed 6/12/19 and to assist with fluid mgmt given worsening renal fxn again this admission and recurrent effusions - d/w Dr. Kermit Hart - recommends PleurX for right effusion as initiation of dialysis will take time to balance fluid status. Pulmonary consult (p) and will await recommendations as well but likely needs thoracentesis with Pleurx placement as patient is dyspneic and hypoxic. Hold any diuretics until further eval by renal.  Continue to monitor BP, lites, renal function, BS. Await recs. Hold and atbx at this time - unlikely elevated PCT due to infection and more likely LOUISE/CKD - monitor for fever - if fever will start atbx        Chief Complaint: [facility] [address] Course: Jaye Estrella is a 81 y.o. female with bipolar, CKD V, diastolic CHF, DMII, HTN and lHLD is admitted with increasing sob again and recurrent mod/large right pleural effusion, LOUISE on CKD now stage V. She was recently admitted 6/6/2019-6/13/2019 for hypoxic respiratory failure due to pleural effusions and renal dysfunction. She also had a recent admission 5/19 - 5/23 for weight gain and shortness of breath. She required thoracentesis both in the May and most recent admission. Last right thoracentesis was 6/12/2019 of 1.5 L. Had a left thoracentesis on 6/9/2019 at 500 mL. Her renal function in ER is gone up to 6.0 was 3.5 on discharge 6/12. K up to 5.8. Her baseline creatinine is in the mid 3's. Was concerned with worsening renal function during most recent admission Dr. Kermit Hart had a PD catheter placed on 6/11/2019 with future plans for outpatient PD. However patient returned with increasing shortness of breath and not feeling well.   Was found to have LOUISE and recurrent moderate to large right pleural effusion. She was admitted to the hospitalist service again for further evaluation and treatment. -- pulm consulted -> pleurX placed 6/18 for rapidly recurrent transudative effusion -> 1L off - small PTX post-procedure  -- low IVF started 6/18 per renal      Subjective:   -- 6/18/2019 --> pt had pleurx this am - feeling better. Breathing easier. Minimal pain on right with the tube. No cp. No abd pain. Little nausea this am but better after pleurX placed. No cough. On 4L O2. Denies diarrhea. NO melena/hematochezia. Weak. No abd pain. Per RN NS instilled this am to check PD catheter fxn and pt tolerated well. Afebrile.   2 voids 6/17 documented      Medications:  Reviewed    Infusion Medications    sodium chloride 50 mL/hr at 06/18/19 1114    dextrose       Scheduled Medications    sodium chloride flush  10 mL Intravenous 2 times per day    docusate sodium  100 mg Oral BID    heparin (porcine)  5,000 Units Subcutaneous 3 times per day    amLODIPine  10 mg Oral Daily    doxazosin  1 mg Oral Daily    DULoxetine  30 mg Oral Daily    famotidine  10 mg Oral BID    ferrous sulfate  325 mg Oral BID    gabapentin  100 mg Oral TID    hydrALAZINE  100 mg Oral 3 times per day    insulin glargine  10 Units Subcutaneous BID    isosorbide dinitrate  20 mg Oral TID    levothyroxine  25 mcg Oral Daily    meclizine  25 mg Oral TID    oxybutynin  5 mg Oral Daily    pravastatin  40 mg Oral Nightly    insulin lispro  0-6 Units Subcutaneous TID WC    insulin lispro  0-3 Units Subcutaneous Nightly     PRN Meds: sodium chloride flush, ondansetron, acetaminophen, glucose, dextrose, glucagon (rDNA), dextrose      Intake/Output Summary (Last 24 hours) at 6/18/2019 1208  Last data filed at 6/18/2019 1000  Gross per 24 hour   Intake 1010 ml   Output 1775 ml   Net -765 ml       Diet:  DIET CARB CONTROL; Low Potassium    Exam:  BP (!) 159/72   Pulse 62   Temp 98.8 °F (37.1 °C) (Oral)   Resp 18   Ht 5' 4\" (1.626 m)   Wt 163 lb 3.2 oz (74 kg)   SpO2 96%   BMI 28.01 kg/m²     General appearance: Looks ill but better this am, non-toxic, oriented x 3  HEENT: Pupils equal, round, and reactive to light. Conjunctivae/corneas clear. Neck: Supple, with full range of motion. Trachea midline. Respiratory:  Normal respiratory effort. Still diminished right all way up - CT right posterior/lateral chest;  Left clear. .  No wheezing. No rhonchi. No distress or accessory muscle use. Less sob with conversation  Cardiovascular: Regular rate and rhythm with normal S1/S2 2/6 SHANITA at LLSB/LUSB, No rubs or gallops. Abdomen: Soft, non-tender, +PD catheter in med abd with bandage on but NO TTP around it  -- non-distended with normal bowel sounds. No rebound or guarding  Musculoskeletal: No clubbing, cyanosis, trace BLE edema. Full range of motion without deformity. No calf tenderness palpation  Skin: Skin color, texture, turgor normal.  No rashes or lesions. Pale. Neurologic:  Neurovascularly intact without any focal sensory/motor deficits. Cranial nerves: II-XII intact, grossly non-focal.  Psychiatric: Alert and oriented, thought content appropriate  Capillary Refill: Brisk,< 3 seconds   Peripheral Pulses: +2 palpable, equal bilaterally       Labs:   Recent Labs     06/16/19  2202 06/18/19  0532   WBC 16.0* 14.2*   HGB 8.7* 8.4*   HCT 25.2* 25.5*   * 391     Recent Labs     06/17/19  0718 06/17/19  1805 06/18/19  0532   * 129* 130*   K 5.3* 5.9* 5.0   CL 91* 93* 93*   CO2 18* 17* 17*   BUN 95* 103* 103*   CREATININE 5.5* 6.4* 6.2*   CALCIUM 9.5 9.0 8.7     No results for input(s): AST, ALT, BILIDIR, BILITOT, ALKPHOS in the last 72 hours. No results for input(s): INR in the last 72 hours. No results for input(s): [place] in the last 72 hours.     Urinalysis:      Lab Results   Component Value Date    NITRU Negative 03/09/2019    WBCUA 0-2 06/07/2016    BACTERIA NONE 06/07/2016    RBCUA 0-2 06/07/2016 BLOODU Negative 03/09/2019    GLUCOSEU 2+ 03/09/2019       Radiology:  XR CHEST PA INSPIRATION 1 VW   Final Result   Interval placement of a right-sided pleural drainage catheter with marked decreased size of pleural effusion. However, there has been development of a right-sided apical 15 mm pneumothorax. Findings discussed by Dr. Corinne Davis with 6K nurse [facility] 8:50 AM 6/18/2019 via telephone. **This report has been created using voice recognition software. It may contain minor errors which are inherent in voice recognition technology. **      Final report electronically signed by Dr Tevin Barragan on 6/18/2019 8:55 AM      IR ABSCESS DRAINAGE PERC   Final Result   Status post successful tunneled Pleurx chest drainage catheter insertion. **This report has been created using voice recognition software. It may contain minor errors which are inherent in voice recognition technology. **      Final report electronically signed by Dr Tevin Barragan on 6/18/2019 8:59 AM      XR CHEST PORTABLE   Final Result      Moderate to large right and small left pleural effusions. Bilateral interstitial pulmonary edema versus atypical pneumonia. Left lower lobe atelectasis. **This report has been created using voice recognition software. It may contain minor errors which are inherent in voice recognition technology. **      Final report electronically signed by Dr. Aguila Guillaume on 6/16/2019 11:02 PM          Diet: DIET CARB CONTROL; Low Potassium    Perez: no    Microbiology:  Blood cx 6/16 (-) to date    Tele:  SR    DVT prophylaxis: [] Lovenox                                 [] SCDs                                 [x] SQ Heparin                                 [] Encourage ambulation           [] Already on Anticoagulation     Disposition:    [] Home       [] TCU       [] Rehab       [] Psych       [x] SNF       [] [place]       [] Other-    Code Status: full    PT/OT Eval Status: consulted        Electronically signed by Meribeth Mcardle, MD on 6/18/2019 at 12:08 PM when pt evaluated - final note filed late

## 2019-06-18 NOTE — PLAN OF CARE
Problem: Falls - Risk of:  Goal: Will remain free from falls  Description  Will remain free from falls  Outcome: Ongoing  Note:   No falls this shift, call light in reach. Bed alarm on. Up with 1 assist. PT/OT working with patient. Problem: Risk for Impaired Skin Integrity  Goal: Tissue integrity - skin and mucous membranes  Description  Structural intactness and normal physiological function of skin and  mucous membranes. Outcome: Ongoing  Note:   Pt turns and repositions self frequently. Pleurx catheter placed on right. Dressing remains dry and intact. Problem: DISCHARGE BARRIERS  Goal: Patient's continuum of care needs are met  Outcome: Ongoing  Note:   Plans on returning to ECF at discharge.  assisting with discharge needs. Problem: Nutrition  Goal: Optimal nutrition therapy  Outcome: Ongoing  Note:   Tolerating diet. Eating 25% of meals. Needs encouragement to eat. Care plan reviewed with patient. Patient verbalize understanding of the plan of care and contribute to goal setting.

## 2019-06-19 ENCOUNTER — APPOINTMENT (OUTPATIENT)
Dept: GENERAL RADIOLOGY | Age: 69
DRG: 291 | End: 2019-06-19
Payer: MEDICARE

## 2019-06-19 LAB
ANION GAP SERPL CALCULATED.3IONS-SCNC: 22 MEQ/L (ref 8–16)
BUN BLDV-MCNC: 95 MG/DL (ref 7–22)
CALCIUM SERPL-MCNC: 8.4 MG/DL (ref 8.5–10.5)
CHLORIDE BLD-SCNC: 93 MEQ/L (ref 98–111)
CO2: 17 MEQ/L (ref 23–33)
CREAT SERPL-MCNC: 5.3 MG/DL (ref 0.4–1.2)
GFR SERPL CREATININE-BSD FRML MDRD: 8 ML/MIN/1.73M2
GLUCOSE BLD-MCNC: 115 MG/DL (ref 70–108)
GLUCOSE BLD-MCNC: 120 MG/DL (ref 70–108)
GLUCOSE BLD-MCNC: 138 MG/DL (ref 70–108)
GLUCOSE BLD-MCNC: 63 MG/DL (ref 70–108)
GLUCOSE BLD-MCNC: 72 MG/DL (ref 70–108)
POTASSIUM SERPL-SCNC: 4.3 MEQ/L (ref 3.5–5.2)
SODIUM BLD-SCNC: 132 MEQ/L (ref 135–145)

## 2019-06-19 PROCEDURE — 80048 BASIC METABOLIC PNL TOTAL CA: CPT

## 2019-06-19 PROCEDURE — 97530 THERAPEUTIC ACTIVITIES: CPT

## 2019-06-19 PROCEDURE — 36415 COLL VENOUS BLD VENIPUNCTURE: CPT

## 2019-06-19 PROCEDURE — 99232 SBSQ HOSP IP/OBS MODERATE 35: CPT | Performed by: INTERNAL MEDICINE

## 2019-06-19 PROCEDURE — 1200000003 HC TELEMETRY R&B

## 2019-06-19 PROCEDURE — 6370000000 HC RX 637 (ALT 250 FOR IP): Performed by: PHYSICIAN ASSISTANT

## 2019-06-19 PROCEDURE — 2709999900 HC NON-CHARGEABLE SUPPLY

## 2019-06-19 PROCEDURE — APPSS30 APP SPLIT SHARED TIME 16-30 MINUTES: Performed by: NURSE PRACTITIONER

## 2019-06-19 PROCEDURE — 2580000003 HC RX 258: Performed by: INTERNAL MEDICINE

## 2019-06-19 PROCEDURE — 6360000002 HC RX W HCPCS: Performed by: PHYSICIAN ASSISTANT

## 2019-06-19 PROCEDURE — 71045 X-RAY EXAM CHEST 1 VIEW: CPT

## 2019-06-19 PROCEDURE — 82948 REAGENT STRIP/BLOOD GLUCOSE: CPT

## 2019-06-19 PROCEDURE — 97163 PT EVAL HIGH COMPLEX 45 MIN: CPT

## 2019-06-19 PROCEDURE — 97535 SELF CARE MNGMENT TRAINING: CPT

## 2019-06-19 RX ORDER — INSULIN GLARGINE 100 [IU]/ML
8 INJECTION, SOLUTION SUBCUTANEOUS 2 TIMES DAILY
Status: DISCONTINUED | OUTPATIENT
Start: 2019-06-19 | End: 2019-06-20 | Stop reason: HOSPADM

## 2019-06-19 RX ADMIN — ISOSORBIDE DINITRATE 20 MG: 20 TABLET ORAL at 20:15

## 2019-06-19 RX ADMIN — AMLODIPINE BESYLATE 10 MG: 10 TABLET ORAL at 08:24

## 2019-06-19 RX ADMIN — HEPARIN SODIUM 5000 UNITS: 5000 INJECTION, SOLUTION INTRAVENOUS; SUBCUTANEOUS at 15:39

## 2019-06-19 RX ADMIN — DOCUSATE SODIUM 100 MG: 100 CAPSULE, LIQUID FILLED ORAL at 20:15

## 2019-06-19 RX ADMIN — DOXAZOSIN 1 MG: 1 TABLET ORAL at 08:25

## 2019-06-19 RX ADMIN — FERROUS SULFATE TAB 325 MG (65 MG ELEMENTAL FE) 325 MG: 325 (65 FE) TAB at 08:24

## 2019-06-19 RX ADMIN — HYDRALAZINE HYDROCHLORIDE 100 MG: 50 TABLET, FILM COATED ORAL at 20:15

## 2019-06-19 RX ADMIN — ACETAMINOPHEN 650 MG: 325 TABLET ORAL at 18:05

## 2019-06-19 RX ADMIN — INSULIN GLARGINE 10 UNITS: 100 INJECTION, SOLUTION SUBCUTANEOUS at 08:25

## 2019-06-19 RX ADMIN — OXYBUTYNIN CHLORIDE 5 MG: 5 TABLET ORAL at 08:24

## 2019-06-19 RX ADMIN — SODIUM CHLORIDE: 9 INJECTION, SOLUTION INTRAVENOUS at 08:24

## 2019-06-19 RX ADMIN — GABAPENTIN 100 MG: 100 CAPSULE ORAL at 15:53

## 2019-06-19 RX ADMIN — ISOSORBIDE DINITRATE 20 MG: 20 TABLET ORAL at 08:24

## 2019-06-19 RX ADMIN — HYDRALAZINE HYDROCHLORIDE 100 MG: 50 TABLET, FILM COATED ORAL at 15:53

## 2019-06-19 RX ADMIN — MECLIZINE HYDROCHLORIDE 25 MG: 12.5 TABLET, FILM COATED ORAL at 20:15

## 2019-06-19 RX ADMIN — FAMOTIDINE 10 MG: 20 TABLET, FILM COATED ORAL at 08:25

## 2019-06-19 RX ADMIN — FAMOTIDINE 10 MG: 20 TABLET, FILM COATED ORAL at 20:16

## 2019-06-19 RX ADMIN — MECLIZINE HYDROCHLORIDE 25 MG: 12.5 TABLET, FILM COATED ORAL at 15:53

## 2019-06-19 RX ADMIN — FERROUS SULFATE TAB 325 MG (65 MG ELEMENTAL FE) 325 MG: 325 (65 FE) TAB at 20:15

## 2019-06-19 RX ADMIN — GABAPENTIN 100 MG: 100 CAPSULE ORAL at 08:24

## 2019-06-19 RX ADMIN — HEPARIN SODIUM 5000 UNITS: 5000 INJECTION, SOLUTION INTRAVENOUS; SUBCUTANEOUS at 05:42

## 2019-06-19 RX ADMIN — DULOXETINE HYDROCHLORIDE 30 MG: 30 CAPSULE, DELAYED RELEASE ORAL at 08:24

## 2019-06-19 RX ADMIN — ISOSORBIDE DINITRATE 20 MG: 20 TABLET ORAL at 15:53

## 2019-06-19 RX ADMIN — PRAVASTATIN SODIUM 40 MG: 40 TABLET ORAL at 20:15

## 2019-06-19 RX ADMIN — HYDRALAZINE HYDROCHLORIDE 100 MG: 50 TABLET, FILM COATED ORAL at 05:41

## 2019-06-19 RX ADMIN — HEPARIN SODIUM 5000 UNITS: 5000 INJECTION, SOLUTION INTRAVENOUS; SUBCUTANEOUS at 20:15

## 2019-06-19 RX ADMIN — DOCUSATE SODIUM 100 MG: 100 CAPSULE, LIQUID FILLED ORAL at 08:50

## 2019-06-19 RX ADMIN — LEVOTHYROXINE SODIUM 25 MCG: 25 TABLET ORAL at 05:41

## 2019-06-19 RX ADMIN — GABAPENTIN 100 MG: 100 CAPSULE ORAL at 20:15

## 2019-06-19 RX ADMIN — MECLIZINE HYDROCHLORIDE 25 MG: 12.5 TABLET, FILM COATED ORAL at 08:25

## 2019-06-19 ASSESSMENT — PAIN DESCRIPTION - FREQUENCY: FREQUENCY: CONTINUOUS

## 2019-06-19 ASSESSMENT — PAIN SCALES - GENERAL
PAINLEVEL_OUTOF10: 0
PAINLEVEL_OUTOF10: 0
PAINLEVEL_OUTOF10: 6
PAINLEVEL_OUTOF10: 0
PAINLEVEL_OUTOF10: 0

## 2019-06-19 ASSESSMENT — PAIN DESCRIPTION - PROGRESSION: CLINICAL_PROGRESSION: GRADUALLY WORSENING

## 2019-06-19 ASSESSMENT — PAIN DESCRIPTION - LOCATION: LOCATION: HEAD

## 2019-06-19 ASSESSMENT — PAIN DESCRIPTION - ORIENTATION: ORIENTATION: UPPER

## 2019-06-19 ASSESSMENT — PAIN DESCRIPTION - ONSET: ONSET: ON-GOING

## 2019-06-19 ASSESSMENT — PAIN - FUNCTIONAL ASSESSMENT: PAIN_FUNCTIONAL_ASSESSMENT: ACTIVITIES ARE NOT PREVENTED

## 2019-06-19 ASSESSMENT — PAIN DESCRIPTION - DESCRIPTORS: DESCRIPTORS: HEADACHE

## 2019-06-19 ASSESSMENT — PAIN DESCRIPTION - PAIN TYPE: TYPE: ACUTE PAIN

## 2019-06-19 NOTE — CARE COORDINATION
Chest xray shows interval smaller right pneumothorax. IVF, DM management, care of pleurex. PT notes that patient appears more short of breathe today, sat is 100% on 4L. Favio Perez once stable.   Electronically signed by Olena Bourne RN on 6/19/2019 at 11:01 AM

## 2019-06-19 NOTE — PLAN OF CARE
Problem: Falls - Risk of:  Goal: Will remain free from falls  Description  Will remain free from falls  6/19/2019 0104 by Stacia Kirkpatrick RN  Outcome: Ongoing  Note:   Patient free from falls during this shift. Fall precautions followed with bed at lowest setting, brakes on, bed alarm on, and 2/4 rails up. Five P's assessed: pain, potty, position, pathway, and possessions through hourly rounding. Call light within reach and pt understands to use it to prevent injury. Problem: Falls - Risk of:  Goal: Absence of physical injury  Description  Absence of physical injury  Outcome: Ongoing  Note:   Pt free from injury this shift. Call light within reach and bed alarm on. Problem: Risk for Impaired Skin Integrity  Goal: Tissue integrity - skin and mucous membranes  Description  Structural intactness and normal physiological function of skin and  mucous membranes. 6/19/2019 0104 by Stacia Kirkpatrick RN  Outcome: Ongoing  Note:   No new problem areas noted to skin. Pt repositions self throughout the day to prevent skin breakdown. Problem: DISCHARGE BARRIERS  Goal: Patient's continuum of care needs are met  6/19/2019 0104 by Stacia Kirkpatrick RN  Outcome: Ongoing  Note:   Pt needs are being addressed through hourly rounding. Care plan reviewed with patient. Patient verbalizes understanding of the plan of care and contribute to goal setting.

## 2019-06-19 NOTE — PROGRESS NOTES
[address] RENAL TELEMETRY 6K  Occupational Therapy  Daily Note  Time:   Time In: 7666  Time Out: 8370  Timed Code Treatment Minutes: 25 Minutes  Minutes: 25          Date: 2019  Patient Name: Jessica Devlin,   Gender: female      Room: [facility]10/010-A  MRN: [id number]  : 1950  (81 y.o.)  Referring Practitioner: Janet King PA-C  Diagnosis: SOB  Additional Pertinent Hx: Pt presented to [address] [place] [address] with complaint of increased SOB. Pt poor historian, obtained from chart review and partially from pt. Pt was recently admitted to [address] [place] [address] on  for the same problem. On , pt had a thoracentesis and had 500 mL removed. She was also seen on that day by nephrology, who determined that pt might benefit from dialysis, specifically PD. Pt states she had surgery for catheter placement last Monday 06/10. Since that time she has had worsening SOB. Bilateral pleural effusions remain persistent. Pt also notes cough, chest pain, nausea and emesis. Denies fever/chills/dysuria/urgencyfrequency and notes that she does not make much urine and has no further complaints at this time. Restrictions/Precautions:  Restrictions/Precautions: Fall Risk    SUBJECTIVE: Patient supine in bed upon arrival. Rn ok'ed treatment. Patient demonstrated having difficulty opening and keeping eyes open during treatment. Patient appeared tired. PAIN: 4/10: back    ADL:   Grooming: Stand By Assistance and with set-up  in bed with washing hands and face and attempted hair care. Patient reaching out to move items around on Tray table. LB Dressing: Max A with donning B Socks  Patient required additional time to complete tasks    BALANCE:  Sitting Balance:  Minimal Assistance, X 1, with cues for safety, with verbal cues , Static  Patient reported having trouble keeping head up and like it wants to go back wards  Patient required assistance with sitting balance due to Left sided lean upon sitting.  Patient when asked reported dizziness. Patient sat EOB with leaning on head of bed and rail. RN staff notified. RN came into room to check patient. BED MOBILITY:  Rolling to Left: Stand By Assistance    Supine to Sit: Contact Guard Assistance, X 1, with verbal cues  then immediatedly required sitting assistance  Sit to Supine: Moderate Assistance B LE into bed  At end of treatment patient pillow supported. ASSESSMENT:  Activity Tolerance:  Patient tolerance of  treatment: fair. Patient required additional time to complete tasks  Discharge Recommendations: Continue to assess pending progress, Patient would benefit from continued therapy after discharge  Equipment Recommendations: Equipment Needed: No  Plan: Times per week: 3-5x  Specific instructions for Next Treatment: Functional mobility; doing ADLs and standing safty; upper body ROM/light resistance exercises and endurance training  Current Treatment Recommendations: Functional Mobility Training, Endurance Training, Self-Care / ADL, Safety Education & Training, Balance Training  Plan Comment: Pt would benefit from continued OT when medically stable and discharged from Acute. OT recommended when returning to [facility]. Patient Education  Patient Education: Sitting safety, ADLs in bed, ECT at home, Sitting or laying back when dizzy    Goals  Short term goals  Time Frame for Short term goals: 1 week  Short term goal 1: Pt will demonstrate functional mobility walking to/from the bathroom with no > than CGA to prepare for doing sinkside grooming. Short term goal 2: Pt will complete dynamic standing activities for over 3 minutes while using 1-2 hand release with SBA to increase her endurance and safety doing ADLs. Short term goal 3: Pt will complete transfers to/from various surfaces with SBA and cues for hand placement to increase her safety for doing her toileting routine.   Short term goal 4: Pt will complete a spongebath or dress her lower body with no > than CGA to increase her safety for returning to taking a shower. Long term goals  Time Frame for Long term goals : None secondary to short estimated length of stay. Following session, patient left in safe position with all fall risk precautions in place.

## 2019-06-19 NOTE — PROGRESS NOTES
[facility]    Patient - Cordelia Bartholomew   MRN -  [id number]   Helio Gandhi # - [de-identified]   - 1950      Date of Admission -  2019  9:30 PM  Date of evaluation -  2019  Room - -10/010-A   Hospital Day - 3  Consulting - Sherlyn Golden MD Primary Care Physician - [address]   Chief Complaint   Back pain  Active Hospital Problem List      Active Hospital Problems    Diagnosis Date Noted    Postprocedural pneumothorax [J95.811]     Moderate malnutrition ([address] [E44.0]     Hyperkalemia [E87.5] 2019    Infiltrate noted on imaging study [R93.89] 2019    Shortness of breath [R06.02] 2019    Hyponatremia [E87.1]     Atelectasis [J98.11]     Recurrent pleural effusion on right [J90] 2019    Chronic kidney disease (CKD), stage V ([address] [N18.5]     Elevated troponin [R74.8]     Uncontrolled type 2 diabetes mellitus with hyperglycemia (HCC) [E11.65] 2018    Acute on chronic diastolic CHF (congestive heart failure) ([address] [I50.33] 2018    Pulmonary edema with congestive heart failure ([address] [I50.1] 2018    Acute on chronic respiratory failure with hypoxia (HCC) [J96.21] 2018    LOUISE (acute kidney injury) ([address] [N17.9] 2018    Chronic kidney disease (CKD), stage IV (severe) ([address] [N18.4] 2018     HPI   Cordelia Bartholomew is a 81 y.o. female with CHF, CKD, HTN, DM2, recurrent pleural effusions transudate, returns to ED from [facility] a few days after DC with re accumulation of pleural fluid, previous taps in 3 different occasions over the last few weeks.   CXR R>L pleural effusion    Past 24 hrs   -Right pleurx drained 775cc yesterday  -PD exchange with 1L patient reports tolerated well  -On 2 LPM via NC today SOB improved  -CXR this AM PTX improved  All other systems reviewed     Past Medical History         Diagnosis Date    Anemia     Anemia in chronic kidney disease(285.21)     Arthritis     Asthma     Bipolar 1 disorder ([facility]     CHF (congestive heart failure) (HCC)     Chronic kidney disease, stage III (moderate) (HCC)     Depression     GERD (gastroesophageal reflux disease)     Gout     Headache(784.0)     Hyperkalemia     Hyperlipidemia     Hypertension     Hypothyroid     Neurogenic bladder     Pneumonia     Seizure ([facility] 01/2018    Type II or unspecified type diabetes mellitus without mention of complication, not stated as uncontrolled       Past Surgical History           Procedure Laterality Date    BACK SURGERY      CERVICAL FUSION      CHOLECYSTECTOMY      COLONOSCOPY      ENDOSCOPY, COLON, DIAGNOSTIC      FRACTURE SURGERY       Diet    DIET CARB CONTROL; Low Potassium  Allergies    Eggs or egg-derived products; Aspirin; Naproxen; Pcn [penicillins];  Vicodin [hydrocodone-acetaminophen]; Vilazodone; and Wellbutrin [bupropion]  Social History     Social History     Socioeconomic History    Marital status:      Spouse name: Not on file    Number of children: 1    Years of education: Not on file    Highest education level: Not on file   Occupational History    Not on file   Social Needs    Financial resource strain: Not on file    Food insecurity:     Worry: Not on file     Inability: Not on file    Transportation needs:     Medical: Not on file     Non-medical: Not on file   Tobacco Use    Smoking status: Never Smoker    Smokeless tobacco: Never Used   Substance and Sexual Activity    Alcohol use: No    Drug use: No    Sexual activity: Not Currently   Lifestyle    Physical activity:     Days per week: Not on file     Minutes per session: Not on file    Stress: Not on file   Relationships    Social connections:     Talks on phone: Not on file     Gets together: Not on file     Attends Zoroastrian service: Not on file     Active member of club or organization: Not on file     Attends meetings of clubs or organizations: Not on file     Relationship status: Not on file  Intimate partner violence:     Fear of current or ex partner: Not on file     Emotionally abused: Not on file     Physically abused: Not on file     Forced sexual activity: Not on file   Other Topics Concern    Not on file   Social History Narrative    Not on file     Family History          Problem Relation Age of Onset    High Blood Pressure Mother     Diabetes Mother     Cancer Mother     Heart Attack Mother     Stroke Father     High Blood Pressure Father     Anemia Father      Meds    Current Medications    insulin glargine  8 Units Subcutaneous BID    sodium chloride flush  10 mL Intravenous 2 times per day    docusate sodium  100 mg Oral BID    heparin (porcine)  5,000 Units Subcutaneous 3 times per day    amLODIPine  10 mg Oral Daily    doxazosin  1 mg Oral Daily    DULoxetine  30 mg Oral Daily    famotidine  10 mg Oral BID    ferrous sulfate  325 mg Oral BID    gabapentin  100 mg Oral TID    hydrALAZINE  100 mg Oral 3 times per day    isosorbide dinitrate  20 mg Oral TID    levothyroxine  25 mcg Oral Daily    meclizine  25 mg Oral TID    oxybutynin  5 mg Oral Daily    pravastatin  40 mg Oral Nightly    insulin lispro  0-6 Units Subcutaneous TID WC    insulin lispro  0-3 Units Subcutaneous Nightly     sodium chloride flush, ondansetron, acetaminophen, glucose, dextrose, glucagon (rDNA), dextrose  IV Drips/Infusions   sodium chloride 50 mL/hr at 06/19/19 0824    dextrose       Vitals    Vitals    height is 5' 4\" (1.626 m) and weight is 170 lb 4.8 oz (77.2 kg). Her oral temperature is 98 °F (36.7 °C). Her blood pressure is 147/66 (abnormal) and her pulse is 68. Her respiration is 22 and oxygen saturation is 92%.      O2 Flow Rate (L/min): 4 L/min  I/O    Intake/Output Summary (Last 24 hours) at 6/19/2019 1547  Last data filed at 6/19/2019 1236  Gross per 24 hour   Intake 1480.86 ml   Output 0 ml   Net 1480.86 ml     Patient Vitals for the past 96 hrs (Last 3 readings):   Weight 06/19/19 0336 170 lb 4.8 oz (77.2 kg)   06/18/19 0430 163 lb 3.2 oz (74 kg)   06/17/19 0122 165 lb 3.2 oz (74.9 kg)     Exam   Physical Exam   Constitutional: No distress on O2 per NC. Patient appears moderately built and  moderately nourished. Head: Normocephalic and atraumatic. Mouth/Throat: Oropharynx is clear and moist.  No oral thrush. Eyes: Conjunctivae are normal. Pupils are equal, round. No scleral icterus. Neck: Neck supple. No tracheal deviation present. Cardiovascular: S1 and S2 with no murmur. No peripheral edema  Pulmonary/Chest: Normal effort with bilateral air entry, clear breath sounds, faint rales noted to right base. No stridor. No respiratory distress. Patient exhibits some tenderness at insertion site of pleurx. Abdominal: Soft. Bowel sounds audible. No distension or tenderness to palp. Musculoskeletal: Moves all extremities  Neurological: Patient is alert and oriented to person, place, and time. Skin: Warm and dry. Labs   ABG  Lab Results   Component Value Date    PH 7.29 06/17/2019    PO2 54 06/17/2019    PCO2 39 06/17/2019    HCO3 19 06/17/2019    O2SAT 84 06/17/2019     Lab Results   Component Value Date    IFIO2 4 06/17/2019    MODE CPAP/PS 12/09/2018    SETTIDVOL 293 12/09/2018    SETPEEP 5.0 12/09/2018     CBC  Recent Labs     06/16/19  2202 06/18/19  0532   WBC 16.0* 14.2*   RBC 2.86* 2.80*   HGB 8.7* 8.4*   HCT 25.2* 25.5*   MCV 88.1 91.1   MCH 30.4 30.0   MCHC 34.5 32.9   * 391   MPV 11.7 11.4      BMP  Recent Labs     06/17/19  1805 06/18/19  0532 06/19/19  0549   * 130* 132*   K 5.9* 5.0 4.3   CL 93* 93* 93*   CO2 17* 17* 17*   * 103* 95*   CREATININE 6.4* 6.2* 5.3*   GLUCOSE 174* 97 63*   CALCIUM 9.0 8.7 8.4*     LFT  No results for input(s): AST, ALT, ALB, BILITOT, ALKPHOS, LIPASE in the last 72 hours. Invalid input(s):   AMYLASE  TROP  Lab Results   Component Value Date    TROPONINT 0.060 06/17/2019    TROPONINT 0.046 06/17/2019 TROPONINT 0.051 06/16/2019     BNP  Lab Results   Component Value Date    PROBNP 7690.0 06/16/2019    PROBNP 3592.0 05/19/2019    PROBNP 2003.0 12/08/2018     D-Dimer  No results found for: DDIMER  Lactic Acid  No results for input(s): LACTA in the last 72 hours. INR  No results for input(s): INR, PROTIME in the last 72 hours. PTT  No results for input(s): APTT in the last 72 hours. Glucose  Recent Labs     06/18/19  2106 06/19/19  0638 06/19/19  1140   POCGLU 91 72 138*     UA No results for input(s): SPECGRAV, PHUR, COLORU, CLARITYU, MUCUS, PROTEINU, BLOODU, RBCUA, WBCUA, BACTERIA, NITRU, GLUCOSEU, BILIRUBINUR, UROBILINOGEN, KETUA, LABCAST, LABCASTTY, AMORPHOS in the last 72 hours. Invalid input(s): CRYSTALS. PFTs   N/A  Echo        Summary   Technically difficult study with suboptimal views   Left ventricle size is normal.   Systolic function was low-normal.   Ejection fraction was estimated at 50-55%. There were no regional left   ventricular wall motion abnormalities and wall thickness was within normal   limits.   Mildly dilated left atrium.   Calcification of the mitral valve noted.   Mild mitral regurgitation is present.   Mild tricuspid regurgitation.      Signature      ----------------------------------------------------------------   Electronically signed by Abram Burkett MD (Interpreting   physician) on 12/08/2018 at 03:42 PM  Cultures    Procalcitonin  Lab Results   Component Value Date    PROCAL 0.40 06/16/2019    PROCAL 0.27 06/07/2019    PROCAL 0.29 05/20/2019     Blood Culture x 2 -No prelim growth  Radiology    CXR  6/19/2019   FINDINGS: POSTSURGICAL CHANGES: None. LINES/TUBES/MECHANICAL DEVICES: None. TRACHEA/HEART/MEDIASTINUM/HILUM: 1. Stable mild prominence of the cardiac silhouette which is partially obscured. LUNG MARIANO: 1. There are bilateral perihilar and the basilar infiltrates, consolidation within the right lower chest and a right pleural effusion. OTHER: None. PNEUMOTHORAX:  1. There is a persistent however interval smaller right apical pneumothorax (less than 5%). OSSEOUS STRUCTURES: 1. No acute osseous abnormality. Impression:  1. There is a persistent however interval smaller right apical pneumothorax (less than 5%). 2. There are bilateral perihilar and the basilar infiltrates, consolidation within the right lower chest and a right pleural effusion. 3. Follow chest radiographs recommended to confirm complete resolution. 6/18/2019   FINDINGS: There has been interval placement of right-sided thoracic drainage catheter. There is a right-sided apical pneumothorax measuring up to 15 mm in weakness. Opacities are again noted at the right lung base which may be related to atelectasis or infiltrate. There are small bilateral pleural effusions. There is mild cardiomegaly. Visualized portions of the upper abdomen are within normal limits. The osseous structures are intact. No acute fractures or suspicious osseous lesions. Impression:  Interval placement of a right-sided pleural drainage catheter with marked decreased size of pleural effusion. However, there has been development of a right-sided apical 15 mm pneumothorax. Moderate to large right and small left pleural effusions. Bilateral interstitial pulmonary edema versus atypical pneumonia. Left lower lobe atelectasis.           **This report has been created using voice recognition software. It may contain minor errors which are inherent in voice recognition technology. **       Final report electronically signed by Dr. Madelnie Villalobos on 6/16/2019 11:02 PM           (See actual reports for details)    Assessment   -Rapidly recurrent trasudative  pleural effusions due to systemic cause (CHF, CKD) S/P R pleurx 6/18/19  -PTX post procedure  S/P multiple admissions and thoracentesis  -LOUISE on CKD stage 5 scheduled to start PD  -Metabolic acidosis from renal disease  Recommendations -Monitor SpO2 wean supplemental O2 to maintain SpO2 >90%-baseline no O2  -Order in place to have Pleurx drained daily with outputs recorded in chart  -Follow CXR in AM for pleural effusion and PTX  -Nephrology following   -Anticipate patient discharge tomorrow [facility] with CXR PA/L to follow pleural effusion s/p Pleurx. At [facility] patient will continue to need to drain daily and report outputs to [facility] weekly. If patient is discharged from [facility] will need setup for [facility] to arrange for draining with Pleurx supplies. Case discussed with nurse and patient/family. Questions and concerns addressed. Meds and Orders reviewed. Electronically signed by     YESENIA Ambrosio CNP on 6/19/2019 at 3:47 PM    70 ml pleurx output, continue daily drainage  Started PD without any issues--monitor TPC output doubt that PD will control pleural effusion. Post-procedural PTX improving, repeat CXR in am.    Patient seen and examined independently by me. Above discussed and I agree with Ever Gilford CNP note Also see my additional comments. Labs, cultures, and radiographs when available were reviewed. Changes were made in the orders as necessary. I discussed patient concerns with Sarah'lidya [email or web address] and instructions were given. Respiratory care issues addressed. Please see our orders for the updated patient care plan.     Electronically signed by     Samantha Doan MD on 6/19/2019 at 8:11 PM

## 2019-06-19 NOTE — PROGRESS NOTES
[facility]         Renal Inpatient Follow-Up note         6/19/2019 10:57 AM    Pt Name:   Sukhi Mcgregor  YOB: 1950  Attending:   Geovani Haley MD    Chief Complaint : Sukhi Mcgregor is a 81 y.o. female being followed by nephrology for [place] /CKD and acidosis    Interval History :   Patient seen and examined by me. No distress  Feels well. Some SOB. No chest pain. Flushing of the PD cath went well. Scheduled Medications :    sodium chloride flush  10 mL Intravenous 2 times per day    docusate sodium  100 mg Oral BID    heparin (porcine)  5,000 Units Subcutaneous 3 times per day    amLODIPine  10 mg Oral Daily    doxazosin  1 mg Oral Daily    DULoxetine  30 mg Oral Daily    famotidine  10 mg Oral BID    ferrous sulfate  325 mg Oral BID    gabapentin  100 mg Oral TID    hydrALAZINE  100 mg Oral 3 times per day    insulin glargine  10 Units Subcutaneous BID    isosorbide dinitrate  20 mg Oral TID    levothyroxine  25 mcg Oral Daily    meclizine  25 mg Oral TID    oxybutynin  5 mg Oral Daily    pravastatin  40 mg Oral Nightly    insulin lispro  0-6 Units Subcutaneous TID WC    insulin lispro  0-3 Units Subcutaneous Nightly      sodium chloride 50 mL/hr at 06/19/19 0824    dextrose         Vitals :  BP (!) 142/65   Pulse 61   Temp 98.5 °F (36.9 °C) (Oral)   Resp 22   Ht 5' 4\" (1.626 m)   Wt 170 lb 4.8 oz (77.2 kg)   SpO2 100%   BMI 29.23 kg/m²     24HR INTAKE/OUTPUT:      Intake/Output Summary (Last 24 hours) at 6/19/2019 1057  Last data filed at 6/19/2019 0336  Gross per 24 hour   Intake 1113.99 ml   Output 0 ml   Net 1113.99 ml     Last 3 weights  Wt Readings from Last 3 Encounters:   06/19/19 170 lb 4.8 oz (77.2 kg)   06/13/19 162 lb 8 oz (73.7 kg)   05/23/19 171 lb 4.8 oz (77.7 kg)           Physical Exam :  General Appearance:  Well developed.  No distress  Mouth/Throat:  Oral mucosa moist  Neck:  Supple, no JVD  Lungs:  Breath sounds:

## 2019-06-19 NOTE — PROGRESS NOTES
Deviations: Forward Flexed Posture, Slow Aurea, Unsteady Gait and easily SOB limited distance    Functional Outcome Measures: Not completed  AM-PAC Inpatient Mobility without Stair Climbing Raw Score : 7  AM-PAC Inpatient without Stair Climbing T-Scale Score : 28.66    ASSESSMENT:  Activity Tolerance:  Patient tolerance of  treatment: poor. Pt easily SOB limited activity-nursing aware, pt looked better and stated less SOB after mobility and repositioning     Equipment Recommendations: Other: cont to assess needs  Discharge Recommendations:  Discharge Recommendations: Continue to assess pending progress, Subacute/Skilled Nursing Facility, Patient would benefit from continued therapy after discharge    Plan: Times per week: 3-5X GM  Times per day: Daily  Specific instructions for Next Treatment: therex and mobility    Patient Education  Patient Education: Plan of Care    Goals:  Patient goals : feel better  Short term goals  Time Frame for Short term goals: by discharge  Short term goal 1: bed mobility with S  Short term goal 2: transfer with SBA  Short term goal 3: amb >50'x1 with walker and CGA to walk safely in room  Long term goals  Time Frame for Long term goals : no LTGs set secondary to short ELOS    Following session, patient left in safe position with all fall risk precautions in place.

## 2019-06-19 NOTE — PROGRESS NOTES
Hospitalist Progress Note    Patient:  Reza Simeon      Unit/Bed:6K-10/010-A    YOB: 1950    MRN: [id number]       Acct: [de-identified]     PCP: [address]    Date of Admission: 6/16/2019    Reason for admission: sob, recurrent pleural effusions, esrd    Expected discharge date:  24 h     Disposition: SNF    Hospital Course: pt adm with recurrent pleural effusion, likely from diastolic heart failure; had pleurex 6. 18. Sore,     In the Last 24 hours:   Had probably orthostatic spell am 6.19.  cxr still not much improved    Medications:  Reviewed    Infusion Medications    sodium chloride 50 mL/hr at 06/19/19 6083    dextrose       Scheduled Medications    insulin glargine  8 Units Subcutaneous BID    sodium chloride flush  10 mL Intravenous 2 times per day    docusate sodium  100 mg Oral BID    heparin (porcine)  5,000 Units Subcutaneous 3 times per day    amLODIPine  10 mg Oral Daily    doxazosin  1 mg Oral Daily    DULoxetine  30 mg Oral Daily    famotidine  10 mg Oral BID    ferrous sulfate  325 mg Oral BID    gabapentin  100 mg Oral TID    hydrALAZINE  100 mg Oral 3 times per day    isosorbide dinitrate  20 mg Oral TID    levothyroxine  25 mcg Oral Daily    meclizine  25 mg Oral TID    oxybutynin  5 mg Oral Daily    pravastatin  40 mg Oral Nightly    insulin lispro  0-6 Units Subcutaneous TID WC    insulin lispro  0-3 Units Subcutaneous Nightly     PRN Meds: sodium chloride flush, ondansetron, acetaminophen, glucose, dextrose, glucagon (rDNA), dextrose      Intake/Output Summary (Last 24 hours) at 6/19/2019 1154  Last data filed at 6/19/2019 0336  Gross per 24 hour   Intake 1113.99 ml   Output 0 ml   Net 1113.99 ml       Diet:  DIET CARB CONTROL; Low Potassium    Exam:  BP (!) 142/65   Pulse 61   Temp 98.5 °F (36.9 °C) (Oral)   Resp 22   Ht 5' 4\" (1.626 m)   Wt 170 lb 4.8 oz (77.2 kg)   SpO2 100%   BMI 29.23 kg/m²     Physical Examination: General appearance no other suspicious bone lesions. EKG leads overlie the chest.     Slight increased haziness of the right lung may indicate posterior layered pleural effusion or slight increase in the patient's right pleural effusion. Otherwise stable appearance **This report has been created using voice recognition software. It may contain minor errors which are inherent in voice recognition technology. ** Final report electronically signed by Dr. Ashlyn Ivan on 5/22/2019 8:15 AM    Ct Chest Wo Contrast    Result Date: 6/7/2019  PROCEDURE: CT CHEST WO CONTRAST CLINICAL INFORMATION: RALES, bilateral pleural effusion R>L recent thoracentesis 6/7/hypoxia COMPARISON: 6/7/2016 TECHNIQUE: Multiple axial 5 millimeter images of the thorax and upper abdomen were obtained without the administration of intravenous contrast material . All CT scans at this facility use dose modulation, iterative reconstruction, and/or weight-based dosing when appropriate to reduce radiation dose to as low as reasonably achievable. FINDINGS: Upper abdomen: Prior cholecystectomy. Mediastinum and radha: Enlarged pulmonary trunk, consistent with pulmonary arterial hypertension. Extensive coronary artery calcifications are present. There are several small subcentimeter mediastinal lymph nodes, likely reactive. No abnormally enlarged hilar or mediastinal lymph nodes are seen. Bones: Marked hypertrophic spurring is seen in the thoracic spine with several bulky bridging osteophytes. There is extensive calcification in the anterior spinal ligament. In addition, there is a moderate old compression fracture T11 level with evidence  for prior vertebroplasty. There appears slightly greater compression at this time than on the prior study. Lungs: Groundglass infiltrates right mid and upper lung fields, consistent with pneumonia/pulmonary edema. Moderate consolidation both lower lobes, worse on the right, consistent with pneumonia.  There are moderate size bilateral pleural effusions. 1. Moderate-sized left pleural effusion. Bibasilar atelectasis/pneumonia. Groundglass and right mid and upper lung fields, consistent with pneumonia versus pulmonary edema. 2. Enlarged pulmonary arteries, consistent with pulmonary arterial hypertension. **This report has been created using voice recognition software. It may contain minor errors which are inherent in voice recognition technology. ** Final report electronically signed by Dr. Fortunato Chapman on 6/7/2019 1:03 PM    Xr Chest Portable    Result Date: 6/19/2019  PROCEDURE: XR CHEST PORTABLE CLINICAL INFORMATION: Pneumothorax. COMPARISON: 6/18/2019. TECHNIQUE: AP portable chest radiograph performed. FINDINGS: POSTSURGICAL CHANGES: None. LINES/TUBES/MECHANICAL DEVICES: None. TRACHEA/HEART/MEDIASTINUM/HILUM: 1. Stable mild prominence of the cardiac silhouette which is partially obscured. LUNG MARIANO: 1. There are bilateral perihilar and the basilar infiltrates, consolidation within the right lower chest and a right pleural effusion. OTHER: None. PNEUMOTHORAX:  1. There is a persistent however interval smaller right apical pneumothorax (less than 5%). OSSEOUS STRUCTURES: 1. No acute osseous abnormality. 1. There is a persistent however interval smaller right apical pneumothorax (less than 5%). 2. There are bilateral perihilar and the basilar infiltrates, consolidation within the right lower chest and a right pleural effusion. 3. Follow chest radiographs recommended to confirm complete resolution. **This report has been created using voice recognition software. It may contain minor errors which are inherent in voice recognition technology. ** Final report electronically signed by Dr. Deonna Sparks on 6/19/2019 7:03 AM    Xr Chest Portable    Result Date: 6/16/2019  PROCEDURE: XR CHEST PORTABLE CLINICAL INFORMATION: Cough.  COMPARISON: Chest x-ray dated 6/12/2019 TECHNIQUE: AP Portable chest xray FINDINGS: Lines/tubes/devices: none Lungs/pleura: and bronchial calcification. LUNG FIELDS: 1. The patient is status post ultrasound-guided right thoracentesis with aspiration of 1.5 L of pleural fluid. There is no residual pleural fluid. There is no pneumothorax. 2. There are left perihilar and left basilar infiltrates and mild atelectasis and/or infiltrate at the right lung base. There is no pulmonary vascular congestion. OTHER: None. PNEUMOTHORAX: None. OSSEOUS STRUCTURES: 1. No acute osseous abnormality. 2. The bony structures are osteopenic. 3. There are mild hypertrophic degenerative changes at the acromioclavicular articulations bilaterally and subcortical sclerosis and cystic change at the greater tuberosity bilaterally which in the right clinical setting can be associated with impingement. 4. There is mild levoscoliosis. 1. The patient is status post ultrasound-guided right thoracentesis with aspiration of 1.5 L of pleural fluid. There is no residual pleural fluid. There is no pneumothorax. 2. There are left perihilar and left basilar infiltrates and mild atelectasis and/or infiltrate at the right lung base. There is no pulmonary vascular congestion. **This report has been created using voice recognition software. It may contain minor errors which are inherent in voice recognition technology. ** Final report electronically signed by Dr. Brittani Esquivel on 6/12/2019 3:43 PM    Xr Chest 1 Vw    Result Date: 6/9/2019  PROCEDURE: XR CHEST 1 VIEW CLINICAL INFORMATION: post thoracentesis left side COMPARISON: 6/7/2019 TECHNIQUE: Single AP upright view of the chest was obtained. 1. Poor inflation of lungs. Mild cardiomegaly. Moderate atelectasis/pneumonia left perihilar region, right infrahilar region, and both lung bases. Small effusion right side. 2. No residual effusion on the left. No pneumothorax is seen.  Final report electronically signed by Dr. Issa Marrufo on 6/9/2019 10:27 AM    Xr Chest 1 Vw    Result Date: 6/6/2019  PROCEDURE: XR CHEST 1 VIEW CLINICAL INFORMATION: Status post ultrasound-guided right thoracentesis. COMPARISON: 5/23/2019. TECHNIQUE: Single frontal view of the chest performed. FINDINGS: POSTSURGICAL CHANGES: 1. Cholecystectomy. LINES/TUBES/MECHANICAL DEVICES: None. TRACHEA/HEART/MEDIASTINUM/HILUM: 1. There is mild enlargement of the cardiomediastinal silhouette. 2. There is tracheal and bronchial calcification. LUNG FIELDS: 1. The patient is status post large volume right thoracentesis with aspiration of 1.4 L of clear yellow fluid. There is no pneumothorax. There are bilateral perihilar and the basilar infiltrates. OTHER: None. PNEUMOTHORAX: None. OSSEOUS STRUCTURES: 1. No acute osseous abnormality. 2. Generalized osteopenia. 3. Mild levoscoliosis. 1. The patient is status post large volume right thoracentesis with aspiration of 1.4 L of clear yellow fluid. There is no pneumothorax. There are bilateral perihilar and the basilar infiltrates. **This report has been created using voice recognition software. It may contain minor errors which are inherent in voice recognition technology. ** Final report electronically signed by Dr. Emaline Boas on 6/6/2019 1:30 PM    Us Thoracentesis    Result Date: 6/12/2019  PROCEDURE: US THORACENTESIS CLINICAL INFORMATION: Right pleural effusion. COMPARISON: No prior study. PHYSICIAN PERFORMING PROCEDURE: Anais Lawton M.D. PROCEDURE:  Informed consent was obtained. Limited sonographic imaging of the posterior chest was performed for localization of the pleural effusion. The skin overlying the pleural effusion was marked. The puncture site was prepped and draped in a sterile fashion and locally anesthetized with 2% lidocaine. The distal tip of a 5 Brazilian one-step catheter was advance into the pleural effusion. An 83 ml specimen was obtained to be sent to the laboratory for analysis as per the orders of the referring physician. .  A total of 1.5 L clear yellow pleural fluid was then aspirated and discarded. The one-step catheter was then removed. The patient tolerated the procedure well and and there were no immediate complications. 1.  Uncomplicated ultrasound guided diagnostic and therapeutic thoracentesis. **This report has been created using voice recognition software. It may contain minor errors which are inherent in voice recognition technology. ** Final report electronically signed by Dr. Austen Quinones on 6/12/2019 3:40 PM    Us Thoracentesis    Result Date: 6/9/2019  THORACENTESIS WITH ULTRASOUND GUIDANCE: PERFORMED BY: Nicole Johnson M.D. CLINICAL INFORMATION: Pleural effusion APPROACH: Left side, posterior FLUID WITHDRAWN: 500 mL blood-tinged serous fluid PROCEDURE: Signed informed consent was obtained prior to performing this procedure. The thorax was initially evaluated sonographically to determine appropriate puncture site. The skin was marked, prepped, and draped in a sterile fashion. Following local anesthesia and utilizing aseptic technique, a 5 Kuwaiti one-step catheter was successfully inserted into the pleural effusion at the position indicated above. Pleural fluid in the amount was above was then aspirated and the needle was removed. The patient tolerated the procedure well. A post procedure chest radiograph will be obtained. Status post thoracentesis **This report has been created using voice recognition software. It may contain minor errors which are inherent in voice recognition technology. ** Final report electronically signed by Dr. Brittani Spear on 6/9/2019 10:46 AM    Us Thoracentesis    Result Date: 6/6/2019  PROCEDURE: US THORACENTESIS CLINICAL INFORMATION: Large right pleural effusion. COMPARISON: No prior study. PHYSICIAN PERFORMING PROCEDURE: Charly Martin M.D. PROCEDURE:  Informed consent was obtained. Limited sonographic imaging of the posterior chest was performed for localization of the pleural effusion. The skin overlying the pleural effusion was marked.  The catheter as above. This procedure was performed with intermittent fluoroscopic visualization and documented with intermittent spot films. The deep cuff of the catheter was then positioned in the anterior abdominal muscle and stabilized with a 2-0 silk pursestring suture. A small subcutaneous tunnel was then created and the remaining portion of the catheter was passed through the tunnel, positioning the superficial cuff in the upper end of the tunnel, approximately 2-3 CM superior to the catheter exit site. The abdominal wound and subcutaneous tunnel were flushed several times with bacitracin solution. The wounds closed with 3-0 Vicryl subcuticular suture and a 2-0 silk external \" retention sutures\", to assist with wound healing. Instructions will be of into the dialysis nurses to remove these in approximately 10 days. Dermabond skin adhesive and Steri-Strips were also applied to the abdominal wound along with a sterile OpSite dressing. The patient was instructed to keep the wound covered and dry for the next 10 days. The patient tolerated the procedure well. A fluoroscopic spot film was obtained at the end of this procedure to document catheter position. Status post successful tunneled peritoneal dialysis  (CAPD)  catheter insertion. **This report has been created using voice recognition software. It may contain minor errors which are inherent in voice recognition technology. ** Final report electronically signed by Dr. Abdulkadir Hernandez on 6/11/2019 3:15 PM    Xr Chest Pa Inspiration 1 Vw    Result Date: 6/18/2019  PROCEDURE: XR CHEST PA INSPIRATION 1 VW CLINICAL INFORMATION: 82-year-old female status post right thoracic Pleurx catheter placement. COMPARISON: Comparison is made to previous study dated 6/16/2019. TECHNIQUE: AP upright view of the chest was obtained. FINDINGS: There has been interval placement of right-sided thoracic drainage catheter.  There is a right-sided apical pneumothorax measuring up to 15 mm in weakness. Opacities are again noted at the right lung base which may be related to atelectasis or infiltrate. There are small bilateral pleural effusions. There is mild cardiomegaly. Visualized portions of the upper abdomen are within normal limits. The osseous structures are intact. No acute fractures or suspicious osseous lesions. Interval placement of a right-sided pleural drainage catheter with marked decreased size of pleural effusion. However, there has been development of a right-sided apical 15 mm pneumothorax. Findings discussed by Dr. Barbie Merritt with 6K nurse Lexus Coello 8:50 AM 6/18/2019 via telephone. **This report has been created using voice recognition software. It may contain minor errors which are inherent in voice recognition technology. ** Final report electronically signed by Dr Maura Reyes on 6/18/2019 8:55 AM    Xr Chest Pa Inspiration 1 Vw    Result Date: 5/23/2019  PROCEDURE: XR CHEST PA INSPIRATION 1 VW CLINICAL INFORMATION: right thoracentesis. COMPARISON: 5/22/2019 TECHNIQUE: AP upright view of the chest. FINDINGS: Mild to moderate elevation of the right hemidiaphragm is again seen. A tiny amount of pleural fluid likely persists on the right though it is improved. No pneumothorax is evident. There is mild left basilar parenchymal opacity which is stable. No pneumothorax post right thoracentesis. **This report has been created using voice recognition software. It may contain minor errors which are inherent in voice recognition technology. ** Final report electronically signed by Dr. Kwesi Boggs on 5/23/2019 12:34 PM    Ir Abscess Drainage Perc    Result Date: 6/18/2019  TUNNELED PLEURX DRAINAGE CATHETER INSERTION / CHEST: PERFORMED BY: Jason Laureano. Barbie Merritt M.D. CLINICAL INFORMATION: Recurrent pleural effusion APPROACH:  Right hemithorax, inferiorly, mid axillary line. CATHETER: 16 Georgian tunneled Pleurx chest drainage catheter.  FLUID: 1 L corona serous fluid was obtained and discarded. SEDATION:  Versed 0.5 mg and fentanyl 25 mcg IV; the patient was sedated for  30 minutes  during this procedure and monitored with EKG and pulse-ox monitoring devices by a registered nurse. FLUOROSCOPY TIME: 1 minute 39 seconds FLUOROSCOPIC IMAGES: 3 PROCEDURE:  Signed informed consent was obtained prior to performing this procedure. The patient was sedated, as indicated above. The chest was evaluated with ultrasound initially to determine an appropriate puncture site. Images were obtained, revealing a significant amount pleural fluid. The skin was marked, prepped, and draped in a sterile fashion, utilizing [address]. The skin was infiltrated with lidocaine 2 percent at the puncture site and a small skin nick was made with a #11 scalpel blade. A micropuncture needle, attached to a syringe, was then successfully passed into the pleural fluid. A small  amount of fluid was aspirated to confirm appropriate needle position. An 018 wire was then passed through the needle, followed by insertion of a 4 Ecuadorean vessel dilator which was later up sized to a 16 [place] peel-away sheath. A skin tract leading anteriorly and inferiorly from the initial puncture site was then infiltrated with lidocaine 2 percent in preparation for formation of a subcutaneous tunnel. A small incision was then made along the anterior-inferior aspect of the tunnel and the Pleurx catheter was passed through the tunnel, attached to the tunneling device, and was then passed through the peel-away sheath. The sheath was removed, and The tunnel was flushed several times with bacitracin solution. The catheter was then connected to a vacuum bottle and pleural fluid, in the amount listed above, was aspirated and discarded, confirming appropriate catheter function. The wound site were sutured with 3-0 Vicryl interrupted suture.  A small amount of antibiotic ointment was applied to the catheter entrance site, and an antibacterial Biopatch was applied to the catheter exit site, along with a sterile OpSite dressing. Fluoroscopic spot films were obtained to document catheter position. The patient tolerated the procedure well. Status post successful tunneled Pleurx chest drainage catheter insertion. **This report has been created using voice recognition software. It may contain minor errors which are inherent in voice recognition technology. ** Final report electronically signed by Dr Mey Contreras on 6/18/2019 8:59 AM       DVT prophylaxis: SQ Heparin     Code Status: Full Code    PT/OT Eval Status:  ordered      Tele:  No    Assessment/Plan:    Active Hospital Problems    Diagnosis Date Noted    Postprocedural pneumothorax [J95.811]     Moderate malnutrition ([address] [E44.0]     Hyperkalemia [E87.5] 06/17/2019    Infiltrate noted on imaging study [R93.89] 06/17/2019    Shortness of breath [R06.02] 06/16/2019    Hyponatremia [E87.1]     Atelectasis [J98.11]     Recurrent pleural effusion on right [J90] 06/06/2019    Chronic kidney disease (CKD), stage V ([address] [N18.5]     Elevated troponin [R74.8]     Uncontrolled type 2 diabetes mellitus with hyperglycemia (HCC) [E11.65] 12/07/2018    Acute on chronic diastolic CHF (congestive heart failure) ([address] [I50.33] 12/07/2018    Pulmonary edema with congestive heart failure ([address] [I50.1] 12/07/2018    Acute on chronic respiratory failure with hypoxia (HCC) [J96.21] 12/07/2018    LOUISE (acute kidney injury) ([address] [N17.9] 12/07/2018    Chronic kidney disease (CKD), stage IV (severe) ([address] [N18.4] 11/02/2018       1. Right pleural effusion- s/p pleurex  2. esrd  3. Possibly to nh am  4. hbp- may need further adjustment  5.  Dizziness- check orthostatics        Electronically signed by Edison Dennis MD on 6/19/2019 at 11:54 AM

## 2019-06-19 NOTE — PROGRESS NOTES
[facility]  INPATIENT PHYSICAL THERAPY  EVALUATION  STRZ RENAL TELEMETRY 6K - 6K-10/010-A    Time In: 1606  Time Out: 2294  Timed Code Treatment Minutes: 0 Minutes  Minutes: 12          Date: 2019  Patient Name: Justin Babcock,  Gender:  female        MRN: [id number]  : 1950  (81 y.o.)      Referring Practitioner: JOHN Hood  Diagnosis: SOB  Additional Pertinent Hx: Pt presented to [facility] with complaint of increased SOB. Pt poor historian, obtained from chart review and partially from pt. Pt was recently admitted to [facility] on  for the same problem. On , pt had a thoracentesis and had 500 mL removed. She was also seen on that day by nephrology, who determined that pt might benefit from dialysis, specifically PD. Pt states she had surgery for catheter placement last Monday 06/10. Since that time she has had worsening SOB. Bilateral pleural effusions remain persistent. Pt also notes cough, chest pain, nausea and emesis.  Denies fever/chills/dysuria/urgencyfrequency and notes that she does not make much urine and has no further complaints at this time     Past Medical History:   Diagnosis Date    Anemia     Anemia in chronic kidney disease(285.21)     Arthritis     Asthma     Bipolar 1 disorder (HCC)     CHF (congestive heart failure) ([facility]     Chronic kidney disease, stage III (moderate) (HCC)     Depression     GERD (gastroesophageal reflux disease)     Gout     Headache(784.0)     Hyperkalemia     Hyperlipidemia     Hypertension     Hypothyroid     Neurogenic bladder     Pneumonia     Seizure ([facility] [address] 2018    Type II or unspecified type diabetes mellitus without mention of complication, not stated as uncontrolled      Past Surgical History:   Procedure Laterality Date    BACK SURGERY      CERVICAL FUSION      CHOLECYSTECTOMY      COLONOSCOPY      ENDOSCOPY, COLON, DIAGNOSTIC      FRACTURE SURGERY         Restrictions/Precautions:  Fall Risk    Subjective:  Chart Reviewed: Yes  Patient assessed for rehabilitation services?: Yes  Family / Caregiver Present: No  Subjective: pleasant and cooperative, per nursing waiting for cap for pleurx drain before can do mobility    General:     Pain:  Denies. Pain Assessment  Pain Level: 0       Social/Functional History:    Lives With: Alone  Type of Home: Facility([facility])  Home Layout: One level  Home Access: Level entry  Home Equipment: Rolling walker     Bathroom Shower/Tub: Walk-in shower  Bathroom Toilet: Standard  Bathroom Equipment: Grab bars in shower    Receives Help From: Other (comment)(staff as available)  ADL Assistance: Needs assistance  Bath: Minimal assistance  Dressing: Minimal assistance  Homemaking Assistance: Needs assistance  Meal Prep: Maximal  Laundry: Maximal  Vacuuming: Maximal  Cleaning: Maximal  Gardening: Maximal  Yard Work: Maximal  Driving: Maximal  Shopping: Maximal  Homemaking Responsibilities: No  Ambulation Assistance: Independent  Transfer Assistance: Independent    Active : No  Occupation: Retired  Leisure & Hobbies: Reading, spending time with grandchildren  Additional Comments: Pt had needed more help with her self care in the days leading to admission. She did not use O2 until a couple of days prior to admission. Pt uses a rolling walker. Pt had been able to participate in groups at nursing home until recently.       Objective:       RLE AROM: WFL         LLE AROM : WFL    Strength RLE: WFL  Comment:  per pt has chronic foot drop but able to perform DF lacking about 10 degrees from neutral    Strength LLE: WFL      Sensation  Overall Sensation Status: (Numbness or tingling in her L hand - medially)       Rolling to Left: Stand by assistance  Rolling to Right: Stand by assistance  Comment: bridges in sup to place bedpan with SBA    Exercises:  Comments: sup BLE ankle pumps, heelslide x5 reps       Activity Tolerance:  Activity Tolerance: Treatment limited secondary to medical complications (free text)  Activity Tolerance: limited per nursing request waiting for cap for pleurx drain    Treatment Initiated: none    Assessment: Body structures, Functions, Activity limitations: Decreased functional mobility , Decreased endurance, Decreased strength, Decreased balance  Assessment: pt tolerated fair, limited by waiting for cap for pleurx drain, generalized weakness and deconditioning, dec balance, easily SOB, IV lines, heart monitor, O2, inc assist for safe mobility, recommend cont PT to inc pt functional mobility  Prognosis: Good    Clinical Presentation: High - Unstable with Unpredictable Characteristics: see assessment:    Decision Making: High Complexitybased on patient assessment and decision making process of determining plan of care and establishing reasonable expectations for measurable functional outcomes    REQUIRES PT FOLLOW UP: Yes    Discharge Recommendations:  Discharge Recommendations: Continue to assess pending progress, Subacute/Skilled Nursing Facility, Patient would benefit from continued therapy after discharge    Patient Education:  Patient Education: POC    Equipment Recommendations: Other: cont to assess needs    Safety:  Type of devices:  All fall risk precautions in place, Bed alarm in place, Call light within reach, Nurse notified, Left in bed, Patient at risk for falls    Plan:  Times per week: 3-5X GM  Times per day: Daily  Specific instructions for Next Treatment: therex and mobility    Goals:  Patient goals : feel better  Short term goals  Time Frame for Short term goals: by discharge  Short term goal 1: bed mobility with S  Short term goal 2: transfer with SBA  Short term goal 3: amb >50'x1 with walker and CGA to walk safely in room  Long term goals  Time Frame for Long term goals : no LTGs set secondary to short ELOS    Evaluation Complexity: Based on the findings of patient history, examination, clinical presentation, and decision making

## 2019-06-19 NOTE — CARE COORDINATION
[facility] Transitions Follow Up Call    2019    Patient: Ariella Lester  Patient : 1950   MRN: [id number]  Reason for Admission:   Discharge Date: 19 RARS: Readmission Risk Score: 32         Reviewed nH Predict Tool with patient and SW/CM. nH Predict Tool uploaded in the media tab. Recommendation is for SNF and discharge plan is SNF    Care Transitions Subsequent and Final Call    Subsequent and Final Calls  Care Transitions Interventions  Other Interventions:             Follow Up  Future Appointments   Date Time Provider Lew Adams   2019 11:30 AM YESENIA ePrez - CNP Pulm Med [facility] - Rosalba Haji RN

## 2019-06-20 ENCOUNTER — APPOINTMENT (OUTPATIENT)
Dept: GENERAL RADIOLOGY | Age: 69
DRG: 291 | End: 2019-06-20
Payer: MEDICARE

## 2019-06-20 VITALS
OXYGEN SATURATION: 94 % | RESPIRATION RATE: 16 BRPM | HEART RATE: 75 BPM | TEMPERATURE: 98.4 F | BODY MASS INDEX: 28.83 KG/M2 | WEIGHT: 168.9 LBS | SYSTOLIC BLOOD PRESSURE: 137 MMHG | HEIGHT: 64 IN | DIASTOLIC BLOOD PRESSURE: 61 MMHG

## 2019-06-20 LAB
ALBUMIN SERPL-MCNC: 3 G/DL (ref 3.5–5.1)
ALP BLD-CCNC: 93 U/L (ref 38–126)
ALT SERPL-CCNC: 8 U/L (ref 11–66)
ANION GAP SERPL CALCULATED.3IONS-SCNC: 21 MEQ/L (ref 8–16)
AST SERPL-CCNC: 13 U/L (ref 5–40)
BILIRUB SERPL-MCNC: 0.4 MG/DL (ref 0.3–1.2)
BUN BLDV-MCNC: 96 MG/DL (ref 7–22)
CALCIUM SERPL-MCNC: 8.3 MG/DL (ref 8.5–10.5)
CHLORIDE BLD-SCNC: 93 MEQ/L (ref 98–111)
CO2: 18 MEQ/L (ref 23–33)
CREAT SERPL-MCNC: 5.3 MG/DL (ref 0.4–1.2)
GFR SERPL CREATININE-BSD FRML MDRD: 8 ML/MIN/1.73M2
GLUCOSE BLD-MCNC: 104 MG/DL (ref 70–108)
GLUCOSE BLD-MCNC: 173 MG/DL (ref 70–108)
GLUCOSE BLD-MCNC: 190 MG/DL (ref 70–108)
GLUCOSE BLD-MCNC: 96 MG/DL (ref 70–108)
POTASSIUM SERPL-SCNC: 4.5 MEQ/L (ref 3.5–5.2)
SODIUM BLD-SCNC: 132 MEQ/L (ref 135–145)
TOTAL PROTEIN: 6.1 G/DL (ref 6.1–8)

## 2019-06-20 PROCEDURE — 99232 SBSQ HOSP IP/OBS MODERATE 35: CPT | Performed by: INTERNAL MEDICINE

## 2019-06-20 PROCEDURE — 6370000000 HC RX 637 (ALT 250 FOR IP): Performed by: PHYSICIAN ASSISTANT

## 2019-06-20 PROCEDURE — 94760 N-INVAS EAR/PLS OXIMETRY 1: CPT

## 2019-06-20 PROCEDURE — 80053 COMPREHEN METABOLIC PANEL: CPT

## 2019-06-20 PROCEDURE — 82948 REAGENT STRIP/BLOOD GLUCOSE: CPT

## 2019-06-20 PROCEDURE — 71045 X-RAY EXAM CHEST 1 VIEW: CPT

## 2019-06-20 PROCEDURE — 6370000000 HC RX 637 (ALT 250 FOR IP): Performed by: INTERNAL MEDICINE

## 2019-06-20 PROCEDURE — 6360000002 HC RX W HCPCS: Performed by: INTERNAL MEDICINE

## 2019-06-20 PROCEDURE — APPSS30 APP SPLIT SHARED TIME 16-30 MINUTES: Performed by: NURSE PRACTITIONER

## 2019-06-20 PROCEDURE — 6360000002 HC RX W HCPCS: Performed by: PHYSICIAN ASSISTANT

## 2019-06-20 PROCEDURE — 36415 COLL VENOUS BLD VENIPUNCTURE: CPT

## 2019-06-20 RX ORDER — INSULIN GLARGINE 100 [IU]/ML
8 INJECTION, SOLUTION SUBCUTANEOUS 2 TIMES DAILY
Qty: 1 VIAL | Refills: 3 | DISCHARGE
Start: 2019-06-20 | End: 2019-06-22 | Stop reason: ALTCHOICE

## 2019-06-20 RX ORDER — PSEUDOEPHEDRINE HCL 30 MG
100 TABLET ORAL 2 TIMES DAILY
Status: ON HOLD | DISCHARGE
Start: 2019-06-20 | End: 2020-01-24

## 2019-06-20 RX ADMIN — DARBEPOETIN ALFA 40 MCG: 40 INJECTION, SOLUTION INTRAVENOUS; SUBCUTANEOUS at 15:03

## 2019-06-20 RX ADMIN — ISOSORBIDE DINITRATE 20 MG: 20 TABLET ORAL at 13:23

## 2019-06-20 RX ADMIN — DOCUSATE SODIUM 100 MG: 100 CAPSULE, LIQUID FILLED ORAL at 08:34

## 2019-06-20 RX ADMIN — MECLIZINE HYDROCHLORIDE 25 MG: 12.5 TABLET, FILM COATED ORAL at 13:47

## 2019-06-20 RX ADMIN — FERROUS SULFATE TAB 325 MG (65 MG ELEMENTAL FE) 325 MG: 325 (65 FE) TAB at 08:34

## 2019-06-20 RX ADMIN — LEVOTHYROXINE SODIUM 25 MCG: 25 TABLET ORAL at 04:51

## 2019-06-20 RX ADMIN — GABAPENTIN 100 MG: 100 CAPSULE ORAL at 13:23

## 2019-06-20 RX ADMIN — HYDRALAZINE HYDROCHLORIDE 100 MG: 50 TABLET, FILM COATED ORAL at 04:51

## 2019-06-20 RX ADMIN — MECLIZINE HYDROCHLORIDE 25 MG: 12.5 TABLET, FILM COATED ORAL at 08:33

## 2019-06-20 RX ADMIN — DOXAZOSIN 1 MG: 1 TABLET ORAL at 08:33

## 2019-06-20 RX ADMIN — ACETAMINOPHEN 650 MG: 325 TABLET ORAL at 13:56

## 2019-06-20 RX ADMIN — ISOSORBIDE DINITRATE 20 MG: 20 TABLET ORAL at 08:33

## 2019-06-20 RX ADMIN — OXYBUTYNIN CHLORIDE 5 MG: 5 TABLET ORAL at 08:33

## 2019-06-20 RX ADMIN — AMLODIPINE BESYLATE 10 MG: 10 TABLET ORAL at 08:34

## 2019-06-20 RX ADMIN — INSULIN GLARGINE 8 UNITS: 100 INJECTION, SOLUTION SUBCUTANEOUS at 08:34

## 2019-06-20 RX ADMIN — GABAPENTIN 100 MG: 100 CAPSULE ORAL at 08:33

## 2019-06-20 RX ADMIN — HYDRALAZINE HYDROCHLORIDE 100 MG: 50 TABLET, FILM COATED ORAL at 13:23

## 2019-06-20 RX ADMIN — HEPARIN SODIUM 5000 UNITS: 5000 INJECTION, SOLUTION INTRAVENOUS; SUBCUTANEOUS at 04:50

## 2019-06-20 RX ADMIN — FAMOTIDINE 10 MG: 20 TABLET, FILM COATED ORAL at 08:34

## 2019-06-20 RX ADMIN — INSULIN LISPRO 1 UNITS: 100 INJECTION, SOLUTION INTRAVENOUS; SUBCUTANEOUS at 13:24

## 2019-06-20 RX ADMIN — DULOXETINE HYDROCHLORIDE 30 MG: 30 CAPSULE, DELAYED RELEASE ORAL at 08:34

## 2019-06-20 ASSESSMENT — PAIN SCALES - GENERAL
PAINLEVEL_OUTOF10: 0
PAINLEVEL_OUTOF10: 3

## 2019-06-20 NOTE — PROGRESS NOTES
mild rales noted   Heart[de-identified]  S1,S2 heard  Abdomen:  Soft, non - tender  Musculoskeletal:  Edema - none         Last 3 CBC   Recent Labs     06/18/19  0532   WBC 14.2*   RBC 2.80*   HGB 8.4*   HCT 25.5*        Last 3 CMP  Recent Labs     06/18/19  0532 06/19/19  0549 06/20/19  0541   * 132* 132*   K 5.0 4.3 4.5   CL 93* 93* 93*   CO2 17* 17* 18*   * 95* 96*   CREATININE 6.2* 5.3* 5.3*   CALCIUM 8.7 8.4* 8.3*   LABALBU  --   --  3.0*   BILITOT  --   --  0.4             Assessment / Plan   Renal -acute kidney injury chronic kidney disease stage V, probably prerenal to some degree. She was on diuretics  · Renal fx is better with hydration. Now stable at 5.3  · Stop IVF. Start RRT next week     Hyponatremia secondary to renal dysfunction and inability to excrete free water,   Metabolic acidosis from renal dysfunction  Pleural effusion -  deemed transudate and upon speaking with the pulmonologist during the last admission she might benefit from renal replacement therapy   Anemia of chronic kidney disease - one dose of felix today. Iron studies in 5/19 were ok  Diabetes mellitus  meds reviewed and D/W NOAH angeles and LIANNE Gonzalez Dr., D.  [facility].

## 2019-06-20 NOTE — CARE COORDINATION
6/20/19, 11:22 AM    Discharge plan discussed by  and . Discharge plan reviewed with patient/ family. Patient/ family verbalize understanding of discharge plan and are in agreement with plan. Understanding was demonstrated using the teach back method. IMM Letter  IMM Letter given to Patient/Family/Significant other/Guardian/POA/by[de-identified] Dinora Mae CM  IMM Letter date given[de-identified] 06/20/19  IMM Letter time given[de-identified] 6523     Patient discharge today returning to [facility]. Ambulette scheduled for 2pm. RN updated Blue envelope on chart.

## 2019-06-21 NOTE — DISCHARGE SUMMARY
commence as an outpt. .              Exam:     Vitals:  Vitals:    06/20/19 0417 06/20/19 0445 06/20/19 0815 06/20/19 1110   BP: 130/60  (!) 143/65 137/61   Pulse: 72  79 75   Resp: 18  18 16   Temp: 98 °F (36.7 °C)  98.7 °F (37.1 °C) 98.4 °F (36.9 °C)   TempSrc: Oral  Oral Oral   SpO2: 98%  92% 94%   Weight:  168 lb 14.4 oz (76.6 kg)     Height:         Weight: Weight: 168 lb 14.4 oz (76.6 kg)     24 hour intake/output:    Intake/Output Summary (Last 24 hours) at 6/21/2019 1424  Last data filed at 6/20/2019 1533  Gross per 24 hour   Intake --   Output 800 ml   Net -800 ml         General appearance:  No apparent distress, appears stated age and cooperative. Chronically ill appearing    HEENT:  Normal cephalic, atraumatic without obvious deformity. Pupils equal, round, and reactive to light. Extra ocular muscles intact. Conjunctivae/corneas clear. Neck: Supple, with full range of motion. No jugular venous distention. Trachea midline. Respiratory:  Normal respiratory effort. Breath sounds diminished bilaterally. Cardiovascular:  Regular rate and rhythm with normal S1/S2 without murmurs, rubs or gallops. Abdomen: Soft, non-tender, non-distended with normal bowel sounds. Musculoskeletal:  No clubbing, cyanosis or edema bilaterally. Full range of motion without deformity. Skin: Skin color, texture, turgor normal.  No rashes or lesions. Neurologic:  Neurovascularly intact without any focal sensory/motor deficits. Cranial nerves: II-XII intact, grossly non-focal.  Psychiatric:  Alert and oriented, thought content appropriate, normal insight        Labs:  For convenience and continuity at follow-up the following most recent labs are provided:      CBC:    Lab Results   Component Value Date    WBC 14.2 06/18/2019    HGB 8.4 06/18/2019    HCT 25.5 06/18/2019     06/18/2019       Renal:    Lab Results   Component Value Date     06/20/2019    K 4.5 06/20/2019    K 5.0 06/18/2019    CL 93 06/20/2019 created using voice recognition software. It may contain minor errors which are inherent in voice recognition technology. **      Final report electronically signed by Dr Kb Webb on 6/18/2019 8:59 AM      XR CHEST PORTABLE   Final Result      Moderate to large right and small left pleural effusions. Bilateral interstitial pulmonary edema versus atypical pneumonia. Left lower lobe atelectasis. **This report has been created using voice recognition software. It may contain minor errors which are inherent in voice recognition technology. **      Final report electronically signed by Dr. Aliya Francis on 6/16/2019 11:02 PM      XR CHEST PA LATERAL W BOTH OBLIQUE PROJECTIONS    (Results Pending)          Consults:     IP CONSULT TO SOCIAL WORK  IP CONSULT TO PULMONOLOGY  IP CONSULT TO NEPHROLOGY  IP CONSULT TO NEPHROLOGY  IP CONSULT TO SOCIAL WORK    Disposition: SNF  Condition at Discharge: Stable    Code Status:  Prior     Patient Instructions:    Discharge lab work: Activity: activity as tolerated  Diet: No diet orders on file      Follow-up visits:   [facility]  [address] [facility]  [address] [facility]  [phone]    In 1 week  For post hospital follow-up. [facility]  [address]  [address] [place] [address]    Go on 8/27/2019  2 month post hospital follow-up with CXR PA/L to be completed 2 days prior, if not completed at [facility] facility bring CD and radiologist impression     Adrianna Rodriguez MD  [address] [address]  [phone]    In 1 week  For post hospital follow-up with BMP prior to appointment.           Discharge Medications:      Rogelio Gambino   Home Medication Instructions INW:[id number]    Printed on:06/21/19 1424   Medication Information                      acetaminophen (TYLENOL) 325 MG tablet  Take 650 mg by mouth every 6 hours as needed for Pain             acetaminophen-codeine (TYLENOL #3) 300-30 MG per tablet  Take 1 tablet by mouth every 6 hours as needed for Pain. amLODIPine (NORVASC) 10 MG tablet  Take 10 mg by mouth daily             blood glucose test strips (ASCENSIA AUTODISC VI;ONE TOUCH ULTRA TEST VI) strip  Patient tests blood sugar three times per day. Diagnosis DMII E11.9             docusate sodium (COLACE, DULCOLAX) 100 MG CAPS  Take 100 mg by mouth 2 times daily             doxazosin (CARDURA) 1 MG tablet  Take 1 tablet by mouth daily             DULoxetine (CYMBALTA) 30 MG extended release capsule  Take 30 mg by mouth daily             famotidine (PEPCID) 10 MG tablet  Take 10 mg by mouth 2 times daily             ferrous sulfate 325 (65 Fe) MG tablet  Take 325 mg by mouth 2 times daily             gabapentin (NEURONTIN) 100 MG capsule  Take 100 mg by mouth 3 times daily.              glucagon, rDNA, 1 MG injection  Inject 1 mg into the muscle as needed for Low blood sugar (Blood glucose less than 70 mg/dL and patient NOT ALERT or NPO and does not have IV access.)             hydrALAZINE (APRESOLINE) 100 MG tablet  Take 1 tablet by mouth every 8 hours             insulin glargine (LANTUS) 100 UNIT/ML injection vial  Inject 8 Units into the skin 2 times daily             insulin lispro (HUMALOG) 100 UNIT/ML injection vial  Inject 0-6 Units into the skin 3 times daily (with meals)             isosorbide dinitrate (ISORDIL) 20 MG tablet  Take 1 tablet by mouth 3 times daily             levothyroxine (SYNTHROID) 25 MCG tablet  Take 25 mcg by mouth Daily             meclizine (ANTIVERT) 25 MG tablet  Take 25 mg by mouth 3 times daily             ondansetron (ZOFRAN-ODT) 4 MG disintegrating tablet  Take 4 mg by mouth every 4 hours as needed for Nausea or Vomiting             oxybutynin (DITROPAN) 5 MG tablet  Take 5 mg by mouth daily             OXYGEN  Inhale 2-4 L into the lungs as needed (to keep sat> 90%) pravastatin (PRAVACHOL) 40 MG tablet  Take 40 mg by mouth nightly                  Time Spent on discharge is more than 45 minutes in the examination, evaluation, counseling and review of medications and discharge plan. Signed: Thank you Pablo Levy for the opportunity to be involved in this patient's care.     Electronically signed by Valentino Jesus, MD on 6/21/2019 at 2:24 PM

## 2019-06-22 ENCOUNTER — APPOINTMENT (OUTPATIENT)
Dept: GENERAL RADIOLOGY | Age: 69
DRG: 291 | End: 2019-06-22
Payer: MEDICARE

## 2019-06-22 ENCOUNTER — APPOINTMENT (OUTPATIENT)
Dept: CT IMAGING | Age: 69
DRG: 291 | End: 2019-06-22
Payer: MEDICARE

## 2019-06-22 ENCOUNTER — HOSPITAL ENCOUNTER (INPATIENT)
Age: 69
LOS: 3 days | Discharge: NURSING, REHAB OR LONG-TERM CARE | DRG: 291 | End: 2019-06-25
Attending: EMERGENCY MEDICINE | Admitting: HOSPITALIST
Payer: MEDICARE

## 2019-06-22 PROBLEM — I21.4 NSTEMI (NON-ST ELEVATED MYOCARDIAL INFARCTION) (HCC): Status: ACTIVE | Noted: 2019-06-22

## 2019-06-22 LAB
ALBUMIN SERPL-MCNC: 3.2 G/DL (ref 3.5–5.1)
ALP BLD-CCNC: 86 U/L (ref 38–126)
ALT SERPL-CCNC: 6 U/L (ref 11–66)
ANION GAP SERPL CALCULATED.3IONS-SCNC: 17 MEQ/L (ref 8–16)
AST SERPL-CCNC: 9 U/L (ref 5–40)
BACTERIA: ABNORMAL /HPF
BASE EXCESS MIXED: -4.8 MMOL/L (ref -2–3)
BASOPHILS # BLD: 0.3 %
BASOPHILS ABSOLUTE: 0 THOU/MM3 (ref 0–0.1)
BILIRUB SERPL-MCNC: 0.4 MG/DL (ref 0.3–1.2)
BILIRUBIN URINE: NEGATIVE
BLOOD CULTURE, ROUTINE: NORMAL
BLOOD CULTURE, ROUTINE: NORMAL
BLOOD, URINE: NEGATIVE
BUN BLDV-MCNC: 95 MG/DL (ref 7–22)
CALCIUM SERPL-MCNC: 9.2 MG/DL (ref 8.5–10.5)
CASTS 2: ABNORMAL /LPF
CASTS UA: ABNORMAL /LPF
CHARACTER, URINE: ABNORMAL
CHLORIDE BLD-SCNC: 93 MEQ/L (ref 98–111)
CO2: 20 MEQ/L (ref 23–33)
COLLECTED BY:: ABNORMAL
COLOR: YELLOW
CREAT SERPL-MCNC: 5.3 MG/DL (ref 0.4–1.2)
CRYSTALS, UA: ABNORMAL
DEVICE: ABNORMAL
EOSINOPHIL # BLD: 3.1 %
EOSINOPHILS ABSOLUTE: 0.4 THOU/MM3 (ref 0–0.4)
EPITHELIAL CELLS, UA: ABNORMAL /HPF
ERYTHROCYTE [DISTWIDTH] IN BLOOD BY AUTOMATED COUNT: 13.3 % (ref 11.5–14.5)
ERYTHROCYTE [DISTWIDTH] IN BLOOD BY AUTOMATED COUNT: 43.6 FL (ref 35–45)
FIO2, MIXED VENOUS: 2
GFR SERPL CREATININE-BSD FRML MDRD: 8 ML/MIN/1.73M2
GLUCOSE BLD-MCNC: 133 MG/DL (ref 70–108)
GLUCOSE BLD-MCNC: 99 MG/DL (ref 70–108)
GLUCOSE URINE: NEGATIVE MG/DL
HCO3, MIXED: 21 MMOL/L (ref 23–28)
HCT VFR BLD CALC: 24 % (ref 37–47)
HEMOGLOBIN: 8 GM/DL (ref 12–16)
IMMATURE GRANS (ABS): 0.11 THOU/MM3 (ref 0–0.07)
IMMATURE GRANULOCYTES: 0.8 %
KETONES, URINE: NEGATIVE
LEUKOCYTE ESTERASE, URINE: NEGATIVE
LIPASE: 5 U/L (ref 5.6–51.3)
LYMPHOCYTES # BLD: 5.8 %
LYMPHOCYTES ABSOLUTE: 0.8 THOU/MM3 (ref 1–4.8)
MCH RBC QN AUTO: 29.9 PG (ref 26–33)
MCHC RBC AUTO-ENTMCNC: 33.3 GM/DL (ref 32.2–35.5)
MCV RBC AUTO: 89.6 FL (ref 81–99)
MISCELLANEOUS 2: ABNORMAL
MONOCYTES # BLD: 6.3 %
MONOCYTES ABSOLUTE: 0.9 THOU/MM3 (ref 0.4–1.3)
NITRITE, URINE: NEGATIVE
NUCLEATED RED BLOOD CELLS: 0 /100 WBC
O2 SAT, MIXED: 82 %
OSMOLALITY CALCULATION: 290.2 MOSMOL/KG (ref 275–300)
PCO2, MIXED VENOUS: 39 MMHG (ref 41–51)
PH UA: 5 (ref 5–9)
PH, MIXED: 7.33 (ref 7.31–7.41)
PLATELET # BLD: 359 THOU/MM3 (ref 130–400)
PMV BLD AUTO: 10.3 FL (ref 9.4–12.4)
PO2 MIXED: 50 MMHG (ref 25–40)
POTASSIUM SERPL-SCNC: 4.5 MEQ/L (ref 3.5–5.2)
PROCALCITONIN: 0.38 NG/ML (ref 0.01–0.09)
PROTEIN UA: 100
RBC # BLD: 2.68 MILL/MM3 (ref 4.2–5.4)
RBC URINE: ABNORMAL /HPF
RENAL EPITHELIAL, UA: ABNORMAL
SEG NEUTROPHILS: 83.7 %
SEGMENTED NEUTROPHILS ABSOLUTE COUNT: 11.6 THOU/MM3 (ref 1.8–7.7)
SITE: ABNORMAL
SODIUM BLD-SCNC: 130 MEQ/L (ref 135–145)
SPECIFIC GRAVITY, URINE: 1.01 (ref 1–1.03)
TOTAL PROTEIN: 6.1 G/DL (ref 6.1–8)
TROPONIN T: 0.04 NG/ML
TROPONIN T: 0.04 NG/ML
TROPONIN T: 0.55 NG/ML
UROBILINOGEN, URINE: 0.2 EU/DL (ref 0–1)
WBC # BLD: 13.8 THOU/MM3 (ref 4.8–10.8)
WBC UA: ABNORMAL /HPF
YEAST: ABNORMAL

## 2019-06-22 PROCEDURE — 36600 WITHDRAWAL OF ARTERIAL BLOOD: CPT

## 2019-06-22 PROCEDURE — 84484 ASSAY OF TROPONIN QUANT: CPT

## 2019-06-22 PROCEDURE — 71250 CT THORAX DX C-: CPT

## 2019-06-22 PROCEDURE — 2709999900 HC NON-CHARGEABLE SUPPLY

## 2019-06-22 PROCEDURE — 2140000000 HC CCU INTERMEDIATE R&B

## 2019-06-22 PROCEDURE — 80053 COMPREHEN METABOLIC PANEL: CPT

## 2019-06-22 PROCEDURE — 99223 1ST HOSP IP/OBS HIGH 75: CPT | Performed by: PHYSICIAN ASSISTANT

## 2019-06-22 PROCEDURE — 51701 INSERT BLADDER CATHETER: CPT

## 2019-06-22 PROCEDURE — 82948 REAGENT STRIP/BLOOD GLUCOSE: CPT

## 2019-06-22 PROCEDURE — 83690 ASSAY OF LIPASE: CPT

## 2019-06-22 PROCEDURE — 36415 COLL VENOUS BLD VENIPUNCTURE: CPT

## 2019-06-22 PROCEDURE — 85025 COMPLETE CBC W/AUTO DIFF WBC: CPT

## 2019-06-22 PROCEDURE — 6370000000 HC RX 637 (ALT 250 FOR IP): Performed by: EMERGENCY MEDICINE

## 2019-06-22 PROCEDURE — 81001 URINALYSIS AUTO W/SCOPE: CPT

## 2019-06-22 PROCEDURE — 71045 X-RAY EXAM CHEST 1 VIEW: CPT

## 2019-06-22 PROCEDURE — 6360000002 HC RX W HCPCS: Performed by: PHYSICIAN ASSISTANT

## 2019-06-22 PROCEDURE — 6370000000 HC RX 637 (ALT 250 FOR IP): Performed by: PHYSICIAN ASSISTANT

## 2019-06-22 PROCEDURE — 99285 EMERGENCY DEPT VISIT HI MDM: CPT

## 2019-06-22 PROCEDURE — 84145 PROCALCITONIN (PCT): CPT

## 2019-06-22 PROCEDURE — 82803 BLOOD GASES ANY COMBINATION: CPT

## 2019-06-22 PROCEDURE — 94640 AIRWAY INHALATION TREATMENT: CPT

## 2019-06-22 PROCEDURE — 93005 ELECTROCARDIOGRAM TRACING: CPT | Performed by: EMERGENCY MEDICINE

## 2019-06-22 RX ORDER — FERROUS SULFATE 325(65) MG
325 TABLET ORAL 2 TIMES DAILY
Status: DISCONTINUED | OUTPATIENT
Start: 2019-06-22 | End: 2019-06-25 | Stop reason: HOSPADM

## 2019-06-22 RX ORDER — DEXTROSE MONOHYDRATE 25 G/50ML
12.5 INJECTION, SOLUTION INTRAVENOUS PRN
Status: DISCONTINUED | OUTPATIENT
Start: 2019-06-22 | End: 2019-06-25 | Stop reason: HOSPADM

## 2019-06-22 RX ORDER — FAMOTIDINE 20 MG/1
10 TABLET, FILM COATED ORAL 2 TIMES DAILY
Status: DISCONTINUED | OUTPATIENT
Start: 2019-06-22 | End: 2019-06-25 | Stop reason: HOSPADM

## 2019-06-22 RX ORDER — HEPARIN SODIUM 1000 [USP'U]/ML
4000 INJECTION, SOLUTION INTRAVENOUS; SUBCUTANEOUS PRN
Status: DISCONTINUED | OUTPATIENT
Start: 2019-06-22 | End: 2019-06-23

## 2019-06-22 RX ORDER — DEXTROSE MONOHYDRATE 50 MG/ML
100 INJECTION, SOLUTION INTRAVENOUS PRN
Status: DISCONTINUED | OUTPATIENT
Start: 2019-06-22 | End: 2019-06-25 | Stop reason: HOSPADM

## 2019-06-22 RX ORDER — OXYBUTYNIN CHLORIDE 5 MG/1
5 TABLET ORAL DAILY
Status: DISCONTINUED | OUTPATIENT
Start: 2019-06-23 | End: 2019-06-25 | Stop reason: HOSPADM

## 2019-06-22 RX ORDER — ACETAMINOPHEN 325 MG/1
650 TABLET ORAL EVERY 6 HOURS PRN
Status: DISCONTINUED | OUTPATIENT
Start: 2019-06-22 | End: 2019-06-25 | Stop reason: HOSPADM

## 2019-06-22 RX ORDER — GABAPENTIN 100 MG/1
100 CAPSULE ORAL 3 TIMES DAILY
Status: DISCONTINUED | OUTPATIENT
Start: 2019-06-22 | End: 2019-06-25 | Stop reason: HOSPADM

## 2019-06-22 RX ORDER — LEVOTHYROXINE SODIUM 0.03 MG/1
25 TABLET ORAL DAILY
Status: DISCONTINUED | OUTPATIENT
Start: 2019-06-23 | End: 2019-06-25 | Stop reason: HOSPADM

## 2019-06-22 RX ORDER — IPRATROPIUM BROMIDE AND ALBUTEROL SULFATE 2.5; .5 MG/3ML; MG/3ML
1 SOLUTION RESPIRATORY (INHALATION) ONCE
Status: COMPLETED | OUTPATIENT
Start: 2019-06-22 | End: 2019-06-22

## 2019-06-22 RX ORDER — NITROGLYCERIN 20 MG/100ML
5 INJECTION INTRAVENOUS CONTINUOUS
Status: DISCONTINUED | OUTPATIENT
Start: 2019-06-22 | End: 2019-06-25 | Stop reason: HOSPADM

## 2019-06-22 RX ORDER — NICOTINE POLACRILEX 4 MG
15 LOZENGE BUCCAL PRN
Status: DISCONTINUED | OUTPATIENT
Start: 2019-06-22 | End: 2019-06-25 | Stop reason: HOSPADM

## 2019-06-22 RX ORDER — DOXAZOSIN MESYLATE 1 MG/1
1 TABLET ORAL DAILY
Status: DISCONTINUED | OUTPATIENT
Start: 2019-06-23 | End: 2019-06-25 | Stop reason: HOSPADM

## 2019-06-22 RX ORDER — INSULIN GLARGINE 100 [IU]/ML
8 INJECTION, SOLUTION SUBCUTANEOUS 2 TIMES DAILY
Status: DISCONTINUED | OUTPATIENT
Start: 2019-06-22 | End: 2019-06-24

## 2019-06-22 RX ORDER — SODIUM CHLORIDE 0.9 % (FLUSH) 0.9 %
10 SYRINGE (ML) INJECTION EVERY 12 HOURS SCHEDULED
Status: DISCONTINUED | OUTPATIENT
Start: 2019-06-22 | End: 2019-06-25 | Stop reason: HOSPADM

## 2019-06-22 RX ORDER — SODIUM CHLORIDE 0.9 % (FLUSH) 0.9 %
10 SYRINGE (ML) INJECTION PRN
Status: DISCONTINUED | OUTPATIENT
Start: 2019-06-22 | End: 2019-06-25 | Stop reason: HOSPADM

## 2019-06-22 RX ORDER — HEPARIN SODIUM 1000 [USP'U]/ML
2000 INJECTION, SOLUTION INTRAVENOUS; SUBCUTANEOUS PRN
Status: DISCONTINUED | OUTPATIENT
Start: 2019-06-22 | End: 2019-06-23

## 2019-06-22 RX ORDER — AMLODIPINE BESYLATE 10 MG/1
10 TABLET ORAL DAILY
Status: DISCONTINUED | OUTPATIENT
Start: 2019-06-23 | End: 2019-06-25 | Stop reason: HOSPADM

## 2019-06-22 RX ORDER — ONDANSETRON 2 MG/ML
4 INJECTION INTRAMUSCULAR; INTRAVENOUS EVERY 6 HOURS PRN
Status: DISCONTINUED | OUTPATIENT
Start: 2019-06-22 | End: 2019-06-25 | Stop reason: HOSPADM

## 2019-06-22 RX ORDER — HEPARIN SODIUM 1000 [USP'U]/ML
4000 INJECTION, SOLUTION INTRAVENOUS; SUBCUTANEOUS ONCE
Status: COMPLETED | OUTPATIENT
Start: 2019-06-22 | End: 2019-06-22

## 2019-06-22 RX ORDER — ISOSORBIDE DINITRATE 20 MG/1
20 TABLET ORAL 3 TIMES DAILY
Status: DISCONTINUED | OUTPATIENT
Start: 2019-06-22 | End: 2019-06-25 | Stop reason: HOSPADM

## 2019-06-22 RX ORDER — HEPARIN SODIUM 10000 [USP'U]/100ML
12 INJECTION, SOLUTION INTRAVENOUS CONTINUOUS
Status: DISCONTINUED | OUTPATIENT
Start: 2019-06-22 | End: 2019-06-23

## 2019-06-22 RX ORDER — HYDRALAZINE HYDROCHLORIDE 50 MG/1
100 TABLET, FILM COATED ORAL EVERY 8 HOURS SCHEDULED
Status: DISCONTINUED | OUTPATIENT
Start: 2019-06-22 | End: 2019-06-25 | Stop reason: HOSPADM

## 2019-06-22 RX ORDER — HEPARIN SODIUM 10000 [USP'U]/100ML
INJECTION, SOLUTION INTRAVENOUS
Status: DISPENSED
Start: 2019-06-22 | End: 2019-06-23

## 2019-06-22 RX ORDER — DOCUSATE SODIUM 100 MG/1
100 CAPSULE, LIQUID FILLED ORAL 2 TIMES DAILY
Status: DISCONTINUED | OUTPATIENT
Start: 2019-06-22 | End: 2019-06-25 | Stop reason: HOSPADM

## 2019-06-22 RX ORDER — PRAVASTATIN SODIUM 40 MG
40 TABLET ORAL NIGHTLY
Status: DISCONTINUED | OUTPATIENT
Start: 2019-06-22 | End: 2019-06-25 | Stop reason: HOSPADM

## 2019-06-22 RX ADMIN — FERROUS SULFATE TAB 325 MG (65 MG ELEMENTAL FE) 325 MG: 325 (65 FE) TAB at 22:17

## 2019-06-22 RX ADMIN — HEPARIN SODIUM 4000 UNITS: 1000 INJECTION INTRAVENOUS; SUBCUTANEOUS at 21:47

## 2019-06-22 RX ADMIN — HYDRALAZINE HYDROCHLORIDE 100 MG: 50 TABLET, FILM COATED ORAL at 22:17

## 2019-06-22 RX ADMIN — INSULIN GLARGINE 8 UNITS: 100 INJECTION, SOLUTION SUBCUTANEOUS at 22:19

## 2019-06-22 RX ADMIN — PRAVASTATIN SODIUM 40 MG: 40 TABLET ORAL at 22:17

## 2019-06-22 RX ADMIN — ISOSORBIDE DINITRATE 20 MG: 20 TABLET ORAL at 22:17

## 2019-06-22 RX ADMIN — GABAPENTIN 100 MG: 100 CAPSULE ORAL at 22:17

## 2019-06-22 RX ADMIN — FAMOTIDINE 10 MG: 20 TABLET ORAL at 22:18

## 2019-06-22 RX ADMIN — IPRATROPIUM BROMIDE AND ALBUTEROL SULFATE 1 AMPULE: .5; 3 SOLUTION RESPIRATORY (INHALATION) at 17:50

## 2019-06-22 RX ADMIN — HEPARIN SODIUM 12 UNITS/KG/HR: 10000 INJECTION, SOLUTION INTRAVENOUS at 21:31

## 2019-06-22 RX ADMIN — DOCUSATE SODIUM 100 MG: 100 CAPSULE, LIQUID FILLED ORAL at 22:18

## 2019-06-22 ASSESSMENT — ENCOUNTER SYMPTOMS
WHEEZING: 0
EYE PAIN: 0
DIARRHEA: 0
SHORTNESS OF BREATH: 1
COUGH: 0
SHORTNESS OF BREATH: 0
SORE THROAT: 0
GASTROINTESTINAL NEGATIVE: 1
ABDOMINAL PAIN: 1
RHINORRHEA: 0
VOMITING: 0
EYE DISCHARGE: 0
BACK PAIN: 0
ALLERGIC/IMMUNOLOGIC NEGATIVE: 1
EYES NEGATIVE: 1
NAUSEA: 0

## 2019-06-22 ASSESSMENT — PAIN DESCRIPTION - PAIN TYPE
TYPE: SURGICAL PAIN
TYPE: CHRONIC PAIN
TYPE: CHRONIC PAIN

## 2019-06-22 ASSESSMENT — PAIN DESCRIPTION - LOCATION
LOCATION: CHEST;ABDOMEN
LOCATION: BACK

## 2019-06-22 ASSESSMENT — PAIN SCALES - GENERAL
PAINLEVEL_OUTOF10: 4
PAINLEVEL_OUTOF10: 0
PAINLEVEL_OUTOF10: 4
PAINLEVEL_OUTOF10: 4
PAINLEVEL_OUTOF10: 0

## 2019-06-22 ASSESSMENT — PAIN DESCRIPTION - PROGRESSION: CLINICAL_PROGRESSION: NOT CHANGED

## 2019-06-22 ASSESSMENT — PAIN DESCRIPTION - ONSET
ONSET: ON-GOING
ONSET: ON-GOING

## 2019-06-22 ASSESSMENT — PAIN DESCRIPTION - FREQUENCY
FREQUENCY: CONTINUOUS
FREQUENCY: CONTINUOUS

## 2019-06-22 ASSESSMENT — PAIN DESCRIPTION - ORIENTATION
ORIENTATION: RIGHT
ORIENTATION: LEFT

## 2019-06-22 ASSESSMENT — PAIN DESCRIPTION - DESCRIPTORS
DESCRIPTORS: ACHING;DULL
DESCRIPTORS: ACHING

## 2019-06-22 NOTE — ED NOTES
Straight cath urine obtained and sent to lab. Pt repositioned on cot. Vitals updated. Denies needs at this time. Call light within reach.       Cristobal Betancourt RN  06/22/19 4461

## 2019-06-22 NOTE — ED TRIAGE NOTES
Pt to ED via EMS w/rprts of increased fatigue. Pt rprts dc from hospital two days ago and today at 3pm got up to use the bathroom and legs just gave out. Denies fall. States increased weakness and fatigue. Generalized body aches. Rprts pain at drainage site to R flank and LLQ peritoneal catheter. Dull aches; rates 4/10. Denies nausea/vomiting. Alert and oriented x4. Breathing easy and unlabored on 2L of O2 via NC. Diminished breath sounds throughout. Hypoactive bowel sounds in all four quadrants. Placed on monitor and in gown.

## 2019-06-22 NOTE — ED NOTES
Bed: 022A  Expected date: 6/22/19  Expected time: 4:42 PM  Means of arrival: OTHER  Comments:  Terry Emery RN  06/22/19 0558

## 2019-06-22 NOTE — ED NOTES
First encounter with pt. Pt found resting on cot, EMT student obtaining EKG at this time. Provider awaiting CT scan to be completed.       [address] [place] [address] NOAH Frey  06/22/19 6209

## 2019-06-22 NOTE — ED PROVIDER NOTES
Negative for decreased urine volume, difficulty urinating, dysuria, frequency, hematuria, urgency and vaginal discharge. Musculoskeletal: Negative for arthralgias, back pain, joint swelling and neck pain. Skin: Negative for pallor and rash. Neurological: Positive for weakness (generalized). Negative for dizziness, syncope, light-headedness, numbness and headaches. Hematological: Negative for adenopathy. Psychiatric/Behavioral: Negative for confusion and suicidal ideas. The patient is not nervous/anxious. PAST MEDICAL HISTORY    has a past medical history of Adjustment disorder with mixed anxiety and depressed mood, Anemia, Anemia in chronic kidney disease(285.21), Anxiety, Arthritis, Asthma, Bipolar 1 disorder ([address] Bradycardia, CHF (congestive heart failure) ([address] Chronic diastolic heart failure ([address] Chronic kidney disease, stage III (moderate) ([address] CKD (chronic kidney disease), Depression, Difficulty walking, Dizziness and giddiness, GERD (gastroesophageal reflux disease), Gout, Headache(784.0), Hyperkalemia, Hyperlipidemia, Hypertension, Hypertensive urgency, Hypothyroid, Hypothyroidism, Low back pain, Major depressive disorder, recurrent, mild (HCC), Muscle weakness (generalized), Neurogenic bladder, Pleural effusion, Pneumonia, Seizure ([address] Type II or unspecified type diabetes mellitus without mention of complication, not stated as uncontrolled, and Urine retention. SURGICAL HISTORY      has a past surgical history that includes fracture surgery; Cholecystectomy; cervical fusion; back surgery; Colonoscopy; and Endoscopy, colon, diagnostic.     CURRENT MEDICATIONS       Current Discharge Medication List      CONTINUE these medications which have NOT CHANGED    Details   insulin glargine (BASAGLAR KWIKPEN) 100 UNIT/ML injection pen Inject 8 Units into the skin 2 times daily      insulin lispro (HUMALOG) 100 UNIT/ML injection vial Inject 0-6 Units into the skin 3 times daily (with meals)  Qty: 1 vial, Refills: 3      docusate sodium (COLACE, DULCOLAX) 100 MG CAPS Take 100 mg by mouth 2 times daily      ondansetron (ZOFRAN-ODT) 4 MG disintegrating tablet Take 4 mg by mouth every 4 hours as needed for Nausea or Vomiting      famotidine (PEPCID) 10 MG tablet Take 10 mg by mouth 2 times daily      OXYGEN Inhale 2-4 L into the lungs as needed (to keep sat> 90%)      doxazosin (CARDURA) 1 MG tablet Take 1 tablet by mouth daily  Qty: 30 tablet, Refills: 3      hydrALAZINE (APRESOLINE) 100 MG tablet Take 1 tablet by mouth every 8 hours  Qty: 90 tablet, Refills: 3      DULoxetine (CYMBALTA) 30 MG extended release capsule Take 30 mg by mouth daily      oxybutynin (DITROPAN) 5 MG tablet Take 5 mg by mouth daily      meclizine (ANTIVERT) 25 MG tablet Take 25 mg by mouth 3 times daily      gabapentin (NEURONTIN) 100 MG capsule Take 100 mg by mouth 3 times daily. ferrous sulfate 325 (65 Fe) MG tablet Take 325 mg by mouth 2 times daily      amLODIPine (NORVASC) 10 MG tablet Take 10 mg by mouth daily      acetaminophen-codeine (TYLENOL #3) 300-30 MG per tablet Take 1 tablet by mouth every 6 hours as needed for Pain. acetaminophen (TYLENOL) 325 MG tablet Take 650 mg by mouth every 6 hours as needed for Pain      isosorbide dinitrate (ISORDIL) 20 MG tablet Take 1 tablet by mouth 3 times daily  Refills: 0      levothyroxine (SYNTHROID) 25 MCG tablet Take 25 mcg by mouth Daily      pravastatin (PRAVACHOL) 40 MG tablet Take 40 mg by mouth nightly       glucagon, rDNA, 1 MG injection Inject 1 mg into the muscle as needed for Low blood sugar (Blood glucose less than 70 mg/dL and patient NOT ALERT or NPO and does not have IV access.)      blood glucose test strips (ASCENSIA AUTODISC VI;ONE TOUCH ULTRA TEST VI) strip Patient tests blood sugar three times per day.  Diagnosis DMII E11.9             ALLERGIES     is allergic to eggs or egg-derived products; aspirin; naproxen; pcn [penicillins]; vicodin [hydrocodone-acetaminophen]; vilazodone; and wellbutrin [bupropion]. FAMILY HISTORY     indicated that her mother is . She indicated that her father is . family history includes Anemia in her father; Cancer in her mother; Diabetes in her mother; Heart Attack in her mother; High Blood Pressure in her father and mother; Stroke in her father. SOCIAL HISTORY      reports that she has never smoked. She has never used smokeless tobacco. She reports that she does not drink alcohol or use drugs. PHYSICAL EXAM     INITIAL VITALS:  height is 5' 6\" (1.676 m) and weight is 168 lb 12.8 oz (76.6 kg). Her oral temperature is 98.1 °F (36.7 °C). Her blood pressure is 145/64 (abnormal) and her pulse is 71. Her respiration is 18 and oxygen saturation is 94%. Physical Exam   Constitutional: She is oriented to person, place, and time. She appears well-developed and well-nourished. No distress. Nasal cannula in place. Patient is placed on 2L NC. HENT:   Head: Normocephalic and atraumatic. Right Ear: External ear normal.   Left Ear: External ear normal.   Eyes: Conjunctivae are normal.   Neck: Normal range of motion. Neck supple. No thyromegaly present. Cardiovascular: Normal rate, regular rhythm and normal heart sounds. No murmur heard. Pulmonary/Chest: Effort normal. No respiratory distress. She has no decreased breath sounds. She has no wheezes. She has no rhonchi. She has rales (crackles) in the right middle field, the right lower field, the left middle field and the left lower field. She exhibits no tenderness. Abdominal: Soft. She exhibits no distension. There is tenderness in the left upper quadrant. Musculoskeletal: Normal range of motion. She exhibits no edema. Left flank tenderness. Neurological: She is alert and oriented to person, place, and time. No cranial nerve deficit. Skin: Skin is warm and dry. No rash noted. She is not diaphoretic. No erythema. No pallor.    Right lateral pleurex catheter. CAPD in place. Psychiatric: She has a normal mood and affect. Her behavior is normal. Judgment and thought content normal.   Nursing note and vitals reviewed. DIFFERENTIAL DIAGNOSIS:   Pneumonia, pleural effusion, UTI, metabolic encephalopathy, AMI, fluid overload     DIAGNOSTIC RESULTS     EKG: All EKG's are interpreted by the Emergency Department Physician who either signs or Co-signsthis chart in the absence of a cardiologist.    Simona Britton. Rate: 65 bpm  VT interval: 146 ms  QRS duration: 100 ms  QTc: 420 ms  P-R-T axes: 36, -1, 133  Normal sinus rhythm  T wave abnormality, consider lateral ischemia   No STEMI    RADIOLOGY: non-plain film images(s) such as CT, Ultrasound and MRI are read by the radiologist.    CT CHEST WO CONTRAST   Final Result      Small right chest tube has apparently pulled out of the chest and there is a small right pneumothorax. **This report has been created using voice recognition software. It may contain minor errors which are inherent in voice recognition technology. **      Final report electronically signed by Dr. Hussain Dumont on 6/22/2019 7:47 PM      XR CHEST PORTABLE   Final Result   Mildly worsened parenchymal opacities as above. This could relate to pulmonary edema. **This report has been created using voice recognition software. It may contain minor errors which are inherent in voice recognition technology. **      Final report electronically signed by Dr. Hussain Dumont on 6/22/2019 6:20 PM          []Visualized and interpreted by me   [x] Radiologist's Wet Read Report Reviewed   [] Discussed with Radiologist.    Lavon Hunt:   Results for orders placed or performed during the hospital encounter of 06/22/19   CBC Auto Differential   Result Value Ref Range    WBC 13.8 (H) 4.8 - 10.8 thou/mm3    RBC 2.68 (L) 4.20 - 5.40 mill/mm3    Hemoglobin 8.0 (L) 12.0 - 16.0 gm/dl    Hematocrit 24.0 (L) 37.0 - 47.0 %    MCV 89.6 81.0 - 99.0 fL MCH 29.9 26.0 - 33.0 pg    MCHC 33.3 32.2 - 35.5 gm/dl    RDW-CV 13.3 11.5 - 14.5 %    RDW-SD 43.6 35.0 - 45.0 fL    Platelets 124 760 - 886 thou/mm3    MPV 10.3 9.4 - 12.4 fL    Seg Neutrophils 83.7 %    Lymphocytes 5.8 %    Monocytes 6.3 %    Eosinophils 3.1 %    Basophils 0.3 %    Immature Granulocytes 0.8 %    Segs Absolute 11.6 (H) 1.8 - 7.7 thou/mm3    Lymphocytes # 0.8 (L) 1.0 - 4.8 thou/mm3    Monocytes # 0.9 0.4 - 1.3 thou/mm3    Eosinophils # 0.4 0.0 - 0.4 thou/mm3    Basophils # 0.0 0.0 - 0.1 thou/mm3    Immature Grans (Abs) 0.11 (H) 0.00 - 0.07 thou/mm3    nRBC 0 /100 wbc   Comprehensive Metabolic Panel   Result Value Ref Range    Glucose 99 70 - 108 mg/dL    CREATININE 5.3 (HH) 0.4 - 1.2 mg/dL    BUN 95 (H) 7 - 22 mg/dL    Sodium 130 (L) 135 - 145 meq/L    Potassium 4.5 3.5 - 5.2 meq/L    Chloride 93 (L) 98 - 111 meq/L    CO2 20 (L) 23 - 33 meq/L    Calcium 9.2 8.5 - 10.5 mg/dL    AST 9 5 - 40 U/L    Alkaline Phosphatase 86 38 - 126 U/L    Total Protein 6.1 6.1 - 8.0 g/dL    Alb 3.2 (L) 3.5 - 5.1 g/dL    Total Bilirubin 0.4 0.3 - 1.2 mg/dL    ALT 6 (L) 11 - 66 U/L   Troponin   Result Value Ref Range    Troponin T 0.042 (A) ng/ml   Procalcitonin   Result Value Ref Range    Procalcitonin 0.38 (H) 0.01 - 0.09 ng/mL   Lipase   Result Value Ref Range    Lipase 5.0 (L) 5.6 - 51.3 U/L   Blood Gas, Arterial   Result Value Ref Range    pH, Blood Gas 7.36 7.35 - 7.45    PCO2 38 35 - 45 mmhg    PO2 61 (L) 71 - 104 mmhg    HCO3 22 (L) 23 - 28 mmol/l    Base Excess (Calculated) -3.6 (L) -2.5 - 2.5 mmol/l    O2 Sat 90 %    IFIO2 2     DEVICE Cannula     Vince Test N/A     Source: L Radial     COLLECTED BY: [id number]    Anion Gap   Result Value Ref Range    Anion Gap 17.0 (H) 8.0 - 16.0 meq/L   Glomerular Filtration Rate, Estimated   Result Value Ref Range    Est, Glom Filt Rate 8 (A) ml/min/1.73m2   Osmolality   Result Value Ref Range    Osmolality Calc 290.2 275.0 - 300 mOsmol/kg   Blood Gas, Venous   Result Value Ref Range    PH MIXED 7.33 7.31 - 7.41    PCO2, MIXED VENOUS 39 (L) 41 - 51 mmhg    PO2, Mixed 50 (H) 25 - 40 mmhg    HCO3, Mixed 21 (L) 23 - 28 mmol/l    Base Exc, Mixed -4.8 (L) -2.0 - 3.0 mmol/l    O2 Sat, Mixed 82 %    FIO2, MIXED VENOUS 2     COLLECTED BY: [id number]     DEVICE Cannula     Site R Radial    Urine with Reflexed Micro   Result Value Ref Range    Glucose, Ur NEGATIVE NEGATIVE mg/dl    Bilirubin Urine NEGATIVE NEGATIVE    Ketones, Urine NEGATIVE NEGATIVE    Specific Gravity, Urine 1.015 1.002 - 1.03    Blood, Urine NEGATIVE NEGATIVE    pH, UA 5.0 5.0 - 9.0    Protein,  (A) NEGATIVE    Urobilinogen, Urine 0.2 0.0 - 1.0 eu/dl    Nitrite, Urine NEGATIVE NEGATIVE    Leukocyte Esterase, Urine NEGATIVE NEGATIVE    Color, UA YELLOW STRAW-YELL    Character, Urine CLOUDY (A) CLEAR-SL C    RBC, UA 5-10 0-2/hpf /hpf    WBC, UA 0-2 0-4/hpf /hpf    Epi Cells 0-2 3-5/hpf /hpf    Bacteria, UA NONE FEW/NONE S /hpf    Casts UA NONE SEEN NONE SEEN /lpf    Crystals NONE SEEN NONE SEEN    Renal Epithelial, Urine NONE SEEN NONE SEEN    Yeast, UA NONE SEEN NONE SEEN    CASTS 2 NONE SEEN NONE SEEN /lpf    MISCELLANEOUS 2 NONE SEEN    Troponin   Result Value Ref Range    Troponin T 0.547 (A) ng/ml   Troponin   Result Value Ref Range    Troponin T 0.042 (A) ng/ml   Troponin   Result Value Ref Range    Troponin T 0.037 (A) ng/ml   CBC   Result Value Ref Range    WBC 13.2 (H) 4.8 - 10.8 thou/mm3    RBC 2.83 (L) 4.20 - 5.40 mill/mm3    Hemoglobin 8.4 (L) 12.0 - 16.0 gm/dl    Hematocrit 25.5 (L) 37.0 - 47.0 %    MCV 90.1 81.0 - 99.0 fL    MCH 29.7 26.0 - 33.0 pg    MCHC 32.9 32.2 - 35.5 gm/dl    RDW-CV 13.3 11.5 - 14.5 %    RDW-SD 43.8 35.0 - 45.0 fL    Platelets 313 103 - 156 thou/mm3    MPV 10.6 9.4 - 12.4 fL   Basic Metabolic Panel w/ Reflex to MG   Result Value Ref Range    Sodium 131 (L) 135 - 145 meq/L    Potassium reflex Magnesium 4.6 3.5 - 5.2 meq/L    Chloride 93 (L) 98 - 111 meq/L    CO2 20 (L) 23 - 33 meq/L 14212  [phone]            DISCHARGE MEDICATIONS:  Current Discharge Medication List          (Please note that portions of this note were completed with a voice recognition program.  Efforts were made to edit the dictations but occasionally words aremis-transcribed.)    Scribe:  Paulo Hernandez 6/22/19 5:18 PM Scribing for and in the presence of Ermelinda Agrawal MD.    Signed by: Pinky Morse, 06/23/19 5:59 AM    Provider:  I personally performed the services described in the documentation, reviewed and edited the documentation which was dictated to the scribe in my presence, and it accurately records my words and actions.     Ermelinda Agrawal MD 06/23/19 5:59 AM    MD Ermelinda Bone MD  06/23/19 0272

## 2019-06-22 NOTE — ED NOTES
Pt updated on POC and new orders. Pt alert and oriented x4. Breathing easy and unlabored on NC. Nadine Cruz, EMT student at bedside to start IV.       Temitope Mccarty RN  06/22/19 7417

## 2019-06-23 LAB
ALLEN TEST: ABNORMAL
ANION GAP SERPL CALCULATED.3IONS-SCNC: 18 MEQ/L (ref 8–16)
BASE EXCESS (CALCULATED): -3.6 MMOL/L (ref -2.5–2.5)
BUN BLDV-MCNC: 101 MG/DL (ref 7–22)
CALCIUM SERPL-MCNC: 9.3 MG/DL (ref 8.5–10.5)
CHLORIDE BLD-SCNC: 93 MEQ/L (ref 98–111)
CO2: 20 MEQ/L (ref 23–33)
COLLECTED BY:: ABNORMAL
CREAT SERPL-MCNC: 5.2 MG/DL (ref 0.4–1.2)
DEVICE: ABNORMAL
EKG ATRIAL RATE: 65 BPM
EKG ATRIAL RATE: 72 BPM
EKG P AXIS: 36 DEGREES
EKG P AXIS: 39 DEGREES
EKG P-R INTERVAL: 146 MS
EKG P-R INTERVAL: 148 MS
EKG Q-T INTERVAL: 402 MS
EKG Q-T INTERVAL: 404 MS
EKG QRS DURATION: 100 MS
EKG QRS DURATION: 102 MS
EKG QTC CALCULATION (BAZETT): 420 MS
EKG QTC CALCULATION (BAZETT): 440 MS
EKG T AXIS: 133 DEGREES
EKG T AXIS: 134 DEGREES
EKG VENTRICULAR RATE: 65 BPM
EKG VENTRICULAR RATE: 72 BPM
ERYTHROCYTE [DISTWIDTH] IN BLOOD BY AUTOMATED COUNT: 13.3 % (ref 11.5–14.5)
ERYTHROCYTE [DISTWIDTH] IN BLOOD BY AUTOMATED COUNT: 43.8 FL (ref 35–45)
GFR SERPL CREATININE-BSD FRML MDRD: 8 ML/MIN/1.73M2
GLUCOSE BLD-MCNC: 101 MG/DL (ref 70–108)
GLUCOSE BLD-MCNC: 103 MG/DL (ref 70–108)
GLUCOSE BLD-MCNC: 108 MG/DL (ref 70–108)
GLUCOSE BLD-MCNC: 189 MG/DL (ref 70–108)
GLUCOSE BLD-MCNC: 94 MG/DL (ref 70–108)
HCO3: 22 MMOL/L (ref 23–28)
HCT VFR BLD CALC: 25.5 % (ref 37–47)
HEMOGLOBIN: 8.4 GM/DL (ref 12–16)
IFIO2: 2
MCH RBC QN AUTO: 29.7 PG (ref 26–33)
MCHC RBC AUTO-ENTMCNC: 32.9 GM/DL (ref 32.2–35.5)
MCV RBC AUTO: 90.1 FL (ref 81–99)
O2 SATURATION: 90 %
PCO2: 38 MMHG (ref 35–45)
PH BLOOD GAS: 7.36 (ref 7.35–7.45)
PLATELET # BLD: 322 THOU/MM3 (ref 130–400)
PMV BLD AUTO: 10.6 FL (ref 9.4–12.4)
PO2: 61 MMHG (ref 71–104)
POTASSIUM REFLEX MAGNESIUM: 4.6 MEQ/L (ref 3.5–5.2)
RBC # BLD: 2.83 MILL/MM3 (ref 4.2–5.4)
SODIUM BLD-SCNC: 131 MEQ/L (ref 135–145)
SOURCE, BLOOD GAS: ABNORMAL
TROPONIN T: 0.04 NG/ML
WBC # BLD: 13.2 THOU/MM3 (ref 4.8–10.8)

## 2019-06-23 PROCEDURE — 84484 ASSAY OF TROPONIN QUANT: CPT

## 2019-06-23 PROCEDURE — 80048 BASIC METABOLIC PNL TOTAL CA: CPT

## 2019-06-23 PROCEDURE — 93010 ELECTROCARDIOGRAM REPORT: CPT | Performed by: INTERNAL MEDICINE

## 2019-06-23 PROCEDURE — 6370000000 HC RX 637 (ALT 250 FOR IP): Performed by: PHYSICIAN ASSISTANT

## 2019-06-23 PROCEDURE — 36415 COLL VENOUS BLD VENIPUNCTURE: CPT

## 2019-06-23 PROCEDURE — 2500000003 HC RX 250 WO HCPCS: Performed by: INTERNAL MEDICINE

## 2019-06-23 PROCEDURE — 99233 SBSQ HOSP IP/OBS HIGH 50: CPT | Performed by: HOSPITALIST

## 2019-06-23 PROCEDURE — 82803 BLOOD GASES ANY COMBINATION: CPT

## 2019-06-23 PROCEDURE — 2140000000 HC CCU INTERMEDIATE R&B

## 2019-06-23 PROCEDURE — 99223 1ST HOSP IP/OBS HIGH 75: CPT | Performed by: NUCLEAR MEDICINE

## 2019-06-23 PROCEDURE — 2580000003 HC RX 258: Performed by: PHYSICIAN ASSISTANT

## 2019-06-23 PROCEDURE — 85027 COMPLETE CBC AUTOMATED: CPT

## 2019-06-23 PROCEDURE — 82948 REAGENT STRIP/BLOOD GLUCOSE: CPT

## 2019-06-23 PROCEDURE — 36600 WITHDRAWAL OF ARTERIAL BLOOD: CPT

## 2019-06-23 PROCEDURE — 2700000000 HC OXYGEN THERAPY PER DAY

## 2019-06-23 PROCEDURE — 94760 N-INVAS EAR/PLS OXIMETRY 1: CPT

## 2019-06-23 PROCEDURE — 93005 ELECTROCARDIOGRAM TRACING: CPT | Performed by: PHYSICIAN ASSISTANT

## 2019-06-23 PROCEDURE — 2709999900 HC NON-CHARGEABLE SUPPLY

## 2019-06-23 PROCEDURE — 99221 1ST HOSP IP/OBS SF/LOW 40: CPT | Performed by: INTERNAL MEDICINE

## 2019-06-23 RX ORDER — BUMETANIDE 0.25 MG/ML
2 INJECTION, SOLUTION INTRAMUSCULAR; INTRAVENOUS ONCE
Status: COMPLETED | OUTPATIENT
Start: 2019-06-23 | End: 2019-06-23

## 2019-06-23 RX ADMIN — FAMOTIDINE 10 MG: 20 TABLET ORAL at 11:16

## 2019-06-23 RX ADMIN — DOCUSATE SODIUM 100 MG: 100 CAPSULE, LIQUID FILLED ORAL at 21:09

## 2019-06-23 RX ADMIN — INSULIN GLARGINE 8 UNITS: 100 INJECTION, SOLUTION SUBCUTANEOUS at 21:26

## 2019-06-23 RX ADMIN — ISOSORBIDE DINITRATE 20 MG: 20 TABLET ORAL at 16:22

## 2019-06-23 RX ADMIN — HYDRALAZINE HYDROCHLORIDE 100 MG: 50 TABLET, FILM COATED ORAL at 16:22

## 2019-06-23 RX ADMIN — INSULIN GLARGINE 8 UNITS: 100 INJECTION, SOLUTION SUBCUTANEOUS at 11:14

## 2019-06-23 RX ADMIN — OXYBUTYNIN CHLORIDE 5 MG: 5 TABLET ORAL at 11:15

## 2019-06-23 RX ADMIN — INSULIN LISPRO 1 UNITS: 100 INJECTION, SOLUTION INTRAVENOUS; SUBCUTANEOUS at 11:14

## 2019-06-23 RX ADMIN — GABAPENTIN 100 MG: 100 CAPSULE ORAL at 17:54

## 2019-06-23 RX ADMIN — FERROUS SULFATE TAB 325 MG (65 MG ELEMENTAL FE) 325 MG: 325 (65 FE) TAB at 11:16

## 2019-06-23 RX ADMIN — GABAPENTIN 100 MG: 100 CAPSULE ORAL at 11:16

## 2019-06-23 RX ADMIN — Medication 10 ML: at 11:19

## 2019-06-23 RX ADMIN — LEVOTHYROXINE SODIUM 25 MCG: 25 TABLET ORAL at 11:15

## 2019-06-23 RX ADMIN — ISOSORBIDE DINITRATE 20 MG: 20 TABLET ORAL at 21:09

## 2019-06-23 RX ADMIN — FAMOTIDINE 10 MG: 20 TABLET ORAL at 21:09

## 2019-06-23 RX ADMIN — AMLODIPINE BESYLATE 10 MG: 10 TABLET ORAL at 11:16

## 2019-06-23 RX ADMIN — GABAPENTIN 100 MG: 100 CAPSULE ORAL at 21:09

## 2019-06-23 RX ADMIN — BUMETANIDE 2 MG: 0.25 INJECTION INTRAMUSCULAR; INTRAVENOUS at 11:15

## 2019-06-23 RX ADMIN — Medication 10 ML: at 21:10

## 2019-06-23 RX ADMIN — PRAVASTATIN SODIUM 40 MG: 40 TABLET ORAL at 21:09

## 2019-06-23 RX ADMIN — DOXAZOSIN 1 MG: 1 TABLET ORAL at 11:15

## 2019-06-23 RX ADMIN — ISOSORBIDE DINITRATE 20 MG: 20 TABLET ORAL at 11:16

## 2019-06-23 RX ADMIN — FERROUS SULFATE TAB 325 MG (65 MG ELEMENTAL FE) 325 MG: 325 (65 FE) TAB at 21:09

## 2019-06-23 RX ADMIN — HYDRALAZINE HYDROCHLORIDE 100 MG: 50 TABLET, FILM COATED ORAL at 21:09

## 2019-06-23 RX ADMIN — DOCUSATE SODIUM 100 MG: 100 CAPSULE, LIQUID FILLED ORAL at 11:15

## 2019-06-23 ASSESSMENT — PAIN SCALES - GENERAL
PAINLEVEL_OUTOF10: 0

## 2019-06-23 NOTE — H&P
Assessment and Plan:        1. NSTEMI: marked elevation of troponin on recheck in ER. Patient denies any chest pain. Will start heparin drip, consult cardiology  2. ESRD starting CAPD: consult nephrology  3. Pleural effusion: Pleurex drain is pulled out in the right back, will need replaced if patient develops a pleural effusion again. 4. DM II, insulin dependent: Lantus and sliding scale coverage,  Hypoglycemic treatment orders      CC:  fatigue  HPI: Patient returns to the hospital today for increasing weakness. Patient was just discharged yesterday afternoon back to her nursing home. The patient was progressively more weak and the nursing home sent her back to be dialyzed. She is supposed to start CAPD soon. Catheter is in place. The patient had a marked troponin elevation in the ER - 0.042 --> 0.547 in two hours. Patient denies any chest pain or increased shortness of breath. ROS: Review of Systems   Constitutional: Positive for fatigue. Negative for appetite change and fever. HENT: Negative. Eyes: Negative. Respiratory: Negative for shortness of breath. Cardiovascular: Negative for chest pain. Gastrointestinal: Negative. Endocrine: Negative. Genitourinary: Negative. Musculoskeletal: Negative. Skin: Negative. Allergic/Immunologic: Negative. Neurological: Negative. Hematological: Negative. Psychiatric/Behavioral: Negative. All other systems reviewed and are negative.       PMH:    Past Medical History:   Diagnosis Date    Anemia     Anemia in chronic kidney disease(285.21)     Arthritis     Asthma     Bipolar 1 disorder (HCC)     CHF (congestive heart failure) (HCC)     Chronic kidney disease, stage III (moderate) (HCC)     Depression     GERD (gastroesophageal reflux disease)     Gout     Headache(784.0)     Hyperkalemia     Hyperlipidemia     Hypertension     Hypothyroid     Neurogenic bladder     Pneumonia     Seizure ([facility] [address] 01/2018    Type II MG per tablet Take 1 tablet by mouth every 6 hours as needed for Pain.  acetaminophen (TYLENOL) 325 MG tablet Take 650 mg by mouth every 6 hours as needed for Pain      blood glucose test strips (ASCENSIA AUTODISC VI;ONE TOUCH ULTRA TEST VI) strip Patient tests blood sugar three times per day.  Diagnosis DMII E11.9             Labs:   Recent Results (from the past 24 hour(s))   CBC Auto Differential    Collection Time: 06/22/19  5:33 PM   Result Value Ref Range    WBC 13.8 (H) 4.8 - 10.8 thou/mm3    RBC 2.68 (L) 4.20 - 5.40 mill/mm3    Hemoglobin 8.0 (L) 12.0 - 16.0 gm/dl    Hematocrit 24.0 (L) 37.0 - 47.0 %    MCV 89.6 81.0 - 99.0 fL    MCH 29.9 26.0 - 33.0 pg    MCHC 33.3 32.2 - 35.5 gm/dl    RDW-CV 13.3 11.5 - 14.5 %    RDW-SD 43.6 35.0 - 45.0 fL    Platelets 237 162 - 286 thou/mm3    MPV 10.3 9.4 - 12.4 fL    Seg Neutrophils 83.7 %    Lymphocytes 5.8 %    Monocytes 6.3 %    Eosinophils 3.1 %    Basophils 0.3 %    Immature Granulocytes 0.8 %    Segs Absolute 11.6 (H) 1.8 - 7.7 thou/mm3    Lymphocytes # 0.8 (L) 1.0 - 4.8 thou/mm3    Monocytes # 0.9 0.4 - 1.3 thou/mm3    Eosinophils # 0.4 0.0 - 0.4 thou/mm3    Basophils # 0.0 0.0 - 0.1 thou/mm3    Immature Grans (Abs) 0.11 (H) 0.00 - 0.07 thou/mm3    nRBC 0 /100 wbc   Comprehensive Metabolic Panel    Collection Time: 06/22/19  5:33 PM   Result Value Ref Range    Glucose 99 70 - 108 mg/dL    CREATININE 5.3 (HH) 0.4 - 1.2 mg/dL    BUN 95 (H) 7 - 22 mg/dL    Sodium 130 (L) 135 - 145 meq/L    Potassium 4.5 3.5 - 5.2 meq/L    Chloride 93 (L) 98 - 111 meq/L    CO2 20 (L) 23 - 33 meq/L    Calcium 9.2 8.5 - 10.5 mg/dL    AST 9 5 - 40 U/L    Alkaline Phosphatase 86 38 - 126 U/L    Total Protein 6.1 6.1 - 8.0 g/dL    Alb 3.2 (L) 3.5 - 5.1 g/dL    Total Bilirubin 0.4 0.3 - 1.2 mg/dL    ALT 6 (L) 11 - 66 U/L   Troponin    Collection Time: 06/22/19  5:33 PM   Result Value Ref Range    Troponin T 0.042 (A) ng/ml   Procalcitonin    Collection Time: 06/22/19  5:33 PM Result Value Ref Range    Procalcitonin 0.38 (H) 0.01 - 0.09 ng/mL   Lipase    Collection Time: 06/22/19  5:33 PM   Result Value Ref Range    Lipase 5.0 (L) 5.6 - 51.3 U/L   Anion Gap    Collection Time: 06/22/19  5:33 PM   Result Value Ref Range    Anion Gap 17.0 (H) 8.0 - 16.0 meq/L   Glomerular Filtration Rate, Estimated    Collection Time: 06/22/19  5:33 PM   Result Value Ref Range    Est, Glom Filt Rate 8 (A) ml/min/1.73m2   Osmolality    Collection Time: 06/22/19  5:33 PM   Result Value Ref Range    Osmolality Calc 290.2 275.0 - 300 mOsmol/kg   Urine with Reflexed Micro    Collection Time: 06/22/19  5:35 PM   Result Value Ref Range    Glucose, Ur NEGATIVE NEGATIVE mg/dl    Bilirubin Urine NEGATIVE NEGATIVE    Ketones, Urine NEGATIVE NEGATIVE    Specific Gravity, Urine 1.015 1.002 - 1.03    Blood, Urine NEGATIVE NEGATIVE    pH, UA 5.0 5.0 - 9.0    Protein,  (A) NEGATIVE    Urobilinogen, Urine 0.2 0.0 - 1.0 eu/dl    Nitrite, Urine NEGATIVE NEGATIVE    Leukocyte Esterase, Urine NEGATIVE NEGATIVE    Color, UA YELLOW STRAW-YELL    Character, Urine CLOUDY (A) CLEAR-SL C    RBC, UA 5-10 0-2/hpf /hpf    WBC, UA 0-2 0-4/hpf /hpf    Epi Cells 0-2 3-5/hpf /hpf    Bacteria, UA NONE FEW/NONE S /hpf    Casts UA NONE SEEN NONE SEEN /lpf    Crystals NONE SEEN NONE SEEN    Renal Epithelial, Urine NONE SEEN NONE SEEN    Yeast, UA NONE SEEN NONE SEEN    CASTS 2 NONE SEEN NONE SEEN /lpf    MISCELLANEOUS 2 NONE SEEN    Blood Gas, Venous    Collection Time: 06/22/19  5:52 PM   Result Value Ref Range    PH MIXED 7.33 7.31 - 7.41    PCO2, MIXED VENOUS 39 (L) 41 - 51 mmhg    PO2, Mixed 50 (H) 25 - 40 mmhg    HCO3, Mixed 21 (L) 23 - 28 mmol/l    Base Exc, Mixed -4.8 (L) -2.0 - 3.0 mmol/l    O2 Sat, Mixed 82 %    FIO2, MIXED VENOUS 2     COLLECTED BY: [id number]     DEVICE Cannula     Site R Radial    EKG 12 Lead    Collection Time: 06/22/19  7:07 PM   Result Value Ref Range    Ventricular Rate 65 BPM    Atrial Rate 65 BPM    P-R Interval 146 ms    QRS Duration 100 ms    Q-T Interval 404 ms    QTc Calculation (Bazett) 420 ms    P Axis 36 degrees    T Axis 133 degrees   Troponin    Collection Time: 06/22/19  7:12 PM   Result Value Ref Range    Troponin T 0.547 (A) ng/ml         Vital Signs: T: 98.5F P: 67 RR: 20 B/P: 136/45: FiO2: 2L: O2 Sat:92%: I/O: No intake or output data in the 24 hours ending 06/22/19 2021      General:   Patient is ill appearing, non toxic, in no acute distress  HEENT:  normocephalic and atraumatic. No scleral icterus. Neck: supple. No JVD. Lungs: diminished throughout. No retractions. Pleurex drain is in the skin on the right back  Cardiac: RRR  Abdomen: soft. Nontender. Extremities:  No clubbing, cyanosis, or edema x 4. Vasculature: capillary refill < 3 seconds. Skin:  warm and dry. Psych:  Alert and oriented x3. Affect appropriate  Lymph:  No supraclavicular adenopathy. Neurologic:  No focal deficit.     Electronically signed by Michael Greene PA-C

## 2019-06-23 NOTE — FLOWSHEET NOTE
06/22/19 2208   Provider Notification   Reason for Communication Evaluate   Provider Name Dr. Ema Lagos   Provider Notification Physician   Method of Communication Secure Message   Response Waiting for response   Notification Time [phone]   New consult for ESRD starting CAPD soon. Patient came to ED from nursing home for increased fatigue- \"legs gave out\" when nursing staff was assisting patient to restroom- no fall. Patient had CAPD placed on 6/17 by Dr. Phuong Dorman- dialysis was to start on 6/26. Trop at [phone] today was 0.042 and at 1912 was 0.547. Heparin bolus/gtt started. EKG says no STEMI.  Patient denies chest pain and SOB- she does c/o pain at insertion site of pleurex drain that was placed for pleural effusion on 6/16-6/20 admission- cxr says chest tube is out of place and there is sm pneumothorax    2220: Spoke to Dr. Ema Lagos- he said he'd see patient in AM

## 2019-06-23 NOTE — FLOWSHEET NOTE
06/22/19 2256   Provider Notification   Reason for Communication Evaluate   Provider Name Dr. Mamta Wright   Provider Notification Physician   Method of Communication Secure Message   Response Waiting for response   Notification Time 2256   New consult for marked troponin elevation. Patient came to ED from nursing home for increased fatigue- \"legs gave out\" when nursing staff was assisting patient to restroom- no fall. Patient had CAPD placed on 6/17 by Dr. Jermaine Benitez- dialysis was to start on 6/26. Trop at [phone] today was 0.042 and at 1912 was 0.547. Heparin bolus/gtt started. EKG says no STEMI.  Patient denies chest pain and SOB- she does c/o pain at insertion site of pleurex drain that was placed for pleural effusion on 6/16-6/20 admission- cxr says chest tube is out of place and there is sm pneumothorax

## 2019-06-23 NOTE — FLOWSHEET NOTE
06/23/19 0622   Provider Notification   Reason for Communication Evaluate   Provider Name Dr. Shiela Valerio   Provider Notification Physician   Method of Communication Secure Message   Response Waiting for response   Notification Time 0622   Sent message earlier regarding consult on this patient- Update: Trops back to baseline and heparin gtt d/c.

## 2019-06-23 NOTE — PROGRESS NOTES
Hospitalist Progress Note    Patient:  Cortney Freeman      Unit/Bed:3B-35/035-A    YOB: 1950    MRN: [id number]       Acct: [de-identified]     PCP: [id number]    Date of Admission: 6/22/2019    Active Hospital Problems    Diagnosis Date Noted    NSTEMI (non-ST elevated myocardial infarction) ([facility] [I21.4] 06/22/2019    Chronic kidney disease (CKD), stage V ([facility] [N18.5]     Uncontrolled type 2 diabetes mellitus with hyperglycemia ([facility] [E11.65] 12/07/2018    Essential hypertension [I10] 12/07/2018    Type 2 diabetes mellitus with insulin therapy ([facility] [E11.9, Z79.4]        Assessment/Plan:    Acute on Chronic CKD, stage V: , creat 5.2, GFR 8. Pt follows with outpatient nephrologist. Had PD cath placed 13 days ago but has not yet started dialysis. Plans to start 6/26. Nephrology consulted. Will continue to monitor labs.      Elevated Troponin: likely due to lab error vs underlying CAD w/ decreased kidney function: Troponin 0.042 on arrival, spiked to 0.547, then back down to 0.042 within 2 hours. Pt denied any symptoms of chest pain. Cardio consulted and following, feel there is likely underlying CAD. Recommending further ischemic workup with cardiac cath once kidney function improves. Will continue to trend troponin levels.      Pleural Effusion: likely due to CHF. Pt recently admitted 6/6-6/13, and 6/16-6/20 for pleural effusions. Right thoracentesis on 6/12 1.5 L drained, transudative. Indwelling right pleurex placed on 6/18. Per CT chest on 6/22 chest tube no longer in place. Is not draining per RN. Will consult IR for repositioning Vs removal in AM.  Pt denies use of O2 at home but has been on 3 lpm via NC at [facility] since dishcarge, is now on 2L NC with sat of 96%, will attempt to wean as tolerated.       Diastolic CHF: Recent admission for CHF exacerbation. Echo from 12/8/2018: EF of 50-55% with mildly dilated left atrium.  Limited echo from 6/7/19 estimated EF of 55% with no other abnormalities noted. Pt having recurrent transudative pleural effusions. Received one time dose IV Bumex. Clinically euvolemic. Cardiology following.        Type II DM: BS's in 100's. Continue lantus and sliding scale insulin. Continue carb controlled diet and accuchecks.      Hx of Hypertension: SBP in 140's. Pt not taking ACE/ARB due to CKD. Continue amlodipine and hydralazine.      Normocytic Anemia of chronic disease, due to CKD: Hgb 8.4. Baseline Hgb 8-9. Pt taking iron supplement. Following with nephrology. Will continue to monitor H&H.      Hyponatremia: due to CKD. Sodium 131 today. Baseline appears to be in 130's. Will continue to monitor.      Metabolic Acidosis: due to CKD. ABG from 6/23 shows pH 7.36 with HCO3 of 22 and PCO2 of 38.      Leukocytosis: no known source of infection at this time. Pt afebrile and denies respiratory, GI, and urinary symptoms. Pt remains stable. Will continue to trend.     Hx of Hypothyroid: continue synthroid. Expected discharge date:  TBD         Disposition:      [] Home                             [] TCU                             [] Rehab                             [] Psych                             [x] SNF                             [] [facility]                             [] Other-    Chief Complaint:   Chief Complaint   Patient presents with    Fatigue     possible need for dialysis        Hospital Course: Patient was seen, examined and the medical chart was reviewed thoroughly today. In summary, 81 y. o.female admitted overnight for  generalized weakness and query need for dialysis. Subjective (past 24 hours):   c/o pain at CT drain site which is positional. Denies worsening SOB/CP. Denies fever/chills/night sweats. Denies urinary/URT symptoms or diarrhea.       Medications:  Reviewed    Infusion Medications    dextrose      nitroGLYCERIN       Scheduled Medications    bumetanide  2 mg Intravenous Once    amLODIPine  10 mg Oral Daily    docusate sodium  100 mg Oral BID    doxazosin  1 mg Oral Daily    famotidine  10 mg Oral BID    ferrous sulfate  325 mg Oral BID    gabapentin  100 mg Oral TID    hydrALAZINE  100 mg Oral 3 times per day    insulin glargine  8 Units Subcutaneous BID    insulin lispro  0-6 Units Subcutaneous TID     insulin lispro  0-3 Units Subcutaneous Nightly    isosorbide dinitrate  20 mg Oral TID    levothyroxine  25 mcg Oral Daily    oxybutynin  5 mg Oral Daily    pravastatin  40 mg Oral Nightly    sodium chloride flush  10 mL Intravenous 2 times per day     PRN Meds: acetaminophen, glucose, dextrose, glucagon (rDNA), dextrose, sodium chloride flush, magnesium hydroxide, ondansetron    No intake or output data in the 24 hours ending 06/23/19 1007    Diet:  DIET CARB CONTROL; Carb Control: 4 carb choices (60 gms)/meal; No Caffeine    Exam:  BP (!) 145/64   Pulse 71   Temp 98.1 °F (36.7 °C) (Oral)   Resp 18   Ht 5' 6\" (1.676 m)   Wt 168 lb 12.8 oz (76.6 kg)   SpO2 96%   BMI 27.25 kg/m²     General appearance: A&O x3, Not ill or toxic, in no apparent distress  Neck: Supple, no JVD, no carotid bruits  Heart: Regular rhythm, bradycardic, normal S1 and S2, no rubs, murmurs or gallops  Lungs: bilat decreased A/E at bases; clear to ascultation no rales, wheezes, or rhonchi.  R chest pigtail in-situ with intact overlying skin  Abdomen: soft, non-tender, non-distended, no bruits, no masses  Extremities: no clubbing, cyanosis or edema  Neurologic: alert and oriented x 3, cranial nerves 2-12 grossly intact, motor and sensory intact, moving all extremities  Skin: No rashes  Capillary Refill: Brisk,< 3 seconds   Peripheral Pulses: +2 palpable, equal bilaterally           Labs:   Recent Labs     06/22/19 1733 06/23/19 0333   WBC 13.8* 13.2*   HGB 8.0* 8.4*   HCT 24.0* 25.5*    322     Recent Labs     06/22/19 1733 06/23/19  0333   * 131*   K 4.5 4.6   CL 93* 93*   CO2 20* 20*   BUN 95* 101* CREATININE 5.3* 5.2*   CALCIUM 9.2 9.3     Recent Labs     06/22/19  1733   AST 9   ALT 6*   BILITOT 0.4   ALKPHOS 86     No results for input(s): INR in the last 72 hours. No results for input(s): Shae Basques in the last 72 hours. Urinalysis:      Lab Results   Component Value Date    NITRU NEGATIVE 06/22/2019    WBCUA 0-2 06/22/2019    BACTERIA NONE 06/22/2019    RBCUA 5-10 06/22/2019    BLOODU NEGATIVE 06/22/2019    GLUCOSEU NEGATIVE 06/22/2019       Radiology:  Ct Chest Wo Contrast    Result Date: 6/22/2019  PROCEDURE: CT CHEST WO CONTRAST CLINICAL INFORMATION: SOB, fluid overload, ESRD. COMPARISON: Chest x-ray 6/22/2019, chest CT 6/7/2019 TECHNIQUE: 5 mm noncontrast axial images were obtained through the chest. Sagittal and coronal reconstructions were obtained. All CT scans at this facility use dose modulation, iterative reconstruction, and/or weight-based dosing when appropriate to reduce radiation dose to as low as reasonably achievable. FINDINGS: The heart size is normal. No pericardial effusion. There is moderate coronary calcification. The aorta is of normal caliber. There is no gross abnormality of the pulmonary artery. Multiple mildly prominent mediastinal nodes are worsened from previous. Since previous CT a small bore chest tube has been placed on the right. It apparently has been pulled out of the chest and is located its tip is located at the level of the outer rib cortex. There is a small hydropneumothorax on the right measuring 7 mm anteriorly. Small-to-moderate left pleural effusion without significant change. Small-to-moderate right pleural effusion appears mildly worsened superiorly. Mild lower lobe airspace opacities bilaterally persist. There is improved aeration of the right lower lobe from previous. Previous right upper lobe opacity is significantly cleared as well. No new parenchymal opacities. No suspicious osseous lesions are present. Cholecystectomy.       Small right chest tube has apparently pulled out of the chest and there is a small right pneumothorax. **This report has been created using voice recognition software. It may contain minor errors which are inherent in voice recognition technology. ** Final report electronically signed by Dr. Dayana Clifton on 6/22/2019 7:47 PM    Ct Chest Wo Contrast    Result Date: 6/7/2019  PROCEDURE: CT CHEST WO CONTRAST CLINICAL INFORMATION: RALES, bilateral pleural effusion R>L recent thoracentesis 6/7/hypoxia COMPARISON: 6/7/2016 TECHNIQUE: Multiple axial 5 millimeter images of the thorax and upper abdomen were obtained without the administration of intravenous contrast material . All CT scans at this facility use dose modulation, iterative reconstruction, and/or weight-based dosing when appropriate to reduce radiation dose to as low as reasonably achievable. FINDINGS: Upper abdomen: Prior cholecystectomy. Mediastinum and radha: Enlarged pulmonary trunk, consistent with pulmonary arterial hypertension. Extensive coronary artery calcifications are present. There are several small subcentimeter mediastinal lymph nodes, likely reactive. No abnormally enlarged hilar or mediastinal lymph nodes are seen. Bones: Marked hypertrophic spurring is seen in the thoracic spine with several bulky bridging osteophytes. There is extensive calcification in the anterior spinal ligament. In addition, there is a moderate old compression fracture T11 level with evidence  for prior vertebroplasty. There appears slightly greater compression at this time than on the prior study. Lungs: Groundglass infiltrates right mid and upper lung fields, consistent with pneumonia/pulmonary edema. Moderate consolidation both lower lobes, worse on the right, consistent with pneumonia. There are moderate size bilateral pleural effusions. 1. Moderate-sized left pleural effusion. Bibasilar atelectasis/pneumonia.  Groundglass and right mid and upper lung fields, consistent with pneumonia versus pulmonary edema. 2. Enlarged pulmonary arteries, consistent with pulmonary arterial hypertension. **This report has been created using voice recognition software. It may contain minor errors which are inherent in voice recognition technology. ** Final report electronically signed by Dr. Titus Vaughan on 6/7/2019 1:03 PM    Xr Chest Portable    Result Date: 6/22/2019  PROCEDURE: XR CHEST PORTABLE CLINICAL INFORMATION: SOB. COMPARISON: Multiple previous most recent 6/20/2019 TECHNIQUE: AP upright view of the chest. FINDINGS: Mild cardiomegaly is similar to previous. There is at least a small right pleural effusion. Vasculature is prominent and indistinct. Moderate perihilar and lower lobe parenchymal opacities appear slightly worsened. Mild upper lobe opacities bilaterally are stable. Mildly worsened parenchymal opacities as above. This could relate to pulmonary edema. **This report has been created using voice recognition software. It may contain minor errors which are inherent in voice recognition technology. ** Final report electronically signed by Dr. Harman Villegas on 6/22/2019 6:20 PM    Xr Chest Portable    Result Date: 6/20/2019  PROCEDURE: XR CHEST PORTABLE CLINICAL INFORMATION: chf/pleural effusions. COMPARISON: June 19, 2019 TECHNIQUE: AP upright view of the chest. FINDINGS: The heart remains partially obscured by perihilar atelectasis and or infiltrates with superimposed coarse interstitial markings. Underlying fibrosis cannot be excluded. There is persistent basilar atelectasis and pleural effusions right greater than left. Previously described small right apical pneumothorax is not well visualized on current exam. There is no change of the skeleton. 1. Bilateral perihilar and basilar atelectasis and or infiltrate changes. 2 persistent basilar effusions right greater than left. 3 previous right-sided pneumothorax not well-visualized.   This report has been created using voice recognition software. It may contain minor errors which are inherent in voice recognition technology. ** Final report electronically signed by Dr. Stephan Walters on 6/20/2019 6:11 AM    Xr Chest Portable    Result Date: 6/19/2019  PROCEDURE: XR CHEST PORTABLE CLINICAL INFORMATION: Pneumothorax. COMPARISON: 6/18/2019. TECHNIQUE: AP portable chest radiograph performed. FINDINGS: POSTSURGICAL CHANGES: None. LINES/TUBES/MECHANICAL DEVICES: None. TRACHEA/HEART/MEDIASTINUM/HILUM: 1. Stable mild prominence of the cardiac silhouette which is partially obscured. LUNG MARIANO: 1. There are bilateral perihilar and the basilar infiltrates, consolidation within the right lower chest and a right pleural effusion. OTHER: None. PNEUMOTHORAX:  1. There is a persistent however interval smaller right apical pneumothorax (less than 5%). OSSEOUS STRUCTURES: 1. No acute osseous abnormality. 1. There is a persistent however interval smaller right apical pneumothorax (less than 5%). 2. There are bilateral perihilar and the basilar infiltrates, consolidation within the right lower chest and a right pleural effusion. 3. Follow chest radiographs recommended to confirm complete resolution. **This report has been created using voice recognition software. It may contain minor errors which are inherent in voice recognition technology. ** Final report electronically signed by Dr. Delio Renae on 6/19/2019 7:03 AM    Xr Chest Portable    Result Date: 6/16/2019  PROCEDURE: XR CHEST PORTABLE CLINICAL INFORMATION: Cough. COMPARISON: Chest x-ray dated 6/12/2019 TECHNIQUE: AP Portable chest xray FINDINGS: Lines/tubes/devices: none Lungs/pleura: There are moderate to large right and small left pleural effusions. There are bilateral interstitial infiltrates consistent with pulmonary edema or an atypical pneumonia. There is left lower lobe atelectasis. Heart: There is mild cardiomegaly. Mediastinum/radha: No obvious mass or adenopathy.  Skeleton: There are degenerative changes of the spine. Moderate to large right and small left pleural effusions. Bilateral interstitial pulmonary edema versus atypical pneumonia. Left lower lobe atelectasis. **This report has been created using voice recognition software. It may contain minor errors which are inherent in voice recognition technology. ** Final report electronically signed by Dr. Nirav Vega on 6/16/2019 11:02 PM    Xr Chest Portable    Result Date: 6/7/2019  PROCEDURE: XR CHEST PORTABLE CLINICAL INFORMATION: plerural effusion. COMPARISON: June 6, 2019 TECHNIQUE: AP upright view of the chest. FINDINGS: The heart remains enlarged. There is persistent mild pulmonary venous congestion with basilar atelectasis and pleural effusions right greater than left humeral appearance is similar to the prior study. The skeletal structures are stable. 1. Redemonstration of changes compatible with congestive heart failure. 2. Bilateral effusions right greater than left. **This report has been created using voice recognition software. It may contain minor errors which are inherent in voice recognition technology. ** Final report electronically signed by Dr. Daryl Pham on 6/7/2019 2:19 AM    Xr Chest 1 Vw    Result Date: 6/12/2019  PROCEDURE: XR CHEST 1 VIEW CLINICAL INFORMATION: Status post ultrasound-guided right thoracentesis. COMPARISON: 6/9/2019. TECHNIQUE: Single frontal view of the chest performed. FINDINGS: POSTSURGICAL CHANGES: 1. Cholecystectomy. LINES/TUBES/MECHANICAL DEVICES: None. TRACHEA/HEART/MEDIASTINUM/HILUM: 1. There is stable mild enlargement of the cardiomediastinal silhouette. 2. There is tracheal and bronchial calcification. LUNG FIELDS: 1. The patient is status post ultrasound-guided right thoracentesis with aspiration of 1.5 L of pleural fluid. There is no residual pleural fluid. There is no pneumothorax.  2. There are left perihilar and left basilar infiltrates and mild atelectasis and/or infiltrate at the right lung base. There is no pulmonary vascular congestion. OTHER: None. PNEUMOTHORAX: None. OSSEOUS STRUCTURES: 1. No acute osseous abnormality. 2. The bony structures are osteopenic. 3. There are mild hypertrophic degenerative changes at the acromioclavicular articulations bilaterally and subcortical sclerosis and cystic change at the greater tuberosity bilaterally which in the right clinical setting can be associated with impingement. 4. There is mild levoscoliosis. 1. The patient is status post ultrasound-guided right thoracentesis with aspiration of 1.5 L of pleural fluid. There is no residual pleural fluid. There is no pneumothorax. 2. There are left perihilar and left basilar infiltrates and mild atelectasis and/or infiltrate at the right lung base. There is no pulmonary vascular congestion. **This report has been created using voice recognition software. It may contain minor errors which are inherent in voice recognition technology. ** Final report electronically signed by Dr. Agustín Duff on 6/12/2019 3:43 PM    Xr Chest 1 Vw    Result Date: 6/9/2019  PROCEDURE: XR CHEST 1 VIEW CLINICAL INFORMATION: post thoracentesis left side COMPARISON: 6/7/2019 TECHNIQUE: Single AP upright view of the chest was obtained. 1. Poor inflation of lungs. Mild cardiomegaly. Moderate atelectasis/pneumonia left perihilar region, right infrahilar region, and both lung bases. Small effusion right side. 2. No residual effusion on the left. No pneumothorax is seen. Final report electronically signed by Dr. Vanita Liu on 6/9/2019 10:27 AM    Xr Chest 1 Vw    Result Date: 6/6/2019  PROCEDURE: XR CHEST 1 VIEW CLINICAL INFORMATION: Status post ultrasound-guided right thoracentesis. COMPARISON: 5/23/2019. TECHNIQUE: Single frontal view of the chest performed. FINDINGS: POSTSURGICAL CHANGES: 1. Cholecystectomy. LINES/TUBES/MECHANICAL DEVICES: None. TRACHEA/HEART/MEDIASTINUM/HILUM: 1.  There is mild enlargement of the cardiomediastinal silhouette. 2. There is tracheal and bronchial calcification. LUNG FIELDS: 1. The patient is status post large volume right thoracentesis with aspiration of 1.4 L of clear yellow fluid. There is no pneumothorax. There are bilateral perihilar and the basilar infiltrates. OTHER: None. PNEUMOTHORAX: None. OSSEOUS STRUCTURES: 1. No acute osseous abnormality. 2. Generalized osteopenia. 3. Mild levoscoliosis. 1. The patient is status post large volume right thoracentesis with aspiration of 1.4 L of clear yellow fluid. There is no pneumothorax. There are bilateral perihilar and the basilar infiltrates. **This report has been created using voice recognition software. It may contain minor errors which are inherent in voice recognition technology. ** Final report electronically signed by Dr. Jacob Perez on 6/6/2019 1:30 PM    Us Thoracentesis    Result Date: 6/12/2019  PROCEDURE: US THORACENTESIS CLINICAL INFORMATION: Right pleural effusion. COMPARISON: No prior study. PHYSICIAN PERFORMING PROCEDURE: Zee Bryant M.D. PROCEDURE:  Informed consent was obtained. Limited sonographic imaging of the posterior chest was performed for localization of the pleural effusion. The skin overlying the pleural effusion was marked. The puncture site was prepped and draped in a sterile fashion and locally anesthetized with 2% lidocaine. The distal tip of a 5 Faroese one-step catheter was advance into the pleural effusion. An 83 ml specimen was obtained to be sent to the laboratory for analysis as per the orders of the referring physician. .  A total of 1.5 L clear yellow pleural fluid was then aspirated and discarded. The one-step catheter was then removed. The patient tolerated the procedure well and and there were no immediate complications. 1.  Uncomplicated ultrasound guided diagnostic and therapeutic thoracentesis. **This report has been created using voice recognition software.   It may contain minor errors which are inherent in voice recognition technology. ** Final report electronically signed by Dr. Jarek Ballesteros on 6/12/2019 3:40 PM    Us Thoracentesis    Result Date: 6/9/2019  THORACENTESIS WITH ULTRASOUND GUIDANCE: PERFORMED BY: Adrian Stock M.D. CLINICAL INFORMATION: Pleural effusion APPROACH: Left side, posterior FLUID WITHDRAWN: 500 mL blood-tinged serous fluid PROCEDURE: Signed informed consent was obtained prior to performing this procedure. The thorax was initially evaluated sonographically to determine appropriate puncture site. The skin was marked, prepped, and draped in a sterile fashion. Following local anesthesia and utilizing aseptic technique, a 5 Jordanian one-step catheter was successfully inserted into the pleural effusion at the position indicated above. Pleural fluid in the amount was above was then aspirated and the needle was removed. The patient tolerated the procedure well. A post procedure chest radiograph will be obtained. Status post thoracentesis **This report has been created using voice recognition software. It may contain minor errors which are inherent in voice recognition technology. ** Final report electronically signed by Dr. Meron Jimenez on 6/9/2019 10:46 AM    Us Thoracentesis    Result Date: 6/6/2019  PROCEDURE: US THORACENTESIS CLINICAL INFORMATION: Large right pleural effusion. COMPARISON: No prior study. PHYSICIAN PERFORMING PROCEDURE: Ana Ramirez M.D. PROCEDURE:  Informed consent was obtained. Limited sonographic imaging of the posterior chest was performed for localization of the pleural effusion. The skin overlying the pleural effusion was marked. The puncture site was prepped and draped in a sterile fashion and locally anesthetized with 2% lidocaine. The distal tip of a 5 Jordanian one-step catheter was advance into the pleural effusion. A total of 1.4 L clear yellow pleural fluid was then aspirated and discarded. The one-step catheter was then removed. The patient tolerated the procedure well and and there were no immediate complications. 1.  Uncomplicated ultrasound guided therapeutic thoracentesis. **This report has been created using voice recognition software. It may contain minor errors which are inherent in voice recognition technology. ** Final report electronically signed by Dr. Steffi Workman on 6/6/2019 1:28 PM    Ir Insert Capd Complete    Result Date: 6/11/2019  CAPD CATHETER INSERTION: CLINICAL INFORMATION: Renal failure     PERFORMED BY: Fina Green M.D. APPROACH: Anterior abdominal wall, paraumbilical, left side. CATHETER: 16 Bulgarian curled tip swan-neck Tenckhoff peritoneal dialysis catheter, 62.5 CM CATHETER TIP: Midline in the pelvis CONTRAST: Omnipaque 180, 30 mL FLUOROSCOPY TIME: 4 minutes 36 seconds FLUOROSCOPIC IMAGES: 4 SEDATION: Versed 1.1 mg and Dilaudid 1 mg , IV; the patient was sedated for 90 minutes during this procedure and monitored the EKG and pulse ox monitoring devices by a registered nurse. PROCEDURE:  Signed informed consent was obtained prior to performing this procedure. The patient was sedated as indicated above. Following local anesthesia and utilizing MAXIMAL STERILE BARRIER TECHNIQUE, an 8-10 CM vertical incision was made along the anterior abdominal wall in preparation for catheter placement. Blunt dissection was performed to expose the anterior rectus sheath. A small incision was made in the anterior rectus sheath with a #11 scalpel blade and a micropuncture needle was passed through the incision into the peritoneal cavity with ultrasound guidance. A small amount of iodinated contrast material was then injected through the needle, confirming appropriate needle position within the peritoneal cavity. A fluoroscopic spot film was obtained for documentation. An 018 wire was then passed through the needle followed by insertion of a 4 Bulgarian vessel dilator which was then upsized to a 6 Western Princess vascular sheath.  Additional iodinated contrast material was injected to reconfirm appropriate position of the sheath within the peroneal cavity. Approximately 400 mL sterile saline was injected through the sheath to distend the peritoneal cavity. The percutaneous tract was then dilated up to a 14 Western Princess size utilizing progressively larger dilators advanced over a stiff angled Glidewire. Finally, a 12 Sinhala peel-away sheath was inserted, followed by insertion of the CAPD catheter as above. This procedure was performed with intermittent fluoroscopic visualization and documented with intermittent spot films. The deep cuff of the catheter was then positioned in the anterior abdominal muscle and stabilized with a 2-0 silk pursestring suture. A small subcutaneous tunnel was then created and the remaining portion of the catheter was passed through the tunnel, positioning the superficial cuff in the upper end of the tunnel, approximately 2-3 CM superior to the catheter exit site. The abdominal wound and subcutaneous tunnel were flushed several times with bacitracin solution. The wounds closed with 3-0 Vicryl subcuticular suture and a 2-0 silk external \" retention sutures\", to assist with wound healing. Instructions will be of into the dialysis nurses to remove these in approximately 10 days. Dermabond skin adhesive and Steri-Strips were also applied to the abdominal wound along with a sterile OpSite dressing. The patient was instructed to keep the wound covered and dry for the next 10 days. The patient tolerated the procedure well. A fluoroscopic spot film was obtained at the end of this procedure to document catheter position. Status post successful tunneled peritoneal dialysis  (CAPD)  catheter insertion. **This report has been created using voice recognition software. It may contain minor errors which are inherent in voice recognition technology. ** Final report electronically signed by Dr. Caesar Malcolm on 6/11/2019 3:15 PM    Xr Chest Pa Inspiration 1 Vw    Result Date: 6/18/2019  PROCEDURE: XR CHEST PA INSPIRATION 1 VW CLINICAL INFORMATION: 42-year-old female status post right thoracic Pleurx catheter placement. COMPARISON: Comparison is made to previous study dated 6/16/2019. TECHNIQUE: AP upright view of the chest was obtained. FINDINGS: There has been interval placement of right-sided thoracic drainage catheter. There is a right-sided apical pneumothorax measuring up to 15 mm in weakness. Opacities are again noted at the right lung base which may be related to atelectasis or infiltrate. There are small bilateral pleural effusions. There is mild cardiomegaly. Visualized portions of the upper abdomen are within normal limits. The osseous structures are intact. No acute fractures or suspicious osseous lesions. Interval placement of a right-sided pleural drainage catheter with marked decreased size of pleural effusion. However, there has been development of a right-sided apical 15 mm pneumothorax. Findings discussed by Dr. Alyssa Zhao with 6K nurse Myra Grijalva 8:50 AM 6/18/2019 via telephone. **This report has been created using voice recognition software. It may contain minor errors which are inherent in voice recognition technology. ** Final report electronically signed by Dr Ne Beck on 6/18/2019 8:55 AM    Ir Abscess Drainage Perc    Result Date: 6/18/2019  TUNNELED PLEURX DRAINAGE CATHETER INSERTION / CHEST: PERFORMED BY: Roldan Stewart. Alyssa Zhao M.D. CLINICAL INFORMATION: Recurrent pleural effusion APPROACH:  Right hemithorax, inferiorly, mid axillary line. CATHETER: 16 Greek tunneled Pleurx chest drainage catheter. FLUID: 1 L corona serous fluid was obtained and discarded. SEDATION:  Versed 0.5 mg and fentanyl 25 mcg IV; the patient was sedated for  30 minutes  during this procedure and monitored with EKG and pulse-ox monitoring devices by a registered nurse.  FLUOROSCOPY TIME: 1 minute 39 seconds FLUOROSCOPIC IMAGES: 3 PROCEDURE:  Signed informed consent was obtained prior to performing this procedure. The patient was sedated, as indicated above. The chest was evaluated with ultrasound initially to determine an appropriate puncture site. Images were obtained, revealing a significant amount pleural fluid. The skin was marked, prepped, and draped in a sterile fashion, utilizing [address]. The skin was infiltrated with lidocaine 2 percent at the puncture site and a small skin nick was made with a #11 scalpel blade. A micropuncture needle, attached to a syringe, was then successfully passed into the pleural fluid. A small  amount of fluid was aspirated to confirm appropriate needle position. An 018 wire was then passed through the needle, followed by insertion of a 4 Finnish vessel dilator which was later up sized to a 16 [employer] peel-away sheath. A skin tract leading anteriorly and inferiorly from the initial puncture site was then infiltrated with lidocaine 2 percent in preparation for formation of a subcutaneous tunnel. A small incision was then made along the anterior-inferior aspect of the tunnel and the Pleurx catheter was passed through the tunnel, attached to the tunneling device, and was then passed through the peel-away sheath. The sheath was removed, and The tunnel was flushed several times with bacitracin solution. The catheter was then connected to a vacuum bottle and pleural fluid, in the amount listed above, was aspirated and discarded, confirming appropriate catheter function. The wound site were sutured with 3-0 Vicryl interrupted suture. A small amount of antibiotic ointment was applied to the catheter entrance site, and an antibacterial Biopatch was applied to the catheter exit site, along with a sterile OpSite dressing. Fluoroscopic spot films were obtained to document catheter position. The patient tolerated the procedure well. Status post successful tunneled Pleurx chest drainage catheter insertion. **This report has been created using voice recognition software. It may contain minor errors which are inherent in voice recognition technology. ** Final report electronically signed by Dr Edison Taylor on 6/18/2019 8:59 AM      Diet: DIET CARB CONTROL; Carb Control: 4 carb choices (60 gms)/meal; No Caffeine    DVT prophylaxis: [] Lovenox                                 [] SCDs                                 [x] SQ Heparin                                 [] Encourage ambulation           [] Already on Anticoagulation       Code Status: Full Code      Active Hospital Problems    Diagnosis Date Noted    NSTEMI (non-ST elevated myocardial infarction) ([facility] [I21.4] 06/22/2019    Chronic kidney disease (CKD), stage V ([address] [N18.5]     Uncontrolled type 2 diabetes mellitus with hyperglycemia ([address] [E11.65] 12/07/2018    Essential hypertension [I10] 12/07/2018    Type 2 diabetes mellitus with insulin therapy ([address] [E11.9, Z79.4]            Patient was updated about the treatment plan, all the questions and concerns were addressed.         Electronically signed by Shanita Fournier MD on 6/23/2019 at 10:07 AM

## 2019-06-23 NOTE — PROGRESS NOTES
Bumex. Cardiology following. Type II DM: BS's in 100's. Continue lantus and sliding scale insulin. Continue carb controlled diet and accuchecks. Hx of Hypertension: SBP in 140's. Pt not taking ACE/ARB due to CKD. Continue amlodipine and hydralazine. Normocytic Anemia of chronic disease, due to CKD: Hgb 8.4. Baseline Hgb 8-9. Pt taking iron supplement. Following with nephrology. Will continue to monitor H&H. Hyponatremia: due to CKD. Sodium 131 today. Baseline appears to be in 130's. Will continue to monitor. Metabolic Acidosis: due to CKD. ABG from 6/23 shows pH 7.36 with HCO3 of 22 and PCO2 of 38. Leukocytosis: no known source of infection at this time. Pt afebrile and denies respiratory, GI, and urinary symptoms. Pt remains stable. Will continue to trend. Hx of Hypothyroid: continue synthroid. Expected discharge date:  TBD     Disposition:    [] Home       [] TCU       [] Rehab       [] Psych       [x] SNF       [] [facility]       [] Other-    Chief Complaint: weakness     Hospital Course:  81 y.o. female who presents to the ED via EMS from the nursing home for the evaluation of fatigue and generalized weakness. The patient was sleeping when the nurses were helping her up and her legs gave out around 3pm today. Patient states she eventually was able to walk to the bathroom with her walker. She reports associated increased shortness of breath, subtle right side chest pain, left flank, RUQ pain and left lower chest pain. Patient denies wearing oxygen at home or any recent falls. She denies fever or chills. The patient was admitted on 6/16/19 and discharged on 6/20 for pleural effusion. Patient had 700 mL drained from her lungs on 6/19. Patient had an indwelling right pleurex catheter placed for her frequent fluid overload. Patient also receives peritoneal dialysis and her next dialysis is Wednesday 6/26. Patient states she is still making urine.  No further complaints at the time of the initial encounter    6/23: CT chest shows pleurex catheter is not in place. Will have IR attempt to reposition in AM. Pt had elevated troponin on arrival, but was likely lab error due to quick return to baseline. Cardiology was consulted. Subjective (past 24 hours): Pt seen and examined. Stated she is doing well today. She reported coming in due to feeling weak and stated that she didn't have any pain before arrival. Pt denied the use of O2 in the nursing home. She denied SOB, Chest pain, palpitations, cough, and fever or chills at this time. Medications:  Reviewed    Infusion Medications    dextrose      nitroGLYCERIN       Scheduled Medications    bumetanide  2 mg Intravenous Once    amLODIPine  10 mg Oral Daily    docusate sodium  100 mg Oral BID    doxazosin  1 mg Oral Daily    famotidine  10 mg Oral BID    ferrous sulfate  325 mg Oral BID    gabapentin  100 mg Oral TID    hydrALAZINE  100 mg Oral 3 times per day    insulin glargine  8 Units Subcutaneous BID    insulin lispro  0-6 Units Subcutaneous TID WC    insulin lispro  0-3 Units Subcutaneous Nightly    isosorbide dinitrate  20 mg Oral TID    levothyroxine  25 mcg Oral Daily    oxybutynin  5 mg Oral Daily    pravastatin  40 mg Oral Nightly    sodium chloride flush  10 mL Intravenous 2 times per day     PRN Meds: acetaminophen, glucose, dextrose, glucagon (rDNA), dextrose, sodium chloride flush, magnesium hydroxide, ondansetron    No intake or output data in the 24 hours ending 06/23/19 0941    Diet:  DIET CARB CONTROL; Carb Control: 4 carb choices (60 gms)/meal; No Caffeine    Exam:  BP (!) 145/64   Pulse 71   Temp 98.1 °F (36.7 °C) (Oral)   Resp 18   Ht 5' 6\" (1.676 m)   Wt 168 lb 12.8 oz (76.6 kg)   SpO2 96%   BMI 27.25 kg/m²     General appearance: Alert, in no acute distress. HEENT: Pupils equal, round, and reactive to light. Conjunctivae/corneas clear. Neck: Supple, with full range of motion.  No jugular venous distention. Trachea midline. Respiratory:  Normal respiratory effort. Lung diminished in bilateral lung fields. Crackles noted in bilateral bases. Indwelling pleurex drain on right back. Cardiovascular: Regular rate and rhythm with normal S1/S2 without murmurs, rubs or gallops. Abdomen: Soft, non-tender, normal bowel sounds. Musculoskeletal: passive and active ROM x 4 extremities. Skin: Skin color, texture, turgor normal.  No rashes or lesions. Neurologic:  Neurovascularly intact without any focal sensory/motor deficits. Cranial nerves: II-XII intact, grossly non-focal.  Psychiatric: Alert and oriented, thought content appropriate, normal insight  Exam of extremities: peripheral pulses normal, no pedal edema, no clubbing or cyanosis        Labs:   Recent Labs     06/22/19 1733 06/23/19  0333   WBC 13.8* 13.2*   HGB 8.0* 8.4*   HCT 24.0* 25.5*    322     Recent Labs     06/22/19 1733 06/23/19  0333   * 131*   K 4.5 4.6   CL 93* 93*   CO2 20* 20*   BUN 95* 101*   CREATININE 5.3* 5.2*   CALCIUM 9.2 9.3     Recent Labs     06/22/19  1733   AST 9   ALT 6*   BILITOT 0.4   ALKPHOS 86     No results for input(s): INR in the last 72 hours. No results for input(s): Winford [place] in the last 72 hours. Microbiology:      Urinalysis:      Lab Results   Component Value Date    NITRU NEGATIVE 06/22/2019    WBCUA 0-2 06/22/2019    BACTERIA NONE 06/22/2019    RBCUA 5-10 06/22/2019    BLOODU NEGATIVE 06/22/2019    GLUCOSEU NEGATIVE 06/22/2019       Radiology:  CT CHEST WO CONTRAST   Final Result      Small right chest tube has apparently pulled out of the chest and there is a small right pneumothorax. **This report has been created using voice recognition software. It may contain minor errors which are inherent in voice recognition technology. **      Final report electronically signed by Dr. Demond Adrian on 6/22/2019 7:47 PM      XR CHEST PORTABLE   Final Result   Mildly worsened parenchymal opacities as above. This could relate to pulmonary edema. **This report has been created using voice recognition software. It may contain minor errors which are inherent in voice recognition technology. **      Final report electronically signed by Dr. Glenys Cooper on 6/22/2019 6:20 PM          DVT prophylaxis: [] Lovenox                                 [] SCDs                                 [x] SQ Heparin                                 [] Encourage ambulation           [] Already on Anticoagulation     Code Status: Full Code    Tele:   [x] yes             [] no    Active Hospital Problems    Diagnosis Date Noted    [facility]non-ST [facility] [I21.4] 06/22/2019    Chronic kidney disease (CKD), stage V ([facility] [N18.5]     Uncontrolled type 2 diabetes mellitus with hyperglycemia ([facility] [E11.65] 12/07/2018    Essential hypertension [I10] 12/07/2018    Type 2 diabetes mellitus with insulin therapy ([facility] [E11.9, Z79.4]        Electronically signed by Cindy Weber on 6/23/2019 at 9:41 AM      **This is a Medical/ PA/ APRN Student Note and is charted for educational purposes. The non-physician staff attested note is not to be used for billing purposes or to guide patient care. Please see the physician modifications/ attestation for treatment plan/suggestions. This note has been reviewed and feedback has been provided to the student.  **

## 2019-06-23 NOTE — PLAN OF CARE
Problem: Falls - Risk of:  Goal: Will remain free from falls  Description  Will remain free from falls  Outcome: Ongoing  Note:   Assessment & interventions provided throughout shift. Bed locked & in low position, call light in reach, side-rails up x2, non-slip socks on when ambulating, reminded patient to use call light to call for assistance. Bed alarm on. Goal: Absence of physical injury  Description  Absence of physical injury  Outcome: Ongoing     Problem: Risk for Impaired Skin Integrity  Goal: Tissue integrity - skin and mucous membranes  Description  Structural intactness and normal physiological function of skin and  mucous membranes. Outcome: Ongoing  Note:   Ongoing assessment & interventions provided throughout shift. Skin assessments provided. Encouraging/assisting patient to turn as needed. Blanchable redness on coccyx noted. Problem: Daily Care:  Goal: Daily care needs are met  Description  Daily care needs are met  Outcome: Ongoing  Note:   Patient able to complete ADLs. Assistance provided as needed. Problem: Pain:  Goal: Patient's pain/discomfort is manageable  Description  Patient's pain/discomfort is manageable  Outcome: Ongoing  Note:   Patient denies pain at this time. Will continue to monitor      Problem: Discharge Planning:  Goal: Patients continuum of care needs are met  Description  Patients continuum of care needs are met  Outcome: Ongoing  Note:   Patient is from [facility]. Care plan reviewed with patient. Patient verbalizes understanding of the care plan and contributed to goal setting.

## 2019-06-23 NOTE — ED NOTES
RN relayed message to pt regarding pt's grandchildren praying for her and that her son will speak with her tomorrow. Updated pt on bed placement.       [facility]  06/22/19 2036

## 2019-06-23 NOTE — PROGRESS NOTES
Stopped heparin drip when serial troponins reflected an immediate return to baseline in the absence of chest pain. This may represent equipment malfunction or lab error instead of marked elevation of troponin.     Trever Griffin PA-C

## 2019-06-23 NOTE — CONSULTS
Nephrology Consult Note  Patient's Name: Guadalupe Looney  5:54 AM  6/23/2019    Nephrologist: Rodrigo Cervantes    Reason for Consult:  ESRD  Requesting Physician:  Isabella Ruth MD  PCP: Taurus Freeman    Chief Complaint:  None  Assessment  1. ESRD  2. Hypertension  3. Hyponatremia from ESRD  4. Metabolic acidosis from ESRD  5. Leukocytosis   6. Normocytic anemia of chronic disease   7. Deconditioning   8. Right pleural effusion  9. CHF ? Plan    1. Reviewed labs   2. Reviewed medications   3. No changes   4. No urgent need to start PD today  5. Bumetanide 2 mg IV one  6. AM labs  7. Dr Kaylen Perdomo resumes care in am  8. Discussed with patient and staff  9. Will follow       History Obtained From: Patient  History of Present Ilness:    Guadalupe Looney is a 81 y.o. female with history of end-stage kidney disease who had peritoneal dialysis catheter placed about 13 days ago but not on dialysis yet admitted through the emergency department after the patient presented there with weakness. According to the patient and also the medical record, she tried to get out of bed and became weak in her legs. According to her the legs just gave up. The [facility] care facility where she resides thought she may need renal placement therapy. As a result  she was sent to the hospital.  She is doing fine now with no complaint.     Past Medical History:   Diagnosis Date    Adjustment disorder with mixed anxiety and depressed mood     Anemia     Anemia in chronic kidney disease(285.21)     Anxiety     Arthritis     Asthma     Bipolar 1 disorder (HCC)     Bradycardia     CHF (congestive heart failure) (HCC)     Chronic diastolic heart failure (HCC)     Chronic kidney disease, stage III (moderate) (HCC)     CKD (chronic kidney disease)     Stage 3    Depression     Difficulty walking     Dizziness and giddiness     GERD (gastroesophageal reflux disease)     Gout     Headache(784.0)     Hyperkalemia     Hyperlipidemia     Hypertension  Hypertensive urgency     Hypothyroid     Hypothyroidism     Low back pain     Major depressive disorder, recurrent, mild (HCC)     Muscle weakness (generalized)     Neurogenic bladder     Pleural effusion 06/16/2019    R pleurex catheter placed    Pneumonia     Seizure ([address] 01/2018    Type II or unspecified type diabetes mellitus without mention of complication, not stated as uncontrolled     Urine retention        Past Surgical History:   Procedure Laterality Date    BACK SURGERY      CERVICAL FUSION      CHOLECYSTECTOMY      COLONOSCOPY      ENDOSCOPY, COLON, DIAGNOSTIC      FRACTURE SURGERY         Family History   Problem Relation Age of Onset    High Blood Pressure Mother     Diabetes Mother     Cancer Mother     Heart Attack Mother     Stroke Father     High Blood Pressure Father     Anemia Father         reports that she has never smoked. She has never used smokeless tobacco. She reports that she does not drink alcohol or use drugs. Allergies:  Eggs or egg-derived products; Aspirin; Naproxen; Pcn [penicillins];  Vicodin [hydrocodone-acetaminophen]; Vilazodone; and Wellbutrin [bupropion]    Current Medications:      acetaminophen (TYLENOL) tablet 650 mg Q6H PRN   amLODIPine (NORVASC) tablet 10 mg Daily   docusate sodium (COLACE) capsule 100 mg BID   doxazosin (CARDURA) tablet 1 mg Daily   famotidine (PEPCID) tablet 10 mg BID   ferrous sulfate tablet 325 mg BID   gabapentin (NEURONTIN) capsule 100 mg TID   hydrALAZINE (APRESOLINE) tablet 100 mg 3 times per day   insulin glargine (LANTUS) injection vial 8 Units BID   insulin lispro (HUMALOG) injection vial 0-6 Units TID WC   insulin lispro (HUMALOG) injection vial 0-3 Units Nightly   glucose (GLUTOSE) 40 % oral gel 15 g PRN   dextrose 50 % IV solution PRN   glucagon (rDNA) injection 1 mg PRN   dextrose 5 % solution PRN   isosorbide dinitrate (ISORDIL) tablet 20 mg TID   levothyroxine (SYNTHROID) tablet 25 mcg Daily   oxybutynin (DITROPAN) tablet 5 mg Daily   pravastatin (PRAVACHOL) tablet 40 mg Nightly   sodium chloride flush 0.9 % injection 10 mL 2 times per day   sodium chloride flush 0.9 % injection 10 mL PRN   magnesium hydroxide (MILK OF MAGNESIA) 400 MG/5ML suspension 30 mL Daily PRN   ondansetron (ZOFRAN) injection 4 mg Q6H PRN   nitroGLYCERIN 50 mg in dextrose 5% 250 mL infusion Continuous   heparin (porcine) 100 UNIT/ML infusion        Review of Systems:   Pertinent positives stated above in HPI. All other systems were reviewed and were negative. ROS:Constitutional: negative  Eyes: negative  Ears, nose, mouth, throat, and face: negative  Respiratory: negative  Cardiovascular: negative  Gastrointestinal: negative  Genitourinary:negative  Integument/breast: negative  Hematologic/lymphatic: negative  Musculoskeletal:positive for arthralgias and stiff joints  Neurological: positive for coordination problems, gait problems and weakness  Behavioral/Psych: negative  Endocrine: negative  Allergic/Immunologic: negative    Physical exam:   Constitutional: Well-developed elderly lady in no distress  Vitals:   Vitals:    06/23/19 0806   BP:    Pulse:    Resp:    Temp:    SpO2: 96%       Skin: no rash, turgor is normal  Heent: Pupils are reactive to light and accommodation. Throat is clear. Oral mucosa is moist.  Neck: no bruits or jvd noted  Cardiovascular:  S1, S2 without murmur or rubs  Respiratory: Decreased sound right base and right pleurx catheter noted   Abdomen:  +bs, soft, PD catheter noted and intact  Ext: No lower extremity edema  Psychiatric: mood and affect appropriate  Musculoskeletal:  Rom, muscular strength intact  CNS: Very awake and very alert. Well-oriented. Normal speech. Good motor strength. No focal deficit.     Data:   Labs:  Lab Results   Component Value Date     (L) 06/23/2019     (L) 06/22/2019     (L) 06/20/2019    K 4.6 06/23/2019    K 4.5 06/22/2019    K 4.5 06/20/2019    CL 93 (L) 06/23/2019    CO2 20 (L) 06/23/2019    CO2 20 (L) 06/22/2019    CO2 18 (L) 06/20/2019    CREATININE 5.2 (HH) 06/23/2019    CREATININE 5.3 (HH) 06/22/2019    CREATININE 5.3 (HH) 06/20/2019     (H) 06/23/2019    BUN 95 (H) 06/22/2019    BUN 96 (H) 06/20/2019    GLUCOSE 101 06/23/2019    GLUCOSE 99 06/22/2019    GLUCOSE 96 06/20/2019    PHOS 4.6 03/08/2019    PHOS 3.7 12/16/2018    PHOS 3.5 12/14/2018    WBC 13.2 (H) 06/23/2019    WBC 13.8 (H) 06/22/2019    WBC 14.2 (H) 06/18/2019    HGB 8.4 (L) 06/23/2019    HGB 8.0 (L) 06/22/2019    HGB 8.4 (L) 06/18/2019    HCT 25.5 (L) 06/23/2019    HCT 24.0 (L) 06/22/2019    HCT 25.5 (L) 06/18/2019    MCV 90.1 06/23/2019     06/23/2019     {Labs reviewed    Imaging:  CXR results:         Thank you Dr. Kamini Lei for allowing us to participate in care of Bertrand       Electronically signed by Amparo Barnes MD on 6/23/2019 at 5:54 AM

## 2019-06-23 NOTE — CONSULTS
[address] [place] [address]                                  CONSULTATION    PATIENT NAME: Pia Pelaez                     :        1950  MED REC NO:   [id number]                           ROOM:       4143  ACCOUNT NO:   [de-identified]                           ADMIT DATE: 2019  PROVIDER:     Celsa Harrison M.D.    Deny Ranjith:  2019    REASON FOR THE CONSULTATION:  Elevated cardiac enzymes with risk factors  for coronary artery disease. REQUESTING PHYSICIAN:  Hospitalist Service. HISTORY OF PRESENT ILLNESS:  This is an unfortunate 62-year-old patient  who resides at the nursing home due to multiple chronic illnesses  including kidney dysfunction, back problems, and musculoskeletal  limitation. The patient comes in with generalized symptoms of weakness,  tiredness and inability to stand. There was not any particular chest  pain. She has had quite a bit of limitation that it is not easy to  assess her shortness of breath. Initial assessment in the emergency  room with EKG and cardiac enzymes showed borderline cardiac enzymes with  no acute myocardial infarct findings. She does have worsening kidney  function and is getting close to requiring dialysis. The patient has  been quite limited in terms of activity. She denies any recent chest  pain. She does have shortness of breath with exertion. There is no  obvious syncope. She does have intermittent dizziness, lightheadedness. She has a history of many years of diabetes mellitus. REVIEW OF SYSTEMS:  No fever, no chills, no weight loss. No hematuria  or dysuria. No abdominal pain, nausea, vomiting, or diarrhea. No  obvious suicidal ideation. The patient has underlying depression. No  skin rashes. The patient has had intermittent dizziness,  lightheadedness with no syncope. No recent trauma. No bleeding  problem.   Diffuse arthritic and musculoskeletal problems with back  issues. PAST MEDICAL HISTORY:  1. Renal failure. 2.  Hypertension. 3.  Diabetes mellitus for many years. 5.  Hyperlipidemia. 5.  Arthritis. ALLERGIES:  The patient is allergic to DIFFERENT FOOD PRODUCTS including  EGGS and EGG DERIVATIVES. Medication allergies included ASPIRIN,  NAPROSYN, PENICILLIN, VICODIN, WELLBUTRIN. CURRENT MEDICATIONS:  Includes Norvasc 10 a day, Bumex IV, Cardura one a  day, Pepcid 10 a day, Neurontin 100 three times a day, Lantus, Humalog,  Synthroid 25 a day, Zofran, Ditropan, Pravachol 40 a day. SOCIAL HISTORY:  No tobacco.  No drugs. No alcohol. FAMILY HISTORY:  Noncontributory. PHYSICAL EXAMINATION:  VITAL SIGNS:  Showed a blood pressure 130/80, heart rate of 70. GENERAL APPEARANCE:  Pleasant lady in no obvious acute distress. EYES AND EARS:  No discharge. NECK:  No JVD. No bruits. No masses. LUNGS:  Decreased air entry. No crackles. No wheezes. HEART:  Normal S1, S2. Systolic murmur grade 2/6. ABDOMEN:  Soft, nontender. Positive bowel sounds. No organomegaly. EXTREMITIES:  Moderate edema. NEUROLOGIC EXAMINATION:  Grossly intact. Awake, alert. No focal  deficits. PSYCH:  No obvious active psychosis. SKIN:  No rashes. LABORATORY DATA:  Sodium 131, potassium 4.6, , creatinine 5.2. Troponin 0.037. White count 13.2, hemoglobin 8.4, hematocrit 25.5,  platelets 945. EKG showed sinus rhythm, nonspecific ST-T wave changes. IMPRESSION:  This is a patient who comes in with the above presentation  that is rather nonspecific. I have no doubt in my mind that with her  history and risk factor, she likely does have underlying coronary artery  disease and that could be a cause of enzyme leak with her current  presentation with her kidney dysfunction; however, I doubt myocardial  infarct at this time. RECOMMENDATIONS:  1. The patient will need ischemia workup once her kidney function  settles down.   She will likely benefit from considering a cardiac  catheterization when she goes on dialysis. 2.  In the meantime, I would continue with medical treatment. 3.  Monitor blood pressure, monitor progress. 4.  My team will be following. Will make further arrangements in terms  of organizing a cardiac cath either as an inpatient or as an outpatient  after dialysis has been instituted. Thank you for allowing me to participate in the care of this patient.         Iron Ambrocio M.D.    D: 06/23/2019 10:11:19       T: 06/23/2019 10:13:36     [email or web address]  Job#: [id number]     Doc#: [id number]    CC:

## 2019-06-24 ENCOUNTER — APPOINTMENT (OUTPATIENT)
Dept: GENERAL RADIOLOGY | Age: 69
DRG: 291 | End: 2019-06-24
Payer: MEDICARE

## 2019-06-24 ENCOUNTER — APPOINTMENT (OUTPATIENT)
Dept: INTERVENTIONAL RADIOLOGY/VASCULAR | Age: 69
DRG: 291 | End: 2019-06-24
Payer: MEDICARE

## 2019-06-24 PROBLEM — N18.6 ESRD (END STAGE RENAL DISEASE) (HCC): Status: ACTIVE | Noted: 2019-05-19

## 2019-06-24 LAB
ANION GAP SERPL CALCULATED.3IONS-SCNC: 19 MEQ/L (ref 8–16)
BUN BLDV-MCNC: 99 MG/DL (ref 7–22)
CALCIUM SERPL-MCNC: 8.9 MG/DL (ref 8.5–10.5)
CHLORIDE BLD-SCNC: 92 MEQ/L (ref 98–111)
CO2: 17 MEQ/L (ref 23–33)
CREAT SERPL-MCNC: 5 MG/DL (ref 0.4–1.2)
GFR SERPL CREATININE-BSD FRML MDRD: 9 ML/MIN/1.73M2
GLUCOSE BLD-MCNC: 124 MG/DL (ref 70–108)
GLUCOSE BLD-MCNC: 168 MG/DL (ref 70–108)
GLUCOSE BLD-MCNC: 170 MG/DL (ref 70–108)
GLUCOSE BLD-MCNC: 53 MG/DL (ref 70–108)
GLUCOSE BLD-MCNC: 56 MG/DL (ref 70–108)
GLUCOSE BLD-MCNC: 63 MG/DL (ref 70–108)
MAGNESIUM: 2.2 MG/DL (ref 1.6–2.4)
PLATELET # BLD: 325 THOU/MM3 (ref 130–400)
POTASSIUM SERPL-SCNC: 4.3 MEQ/L (ref 3.5–5.2)
SODIUM BLD-SCNC: 128 MEQ/L (ref 135–145)

## 2019-06-24 PROCEDURE — 82948 REAGENT STRIP/BLOOD GLUCOSE: CPT

## 2019-06-24 PROCEDURE — 2709999900 HC NON-CHARGEABLE SUPPLY

## 2019-06-24 PROCEDURE — 2500000003 HC RX 250 WO HCPCS

## 2019-06-24 PROCEDURE — 99233 SBSQ HOSP IP/OBS HIGH 50: CPT | Performed by: HOSPITALIST

## 2019-06-24 PROCEDURE — C1729 CATH, DRAINAGE: HCPCS

## 2019-06-24 PROCEDURE — 2500000003 HC RX 250 WO HCPCS: Performed by: RADIOLOGY

## 2019-06-24 PROCEDURE — 2580000003 HC RX 258: Performed by: PHYSICIAN ASSISTANT

## 2019-06-24 PROCEDURE — 99232 SBSQ HOSP IP/OBS MODERATE 35: CPT | Performed by: INTERNAL MEDICINE

## 2019-06-24 PROCEDURE — 6370000000 HC RX 637 (ALT 250 FOR IP): Performed by: PHYSICIAN ASSISTANT

## 2019-06-24 PROCEDURE — 32552 REMOVE LUNG CATHETER: CPT | Performed by: RADIOLOGY

## 2019-06-24 PROCEDURE — 6370000000 HC RX 637 (ALT 250 FOR IP): Performed by: HOSPITALIST

## 2019-06-24 PROCEDURE — 71045 X-RAY EXAM CHEST 1 VIEW: CPT

## 2019-06-24 PROCEDURE — 0WP9X0Z REMOVAL OF DRAINAGE DEVICE FROM RIGHT PLEURAL CAVITY, EXTERNAL APPROACH: ICD-10-PCS | Performed by: RADIOLOGY

## 2019-06-24 PROCEDURE — 83735 ASSAY OF MAGNESIUM: CPT

## 2019-06-24 PROCEDURE — 99232 SBSQ HOSP IP/OBS MODERATE 35: CPT | Performed by: PHYSICIAN ASSISTANT

## 2019-06-24 PROCEDURE — 36415 COLL VENOUS BLD VENIPUNCTURE: CPT

## 2019-06-24 PROCEDURE — 80048 BASIC METABOLIC PNL TOTAL CA: CPT

## 2019-06-24 PROCEDURE — 85049 AUTOMATED PLATELET COUNT: CPT

## 2019-06-24 PROCEDURE — 2580000003 HC RX 258: Performed by: INTERNAL MEDICINE

## 2019-06-24 PROCEDURE — 2140000000 HC CCU INTERMEDIATE R&B

## 2019-06-24 RX ORDER — CLINDAMYCIN PHOSPHATE 600 MG/50ML
600 INJECTION INTRAVENOUS ONCE
Status: COMPLETED | OUTPATIENT
Start: 2019-06-24 | End: 2019-06-24

## 2019-06-24 RX ORDER — SODIUM CHLORIDE, SODIUM LACTATE, CALCIUM CHLORIDE, MAGNESIUM CHLORIDE AND DEXTROSE 1.5; 538; 448; 18.3; 5.08 G/100ML; MG/100ML; MG/100ML; MG/100ML; MG/100ML
1000 INJECTION, SOLUTION INTRAPERITONEAL EVERY 6 HOURS
Status: DISCONTINUED | OUTPATIENT
Start: 2019-06-24 | End: 2019-06-24

## 2019-06-24 RX ORDER — INSULIN GLARGINE 100 [IU]/ML
4 INJECTION, SOLUTION SUBCUTANEOUS 2 TIMES DAILY
Status: DISCONTINUED | OUTPATIENT
Start: 2019-06-24 | End: 2019-06-25 | Stop reason: HOSPADM

## 2019-06-24 RX ADMIN — GABAPENTIN 100 MG: 100 CAPSULE ORAL at 08:25

## 2019-06-24 RX ADMIN — PRAVASTATIN SODIUM 40 MG: 40 TABLET ORAL at 21:03

## 2019-06-24 RX ADMIN — Medication 10 ML: at 21:03

## 2019-06-24 RX ADMIN — INSULIN GLARGINE 8 UNITS: 100 INJECTION, SOLUTION SUBCUTANEOUS at 08:27

## 2019-06-24 RX ADMIN — LEVOTHYROXINE SODIUM 25 MCG: 25 TABLET ORAL at 06:41

## 2019-06-24 RX ADMIN — INSULIN GLARGINE 4 UNITS: 100 INJECTION, SOLUTION SUBCUTANEOUS at 21:15

## 2019-06-24 RX ADMIN — FAMOTIDINE 10 MG: 20 TABLET ORAL at 21:03

## 2019-06-24 RX ADMIN — INSULIN LISPRO 1 UNITS: 100 INJECTION, SOLUTION INTRAVENOUS; SUBCUTANEOUS at 21:15

## 2019-06-24 RX ADMIN — HYDRALAZINE HYDROCHLORIDE 100 MG: 50 TABLET, FILM COATED ORAL at 13:56

## 2019-06-24 RX ADMIN — DOXAZOSIN 1 MG: 1 TABLET ORAL at 08:25

## 2019-06-24 RX ADMIN — CLINDAMYCIN PHOSPHATE 600 MG: 600 INJECTION, SOLUTION INTRAVENOUS at 13:56

## 2019-06-24 RX ADMIN — DOCUSATE SODIUM 100 MG: 100 CAPSULE, LIQUID FILLED ORAL at 08:28

## 2019-06-24 RX ADMIN — FAMOTIDINE 10 MG: 20 TABLET ORAL at 08:27

## 2019-06-24 RX ADMIN — SODIUM CHLORIDE, SODIUM LACTATE, CALCIUM CHLORIDE, MAGNESIUM CHLORIDE AND DEXTROSE 1000 ML: 1.5; 538; 448; 18.3; 5.08 INJECTION, SOLUTION INTRAPERITONEAL at 17:52

## 2019-06-24 RX ADMIN — INSULIN LISPRO 1 UNITS: 100 INJECTION, SOLUTION INTRAVENOUS; SUBCUTANEOUS at 18:04

## 2019-06-24 RX ADMIN — GABAPENTIN 100 MG: 100 CAPSULE ORAL at 21:03

## 2019-06-24 RX ADMIN — FERROUS SULFATE TAB 325 MG (65 MG ELEMENTAL FE) 325 MG: 325 (65 FE) TAB at 08:25

## 2019-06-24 RX ADMIN — DOCUSATE SODIUM 100 MG: 100 CAPSULE, LIQUID FILLED ORAL at 21:03

## 2019-06-24 RX ADMIN — HYDRALAZINE HYDROCHLORIDE 100 MG: 50 TABLET, FILM COATED ORAL at 06:41

## 2019-06-24 RX ADMIN — ISOSORBIDE DINITRATE 20 MG: 20 TABLET ORAL at 08:25

## 2019-06-24 RX ADMIN — Medication 10 ML: at 13:56

## 2019-06-24 RX ADMIN — OXYBUTYNIN CHLORIDE 5 MG: 5 TABLET ORAL at 08:25

## 2019-06-24 RX ADMIN — ISOSORBIDE DINITRATE 20 MG: 20 TABLET ORAL at 14:02

## 2019-06-24 RX ADMIN — HYDRALAZINE HYDROCHLORIDE 100 MG: 50 TABLET, FILM COATED ORAL at 21:03

## 2019-06-24 RX ADMIN — AMLODIPINE BESYLATE 10 MG: 10 TABLET ORAL at 08:25

## 2019-06-24 RX ADMIN — FERROUS SULFATE TAB 325 MG (65 MG ELEMENTAL FE) 325 MG: 325 (65 FE) TAB at 21:03

## 2019-06-24 RX ADMIN — ISOSORBIDE DINITRATE 20 MG: 20 TABLET ORAL at 21:03

## 2019-06-24 RX ADMIN — GABAPENTIN 100 MG: 100 CAPSULE ORAL at 13:56

## 2019-06-24 ASSESSMENT — PAIN SCALES - GENERAL
PAINLEVEL_OUTOF10: 0

## 2019-06-24 NOTE — PROGRESS NOTES
Hospitalist Progress Note   **This is a Medical/ PA/ APRN Student Note and is charted for educational purposes. The non-physician staff attested note is not to be used for billing purposes or to guide patient care. Please see the physician modifications/ attestation for treatment plan/suggestions. This note has been reviewed and feedback has been provided to the student. **    Patient:  Jamal Mathur      Unit/Bed:3B-35/035-A    YOB: 1950    MRN: [id number]       Acct: [de-identified]     PCP: [address]    Date of Admission: 6/22/2019    Assessment/Plan:    Acute on Chronic CKD, stage V: , creat 5.2, GFR 8. Pt follows with outpatient nephrologist. Had PD cath placed 13 days ago but has not yet started dialysis. Plans to start 6/26. Nephrology consulted. Will continue to monitor labs. 6/24: Kidney function slightly improved. BUN 99, Creat 5.0, GFR 9. Per nephrology, pt will start PD on Wednesday. Continue to monitor labs.      Elevated Troponin: likely due to lab error vs underlying CAD w/ decreased kidney function: Troponin 0.042 on arrival, spiked to 0.547, then back down to 0.042 within 2 hours. Pt denied any symptoms of chest pain. Cardio consulted and following, feel there is likely underlying CAD. Recommending further ischemic workup with cardiac cath once kidney function improves.     Pleural Effusion: likely due to CHF. Pt recently admitted 6/6-6/13, and 6/16-6/20 for pleural effusions. Right thoracentesis on 6/12 1.5 L drained, transudative. Indwelling right pleurex placed on 6/18. Per CT chest on 6/22 chest tube no longer in place. Is not draining per RN. Will consult IR for repositioning in AM.  Pt denies use of O2 at home, is now on 2L NC with sat of 96%, will attempt to wean as tolerated. 6/24: IR removed right pleurex catheter today. Small-mod residual pleural effusion loculated on right lung base.  Pt on 3L NC with O2 sat of 96%, will continue to attempt to wean as pt does not require O2 at home. Pulmonology to see.      Diastolic CHF: Recent admission for CHF exacerbation. Echo from 12/8/2018: EF of 50-55% with mildly dilated left atrium. Limited echo from 6/7/19 estimated EF of 55% with no other abnormalities noted. Pt having recurrent transudative pleural effusions. Received one time dose IV Bumex. Cardiology following.      Type II DM: BS's in 100's. Continue lantus and sliding scale insulin. Continue carb controlled diet and accuchecks. Had episode of hypoglycemia with BS 56 this AM, improved. Pt stated she did not eat well yesterday.      Hx of Hypertension: SBP in 130's. Pt not taking ACE/ARB due to CKD. Continue amlodipine and hydralazine.      Normocytic Anemia of chronic disease, due to CKD: Hgb 8.4. Baseline Hgb 8-9. Pt taking iron supplement. Following with nephrology. Will continue to monitor.     Hyponatremia: due to CKD. Sodium 128 today. Baseline appears to be in 130's. Will continue to monitor.      Metabolic Acidosis: due to CKD. ABG from 6/23 shows pH 7.36 with HCO3 of 22 and PCO2 of 38.      Leukocytosis: no known source of infection at this time. Pt afebrile and denies respiratory, GI, and urinary symptoms. Pt remains stable. Will continue to trend.     Hx of Hypothyroid: continue synthroid. Expected discharge date:  TBD    Disposition:    [] Home       [] TCU       [] Rehab       [] Psych       [x] SNF       [] [facility]       [] Other-    Chief Complaint: generalized weakness     Hospital Course: 81 y.o. female who presents to the ED via EMS from the nursing home for the evaluation of fatigue and generalized weakness. The patient was sleeping when the nurses were helping her up and her legs gave out around 3pm today. Patient states she eventually was able to walk to the bathroom with her walker. She reports associated increased shortness of breath, subtle right side chest pain, left flank, RUQ pain and left lower chest pain. Patient denies wearing oxygen at home or any recent falls. She denies fever or chills. The patient was admitted on 6/16/19 and discharged on 6/20 for pleural effusion. Patient had 700 mL drained from her lungs on 6/19. Patient had an indwelling right pleurex catheter placed for her frequent fluid overload. Patient also receives peritoneal dialysis and her next dialysis is Wednesday 6/26. Patient states she is still making urine. No further complaints at the time of the initial encounter     6/23: CT chest shows pleurex catheter is not in place. Will have IR attempt to reposition in AM. Pt had elevated troponin on arrival, but was likely lab error due to quick return to baseline. Cardiology was consulted. 6/24: IR removed right pleurex catheter today. Pt will start PD on Wednesday per nephrology. BS 56 this AM, pt stated she did not eat much last night, improved to 124 at recheck.       Subjective (past 24 hours): Patient       Medications:  Reviewed    Infusion Medications    dextrose      nitroGLYCERIN       Scheduled Medications    clindamycin (CLEOCIN) IV  600 mg Intravenous Once    amLODIPine  10 mg Oral Daily    docusate sodium  100 mg Oral BID    doxazosin  1 mg Oral Daily    famotidine  10 mg Oral BID    ferrous sulfate  325 mg Oral BID    gabapentin  100 mg Oral TID    hydrALAZINE  100 mg Oral 3 times per day    insulin glargine  8 Units Subcutaneous BID    insulin lispro  0-6 Units Subcutaneous TID WC    insulin lispro  0-3 Units Subcutaneous Nightly    isosorbide dinitrate  20 mg Oral TID    levothyroxine  25 mcg Oral Daily    oxybutynin  5 mg Oral Daily    pravastatin  40 mg Oral Nightly    sodium chloride flush  10 mL Intravenous 2 times per day     PRN Meds: acetaminophen, glucose, dextrose, glucagon (rDNA), dextrose, sodium chloride flush, magnesium hydroxide, ondansetron      Intake/Output Summary (Last 24 hours) at 6/24/2019 1122  Last data filed at 6/24/2019 0418  Gross per 24 hour   Intake 1350 ml   Output 550 ml   Net 800 ml       Diet:  DIET CARB CONTROL; Carb Control: 4 carb choices (60 gms)/meal; No Caffeine    Exam:  BP (!) 132/107   Pulse 73   Temp 98.3 °F (36.8 °C) (Oral)   Resp 18   Ht 5' 6\" (1.676 m)   Wt 172 lb (78 kg)   SpO2 96%   BMI 27.76 kg/m²       General appearance: Alert, in no acute distress. HEENT: Pupils equal, round, and reactive to light. Conjunctivae/corneas clear. Neck: Supple, with full range of motion. No jugular venous distention. Trachea midline. Respiratory:  Normal respiratory effort. Lung diminished in bilateral lung fields. Cardiovascular: Regular rate and rhythm with normal S1/S2 without murmurs, rubs or gallops. Abdomen: Soft, non-tender, normal bowel sounds. Musculoskeletal: passive and active ROM x 4 extremities. Skin: Skin color, texture, turgor normal.  No rashes or lesions. Neurologic:  Neurovascularly intact without any focal sensory/motor deficits. Cranial nerves: II-XII intact, grossly non-focal.  Psychiatric: Alert and oriented, thought content appropriate, normal insight  Exam of extremities: peripheral pulses normal, no pedal edema, no clubbing or cyanosis       Labs:   Recent Labs     06/22/19  1733 06/23/19 0333 06/24/19  0334   WBC 13.8* 13.2*  --    HGB 8.0* 8.4*  --    HCT 24.0* 25.5*  --     322 325     Recent Labs     06/22/19 1733 06/23/19 0333 06/24/19  0334   * 131* 128*   K 4.5 4.6 4.3   CL 93* 93* 92*   CO2 20* 20* 17*   BUN 95* 101* 99*   CREATININE 5.3* 5.2* 5.0*   CALCIUM 9.2 9.3 8.9     Recent Labs     06/22/19  1733   AST 9   ALT 6*   BILITOT 0.4   ALKPHOS 86     No results for input(s): INR in the last 72 hours. No results for input(s): Leslie Belts in the last 72 hours.     Microbiology:      Urinalysis:      Lab Results   Component Value Date    NITRU NEGATIVE 06/22/2019    WBCUA 0-2 06/22/2019    BACTERIA NONE 06/22/2019    RBCUA 5-10 06/22/2019    BLOODU NEGATIVE 06/22/2019 Jeronimo Samaniego 994 NEGATIVE 06/22/2019       Radiology:  CT CHEST WO CONTRAST   Final Result      Small right chest tube has apparently pulled out of the chest and there is a small right pneumothorax. **This report has been created using voice recognition software. It may contain minor errors which are inherent in voice recognition technology. **      Final report electronically signed by Dr. Linda Swain on 6/22/2019 7:47 PM      XR CHEST PORTABLE   Final Result   Mildly worsened parenchymal opacities as above. This could relate to pulmonary edema. **This report has been created using voice recognition software. It may contain minor errors which are inherent in voice recognition technology. **      Final report electronically signed by Dr. Linda Swain on 6/22/2019 6:20 PM      IR GUIDED THORACENTESIS PLEURAL    (Results Pending)   XR CHEST PORTABLE    (Results Pending)        Code Status: Full Code       Active Hospital Problems    Diagnosis Date Noted    NSTEMI (non-ST elevated myocardial infarction) ([facility] [address] [I21.4] 06/22/2019    Chronic kidney disease (CKD), stage V ([address] [N18.5]     ESRD (end stage renal disease) ([address] [N18.6] 05/19/2019    Uncontrolled type 2 diabetes mellitus with hyperglycemia ([address] [E11.65] 12/07/2018    Essential hypertension [I10] 12/07/2018    Type 2 diabetes mellitus with insulin therapy ([address] [E11.9, Z79.4]        Electronically signed by Nancy Wright on 6/24/2019 at 11:22 AM        **This is a Medical/ PA/ APRN Student Note and is charted for educational purposes. The non-physician staff attested note is not to be used for billing purposes or to guide patient care. Please see the physician modifications/ attestation for treatment plan/suggestions. This note has been reviewed and feedback has been provided to the student.  **

## 2019-06-24 NOTE — PROGRESS NOTES
Nutrition Assessment    Type and Reason for Visit: Initial, Positive Nutrition Screen(Poor po/appetite)    Nutrition Recommendations: Send Nepro twice/day. Consider adding renal diet to current diet order. Nutrition Assessment:Pt. nutritionally compromised AEB poor intake. At risk for further nutrition compromise r/t ESRD starting CAPD 6/26/ per cht,l NSTEMI, Pleural Effusion, DM II, Hx Anemia, CHF, CKD. Long Lemus Send Nepro twice/day. Consider adding renal diet to current diet order. Labs: (6/24) Na 128, BUN 99, Creatinine 5,  chemsticks . (6/17) A1C 5.2. Malnutrition Assessment:  · Malnutrition Status: At risk for malnutrition    Nutrition Risk Level: Moderate    Nutrient Needs:  · Estimated Daily Total Kcal: ~1770kcals ( 30kcals/kgm IBW 59kgm)  · Estimated Daily Protein (g): ~59+  grams ( 1+gram protein/kgm IBW 59kgm as renal function tolerates)       Nutrition Diagnosis:   · Problem: Increased nutrient needs  · Etiology: related to Increased demand for energy/nutrients     Signs and symptoms:  as evidenced by Presence of wounds    Objective Information:  · Nutrition-Focused Physical Findings: Pt. seen, appears lethargic. Mentions she consumed 1/2 orange, bites of lunch. Denies N/V/D/C. Denies chewing/swallowing diffiuclty. From [facility] ECF. CAPD to start 6/26 per chart. No BM. New pt. · Wound Type: (wound sacrum, left heel, rt. heel, & buttocks noted) Pt. Allergic to eggs.    · Current Nutrition Therapies:  · Oral Diet Orders: Carb Control 4 Carbs/Meal(no caffeine)   · Oral Diet intake: 1-25%  · Oral Nutrition Supplement (ONS) Orders: Renal Oral Supplement  · ONS intake: (new order)  · Anthropometric Measures:  · Ht: 5' 6\" (167.6 cm)   · Current Body Wt: 171 lb 15.3 oz (78 kg)  · Admission Body Wt: 179 lb 14.3 oz (81.6 kg)((6/22) no edema)  · Usual Body Wt: (per EMR: (6/16) 168# 14.4oz, (6/6): 162# 8oz, (5/19) 171# 4.8oz, pt. states  ~160#)  · % Weight Change:  ,  5.6% wt. gain in 18 days ( ESRD, different scales)  · Ideal Body Wt: 130 lb (59 kg),    · BMI Classification: BMI 25.0 - 29.9 Overweight    Nutrition Interventions:   Start ONS(Consider adding renal diet to current orders)  Continued Inpatient Monitoring, Education Not Indicated, Coordination of Care    Nutrition Evaluation:   · Evaluation: Goals set   · Goals: Pt. will consume 75% or more at meals during LOS to aid with wound healing.     · Monitoring: Meal Intake, Supplement Intake, Skin Integrity, Wound Healing, Mental Status/Confusion, Weight, Pertinent Labs, Chewing/Swallowing, Monitor Bowel Function      Electronically signed by Myesha Farley RD, LD on 6/24/19 at 4:32 PM    Contact Number: [phone]

## 2019-06-24 NOTE — DISCHARGE INSTR - COC
Acute on chronic respiratory failure with hypoxia ([facility]) J96.21    Pulmonary edema with congestive heart failure ([facility]) I50.1    Acute on chronic diastolic CHF (congestive heart failure) ([facility]) I50.33    LOUISE (acute kidney injury) ([facility] [address] N17.9    Uncontrolled type 2 diabetes mellitus with hyperglycemia ([facility]) E11.65    Essential hypertension I10    Hypothyroidism E03.9    Other fluid overload E87.79    ESRD (end stage renal disease) ([facility]) N18.6    Diabetic nephropathy associated with type 2 diabetes mellitus ([facility]) E11.21    Bilateral pleural effusion J90    Elevated troponin S87.8    Metabolic acidosis X94.4    Leukocytosis D72.829    Generalized weakness R53.1    Bipolar 1 disorder ([facility]) F31.9    Mild mitral regurgitation I34.0    Mild tricuspid regurgitation I07.1    Chronic pain syndrome G89.4    Chronic bilateral pleural effusions J90    Chronic kidney disease (CKD), stage V ([facility]) N18.5    (HFpEF) heart failure with preserved ejection fraction ([facility]) I50.30    Atelectasis J98.11    Hyponatremia E87.1    Chronic anemia D64.9    Hyperlipidemia E78.5    Constipation K59.00    Shortness of breath R06.02    Hyperkalemia E87.5    Infiltrate noted on imaging study R93.89    Postprocedural pneumothorax J95.811    Moderate malnutrition ([facility]) E44.0    NSTEMI (non-ST elevated myocardial infarction) ([facility]) I21.4       Isolation/Infection:   Isolation          No Isolation            Nurse Assessment:  Last Vital Signs: /82   Pulse 70   Temp 97 °F (36.1 °C) (Axillary)   Resp 18   Ht 5' 6\" (1.676 m)   Wt 172 lb (78 kg)   SpO2 97%   BMI 27.76 kg/m²     Last documented pain score (0-10 scale): Pain Level: 0  Last Weight:   Wt Readings from Last 1 Encounters:   06/24/19 172 lb (78 kg)     Mental Status:  oriented    IV Access:  none    Nursing Mobility/ADLs:  Walking   Assisted  Transfer  Assisted  Bathing  Assisted  Dressing  assisted  Toileting  Assisted  Feeding  Assisted  Med Admin Dependent  Med Delivery   whole    Wound Care Documentation and Therapy:  Wound 12/12/18 Buttocks Inner;Mid;Left;Right;Medial Non-blanchable purple area to buttocks (Active)   Wound Assessment Clean;Purple;Red 6/23/2019  7:29 PM   Tiffanie-wound Assessment Blanchable erythema 6/23/2019  7:29 PM   Number of days: 193       Wound 06/08/19 Heel Left Boggy, redness (Active)   Number of days: 15       Wound 06/08/19 Heel Right boggy, reddness (Active)   Number of days: 15       Wound 06/17/19 Sacrum Mid healing pressure sore. area on non blanchable redness (Active)   Number of days: 7        Elimination:  Continence:   · Bowel: yes  · Bladder: Yes  Urinary Catheter: None   Colostomy/Ileostomy/Ileal Conduit: No- has a CAPD catheter in abdomen       Date of Last BM: 6/24    Intake/Output Summary (Last 24 hours) at 6/24/2019 1445  Last data filed at 6/24/2019 1438  Gross per 24 hour   Intake 1379.89 ml   Output 550 ml   Net 829.89 ml     I/O last 3 completed shifts: In: 1970 [P.O.:1960; I.V.:10]  Out: 550 [Urine:550]    Safety Concerns:     None    Impairments/Disabilities:      None    Nutrition Therapy:  Current Nutrition Therapy:   - Oral Diet:  General and Carb Control 4 carbs/meal (1800kcals/day)- low carb, renal nutritional supplements at break, lunch, dinner. Routes of Feeding: Oral  Liquids: Thin Liquids  Daily Fluid Restriction: no  Last Modified Barium Swallow with Video (Video Swallowing Test): not done    Treatments at the Time of Hospital Discharge:   Respiratory Treatments: see MAR  Oxygen Therapy:  is on oxygen at 3 L/min per nasal cannula.   Ventilator:    - No ventilator support    Rehab Therapies: Physical Therapy and Occupational Therapy  Weight Bearing Status/Restrictions: No weight bearing restirctions  Other Medical Equipment (for information only, NOT a DME order):  wheelchair and walker  Other Treatments:     Patient's personal belongings (please select all that are sent with patient):  Glasses , dentures      RN SIGNATURE:  Zhane Mondragon    CASE MANAGEMENT/SOCIAL WORK SECTION    Inpatient Status Date: 06/22/19    Readmission Risk Assessment Score:  Readmission Risk              Risk of Unplanned Readmission:        30           Discharging to Facility/ Agency   · Name: [facility]   · Address: [address]  [address]  · Phone: [phone]  · Fax: [phone]     Dialysis Facility (if applicable)   · Name:  · Address:  · Dialysis Schedule:  · Phone:  · Fax:    / signature: Electronically signed by Luan Dow. DIANA Vega on 6/25/19 at 10:53 AM    PHYSICIAN SECTION    Prognosis: good    Condition at Discharge: Stable    Rehab Potential (if transferring to Rehab): Good    Recommended Labs or Other Treatments After Discharge: BMP in 3 days    Physician Certification: I certify the above information and transfer of Cortney Freeman  is necessary for the continuing treatment of the diagnosis listed and that she requires [place] for greater 30 days.      Update Admission H&P: No change in H&P    PHYSICIAN SIGNATURE:  Electronically signed by Jay Jay Bush MD on 6/25/19 at 8:52 AM

## 2019-06-24 NOTE — PROGRESS NOTES
Cardiology Progress Note      Patient:  Ian Castillo  YOB: 1950  MRN: [id number]   Acct: [de-identified]  Admit Date:  6/22/2019  Primary Cardiologist: none  Pt seen by dr Montes Main    Chief Complaint: fatigue and weaknes  Note per dr Barone Flavin:  Elevated cardiac enzymes with risk factors  for coronary artery disease.     REQUESTING PHYSICIAN:  Hospitalist Service.     HISTORY OF PRESENT ILLNESS:  This is an unfortunate 68-year-old patient  who resides at the nursing home due to multiple chronic illnesses  including kidney dysfunction, back problems, and musculoskeletal  limitation. The patient comes in with generalized symptoms of weakness,  tiredness and inability to stand. There was not any particular chest  pain. She has had quite a bit of limitation that it is not easy to  assess her shortness of breath. Initial assessment in the emergency  room with EKG and cardiac enzymes showed borderline cardiac enzymes with  no acute myocardial infarct findings. She does have worsening kidney  function and is getting close to requiring dialysis. The patient has  been quite limited in terms of activity. She denies any recent chest  pain. She does have shortness of breath with exertion. There is no  obvious syncope. She does have intermittent dizziness, lightheadedness. She has a history of many years of diabetes mellitus. \"    Subjective (Events in last 24 hours): pt awake and alert. NAD.  No cp or sob      Objective:   BP (!) 132/107   Pulse 73   Temp 98.3 °F (36.8 °C) (Oral)   Resp 18   Ht 5' 6\" (1.676 m)   Wt 172 lb (78 kg)   SpO2 96%   BMI 27.76 kg/m²        TELEMETRY: nsr    Physical Exam:  General Appearance: alert and oriented to person, place and time, in no acute distress  Cardiovascular: normal rate, regular rhythm, normal S1 and S2, +murmur  Pulmonary/Chest: bibasilar crackles  Abdomen: soft, non-tender, non-distended, normal bowel sounds, no masses Extremities: no cyanosis, clubbing or edema, pulse   Skin: warm and dry, no rash or erythema  Head: normocephalic and atraumatic  Eyes: pupils equal, round, and reactive to light  Neck: supple and non-tender without mass, no thyromegaly   Musculoskeletal: normal range of motion, no joint swelling, deformity or tenderness  Neurological: alert, oriented, normal speech, no focal findings or movement disorder noted    Medications:    amLODIPine  10 mg Oral Daily    docusate sodium  100 mg Oral BID    doxazosin  1 mg Oral Daily    famotidine  10 mg Oral BID    ferrous sulfate  325 mg Oral BID    gabapentin  100 mg Oral TID    hydrALAZINE  100 mg Oral 3 times per day    insulin glargine  8 Units Subcutaneous BID    insulin lispro  0-6 Units Subcutaneous TID WC    insulin lispro  0-3 Units Subcutaneous Nightly    isosorbide dinitrate  20 mg Oral TID    levothyroxine  25 mcg Oral Daily    oxybutynin  5 mg Oral Daily    pravastatin  40 mg Oral Nightly    sodium chloride flush  10 mL Intravenous 2 times per day      dextrose      nitroGLYCERIN         acetaminophen 650 mg Q6H PRN   glucose 15 g PRN   dextrose 12.5 g PRN   glucagon (rDNA) 1 mg PRN   dextrose 100 mL/hr PRN   sodium chloride flush 10 mL PRN   magnesium hydroxide 30 mL Daily PRN   ondansetron 4 mg Q6H PRN       Lab Data:    Cardiac Enzymes:  No results for input(s): CKTOTAL, CKMB, CKMBINDEX, TROPONINI in the last 72 hours.     CBC:   Lab Results   Component Value Date    WBC 13.2 06/23/2019    RBC 2.83 06/23/2019    HGB 8.4 06/23/2019    HCT 25.5 06/23/2019     06/24/2019       CMP:  Lab Results   Component Value Date     06/24/2019    K 4.3 06/24/2019    K 4.6 06/23/2019    CL 92 06/24/2019    CO2 17 06/24/2019    BUN 99 06/24/2019    CREATININE 5.0 06/24/2019    LABGLOM 9 06/24/2019    GLUCOSE 63 06/24/2019    CALCIUM 8.9 06/24/2019       Hepatic Function Panel:  Lab Results   Component Value Date    ALKPHOS 86 06/22/2019    ALT 6 06/22/2019    AST 9 06/22/2019    PROT 6.1 06/22/2019    BILITOT 0.4 06/22/2019    BILIDIR <0.2 06/09/2019    LABALBU 3.2 06/22/2019       Magnesium:    Lab Results   Component Value Date    MG 2.2 06/24/2019       PT/INR:    Lab Results   Component Value Date    INR 1.03 06/07/2016       HgBA1c:    Lab Results   Component Value Date    LABA1C 5.2 06/17/2019       FLP:  Lab Results   Component Value Date    TRIG 148 05/20/2019    HDL 66 05/20/2019    LDLCALC 75 05/20/2019       TSH:    Lab Results   Component Value Date    TSH 3.000 05/19/2019         Assessment:    Presentation for fatigue and weakness  Elevated troponins - denies chest pain  Ef 55 per echo 6/7/19  NARINDER/CKD  Recurrent bilateral pleural effusions  DM  HTN    Plan:  · No asa due to allergy  · No BB due to allergy of itching with metoprolol in past, but it Is not listed in her allergies  · Cont statin/nitrate/hydralazine/norvasc  · Daily I/o and weights  · Diuresis per renal  · Recommend further ischemic w/up once kidney status stabilized - pt understands risk of MI and death and wants to hold off on [facility] at this time due to her kidney status and when cleared by renal        Electronically signed by Simeon Perdomo PA-C on 6/24/2019 at 9:49 AM

## 2019-06-24 NOTE — CARE COORDINATION
19, 8:10 AM      Sal Espinosa       [facility]   Location: [address] [place] [address]- Reason for admit: NSTEMI (non-ST elevated myocardial infarction) ([address] [I21.4] Status: IP  Admit order signed?: yes  PMH:  has a past medical history of Adjustment disorder with mixed anxiety and depressed mood, Anemia, Anemia in chronic kidney disease(285.21), Anxiety, Arthritis, Asthma, Bipolar 1 disorder ([address] Bradycardia, CHF (congestive heart failure) ([address] Chronic diastolic heart failure ([address] Chronic kidney disease, stage III (moderate) ([address] CKD (chronic kidney disease), Depression, Difficulty walking, Dizziness and giddiness, GERD (gastroesophageal reflux disease), Gout, Headache(784.0), Hyperkalemia, Hyperlipidemia, Hypertension, Hypertensive urgency, Hypothyroid, Hypothyroidism, Low back pain, Major depressive disorder, recurrent, mild (HCC), Muscle weakness (generalized), Neurogenic bladder, Pleural effusion, Pneumonia, Seizure ([address] Type II or unspecified type diabetes mellitus without mention of complication, not stated as uncontrolled, and Urine retention. Procedure:  Removal of pleural drain. Pertinent abnormal Imagin/22 - CT chest - Small right chest tube has apparently pulled out of the chest and there is a small right pneumothorax.     Medications:  Scheduled Meds:   amLODIPine  10 mg Oral Daily    docusate sodium  100 mg Oral BID    doxazosin  1 mg Oral Daily    famotidine  10 mg Oral BID    ferrous sulfate  325 mg Oral BID    gabapentin  100 mg Oral TID    hydrALAZINE  100 mg Oral 3 times per day    insulin glargine  8 Units Subcutaneous BID    insulin lispro  0-6 Units Subcutaneous TID     insulin lispro  0-3 Units Subcutaneous Nightly    isosorbide dinitrate  20 mg Oral TID    levothyroxine  25 mcg Oral Daily    oxybutynin  5 mg Oral Daily    pravastatin  40 mg Oral Nightly    sodium chloride flush  10 mL Intravenous 2 times per day     Continuous Infusions:   dextrose      nitroGLYCERIN        Pertinent Info/Orders/Treatment Plan:  Admitted through ED with complaints of increased weakness. Troponins 0.547, 0.042, 0.037. Consulting Nephrology and Cardiology. , creatinine 5.2. Sodium 131, down to 128 today. Started on NTG gtt, now off. IV Cleocin. IV Bumex x1. Plans have been to start CAPD soon. Diet: DIET CARB CONTROL; Carb Control: 4 carb choices (60 gms)/meal; No Caffeine   Smoking status:  reports that she has never smoked. She has never used smokeless tobacco.   PCP: Louise Ramirez  Readmission: Yes  Patient has been readmitted within 2 days. Patient went to f/u appointment? NA went to ECF. If yes, was it within 7 days? NA went to ECF. Patient was able to fill prescriptions? NA went to ECF. Patient is taking medications as prescribed? NA went to ECF. Cause for readmission? Increased weakness. Readmission Risk Score: 30%    Discharge Planning  Current Residence:  Nursing Home  Living Arrangements:  Other (Comment)   Support Systems:  Children, Family Members, /, ECF/Assisted Living  Current Services PTA:     Potential Assistance Needed:  Skilled Nursing Facility  Potential Assistance Purchasing Medications:  No  Does patient want to participate in local refill/ meds to beds program?  Not Assessed  Type of Home Care Services:  None  Patient expects to be discharged to:  [facility]  Expected Discharge date:  06/26/19  Follow Up Appointment: Best Day/ Time: Monday AM    Discharge Plan: Spoke with pt, she is wanting to go back to the nursing home today if possible.  aware.       Evaluation: yes

## 2019-06-24 NOTE — PROGRESS NOTES
1048 Patient received in IR for possible chest tube removal.  1050This procedure has been fully reviewed with the patient and written informed consent has been obtained. 1100 Procedure started with Dr. Alejandra Haines   1108 chest tube removed after evaluating it per Dr. Alejandra Haines  8616 Procedure completed; patient tolerated well. 3 Vicryl sutures at the site, xeroform dressing, folded 4x4 and tegaderm. no bleeding noted. Pt. Tolerated procedure well. Doctor ordering CXR post.   4550 Macrina Mckenzie. RT is placing a new dressing and biopatch at the peritoneal dialysis catheter site. 1120 called report to Freya torres RN  0529 Patient on bed; comfort ensured. 1126 Patient taken to x- ray via bed.

## 2019-06-24 NOTE — CARE COORDINATION
6/24/19, 2:32 PM    DISCHARGE BARRIERS      Spoke with [facility] admissions. Confirmed Mendel Benton has a medicaid bed hold. Attempted to speak with Mendel Benton, however, she is sleeping and did not awaken when called. No family here at present. Anticipate return to [facility] at discharge.

## 2019-06-24 NOTE — H&P
Formulation and discussion of sedation / procedure plans, risks, benefits, side effects and alternatives with patient and/or responsible adult completed.     Electronically signed by Judy Briones MD on 6/24/2019 at 11:12 AM

## 2019-06-24 NOTE — PLAN OF CARE
Problem: Nutrition  Goal: Optimal nutrition therapy  Outcome: Ongoing   Nutrition Problem: Increased nutrient needs  Intervention: Food and/or Nutrient Delivery: Start ONS(Consider adding renal diet to current orders)  Nutritional Goals: Pt. will consume 75% or more at meals during LOS to aid with wound healing.

## 2019-06-24 NOTE — OP NOTE
Department of Radiology  Post Procedure Progress Note      Pre-Procedure Diagnosis:  Pleural effusion , not draining    Procedure Performed:  pleurx cath removal    Anesthesia: local     Findings: successful    Immediate Complications:  None    Estimated Blood Loss: minimal    SEE DICTATED PROCEDURE NOTE FOR COMPLETE DETAILS.     Ander Nuñez MD   6/24/2019 11:12 AM

## 2019-06-24 NOTE — PROGRESS NOTES
Hospitalist Progress Note    Patient:  Hector Sheffield      Unit/Bed:3B-35/035-A    YOB: 1950    MRN: [id number]       Acct: [de-identified]     PCP: [id number]    Date of Admission: 6/22/2019    Active Hospital Problems    Diagnosis Date Noted    NSTEMI (non-ST elevated myocardial infarction) ([facility] [I21.4] 06/22/2019    Chronic kidney disease (CKD), stage V ([facility] [N18.5]     ESRD (end stage renal disease) ([facility] [N18.6] 05/19/2019    Uncontrolled type 2 diabetes mellitus with hyperglycemia ([facility] [E11.65] 12/07/2018    Essential hypertension [I10] 12/07/2018    Type 2 diabetes mellitus with insulin therapy ([facility] [E11.9, Z79.4]        Assessment/Plan:    Acute on Chronic CKD, stage V: , creat 5.2, GFR 8. Pt follows with outpatient nephrologist. Had PD cath placed 13 days ago but has not yet started dialysis. Plans to start 6/26. Nephrology consulted. Will continue to monitor labs. 6/24: sam following, started with a short cycle today with plan for formal PD on Wednesday.       Elevated Troponin: likely due to lab error vs underlying CAD w/ decreased kidney function: Troponin 0.042 on arrival, spiked to 0.547, then back down to 0.042 within 2 hours. Pt denied any symptoms of chest pain. Cardio consulted and following, feel there is likely underlying CAD. Recommending further ischemic workup with cardiac cath once kidney function improves. Will continue to trend troponin levels.      Pleural Effusion: likely due to CHF/volume overload. Pt recently admitted 6/6-6/13, and 6/16-6/20 for pleural effusions. Right thoracentesis on 6/12 1.5 L drained, transudative. Indwelling right pleurex placed on 6/18. Per CT chest on 6/22 chest tube no longer in place. Is not draining per RN. Will consult IR for repositioning Vs removal in AM.  Pt denies use of O2 at home but has been on 3 lpm via NC at [facility] since dishcarge, is now on 2L NC with sat of 96%, will attempt to wean as tolerated. 6/24: IR removed right pleurex catheter today. No evidence of pneumothorax on post CXR with improved lung aeration. Small-mod residual pleural effusion right lung base loculated . Pt on 3L NC with O2 sat of 96%, RN aware to wean aggressively    Diastolic CHF: Recent admission for CHF exacerbation. Echo from 12/8/2018: EF of 50-55% with mildly dilated left atrium. Limited echo from 6/7/19 estimated EF of 55% with no other abnormalities noted. Pt having recurrent transudative pleural effusions. Received one time dose IV Bumex. Clinically euvolemic. Cardiology following.        Type II DM: BS's in 100's. Continue lantus and sliding scale insulin. Continue carb controlled diet and accuchecks. 6/24: noted hypoglycemia with BS in 50s this a.m. Likely d/t poor PO intake. Reduced lantus to 4 from 8 BID. Will reassess     Hx of Hypertension: SBP in 140's. Pt not taking ACE/ARB due to CKD. Continue amlodipine and hydralazine. 6/24: well-controlled. CCM     Normocytic Anemia of chronic disease, due to CKD: Hgb 8.4. Baseline Hgb 8-9. Pt taking iron supplement. Following with nephrology. Will continue to monitor H&H.      Hyponatremia: due to CKD. Sodium 131 today. Baseline appears to be in 130's. Will continue to monitor. 6/24 Na 128. Chronic; Likely d/t free water excess. To be corrected with HD       Metabolic Acidosis: due to CKD. ABG from 6/23 shows pH 7.36 with HCO3 of 22 and PCO2 of 38.    6/24: CO2 down to 17 from 20. Will improve with dialysis. monitor      Leukocytosis: no known source of infection at this time. Pt afebrile and denies respiratory, GI, and urinary symptoms. Pt remains stable. Will continue to trend.     Hx of Hypothyroid: continue synthroid.           Expected discharge date:  1-2 days         Disposition:      [] Home                             [] TCU                             [] Rehab                             [] Psych                             [x] SNF [] [facility]                             [] Other-    Chief Complaint:   Chief Complaint   Patient presents with    Fatigue     possible need for dialysis        Hospital Course: Patient was seen, examined and the medical chart was reviewed thoroughly today. In summary, 81 y. o.female admitted overnight for  generalized weakness and query need for dialysis. Subjective (past 24 hours):   c/o pain at CT drain site which is positional. Denies worsening SOB/CP. Denies fever/chills/night sweats. Denies urinary/URT symptoms or diarrhea. 6/24: breathing impoving. Tolerated IR removal of pigtail w/o complications. Denies CP. Denies N/V.     Medications:  Reviewed    Infusion Medications    dextrose      nitroGLYCERIN       Scheduled Medications    clindamycin (CLEOCIN) IV  600 mg Intravenous Once    amLODIPine  10 mg Oral Daily    docusate sodium  100 mg Oral BID    doxazosin  1 mg Oral Daily    famotidine  10 mg Oral BID    ferrous sulfate  325 mg Oral BID    gabapentin  100 mg Oral TID    hydrALAZINE  100 mg Oral 3 times per day    insulin glargine  8 Units Subcutaneous BID    insulin lispro  0-6 Units Subcutaneous TID WC    insulin lispro  0-3 Units Subcutaneous Nightly    isosorbide dinitrate  20 mg Oral TID    levothyroxine  25 mcg Oral Daily    oxybutynin  5 mg Oral Daily    pravastatin  40 mg Oral Nightly    sodium chloride flush  10 mL Intravenous 2 times per day     PRN Meds: acetaminophen, glucose, dextrose, glucagon (rDNA), dextrose, sodium chloride flush, magnesium hydroxide, ondansetron      Intake/Output Summary (Last 24 hours) at 6/24/2019 1125  Last data filed at 6/24/2019 0418  Gross per 24 hour   Intake 1350 ml   Output 550 ml   Net 800 ml       Diet:  DIET CARB CONTROL; Carb Control: 4 carb choices (60 gms)/meal; No Caffeine    Exam:  BP (!) 132/107   Pulse 73   Temp 98.3 °F (36.8 °C) (Oral)   Resp 18   Ht 5' 6\" (1.676 m)   Wt 172 lb (78 kg)   SpO2 96%   BMI 27.76 kg/m²     General appearance: A&O x3, Not ill or toxic, in no apparent distress  Neck: Supple, no JVD, no carotid bruits  Heart: Regular rhythm, bradycardic, normal S1 and S2, no rubs, murmurs or gallops  Lungs: bilat decreased A/E at bases; clear to ascultation no rales, wheezes, or rhonchi. R chest pigtail in-situ with intact overlying skin  Abdomen: soft, non-tender, non-distended, no bruits, no masses  Extremities: no clubbing, cyanosis or edema  Neurologic: alert and oriented x 3, cranial nerves 2-12 grossly intact, motor and sensory intact, moving all extremities  Skin: No rashes  Capillary Refill: Brisk,< 3 seconds   Peripheral Pulses: +2 palpable, equal bilaterally           Labs:   Recent Labs     06/22/19 1733 06/23/19 0333 06/24/19  0334   WBC 13.8* 13.2*  --    HGB 8.0* 8.4*  --    HCT 24.0* 25.5*  --     322 325     Recent Labs     06/22/19 1733 06/23/19  0333 06/24/19  0334   * 131* 128*   K 4.5 4.6 4.3   CL 93* 93* 92*   CO2 20* 20* 17*   BUN 95* 101* 99*   CREATININE 5.3* 5.2* 5.0*   CALCIUM 9.2 9.3 8.9     Recent Labs     06/22/19  1733   AST 9   ALT 6*   BILITOT 0.4   ALKPHOS 86     No results for input(s): INR in the last 72 hours. No results for input(s): David Oiler in the last 72 hours. Urinalysis:      Lab Results   Component Value Date    NITRU NEGATIVE 06/22/2019    WBCUA 0-2 06/22/2019    BACTERIA NONE 06/22/2019    RBCUA 5-10 06/22/2019    BLOODU NEGATIVE 06/22/2019    GLUCOSEU NEGATIVE 06/22/2019       Radiology:  Ct Chest Wo Contrast    Result Date: 6/22/2019  PROCEDURE: CT CHEST WO CONTRAST CLINICAL INFORMATION: SOB, fluid overload, ESRD. COMPARISON: Chest x-ray 6/22/2019, chest CT 6/7/2019 TECHNIQUE: 5 mm noncontrast axial images were obtained through the chest. Sagittal and coronal reconstructions were obtained.  All CT scans at this facility use dose modulation, iterative reconstruction, and/or weight-based dosing when appropriate to reduce radiation dose to as low as reasonably achievable. FINDINGS: The heart size is normal. No pericardial effusion. There is moderate coronary calcification. The aorta is of normal caliber. There is no gross abnormality of the pulmonary artery. Multiple mildly prominent mediastinal nodes are worsened from previous. Since previous CT a small bore chest tube has been placed on the right. It apparently has been pulled out of the chest and is located its tip is located at the level of the outer rib cortex. There is a small hydropneumothorax on the right measuring 7 mm anteriorly. Small-to-moderate left pleural effusion without significant change. Small-to-moderate right pleural effusion appears mildly worsened superiorly. Mild lower lobe airspace opacities bilaterally persist. There is improved aeration of the right lower lobe from previous. Previous right upper lobe opacity is significantly cleared as well. No new parenchymal opacities. No suspicious osseous lesions are present. Cholecystectomy. Small right chest tube has apparently pulled out of the chest and there is a small right pneumothorax. **This report has been created using voice recognition software. It may contain minor errors which are inherent in voice recognition technology. ** Final report electronically signed by Dr. Mike Chau on 6/22/2019 7:47 PM    Ct Chest Wo Contrast    Result Date: 6/7/2019  PROCEDURE: CT CHEST WO CONTRAST CLINICAL INFORMATION: RALES, bilateral pleural effusion R>L recent thoracentesis 6/7/hypoxia COMPARISON: 6/7/2016 TECHNIQUE: Multiple axial 5 millimeter images of the thorax and upper abdomen were obtained without the administration of intravenous contrast material . All CT scans at this facility use dose modulation, iterative reconstruction, and/or weight-based dosing when appropriate to reduce radiation dose to as low as reasonably achievable. FINDINGS: Upper abdomen: Prior cholecystectomy.  Mediastinum and ardha: Enlarged pulmonary voice recognition software. It may contain minor errors which are inherent in voice recognition technology. ** Final report electronically signed by Dr. Mike Chau on 6/22/2019 6:20 PM    Xr Chest Portable    Result Date: 6/20/2019  PROCEDURE: XR CHEST PORTABLE CLINICAL INFORMATION: chf/pleural effusions. COMPARISON: June 19, 2019 TECHNIQUE: AP upright view of the chest. FINDINGS: The heart remains partially obscured by perihilar atelectasis and or infiltrates with superimposed coarse interstitial markings. Underlying fibrosis cannot be excluded. There is persistent basilar atelectasis and pleural effusions right greater than left. Previously described small right apical pneumothorax is not well visualized on current exam. There is no change of the skeleton. 1. Bilateral perihilar and basilar atelectasis and or infiltrate changes. 2 persistent basilar effusions right greater than left. 3 previous right-sided pneumothorax not well-visualized. This report has been created using voice recognition software. It may contain minor errors which are inherent in voice recognition technology. ** Final report electronically signed by Dr. Serenity Boo on 6/20/2019 6:11 AM    Xr Chest Portable    Result Date: 6/19/2019  PROCEDURE: XR CHEST PORTABLE CLINICAL INFORMATION: Pneumothorax. COMPARISON: 6/18/2019. TECHNIQUE: AP portable chest radiograph performed. FINDINGS: POSTSURGICAL CHANGES: None. LINES/TUBES/MECHANICAL DEVICES: None. TRACHEA/HEART/MEDIASTINUM/HILUM: 1. Stable mild prominence of the cardiac silhouette which is partially obscured. LUNG MARIANO: 1. There are bilateral perihilar and the basilar infiltrates, consolidation within the right lower chest and a right pleural effusion. OTHER: None. PNEUMOTHORAX:  1. There is a persistent however interval smaller right apical pneumothorax (less than 5%). OSSEOUS STRUCTURES: 1. No acute osseous abnormality.      1. There is a persistent however interval smaller right apical pneumothorax (less than 5%). 2. There are bilateral perihilar and the basilar infiltrates, consolidation within the right lower chest and a right pleural effusion. 3. Follow chest radiographs recommended to confirm complete resolution. **This report has been created using voice recognition software. It may contain minor errors which are inherent in voice recognition technology. ** Final report electronically signed by Dr. Tanya Nguyễn on 6/19/2019 7:03 AM    Xr Chest Portable    Result Date: 6/16/2019  PROCEDURE: XR CHEST PORTABLE CLINICAL INFORMATION: Cough. COMPARISON: Chest x-ray dated 6/12/2019 TECHNIQUE: AP Portable chest xray FINDINGS: Lines/tubes/devices: none Lungs/pleura: There are moderate to large right and small left pleural effusions. There are bilateral interstitial infiltrates consistent with pulmonary edema or an atypical pneumonia. There is left lower lobe atelectasis. Heart: There is mild cardiomegaly. Mediastinum/radha: No obvious mass or adenopathy. Skeleton: There are degenerative changes of the spine. Moderate to large right and small left pleural effusions. Bilateral interstitial pulmonary edema versus atypical pneumonia. Left lower lobe atelectasis. **This report has been created using voice recognition software. It may contain minor errors which are inherent in voice recognition technology. ** Final report electronically signed by Dr. Eladio Heimlich on 6/16/2019 11:02 PM    Xr Chest Portable    Result Date: 6/7/2019  PROCEDURE: XR CHEST PORTABLE CLINICAL INFORMATION: plerural effusion. COMPARISON: June 6, 2019 TECHNIQUE: AP upright view of the chest. FINDINGS: The heart remains enlarged. There is persistent mild pulmonary venous congestion with basilar atelectasis and pleural effusions right greater than left humeral appearance is similar to the prior study. The skeletal structures are stable. 1. Redemonstration of changes compatible with congestive heart failure.  2. Bilateral effusions right greater than left. **This report has been created using voice recognition software. It may contain minor errors which are inherent in voice recognition technology. ** Final report electronically signed by Dr. Lauryn Bernstein on 6/7/2019 2:19 AM    Xr Chest 1 Vw    Result Date: 6/12/2019  PROCEDURE: XR CHEST 1 VIEW CLINICAL INFORMATION: Status post ultrasound-guided right thoracentesis. COMPARISON: 6/9/2019. TECHNIQUE: Single frontal view of the chest performed. FINDINGS: POSTSURGICAL CHANGES: 1. Cholecystectomy. LINES/TUBES/MECHANICAL DEVICES: None. TRACHEA/HEART/MEDIASTINUM/HILUM: 1. There is stable mild enlargement of the cardiomediastinal silhouette. 2. There is tracheal and bronchial calcification. LUNG FIELDS: 1. The patient is status post ultrasound-guided right thoracentesis with aspiration of 1.5 L of pleural fluid. There is no residual pleural fluid. There is no pneumothorax. 2. There are left perihilar and left basilar infiltrates and mild atelectasis and/or infiltrate at the right lung base. There is no pulmonary vascular congestion. OTHER: None. PNEUMOTHORAX: None. OSSEOUS STRUCTURES: 1. No acute osseous abnormality. 2. The bony structures are osteopenic. 3. There are mild hypertrophic degenerative changes at the acromioclavicular articulations bilaterally and subcortical sclerosis and cystic change at the greater tuberosity bilaterally which in the right clinical setting can be associated with impingement. 4. There is mild levoscoliosis. 1. The patient is status post ultrasound-guided right thoracentesis with aspiration of 1.5 L of pleural fluid. There is no residual pleural fluid. There is no pneumothorax. 2. There are left perihilar and left basilar infiltrates and mild atelectasis and/or infiltrate at the right lung base. There is no pulmonary vascular congestion. **This report has been created using voice recognition software.   It may contain minor errors which are inherent in voice recognition technology. ** Final report electronically signed by Dr. Rosalyn Dubin on 6/12/2019 3:43 PM    Xr Chest 1 Vw    Result Date: 6/9/2019  PROCEDURE: XR CHEST 1 VIEW CLINICAL INFORMATION: post thoracentesis left side COMPARISON: 6/7/2019 TECHNIQUE: Single AP upright view of the chest was obtained. 1. Poor inflation of lungs. Mild cardiomegaly. Moderate atelectasis/pneumonia left perihilar region, right infrahilar region, and both lung bases. Small effusion right side. 2. No residual effusion on the left. No pneumothorax is seen. Final report electronically signed by Dr. Andressa Lucia on 6/9/2019 10:27 AM    Xr Chest 1 Vw    Result Date: 6/6/2019  PROCEDURE: XR CHEST 1 VIEW CLINICAL INFORMATION: Status post ultrasound-guided right thoracentesis. COMPARISON: 5/23/2019. TECHNIQUE: Single frontal view of the chest performed. FINDINGS: POSTSURGICAL CHANGES: 1. Cholecystectomy. LINES/TUBES/MECHANICAL DEVICES: None. TRACHEA/HEART/MEDIASTINUM/HILUM: 1. There is mild enlargement of the cardiomediastinal silhouette. 2. There is tracheal and bronchial calcification. LUNG FIELDS: 1. The patient is status post large volume right thoracentesis with aspiration of 1.4 L of clear yellow fluid. There is no pneumothorax. There are bilateral perihilar and the basilar infiltrates. OTHER: None. PNEUMOTHORAX: None. OSSEOUS STRUCTURES: 1. No acute osseous abnormality. 2. Generalized osteopenia. 3. Mild levoscoliosis. 1. The patient is status post large volume right thoracentesis with aspiration of 1.4 L of clear yellow fluid. There is no pneumothorax. There are bilateral perihilar and the basilar infiltrates. **This report has been created using voice recognition software. It may contain minor errors which are inherent in voice recognition technology. ** Final report electronically signed by Dr. Rosalyn Dubin on 6/6/2019 1:30 PM    Us Thoracentesis    Result Date: 6/12/2019  PROCEDURE: [facility] INFORMATION: Right pleural effusion. COMPARISON: No prior study. PHYSICIAN PERFORMING PROCEDURE: Stan Ahumada M.D. PROCEDURE:  Informed consent was obtained. Limited sonographic imaging of the posterior chest was performed for localization of the pleural effusion. The skin overlying the pleural effusion was marked. The puncture site was prepped and draped in a sterile fashion and locally anesthetized with 2% lidocaine. The distal tip of a 5 Polish one-step catheter was advance into the pleural effusion. An 83 ml specimen was obtained to be sent to the laboratory for analysis as per the orders of the referring physician. .  A total of 1.5 L clear yellow pleural fluid was then aspirated and discarded. The one-step catheter was then removed. The patient tolerated the procedure well and and there were no immediate complications. 1.  Uncomplicated ultrasound guided diagnostic and therapeutic thoracentesis. **This report has been created using voice recognition software. It may contain minor errors which are inherent in voice recognition technology. ** Final report electronically signed by Dr. Sally Jovel on 6/12/2019 3:40 PM    Us Thoracentesis    Result Date: 6/9/2019  THORACENTESIS WITH ULTRASOUND GUIDANCE: PERFORMED BY: Judy Briones M.D. CLINICAL INFORMATION: Pleural effusion APPROACH: Left side, posterior FLUID WITHDRAWN: 500 mL blood-tinged serous fluid PROCEDURE: Signed informed consent was obtained prior to performing this procedure. The thorax was initially evaluated sonographically to determine appropriate puncture site. The skin was marked, prepped, and draped in a sterile fashion. Following local anesthesia and utilizing aseptic technique, a 5 Polish one-step catheter was successfully inserted into the pleural effusion at the position indicated above. Pleural fluid in the amount was above was then aspirated and the needle was removed. The patient tolerated the procedure well.  A post procedure chest radiograph will be obtained. Status post thoracentesis **This report has been created using voice recognition software. It may contain minor errors which are inherent in voice recognition technology. ** Final report electronically signed by Dr. Sherrie Black on 6/9/2019 10:46 AM    Us Thoracentesis    Result Date: 6/6/2019  PROCEDURE: US THORACENTESIS CLINICAL INFORMATION: Large right pleural effusion. COMPARISON: No prior study. PHYSICIAN PERFORMING PROCEDURE: Varghese Melgar M.D. PROCEDURE:  Informed consent was obtained. Limited sonographic imaging of the posterior chest was performed for localization of the pleural effusion. The skin overlying the pleural effusion was marked. The puncture site was prepped and draped in a sterile fashion and locally anesthetized with 2% lidocaine. The distal tip of a 5 Eritrean one-step catheter was advance into the pleural effusion. A total of 1.4 L clear yellow pleural fluid was then aspirated and discarded. The one-step catheter was then removed. The patient tolerated the procedure well and and there were no immediate complications. 1.  Uncomplicated ultrasound guided therapeutic thoracentesis. **This report has been created using voice recognition software. It may contain minor errors which are inherent in voice recognition technology. ** Final report electronically signed by Dr. Aloma Mcardle on 6/6/2019 1:28 PM    Ir Insert Capd Complete    Result Date: 6/11/2019  CAPD CATHETER INSERTION: CLINICAL INFORMATION: Renal failure     PERFORMED BY: Michele London M.D. APPROACH: Anterior abdominal wall, paraumbilical, left side.   CATHETER: 16 Eritrean curled tip swan-neck Tenckhoff peritoneal dialysis catheter, 62.5 CM CATHETER TIP: Midline in the pelvis CONTRAST: Omnipaque 180, 30 mL FLUOROSCOPY TIME: 4 minutes 36 seconds FLUOROSCOPIC IMAGES: 4 SEDATION: Versed 1.1 mg and Dilaudid 1 mg , IV; the patient was sedated for 90 minutes during this procedure and monitored the EKG and pulse ox monitoring devices by a registered nurse. PROCEDURE:  Signed informed consent was obtained prior to performing this procedure. The patient was sedated as indicated above. Following local anesthesia and utilizing MAXIMAL STERILE BARRIER TECHNIQUE, an 8-10 CM vertical incision was made along the anterior abdominal wall in preparation for catheter placement. Blunt dissection was performed to expose the anterior rectus sheath. A small incision was made in the anterior rectus sheath with a #11 scalpel blade and a micropuncture needle was passed through the incision into the peritoneal cavity with ultrasound guidance. A small amount of iodinated contrast material was then injected through the needle, confirming appropriate needle position within the peritoneal cavity. A fluoroscopic spot film was obtained for documentation. An 018 wire was then passed through the needle followed by insertion of a 4 Filipino vessel dilator which was then upsized to a 6 Western Princess vascular sheath. Additional iodinated contrast material was injected to reconfirm appropriate position of the sheath within the peroneal cavity. Approximately 400 mL sterile saline was injected through the sheath to distend the peritoneal cavity. The percutaneous tract was then dilated up to a 14 Western Princess size utilizing progressively larger dilators advanced over a stiff angled Glidewire. Finally, a 12 Filipino peel-away sheath was inserted, followed by insertion of the CAPD catheter as above. This procedure was performed with intermittent fluoroscopic visualization and documented with intermittent spot films. The deep cuff of the catheter was then positioned in the anterior abdominal muscle and stabilized with a 2-0 silk pursestring suture.  A small subcutaneous tunnel was then created and the remaining portion of the catheter was passed through the tunnel, positioning the superficial cuff in the upper end of the tunnel, approximately 2-3 CM superior to the catheter exit site. The abdominal wound and subcutaneous tunnel were flushed several times with bacitracin solution. The wounds closed with 3-0 Vicryl subcuticular suture and a 2-0 silk external \" retention sutures\", to assist with wound healing. Instructions will be of into the dialysis nurses to remove these in approximately 10 days. Dermabond skin adhesive and Steri-Strips were also applied to the abdominal wound along with a sterile OpSite dressing. The patient was instructed to keep the wound covered and dry for the next 10 days. The patient tolerated the procedure well. A fluoroscopic spot film was obtained at the end of this procedure to document catheter position. Status post successful tunneled peritoneal dialysis  (CAPD)  catheter insertion. **This report has been created using voice recognition software. It may contain minor errors which are inherent in voice recognition technology. ** Final report electronically signed by Dr. Suzie Schilling on 6/11/2019 3:15 PM    Xr Chest Pa Inspiration 1 Vw    Result Date: 6/18/2019  PROCEDURE: XR CHEST PA INSPIRATION 1 VW CLINICAL INFORMATION: 68-year-old female status post right thoracic Pleurx catheter placement. COMPARISON: Comparison is made to previous study dated 6/16/2019. TECHNIQUE: AP upright view of the chest was obtained. FINDINGS: There has been interval placement of right-sided thoracic drainage catheter. There is a right-sided apical pneumothorax measuring up to 15 mm in weakness. Opacities are again noted at the right lung base which may be related to atelectasis or infiltrate. There are small bilateral pleural effusions. There is mild cardiomegaly. Visualized portions of the upper abdomen are within normal limits. The osseous structures are intact. No acute fractures or suspicious osseous lesions. Interval placement of a right-sided pleural drainage catheter with marked decreased size of pleural effusion.  However, there has been development of a right-sided apical 15 mm pneumothorax. Findings discussed by Dr. Meggan Schmidt with 6K nurse [employer] 8:50 AM 6/18/2019 via telephone. **This report has been created using voice recognition software. It may contain minor errors which are inherent in voice recognition technology. ** Final report electronically signed by Dr Tosha Bonilla on 6/18/2019 8:55 AM    Ir Abscess Drainage Perc    Result Date: 6/18/2019  TUNNELED PLEURX DRAINAGE CATHETER INSERTION / CHEST: PERFORMED BY: Mickey Mayo. Meggan Schmidt M.D. CLINICAL INFORMATION: Recurrent pleural effusion APPROACH:  Right hemithorax, inferiorly, mid axillary line. CATHETER: 16 [employer] tunneled Pleurx chest drainage catheter. FLUID: 1 L corona serous fluid was obtained and discarded. SEDATION:  Versed 0.5 mg and fentanyl 25 mcg IV; the patient was sedated for  30 minutes  during this procedure and monitored with EKG and pulse-ox monitoring devices by a registered nurse. FLUOROSCOPY TIME: 1 minute 39 seconds FLUOROSCOPIC IMAGES: 3 PROCEDURE:  Signed informed consent was obtained prior to performing this procedure. The patient was sedated, as indicated above. The chest was evaluated with ultrasound initially to determine an appropriate puncture site. Images were obtained, revealing a significant amount pleural fluid. The skin was marked, prepped, and draped in a sterile fashion, utilizing [address]. The skin was infiltrated with lidocaine 2 percent at the puncture site and a small skin nick was made with a #11 scalpel blade. A micropuncture needle, attached to a syringe, was then successfully passed into the pleural fluid. A small  amount of fluid was aspirated to confirm appropriate needle position. An 018 wire was then passed through the needle, followed by insertion of a 4 [place] vessel dilator which was later up sized to a 16 [employer] peel-away sheath.  A skin tract leading anteriorly and inferiorly from the initial puncture site was then infiltrated with lidocaine 2 percent in preparation for formation of a subcutaneous tunnel. A small incision was then made along the anterior-inferior aspect of the tunnel and the Pleurx catheter was passed through the tunnel, attached to the tunneling device, and was then passed through the peel-away sheath. The sheath was removed, and The tunnel was flushed several times with bacitracin solution. The catheter was then connected to a vacuum bottle and pleural fluid, in the amount listed above, was aspirated and discarded, confirming appropriate catheter function. The wound site were sutured with 3-0 Vicryl interrupted suture. A small amount of antibiotic ointment was applied to the catheter entrance site, and an antibacterial Biopatch was applied to the catheter exit site, along with a sterile OpSite dressing. Fluoroscopic spot films were obtained to document catheter position. The patient tolerated the procedure well. Status post successful tunneled Pleurx chest drainage catheter insertion. **This report has been created using voice recognition software. It may contain minor errors which are inherent in voice recognition technology. ** Final report electronically signed by Dr James Lopez on 6/18/2019 8:59 AM      Diet: DIET CARB CONTROL; Carb Control: 4 carb choices (60 gms)/meal; No Caffeine    DVT prophylaxis: [] Lovenox                                 [] SCDs                                 [x] SQ Heparin                                 [] Encourage ambulation           [] Already on Anticoagulation       Code Status: Full Code      Active Hospital Problems    Diagnosis Date Noted    NSTEMI (non-ST elevated myocardial infarction) ([facility] [address] [I21.4] 06/22/2019    Chronic kidney disease (CKD), stage V ([facility] [address] [N18.5]     ESRD (end stage renal disease) ([facility] [address] [N18.6] 05/19/2019    Uncontrolled type 2 diabetes mellitus with hyperglycemia ([facility] [address] [E11.65] 12/07/2018    Essential hypertension [I10] 12/07/2018    Type 2

## 2019-06-24 NOTE — FLOWSHEET NOTE
Patient is a 51-year-old female patient. She was approachable and receptive to the encounter while lying in her bed with the TV on. She appeared to be anxious during the visit and did not fully engaged in conversation. She expressed \"feeling better\" but did not elaborate on her situation. She shared having \"no needs\" for spiritual care at this time. Jhonny Montaño will follow-up to further assess needs at a later time. She was informed that a Luzma Odell would be visiting the unit, as well as communion would be distributed to practice her [address] [place] [address] while her as a patient. 06/24/19 1000   Encounter Summary   Services provided to: Patient   Referral/Consult From: [facility] [address] [facility]   Continue Visiting Yes  (6/24/2019)   Complexity of Encounter Low   Length of Encounter 15 minutes   Spiritual/Latter-day   Type Spiritual support   Assessment Approachable; Anxious   Intervention Active listening;Explored feelings, thoughts, concerns;Sustaining presence/ Ministry of presence   Outcome Receptive

## 2019-06-24 NOTE — PROGRESS NOTES
[facility]         Renal Inpatient Follow-Up note         6/24/2019 10:36 AM    Pt Name:   Delfin Kapoor  YOB: 1950  Attending:   Dora Wilks MD    Chief Complaint : Delfin Kapoor is a 81 y.o. female being followed by nephrology for ESRD not yet on HD    Interval History :   Patient seen and examined by me. No distress  Feels well. No cp or SOB. Volume status is good as well. Scheduled Medications :    amLODIPine  10 mg Oral Daily    docusate sodium  100 mg Oral BID    doxazosin  1 mg Oral Daily    famotidine  10 mg Oral BID    ferrous sulfate  325 mg Oral BID    gabapentin  100 mg Oral TID    hydrALAZINE  100 mg Oral 3 times per day    insulin glargine  8 Units Subcutaneous BID    insulin lispro  0-6 Units Subcutaneous TID WC    insulin lispro  0-3 Units Subcutaneous Nightly    isosorbide dinitrate  20 mg Oral TID    levothyroxine  25 mcg Oral Daily    oxybutynin  5 mg Oral Daily    pravastatin  40 mg Oral Nightly    sodium chloride flush  10 mL Intravenous 2 times per day      dextrose      nitroGLYCERIN         Vitals :  BP (!) 132/107   Pulse 73   Temp 98.3 °F (36.8 °C) (Oral)   Resp 18   Ht 5' 6\" (1.676 m)   Wt 172 lb (78 kg)   SpO2 96%   BMI 27.76 kg/m²     24HR INTAKE/OUTPUT:      Intake/Output Summary (Last 24 hours) at 6/24/2019 1036  Last data filed at 6/24/2019 0418  Gross per 24 hour   Intake 1350 ml   Output 550 ml   Net 800 ml     Last 3 weights  Wt Readings from Last 3 Encounters:   06/24/19 172 lb (78 kg)   06/20/19 168 lb 14.4 oz (76.6 kg)   06/13/19 162 lb 8 oz (73.7 kg)           Physical Exam :  General Appearance:  Well developed. No distress  Mouth/Throat:  Oral mucosa moist  Neck:  Supple, no JVD  Lungs:  Breath sounds: clear  Heart[de-identified]  S1,S2 heard  Abdomen:  Soft, non - tender  Musculoskeletal:  Edema - none  Looked at the cath site it is looking well. No drainage noted and site looks clean.           Last 3 CBC Recent Labs     06/22/19  1733 06/23/19  0333 06/24/19  0334   WBC 13.8* 13.2*  --    RBC 2.68* 2.83*  --    HGB 8.0* 8.4*  --    HCT 24.0* 25.5*  --     322 325     Last 3 CMP  Recent Labs     06/22/19  1733 06/23/19 0333 06/24/19  0334   * 131* 128*   K 4.5 4.6 4.3   CL 93* 93* 92*   CO2 20* 20* 17*   BUN 95* 101* 99*   CREATININE 5.3* 5.2* 5.0*   CALCIUM 9.2 9.3 8.9   LABALBU 3.2*  --   --    BILITOT 0.4  --   --              Assessment / Plan   Renal - ESRD not yet on Dialysis. She will be doing PD due to be started on Wednesday.  - volume status looks well . No urgent need for RRT  - will flush the catheter today with 1000 ml and change the dressing    Hyponatremia 2/2 renal dysfunction  Mild acidosis will correct with PD once started  Anemia of ESRD. One dose of felix today  B/L pleural effusions - S/P pleurex catheter placement. For IR later today  Essential Hypertension  Diabetes Mellitus  D/W patient, nurse. meds reviewed    LIANNE Amaro D.  [facility].

## 2019-06-25 VITALS
HEIGHT: 66 IN | DIASTOLIC BLOOD PRESSURE: 63 MMHG | HEART RATE: 76 BPM | RESPIRATION RATE: 16 BRPM | OXYGEN SATURATION: 95 % | WEIGHT: 184.7 LBS | TEMPERATURE: 97.6 F | BODY MASS INDEX: 29.68 KG/M2 | SYSTOLIC BLOOD PRESSURE: 140 MMHG

## 2019-06-25 LAB
ANION GAP SERPL CALCULATED.3IONS-SCNC: 14 MEQ/L (ref 8–16)
BUN BLDV-MCNC: 95 MG/DL (ref 7–22)
CALCIUM SERPL-MCNC: 9.1 MG/DL (ref 8.5–10.5)
CHLORIDE BLD-SCNC: 95 MEQ/L (ref 98–111)
CO2: 18 MEQ/L (ref 23–33)
CREAT SERPL-MCNC: 4.5 MG/DL (ref 0.4–1.2)
GFR SERPL CREATININE-BSD FRML MDRD: 10 ML/MIN/1.73M2
GLUCOSE BLD-MCNC: 105 MG/DL (ref 70–108)
GLUCOSE BLD-MCNC: 136 MG/DL (ref 70–108)
GLUCOSE BLD-MCNC: 98 MG/DL (ref 70–108)
POTASSIUM SERPL-SCNC: 4.7 MEQ/L (ref 3.5–5.2)
SODIUM BLD-SCNC: 127 MEQ/L (ref 135–145)

## 2019-06-25 PROCEDURE — 6370000000 HC RX 637 (ALT 250 FOR IP): Performed by: HOSPITALIST

## 2019-06-25 PROCEDURE — 6370000000 HC RX 637 (ALT 250 FOR IP): Performed by: PHYSICIAN ASSISTANT

## 2019-06-25 PROCEDURE — 82948 REAGENT STRIP/BLOOD GLUCOSE: CPT

## 2019-06-25 PROCEDURE — 36415 COLL VENOUS BLD VENIPUNCTURE: CPT

## 2019-06-25 PROCEDURE — 99239 HOSP IP/OBS DSCHRG MGMT >30: CPT | Performed by: HOSPITALIST

## 2019-06-25 PROCEDURE — 2580000003 HC RX 258: Performed by: PHYSICIAN ASSISTANT

## 2019-06-25 PROCEDURE — 99232 SBSQ HOSP IP/OBS MODERATE 35: CPT | Performed by: INTERNAL MEDICINE

## 2019-06-25 PROCEDURE — 6360000002 HC RX W HCPCS: Performed by: INTERNAL MEDICINE

## 2019-06-25 PROCEDURE — 80048 BASIC METABOLIC PNL TOTAL CA: CPT

## 2019-06-25 RX ORDER — INSULIN GLARGINE 100 [IU]/ML
4 INJECTION, SOLUTION SUBCUTANEOUS 2 TIMES DAILY
Qty: 1 VIAL | Refills: 1 | Status: ON HOLD | OUTPATIENT
Start: 2019-06-25 | End: 2020-01-24

## 2019-06-25 RX ADMIN — GABAPENTIN 100 MG: 100 CAPSULE ORAL at 08:59

## 2019-06-25 RX ADMIN — FAMOTIDINE 10 MG: 20 TABLET ORAL at 08:59

## 2019-06-25 RX ADMIN — Medication 10 ML: at 09:00

## 2019-06-25 RX ADMIN — DARBEPOETIN ALFA 60 MCG: 60 INJECTION, SOLUTION INTRAVENOUS; SUBCUTANEOUS at 09:26

## 2019-06-25 RX ADMIN — OXYBUTYNIN CHLORIDE 5 MG: 5 TABLET ORAL at 08:59

## 2019-06-25 RX ADMIN — HYDRALAZINE HYDROCHLORIDE 100 MG: 50 TABLET, FILM COATED ORAL at 04:56

## 2019-06-25 RX ADMIN — AMLODIPINE BESYLATE 10 MG: 10 TABLET ORAL at 08:59

## 2019-06-25 RX ADMIN — INSULIN GLARGINE 4 UNITS: 100 INJECTION, SOLUTION SUBCUTANEOUS at 09:00

## 2019-06-25 RX ADMIN — Medication 10 ML: at 09:06

## 2019-06-25 RX ADMIN — DOXAZOSIN 1 MG: 1 TABLET ORAL at 08:59

## 2019-06-25 RX ADMIN — DOCUSATE SODIUM 100 MG: 100 CAPSULE, LIQUID FILLED ORAL at 08:59

## 2019-06-25 RX ADMIN — FERROUS SULFATE TAB 325 MG (65 MG ELEMENTAL FE) 325 MG: 325 (65 FE) TAB at 08:59

## 2019-06-25 RX ADMIN — LEVOTHYROXINE SODIUM 25 MCG: 25 TABLET ORAL at 04:56

## 2019-06-25 RX ADMIN — ISOSORBIDE DINITRATE 20 MG: 20 TABLET ORAL at 08:59

## 2019-06-25 ASSESSMENT — PAIN SCALES - GENERAL: PAINLEVEL_OUTOF10: 0

## 2019-06-25 NOTE — CARE COORDINATION
6/25/19, 10:51 AM    Discharge plan discussed by  and . Discharge plan reviewed with patient/ family. Patient/ family verbalize understanding of discharge plan and are in agreement with plan. Understanding was demonstrated using the teach back method. Services After Discharge  Services At/After Discharge: In [facility], Nursing Services, Skilled Therapy(Nathalie Wagner/ [facility])   IMM Letter  IMM Letter given to Patient/Family/Significant other/Guardian/POA/by[de-identified]   IMM Letter date given[de-identified] 06/25/19  IMM Letter time given[de-identified] Armando Moran will return to [facility] today. She will return under her Medicare benefit. She will be transported by [facility]. Discharge instructions and transport forms are attached to Sarah's blue discharge packet. ABIGAIL called Pat at [facility] and made her aware of discharge. Naseem Ochoa is aware ABIGAIL has set up [facility] to transport.

## 2019-06-25 NOTE — DISCHARGE INSTR - DIET

## 2019-06-25 NOTE — PROGRESS NOTES
[facility]         Renal Inpatient Follow-Up note         6/25/2019 8:38 AM    Pt Name:   Jayson Florez  YOB: 1950  Attending:   Iliana Pimentel MD    Chief Complaint : Jayson Florez is a 81 y.o. female being followed by nephrology for ESRD not yet on HD    Interval History :   Patient seen and examined by me. No distress  Feels well. No cp or SOB. Volume status is good as well. Her pleurex cath ws removed     Scheduled Medications :    insulin glargine  4 Units Subcutaneous BID    amLODIPine  10 mg Oral Daily    docusate sodium  100 mg Oral BID    doxazosin  1 mg Oral Daily    famotidine  10 mg Oral BID    ferrous sulfate  325 mg Oral BID    gabapentin  100 mg Oral TID    hydrALAZINE  100 mg Oral 3 times per day    insulin lispro  0-6 Units Subcutaneous TID WC    insulin lispro  0-3 Units Subcutaneous Nightly    isosorbide dinitrate  20 mg Oral TID    levothyroxine  25 mcg Oral Daily    oxybutynin  5 mg Oral Daily    pravastatin  40 mg Oral Nightly    sodium chloride flush  10 mL Intravenous 2 times per day      dextrose      nitroGLYCERIN         Vitals :  /60   Pulse 75   Temp 98.6 °F (37 °C) (Oral)   Resp 15   Ht 5' 6\" (1.676 m)   Wt 184 lb 11.2 oz (83.8 kg)   SpO2 92%   BMI 29.81 kg/m²     24HR INTAKE/OUTPUT:      Intake/Output Summary (Last 24 hours) at 6/25/2019 0838  Last data filed at 6/25/2019 0755  Gross per 24 hour   Intake 1149.89 ml   Output 200 ml   Net 949.89 ml     Last 3 weights  Wt Readings from Last 3 Encounters:   06/25/19 184 lb 11.2 oz (83.8 kg)   06/20/19 168 lb 14.4 oz (76.6 kg)   06/13/19 162 lb 8 oz (73.7 kg)           Physical Exam :  General Appearance:  Well developed.  No distress  Mouth/Throat:  Oral mucosa moist  Neck:  Supple, no JVD  Lungs:  Breath sounds: clear  Heart[de-identified]  S1,S2 heard  Abdomen:  Soft, non - tender  Musculoskeletal:  Edema - none         Last 3 CBC   Recent Labs     06/22/19  1733 06/23/19  0333 06/24/19  0334   WBC 13.8* 13.2*  --    RBC 2.68* 2.83*  --    HGB 8.0* 8.4*  --    HCT 24.0* 25.5*  --     322 325     Last 3 CMP  Recent Labs     06/22/19  1733 06/23/19  0333 06/24/19  0334   * 131* 128*   K 4.5 4.6 4.3   CL 93* 93* 92*   CO2 20* 20* 17*   BUN 95* 101* 99*   CREATININE 5.3* 5.2* 5.0*   CALCIUM 9.2 9.3 8.9   LABALBU 3.2*  --   --    BILITOT 0.4  --   --              Assessment / Plan   Renal - ESRD not yet on Dialysis. She will be doing PD due to be started tomorrow.  - volume status looks well . No urgent need for RRT  - catheter looking well and flushed well. Hyponatremia 2/2 renal dysfunction, will improve mostly on dialysis  Mild acidosis will correct with PD once started  Anemia of ESRD. One dose of felix  B/L pleural effusions - S/P pleurex catheter removal.  Essential Hypertension  Diabetes Mellitus  D/W patient, nurse. meds reviewed  NH later today . LIANNE Read D.  [facility].

## 2019-06-25 NOTE — DISCHARGE SUMMARY
Hospital Medicine Discharge Summary      Patient Identification:   Jennifer Vyas   : 1950  MRN: [id number]   Account: [de-identified]      Patient's PCP: [address]    Admit Date: 2019     Discharge Date:   2019    Admitting Physician: Henok Regan MD     Discharge Physician: Enrique Orosco MD     Discharge Diagnoses: Active Hospital Problems    Diagnosis Date Noted     2019    Chronic kidney disease (CKD), stage V ([facility] [N18.5]     ESRD (end stage renal disease) ([facility] [N18.6] 2019    Uncontrolled type 2 diabetes mellitus with hyperglycemia ([facility] [E11.65] 2018    Essential hypertension [I10] 2018    Type 2 diabetes mellitus with insulin therapy [facility] [E11.9, Z79.4]        The patient was seen and examined on day of discharge and this discharge summary is in conjunction with any daily progress note from day of discharge. Hospital Course:   Jennifer Vyas is a 81 y.o. female admitted to [address] [place] [address] on 2019 for generalized weakness and query need for dialysis. She responded well to medical management. She was also seen by IR and nephrology. She was discharged home upon clearance by nephrology.       .        Assessment/Plan:    Acute on Chronic CKD, stage V: , creat 5.2, GFR 8. Pt follows with outpatient nephrologist. Had PD cath placed 13 days ago but has not yet started dialysis. Plans to start . Nephrology consulted. Will continue to monitor labs.      : nehro following, started with a short cycle today with plan for formal PD on Wednesday.        Elevated Troponin: likely due to lab error vs underlying CAD w/ decreased kidney function: Troponin 0.042 on arrival, spiked to 0.547, then back down to 0.042 within 2 hours. Pt denied any symptoms of chest pain. Cardio consulted and following, feel there is likely underlying CAD. Recommending further ischemic workup with cardiac cath once kidney function improves.  Will continue to trend troponin levels.     Although NSTEMI generated as an active problem on admission, it became evident later pt did not have ACS.    Pleural Effusion: likely due to CHF/volume overload.  Pt recently admitted 6/6-6/13, and 6/16-6/20 for pleural effusions. Right thoracentesis on 6/12 1.5 L drained, transudative. Indwelling right pleurex placed on 6/18. Per CT chest on 6/22 chest tube no longer in place. Is not draining per RN. Will consult IR for repositioning Vs removal in AM.  Pt denies use of O2 at home but has been on 3 lpm via NC at [facility] since dishcarge, is now on 2L NC with sat of 96%, will attempt to wean as tolerated.       6/24: IR removed right pleurex catheter today. No evidence of pneumothorax on post CXR with improved lung aeration. Small-mod residual pleural effusion right lung base loculated . Pt on 3L NC with O2 sat of 96%, RN aware to wean aggressively    6/25: at baseline O2 requirement     Chronic Diastolic CHF: Recent admission for CHF exacerbation. Echo from 12/8/2018: EF of 50-55% with mildly dilated left atrium. Limited echo from 6/7/19 estimated EF of 55% with no other abnormalities noted. Pt having recurrent transudative pleural effusions. Received one time dose IV Bumex. Clinically euvolemic. Cardiology following.     6/24: no diuretics. Will be starting on PD with UF planned as per nephro.         Type II DM: BS's in 100's. Continue lantus and sliding scale insulin. Continue carb controlled diet and accuchecks.      6/24: noted hypoglycemia with BS in 50s this a.m. Likely d/t poor PO intake. Reduced lantus to 4 from 8 BID. Will reassess    6/25: no hypoglycemia. Discharged at current in hospital dose. Regimen to be readjusted based on nutritional status and BS monitoring as per PCP     Hx of Hypertension: SBP in 140's. Pt not taking ACE/ARB due to CKD. Continue amlodipine and hydralazine.      6/24: well-controlled. CCM     Normocytic Anemia of chronic disease, due to CKD: Hgb 8.4. Baseline Hgb 8-9. Pt taking iron supplement. Following with nephrology. Will continue to monitor H&H.     6/25: EPO once given as per nephro.          Hyponatremia: due to CKD. Sodium 131 today. Baseline appears to be in 130's. Will continue to monitor.   6/24 Na 128. Chronic; Likely d/t free water excess. To be corrected with HD    6/25: Na 127 today. K WNLs. Will have PD tomorrow which will likely improve.        Metabolic Acidosis: due to CKD. ABG from 6/23 shows pH 7.36 with HCO3 of 22 and PCO2 of 38.     6/24: CO2 down to 17 from 20. Will improve with dialysis. Monitor  6/25: Co2 18 today. Will improve with PD.        Leukocytosis: no known source of infection at this time. Pt afebrile and denies respiratory, GI, and urinary symptoms. Pt remains stable. Will continue to trend.     Hx of Hypothyroid: continue synthroid.     Moderate malnutrition: seen by dietician with recommendation for supplements          Exam:     Vitals:  Vitals:    06/24/19 1926 06/24/19 2317 06/25/19 0406 06/25/19 0445   BP: (!) 140/65 138/79 133/60    Pulse: 61 66 75    Resp: 18 18 15    Temp: 98.4 °F (36.9 °C) 98.7 °F (37.1 °C) 98.6 °F (37 °C)    TempSrc: Oral Oral Oral    SpO2: 99% 99% (!) 85% 92%   Weight:   184 lb 11.2 oz (83.8 kg)    Height:         Weight: Weight: 184 lb 11.2 oz (83.8 kg)     24 hour intake/output:    Intake/Output Summary (Last 24 hours) at 6/25/2019 0854  Last data filed at 6/25/2019 0755  Gross per 24 hour   Intake 1149.89 ml   Output 200 ml   Net 949.89 ml           General appearance: A&O x3, Not ill or toxic, in no apparent distress  Neck: Supple, no JVD, no carotid bruits  Heart: Regular rhythm, bradycardic, normal S1 and S2, no rubs, murmurs or gallops  Lungs: bilat decreased A/E at bases; clear to ascultation no rales, wheezes, or rhonchi.  R chest pigtail in-situ with intact overlying skin  Abdomen: soft, non-tender, non-distended, no bruits, no masses  Extremities: no clubbing, cyanosis or edema  Neurologic: alert and oriented x 3, cranial nerves 2-12 grossly intact, motor and sensory intact, moving all extremities  Skin: No rashes  Capillary Refill: Brisk,< 3 seconds   Peripheral Pulses: +2 palpable, equal bilaterally                   Labs: For convenience and continuity at follow-up the following most recent labs are provided:      CBC:    Lab Results   Component Value Date    WBC 13.2 06/23/2019    HGB 8.4 06/23/2019    HCT 25.5 06/23/2019     06/24/2019       Renal:    Lab Results   Component Value Date     06/24/2019    K 4.3 06/24/2019    K 4.6 06/23/2019    CL 92 06/24/2019    CO2 17 06/24/2019    BUN 99 06/24/2019    CREATININE 5.0 06/24/2019    CALCIUM 8.9 06/24/2019    PHOS 4.6 03/08/2019         Significant Diagnostic Studies    Radiology:   XR CHEST PORTABLE   Final Result   Removal of the patient's right-sided Pleurx catheter without evidence of pneumothorax or complication. Improved aeration of the left lung and decrease in vascular congestion overall. **This report has been created using voice recognition software. It may contain minor errors which are inherent in voice recognition technology. **      Final report electronically signed by Dr. Bruce Vicente on 6/24/2019 11:38 AM      IR INTERVENTIONAL RADIOLOGY PROCEDURE REQUEST   Final Result   1. Status post successful tunneled Pleurx catheter removal..   2. Patient has a small to moderate amount of residual pleural fluid appears be loculated along the posterior aspect of the right lung base. If desired, this could be aspirated with thoracentesis. **This report has been created using voice recognition software. It may contain minor errors which are inherent in voice recognition technology. **      Final report electronically signed by Dr. Du Kidney on 6/24/2019 11:49 AM      CT CHEST WO CONTRAST   Final Result      Small right chest tube has apparently pulled out of the chest and there is a small right pneumothorax. **This report has been created using voice recognition software. It may contain minor errors which are inherent in voice recognition technology. **      Final report electronically signed by Dr. Hammer Kidney on 6/22/2019 7:47 PM      XR CHEST PORTABLE   Final Result   Mildly worsened parenchymal opacities as above. This could relate to pulmonary edema. **This report has been created using voice recognition software. It may contain minor errors which are inherent in voice recognition technology. **      Final report electronically signed by Dr. Hammer Kidney on 6/22/2019 6:20 PM             Consults:     [address] TO NEPHROLOGY  IP CONSULT TO SOCIAL WORK    Disposition:    [] Home       [] TCU       [] Rehab       [] Psych       [x] SNF       [] [place]       [] Other-    Condition at Discharge: Stable    Code Status:  Full Code     Patient Instructions:    Discharge lab work: BMP 2 days  Activity: activity as tolerated  Diet: DIET CARB CONTROL; Carb Control: 4 carb choices (60 gms)/meal; No Caffeine  Dietary Nutrition Supplements: Renal Oral Supplement      Follow-up visits:   [facility]  [phone]               Discharge Medications:      [facility]   Home Medication Instructions UDU:[id number]    Printed on:06/25/19 5477   Medication Information                      acetaminophen (TYLENOL) 325 MG tablet  Take 650 mg by mouth every 6 hours as needed for Pain             acetaminophen-codeine (TYLENOL #3) 300-30 MG per tablet  Take 1 tablet by mouth every 6 hours as needed for Pain. amLODIPine (NORVASC) 10 MG tablet  Take 10 mg by mouth daily             blood glucose test strips (ASCENSIA AUTODISC VI;ONE TOUCH ULTRA TEST VI) strip  Patient tests blood sugar three times per day.  Diagnosis DMII E11.9             docusate sodium (COLACE, DULCOLAX) 100 MG CAPS  Take 100 mg by mouth 2 times daily             doxazosin (CARDURA) 1 MG tablet  Take 1 tablet by mouth daily             DULoxetine (CYMBALTA) 30 MG extended release capsule  Take 30 mg by mouth daily             famotidine (PEPCID) 10 MG tablet  Take 10 mg by mouth 2 times daily             ferrous sulfate 325 (65 Fe) MG tablet  Take 325 mg by mouth 2 times daily             gabapentin (NEURONTIN) 100 MG capsule  Take 100 mg by mouth 3 times daily. glucagon, rDNA, 1 MG injection  Inject 1 mg into the muscle as needed for Low blood sugar (Blood glucose less than 70 mg/dL and patient NOT ALERT or NPO and does not have IV access.)             hydrALAZINE (APRESOLINE) 100 MG tablet  Take 1 tablet by mouth every 8 hours             insulin glargine (LANTUS) 100 UNIT/ML injection vial  Inject 4 Units into the skin 2 times daily             insulin lispro (HUMALOG) 100 UNIT/ML injection vial  Inject 0-6 Units into the skin 3 times daily (with meals)             isosorbide dinitrate (ISORDIL) 20 MG tablet  Take 1 tablet by mouth 3 times daily             levothyroxine (SYNTHROID) 25 MCG tablet  Take 25 mcg by mouth Daily             meclizine (ANTIVERT) 25 MG tablet  Take 25 mg by mouth 3 times daily             ondansetron (ZOFRAN-ODT) 4 MG disintegrating tablet  Take 4 mg by mouth every 4 hours as needed for Nausea or Vomiting             oxybutynin (DITROPAN) 5 MG tablet  Take 5 mg by mouth daily             OXYGEN  Inhale 2-4 L into the lungs as needed (to keep sat> 90%)             pravastatin (PRAVACHOL) 40 MG tablet  Take 40 mg by mouth nightly                  Time Spent on discharge is more than 45 minutes in the examination, evaluation, counseling and review of medications and discharge plan. With RN in room, patient was updated about the treatment plan, all the questions and concerns were addressed. Alarming signs and symptoms to return to ED were explained in length. Signed: Thank you Amber Hyde for the opportunity to be involved in this patient's care.     Electronically signed by Dorothy Gardner MD on 6/25/2019 at 8:54 AM

## 2019-06-25 NOTE — PROGRESS NOTES
AVS has been faxed over to [facility]. Report has been called to Mayo Turner at [facility]. Pt. Has been prepared for transport at approx [phone]  Today.

## 2019-07-12 ENCOUNTER — APPOINTMENT (OUTPATIENT)
Dept: GENERAL RADIOLOGY | Age: 69
End: 2019-07-12
Payer: MEDICARE

## 2019-07-12 ENCOUNTER — APPOINTMENT (OUTPATIENT)
Dept: CT IMAGING | Age: 69
End: 2019-07-12
Payer: MEDICARE

## 2019-07-12 ENCOUNTER — HOSPITAL ENCOUNTER (EMERGENCY)
Age: 69
Discharge: HOME | End: 2019-07-13
Attending: FAMILY MEDICINE
Payer: MEDICARE

## 2019-07-12 VITALS
BODY MASS INDEX: 29.7 KG/M2 | SYSTOLIC BLOOD PRESSURE: 123 MMHG | OXYGEN SATURATION: 99 % | HEART RATE: 79 BPM | RESPIRATION RATE: 14 BRPM | WEIGHT: 184 LBS | DIASTOLIC BLOOD PRESSURE: 87 MMHG | TEMPERATURE: 98.7 F

## 2019-07-12 DIAGNOSIS — J90 CHRONIC PLEURAL EFFUSION: ICD-10-CM

## 2019-07-12 DIAGNOSIS — R41.0 CONFUSION: Primary | ICD-10-CM

## 2019-07-12 LAB
ALBUMIN SERPL-MCNC: 3.1 G/DL (ref 3.5–5.1)
ALLEN TEST: POSITIVE
ALP BLD-CCNC: 98 U/L (ref 38–126)
ALT SERPL-CCNC: 7 U/L (ref 11–66)
AMMONIA: 44 UMOL/L (ref 11–60)
ANION GAP SERPL CALCULATED.3IONS-SCNC: 14 MEQ/L (ref 8–16)
AST SERPL-CCNC: 12 U/L (ref 5–40)
BASE EXCESS (CALCULATED): 4 MMOL/L (ref -2.5–2.5)
BASOPHILS # BLD: 0.5 %
BASOPHILS ABSOLUTE: 0 THOU/MM3 (ref 0–0.1)
BILIRUB SERPL-MCNC: 0.3 MG/DL (ref 0.3–1.2)
BILIRUBIN DIRECT: < 0.2 MG/DL (ref 0–0.3)
BUN BLDV-MCNC: 39 MG/DL (ref 7–22)
CALCIUM SERPL-MCNC: 9 MG/DL (ref 8.5–10.5)
CHLORIDE BLD-SCNC: 94 MEQ/L (ref 98–111)
CO2: 27 MEQ/L (ref 23–33)
COLLECTED BY:: ABNORMAL
CREAT SERPL-MCNC: 3.6 MG/DL (ref 0.4–1.2)
DEVICE: ABNORMAL
EKG ATRIAL RATE: 72 BPM
EKG P AXIS: 90 DEGREES
EKG P-R INTERVAL: 188 MS
EKG Q-T INTERVAL: 390 MS
EKG QRS DURATION: 86 MS
EKG QTC CALCULATION (BAZETT): 427 MS
EKG T AXIS: -72 DEGREES
EKG VENTRICULAR RATE: 72 BPM
EOSINOPHIL # BLD: 2.9 %
EOSINOPHILS ABSOLUTE: 0.3 THOU/MM3 (ref 0–0.4)
ERYTHROCYTE [DISTWIDTH] IN BLOOD BY AUTOMATED COUNT: 13.4 % (ref 11.5–14.5)
ERYTHROCYTE [DISTWIDTH] IN BLOOD BY AUTOMATED COUNT: 45.2 FL (ref 35–45)
GFR SERPL CREATININE-BSD FRML MDRD: 13 ML/MIN/1.73M2
GLUCOSE BLD-MCNC: 116 MG/DL (ref 70–108)
GLUCOSE BLD-MCNC: 121 MG/DL (ref 70–108)
HCO3: 30 MMOL/L (ref 23–28)
HCT VFR BLD CALC: 30.7 % (ref 37–47)
HEMOGLOBIN: 9.4 GM/DL (ref 12–16)
IFIO2: 4
IMMATURE GRANS (ABS): 0.04 THOU/MM3 (ref 0–0.07)
IMMATURE GRANULOCYTES: 0.4 %
LYMPHOCYTES # BLD: 7.2 %
LYMPHOCYTES ABSOLUTE: 0.7 THOU/MM3 (ref 1–4.8)
MCH RBC QN AUTO: 28.1 PG (ref 26–33)
MCHC RBC AUTO-ENTMCNC: 30.6 GM/DL (ref 32.2–35.5)
MCV RBC AUTO: 91.9 FL (ref 81–99)
MONOCYTES # BLD: 6.4 %
MONOCYTES ABSOLUTE: 0.6 THOU/MM3 (ref 0.4–1.3)
NUCLEATED RED BLOOD CELLS: 0 /100 WBC
O2 SATURATION: 97 %
OSMOLALITY CALCULATION: 280.5 MOSMOL/KG (ref 275–300)
PCO2: 49 MMHG (ref 35–45)
PH BLOOD GAS: 7.39 (ref 7.35–7.45)
PLATELET # BLD: 353 THOU/MM3 (ref 130–400)
PMV BLD AUTO: 9.4 FL (ref 9.4–12.4)
PO2: 95 MMHG (ref 71–104)
POTASSIUM SERPL-SCNC: 3.9 MEQ/L (ref 3.5–5.2)
PRO-BNP: 4450 PG/ML (ref 0–900)
RBC # BLD: 3.34 MILL/MM3 (ref 4.2–5.4)
SEG NEUTROPHILS: 82.6 %
SEGMENTED NEUTROPHILS ABSOLUTE COUNT: 7.8 THOU/MM3 (ref 1.8–7.7)
SODIUM BLD-SCNC: 135 MEQ/L (ref 135–145)
SOURCE, BLOOD GAS: ABNORMAL
T4 FREE: 1.02 NG/DL (ref 0.93–1.76)
TOTAL PROTEIN: 6 G/DL (ref 6.1–8)
TROPONIN T: 0.04 NG/ML
TSH SERPL DL<=0.05 MIU/L-ACNC: 9.99 UIU/ML (ref 0.4–4.2)
WBC # BLD: 9.4 THOU/MM3 (ref 4.8–10.8)

## 2019-07-12 PROCEDURE — 74176 CT ABD & PELVIS W/O CONTRAST: CPT

## 2019-07-12 PROCEDURE — 99285 EMERGENCY DEPT VISIT HI MDM: CPT

## 2019-07-12 PROCEDURE — 80053 COMPREHEN METABOLIC PANEL: CPT

## 2019-07-12 PROCEDURE — 70450 CT HEAD/BRAIN W/O DYE: CPT

## 2019-07-12 PROCEDURE — 84443 ASSAY THYROID STIM HORMONE: CPT

## 2019-07-12 PROCEDURE — 82948 REAGENT STRIP/BLOOD GLUCOSE: CPT

## 2019-07-12 PROCEDURE — 84484 ASSAY OF TROPONIN QUANT: CPT

## 2019-07-12 PROCEDURE — 83880 ASSAY OF NATRIURETIC PEPTIDE: CPT

## 2019-07-12 PROCEDURE — 2709999900 HC NON-CHARGEABLE SUPPLY

## 2019-07-12 PROCEDURE — 93005 ELECTROCARDIOGRAM TRACING: CPT | Performed by: FAMILY MEDICINE

## 2019-07-12 PROCEDURE — 84439 ASSAY OF FREE THYROXINE: CPT

## 2019-07-12 PROCEDURE — 71250 CT THORAX DX C-: CPT

## 2019-07-12 PROCEDURE — 85025 COMPLETE CBC W/AUTO DIFF WBC: CPT

## 2019-07-12 PROCEDURE — 36600 WITHDRAWAL OF ARTERIAL BLOOD: CPT

## 2019-07-12 PROCEDURE — 82803 BLOOD GASES ANY COMBINATION: CPT

## 2019-07-12 PROCEDURE — 82248 BILIRUBIN DIRECT: CPT

## 2019-07-12 PROCEDURE — 36415 COLL VENOUS BLD VENIPUNCTURE: CPT

## 2019-07-12 PROCEDURE — 71045 X-RAY EXAM CHEST 1 VIEW: CPT

## 2019-07-12 PROCEDURE — 82140 ASSAY OF AMMONIA: CPT

## 2019-07-12 ASSESSMENT — PAIN DESCRIPTION - ORIENTATION: ORIENTATION: MID

## 2019-07-12 ASSESSMENT — ENCOUNTER SYMPTOMS
ABDOMINAL DISTENTION: 0
ABDOMINAL PAIN: 0
SHORTNESS OF BREATH: 1

## 2019-07-12 ASSESSMENT — PAIN SCALES - GENERAL: PAINLEVEL_OUTOF10: 10

## 2019-07-12 ASSESSMENT — PAIN DESCRIPTION - LOCATION: LOCATION: CHEST

## 2019-07-12 ASSESSMENT — PAIN DESCRIPTION - PAIN TYPE: TYPE: ACUTE PAIN

## 2019-07-12 NOTE — ED PROVIDER NOTES
[facility]  eMERGENCY dEPARTMENT eNCOUnter          [address] [facility]       Chief Complaint   Patient presents with    Abdominal Pain    Altered Mental Status    Chest Pain    Shortness of Breath       Nurses Notes reviewed and I agree except as noted in the HPI. HISTORY OF PRESENT ILLNESS    Verónica Mack is a 81 y.o. female who presents to the Emergency Department via EMS from [address] [place] [address]. The patient has a history of CKD, CHF, hypertension, hyperlipidemia, and diabetes. The patient has peritoneal dialysis on Monday, Wednesday, and Fridays. She did have her treatment today. Patient was sent to [facility] ER after treatment for AMS and abdominal distension. Patient had a chest xray, head CT, and labs performed there all with no acute findings. Patient was discharged back to the nursing home. Nursing home staff sent patient here this evening with concern of continued confusion/AMS and distension. The patient is alert and oriented x3 upon arrival here. According to nursing home paperwork, patient is typically \"alert, disoriented, but follows commands\". Patient is chronically on 4 to 6 liters of O2. She sating appropriately here on 4L. Patient denies any pain, specifically to the chest or abdomen. There are no other complaints or symptoms. REVIEW OF SYSTEMS     Review of Systems   Constitutional: Negative for chills and fever. Respiratory: Positive for shortness of breath (chronic - no change). Cardiovascular: Negative for chest pain. Gastrointestinal: Negative for abdominal distention and abdominal pain. Psychiatric/Behavioral: Negative for confusion.      PAST MEDICAL HISTORY    has a past medical history of Adjustment disorder with mixed anxiety and depressed mood, Anemia, Anemia in chronic kidney disease(285.21), Anxiety, Arthritis, Asthma, Bipolar 1 disorder ([address] Bradycardia, CHF (congestive heart failure) ([address] Chronic diastolic heart failure ([address] Chronic kidney disease, stage III (moderate) (HCC), CKD (chronic kidney disease), Depression, Difficulty walking, Dizziness and giddiness, GERD (gastroesophageal reflux disease), Gout, Headache(784.0), Hyperkalemia, Hyperlipidemia, Hypertension, Hypertensive urgency, Hypothyroid, Hypothyroidism, Low back pain, Major depressive disorder, recurrent, mild (HCC), Muscle weakness (generalized), Neurogenic bladder, Pleural effusion, Pneumonia, Seizure ([facility] Type II or unspecified type diabetes mellitus without mention of complication, not stated as uncontrolled, and Urine retention. SURGICAL HISTORY      has a past surgical history that includes fracture surgery; Cholecystectomy; cervical fusion; back surgery; Colonoscopy; and Endoscopy, colon, diagnostic. CURRENT MEDICATIONS       Discharge Medication List as of 7/13/2019  2:21 AM      CONTINUE these medications which have NOT CHANGED    Details   insulin glargine (LANTUS) 100 UNIT/ML injection vial Inject 4 Units into the skin 2 times daily, Disp-1 vial, R-1Normal      insulin lispro (HUMALOG) 100 UNIT/ML injection vial Inject 0-6 Units into the skin 3 times daily (with meals), Disp-1 vial, R-3DC to [facility]      glucagon, rDNA, 1 MG injection Inject 1 mg into the muscle as needed for Low blood sugar (Blood glucose less than 70 mg/dL and patient NOT ALERT or NPO and does not have IV access. )DC to SNF      docusate sodium (COLACE, DULCOLAX) 100 MG CAPS Take 100 mg by mouth 2 times dailyDC to SNF      ondansetron (ZOFRAN-ODT) 4 MG disintegrating tablet Take 4 mg by mouth every 4 hours as needed for Nausea or VomitingHistorical Med      famotidine (PEPCID) 10 MG tablet Take 10 mg by mouth 2 times dailyHistorical Med      OXYGEN Inhale 2-4 L into the lungs as needed (to keep sat> 90%)Historical Med      doxazosin (CARDURA) 1 MG tablet Take 1 tablet by mouth daily, Disp-30 tablet, R-3DC to SNF      hydrALAZINE (APRESOLINE) 100 MG tablet Take 1 tablet by mouth every 8 123/87 and her pulse is 79. Her respiration is 14 and oxygen saturation is 99%. Physical Exam   Constitutional: She is oriented to person, place, and time. Nasal cannula in place. HENT:   Head: Normocephalic and atraumatic. Right Ear: External ear normal.   Left Ear: External ear normal.   Eyes: Conjunctivae are normal. Right eye exhibits no discharge. Left eye exhibits no discharge. No scleral icterus. Neck: Normal range of motion. Neck supple. No JVD present. Pulmonary/Chest: Effort normal. No stridor. No respiratory distress. Abdominal: Soft. She exhibits no distension. There is no tenderness. Musculoskeletal: Normal range of motion. She exhibits no edema. Neurological: She is alert and oriented to person, place, and time. She exhibits normal muscle tone. GCS eye subscore is 4. GCS verbal subscore is 5. GCS motor subscore is 6. Skin: Skin is warm and dry. She is not diaphoretic. No erythema. Psychiatric: She has a normal mood and affect. Her behavior is normal.   Nursing note and vitals reviewed. DIFFERENTIALDIAGNOSIS:   Differential including, but not limited to Confusion, AMS, Fluid overload    DIAGNOSTIC RESULTS     EKG: All EKG's are interpreted by the Emergency Juancarlos Burnett who either signs or Co-signs this chart in the absence of a cardiologist.    Carla Haider. Rate: 72 bpm  TX interval: 188 ms  QRS duration: 86 ms  QTc: 427 ms  P-R-T axes: 90, -1, -72  Normal sinus rhythm. Anterior infarct, age undetermined. No STEMI    RADIOLOGY: non-plain film images(s) such as CT, Ultrasound and MRI are read by the radiologist.    CT HEAD WO CONTRAST   Final Result       No acute intracranial abnormality is identified within the limits of a motion degraded exam. Senescent changes are again noted. If there is continued clinical concern for acute ischemia, MRI is more sensitive and may be considered. **This report has been created using voice recognition software.  It may contain minor errors which are inherent in voice recognition technology. **      Final report electronically signed by Dr. George Otero on 7/13/2019 12:12 PM      CT ABDOMEN PELVIS WO CONTRAST Additional Contrast? None   Final Result   No acute inflammatory or infectious process in the abdomen or pelvis. No evidence of bowel obstruction. Moderate to large amount of stool in the cecum and ascending and proximal transverse colon. No urinary tract calculi. No hydroureteronephrosis. Peritoneal dialysis catheter with a small amount of dialysate in the pelvis. Atrophic pancreas. Anasarca. Additional findings as detailed above. **This report has been created using voice recognition software. It may contain minor errors which are inherent in voice recognition technology. **      Final report electronically signed by Dr. Nestor Suazo on 7/12/2019 10:10 PM      CT CHEST WO CONTRAST   Final Result    Moderate bilateral pleural effusions, larger on the right extending into the right apex, both similar compared to the prior study. Mild interstitial infiltrates likely representing pulmonary edema, with a small patchy focus of alveolar confluence in the left upper lobe. Bilateral multilobar atelectasis. Stable mild mediastinal adenopathy. Additional stable findings as detailed above. **This report has been created using voice recognition software. It may contain minor errors which are inherent in voice recognition technology. **      Final report electronically signed by Dr. Nestor Suazo on 7/12/2019 10:00 PM      XR CHEST PORTABLE   Final Result   There is a moderate right pleural effusion possibly loculated. Cardiomegaly. Small left pleural effusion. Bibasilar probable atelectasis. Infiltrate cannot be excluded. **This report has been created using voice recognition software. It may contain minor errors which are inherent in voice recognition technology. **      Final report electronically

## 2019-07-13 NOTE — ED NOTES
Pt was low in the bed. Boosted her up in bed. Pt still c/o CAN.       Crista Lares RN  07/12/19 6029

## 2019-07-14 PROCEDURE — 93010 ELECTROCARDIOGRAM REPORT: CPT | Performed by: INTERNAL MEDICINE

## 2019-07-30 ENCOUNTER — OFFICE VISIT (OUTPATIENT)
Dept: CARDIOLOGY CLINIC | Age: 69
End: 2019-07-30
Payer: MEDICARE

## 2019-07-30 ENCOUNTER — TELEPHONE (OUTPATIENT)
Dept: CARDIOLOGY CLINIC | Age: 69
End: 2019-07-30

## 2019-07-30 VITALS
BODY MASS INDEX: 27.8 KG/M2 | HEIGHT: 66 IN | SYSTOLIC BLOOD PRESSURE: 110 MMHG | DIASTOLIC BLOOD PRESSURE: 58 MMHG | HEART RATE: 64 BPM | WEIGHT: 173 LBS

## 2019-07-30 DIAGNOSIS — R06.02 SHORTNESS OF BREATH: Primary | ICD-10-CM

## 2019-07-30 DIAGNOSIS — I50.32 CHRONIC DIASTOLIC (CONGESTIVE) HEART FAILURE (HCC): ICD-10-CM

## 2019-07-30 DIAGNOSIS — I10 ESSENTIAL HYPERTENSION: Chronic | ICD-10-CM

## 2019-07-30 DIAGNOSIS — I34.0 MILD MITRAL REGURGITATION: ICD-10-CM

## 2019-07-30 DIAGNOSIS — I10 ESSENTIAL HYPERTENSION: Primary | ICD-10-CM

## 2019-07-30 PROCEDURE — 99213 OFFICE O/P EST LOW 20 MIN: CPT | Performed by: PHYSICIAN ASSISTANT

## 2019-07-30 PROCEDURE — G8400 PT W/DXA NO RESULTS DOC: HCPCS | Performed by: PHYSICIAN ASSISTANT

## 2019-07-30 PROCEDURE — 4040F PNEUMOC VAC/ADMIN/RCVD: CPT | Performed by: PHYSICIAN ASSISTANT

## 2019-07-30 PROCEDURE — 1090F PRES/ABSN URINE INCON ASSESS: CPT | Performed by: PHYSICIAN ASSISTANT

## 2019-07-30 PROCEDURE — G8419 CALC BMI OUT NRM PARAM NOF/U: HCPCS | Performed by: PHYSICIAN ASSISTANT

## 2019-07-30 PROCEDURE — 1123F ACP DISCUSS/DSCN MKR DOCD: CPT | Performed by: PHYSICIAN ASSISTANT

## 2019-07-30 PROCEDURE — 3017F COLORECTAL CA SCREEN DOC REV: CPT | Performed by: PHYSICIAN ASSISTANT

## 2019-07-30 PROCEDURE — G8428 CUR MEDS NOT DOCUMENT: HCPCS | Performed by: PHYSICIAN ASSISTANT

## 2019-07-30 PROCEDURE — 1036F TOBACCO NON-USER: CPT | Performed by: PHYSICIAN ASSISTANT

## 2019-07-30 RX ORDER — SERTRALINE HYDROCHLORIDE 25 MG/1
25 TABLET, FILM COATED ORAL DAILY
COMMUNITY
End: 2020-01-01 | Stop reason: ALTCHOICE

## 2019-07-30 NOTE — TELEPHONE ENCOUNTER
lexiscan stress scheduled at [facility] 08-13-19  geraldine at [facility] notified, instructions reviewed and faxed to [facility]  ([phone])

## 2019-07-30 NOTE — TELEPHONE ENCOUNTER
Spoke with Opal at [facility]. Opal voiced understanding. Order printed and given to scheduling.

## 2019-07-30 NOTE — PROGRESS NOTES
Hospital follow-up. She denies having any chest pain or palpitations. She has intermittent SOB, dizziness, lightheadedness, and JAMARI. [facility] PROFESSIONAL [facility]  [facility] [address] [place] [address]   Dept: [phone]   Dept Fax: [phone]: [phone]      Chief Complaint   Patient presents with    Follow[facility]Up from [facility]    Coronary Artery Disease     Patient presents for follow-up appointment after recent hospitalization and noted troponin elevation. She denies any chest pain or palpitations. She has intermittent shortness of breath with exertion.    Cardiologist:  Dr. Briseyda Flores:   No fever, no chills, No fatigue or weight loss  Pulmonary:   Intermittent shortness of breath   cardiac:    Denies recent chest pain   GI:     No nausea or vomiting, no abdominal pain  Neuro:    No dizziness or light headedness  Musculoskeletal:  No recent active issues  Extremities:   No edema, good peripheral pulses      Past Medical History:   Diagnosis Date    Adjustment disorder with mixed anxiety and depressed mood     Anemia     Anemia in chronic kidney disease(285.21)     Anxiety     Arthritis     Asthma     Bipolar 1 disorder (HCC)     Bradycardia     CHF (congestive heart failure) (HCC)     Chronic diastolic heart failure (HCC)     Chronic kidney disease, stage III (moderate) (HCC)     CKD (chronic kidney disease)     Stage 3    Depression     Difficulty walking     Dizziness and giddiness     GERD (gastroesophageal reflux disease)     Gout     Headache(784.0)     Hyperkalemia     Hyperlipidemia     Hypertension     Hypertensive urgency     Hypothyroid     Hypothyroidism     Low back pain     Major depressive disorder, recurrent, mild ([facility])     Muscle weakness (generalized)     Neurogenic bladder     Pleural effusion 06/16/2019    R pleurex catheter placed    Pneumonia     Seizure ([facility] 01/2018    Type II

## 2019-09-11 DIAGNOSIS — I50.32 CHRONIC DIASTOLIC (CONGESTIVE) HEART FAILURE (HCC): ICD-10-CM

## 2019-09-11 DIAGNOSIS — R06.02 SHORTNESS OF BREATH: ICD-10-CM

## 2019-09-11 DIAGNOSIS — I10 ESSENTIAL HYPERTENSION: Chronic | ICD-10-CM

## 2019-09-11 DIAGNOSIS — I34.0 MILD MITRAL REGURGITATION: ICD-10-CM

## 2019-10-17 ENCOUNTER — OFFICE VISIT (OUTPATIENT)
Dept: PULMONOLOGY | Age: 69
End: 2019-10-17
Payer: MEDICARE

## 2019-10-17 VITALS
DIASTOLIC BLOOD PRESSURE: 64 MMHG | HEIGHT: 66 IN | BODY MASS INDEX: 26.84 KG/M2 | WEIGHT: 167 LBS | SYSTOLIC BLOOD PRESSURE: 112 MMHG | TEMPERATURE: 98.6 F | OXYGEN SATURATION: 98 % | HEART RATE: 60 BPM

## 2019-10-17 DIAGNOSIS — Z87.09 HISTORY OF PLEURAL EFFUSION: Primary | ICD-10-CM

## 2019-10-17 DIAGNOSIS — N18.5 CHRONIC KIDNEY DISEASE (CKD), STAGE V (HCC): ICD-10-CM

## 2019-10-17 DIAGNOSIS — D64.9 CHRONIC ANEMIA: ICD-10-CM

## 2019-10-17 DIAGNOSIS — J98.4 RESTRICTIVE LUNG DISEASE: ICD-10-CM

## 2019-10-17 DIAGNOSIS — I50.32 CHRONIC HEART FAILURE WITH PRESERVED EJECTION FRACTION (HCC): ICD-10-CM

## 2019-10-17 PROCEDURE — G8417 CALC BMI ABV UP PARAM F/U: HCPCS | Performed by: NURSE PRACTITIONER

## 2019-10-17 PROCEDURE — G8484 FLU IMMUNIZE NO ADMIN: HCPCS | Performed by: NURSE PRACTITIONER

## 2019-10-17 PROCEDURE — G8427 DOCREV CUR MEDS BY ELIG CLIN: HCPCS | Performed by: NURSE PRACTITIONER

## 2019-10-17 PROCEDURE — 4040F PNEUMOC VAC/ADMIN/RCVD: CPT | Performed by: NURSE PRACTITIONER

## 2019-10-17 PROCEDURE — G8400 PT W/DXA NO RESULTS DOC: HCPCS | Performed by: NURSE PRACTITIONER

## 2019-10-17 PROCEDURE — 3017F COLORECTAL CA SCREEN DOC REV: CPT | Performed by: NURSE PRACTITIONER

## 2019-10-17 PROCEDURE — 1090F PRES/ABSN URINE INCON ASSESS: CPT | Performed by: NURSE PRACTITIONER

## 2019-10-17 PROCEDURE — 1036F TOBACCO NON-USER: CPT | Performed by: NURSE PRACTITIONER

## 2019-10-17 PROCEDURE — 99215 OFFICE O/P EST HI 40 MIN: CPT | Performed by: NURSE PRACTITIONER

## 2019-10-17 PROCEDURE — 1123F ACP DISCUSS/DSCN MKR DOCD: CPT | Performed by: NURSE PRACTITIONER

## 2019-10-17 ASSESSMENT — ENCOUNTER SYMPTOMS
SHORTNESS OF BREATH: 0
STRIDOR: 0
ALLERGIC/IMMUNOLOGIC NEGATIVE: 1
CHEST TIGHTNESS: 0
COUGH: 0
NAUSEA: 0
EYES NEGATIVE: 1
BACK PAIN: 0
DIARRHEA: 0
WHEEZING: 0

## 2019-11-26 ENCOUNTER — OFFICE VISIT (OUTPATIENT)
Dept: CARDIOLOGY CLINIC | Age: 69
End: 2019-11-26
Payer: MEDICARE

## 2019-11-26 VITALS
HEART RATE: 68 BPM | WEIGHT: 167 LBS | HEIGHT: 66 IN | SYSTOLIC BLOOD PRESSURE: 132 MMHG | BODY MASS INDEX: 26.84 KG/M2 | DIASTOLIC BLOOD PRESSURE: 70 MMHG

## 2019-11-26 DIAGNOSIS — E78.01 FAMILIAL HYPERCHOLESTEROLEMIA: ICD-10-CM

## 2019-11-26 DIAGNOSIS — I10 ESSENTIAL HYPERTENSION: Primary | ICD-10-CM

## 2019-11-26 PROCEDURE — 3017F COLORECTAL CA SCREEN DOC REV: CPT | Performed by: NUCLEAR MEDICINE

## 2019-11-26 PROCEDURE — G8417 CALC BMI ABV UP PARAM F/U: HCPCS | Performed by: NUCLEAR MEDICINE

## 2019-11-26 PROCEDURE — 99213 OFFICE O/P EST LOW 20 MIN: CPT | Performed by: NUCLEAR MEDICINE

## 2019-11-26 PROCEDURE — G8484 FLU IMMUNIZE NO ADMIN: HCPCS | Performed by: NUCLEAR MEDICINE

## 2019-11-26 PROCEDURE — G8400 PT W/DXA NO RESULTS DOC: HCPCS | Performed by: NUCLEAR MEDICINE

## 2019-11-26 PROCEDURE — 1090F PRES/ABSN URINE INCON ASSESS: CPT | Performed by: NUCLEAR MEDICINE

## 2019-11-26 PROCEDURE — G8427 DOCREV CUR MEDS BY ELIG CLIN: HCPCS | Performed by: NUCLEAR MEDICINE

## 2019-11-26 PROCEDURE — 1036F TOBACCO NON-USER: CPT | Performed by: NUCLEAR MEDICINE

## 2019-11-26 PROCEDURE — 1123F ACP DISCUSS/DSCN MKR DOCD: CPT | Performed by: NUCLEAR MEDICINE

## 2019-11-26 PROCEDURE — 4040F PNEUMOC VAC/ADMIN/RCVD: CPT | Performed by: NUCLEAR MEDICINE

## 2019-11-26 RX ORDER — POTASSIUM CHLORIDE 20 MEQ/1
20 TABLET, EXTENDED RELEASE ORAL DAILY
Status: ON HOLD | COMMUNITY
End: 2020-01-24

## 2019-11-26 RX ORDER — FLUTICASONE PROPIONATE 50 MCG
2 SPRAY, SUSPENSION (ML) NASAL DAILY
COMMUNITY

## 2019-11-26 RX ORDER — CALCIUM POLYCARBOPHIL 625 MG 625 MG/1
3 TABLET ORAL
COMMUNITY

## 2019-11-26 RX ORDER — GUAIFENESIN 200 MG/1
200 TABLET ORAL EVERY 4 HOURS PRN
COMMUNITY

## 2019-11-26 RX ORDER — SEVELAMER CARBONATE 800 MG/1
2 TABLET, FILM COATED ORAL
COMMUNITY

## 2020-01-01 ENCOUNTER — APPOINTMENT (OUTPATIENT)
Dept: MRI IMAGING | Age: 70
DRG: 871 | End: 2020-01-01
Attending: INTERNAL MEDICINE
Payer: MEDICARE

## 2020-01-01 ENCOUNTER — APPOINTMENT (OUTPATIENT)
Dept: GENERAL RADIOLOGY | Age: 70
DRG: 871 | End: 2020-01-01
Attending: INTERNAL MEDICINE
Payer: MEDICARE

## 2020-01-01 ENCOUNTER — APPOINTMENT (OUTPATIENT)
Dept: GENERAL RADIOLOGY | Age: 70
DRG: 312 | End: 2020-01-01
Attending: INTERNAL MEDICINE
Payer: MEDICARE

## 2020-01-01 ENCOUNTER — OFFICE VISIT (OUTPATIENT)
Dept: CARDIOLOGY CLINIC | Age: 70
End: 2020-01-01
Payer: MEDICARE

## 2020-01-01 ENCOUNTER — APPOINTMENT (OUTPATIENT)
Dept: CT IMAGING | Age: 70
DRG: 871 | End: 2020-01-01
Attending: INTERNAL MEDICINE
Payer: MEDICARE

## 2020-01-01 ENCOUNTER — HOSPITAL ENCOUNTER (INPATIENT)
Age: 70
LOS: 15 days | Discharge: NURSING, REHAB OR LONG-TERM CARE | DRG: 871 | End: 2020-08-28
Attending: INTERNAL MEDICINE | Admitting: INTERNAL MEDICINE
Payer: MEDICARE

## 2020-01-01 ENCOUNTER — TELEPHONE (OUTPATIENT)
Dept: CARDIOLOGY CLINIC | Age: 70
End: 2020-01-01

## 2020-01-01 ENCOUNTER — APPOINTMENT (OUTPATIENT)
Dept: NUCLEAR MEDICINE | Age: 70
DRG: 871 | End: 2020-01-01
Attending: INTERNAL MEDICINE
Payer: MEDICARE

## 2020-01-01 VITALS
OXYGEN SATURATION: 96 % | DIASTOLIC BLOOD PRESSURE: 68 MMHG | RESPIRATION RATE: 18 BRPM | SYSTOLIC BLOOD PRESSURE: 142 MMHG | BODY MASS INDEX: 30.41 KG/M2 | HEART RATE: 64 BPM | HEIGHT: 66 IN | TEMPERATURE: 99.4 F | WEIGHT: 189.2 LBS

## 2020-01-01 VITALS
TEMPERATURE: 98.4 F | BODY MASS INDEX: 30.49 KG/M2 | SYSTOLIC BLOOD PRESSURE: 142 MMHG | WEIGHT: 183 LBS | HEIGHT: 65 IN | OXYGEN SATURATION: 98 % | DIASTOLIC BLOOD PRESSURE: 50 MMHG | RESPIRATION RATE: 16 BRPM | HEART RATE: 53 BPM

## 2020-01-01 VITALS
HEART RATE: 56 BPM | BODY MASS INDEX: 30.96 KG/M2 | WEIGHT: 185.8 LBS | HEIGHT: 65 IN | DIASTOLIC BLOOD PRESSURE: 82 MMHG | SYSTOLIC BLOOD PRESSURE: 138 MMHG

## 2020-01-01 VITALS
WEIGHT: 186.6 LBS | HEIGHT: 66 IN | DIASTOLIC BLOOD PRESSURE: 70 MMHG | HEART RATE: 68 BPM | SYSTOLIC BLOOD PRESSURE: 127 MMHG | BODY MASS INDEX: 29.99 KG/M2

## 2020-01-01 LAB
ALBUMIN SERPL-MCNC: 2.1 G/DL (ref 3.5–5.1)
ALBUMIN SERPL-MCNC: 2.3 G/DL (ref 3.5–5.1)
ALBUMIN SERPL-MCNC: 2.3 G/DL (ref 3.5–5.1)
ALBUMIN SERPL-MCNC: 2.7 G/DL (ref 3.5–5.1)
ALLEN TEST: POSITIVE
ALP BLD-CCNC: 101 U/L (ref 38–126)
ALP BLD-CCNC: 111 U/L (ref 38–126)
ALP BLD-CCNC: 116 U/L (ref 38–126)
ALP BLD-CCNC: 73 U/L (ref 38–126)
ALT SERPL-CCNC: 18 U/L (ref 11–66)
ALT SERPL-CCNC: 19 U/L (ref 11–66)
ALT SERPL-CCNC: 20 U/L (ref 11–66)
ALT SERPL-CCNC: 24 U/L (ref 11–66)
ANION GAP SERPL CALCULATED.3IONS-SCNC: 10 MEQ/L (ref 8–16)
ANION GAP SERPL CALCULATED.3IONS-SCNC: 12 MEQ/L (ref 8–16)
ANION GAP SERPL CALCULATED.3IONS-SCNC: 13 MEQ/L (ref 8–16)
ANION GAP SERPL CALCULATED.3IONS-SCNC: 13 MEQ/L (ref 8–16)
ANION GAP SERPL CALCULATED.3IONS-SCNC: 14 MEQ/L (ref 8–16)
ANION GAP SERPL CALCULATED.3IONS-SCNC: 15 MEQ/L (ref 8–16)
ANION GAP SERPL CALCULATED.3IONS-SCNC: 16 MEQ/L (ref 8–16)
ANION GAP SERPL CALCULATED.3IONS-SCNC: 17 MEQ/L (ref 8–16)
ANION GAP SERPL CALCULATED.3IONS-SCNC: 17 MEQ/L (ref 8–16)
ANION GAP SERPL CALCULATED.3IONS-SCNC: 18 MEQ/L (ref 8–16)
ANISOCYTOSIS: PRESENT
AST SERPL-CCNC: 17 U/L (ref 5–40)
AST SERPL-CCNC: 18 U/L (ref 5–40)
AST SERPL-CCNC: 19 U/L (ref 5–40)
AST SERPL-CCNC: 23 U/L (ref 5–40)
AVERAGE GLUCOSE: 123 MG/DL (ref 70–126)
BASE EXCESS (CALCULATED): -0.2 MMOL/L (ref -2.5–2.5)
BASOPHILIA: ABNORMAL
BASOPHILS # BLD: 0.3 %
BASOPHILS # BLD: 0.7 %
BASOPHILS # BLD: 0.8 %
BASOPHILS # BLD: 0.9 %
BASOPHILS # BLD: 1 %
BASOPHILS # BLD: 1 %
BASOPHILS # BLD: 1.1 %
BASOPHILS ABSOLUTE: 0.1 THOU/MM3 (ref 0–0.1)
BASOPHILS ABSOLUTE: 0.2 THOU/MM3 (ref 0–0.1)
BASOPHILS ABSOLUTE: 0.3 THOU/MM3 (ref 0–0.1)
BILIRUB SERPL-MCNC: 0.2 MG/DL (ref 0.3–1.2)
BILIRUB SERPL-MCNC: 0.3 MG/DL (ref 0.3–1.2)
BILIRUBIN DIRECT: < 0.2 MG/DL (ref 0–0.3)
BLOOD CULTURE, ROUTINE: NORMAL
BODY FLUID CULTURE, STERILE: NORMAL
BODY FLUID RBC: < 2000 /CUMM
BUN BLDV-MCNC: 41 MG/DL (ref 7–22)
BUN BLDV-MCNC: 45 MG/DL (ref 7–22)
BUN BLDV-MCNC: 45 MG/DL (ref 7–22)
BUN BLDV-MCNC: 47 MG/DL (ref 7–22)
BUN BLDV-MCNC: 48 MG/DL (ref 7–22)
BUN BLDV-MCNC: 48 MG/DL (ref 7–22)
BUN BLDV-MCNC: 49 MG/DL (ref 7–22)
BUN BLDV-MCNC: 52 MG/DL (ref 7–22)
BUN BLDV-MCNC: 52 MG/DL (ref 7–22)
BUN BLDV-MCNC: 53 MG/DL (ref 7–22)
BUN BLDV-MCNC: 53 MG/DL (ref 7–22)
BUN BLDV-MCNC: 54 MG/DL (ref 7–22)
BUN BLDV-MCNC: 55 MG/DL (ref 7–22)
BUN BLDV-MCNC: 55 MG/DL (ref 7–22)
BUN BLDV-MCNC: 56 MG/DL (ref 7–22)
BUN BLDV-MCNC: 56 MG/DL (ref 7–22)
BUN BLDV-MCNC: 57 MG/DL (ref 7–22)
BUN BLDV-MCNC: 57 MG/DL (ref 7–22)
BUN BLDV-MCNC: 59 MG/DL (ref 7–22)
C-REACTIVE PROTEIN: 2.73 MG/DL (ref 0–1)
CALCIUM SERPL-MCNC: 8.5 MG/DL (ref 8.5–10.5)
CALCIUM SERPL-MCNC: 8.6 MG/DL (ref 8.5–10.5)
CALCIUM SERPL-MCNC: 8.7 MG/DL (ref 8.5–10.5)
CALCIUM SERPL-MCNC: 8.7 MG/DL (ref 8.5–10.5)
CALCIUM SERPL-MCNC: 8.8 MG/DL (ref 8.5–10.5)
CALCIUM SERPL-MCNC: 8.9 MG/DL (ref 8.5–10.5)
CALCIUM SERPL-MCNC: 9 MG/DL (ref 8.5–10.5)
CALCIUM SERPL-MCNC: 9 MG/DL (ref 8.5–10.5)
CALCIUM SERPL-MCNC: 9.1 MG/DL (ref 8.5–10.5)
CALCIUM SERPL-MCNC: 9.1 MG/DL (ref 8.5–10.5)
CALCIUM SERPL-MCNC: 9.2 MG/DL (ref 8.5–10.5)
CALCIUM SERPL-MCNC: 9.2 MG/DL (ref 8.5–10.5)
CALCIUM SERPL-MCNC: 9.3 MG/DL (ref 8.5–10.5)
CALCIUM SERPL-MCNC: 9.5 MG/DL (ref 8.5–10.5)
CHARACTER, BODY FLUID: CLEAR
CHLORIDE BLD-SCNC: 100 MEQ/L (ref 98–111)
CHLORIDE BLD-SCNC: 86 MEQ/L (ref 98–111)
CHLORIDE BLD-SCNC: 87 MEQ/L (ref 98–111)
CHLORIDE BLD-SCNC: 88 MEQ/L (ref 98–111)
CHLORIDE BLD-SCNC: 89 MEQ/L (ref 98–111)
CHLORIDE BLD-SCNC: 90 MEQ/L (ref 98–111)
CHLORIDE BLD-SCNC: 90 MEQ/L (ref 98–111)
CHLORIDE BLD-SCNC: 92 MEQ/L (ref 98–111)
CHLORIDE BLD-SCNC: 93 MEQ/L (ref 98–111)
CHLORIDE BLD-SCNC: 94 MEQ/L (ref 98–111)
CHLORIDE BLD-SCNC: 95 MEQ/L (ref 98–111)
CHLORIDE BLD-SCNC: 95 MEQ/L (ref 98–111)
CHLORIDE BLD-SCNC: 97 MEQ/L (ref 98–111)
CHLORIDE BLD-SCNC: 98 MEQ/L (ref 98–111)
CO2: 16 MEQ/L (ref 23–33)
CO2: 20 MEQ/L (ref 23–33)
CO2: 21 MEQ/L (ref 23–33)
CO2: 22 MEQ/L (ref 23–33)
CO2: 23 MEQ/L (ref 23–33)
CO2: 24 MEQ/L (ref 23–33)
CO2: 25 MEQ/L (ref 23–33)
COLLECTED BY:: ABNORMAL
COLOR: COLORLESS
CREAT SERPL-MCNC: 6.3 MG/DL (ref 0.4–1.2)
CREAT SERPL-MCNC: 6.3 MG/DL (ref 0.4–1.2)
CREAT SERPL-MCNC: 6.4 MG/DL (ref 0.4–1.2)
CREAT SERPL-MCNC: 6.4 MG/DL (ref 0.4–1.2)
CREAT SERPL-MCNC: 6.5 MG/DL (ref 0.4–1.2)
CREAT SERPL-MCNC: 6.6 MG/DL (ref 0.4–1.2)
CREAT SERPL-MCNC: 6.7 MG/DL (ref 0.4–1.2)
CREAT SERPL-MCNC: 6.8 MG/DL (ref 0.4–1.2)
CREAT SERPL-MCNC: 7.1 MG/DL (ref 0.4–1.2)
CREAT SERPL-MCNC: 7.3 MG/DL (ref 0.4–1.2)
CREAT SERPL-MCNC: 7.3 MG/DL (ref 0.4–1.2)
CREAT SERPL-MCNC: 7.4 MG/DL (ref 0.4–1.2)
CREAT SERPL-MCNC: 7.4 MG/DL (ref 0.4–1.2)
CREAT SERPL-MCNC: 7.5 MG/DL (ref 0.4–1.2)
CREAT SERPL-MCNC: 7.6 MG/DL (ref 0.4–1.2)
CREAT SERPL-MCNC: 7.7 MG/DL (ref 0.4–1.2)
CREAT SERPL-MCNC: 7.9 MG/DL (ref 0.4–1.2)
DEVICE: ABNORMAL
EKG ATRIAL RATE: 57 BPM
EKG ATRIAL RATE: 61 BPM
EKG ATRIAL RATE: 64 BPM
EKG P AXIS: 42 DEGREES
EKG P AXIS: 6 DEGREES
EKG P-R INTERVAL: 140 MS
EKG P-R INTERVAL: 152 MS
EKG P-R INTERVAL: 164 MS
EKG Q-T INTERVAL: 426 MS
EKG Q-T INTERVAL: 446 MS
EKG Q-T INTERVAL: 460 MS
EKG QRS DURATION: 90 MS
EKG QRS DURATION: 92 MS
EKG QRS DURATION: 94 MS
EKG QTC CALCULATION (BAZETT): 414 MS
EKG QTC CALCULATION (BAZETT): 460 MS
EKG QTC CALCULATION (BAZETT): 463 MS
EKG R AXIS: -2 DEGREES
EKG R AXIS: -6 DEGREES
EKG T AXIS: 86 DEGREES
EKG T AXIS: 87 DEGREES
EKG T AXIS: 88 DEGREES
EKG VENTRICULAR RATE: 57 BPM
EKG VENTRICULAR RATE: 61 BPM
EKG VENTRICULAR RATE: 64 BPM
EOSINOPHIL # BLD: 3.2 %
EOSINOPHIL # BLD: 3.4 %
EOSINOPHIL # BLD: 3.5 %
EOSINOPHIL # BLD: 3.5 %
EOSINOPHIL # BLD: 3.6 %
EOSINOPHIL # BLD: 4.1 %
EOSINOPHIL # BLD: 4.1 %
EOSINOPHIL # BLD: 4.2 %
EOSINOPHIL # BLD: 4.5 %
EOSINOPHIL # BLD: 4.9 %
EOSINOPHILS ABSOLUTE: 0.7 THOU/MM3 (ref 0–0.4)
EOSINOPHILS ABSOLUTE: 0.8 THOU/MM3 (ref 0–0.4)
EOSINOPHILS ABSOLUTE: 0.9 THOU/MM3 (ref 0–0.4)
EOSINOPHILS ABSOLUTE: 1 THOU/MM3 (ref 0–0.4)
EOSINOPHILS ABSOLUTE: 1.1 THOU/MM3 (ref 0–0.4)
ERYTHROCYTE [DISTWIDTH] IN BLOOD BY AUTOMATED COUNT: 13.1 % (ref 11.5–14.5)
ERYTHROCYTE [DISTWIDTH] IN BLOOD BY AUTOMATED COUNT: 13.2 % (ref 11.5–14.5)
ERYTHROCYTE [DISTWIDTH] IN BLOOD BY AUTOMATED COUNT: 13.3 % (ref 11.5–14.5)
ERYTHROCYTE [DISTWIDTH] IN BLOOD BY AUTOMATED COUNT: 13.3 % (ref 11.5–14.5)
ERYTHROCYTE [DISTWIDTH] IN BLOOD BY AUTOMATED COUNT: 13.6 % (ref 11.5–14.5)
ERYTHROCYTE [DISTWIDTH] IN BLOOD BY AUTOMATED COUNT: 13.7 % (ref 11.5–14.5)
ERYTHROCYTE [DISTWIDTH] IN BLOOD BY AUTOMATED COUNT: 13.8 % (ref 11.5–14.5)
ERYTHROCYTE [DISTWIDTH] IN BLOOD BY AUTOMATED COUNT: 13.9 % (ref 11.5–14.5)
ERYTHROCYTE [DISTWIDTH] IN BLOOD BY AUTOMATED COUNT: 14 % (ref 11.5–14.5)
ERYTHROCYTE [DISTWIDTH] IN BLOOD BY AUTOMATED COUNT: 14 % (ref 11.5–14.5)
ERYTHROCYTE [DISTWIDTH] IN BLOOD BY AUTOMATED COUNT: 14.4 % (ref 11.5–14.5)
ERYTHROCYTE [DISTWIDTH] IN BLOOD BY AUTOMATED COUNT: 14.7 % (ref 11.5–14.5)
ERYTHROCYTE [DISTWIDTH] IN BLOOD BY AUTOMATED COUNT: 46.8 FL (ref 35–45)
ERYTHROCYTE [DISTWIDTH] IN BLOOD BY AUTOMATED COUNT: 48.4 FL (ref 35–45)
ERYTHROCYTE [DISTWIDTH] IN BLOOD BY AUTOMATED COUNT: 48.8 FL (ref 35–45)
ERYTHROCYTE [DISTWIDTH] IN BLOOD BY AUTOMATED COUNT: 48.8 FL (ref 35–45)
ERYTHROCYTE [DISTWIDTH] IN BLOOD BY AUTOMATED COUNT: 49 FL (ref 35–45)
ERYTHROCYTE [DISTWIDTH] IN BLOOD BY AUTOMATED COUNT: 49 FL (ref 35–45)
ERYTHROCYTE [DISTWIDTH] IN BLOOD BY AUTOMATED COUNT: 49.1 FL (ref 35–45)
ERYTHROCYTE [DISTWIDTH] IN BLOOD BY AUTOMATED COUNT: 49.2 FL (ref 35–45)
ERYTHROCYTE [DISTWIDTH] IN BLOOD BY AUTOMATED COUNT: 49.5 FL (ref 35–45)
ERYTHROCYTE [DISTWIDTH] IN BLOOD BY AUTOMATED COUNT: 50.4 FL (ref 35–45)
ERYTHROCYTE [DISTWIDTH] IN BLOOD BY AUTOMATED COUNT: 50.7 FL (ref 35–45)
ERYTHROCYTE [DISTWIDTH] IN BLOOD BY AUTOMATED COUNT: 51.8 FL (ref 35–45)
ERYTHROCYTE [DISTWIDTH] IN BLOOD BY AUTOMATED COUNT: 52.5 FL (ref 35–45)
ERYTHROCYTE [DISTWIDTH] IN BLOOD BY AUTOMATED COUNT: 53.3 FL (ref 35–45)
ERYTHROCYTE [DISTWIDTH] IN BLOOD BY AUTOMATED COUNT: 53.8 FL (ref 35–45)
GFR SERPL CREATININE-BSD FRML MDRD: 5 ML/MIN/1.73M2
GFR SERPL CREATININE-BSD FRML MDRD: 6 ML/MIN/1.73M2
GFR SERPL CREATININE-BSD FRML MDRD: 7 ML/MIN/1.73M2
GFR SERPL CREATININE-BSD FRML MDRD: 7 ML/MIN/1.73M2
GLUCOSE BLD-MCNC: 100 MG/DL (ref 70–108)
GLUCOSE BLD-MCNC: 102 MG/DL (ref 70–108)
GLUCOSE BLD-MCNC: 102 MG/DL (ref 70–108)
GLUCOSE BLD-MCNC: 103 MG/DL (ref 70–108)
GLUCOSE BLD-MCNC: 104 MG/DL (ref 70–108)
GLUCOSE BLD-MCNC: 104 MG/DL (ref 70–108)
GLUCOSE BLD-MCNC: 106 MG/DL (ref 70–108)
GLUCOSE BLD-MCNC: 109 MG/DL (ref 70–108)
GLUCOSE BLD-MCNC: 110 MG/DL (ref 70–108)
GLUCOSE BLD-MCNC: 111 MG/DL (ref 70–108)
GLUCOSE BLD-MCNC: 112 MG/DL (ref 70–108)
GLUCOSE BLD-MCNC: 112 MG/DL (ref 70–108)
GLUCOSE BLD-MCNC: 113 MG/DL (ref 70–108)
GLUCOSE BLD-MCNC: 117 MG/DL (ref 70–108)
GLUCOSE BLD-MCNC: 117 MG/DL (ref 70–108)
GLUCOSE BLD-MCNC: 118 MG/DL (ref 70–108)
GLUCOSE BLD-MCNC: 119 MG/DL (ref 70–108)
GLUCOSE BLD-MCNC: 122 MG/DL (ref 70–108)
GLUCOSE BLD-MCNC: 123 MG/DL (ref 70–108)
GLUCOSE BLD-MCNC: 123 MG/DL (ref 70–108)
GLUCOSE BLD-MCNC: 126 MG/DL (ref 70–108)
GLUCOSE BLD-MCNC: 128 MG/DL (ref 70–108)
GLUCOSE BLD-MCNC: 130 MG/DL (ref 70–108)
GLUCOSE BLD-MCNC: 131 MG/DL (ref 70–108)
GLUCOSE BLD-MCNC: 132 MG/DL (ref 70–108)
GLUCOSE BLD-MCNC: 133 MG/DL (ref 70–108)
GLUCOSE BLD-MCNC: 133 MG/DL (ref 70–108)
GLUCOSE BLD-MCNC: 136 MG/DL (ref 70–108)
GLUCOSE BLD-MCNC: 138 MG/DL (ref 70–108)
GLUCOSE BLD-MCNC: 139 MG/DL (ref 70–108)
GLUCOSE BLD-MCNC: 139 MG/DL (ref 70–108)
GLUCOSE BLD-MCNC: 140 MG/DL (ref 70–108)
GLUCOSE BLD-MCNC: 141 MG/DL (ref 70–108)
GLUCOSE BLD-MCNC: 141 MG/DL (ref 70–108)
GLUCOSE BLD-MCNC: 144 MG/DL (ref 70–108)
GLUCOSE BLD-MCNC: 146 MG/DL (ref 70–108)
GLUCOSE BLD-MCNC: 147 MG/DL (ref 70–108)
GLUCOSE BLD-MCNC: 147 MG/DL (ref 70–108)
GLUCOSE BLD-MCNC: 148 MG/DL (ref 70–108)
GLUCOSE BLD-MCNC: 148 MG/DL (ref 70–108)
GLUCOSE BLD-MCNC: 151 MG/DL (ref 70–108)
GLUCOSE BLD-MCNC: 153 MG/DL (ref 70–108)
GLUCOSE BLD-MCNC: 157 MG/DL (ref 70–108)
GLUCOSE BLD-MCNC: 160 MG/DL (ref 70–108)
GLUCOSE BLD-MCNC: 161 MG/DL (ref 70–108)
GLUCOSE BLD-MCNC: 162 MG/DL (ref 70–108)
GLUCOSE BLD-MCNC: 165 MG/DL (ref 70–108)
GLUCOSE BLD-MCNC: 165 MG/DL (ref 70–108)
GLUCOSE BLD-MCNC: 167 MG/DL (ref 70–108)
GLUCOSE BLD-MCNC: 168 MG/DL (ref 70–108)
GLUCOSE BLD-MCNC: 169 MG/DL (ref 70–108)
GLUCOSE BLD-MCNC: 171 MG/DL (ref 70–108)
GLUCOSE BLD-MCNC: 173 MG/DL (ref 70–108)
GLUCOSE BLD-MCNC: 174 MG/DL (ref 70–108)
GLUCOSE BLD-MCNC: 175 MG/DL (ref 70–108)
GLUCOSE BLD-MCNC: 179 MG/DL (ref 70–108)
GLUCOSE BLD-MCNC: 180 MG/DL (ref 70–108)
GLUCOSE BLD-MCNC: 184 MG/DL (ref 70–108)
GLUCOSE BLD-MCNC: 185 MG/DL (ref 70–108)
GLUCOSE BLD-MCNC: 188 MG/DL (ref 70–108)
GLUCOSE BLD-MCNC: 190 MG/DL (ref 70–108)
GLUCOSE BLD-MCNC: 192 MG/DL (ref 70–108)
GLUCOSE BLD-MCNC: 194 MG/DL (ref 70–108)
GLUCOSE BLD-MCNC: 199 MG/DL (ref 70–108)
GLUCOSE BLD-MCNC: 201 MG/DL (ref 70–108)
GLUCOSE BLD-MCNC: 206 MG/DL (ref 70–108)
GLUCOSE BLD-MCNC: 207 MG/DL (ref 70–108)
GLUCOSE BLD-MCNC: 208 MG/DL (ref 70–108)
GLUCOSE BLD-MCNC: 212 MG/DL (ref 70–108)
GLUCOSE BLD-MCNC: 218 MG/DL (ref 70–108)
GLUCOSE BLD-MCNC: 224 MG/DL (ref 70–108)
GLUCOSE BLD-MCNC: 226 MG/DL (ref 70–108)
GLUCOSE BLD-MCNC: 232 MG/DL (ref 70–108)
GLUCOSE BLD-MCNC: 239 MG/DL (ref 70–108)
GLUCOSE BLD-MCNC: 245 MG/DL (ref 70–108)
GLUCOSE BLD-MCNC: 254 MG/DL (ref 70–108)
GLUCOSE BLD-MCNC: 70 MG/DL (ref 70–108)
GLUCOSE BLD-MCNC: 77 MG/DL (ref 70–108)
GLUCOSE BLD-MCNC: 80 MG/DL (ref 70–108)
GLUCOSE BLD-MCNC: 86 MG/DL (ref 70–108)
GLUCOSE BLD-MCNC: 89 MG/DL (ref 70–108)
GLUCOSE BLD-MCNC: 89 MG/DL (ref 70–108)
GLUCOSE BLD-MCNC: 91 MG/DL (ref 70–108)
GLUCOSE BLD-MCNC: 92 MG/DL (ref 70–108)
GLUCOSE BLD-MCNC: 96 MG/DL (ref 70–108)
GLUCOSE BLD-MCNC: 97 MG/DL (ref 70–108)
GLUCOSE BLD-MCNC: 97 MG/DL (ref 70–108)
GRAM STAIN RESULT: NORMAL
HBA1C MFR BLD: 6.1 % (ref 4.4–6.4)
HCO3: 24 MMOL/L (ref 23–28)
HCT VFR BLD CALC: 28.4 % (ref 37–47)
HCT VFR BLD CALC: 28.7 % (ref 37–47)
HCT VFR BLD CALC: 29.4 % (ref 37–47)
HCT VFR BLD CALC: 29.6 % (ref 37–47)
HCT VFR BLD CALC: 31.4 % (ref 37–47)
HCT VFR BLD CALC: 31.8 % (ref 37–47)
HCT VFR BLD CALC: 31.8 % (ref 37–47)
HCT VFR BLD CALC: 32.1 % (ref 37–47)
HCT VFR BLD CALC: 32.5 % (ref 37–47)
HCT VFR BLD CALC: 32.6 % (ref 37–47)
HCT VFR BLD CALC: 33.7 % (ref 37–47)
HCT VFR BLD CALC: 33.8 % (ref 37–47)
HCT VFR BLD CALC: 34.9 % (ref 37–47)
HCT VFR BLD CALC: 35.3 % (ref 37–47)
HCT VFR BLD CALC: 36 % (ref 37–47)
HEMOGLOBIN: 10 GM/DL (ref 12–16)
HEMOGLOBIN: 10.2 GM/DL (ref 12–16)
HEMOGLOBIN: 10.3 GM/DL (ref 12–16)
HEMOGLOBIN: 10.4 GM/DL (ref 12–16)
HEMOGLOBIN: 10.5 GM/DL (ref 12–16)
HEMOGLOBIN: 10.6 GM/DL (ref 12–16)
HEMOGLOBIN: 10.9 GM/DL (ref 12–16)
HEMOGLOBIN: 10.9 GM/DL (ref 12–16)
HEMOGLOBIN: 11.3 GM/DL (ref 12–16)
HEMOGLOBIN: 11.4 GM/DL (ref 12–16)
HEMOGLOBIN: 11.5 GM/DL (ref 12–16)
HEMOGLOBIN: 11.5 GM/DL (ref 12–16)
HEMOGLOBIN: 9.6 GM/DL (ref 12–16)
HEMOGLOBIN: 9.8 GM/DL (ref 12–16)
HEMOGLOBIN: 9.9 GM/DL (ref 12–16)
IMMATURE GRANS (ABS): 0.9 THOU/MM3 (ref 0–0.07)
IMMATURE GRANS (ABS): 0.93 THOU/MM3 (ref 0–0.07)
IMMATURE GRANS (ABS): 1.14 THOU/MM3 (ref 0–0.07)
IMMATURE GRANS (ABS): 1.25 THOU/MM3 (ref 0–0.07)
IMMATURE GRANS (ABS): 1.36 THOU/MM3 (ref 0–0.07)
IMMATURE GRANS (ABS): 1.42 THOU/MM3 (ref 0–0.07)
IMMATURE GRANS (ABS): 1.82 THOU/MM3 (ref 0–0.07)
IMMATURE GRANS (ABS): 2.03 THOU/MM3 (ref 0–0.07)
IMMATURE GRANS (ABS): 2.05 THOU/MM3 (ref 0–0.07)
IMMATURE GRANS (ABS): 2.07 THOU/MM3 (ref 0–0.07)
IMMATURE GRANS (ABS): 2.1 THOU/MM3 (ref 0–0.07)
IMMATURE GRANS (ABS): 2.54 THOU/MM3 (ref 0–0.07)
IMMATURE GRANULOCYTES: 11 %
IMMATURE GRANULOCYTES: 4.1 %
IMMATURE GRANULOCYTES: 4.8 %
IMMATURE GRANULOCYTES: 5.9 %
IMMATURE GRANULOCYTES: 5.9 %
IMMATURE GRANULOCYTES: 6.1 %
IMMATURE GRANULOCYTES: 7.1 %
IMMATURE GRANULOCYTES: 7.9 %
IMMATURE GRANULOCYTES: 8.5 %
IMMATURE GRANULOCYTES: 8.7 %
IMMATURE GRANULOCYTES: 8.9 %
IMMATURE GRANULOCYTES: 9.4 %
INTERPRETATION: NORMAL
LACTIC ACID: 1.2 MMOL/L (ref 0.5–2.2)
LACTIC ACID: 1.5 MMOL/L (ref 0.5–2.2)
LV EF: 60 %
LVEF MODALITY: NORMAL
LYMPHOCYTES # BLD: 10.2 %
LYMPHOCYTES # BLD: 10.5 %
LYMPHOCYTES # BLD: 10.9 %
LYMPHOCYTES # BLD: 11.1 %
LYMPHOCYTES # BLD: 11.9 %
LYMPHOCYTES # BLD: 13 %
LYMPHOCYTES # BLD: 13.5 %
LYMPHOCYTES # BLD: 13.7 %
LYMPHOCYTES # BLD: 14.5 %
LYMPHOCYTES # BLD: 14.6 %
LYMPHOCYTES # BLD: 14.6 %
LYMPHOCYTES # BLD: 8.5 %
LYMPHOCYTES ABSOLUTE: 2 THOU/MM3 (ref 1–4.8)
LYMPHOCYTES ABSOLUTE: 2.2 THOU/MM3 (ref 1–4.8)
LYMPHOCYTES ABSOLUTE: 2.5 THOU/MM3 (ref 1–4.8)
LYMPHOCYTES ABSOLUTE: 2.6 THOU/MM3 (ref 1–4.8)
LYMPHOCYTES ABSOLUTE: 2.6 THOU/MM3 (ref 1–4.8)
LYMPHOCYTES ABSOLUTE: 2.7 THOU/MM3 (ref 1–4.8)
LYMPHOCYTES ABSOLUTE: 2.9 THOU/MM3 (ref 1–4.8)
LYMPHOCYTES ABSOLUTE: 3.1 THOU/MM3 (ref 1–4.8)
LYMPHOCYTES ABSOLUTE: 3.2 THOU/MM3 (ref 1–4.8)
LYMPHOCYTES ABSOLUTE: 3.4 THOU/MM3 (ref 1–4.8)
MCH RBC QN AUTO: 32.1 PG (ref 26–33)
MCH RBC QN AUTO: 32.7 PG (ref 26–33)
MCH RBC QN AUTO: 32.7 PG (ref 26–33)
MCH RBC QN AUTO: 32.8 PG (ref 26–33)
MCH RBC QN AUTO: 32.8 PG (ref 26–33)
MCH RBC QN AUTO: 32.9 PG (ref 26–33)
MCH RBC QN AUTO: 32.9 PG (ref 26–33)
MCH RBC QN AUTO: 33.2 PG (ref 26–33)
MCH RBC QN AUTO: 33.3 PG (ref 26–33)
MCH RBC QN AUTO: 33.3 PG (ref 26–33)
MCH RBC QN AUTO: 33.4 PG (ref 26–33)
MCH RBC QN AUTO: 33.7 PG (ref 26–33)
MCH RBC QN AUTO: 33.7 PG (ref 26–33)
MCH RBC QN AUTO: 33.8 PG (ref 26–33)
MCH RBC QN AUTO: 33.9 PG (ref 26–33)
MCHC RBC AUTO-ENTMCNC: 31.2 GM/DL (ref 32.2–35.5)
MCHC RBC AUTO-ENTMCNC: 31.6 GM/DL (ref 32.2–35.5)
MCHC RBC AUTO-ENTMCNC: 31.9 GM/DL (ref 32.2–35.5)
MCHC RBC AUTO-ENTMCNC: 32.5 GM/DL (ref 32.2–35.5)
MCHC RBC AUTO-ENTMCNC: 32.6 GM/DL (ref 32.2–35.5)
MCHC RBC AUTO-ENTMCNC: 32.6 GM/DL (ref 32.2–35.5)
MCHC RBC AUTO-ENTMCNC: 32.7 GM/DL (ref 32.2–35.5)
MCHC RBC AUTO-ENTMCNC: 32.7 GM/DL (ref 32.2–35.5)
MCHC RBC AUTO-ENTMCNC: 33 GM/DL (ref 32.2–35.5)
MCHC RBC AUTO-ENTMCNC: 33.5 GM/DL (ref 32.2–35.5)
MCHC RBC AUTO-ENTMCNC: 33.7 GM/DL (ref 32.2–35.5)
MCHC RBC AUTO-ENTMCNC: 33.8 GM/DL (ref 32.2–35.5)
MCHC RBC AUTO-ENTMCNC: 34 GM/DL (ref 32.2–35.5)
MCHC RBC AUTO-ENTMCNC: 34.1 GM/DL (ref 32.2–35.5)
MCHC RBC AUTO-ENTMCNC: 34.9 GM/DL (ref 32.2–35.5)
MCV RBC AUTO: 100 FL (ref 81–99)
MCV RBC AUTO: 100.6 FL (ref 81–99)
MCV RBC AUTO: 100.6 FL (ref 81–99)
MCV RBC AUTO: 101.9 FL (ref 81–99)
MCV RBC AUTO: 102.2 FL (ref 81–99)
MCV RBC AUTO: 102.6 FL (ref 81–99)
MCV RBC AUTO: 102.6 FL (ref 81–99)
MCV RBC AUTO: 103.5 FL (ref 81–99)
MCV RBC AUTO: 104.2 FL (ref 81–99)
MCV RBC AUTO: 96.6 FL (ref 81–99)
MCV RBC AUTO: 97.4 FL (ref 81–99)
MCV RBC AUTO: 97.6 FL (ref 81–99)
MCV RBC AUTO: 98.5 FL (ref 81–99)
MCV RBC AUTO: 99.7 FL (ref 81–99)
MCV RBC AUTO: 99.7 FL (ref 81–99)
MONOCYTES # BLD: 6 %
MONOCYTES # BLD: 6.4 %
MONOCYTES # BLD: 6.5 %
MONOCYTES # BLD: 6.5 %
MONOCYTES # BLD: 6.8 %
MONOCYTES # BLD: 6.9 %
MONOCYTES # BLD: 6.9 %
MONOCYTES # BLD: 7 %
MONOCYTES # BLD: 7.1 %
MONOCYTES # BLD: 7.3 %
MONOCYTES ABSOLUTE: 1.2 THOU/MM3 (ref 0.4–1.3)
MONOCYTES ABSOLUTE: 1.3 THOU/MM3 (ref 0.4–1.3)
MONOCYTES ABSOLUTE: 1.3 THOU/MM3 (ref 0.4–1.3)
MONOCYTES ABSOLUTE: 1.4 THOU/MM3 (ref 0.4–1.3)
MONOCYTES ABSOLUTE: 1.4 THOU/MM3 (ref 0.4–1.3)
MONOCYTES ABSOLUTE: 1.5 THOU/MM3 (ref 0.4–1.3)
MONOCYTES ABSOLUTE: 1.5 THOU/MM3 (ref 0.4–1.3)
MONOCYTES ABSOLUTE: 1.6 THOU/MM3 (ref 0.4–1.3)
MONOCYTES ABSOLUTE: 1.7 THOU/MM3 (ref 0.4–1.3)
MONOCYTES ABSOLUTE: 1.7 THOU/MM3 (ref 0.4–1.3)
MONONUCLEAR CELLS BODY FLUID: 69 %
MRSA SCREEN RT-PCR: POSITIVE
MRSA SCREEN: NORMAL
NUCLEATED RED BLOOD CELLS: 0 /100 WBC
O2 SATURATION: 92 %
PATHOLOGIST REVIEW: NORMAL
PCO2: 35 MMHG (ref 35–45)
PH BLOOD GAS: 7.44 (ref 7.35–7.45)
PLATELET # BLD: 267 THOU/MM3 (ref 130–400)
PLATELET # BLD: 304 THOU/MM3 (ref 130–400)
PLATELET # BLD: 358 THOU/MM3 (ref 130–400)
PLATELET # BLD: 367 THOU/MM3 (ref 130–400)
PLATELET # BLD: 370 THOU/MM3 (ref 130–400)
PLATELET # BLD: 371 THOU/MM3 (ref 130–400)
PLATELET # BLD: 372 THOU/MM3 (ref 130–400)
PLATELET # BLD: 391 THOU/MM3 (ref 130–400)
PLATELET # BLD: 394 THOU/MM3 (ref 130–400)
PLATELET # BLD: 396 THOU/MM3 (ref 130–400)
PLATELET # BLD: 402 THOU/MM3 (ref 130–400)
PLATELET # BLD: 413 THOU/MM3 (ref 130–400)
PLATELET # BLD: 422 THOU/MM3 (ref 130–400)
PLATELET # BLD: 438 THOU/MM3 (ref 130–400)
PLATELET # BLD: 446 THOU/MM3 (ref 130–400)
PLATELET ESTIMATE: ABNORMAL
PLATELET ESTIMATE: ABNORMAL
PLATELET ESTIMATE: ADEQUATE
PMV BLD AUTO: 10 FL (ref 9.4–12.4)
PMV BLD AUTO: 10.1 FL (ref 9.4–12.4)
PMV BLD AUTO: 10.1 FL (ref 9.4–12.4)
PMV BLD AUTO: 10.2 FL (ref 9.4–12.4)
PMV BLD AUTO: 9.7 FL (ref 9.4–12.4)
PMV BLD AUTO: 9.8 FL (ref 9.4–12.4)
PO2: 60 MMHG (ref 71–104)
POLYMORPHONUCLEAR CELLS BODY FLUID: 31 %
POTASSIUM REFLEX MAGNESIUM: 3.7 MEQ/L (ref 3.5–5.2)
POTASSIUM REFLEX MAGNESIUM: 3.9 MEQ/L (ref 3.5–5.2)
POTASSIUM REFLEX MAGNESIUM: 4.8 MEQ/L (ref 3.5–5.2)
POTASSIUM REFLEX MAGNESIUM: 4.9 MEQ/L (ref 3.5–5.2)
POTASSIUM REFLEX MAGNESIUM: 5.8 MEQ/L (ref 3.5–5.2)
POTASSIUM SERPL-SCNC: 3.3 MEQ/L (ref 3.5–5.2)
POTASSIUM SERPL-SCNC: 3.4 MEQ/L (ref 3.5–5.2)
POTASSIUM SERPL-SCNC: 3.5 MEQ/L (ref 3.5–5.2)
POTASSIUM SERPL-SCNC: 3.6 MEQ/L (ref 3.5–5.2)
POTASSIUM SERPL-SCNC: 3.8 MEQ/L (ref 3.5–5.2)
POTASSIUM SERPL-SCNC: 3.8 MEQ/L (ref 3.5–5.2)
POTASSIUM SERPL-SCNC: 3.9 MEQ/L (ref 3.5–5.2)
POTASSIUM SERPL-SCNC: 3.9 MEQ/L (ref 3.5–5.2)
POTASSIUM SERPL-SCNC: 4.4 MEQ/L (ref 3.5–5.2)
POTASSIUM SERPL-SCNC: 4.6 MEQ/L (ref 3.5–5.2)
POTASSIUM SERPL-SCNC: 4.8 MEQ/L (ref 3.5–5.2)
POTASSIUM SERPL-SCNC: 5 MEQ/L (ref 3.5–5.2)
POTASSIUM SERPL-SCNC: 5.3 MEQ/L (ref 3.5–5.2)
PROCALCITONIN: 1.1 NG/ML (ref 0.01–0.09)
PROCALCITONIN: 1.75 NG/ML (ref 0.01–0.09)
RBC # BLD: 2.84 MILL/MM3 (ref 4.2–5.4)
RBC # BLD: 2.94 MILL/MM3 (ref 4.2–5.4)
RBC # BLD: 2.94 MILL/MM3 (ref 4.2–5.4)
RBC # BLD: 3.04 MILL/MM3 (ref 4.2–5.4)
RBC # BLD: 3.12 MILL/MM3 (ref 4.2–5.4)
RBC # BLD: 3.12 MILL/MM3 (ref 4.2–5.4)
RBC # BLD: 3.13 MILL/MM3 (ref 4.2–5.4)
RBC # BLD: 3.18 MILL/MM3 (ref 4.2–5.4)
RBC # BLD: 3.18 MILL/MM3 (ref 4.2–5.4)
RBC # BLD: 3.22 MILL/MM3 (ref 4.2–5.4)
RBC # BLD: 3.38 MILL/MM3 (ref 4.2–5.4)
RBC # BLD: 3.4 MILL/MM3 (ref 4.2–5.4)
RBC # BLD: 3.43 MILL/MM3 (ref 4.2–5.4)
RBC # BLD: 3.51 MILL/MM3 (ref 4.2–5.4)
RBC # BLD: 3.51 MILL/MM3 (ref 4.2–5.4)
REVIEWED BY: NORMAL
SARS-COV-2, NAAT: NOT DETECTED
SARS-COV-2, NAAT: NOT DETECTED
SCAN OF BLOOD SMEAR: NORMAL
SEDIMENTATION RATE, ERYTHROCYTE: 110 MM/HR (ref 0–20)
SEG NEUTROPHILS: 63.3 %
SEG NEUTROPHILS: 65.9 %
SEG NEUTROPHILS: 66.2 %
SEG NEUTROPHILS: 67.2 %
SEG NEUTROPHILS: 67.9 %
SEG NEUTROPHILS: 68.5 %
SEG NEUTROPHILS: 68.7 %
SEG NEUTROPHILS: 69 %
SEG NEUTROPHILS: 69.9 %
SEG NEUTROPHILS: 72 %
SEG NEUTROPHILS: 73.4 %
SEG NEUTROPHILS: 74.2 %
SEGMENTED NEUTROPHILS ABSOLUTE COUNT: 12.5 THOU/MM3 (ref 1.8–7.7)
SEGMENTED NEUTROPHILS ABSOLUTE COUNT: 13.1 THOU/MM3 (ref 1.8–7.7)
SEGMENTED NEUTROPHILS ABSOLUTE COUNT: 13.1 THOU/MM3 (ref 1.8–7.7)
SEGMENTED NEUTROPHILS ABSOLUTE COUNT: 14 THOU/MM3 (ref 1.8–7.7)
SEGMENTED NEUTROPHILS ABSOLUTE COUNT: 14.6 THOU/MM3 (ref 1.8–7.7)
SEGMENTED NEUTROPHILS ABSOLUTE COUNT: 14.6 THOU/MM3 (ref 1.8–7.7)
SEGMENTED NEUTROPHILS ABSOLUTE COUNT: 15.3 THOU/MM3 (ref 1.8–7.7)
SEGMENTED NEUTROPHILS ABSOLUTE COUNT: 15.7 THOU/MM3 (ref 1.8–7.7)
SEGMENTED NEUTROPHILS ABSOLUTE COUNT: 16.1 THOU/MM3 (ref 1.8–7.7)
SEGMENTED NEUTROPHILS ABSOLUTE COUNT: 16.7 THOU/MM3 (ref 1.8–7.7)
SEGMENTED NEUTROPHILS ABSOLUTE COUNT: 17.8 THOU/MM3 (ref 1.8–7.7)
SEGMENTED NEUTROPHILS ABSOLUTE COUNT: 18.3 THOU/MM3 (ref 1.8–7.7)
SMEAR REVIEW: NORMAL
SODIUM BLD-SCNC: 123 MEQ/L (ref 135–145)
SODIUM BLD-SCNC: 123 MEQ/L (ref 135–145)
SODIUM BLD-SCNC: 124 MEQ/L (ref 135–145)
SODIUM BLD-SCNC: 124 MEQ/L (ref 135–145)
SODIUM BLD-SCNC: 125 MEQ/L (ref 135–145)
SODIUM BLD-SCNC: 126 MEQ/L (ref 135–145)
SODIUM BLD-SCNC: 127 MEQ/L (ref 135–145)
SODIUM BLD-SCNC: 128 MEQ/L (ref 135–145)
SODIUM BLD-SCNC: 128 MEQ/L (ref 135–145)
SODIUM BLD-SCNC: 129 MEQ/L (ref 135–145)
SODIUM BLD-SCNC: 130 MEQ/L (ref 135–145)
SODIUM BLD-SCNC: 130 MEQ/L (ref 135–145)
SODIUM BLD-SCNC: 131 MEQ/L (ref 135–145)
SODIUM BLD-SCNC: 131 MEQ/L (ref 135–145)
SODIUM BLD-SCNC: 133 MEQ/L (ref 135–145)
SODIUM BLD-SCNC: 137 MEQ/L (ref 135–145)
SOURCE, BLOOD GAS: ABNORMAL
SPECIMEN: NORMAL
TOTAL NUCLEATED CELLS BODY FLUID: 13 /CUMM (ref 0–500)
TOTAL PROTEIN: 5.2 G/DL (ref 6.1–8)
TOTAL PROTEIN: 5.5 G/DL (ref 6.1–8)
TOTAL VOLUME RECEIVED BODY FLUID: NORMAL ML
TOXIC GRANULATION: PRESENT
TROPONIN T: 0.05 NG/ML
TSH SERPL DL<=0.05 MIU/L-ACNC: 2.51 UIU/ML (ref 0.4–4.2)
VANCOMYCIN RANDOM: 15 UG/ML (ref 0.1–39.9)
VANCOMYCIN RANDOM: 17.8 UG/ML (ref 0.1–39.9)
VANCOMYCIN RANDOM: 18.2 UG/ML (ref 0.1–39.9)
VANCOMYCIN RANDOM: 18.7 UG/ML (ref 0.1–39.9)
VANCOMYCIN RANDOM: 19.8 UG/ML (ref 0.1–39.9)
VANCOMYCIN RESISTANT ENTEROCOCCUS: NEGATIVE
WBC # BLD: 10.1 THOU/MM3 (ref 4.8–10.8)
WBC # BLD: 18.6 THOU/MM3 (ref 4.8–10.8)
WBC # BLD: 18.7 THOU/MM3 (ref 4.8–10.8)
WBC # BLD: 18.8 THOU/MM3 (ref 4.8–10.8)
WBC # BLD: 19.3 THOU/MM3 (ref 4.8–10.8)
WBC # BLD: 21.2 THOU/MM3 (ref 4.8–10.8)
WBC # BLD: 21.3 THOU/MM3 (ref 4.8–10.8)
WBC # BLD: 22.1 THOU/MM3 (ref 4.8–10.8)
WBC # BLD: 22.7 THOU/MM3 (ref 4.8–10.8)
WBC # BLD: 22.8 THOU/MM3 (ref 4.8–10.8)
WBC # BLD: 23.1 THOU/MM3 (ref 4.8–10.8)
WBC # BLD: 23.5 THOU/MM3 (ref 4.8–10.8)
WBC # BLD: 24 THOU/MM3 (ref 4.8–10.8)
WBC # BLD: 26.7 THOU/MM3 (ref 4.8–10.8)
WBC # BLD: 9.3 THOU/MM3 (ref 4.8–10.8)

## 2020-01-01 PROCEDURE — 99232 SBSQ HOSP IP/OBS MODERATE 35: CPT | Performed by: PHYSICIAN ASSISTANT

## 2020-01-01 PROCEDURE — 6370000000 HC RX 637 (ALT 250 FOR IP): Performed by: PHYSICIAN ASSISTANT

## 2020-01-01 PROCEDURE — 6370000000 HC RX 637 (ALT 250 FOR IP): Performed by: INTERNAL MEDICINE

## 2020-01-01 PROCEDURE — 2580000003 HC RX 258: Performed by: PHYSICIAN ASSISTANT

## 2020-01-01 PROCEDURE — 6360000002 HC RX W HCPCS: Performed by: INTERNAL MEDICINE

## 2020-01-01 PROCEDURE — 2580000003 HC RX 258: Performed by: INTERNAL MEDICINE

## 2020-01-01 PROCEDURE — 83605 ASSAY OF LACTIC ACID: CPT

## 2020-01-01 PROCEDURE — 3430000000 HC RX DIAGNOSTIC RADIOPHARMACEUTICAL: Performed by: INTERNAL MEDICINE

## 2020-01-01 PROCEDURE — 36415 COLL VENOUS BLD VENIPUNCTURE: CPT

## 2020-01-01 PROCEDURE — 6360000002 HC RX W HCPCS: Performed by: PHYSICIAN ASSISTANT

## 2020-01-01 PROCEDURE — 1200000000 HC SEMI PRIVATE

## 2020-01-01 PROCEDURE — 3017F COLORECTAL CA SCREEN DOC REV: CPT | Performed by: NURSE PRACTITIONER

## 2020-01-01 PROCEDURE — 99232 SBSQ HOSP IP/OBS MODERATE 35: CPT | Performed by: NURSE PRACTITIONER

## 2020-01-01 PROCEDURE — 85025 COMPLETE CBC W/AUTO DIFF WBC: CPT

## 2020-01-01 PROCEDURE — 82948 REAGENT STRIP/BLOOD GLUCOSE: CPT

## 2020-01-01 PROCEDURE — 87641 MR-STAPH DNA AMP PROBE: CPT

## 2020-01-01 PROCEDURE — 1111F DSCHRG MED/CURRENT MED MERGE: CPT | Performed by: NURSE PRACTITIONER

## 2020-01-01 PROCEDURE — 99232 SBSQ HOSP IP/OBS MODERATE 35: CPT | Performed by: INTERNAL MEDICINE

## 2020-01-01 PROCEDURE — 97530 THERAPEUTIC ACTIVITIES: CPT

## 2020-01-01 PROCEDURE — 99213 OFFICE O/P EST LOW 20 MIN: CPT | Performed by: NURSE PRACTITIONER

## 2020-01-01 PROCEDURE — 99232 SBSQ HOSP IP/OBS MODERATE 35: CPT | Performed by: FAMILY MEDICINE

## 2020-01-01 PROCEDURE — 93005 ELECTROCARDIOGRAM TRACING: CPT | Performed by: NURSE PRACTITIONER

## 2020-01-01 PROCEDURE — 71045 X-RAY EXAM CHEST 1 VIEW: CPT

## 2020-01-01 PROCEDURE — 87081 CULTURE SCREEN ONLY: CPT

## 2020-01-01 PROCEDURE — A4722 DIALYS SOL FLD VOL > 1999CC: HCPCS | Performed by: INTERNAL MEDICINE

## 2020-01-01 PROCEDURE — 87040 BLOOD CULTURE FOR BACTERIA: CPT

## 2020-01-01 PROCEDURE — 97110 THERAPEUTIC EXERCISES: CPT

## 2020-01-01 PROCEDURE — 80048 BASIC METABOLIC PNL TOTAL CA: CPT

## 2020-01-01 PROCEDURE — 90945 DIALYSIS ONE EVALUATION: CPT | Performed by: INTERNAL MEDICINE

## 2020-01-01 PROCEDURE — 94669 MECHANICAL CHEST WALL OSCILL: CPT

## 2020-01-01 PROCEDURE — 2580000003 HC RX 258: Performed by: PHARMACIST

## 2020-01-01 PROCEDURE — 6370000000 HC RX 637 (ALT 250 FOR IP): Performed by: FAMILY MEDICINE

## 2020-01-01 PROCEDURE — A9570 INDIUM IN-111 AUTO WBC: HCPCS | Performed by: INTERNAL MEDICINE

## 2020-01-01 PROCEDURE — 6360000002 HC RX W HCPCS: Performed by: PHARMACIST

## 2020-01-01 PROCEDURE — 97116 GAIT TRAINING THERAPY: CPT

## 2020-01-01 PROCEDURE — 84145 PROCALCITONIN (PCT): CPT

## 2020-01-01 PROCEDURE — 1036F TOBACCO NON-USER: CPT | Performed by: NUCLEAR MEDICINE

## 2020-01-01 PROCEDURE — 74176 CT ABD & PELVIS W/O CONTRAST: CPT

## 2020-01-01 PROCEDURE — 80202 ASSAY OF VANCOMYCIN: CPT

## 2020-01-01 PROCEDURE — 99233 SBSQ HOSP IP/OBS HIGH 50: CPT | Performed by: FAMILY MEDICINE

## 2020-01-01 PROCEDURE — 90945 DIALYSIS ONE EVALUATION: CPT

## 2020-01-01 PROCEDURE — 97535 SELF CARE MNGMENT TRAINING: CPT

## 2020-01-01 PROCEDURE — 2022F DILAT RTA XM EVC RTNOPTHY: CPT | Performed by: NURSE PRACTITIONER

## 2020-01-01 PROCEDURE — 1200000003 HC TELEMETRY R&B

## 2020-01-01 PROCEDURE — G8427 DOCREV CUR MEDS BY ELIG CLIN: HCPCS | Performed by: NUCLEAR MEDICINE

## 2020-01-01 PROCEDURE — 1090F PRES/ABSN URINE INCON ASSESS: CPT | Performed by: NURSE PRACTITIONER

## 2020-01-01 PROCEDURE — 86140 C-REACTIVE PROTEIN: CPT

## 2020-01-01 PROCEDURE — 97162 PT EVAL MOD COMPLEX 30 MIN: CPT

## 2020-01-01 PROCEDURE — 3017F COLORECTAL CA SCREEN DOC REV: CPT | Performed by: NUCLEAR MEDICINE

## 2020-01-01 PROCEDURE — 6370000000 HC RX 637 (ALT 250 FOR IP): Performed by: NURSE PRACTITIONER

## 2020-01-01 PROCEDURE — 99223 1ST HOSP IP/OBS HIGH 75: CPT | Performed by: PHYSICIAN ASSISTANT

## 2020-01-01 PROCEDURE — 85027 COMPLETE CBC AUTOMATED: CPT

## 2020-01-01 PROCEDURE — 78802 RP LOCLZJ TUM WHBDY 1 D IMG: CPT

## 2020-01-01 PROCEDURE — 3044F HG A1C LEVEL LT 7.0%: CPT | Performed by: NURSE PRACTITIONER

## 2020-01-01 PROCEDURE — 93005 ELECTROCARDIOGRAM TRACING: CPT | Performed by: PHYSICIAN ASSISTANT

## 2020-01-01 PROCEDURE — 4040F PNEUMOC VAC/ADMIN/RCVD: CPT | Performed by: NURSE PRACTITIONER

## 2020-01-01 PROCEDURE — 84443 ASSAY THYROID STIM HORMONE: CPT

## 2020-01-01 PROCEDURE — 97167 OT EVAL HIGH COMPLEX 60 MIN: CPT

## 2020-01-01 PROCEDURE — 80053 COMPREHEN METABOLIC PANEL: CPT

## 2020-01-01 PROCEDURE — 93010 ELECTROCARDIOGRAM REPORT: CPT | Performed by: NUCLEAR MEDICINE

## 2020-01-01 PROCEDURE — 1090F PRES/ABSN URINE INCON ASSESS: CPT | Performed by: NUCLEAR MEDICINE

## 2020-01-01 PROCEDURE — 72148 MRI LUMBAR SPINE W/O DYE: CPT

## 2020-01-01 PROCEDURE — 94760 N-INVAS EAR/PLS OXIMETRY 1: CPT

## 2020-01-01 PROCEDURE — G8400 PT W/DXA NO RESULTS DOC: HCPCS | Performed by: NURSE PRACTITIONER

## 2020-01-01 PROCEDURE — 99239 HOSP IP/OBS DSCHRG MGMT >30: CPT | Performed by: HOSPITALIST

## 2020-01-01 PROCEDURE — G8484 FLU IMMUNIZE NO ADMIN: HCPCS | Performed by: NURSE PRACTITIONER

## 2020-01-01 PROCEDURE — 1123F ACP DISCUSS/DSCN MKR DOCD: CPT | Performed by: NURSE PRACTITIONER

## 2020-01-01 PROCEDURE — 97166 OT EVAL MOD COMPLEX 45 MIN: CPT

## 2020-01-01 PROCEDURE — 87075 CULTR BACTERIA EXCEPT BLOOD: CPT

## 2020-01-01 PROCEDURE — 87205 SMEAR GRAM STAIN: CPT

## 2020-01-01 PROCEDURE — 90935 HEMODIALYSIS ONE EVALUATION: CPT | Performed by: INTERNAL MEDICINE

## 2020-01-01 PROCEDURE — 87070 CULTURE OTHR SPECIMN AEROBIC: CPT

## 2020-01-01 PROCEDURE — 99213 OFFICE O/P EST LOW 20 MIN: CPT | Performed by: NUCLEAR MEDICINE

## 2020-01-01 PROCEDURE — 99233 SBSQ HOSP IP/OBS HIGH 50: CPT | Performed by: INTERNAL MEDICINE

## 2020-01-01 PROCEDURE — 71250 CT THORAX DX C-: CPT

## 2020-01-01 PROCEDURE — G8417 CALC BMI ABV UP PARAM F/U: HCPCS | Performed by: NUCLEAR MEDICINE

## 2020-01-01 PROCEDURE — 4040F PNEUMOC VAC/ADMIN/RCVD: CPT | Performed by: NUCLEAR MEDICINE

## 2020-01-01 PROCEDURE — 99222 1ST HOSP IP/OBS MODERATE 55: CPT | Performed by: INTERNAL MEDICINE

## 2020-01-01 PROCEDURE — G8417 CALC BMI ABV UP PARAM F/U: HCPCS | Performed by: NURSE PRACTITIONER

## 2020-01-01 PROCEDURE — 89050 BODY FLUID CELL COUNT: CPT

## 2020-01-01 PROCEDURE — 71046 X-RAY EXAM CHEST 2 VIEWS: CPT

## 2020-01-01 PROCEDURE — U0002 COVID-19 LAB TEST NON-CDC: HCPCS

## 2020-01-01 PROCEDURE — 1123F ACP DISCUSS/DSCN MKR DOCD: CPT | Performed by: NUCLEAR MEDICINE

## 2020-01-01 PROCEDURE — 93307 TTE W/O DOPPLER COMPLETE: CPT

## 2020-01-01 PROCEDURE — 99238 HOSP IP/OBS DSCHRG MGMT 30/<: CPT | Performed by: FAMILY MEDICINE

## 2020-01-01 PROCEDURE — G8427 DOCREV CUR MEDS BY ELIG CLIN: HCPCS | Performed by: NURSE PRACTITIONER

## 2020-01-01 PROCEDURE — 85651 RBC SED RATE NONAUTOMATED: CPT

## 2020-01-01 PROCEDURE — 82248 BILIRUBIN DIRECT: CPT

## 2020-01-01 PROCEDURE — 99231 SBSQ HOSP IP/OBS SF/LOW 25: CPT | Performed by: FAMILY MEDICINE

## 2020-01-01 PROCEDURE — G8400 PT W/DXA NO RESULTS DOC: HCPCS | Performed by: NUCLEAR MEDICINE

## 2020-01-01 PROCEDURE — 83036 HEMOGLOBIN GLYCOSYLATED A1C: CPT

## 2020-01-01 PROCEDURE — 93306 TTE W/DOPPLER COMPLETE: CPT

## 2020-01-01 PROCEDURE — 99223 1ST HOSP IP/OBS HIGH 75: CPT | Performed by: INTERNAL MEDICINE

## 2020-01-01 PROCEDURE — 82803 BLOOD GASES ANY COMBINATION: CPT

## 2020-01-01 PROCEDURE — 1036F TOBACCO NON-USER: CPT | Performed by: NURSE PRACTITIONER

## 2020-01-01 PROCEDURE — 84484 ASSAY OF TROPONIN QUANT: CPT

## 2020-01-01 PROCEDURE — 93010 ELECTROCARDIOGRAM REPORT: CPT | Performed by: INTERNAL MEDICINE

## 2020-01-01 PROCEDURE — 36600 WITHDRAWAL OF ARTERIAL BLOOD: CPT

## 2020-01-01 PROCEDURE — 87500 VANOMYCIN DNA AMP PROBE: CPT

## 2020-01-01 RX ORDER — MECLIZINE HCL 12.5 MG/1
25 TABLET ORAL 3 TIMES DAILY
Status: DISCONTINUED | OUTPATIENT
Start: 2020-01-01 | End: 2020-01-01 | Stop reason: HOSPADM

## 2020-01-01 RX ORDER — OXYBUTYNIN CHLORIDE 5 MG/1
5 TABLET, EXTENDED RELEASE ORAL DAILY
COMMUNITY

## 2020-01-01 RX ORDER — PANTOPRAZOLE SODIUM 40 MG/1
40 TABLET, DELAYED RELEASE ORAL
Status: DISCONTINUED | OUTPATIENT
Start: 2020-01-01 | End: 2020-01-01 | Stop reason: HOSPADM

## 2020-01-01 RX ORDER — FAMOTIDINE 20 MG/1
10 TABLET, FILM COATED ORAL ONCE
Status: COMPLETED | OUTPATIENT
Start: 2020-01-01 | End: 2020-01-01

## 2020-01-01 RX ORDER — DEXTROSE MONOHYDRATE 50 MG/ML
100 INJECTION, SOLUTION INTRAVENOUS PRN
Status: DISCONTINUED | OUTPATIENT
Start: 2020-01-01 | End: 2020-01-01 | Stop reason: HOSPADM

## 2020-01-01 RX ORDER — BENZONATATE 100 MG/1
100 CAPSULE ORAL EVERY 8 HOURS PRN
COMMUNITY

## 2020-01-01 RX ORDER — SODIUM CHLORIDE, SODIUM LACTATE, CALCIUM CHLORIDE, MAGNESIUM CHLORIDE AND DEXTROSE 1.5; 538; 448; 18.3; 5.08 G/100ML; MG/100ML; MG/100ML; MG/100ML; MG/100ML
2000 INJECTION, SOLUTION INTRAPERITONEAL EVERY 6 HOURS
Status: DISCONTINUED | OUTPATIENT
Start: 2020-01-01 | End: 2020-01-01

## 2020-01-01 RX ORDER — ACETAMINOPHEN 325 MG/1
650 TABLET ORAL EVERY 6 HOURS PRN
Status: DISCONTINUED | OUTPATIENT
Start: 2020-01-01 | End: 2020-01-01

## 2020-01-01 RX ORDER — BUSPIRONE HYDROCHLORIDE 5 MG/1
5 TABLET ORAL 2 TIMES DAILY
COMMUNITY

## 2020-01-01 RX ORDER — PRAVASTATIN SODIUM 40 MG
40 TABLET ORAL NIGHTLY
Status: DISCONTINUED | OUTPATIENT
Start: 2020-01-01 | End: 2020-01-01 | Stop reason: HOSPADM

## 2020-01-01 RX ORDER — ACETAMINOPHEN 325 MG/1
650 TABLET ORAL EVERY 6 HOURS PRN
Status: DISCONTINUED | OUTPATIENT
Start: 2020-01-01 | End: 2020-01-01 | Stop reason: HOSPADM

## 2020-01-01 RX ORDER — CAPSAICIN 0.025 %
CREAM (GRAM) TOPICAL 2 TIMES DAILY
Status: DISCONTINUED | OUTPATIENT
Start: 2020-01-01 | End: 2020-01-01 | Stop reason: HOSPADM

## 2020-01-01 RX ORDER — LIDOCAINE 50 MG/G
OINTMENT TOPICAL 2 TIMES DAILY PRN
Status: DISCONTINUED | OUTPATIENT
Start: 2020-01-01 | End: 2020-01-01 | Stop reason: HOSPADM

## 2020-01-01 RX ORDER — BISACODYL 10 MG
10 SUPPOSITORY, RECTAL RECTAL DAILY PRN
Status: DISCONTINUED | OUTPATIENT
Start: 2020-01-01 | End: 2020-01-01 | Stop reason: HOSPADM

## 2020-01-01 RX ORDER — SEVELAMER CARBONATE 800 MG/1
1600 TABLET, FILM COATED ORAL
Status: DISCONTINUED | OUTPATIENT
Start: 2020-01-01 | End: 2020-01-01 | Stop reason: HOSPADM

## 2020-01-01 RX ORDER — HYDRALAZINE HYDROCHLORIDE 25 MG/1
25 TABLET, FILM COATED ORAL EVERY 8 HOURS SCHEDULED
Status: DISCONTINUED | OUTPATIENT
Start: 2020-01-01 | End: 2020-01-01 | Stop reason: HOSPADM

## 2020-01-01 RX ORDER — AMLODIPINE BESYLATE 5 MG/1
5 TABLET ORAL DAILY
Status: DISCONTINUED | OUTPATIENT
Start: 2020-01-01 | End: 2020-01-01

## 2020-01-01 RX ORDER — POLYETHYLENE GLYCOL 3350 17 G/17G
17 POWDER, FOR SOLUTION ORAL DAILY PRN
Status: DISCONTINUED | OUTPATIENT
Start: 2020-01-01 | End: 2020-01-01 | Stop reason: HOSPADM

## 2020-01-01 RX ORDER — FOLIC ACID/VIT B COMPLEX AND C 5 MG
1 TABLET ORAL DAILY
Status: DISCONTINUED | OUTPATIENT
Start: 2020-01-01 | End: 2020-01-01 | Stop reason: HOSPADM

## 2020-01-01 RX ORDER — DEXTROSE MONOHYDRATE 25 G/50ML
12.5 INJECTION, SOLUTION INTRAVENOUS PRN
Status: DISCONTINUED | OUTPATIENT
Start: 2020-01-01 | End: 2020-01-01 | Stop reason: HOSPADM

## 2020-01-01 RX ORDER — DICYCLOMINE HYDROCHLORIDE 10 MG/1
10 CAPSULE ORAL
Status: DISCONTINUED | OUTPATIENT
Start: 2020-01-01 | End: 2020-01-01 | Stop reason: HOSPADM

## 2020-01-01 RX ORDER — POLYETHYLENE GLYCOL 3350 17 G/17G
17 POWDER, FOR SOLUTION ORAL DAILY
Status: DISCONTINUED | OUTPATIENT
Start: 2020-01-01 | End: 2020-01-01

## 2020-01-01 RX ORDER — SODIUM CHLORIDE 9 MG/ML
INJECTION, SOLUTION INTRAVENOUS CONTINUOUS
Status: DISCONTINUED | OUTPATIENT
Start: 2020-01-01 | End: 2020-01-01

## 2020-01-01 RX ORDER — HYDRALAZINE HYDROCHLORIDE 50 MG/1
50 TABLET, FILM COATED ORAL 3 TIMES DAILY
Qty: 90 TABLET | Refills: 1 | Status: ON HOLD | OUTPATIENT
Start: 2020-01-01 | End: 2020-01-01 | Stop reason: HOSPADM

## 2020-01-01 RX ORDER — SODIUM CHLORIDE, SODIUM LACTATE, CALCIUM CHLORIDE, MAGNESIUM CHLORIDE AND DEXTROSE 2.5; 538; 448; 18.3; 5.08 G/100ML; MG/100ML; MG/100ML; MG/100ML; MG/100ML
2000 INJECTION, SOLUTION INTRAPERITONEAL EVERY 6 HOURS
Status: DISCONTINUED | OUTPATIENT
Start: 2020-01-01 | End: 2020-01-01

## 2020-01-01 RX ORDER — SODIUM CHLORIDE 1000 MG
1 TABLET, SOLUBLE MISCELLANEOUS 2 TIMES DAILY WITH MEALS
Status: DISCONTINUED | OUTPATIENT
Start: 2020-01-01 | End: 2020-01-01

## 2020-01-01 RX ORDER — FLUTICASONE PROPIONATE 50 MCG
2 SPRAY, SUSPENSION (ML) NASAL DAILY
Status: DISCONTINUED | OUTPATIENT
Start: 2020-01-01 | End: 2020-01-01 | Stop reason: HOSPADM

## 2020-01-01 RX ORDER — AMLODIPINE BESYLATE 2.5 MG/1
2.5 TABLET ORAL DAILY
Status: DISCONTINUED | OUTPATIENT
Start: 2020-01-01 | End: 2020-01-01

## 2020-01-01 RX ORDER — SODIUM CHLORIDE, SODIUM LACTATE, CALCIUM CHLORIDE, MAGNESIUM CHLORIDE AND DEXTROSE 1.5; 538; 448; 18.3; 5.08 G/100ML; MG/100ML; MG/100ML; MG/100ML; MG/100ML
2000 INJECTION, SOLUTION INTRAPERITONEAL EVERY 6 HOURS
Status: DISCONTINUED | OUTPATIENT
Start: 2020-01-01 | End: 2020-01-01 | Stop reason: HOSPADM

## 2020-01-01 RX ORDER — SODIUM CHLORIDE, SODIUM LACTATE, CALCIUM CHLORIDE, MAGNESIUM CHLORIDE AND DEXTROSE 1.5; 538; 448; 18.3; 5.08 G/100ML; MG/100ML; MG/100ML; MG/100ML; MG/100ML
2000 INJECTION, SOLUTION INTRAPERITONEAL EVERY 12 HOURS
Status: DISCONTINUED | OUTPATIENT
Start: 2020-01-01 | End: 2020-01-01

## 2020-01-01 RX ORDER — SODIUM CHLORIDE 0.9 % (FLUSH) 0.9 %
10 SYRINGE (ML) INJECTION EVERY 12 HOURS SCHEDULED
Status: DISCONTINUED | OUTPATIENT
Start: 2020-01-01 | End: 2020-01-01 | Stop reason: HOSPADM

## 2020-01-01 RX ORDER — SODIUM CHLORIDE 0.9 % (FLUSH) 0.9 %
10 SYRINGE (ML) INJECTION PRN
Status: DISCONTINUED | OUTPATIENT
Start: 2020-01-01 | End: 2020-01-01 | Stop reason: HOSPADM

## 2020-01-01 RX ORDER — SODIUM CHLORIDE, SODIUM LACTATE, CALCIUM CHLORIDE, MAGNESIUM CHLORIDE AND DEXTROSE 1.5; 538; 448; 18.3; 5.08 G/100ML; MG/100ML; MG/100ML; MG/100ML; MG/100ML
1500 INJECTION, SOLUTION INTRAPERITONEAL EVERY 6 HOURS
Status: DISCONTINUED | OUTPATIENT
Start: 2020-01-01 | End: 2020-01-01

## 2020-01-01 RX ORDER — NICOTINE POLACRILEX 4 MG
15 LOZENGE BUCCAL PRN
Status: DISCONTINUED | OUTPATIENT
Start: 2020-01-01 | End: 2020-01-01 | Stop reason: HOSPADM

## 2020-01-01 RX ORDER — SODIUM CHLORIDE 1000 MG
2 TABLET, SOLUBLE MISCELLANEOUS 2 TIMES DAILY WITH MEALS
Status: DISCONTINUED | OUTPATIENT
Start: 2020-01-01 | End: 2020-01-01 | Stop reason: HOSPADM

## 2020-01-01 RX ORDER — CAPSAICIN 0.025 %
CREAM (GRAM) TOPICAL
Qty: 1 TUBE | Refills: 0
Start: 2020-01-01 | End: 2020-01-01

## 2020-01-01 RX ORDER — DOCUSATE SODIUM 100 MG/1
100 CAPSULE, LIQUID FILLED ORAL 2 TIMES DAILY
Status: DISCONTINUED | OUTPATIENT
Start: 2020-01-01 | End: 2020-01-01 | Stop reason: HOSPADM

## 2020-01-01 RX ORDER — GUAIFENESIN/DEXTROMETHORPHAN 100-10MG/5
10 SYRUP ORAL EVERY 4 HOURS PRN
Status: DISCONTINUED | OUTPATIENT
Start: 2020-01-01 | End: 2020-01-01 | Stop reason: HOSPADM

## 2020-01-01 RX ORDER — ISOSORBIDE DINITRATE 20 MG/1
20 TABLET ORAL 3 TIMES DAILY
Status: ON HOLD | COMMUNITY
End: 2021-01-01 | Stop reason: HOSPADM

## 2020-01-01 RX ORDER — DIPHENHYDRAMINE HCL 25 MG
25 TABLET ORAL EVERY 6 HOURS PRN
Status: DISCONTINUED | OUTPATIENT
Start: 2020-01-01 | End: 2020-01-01 | Stop reason: HOSPADM

## 2020-01-01 RX ORDER — BUSPIRONE HYDROCHLORIDE 5 MG/1
5 TABLET ORAL 2 TIMES DAILY
Status: DISCONTINUED | OUTPATIENT
Start: 2020-01-01 | End: 2020-01-01 | Stop reason: HOSPADM

## 2020-01-01 RX ORDER — LORAZEPAM 1 MG/1
1 TABLET ORAL ONCE
Status: COMPLETED | OUTPATIENT
Start: 2020-01-01 | End: 2020-01-01

## 2020-01-01 RX ORDER — POLYETHYLENE GLYCOL 3350 17 G/17G
17 POWDER, FOR SOLUTION ORAL 2 TIMES DAILY
Status: DISCONTINUED | OUTPATIENT
Start: 2020-01-01 | End: 2020-01-01 | Stop reason: HOSPADM

## 2020-01-01 RX ORDER — OXYBUTYNIN CHLORIDE 5 MG/1
5 TABLET, EXTENDED RELEASE ORAL DAILY
Status: DISCONTINUED | OUTPATIENT
Start: 2020-01-01 | End: 2020-01-01 | Stop reason: HOSPADM

## 2020-01-01 RX ORDER — SODIUM CHLORIDE, SODIUM LACTATE, CALCIUM CHLORIDE, MAGNESIUM CHLORIDE AND DEXTROSE 1.5; 538; 448; 18.3; 5.08 G/100ML; MG/100ML; MG/100ML; MG/100ML; MG/100ML
1500 INJECTION, SOLUTION INTRAPERITONEAL EVERY 6 HOURS
Status: DISCONTINUED | OUTPATIENT
Start: 2020-01-01 | End: 2020-01-01 | Stop reason: HOSPADM

## 2020-01-01 RX ORDER — HEPARIN SODIUM 5000 [USP'U]/ML
5000 INJECTION, SOLUTION INTRAVENOUS; SUBCUTANEOUS EVERY 8 HOURS SCHEDULED
Status: DISCONTINUED | OUTPATIENT
Start: 2020-01-01 | End: 2020-01-01 | Stop reason: HOSPADM

## 2020-01-01 RX ORDER — LEVOTHYROXINE SODIUM 0.03 MG/1
25 TABLET ORAL DAILY
Status: DISCONTINUED | OUTPATIENT
Start: 2020-01-01 | End: 2020-01-01 | Stop reason: HOSPADM

## 2020-01-01 RX ORDER — POTASSIUM CHLORIDE 20 MEQ/1
20 TABLET, EXTENDED RELEASE ORAL 2 TIMES DAILY WITH MEALS
Status: DISCONTINUED | OUTPATIENT
Start: 2020-01-01 | End: 2020-01-01

## 2020-01-01 RX ORDER — LIDOCAINE 50 MG/G
OINTMENT TOPICAL
Qty: 1 TUBE | Refills: 0
Start: 2020-01-01

## 2020-01-01 RX ORDER — CALCIUM POLYCARBOPHIL 625 MG 625 MG/1
625 TABLET ORAL 3 TIMES DAILY
Status: DISCONTINUED | OUTPATIENT
Start: 2020-01-01 | End: 2020-01-01 | Stop reason: HOSPADM

## 2020-01-01 RX ORDER — CYCLOBENZAPRINE HCL 10 MG
5 TABLET ORAL 2 TIMES DAILY PRN
Status: DISCONTINUED | OUTPATIENT
Start: 2020-01-01 | End: 2020-01-01 | Stop reason: HOSPADM

## 2020-01-01 RX ORDER — GABAPENTIN 300 MG/1
300 CAPSULE ORAL DAILY
Status: DISCONTINUED | OUTPATIENT
Start: 2020-01-01 | End: 2020-01-01 | Stop reason: HOSPADM

## 2020-01-01 RX ORDER — PROMETHAZINE HYDROCHLORIDE 25 MG/1
12.5 TABLET ORAL EVERY 6 HOURS PRN
Status: DISCONTINUED | OUTPATIENT
Start: 2020-01-01 | End: 2020-01-01 | Stop reason: HOSPADM

## 2020-01-01 RX ORDER — ISOSORBIDE DINITRATE 20 MG/1
20 TABLET ORAL 3 TIMES DAILY
Status: DISCONTINUED | OUTPATIENT
Start: 2020-01-01 | End: 2020-01-01 | Stop reason: HOSPADM

## 2020-01-01 RX ORDER — HYDRALAZINE HYDROCHLORIDE 50 MG/1
50 TABLET, FILM COATED ORAL EVERY 8 HOURS SCHEDULED
Status: DISCONTINUED | OUTPATIENT
Start: 2020-01-01 | End: 2020-01-01

## 2020-01-01 RX ORDER — ACETAMINOPHEN 650 MG/1
650 SUPPOSITORY RECTAL EVERY 6 HOURS PRN
Status: DISCONTINUED | OUTPATIENT
Start: 2020-01-01 | End: 2020-01-01 | Stop reason: HOSPADM

## 2020-01-01 RX ORDER — ONDANSETRON 2 MG/ML
4 INJECTION INTRAMUSCULAR; INTRAVENOUS EVERY 6 HOURS PRN
Status: DISCONTINUED | OUTPATIENT
Start: 2020-01-01 | End: 2020-01-01 | Stop reason: HOSPADM

## 2020-01-01 RX ORDER — INSULIN GLARGINE 100 [IU]/ML
4 INJECTION, SOLUTION SUBCUTANEOUS EVERY MORNING
Status: DISCONTINUED | OUTPATIENT
Start: 2020-01-01 | End: 2020-01-01 | Stop reason: HOSPADM

## 2020-01-01 RX ORDER — SODIUM CHLORIDE, SODIUM LACTATE, CALCIUM CHLORIDE, MAGNESIUM CHLORIDE AND DEXTROSE 2.5; 538; 448; 18.3; 5.08 G/100ML; MG/100ML; MG/100ML; MG/100ML; MG/100ML
2000 INJECTION, SOLUTION INTRAPERITONEAL EVERY 12 HOURS
Status: DISCONTINUED | OUTPATIENT
Start: 2020-01-01 | End: 2020-01-01

## 2020-01-01 RX ADMIN — SEVELAMER CARBONATE 1600 MG: 800 TABLET, FILM COATED ORAL at 10:29

## 2020-01-01 RX ADMIN — SODIUM CHLORIDE, SODIUM LACTATE, CALCIUM CHLORIDE, MAGNESIUM CHLORIDE AND DEXTROSE 2000 ML: 2.5; 538; 448; 18.3; 5.08 INJECTION, SOLUTION INTRAPERITONEAL at 09:27

## 2020-01-01 RX ADMIN — MECLIZINE HYDROCHLORIDE 25 MG: 12.5 TABLET, FILM COATED ORAL at 09:21

## 2020-01-01 RX ADMIN — POLYETHYLENE GLYCOL 3350 17 G: 17 POWDER, FOR SOLUTION ORAL at 20:52

## 2020-01-01 RX ADMIN — HEPARIN SODIUM 5000 UNITS: 5000 INJECTION INTRAVENOUS; SUBCUTANEOUS at 05:51

## 2020-01-01 RX ADMIN — CALCIUM POLYCARBOPHIL 625 MG: 625 TABLET, FILM COATED ORAL at 21:11

## 2020-01-01 RX ADMIN — ACETAMINOPHEN 650 MG: 325 TABLET ORAL at 20:38

## 2020-01-01 RX ADMIN — MECLIZINE HYDROCHLORIDE 25 MG: 12.5 TABLET, FILM COATED ORAL at 13:20

## 2020-01-01 RX ADMIN — ACETAMINOPHEN 650 MG: 325 TABLET ORAL at 20:25

## 2020-01-01 RX ADMIN — CALCIUM POLYCARBOPHIL 625 MG: 625 TABLET, FILM COATED ORAL at 13:43

## 2020-01-01 RX ADMIN — SEVELAMER CARBONATE 1600 MG: 800 TABLET, FILM COATED ORAL at 16:13

## 2020-01-01 RX ADMIN — Medication 2 G: at 09:45

## 2020-01-01 RX ADMIN — INSULIN LISPRO 1 UNITS: 100 INJECTION, SOLUTION INTRAVENOUS; SUBCUTANEOUS at 11:00

## 2020-01-01 RX ADMIN — SODIUM CHLORIDE, SODIUM LACTATE, CALCIUM CHLORIDE, MAGNESIUM CHLORIDE AND DEXTROSE 1500 ML: 1.5; 538; 448; 18.3; 5.08 INJECTION, SOLUTION INTRAPERITONEAL at 23:58

## 2020-01-01 RX ADMIN — LIDOCAINE: 50 OINTMENT TOPICAL at 08:55

## 2020-01-01 RX ADMIN — MECLIZINE HYDROCHLORIDE 25 MG: 12.5 TABLET, FILM COATED ORAL at 08:31

## 2020-01-01 RX ADMIN — ACETAMINOPHEN 650 MG: 325 TABLET ORAL at 12:34

## 2020-01-01 RX ADMIN — HEPARIN SODIUM 5000 UNITS: 5000 INJECTION INTRAVENOUS; SUBCUTANEOUS at 05:10

## 2020-01-01 RX ADMIN — GABAPENTIN 300 MG: 300 CAPSULE ORAL at 08:47

## 2020-01-01 RX ADMIN — HEPARIN SODIUM: 1000 INJECTION INTRAVENOUS; SUBCUTANEOUS at 15:59

## 2020-01-01 RX ADMIN — GABAPENTIN 100 MG: 100 CAPSULE ORAL at 20:52

## 2020-01-01 RX ADMIN — HEPARIN SODIUM 5000 UNITS: 5000 INJECTION INTRAVENOUS; SUBCUTANEOUS at 05:04

## 2020-01-01 RX ADMIN — MECLIZINE HYDROCHLORIDE 25 MG: 12.5 TABLET, FILM COATED ORAL at 20:34

## 2020-01-01 RX ADMIN — ACETAMINOPHEN 650 MG: 325 TABLET ORAL at 20:34

## 2020-01-01 RX ADMIN — HEPARIN SODIUM 5000 UNITS: 5000 INJECTION INTRAVENOUS; SUBCUTANEOUS at 06:11

## 2020-01-01 RX ADMIN — DICYCLOMINE HYDROCHLORIDE 10 MG: 10 CAPSULE ORAL at 11:13

## 2020-01-01 RX ADMIN — INSULIN GLARGINE 4 UNITS: 100 INJECTION, SOLUTION SUBCUTANEOUS at 20:30

## 2020-01-01 RX ADMIN — ISOSORBIDE DINITRATE 20 MG: 20 TABLET ORAL at 15:16

## 2020-01-01 RX ADMIN — INSULIN LISPRO 1 UNITS: 100 INJECTION, SOLUTION INTRAVENOUS; SUBCUTANEOUS at 12:04

## 2020-01-01 RX ADMIN — DOCUSATE SODIUM 100 MG: 100 CAPSULE, LIQUID FILLED ORAL at 21:11

## 2020-01-01 RX ADMIN — PRAVASTATIN SODIUM 40 MG: 40 TABLET ORAL at 19:56

## 2020-01-01 RX ADMIN — ISOSORBIDE DINITRATE 20 MG: 20 TABLET ORAL at 22:22

## 2020-01-01 RX ADMIN — ISOSORBIDE DINITRATE 20 MG: 20 TABLET ORAL at 09:24

## 2020-01-01 RX ADMIN — Medication 1 G: at 21:48

## 2020-01-01 RX ADMIN — SEVELAMER CARBONATE 1600 MG: 800 TABLET, FILM COATED ORAL at 11:13

## 2020-01-01 RX ADMIN — PANTOPRAZOLE SODIUM 40 MG: 40 TABLET, DELAYED RELEASE ORAL at 04:34

## 2020-01-01 RX ADMIN — MECLIZINE HYDROCHLORIDE 25 MG: 12.5 TABLET, FILM COATED ORAL at 13:44

## 2020-01-01 RX ADMIN — SEVELAMER CARBONATE 1600 MG: 800 TABLET, FILM COATED ORAL at 12:32

## 2020-01-01 RX ADMIN — Medication 2 G: at 15:57

## 2020-01-01 RX ADMIN — HEPARIN SODIUM 5000 UNITS: 5000 INJECTION INTRAVENOUS; SUBCUTANEOUS at 21:36

## 2020-01-01 RX ADMIN — SODIUM CHLORIDE, SODIUM LACTATE, CALCIUM CHLORIDE, MAGNESIUM CHLORIDE AND DEXTROSE 2000 ML: 1.5; 538; 448; 18.3; 5.08 INJECTION, SOLUTION INTRAPERITONEAL at 10:19

## 2020-01-01 RX ADMIN — SERTRALINE 25 MG: 50 TABLET, FILM COATED ORAL at 09:19

## 2020-01-01 RX ADMIN — SEVELAMER CARBONATE 1600 MG: 800 TABLET, FILM COATED ORAL at 08:27

## 2020-01-01 RX ADMIN — BUSPIRONE HYDROCHLORIDE 5 MG: 5 TABLET ORAL at 09:45

## 2020-01-01 RX ADMIN — PANTOPRAZOLE SODIUM 40 MG: 40 TABLET, DELAYED RELEASE ORAL at 06:05

## 2020-01-01 RX ADMIN — SEVELAMER CARBONATE 1600 MG: 800 TABLET, FILM COATED ORAL at 10:59

## 2020-01-01 RX ADMIN — FLUTICASONE PROPIONATE 2 SPRAY: 50 SPRAY, METERED NASAL at 09:52

## 2020-01-01 RX ADMIN — OXYBUTYNIN CHLORIDE 5 MG: 5 TABLET, EXTENDED RELEASE ORAL at 08:58

## 2020-01-01 RX ADMIN — Medication 1 G: at 17:00

## 2020-01-01 RX ADMIN — DOCUSATE SODIUM 100 MG: 100 CAPSULE, LIQUID FILLED ORAL at 20:52

## 2020-01-01 RX ADMIN — SODIUM CHLORIDE, PRESERVATIVE FREE 10 ML: 5 INJECTION INTRAVENOUS at 08:21

## 2020-01-01 RX ADMIN — Medication 1 TABLET: at 23:53

## 2020-01-01 RX ADMIN — BUSPIRONE HYDROCHLORIDE 5 MG: 5 TABLET ORAL at 08:20

## 2020-01-01 RX ADMIN — SODIUM CHLORIDE, SODIUM LACTATE, CALCIUM CHLORIDE, MAGNESIUM CHLORIDE AND DEXTROSE 2000 ML: 1.5; 538; 448; 18.3; 5.08 INJECTION, SOLUTION INTRAPERITONEAL at 11:29

## 2020-01-01 RX ADMIN — MECLIZINE HYDROCHLORIDE 25 MG: 12.5 TABLET, FILM COATED ORAL at 14:19

## 2020-01-01 RX ADMIN — PRAVASTATIN SODIUM 40 MG: 40 TABLET ORAL at 20:38

## 2020-01-01 RX ADMIN — MECLIZINE HYDROCHLORIDE 25 MG: 12.5 TABLET, FILM COATED ORAL at 10:31

## 2020-01-01 RX ADMIN — SEVELAMER CARBONATE 1600 MG: 800 TABLET, FILM COATED ORAL at 16:30

## 2020-01-01 RX ADMIN — ACETAMINOPHEN 650 MG: 325 TABLET ORAL at 00:03

## 2020-01-01 RX ADMIN — INSULIN LISPRO 1 UNITS: 100 INJECTION, SOLUTION INTRAVENOUS; SUBCUTANEOUS at 08:18

## 2020-01-01 RX ADMIN — MECLIZINE HYDROCHLORIDE 25 MG: 12.5 TABLET, FILM COATED ORAL at 20:24

## 2020-01-01 RX ADMIN — SEVELAMER CARBONATE 1600 MG: 800 TABLET, FILM COATED ORAL at 11:29

## 2020-01-01 RX ADMIN — OXYBUTYNIN CHLORIDE 5 MG: 5 TABLET, EXTENDED RELEASE ORAL at 08:47

## 2020-01-01 RX ADMIN — ACETAMINOPHEN 650 MG: 325 TABLET ORAL at 04:56

## 2020-01-01 RX ADMIN — SODIUM CHLORIDE, SODIUM LACTATE, CALCIUM CHLORIDE, MAGNESIUM CHLORIDE AND DEXTROSE 1500 ML: 1.5; 538; 448; 18.3; 5.08 INJECTION, SOLUTION INTRAPERITONEAL at 22:10

## 2020-01-01 RX ADMIN — HYDRALAZINE HYDROCHLORIDE 25 MG: 25 TABLET, FILM COATED ORAL at 06:15

## 2020-01-01 RX ADMIN — INSULIN GLARGINE 4 UNITS: 100 INJECTION, SOLUTION SUBCUTANEOUS at 09:47

## 2020-01-01 RX ADMIN — ISOSORBIDE DINITRATE 20 MG: 20 TABLET ORAL at 21:22

## 2020-01-01 RX ADMIN — PANTOPRAZOLE SODIUM 40 MG: 40 TABLET, DELAYED RELEASE ORAL at 05:26

## 2020-01-01 RX ADMIN — HEPARIN SODIUM 5000 UNITS: 5000 INJECTION INTRAVENOUS; SUBCUTANEOUS at 20:53

## 2020-01-01 RX ADMIN — MECLIZINE HYDROCHLORIDE 25 MG: 12.5 TABLET, FILM COATED ORAL at 14:12

## 2020-01-01 RX ADMIN — POLYETHYLENE GLYCOL 3350 17 G: 17 POWDER, FOR SOLUTION ORAL at 13:26

## 2020-01-01 RX ADMIN — HEPARIN SODIUM 5000 UNITS: 5000 INJECTION INTRAVENOUS; SUBCUTANEOUS at 20:59

## 2020-01-01 RX ADMIN — ISOSORBIDE DINITRATE 20 MG: 20 TABLET ORAL at 08:20

## 2020-01-01 RX ADMIN — MECLIZINE HYDROCHLORIDE 25 MG: 12.5 TABLET, FILM COATED ORAL at 13:16

## 2020-01-01 RX ADMIN — BUSPIRONE HYDROCHLORIDE 5 MG: 5 TABLET ORAL at 07:59

## 2020-01-01 RX ADMIN — PROMETHAZINE HYDROCHLORIDE 12.5 MG: 25 TABLET ORAL at 02:20

## 2020-01-01 RX ADMIN — HEPARIN SODIUM 5000 UNITS: 5000 INJECTION INTRAVENOUS; SUBCUTANEOUS at 11:13

## 2020-01-01 RX ADMIN — HEPARIN SODIUM: 1000 INJECTION INTRAVENOUS; SUBCUTANEOUS at 22:38

## 2020-01-01 RX ADMIN — PROMETHAZINE HYDROCHLORIDE 12.5 MG: 25 TABLET ORAL at 20:38

## 2020-01-01 RX ADMIN — HEPARIN SODIUM 5000 UNITS: 5000 INJECTION INTRAVENOUS; SUBCUTANEOUS at 11:18

## 2020-01-01 RX ADMIN — FLUTICASONE PROPIONATE 2 SPRAY: 50 SPRAY, METERED NASAL at 08:21

## 2020-01-01 RX ADMIN — INSULIN LISPRO 1 UNITS: 100 INJECTION, SOLUTION INTRAVENOUS; SUBCUTANEOUS at 16:42

## 2020-01-01 RX ADMIN — SODIUM CHLORIDE, SODIUM LACTATE, CALCIUM CHLORIDE, MAGNESIUM CHLORIDE AND DEXTROSE 1500 ML: 1.5; 538; 448; 18.3; 5.08 INJECTION, SOLUTION INTRAPERITONEAL at 05:09

## 2020-01-01 RX ADMIN — SEVELAMER CARBONATE 1600 MG: 800 TABLET, FILM COATED ORAL at 17:43

## 2020-01-01 RX ADMIN — INSULIN GLARGINE 4 UNITS: 100 INJECTION, SOLUTION SUBCUTANEOUS at 08:52

## 2020-01-01 RX ADMIN — HYDRALAZINE HYDROCHLORIDE 100 MG: 50 TABLET, FILM COATED ORAL at 05:46

## 2020-01-01 RX ADMIN — GABAPENTIN 100 MG: 100 CAPSULE ORAL at 13:26

## 2020-01-01 RX ADMIN — DOCUSATE SODIUM 100 MG: 100 CAPSULE, LIQUID FILLED ORAL at 14:11

## 2020-01-01 RX ADMIN — GABAPENTIN 100 MG: 100 CAPSULE ORAL at 09:19

## 2020-01-01 RX ADMIN — Medication 1 TABLET: at 08:28

## 2020-01-01 RX ADMIN — BUSPIRONE HYDROCHLORIDE 5 MG: 5 TABLET ORAL at 08:27

## 2020-01-01 RX ADMIN — CEFEPIME HYDROCHLORIDE 1 G: 1 INJECTION, POWDER, FOR SOLUTION INTRAMUSCULAR; INTRAVENOUS at 00:56

## 2020-01-01 RX ADMIN — SEVELAMER CARBONATE 1600 MG: 800 TABLET, FILM COATED ORAL at 11:25

## 2020-01-01 RX ADMIN — PROMETHAZINE HYDROCHLORIDE 12.5 MG: 25 TABLET ORAL at 07:40

## 2020-01-01 RX ADMIN — INSULIN LISPRO 1 UNITS: 100 INJECTION, SOLUTION INTRAVENOUS; SUBCUTANEOUS at 09:48

## 2020-01-01 RX ADMIN — POTASSIUM CHLORIDE 20 MEQ: 1500 TABLET, EXTENDED RELEASE ORAL at 21:48

## 2020-01-01 RX ADMIN — CALCIUM POLYCARBOPHIL 625 MG: 625 TABLET, FILM COATED ORAL at 20:24

## 2020-01-01 RX ADMIN — LEVOTHYROXINE SODIUM 25 MCG: 25 TABLET ORAL at 04:34

## 2020-01-01 RX ADMIN — ISOSORBIDE DINITRATE 20 MG: 20 TABLET ORAL at 13:14

## 2020-01-01 RX ADMIN — INSULIN LISPRO 2 UNITS: 100 INJECTION, SOLUTION INTRAVENOUS; SUBCUTANEOUS at 11:13

## 2020-01-01 RX ADMIN — INSULIN GLARGINE 4 UNITS: 100 INJECTION, SOLUTION SUBCUTANEOUS at 07:53

## 2020-01-01 RX ADMIN — SEVELAMER CARBONATE 1600 MG: 800 TABLET, FILM COATED ORAL at 13:25

## 2020-01-01 RX ADMIN — PRAVASTATIN SODIUM 40 MG: 40 TABLET ORAL at 21:48

## 2020-01-01 RX ADMIN — PRAVASTATIN SODIUM 40 MG: 40 TABLET ORAL at 21:32

## 2020-01-01 RX ADMIN — ACETAMINOPHEN 650 MG: 325 TABLET ORAL at 20:39

## 2020-01-01 RX ADMIN — ISOSORBIDE DINITRATE 20 MG: 20 TABLET ORAL at 07:59

## 2020-01-01 RX ADMIN — Medication 1 TABLET: at 07:59

## 2020-01-01 RX ADMIN — POTASSIUM CHLORIDE 20 MEQ: 1500 TABLET, EXTENDED RELEASE ORAL at 15:09

## 2020-01-01 RX ADMIN — SODIUM CHLORIDE, SODIUM LACTATE, CALCIUM CHLORIDE, MAGNESIUM CHLORIDE AND DEXTROSE 2000 ML: 2.5; 538; 448; 18.3; 5.08 INJECTION, SOLUTION INTRAPERITONEAL at 03:56

## 2020-01-01 RX ADMIN — CALCIUM POLYCARBOPHIL 625 MG: 625 TABLET, FILM COATED ORAL at 20:52

## 2020-01-01 RX ADMIN — LIDOCAINE: 50 OINTMENT TOPICAL at 08:43

## 2020-01-01 RX ADMIN — Medication 2 G: at 08:54

## 2020-01-01 RX ADMIN — ISOSORBIDE DINITRATE 20 MG: 20 TABLET ORAL at 08:43

## 2020-01-01 RX ADMIN — HEPARIN SODIUM 5000 UNITS: 5000 INJECTION INTRAVENOUS; SUBCUTANEOUS at 22:28

## 2020-01-01 RX ADMIN — BUSPIRONE HYDROCHLORIDE 5 MG: 5 TABLET ORAL at 21:29

## 2020-01-01 RX ADMIN — MECLIZINE HYDROCHLORIDE 25 MG: 12.5 TABLET, FILM COATED ORAL at 19:57

## 2020-01-01 RX ADMIN — MECLIZINE HYDROCHLORIDE 25 MG: 12.5 TABLET, FILM COATED ORAL at 20:39

## 2020-01-01 RX ADMIN — SODIUM CHLORIDE, PRESERVATIVE FREE 10 ML: 5 INJECTION INTRAVENOUS at 21:36

## 2020-01-01 RX ADMIN — BUSPIRONE HYDROCHLORIDE 5 MG: 5 TABLET ORAL at 20:38

## 2020-01-01 RX ADMIN — CALCIUM POLYCARBOPHIL 625 MG: 625 TABLET, FILM COATED ORAL at 14:11

## 2020-01-01 RX ADMIN — ISOSORBIDE DINITRATE 20 MG: 20 TABLET ORAL at 20:30

## 2020-01-01 RX ADMIN — SODIUM CHLORIDE, SODIUM LACTATE, CALCIUM CHLORIDE, MAGNESIUM CHLORIDE AND DEXTROSE 2000 ML: 1.5; 538; 448; 18.3; 5.08 INJECTION, SOLUTION INTRAPERITONEAL at 17:00

## 2020-01-01 RX ADMIN — CAPSAICIN: 0.25 CREAM TOPICAL at 22:11

## 2020-01-01 RX ADMIN — OXYBUTYNIN CHLORIDE 5 MG: 5 TABLET, EXTENDED RELEASE ORAL at 07:40

## 2020-01-01 RX ADMIN — LEVOTHYROXINE SODIUM 25 MCG: 25 TABLET ORAL at 05:59

## 2020-01-01 RX ADMIN — SODIUM CHLORIDE, PRESERVATIVE FREE 10 ML: 5 INJECTION INTRAVENOUS at 09:38

## 2020-01-01 RX ADMIN — OXYBUTYNIN CHLORIDE 5 MG: 5 TABLET, EXTENDED RELEASE ORAL at 08:54

## 2020-01-01 RX ADMIN — INSULIN GLARGINE 4 UNITS: 100 INJECTION, SOLUTION SUBCUTANEOUS at 07:55

## 2020-01-01 RX ADMIN — HEPARIN SODIUM 5000 UNITS: 5000 INJECTION INTRAVENOUS; SUBCUTANEOUS at 14:19

## 2020-01-01 RX ADMIN — SODIUM CHLORIDE, SODIUM LACTATE, CALCIUM CHLORIDE, MAGNESIUM CHLORIDE AND DEXTROSE 2000 ML: 1.5; 538; 448; 18.3; 5.08 INJECTION, SOLUTION INTRAPERITONEAL at 04:16

## 2020-01-01 RX ADMIN — INSULIN GLARGINE 4 UNITS: 100 INJECTION, SOLUTION SUBCUTANEOUS at 09:41

## 2020-01-01 RX ADMIN — ISOSORBIDE DINITRATE 20 MG: 20 TABLET ORAL at 11:13

## 2020-01-01 RX ADMIN — CALCIUM POLYCARBOPHIL 625 MG: 625 TABLET, FILM COATED ORAL at 15:15

## 2020-01-01 RX ADMIN — SODIUM CHLORIDE, PRESERVATIVE FREE 10 ML: 5 INJECTION INTRAVENOUS at 19:44

## 2020-01-01 RX ADMIN — Medication 500000 UNITS: at 15:57

## 2020-01-01 RX ADMIN — BUSPIRONE HYDROCHLORIDE 5 MG: 5 TABLET ORAL at 09:16

## 2020-01-01 RX ADMIN — SODIUM CHLORIDE, SODIUM LACTATE, CALCIUM CHLORIDE, MAGNESIUM CHLORIDE AND DEXTROSE 2000 ML: 1.5; 538; 448; 18.3; 5.08 INJECTION, SOLUTION INTRAPERITONEAL at 22:29

## 2020-01-01 RX ADMIN — ISOSORBIDE DINITRATE 20 MG: 20 TABLET ORAL at 08:19

## 2020-01-01 RX ADMIN — LEVOTHYROXINE SODIUM 25 MCG: 25 TABLET ORAL at 06:30

## 2020-01-01 RX ADMIN — SODIUM CHLORIDE: 9 INJECTION, SOLUTION INTRAVENOUS at 20:34

## 2020-01-01 RX ADMIN — MECLIZINE HYDROCHLORIDE 25 MG: 12.5 TABLET, FILM COATED ORAL at 13:26

## 2020-01-01 RX ADMIN — SODIUM CHLORIDE, SODIUM LACTATE, CALCIUM CHLORIDE, MAGNESIUM CHLORIDE AND DEXTROSE 2000 ML: 1.5; 538; 448; 18.3; 5.08 INJECTION, SOLUTION INTRAPERITONEAL at 13:00

## 2020-01-01 RX ADMIN — MECLIZINE HYDROCHLORIDE 25 MG: 12.5 TABLET, FILM COATED ORAL at 09:23

## 2020-01-01 RX ADMIN — SODIUM CHLORIDE, PRESERVATIVE FREE 10 ML: 5 INJECTION INTRAVENOUS at 20:18

## 2020-01-01 RX ADMIN — BUSPIRONE HYDROCHLORIDE 5 MG: 5 TABLET ORAL at 21:22

## 2020-01-01 RX ADMIN — OXYBUTYNIN CHLORIDE 5 MG: 5 TABLET, EXTENDED RELEASE ORAL at 09:47

## 2020-01-01 RX ADMIN — INSULIN GLARGINE 4 UNITS: 100 INJECTION, SOLUTION SUBCUTANEOUS at 09:00

## 2020-01-01 RX ADMIN — ISOSORBIDE DINITRATE 20 MG: 20 TABLET ORAL at 13:09

## 2020-01-01 RX ADMIN — FAMOTIDINE 10 MG: 20 TABLET ORAL at 15:15

## 2020-01-01 RX ADMIN — OXYBUTYNIN CHLORIDE 5 MG: 5 TABLET, EXTENDED RELEASE ORAL at 10:29

## 2020-01-01 RX ADMIN — BUSPIRONE HYDROCHLORIDE 5 MG: 5 TABLET ORAL at 08:43

## 2020-01-01 RX ADMIN — SEVELAMER CARBONATE 1600 MG: 800 TABLET, FILM COATED ORAL at 15:59

## 2020-01-01 RX ADMIN — BUSPIRONE HYDROCHLORIDE 5 MG: 5 TABLET ORAL at 07:40

## 2020-01-01 RX ADMIN — CALCIUM POLYCARBOPHIL 625 MG: 625 TABLET, FILM COATED ORAL at 20:30

## 2020-01-01 RX ADMIN — GABAPENTIN 100 MG: 100 CAPSULE ORAL at 08:33

## 2020-01-01 RX ADMIN — POLYETHYLENE GLYCOL 3350 17 G: 17 POWDER, FOR SOLUTION ORAL at 08:34

## 2020-01-01 RX ADMIN — INSULIN LISPRO 1 UNITS: 100 INJECTION, SOLUTION INTRAVENOUS; SUBCUTANEOUS at 07:41

## 2020-01-01 RX ADMIN — SODIUM CHLORIDE, PRESERVATIVE FREE 10 ML: 5 INJECTION INTRAVENOUS at 08:55

## 2020-01-01 RX ADMIN — ACETAMINOPHEN 650 MG: 325 TABLET ORAL at 04:48

## 2020-01-01 RX ADMIN — Medication 500000 UNITS: at 09:23

## 2020-01-01 RX ADMIN — INSULIN GLARGINE 4 UNITS: 100 INJECTION, SOLUTION SUBCUTANEOUS at 20:25

## 2020-01-01 RX ADMIN — SODIUM CHLORIDE, PRESERVATIVE FREE 10 ML: 5 INJECTION INTRAVENOUS at 20:40

## 2020-01-01 RX ADMIN — MECLIZINE HYDROCHLORIDE 25 MG: 12.5 TABLET, FILM COATED ORAL at 20:56

## 2020-01-01 RX ADMIN — SERTRALINE 25 MG: 50 TABLET, FILM COATED ORAL at 08:33

## 2020-01-01 RX ADMIN — CEFEPIME HYDROCHLORIDE 1 G: 1 INJECTION, POWDER, FOR SOLUTION INTRAMUSCULAR; INTRAVENOUS at 15:10

## 2020-01-01 RX ADMIN — Medication 500000 UNITS: at 17:28

## 2020-01-01 RX ADMIN — Medication 1 G: at 09:16

## 2020-01-01 RX ADMIN — SERTRALINE 25 MG: 50 TABLET, FILM COATED ORAL at 09:13

## 2020-01-01 RX ADMIN — SODIUM CHLORIDE, PRESERVATIVE FREE 10 ML: 5 INJECTION INTRAVENOUS at 20:55

## 2020-01-01 RX ADMIN — PROMETHAZINE HYDROCHLORIDE 12.5 MG: 25 TABLET ORAL at 09:46

## 2020-01-01 RX ADMIN — ISOSORBIDE DINITRATE 20 MG: 20 TABLET ORAL at 21:29

## 2020-01-01 RX ADMIN — ISOSORBIDE DINITRATE 20 MG: 20 TABLET ORAL at 20:39

## 2020-01-01 RX ADMIN — OXYBUTYNIN CHLORIDE 5 MG: 5 TABLET, EXTENDED RELEASE ORAL at 08:20

## 2020-01-01 RX ADMIN — OXYBUTYNIN CHLORIDE 5 MG: 5 TABLET, EXTENDED RELEASE ORAL at 07:52

## 2020-01-01 RX ADMIN — GABAPENTIN 100 MG: 100 CAPSULE ORAL at 14:11

## 2020-01-01 RX ADMIN — BUSPIRONE HYDROCHLORIDE 5 MG: 5 TABLET ORAL at 21:19

## 2020-01-01 RX ADMIN — PANTOPRAZOLE SODIUM 40 MG: 40 TABLET, DELAYED RELEASE ORAL at 06:10

## 2020-01-01 RX ADMIN — POLYETHYLENE GLYCOL 3350 17 G: 17 POWDER, FOR SOLUTION ORAL at 08:19

## 2020-01-01 RX ADMIN — ONDANSETRON 4 MG: 2 INJECTION INTRAMUSCULAR; INTRAVENOUS at 12:03

## 2020-01-01 RX ADMIN — SEVELAMER CARBONATE 1600 MG: 800 TABLET, FILM COATED ORAL at 08:20

## 2020-01-01 RX ADMIN — HEPARIN SODIUM 5000 UNITS: 5000 INJECTION INTRAVENOUS; SUBCUTANEOUS at 06:10

## 2020-01-01 RX ADMIN — INSULIN GLARGINE 4 UNITS: 100 INJECTION, SOLUTION SUBCUTANEOUS at 10:31

## 2020-01-01 RX ADMIN — CEFEPIME HYDROCHLORIDE 1 G: 1 INJECTION, POWDER, FOR SOLUTION INTRAMUSCULAR; INTRAVENOUS at 22:27

## 2020-01-01 RX ADMIN — DICYCLOMINE HYDROCHLORIDE 10 MG: 10 CAPSULE ORAL at 12:58

## 2020-01-01 RX ADMIN — LEVOTHYROXINE SODIUM 25 MCG: 25 TABLET ORAL at 06:09

## 2020-01-01 RX ADMIN — GABAPENTIN 300 MG: 300 CAPSULE ORAL at 08:54

## 2020-01-01 RX ADMIN — LEVOTHYROXINE SODIUM 25 MCG: 25 TABLET ORAL at 04:10

## 2020-01-01 RX ADMIN — INSULIN LISPRO 2 UNITS: 100 INJECTION, SOLUTION INTRAVENOUS; SUBCUTANEOUS at 11:25

## 2020-01-01 RX ADMIN — ONDANSETRON 4 MG: 2 INJECTION INTRAMUSCULAR; INTRAVENOUS at 03:05

## 2020-01-01 RX ADMIN — HEPARIN SODIUM 5000 UNITS: 5000 INJECTION INTRAVENOUS; SUBCUTANEOUS at 06:04

## 2020-01-01 RX ADMIN — SODIUM CHLORIDE, SODIUM LACTATE, CALCIUM CHLORIDE, MAGNESIUM CHLORIDE AND DEXTROSE 2000 ML: 2.5; 538; 448; 18.3; 5.08 INJECTION, SOLUTION INTRAPERITONEAL at 20:18

## 2020-01-01 RX ADMIN — GABAPENTIN 300 MG: 300 CAPSULE ORAL at 09:47

## 2020-01-01 RX ADMIN — HEPARIN SODIUM 5000 UNITS: 5000 INJECTION INTRAVENOUS; SUBCUTANEOUS at 06:23

## 2020-01-01 RX ADMIN — SODIUM CHLORIDE: 9 INJECTION, SOLUTION INTRAVENOUS at 00:12

## 2020-01-01 RX ADMIN — MECLIZINE HYDROCHLORIDE 25 MG: 12.5 TABLET, FILM COATED ORAL at 09:47

## 2020-01-01 RX ADMIN — POTASSIUM CHLORIDE 20 MEQ: 1500 TABLET, EXTENDED RELEASE ORAL at 17:28

## 2020-01-01 RX ADMIN — Medication 500000 UNITS: at 12:32

## 2020-01-01 RX ADMIN — SODIUM CHLORIDE, PRESERVATIVE FREE 10 ML: 5 INJECTION INTRAVENOUS at 09:24

## 2020-01-01 RX ADMIN — Medication 500000 UNITS: at 16:46

## 2020-01-01 RX ADMIN — SODIUM CHLORIDE, SODIUM LACTATE, CALCIUM CHLORIDE, MAGNESIUM CHLORIDE AND DEXTROSE 1500 ML: 1.5; 538; 448; 18.3; 5.08 INJECTION, SOLUTION INTRAPERITONEAL at 22:40

## 2020-01-01 RX ADMIN — Medication 2 G: at 17:23

## 2020-01-01 RX ADMIN — GABAPENTIN 300 MG: 300 CAPSULE ORAL at 09:45

## 2020-01-01 RX ADMIN — SODIUM CHLORIDE, SODIUM LACTATE, CALCIUM CHLORIDE, MAGNESIUM CHLORIDE AND DEXTROSE 1500 ML: 1.5; 538; 448; 18.3; 5.08 INJECTION, SOLUTION INTRAPERITONEAL at 15:09

## 2020-01-01 RX ADMIN — PROMETHAZINE HYDROCHLORIDE 12.5 MG: 25 TABLET ORAL at 15:25

## 2020-01-01 RX ADMIN — DOCUSATE SODIUM 100 MG: 100 CAPSULE, LIQUID FILLED ORAL at 09:17

## 2020-01-01 RX ADMIN — SODIUM CHLORIDE, SODIUM LACTATE, CALCIUM CHLORIDE, MAGNESIUM CHLORIDE AND DEXTROSE 1500 ML: 1.5; 538; 448; 18.3; 5.08 INJECTION, SOLUTION INTRAPERITONEAL at 04:09

## 2020-01-01 RX ADMIN — SODIUM CHLORIDE, SODIUM LACTATE, CALCIUM CHLORIDE, MAGNESIUM CHLORIDE AND DEXTROSE 1500 ML: 1.5; 538; 448; 18.3; 5.08 INJECTION, SOLUTION INTRAPERITONEAL at 10:57

## 2020-01-01 RX ADMIN — SODIUM CHLORIDE, PRESERVATIVE FREE 10 ML: 5 INJECTION INTRAVENOUS at 19:57

## 2020-01-01 RX ADMIN — DICYCLOMINE HYDROCHLORIDE 10 MG: 10 CAPSULE ORAL at 05:56

## 2020-01-01 RX ADMIN — SODIUM CHLORIDE, SODIUM LACTATE, CALCIUM CHLORIDE, MAGNESIUM CHLORIDE AND DEXTROSE 2000 ML: 1.5; 538; 448; 18.3; 5.08 INJECTION, SOLUTION INTRAPERITONEAL at 06:30

## 2020-01-01 RX ADMIN — Medication 500000 UNITS: at 21:44

## 2020-01-01 RX ADMIN — BUSPIRONE HYDROCHLORIDE 5 MG: 5 TABLET ORAL at 10:29

## 2020-01-01 RX ADMIN — ISOSORBIDE DINITRATE 20 MG: 20 TABLET ORAL at 17:06

## 2020-01-01 RX ADMIN — OXYBUTYNIN CHLORIDE 5 MG: 5 TABLET ORAL at 09:13

## 2020-01-01 RX ADMIN — BUSPIRONE HYDROCHLORIDE 5 MG: 5 TABLET ORAL at 20:18

## 2020-01-01 RX ADMIN — Medication 1 TABLET: at 08:20

## 2020-01-01 RX ADMIN — SODIUM CHLORIDE, SODIUM LACTATE, CALCIUM CHLORIDE, MAGNESIUM CHLORIDE AND DEXTROSE 2000 ML: 1.5; 538; 448; 18.3; 5.08 INJECTION, SOLUTION INTRAPERITONEAL at 05:43

## 2020-01-01 RX ADMIN — FAMOTIDINE 10 MG: 20 TABLET ORAL at 08:31

## 2020-01-01 RX ADMIN — Medication 500000 UNITS: at 08:19

## 2020-01-01 RX ADMIN — SODIUM CHLORIDE, SODIUM LACTATE, CALCIUM CHLORIDE, MAGNESIUM CHLORIDE AND DEXTROSE 1500 ML: 1.5; 538; 448; 18.3; 5.08 INJECTION, SOLUTION INTRAPERITONEAL at 17:29

## 2020-01-01 RX ADMIN — OXYBUTYNIN CHLORIDE 5 MG: 5 TABLET ORAL at 08:33

## 2020-01-01 RX ADMIN — FLUTICASONE PROPIONATE 2 SPRAY: 50 SPRAY, METERED NASAL at 23:54

## 2020-01-01 RX ADMIN — SEVELAMER CARBONATE 1600 MG: 800 TABLET, FILM COATED ORAL at 16:51

## 2020-01-01 RX ADMIN — ISOSORBIDE DINITRATE 20 MG: 20 TABLET ORAL at 13:17

## 2020-01-01 RX ADMIN — Medication 455 MICRO CURIE: at 12:47

## 2020-01-01 RX ADMIN — HEPARIN SODIUM 5000 UNITS: 5000 INJECTION INTRAVENOUS; SUBCUTANEOUS at 23:54

## 2020-01-01 RX ADMIN — HYDRALAZINE HYDROCHLORIDE 100 MG: 50 TABLET, FILM COATED ORAL at 22:22

## 2020-01-01 RX ADMIN — INSULIN GLARGINE 4 UNITS: 100 INJECTION, SOLUTION SUBCUTANEOUS at 08:27

## 2020-01-01 RX ADMIN — CEFEPIME HYDROCHLORIDE 1 G: 1 INJECTION, POWDER, FOR SOLUTION INTRAMUSCULAR; INTRAVENOUS at 21:34

## 2020-01-01 RX ADMIN — HEPARIN SODIUM 5000 UNITS: 5000 INJECTION INTRAVENOUS; SUBCUTANEOUS at 05:25

## 2020-01-01 RX ADMIN — SEVELAMER CARBONATE 1600 MG: 800 TABLET, FILM COATED ORAL at 08:30

## 2020-01-01 RX ADMIN — MECLIZINE HYDROCHLORIDE 25 MG: 12.5 TABLET, FILM COATED ORAL at 19:38

## 2020-01-01 RX ADMIN — ISOSORBIDE DINITRATE 20 MG: 20 TABLET ORAL at 07:40

## 2020-01-01 RX ADMIN — LEVOTHYROXINE SODIUM 25 MCG: 25 TABLET ORAL at 05:56

## 2020-01-01 RX ADMIN — PRAVASTATIN SODIUM 40 MG: 40 TABLET ORAL at 20:30

## 2020-01-01 RX ADMIN — GABAPENTIN 100 MG: 100 CAPSULE ORAL at 15:15

## 2020-01-01 RX ADMIN — LEVOTHYROXINE SODIUM 25 MCG: 25 TABLET ORAL at 05:10

## 2020-01-01 RX ADMIN — MECLIZINE HYDROCHLORIDE 25 MG: 12.5 TABLET, FILM COATED ORAL at 11:20

## 2020-01-01 RX ADMIN — CEFEPIME HYDROCHLORIDE 2 G: 2 INJECTION, POWDER, FOR SOLUTION INTRAVENOUS at 23:44

## 2020-01-01 RX ADMIN — GABAPENTIN 300 MG: 300 CAPSULE ORAL at 08:20

## 2020-01-01 RX ADMIN — ISOSORBIDE DINITRATE 20 MG: 20 TABLET ORAL at 15:38

## 2020-01-01 RX ADMIN — SEVELAMER CARBONATE 1600 MG: 800 TABLET, FILM COATED ORAL at 17:23

## 2020-01-01 RX ADMIN — BUSPIRONE HYDROCHLORIDE 5 MG: 5 TABLET ORAL at 21:32

## 2020-01-01 RX ADMIN — SODIUM CHLORIDE, PRESERVATIVE FREE 10 ML: 5 INJECTION INTRAVENOUS at 08:20

## 2020-01-01 RX ADMIN — SODIUM CHLORIDE, SODIUM LACTATE, CALCIUM CHLORIDE, MAGNESIUM CHLORIDE AND DEXTROSE 2000 ML: 1.5; 538; 448; 18.3; 5.08 INJECTION, SOLUTION INTRAPERITONEAL at 00:00

## 2020-01-01 RX ADMIN — GABAPENTIN 100 MG: 100 CAPSULE ORAL at 13:43

## 2020-01-01 RX ADMIN — MECLIZINE HYDROCHLORIDE 25 MG: 12.5 TABLET, FILM COATED ORAL at 15:40

## 2020-01-01 RX ADMIN — SODIUM CHLORIDE: 9 INJECTION, SOLUTION INTRAVENOUS at 06:59

## 2020-01-01 RX ADMIN — PANTOPRAZOLE SODIUM 40 MG: 40 TABLET, DELAYED RELEASE ORAL at 04:57

## 2020-01-01 RX ADMIN — ACETAMINOPHEN 650 MG: 325 TABLET ORAL at 12:31

## 2020-01-01 RX ADMIN — INSULIN LISPRO 1 UNITS: 100 INJECTION, SOLUTION INTRAVENOUS; SUBCUTANEOUS at 12:59

## 2020-01-01 RX ADMIN — MECLIZINE HYDROCHLORIDE 25 MG: 12.5 TABLET, FILM COATED ORAL at 21:33

## 2020-01-01 RX ADMIN — SEVELAMER CARBONATE 1600 MG: 800 TABLET, FILM COATED ORAL at 09:47

## 2020-01-01 RX ADMIN — HEPARIN SODIUM 5000 UNITS: 5000 INJECTION INTRAVENOUS; SUBCUTANEOUS at 06:05

## 2020-01-01 RX ADMIN — SODIUM CHLORIDE, SODIUM LACTATE, CALCIUM CHLORIDE, MAGNESIUM CHLORIDE AND DEXTROSE 2000 ML: 1.5; 538; 448; 18.3; 5.08 INJECTION, SOLUTION INTRAPERITONEAL at 01:29

## 2020-01-01 RX ADMIN — ISOSORBIDE DINITRATE 20 MG: 20 TABLET ORAL at 15:22

## 2020-01-01 RX ADMIN — INSULIN LISPRO 1 UNITS: 100 INJECTION, SOLUTION INTRAVENOUS; SUBCUTANEOUS at 11:18

## 2020-01-01 RX ADMIN — SODIUM CHLORIDE, SODIUM LACTATE, CALCIUM CHLORIDE, MAGNESIUM CHLORIDE AND DEXTROSE 2000 ML: 1.5; 538; 448; 18.3; 5.08 INJECTION, SOLUTION INTRAPERITONEAL at 17:02

## 2020-01-01 RX ADMIN — LEVOTHYROXINE SODIUM 25 MCG: 25 TABLET ORAL at 06:05

## 2020-01-01 RX ADMIN — SODIUM CHLORIDE, SODIUM LACTATE, CALCIUM CHLORIDE, MAGNESIUM CHLORIDE AND DEXTROSE 2000 ML: 1.5; 538; 448; 18.3; 5.08 INJECTION, SOLUTION INTRAPERITONEAL at 20:47

## 2020-01-01 RX ADMIN — SODIUM CHLORIDE, SODIUM LACTATE, CALCIUM CHLORIDE, MAGNESIUM CHLORIDE AND DEXTROSE 1500 ML: 1.5; 538; 448; 18.3; 5.08 INJECTION, SOLUTION INTRAPERITONEAL at 08:56

## 2020-01-01 RX ADMIN — HYDRALAZINE HYDROCHLORIDE 25 MG: 25 TABLET, FILM COATED ORAL at 21:11

## 2020-01-01 RX ADMIN — SODIUM CHLORIDE, SODIUM LACTATE, CALCIUM CHLORIDE, MAGNESIUM CHLORIDE AND DEXTROSE 2000 ML: 2.5; 538; 448; 18.3; 5.08 INJECTION, SOLUTION INTRAPERITONEAL at 03:58

## 2020-01-01 RX ADMIN — HEPARIN SODIUM 5000 UNITS: 5000 INJECTION INTRAVENOUS; SUBCUTANEOUS at 20:34

## 2020-01-01 RX ADMIN — MECLIZINE HYDROCHLORIDE 25 MG: 12.5 TABLET, FILM COATED ORAL at 21:48

## 2020-01-01 RX ADMIN — SEVELAMER CARBONATE 1600 MG: 800 TABLET, FILM COATED ORAL at 16:29

## 2020-01-01 RX ADMIN — ACETAMINOPHEN 650 MG: 325 TABLET ORAL at 03:35

## 2020-01-01 RX ADMIN — Medication 2 G: at 08:58

## 2020-01-01 RX ADMIN — ACETAMINOPHEN 650 MG: 325 TABLET ORAL at 09:14

## 2020-01-01 RX ADMIN — Medication 500000 UNITS: at 11:53

## 2020-01-01 RX ADMIN — ACETAMINOPHEN 650 MG: 325 TABLET ORAL at 21:41

## 2020-01-01 RX ADMIN — BUSPIRONE HYDROCHLORIDE 5 MG: 5 TABLET ORAL at 20:54

## 2020-01-01 RX ADMIN — Medication 500000 UNITS: at 17:04

## 2020-01-01 RX ADMIN — PRAVASTATIN SODIUM 40 MG: 40 TABLET ORAL at 20:18

## 2020-01-01 RX ADMIN — SEVELAMER CARBONATE 1600 MG: 800 TABLET, FILM COATED ORAL at 09:45

## 2020-01-01 RX ADMIN — Medication 500000 UNITS: at 20:48

## 2020-01-01 RX ADMIN — GABAPENTIN 300 MG: 300 CAPSULE ORAL at 10:29

## 2020-01-01 RX ADMIN — Medication 2 G: at 08:43

## 2020-01-01 RX ADMIN — MECLIZINE HYDROCHLORIDE 25 MG: 12.5 TABLET, FILM COATED ORAL at 15:24

## 2020-01-01 RX ADMIN — BUSPIRONE HYDROCHLORIDE 5 MG: 5 TABLET ORAL at 20:39

## 2020-01-01 RX ADMIN — HEPARIN SODIUM 5000 UNITS: 5000 INJECTION INTRAVENOUS; SUBCUTANEOUS at 05:27

## 2020-01-01 RX ADMIN — ISOSORBIDE DINITRATE 20 MG: 20 TABLET ORAL at 20:38

## 2020-01-01 RX ADMIN — HEPARIN SODIUM 5000 UNITS: 5000 INJECTION INTRAVENOUS; SUBCUTANEOUS at 13:14

## 2020-01-01 RX ADMIN — SODIUM CHLORIDE, SODIUM LACTATE, CALCIUM CHLORIDE, MAGNESIUM CHLORIDE AND DEXTROSE 2000 ML: 1.5; 538; 448; 18.3; 5.08 INJECTION, SOLUTION INTRAPERITONEAL at 12:44

## 2020-01-01 RX ADMIN — SEVELAMER CARBONATE 1600 MG: 800 TABLET, FILM COATED ORAL at 16:46

## 2020-01-01 RX ADMIN — SODIUM CHLORIDE, PRESERVATIVE FREE 10 ML: 5 INJECTION INTRAVENOUS at 10:41

## 2020-01-01 RX ADMIN — ISOSORBIDE DINITRATE 20 MG: 20 TABLET ORAL at 08:54

## 2020-01-01 RX ADMIN — LEVOTHYROXINE SODIUM 25 MCG: 25 TABLET ORAL at 05:46

## 2020-01-01 RX ADMIN — HEPARIN SODIUM: 1000 INJECTION INTRAVENOUS; SUBCUTANEOUS at 06:04

## 2020-01-01 RX ADMIN — SODIUM CHLORIDE, SODIUM LACTATE, CALCIUM CHLORIDE, MAGNESIUM CHLORIDE AND DEXTROSE 1500 ML: 1.5; 538; 448; 18.3; 5.08 INJECTION, SOLUTION INTRAPERITONEAL at 04:41

## 2020-01-01 RX ADMIN — SODIUM CHLORIDE, SODIUM LACTATE, CALCIUM CHLORIDE, MAGNESIUM CHLORIDE AND DEXTROSE 2000 ML: 2.5; 538; 448; 18.3; 5.08 INJECTION, SOLUTION INTRAPERITONEAL at 04:42

## 2020-01-01 RX ADMIN — ONDANSETRON 4 MG: 2 INJECTION INTRAMUSCULAR; INTRAVENOUS at 11:51

## 2020-01-01 RX ADMIN — SODIUM CHLORIDE, PRESERVATIVE FREE 10 ML: 5 INJECTION INTRAVENOUS at 20:53

## 2020-01-01 RX ADMIN — Medication 500000 UNITS: at 11:12

## 2020-01-01 RX ADMIN — DOCUSATE SODIUM 100 MG: 100 CAPSULE, LIQUID FILLED ORAL at 08:31

## 2020-01-01 RX ADMIN — OXYBUTYNIN CHLORIDE 5 MG: 5 TABLET, EXTENDED RELEASE ORAL at 08:02

## 2020-01-01 RX ADMIN — MECLIZINE HYDROCHLORIDE 25 MG: 12.5 TABLET, FILM COATED ORAL at 21:11

## 2020-01-01 RX ADMIN — GABAPENTIN 100 MG: 100 CAPSULE ORAL at 21:11

## 2020-01-01 RX ADMIN — GABAPENTIN 300 MG: 300 CAPSULE ORAL at 23:53

## 2020-01-01 RX ADMIN — HEPARIN SODIUM 5000 UNITS: 5000 INJECTION INTRAVENOUS; SUBCUTANEOUS at 22:11

## 2020-01-01 RX ADMIN — ACETAMINOPHEN 650 MG: 325 TABLET ORAL at 15:15

## 2020-01-01 RX ADMIN — LEVOTHYROXINE SODIUM 25 MCG: 25 TABLET ORAL at 06:11

## 2020-01-01 RX ADMIN — Medication 1 TABLET: at 09:47

## 2020-01-01 RX ADMIN — DICYCLOMINE HYDROCHLORIDE 10 MG: 10 CAPSULE ORAL at 16:26

## 2020-01-01 RX ADMIN — ISOSORBIDE DINITRATE 20 MG: 20 TABLET ORAL at 19:39

## 2020-01-01 RX ADMIN — Medication 500000 UNITS: at 21:48

## 2020-01-01 RX ADMIN — HEPARIN SODIUM 5000 UNITS: 5000 INJECTION INTRAVENOUS; SUBCUTANEOUS at 22:30

## 2020-01-01 RX ADMIN — INSULIN GLARGINE 4 UNITS: 100 INJECTION, SOLUTION SUBCUTANEOUS at 08:21

## 2020-01-01 RX ADMIN — MECLIZINE HYDROCHLORIDE 25 MG: 12.5 TABLET, FILM COATED ORAL at 08:47

## 2020-01-01 RX ADMIN — CALCIUM POLYCARBOPHIL 625 MG: 625 TABLET, FILM COATED ORAL at 13:27

## 2020-01-01 RX ADMIN — Medication 500000 UNITS: at 14:22

## 2020-01-01 RX ADMIN — MECLIZINE HYDROCHLORIDE 25 MG: 12.5 TABLET, FILM COATED ORAL at 21:22

## 2020-01-01 RX ADMIN — PRAVASTATIN SODIUM 40 MG: 40 TABLET ORAL at 21:11

## 2020-01-01 RX ADMIN — INSULIN LISPRO 1 UNITS: 100 INJECTION, SOLUTION INTRAVENOUS; SUBCUTANEOUS at 17:44

## 2020-01-01 RX ADMIN — SEVELAMER CARBONATE 1600 MG: 800 TABLET, FILM COATED ORAL at 18:06

## 2020-01-01 RX ADMIN — ISOSORBIDE DINITRATE 20 MG: 20 TABLET ORAL at 08:33

## 2020-01-01 RX ADMIN — SODIUM CHLORIDE, PRESERVATIVE FREE 10 ML: 5 INJECTION INTRAVENOUS at 09:13

## 2020-01-01 RX ADMIN — ISOSORBIDE DINITRATE 20 MG: 20 TABLET ORAL at 17:18

## 2020-01-01 RX ADMIN — SODIUM CHLORIDE, PRESERVATIVE FREE 10 ML: 5 INJECTION INTRAVENOUS at 20:49

## 2020-01-01 RX ADMIN — Medication 500000 UNITS: at 08:58

## 2020-01-01 RX ADMIN — BUSPIRONE HYDROCHLORIDE 5 MG: 5 TABLET ORAL at 19:57

## 2020-01-01 RX ADMIN — INSULIN GLARGINE 4 UNITS: 100 INJECTION, SOLUTION SUBCUTANEOUS at 08:50

## 2020-01-01 RX ADMIN — DICYCLOMINE HYDROCHLORIDE 10 MG: 10 CAPSULE ORAL at 12:43

## 2020-01-01 RX ADMIN — MECLIZINE HYDROCHLORIDE 25 MG: 12.5 TABLET, FILM COATED ORAL at 15:30

## 2020-01-01 RX ADMIN — FLUTICASONE PROPIONATE 2 SPRAY: 50 SPRAY, METERED NASAL at 08:20

## 2020-01-01 RX ADMIN — SEVELAMER CARBONATE 1600 MG: 800 TABLET, FILM COATED ORAL at 08:33

## 2020-01-01 RX ADMIN — INSULIN LISPRO 2 UNITS: 100 INJECTION, SOLUTION INTRAVENOUS; SUBCUTANEOUS at 11:46

## 2020-01-01 RX ADMIN — PRAVASTATIN SODIUM 40 MG: 40 TABLET ORAL at 20:54

## 2020-01-01 RX ADMIN — HEPARIN SODIUM: 1000 INJECTION INTRAVENOUS; SUBCUTANEOUS at 04:40

## 2020-01-01 RX ADMIN — BUSPIRONE HYDROCHLORIDE 5 MG: 5 TABLET ORAL at 09:47

## 2020-01-01 RX ADMIN — PANTOPRAZOLE SODIUM 40 MG: 40 TABLET, DELAYED RELEASE ORAL at 05:07

## 2020-01-01 RX ADMIN — Medication 1 TABLET: at 09:16

## 2020-01-01 RX ADMIN — SODIUM CHLORIDE, SODIUM LACTATE, CALCIUM CHLORIDE, MAGNESIUM CHLORIDE AND DEXTROSE 2000 ML: 1.5; 538; 448; 18.3; 5.08 INJECTION, SOLUTION INTRAPERITONEAL at 05:50

## 2020-01-01 RX ADMIN — CAPSAICIN: 0.25 CREAM TOPICAL at 08:43

## 2020-01-01 RX ADMIN — Medication 1 TABLET: at 09:24

## 2020-01-01 RX ADMIN — SEVELAMER CARBONATE 1600 MG: 800 TABLET, FILM COATED ORAL at 11:53

## 2020-01-01 RX ADMIN — PRAVASTATIN SODIUM 40 MG: 40 TABLET ORAL at 20:52

## 2020-01-01 RX ADMIN — SODIUM CHLORIDE, SODIUM LACTATE, CALCIUM CHLORIDE, MAGNESIUM CHLORIDE AND DEXTROSE 2000 ML: 1.5; 538; 448; 18.3; 5.08 INJECTION, SOLUTION INTRAPERITONEAL at 03:58

## 2020-01-01 RX ADMIN — MECLIZINE HYDROCHLORIDE 25 MG: 12.5 TABLET, FILM COATED ORAL at 13:17

## 2020-01-01 RX ADMIN — SEVELAMER CARBONATE 1600 MG: 800 TABLET, FILM COATED ORAL at 17:07

## 2020-01-01 RX ADMIN — BUSPIRONE HYDROCHLORIDE 5 MG: 5 TABLET ORAL at 08:59

## 2020-01-01 RX ADMIN — SEVELAMER CARBONATE 1600 MG: 800 TABLET, FILM COATED ORAL at 09:17

## 2020-01-01 RX ADMIN — ISOSORBIDE DINITRATE 20 MG: 20 TABLET ORAL at 15:30

## 2020-01-01 RX ADMIN — FLUTICASONE PROPIONATE 2 SPRAY: 50 SPRAY, METERED NASAL at 09:17

## 2020-01-01 RX ADMIN — CEFEPIME HYDROCHLORIDE 1 G: 1 INJECTION, POWDER, FOR SOLUTION INTRAMUSCULAR; INTRAVENOUS at 20:48

## 2020-01-01 RX ADMIN — HEPARIN SODIUM 5000 UNITS: 5000 INJECTION INTRAVENOUS; SUBCUTANEOUS at 21:25

## 2020-01-01 RX ADMIN — CEFEPIME HYDROCHLORIDE 1 G: 1 INJECTION, POWDER, FOR SOLUTION INTRAMUSCULAR; INTRAVENOUS at 22:11

## 2020-01-01 RX ADMIN — SEVELAMER CARBONATE 1600 MG: 800 TABLET, FILM COATED ORAL at 12:43

## 2020-01-01 RX ADMIN — FLUTICASONE PROPIONATE 2 SPRAY: 50 SPRAY, METERED NASAL at 08:28

## 2020-01-01 RX ADMIN — PRAVASTATIN SODIUM 40 MG: 40 TABLET ORAL at 21:22

## 2020-01-01 RX ADMIN — ISOSORBIDE DINITRATE 20 MG: 20 TABLET ORAL at 19:57

## 2020-01-01 RX ADMIN — ACETAMINOPHEN 650 MG: 325 TABLET ORAL at 17:28

## 2020-01-01 RX ADMIN — ISOSORBIDE DINITRATE 20 MG: 20 TABLET ORAL at 21:32

## 2020-01-01 RX ADMIN — SODIUM CHLORIDE, SODIUM LACTATE, CALCIUM CHLORIDE, MAGNESIUM CHLORIDE AND DEXTROSE 2000 ML: 1.5; 538; 448; 18.3; 5.08 INJECTION, SOLUTION INTRAPERITONEAL at 00:02

## 2020-01-01 RX ADMIN — HEPARIN SODIUM 5000 UNITS: 5000 INJECTION INTRAVENOUS; SUBCUTANEOUS at 21:48

## 2020-01-01 RX ADMIN — HEPARIN SODIUM 5000 UNITS: 5000 INJECTION INTRAVENOUS; SUBCUTANEOUS at 20:44

## 2020-01-01 RX ADMIN — SODIUM CHLORIDE, SODIUM LACTATE, CALCIUM CHLORIDE, MAGNESIUM CHLORIDE AND DEXTROSE 2000 ML: 1.5; 538; 448; 18.3; 5.08 INJECTION, SOLUTION INTRAPERITONEAL at 17:43

## 2020-01-01 RX ADMIN — INSULIN LISPRO 1 UNITS: 100 INJECTION, SOLUTION INTRAVENOUS; SUBCUTANEOUS at 11:13

## 2020-01-01 RX ADMIN — ISOSORBIDE DINITRATE 20 MG: 20 TABLET ORAL at 09:47

## 2020-01-01 RX ADMIN — POTASSIUM CHLORIDE 20 MEQ: 1500 TABLET, EXTENDED RELEASE ORAL at 08:59

## 2020-01-01 RX ADMIN — SODIUM CHLORIDE, PRESERVATIVE FREE 10 ML: 5 INJECTION INTRAVENOUS at 07:53

## 2020-01-01 RX ADMIN — SODIUM CHLORIDE, SODIUM LACTATE, CALCIUM CHLORIDE, MAGNESIUM CHLORIDE AND DEXTROSE 2000 ML: 2.5; 538; 448; 18.3; 5.08 INJECTION, SOLUTION INTRAPERITONEAL at 02:03

## 2020-01-01 RX ADMIN — SODIUM CHLORIDE, SODIUM LACTATE, CALCIUM CHLORIDE, MAGNESIUM CHLORIDE AND DEXTROSE 2000 ML: 1.5; 538; 448; 18.3; 5.08 INJECTION, SOLUTION INTRAPERITONEAL at 00:19

## 2020-01-01 RX ADMIN — CEFEPIME HYDROCHLORIDE 1 G: 1 INJECTION, POWDER, FOR SOLUTION INTRAMUSCULAR; INTRAVENOUS at 22:20

## 2020-01-01 RX ADMIN — INSULIN LISPRO 1 UNITS: 100 INJECTION, SOLUTION INTRAVENOUS; SUBCUTANEOUS at 19:37

## 2020-01-01 RX ADMIN — SEVELAMER CARBONATE 1600 MG: 800 TABLET, FILM COATED ORAL at 15:57

## 2020-01-01 RX ADMIN — GABAPENTIN 300 MG: 300 CAPSULE ORAL at 07:52

## 2020-01-01 RX ADMIN — CEFEPIME HYDROCHLORIDE 1 G: 1 INJECTION, POWDER, FOR SOLUTION INTRAMUSCULAR; INTRAVENOUS at 13:29

## 2020-01-01 RX ADMIN — BUSPIRONE HYDROCHLORIDE 5 MG: 5 TABLET ORAL at 20:34

## 2020-01-01 RX ADMIN — SODIUM CHLORIDE, SODIUM LACTATE, CALCIUM CHLORIDE, MAGNESIUM CHLORIDE AND DEXTROSE 2000 ML: 2.5; 538; 448; 18.3; 5.08 INJECTION, SOLUTION INTRAPERITONEAL at 14:19

## 2020-01-01 RX ADMIN — SEVELAMER CARBONATE 1600 MG: 800 TABLET, FILM COATED ORAL at 17:28

## 2020-01-01 RX ADMIN — INSULIN GLARGINE 4 UNITS: 100 INJECTION, SOLUTION SUBCUTANEOUS at 09:48

## 2020-01-01 RX ADMIN — ISOSORBIDE DINITRATE 20 MG: 20 TABLET ORAL at 22:11

## 2020-01-01 RX ADMIN — LEVOTHYROXINE SODIUM 25 MCG: 25 TABLET ORAL at 11:45

## 2020-01-01 RX ADMIN — SODIUM CHLORIDE, PRESERVATIVE FREE 10 ML: 5 INJECTION INTRAVENOUS at 00:11

## 2020-01-01 RX ADMIN — SODIUM CHLORIDE, PRESERVATIVE FREE 10 ML: 5 INJECTION INTRAVENOUS at 21:22

## 2020-01-01 RX ADMIN — HEPARIN SODIUM 5000 UNITS: 5000 INJECTION INTRAVENOUS; SUBCUTANEOUS at 04:10

## 2020-01-01 RX ADMIN — INSULIN LISPRO 2 UNITS: 100 INJECTION, SOLUTION INTRAVENOUS; SUBCUTANEOUS at 11:41

## 2020-01-01 RX ADMIN — PRAVASTATIN SODIUM 40 MG: 40 TABLET ORAL at 20:39

## 2020-01-01 RX ADMIN — INSULIN LISPRO 1 UNITS: 100 INJECTION, SOLUTION INTRAVENOUS; SUBCUTANEOUS at 08:59

## 2020-01-01 RX ADMIN — SODIUM CHLORIDE, SODIUM LACTATE, CALCIUM CHLORIDE, MAGNESIUM CHLORIDE AND DEXTROSE 2000 ML: 2.5; 538; 448; 18.3; 5.08 INJECTION, SOLUTION INTRAPERITONEAL at 21:54

## 2020-01-01 RX ADMIN — ISOSORBIDE DINITRATE 20 MG: 20 TABLET ORAL at 21:19

## 2020-01-01 RX ADMIN — BUSPIRONE HYDROCHLORIDE 5 MG: 5 TABLET ORAL at 08:47

## 2020-01-01 RX ADMIN — MECLIZINE HYDROCHLORIDE 25 MG: 12.5 TABLET, FILM COATED ORAL at 08:20

## 2020-01-01 RX ADMIN — INSULIN GLARGINE 4 UNITS: 100 INJECTION, SOLUTION SUBCUTANEOUS at 08:18

## 2020-01-01 RX ADMIN — HEPARIN SODIUM 5000 UNITS: 5000 INJECTION INTRAVENOUS; SUBCUTANEOUS at 04:29

## 2020-01-01 RX ADMIN — OXYBUTYNIN CHLORIDE 5 MG: 5 TABLET, EXTENDED RELEASE ORAL at 08:28

## 2020-01-01 RX ADMIN — SODIUM CHLORIDE, SODIUM LACTATE, CALCIUM CHLORIDE, MAGNESIUM CHLORIDE AND DEXTROSE 2000 ML: 1.5; 538; 448; 18.3; 5.08 INJECTION, SOLUTION INTRAPERITONEAL at 18:25

## 2020-01-01 RX ADMIN — GABAPENTIN 300 MG: 300 CAPSULE ORAL at 08:28

## 2020-01-01 RX ADMIN — SEVELAMER CARBONATE 1600 MG: 800 TABLET, FILM COATED ORAL at 08:58

## 2020-01-01 RX ADMIN — ACETAMINOPHEN 650 MG: 325 TABLET ORAL at 20:52

## 2020-01-01 RX ADMIN — HEPARIN SODIUM 5000 UNITS: 5000 INJECTION INTRAVENOUS; SUBCUTANEOUS at 11:41

## 2020-01-01 RX ADMIN — MECLIZINE HYDROCHLORIDE 25 MG: 12.5 TABLET, FILM COATED ORAL at 08:43

## 2020-01-01 RX ADMIN — SEVELAMER CARBONATE 1600 MG: 800 TABLET, FILM COATED ORAL at 11:33

## 2020-01-01 RX ADMIN — ACETAMINOPHEN 650 MG: 325 TABLET ORAL at 08:33

## 2020-01-01 RX ADMIN — SODIUM CHLORIDE, SODIUM LACTATE, CALCIUM CHLORIDE, MAGNESIUM CHLORIDE AND DEXTROSE 2000 ML: 1.5; 538; 448; 18.3; 5.08 INJECTION, SOLUTION INTRAPERITONEAL at 11:27

## 2020-01-01 RX ADMIN — INSULIN LISPRO 1 UNITS: 100 INJECTION, SOLUTION INTRAVENOUS; SUBCUTANEOUS at 08:21

## 2020-01-01 RX ADMIN — HEPARIN SODIUM 5000 UNITS: 5000 INJECTION INTRAVENOUS; SUBCUTANEOUS at 05:07

## 2020-01-01 RX ADMIN — Medication 1 G: at 15:09

## 2020-01-01 RX ADMIN — HEPARIN SODIUM 5000 UNITS: 5000 INJECTION INTRAVENOUS; SUBCUTANEOUS at 21:24

## 2020-01-01 RX ADMIN — VANCOMYCIN HYDROCHLORIDE 750 MG: 1 INJECTION, POWDER, LYOPHILIZED, FOR SOLUTION INTRAVENOUS at 17:43

## 2020-01-01 RX ADMIN — SODIUM CHLORIDE, SODIUM LACTATE, CALCIUM CHLORIDE, MAGNESIUM CHLORIDE AND DEXTROSE 1500 ML: 1.5; 538; 448; 18.3; 5.08 INJECTION, SOLUTION INTRAPERITONEAL at 04:40

## 2020-01-01 RX ADMIN — INSULIN GLARGINE 4 UNITS: 100 INJECTION, SOLUTION SUBCUTANEOUS at 08:34

## 2020-01-01 RX ADMIN — SEVELAMER CARBONATE 1600 MG: 800 TABLET, FILM COATED ORAL at 08:43

## 2020-01-01 RX ADMIN — PRAVASTATIN SODIUM 40 MG: 40 TABLET ORAL at 20:51

## 2020-01-01 RX ADMIN — OXYBUTYNIN CHLORIDE 5 MG: 5 TABLET, EXTENDED RELEASE ORAL at 09:24

## 2020-01-01 RX ADMIN — CALCIUM POLYCARBOPHIL 625 MG: 625 TABLET, FILM COATED ORAL at 09:20

## 2020-01-01 RX ADMIN — CEFEPIME HYDROCHLORIDE 1 G: 1 INJECTION, POWDER, FOR SOLUTION INTRAMUSCULAR; INTRAVENOUS at 20:18

## 2020-01-01 RX ADMIN — SEVELAMER CARBONATE 1600 MG: 800 TABLET, FILM COATED ORAL at 12:58

## 2020-01-01 RX ADMIN — SODIUM CHLORIDE, PRESERVATIVE FREE 10 ML: 5 INJECTION INTRAVENOUS at 08:59

## 2020-01-01 RX ADMIN — ISOSORBIDE DINITRATE 20 MG: 20 TABLET ORAL at 11:23

## 2020-01-01 RX ADMIN — SODIUM CHLORIDE, SODIUM LACTATE, CALCIUM CHLORIDE, MAGNESIUM CHLORIDE AND DEXTROSE 2000 ML: 2.5; 538; 448; 18.3; 5.08 INJECTION, SOLUTION INTRAPERITONEAL at 15:08

## 2020-01-01 RX ADMIN — SODIUM CHLORIDE, SODIUM LACTATE, CALCIUM CHLORIDE, MAGNESIUM CHLORIDE AND DEXTROSE 2000 ML: 2.5; 538; 448; 18.3; 5.08 INJECTION, SOLUTION INTRAPERITONEAL at 19:39

## 2020-01-01 RX ADMIN — Medication 1 TABLET: at 10:29

## 2020-01-01 RX ADMIN — LEVOTHYROXINE SODIUM 25 MCG: 25 TABLET ORAL at 03:58

## 2020-01-01 RX ADMIN — SODIUM CHLORIDE, SODIUM LACTATE, CALCIUM CHLORIDE, MAGNESIUM CHLORIDE AND DEXTROSE 2000 ML: 1.5; 538; 448; 18.3; 5.08 INJECTION, SOLUTION INTRAPERITONEAL at 18:32

## 2020-01-01 RX ADMIN — PRAVASTATIN SODIUM 40 MG: 40 TABLET ORAL at 22:11

## 2020-01-01 RX ADMIN — MECLIZINE HYDROCHLORIDE 25 MG: 12.5 TABLET, FILM COATED ORAL at 13:09

## 2020-01-01 RX ADMIN — PROMETHAZINE HYDROCHLORIDE 12.5 MG: 25 TABLET ORAL at 07:52

## 2020-01-01 RX ADMIN — PRAVASTATIN SODIUM 40 MG: 40 TABLET ORAL at 21:19

## 2020-01-01 RX ADMIN — SODIUM CHLORIDE, PRESERVATIVE FREE 10 ML: 5 INJECTION INTRAVENOUS at 09:51

## 2020-01-01 RX ADMIN — SODIUM CHLORIDE, PRESERVATIVE FREE 10 ML: 5 INJECTION INTRAVENOUS at 07:40

## 2020-01-01 RX ADMIN — ISOSORBIDE DINITRATE 20 MG: 20 TABLET ORAL at 20:18

## 2020-01-01 RX ADMIN — SODIUM CHLORIDE, SODIUM LACTATE, CALCIUM CHLORIDE, MAGNESIUM CHLORIDE AND DEXTROSE 1500 ML: 1.5; 538; 448; 18.3; 5.08 INJECTION, SOLUTION INTRAPERITONEAL at 22:45

## 2020-01-01 RX ADMIN — SEVELAMER CARBONATE 1600 MG: 800 TABLET, FILM COATED ORAL at 11:23

## 2020-01-01 RX ADMIN — SODIUM CHLORIDE, SODIUM LACTATE, CALCIUM CHLORIDE, MAGNESIUM CHLORIDE AND DEXTROSE 2000 ML: 1.5; 538; 448; 18.3; 5.08 INJECTION, SOLUTION INTRAPERITONEAL at 17:34

## 2020-01-01 RX ADMIN — HEPARIN SODIUM 5000 UNITS: 5000 INJECTION INTRAVENOUS; SUBCUTANEOUS at 13:21

## 2020-01-01 RX ADMIN — HEPARIN SODIUM 5000 UNITS: 5000 INJECTION INTRAVENOUS; SUBCUTANEOUS at 14:12

## 2020-01-01 RX ADMIN — VANCOMYCIN HYDROCHLORIDE 1250 MG: 5 INJECTION, POWDER, LYOPHILIZED, FOR SOLUTION INTRAVENOUS at 12:44

## 2020-01-01 RX ADMIN — SODIUM CHLORIDE, SODIUM LACTATE, CALCIUM CHLORIDE, MAGNESIUM CHLORIDE AND DEXTROSE 2000 ML: 1.5; 538; 448; 18.3; 5.08 INJECTION, SOLUTION INTRAPERITONEAL at 13:19

## 2020-01-01 RX ADMIN — GABAPENTIN 100 MG: 100 CAPSULE ORAL at 20:25

## 2020-01-01 RX ADMIN — INSULIN LISPRO 1 UNITS: 100 INJECTION, SOLUTION INTRAVENOUS; SUBCUTANEOUS at 16:29

## 2020-01-01 RX ADMIN — BUSPIRONE HYDROCHLORIDE 5 MG: 5 TABLET ORAL at 23:53

## 2020-01-01 RX ADMIN — MECLIZINE HYDROCHLORIDE 25 MG: 12.5 TABLET, FILM COATED ORAL at 21:19

## 2020-01-01 RX ADMIN — SEVELAMER CARBONATE 1600 MG: 800 TABLET, FILM COATED ORAL at 11:43

## 2020-01-01 RX ADMIN — Medication 1 TABLET: at 08:47

## 2020-01-01 RX ADMIN — FLUTICASONE PROPIONATE 2 SPRAY: 50 SPRAY, METERED NASAL at 08:47

## 2020-01-01 RX ADMIN — SODIUM CHLORIDE, SODIUM LACTATE, CALCIUM CHLORIDE, MAGNESIUM CHLORIDE AND DEXTROSE 2000 ML: 1.5; 538; 448; 18.3; 5.08 INJECTION, SOLUTION INTRAPERITONEAL at 22:23

## 2020-01-01 RX ADMIN — SEVELAMER CARBONATE 1600 MG: 800 TABLET, FILM COATED ORAL at 16:02

## 2020-01-01 RX ADMIN — SODIUM CHLORIDE, SODIUM LACTATE, CALCIUM CHLORIDE, MAGNESIUM CHLORIDE AND DEXTROSE 1500 ML: 1.5; 538; 448; 18.3; 5.08 INJECTION, SOLUTION INTRAPERITONEAL at 09:41

## 2020-01-01 RX ADMIN — MECLIZINE HYDROCHLORIDE 25 MG: 12.5 TABLET, FILM COATED ORAL at 20:51

## 2020-01-01 RX ADMIN — INSULIN GLARGINE 4 UNITS: 100 INJECTION, SOLUTION SUBCUTANEOUS at 09:31

## 2020-01-01 RX ADMIN — SODIUM CHLORIDE, SODIUM LACTATE, CALCIUM CHLORIDE, MAGNESIUM CHLORIDE AND DEXTROSE 1500 ML: 1.5; 538; 448; 18.3; 5.08 INJECTION, SOLUTION INTRAPERITONEAL at 17:21

## 2020-01-01 RX ADMIN — SODIUM CHLORIDE, SODIUM LACTATE, CALCIUM CHLORIDE, MAGNESIUM CHLORIDE AND DEXTROSE 2000 ML: 2.5; 538; 448; 18.3; 5.08 INJECTION, SOLUTION INTRAPERITONEAL at 21:41

## 2020-01-01 RX ADMIN — GABAPENTIN 100 MG: 100 CAPSULE ORAL at 20:30

## 2020-01-01 RX ADMIN — GABAPENTIN 100 MG: 100 CAPSULE ORAL at 09:14

## 2020-01-01 RX ADMIN — Medication 500000 UNITS: at 17:43

## 2020-01-01 RX ADMIN — DICYCLOMINE HYDROCHLORIDE 10 MG: 10 CAPSULE ORAL at 06:15

## 2020-01-01 RX ADMIN — VANCOMYCIN HYDROCHLORIDE 1250 MG: 5 INJECTION, POWDER, LYOPHILIZED, FOR SOLUTION INTRAVENOUS at 11:45

## 2020-01-01 RX ADMIN — LEVOTHYROXINE SODIUM 25 MCG: 25 TABLET ORAL at 05:26

## 2020-01-01 RX ADMIN — ISOSORBIDE DINITRATE 20 MG: 20 TABLET ORAL at 10:29

## 2020-01-01 RX ADMIN — DICYCLOMINE HYDROCHLORIDE 10 MG: 10 CAPSULE ORAL at 06:05

## 2020-01-01 RX ADMIN — FAMOTIDINE 10 MG: 20 TABLET ORAL at 08:34

## 2020-01-01 RX ADMIN — MECLIZINE HYDROCHLORIDE 25 MG: 12.5 TABLET, FILM COATED ORAL at 15:15

## 2020-01-01 RX ADMIN — LEVOTHYROXINE SODIUM 25 MCG: 25 TABLET ORAL at 05:25

## 2020-01-01 RX ADMIN — SEVELAMER CARBONATE 1600 MG: 800 TABLET, FILM COATED ORAL at 16:25

## 2020-01-01 RX ADMIN — FLUTICASONE PROPIONATE 2 SPRAY: 50 SPRAY, METERED NASAL at 09:47

## 2020-01-01 RX ADMIN — HYDRALAZINE HYDROCHLORIDE 100 MG: 50 TABLET, FILM COATED ORAL at 05:56

## 2020-01-01 RX ADMIN — PROMETHAZINE HYDROCHLORIDE 12.5 MG: 25 TABLET ORAL at 11:42

## 2020-01-01 RX ADMIN — HEPARIN SODIUM 5000 UNITS: 5000 INJECTION INTRAVENOUS; SUBCUTANEOUS at 15:12

## 2020-01-01 RX ADMIN — PRAVASTATIN SODIUM 40 MG: 40 TABLET ORAL at 23:53

## 2020-01-01 RX ADMIN — ACETAMINOPHEN 650 MG: 325 TABLET ORAL at 07:51

## 2020-01-01 RX ADMIN — ONDANSETRON 4 MG: 2 INJECTION INTRAMUSCULAR; INTRAVENOUS at 09:08

## 2020-01-01 RX ADMIN — SEVELAMER CARBONATE 1600 MG: 800 TABLET, FILM COATED ORAL at 07:52

## 2020-01-01 RX ADMIN — OXYBUTYNIN CHLORIDE 5 MG: 5 TABLET ORAL at 08:30

## 2020-01-01 RX ADMIN — MECLIZINE HYDROCHLORIDE 25 MG: 12.5 TABLET, FILM COATED ORAL at 07:59

## 2020-01-01 RX ADMIN — LIDOCAINE: 50 OINTMENT TOPICAL at 01:20

## 2020-01-01 RX ADMIN — ISOSORBIDE DINITRATE 20 MG: 20 TABLET ORAL at 23:53

## 2020-01-01 RX ADMIN — SODIUM CHLORIDE, SODIUM LACTATE, CALCIUM CHLORIDE, MAGNESIUM CHLORIDE AND DEXTROSE 2000 ML: 2.5; 538; 448; 18.3; 5.08 INJECTION, SOLUTION INTRAPERITONEAL at 10:22

## 2020-01-01 RX ADMIN — SODIUM CHLORIDE, SODIUM LACTATE, CALCIUM CHLORIDE, MAGNESIUM CHLORIDE AND DEXTROSE 2000 ML: 1.5; 538; 448; 18.3; 5.08 INJECTION, SOLUTION INTRAPERITONEAL at 06:15

## 2020-01-01 RX ADMIN — SODIUM CHLORIDE, SODIUM LACTATE, CALCIUM CHLORIDE, MAGNESIUM CHLORIDE AND DEXTROSE 1500 ML: 1.5; 538; 448; 18.3; 5.08 INJECTION, SOLUTION INTRAPERITONEAL at 08:43

## 2020-01-01 RX ADMIN — CALCIUM POLYCARBOPHIL 625 MG: 625 TABLET, FILM COATED ORAL at 08:33

## 2020-01-01 RX ADMIN — GABAPENTIN 300 MG: 300 CAPSULE ORAL at 07:59

## 2020-01-01 RX ADMIN — ACETAMINOPHEN 650 MG: 325 TABLET ORAL at 11:35

## 2020-01-01 RX ADMIN — SODIUM CHLORIDE, SODIUM LACTATE, CALCIUM CHLORIDE, MAGNESIUM CHLORIDE AND DEXTROSE 2000 ML: 1.5; 538; 448; 18.3; 5.08 INJECTION, SOLUTION INTRAPERITONEAL at 06:11

## 2020-01-01 RX ADMIN — BUSPIRONE HYDROCHLORIDE 5 MG: 5 TABLET ORAL at 07:52

## 2020-01-01 RX ADMIN — MECLIZINE HYDROCHLORIDE 25 MG: 12.5 TABLET, FILM COATED ORAL at 20:52

## 2020-01-01 RX ADMIN — MECLIZINE HYDROCHLORIDE 25 MG: 12.5 TABLET, FILM COATED ORAL at 20:48

## 2020-01-01 RX ADMIN — MECLIZINE HYDROCHLORIDE 25 MG: 12.5 TABLET, FILM COATED ORAL at 17:19

## 2020-01-01 RX ADMIN — INSULIN LISPRO 1 UNITS: 100 INJECTION, SOLUTION INTRAVENOUS; SUBCUTANEOUS at 17:28

## 2020-01-01 RX ADMIN — CEFEPIME HYDROCHLORIDE 1 G: 1 INJECTION, POWDER, FOR SOLUTION INTRAMUSCULAR; INTRAVENOUS at 20:34

## 2020-01-01 RX ADMIN — SODIUM CHLORIDE, PRESERVATIVE FREE 10 ML: 5 INJECTION INTRAVENOUS at 21:27

## 2020-01-01 RX ADMIN — Medication 1 TABLET: at 08:59

## 2020-01-01 RX ADMIN — MECLIZINE HYDROCHLORIDE 25 MG: 12.5 TABLET, FILM COATED ORAL at 07:52

## 2020-01-01 RX ADMIN — INSULIN GLARGINE 4 UNITS: 100 INJECTION, SOLUTION SUBCUTANEOUS at 08:26

## 2020-01-01 RX ADMIN — INSULIN GLARGINE 4 UNITS: 100 INJECTION, SOLUTION SUBCUTANEOUS at 07:41

## 2020-01-01 RX ADMIN — SODIUM CHLORIDE, SODIUM LACTATE, CALCIUM CHLORIDE, MAGNESIUM CHLORIDE AND DEXTROSE 2000 ML: 1.5; 538; 448; 18.3; 5.08 INJECTION, SOLUTION INTRAPERITONEAL at 00:39

## 2020-01-01 RX ADMIN — OXYBUTYNIN CHLORIDE 5 MG: 5 TABLET, EXTENDED RELEASE ORAL at 09:16

## 2020-01-01 RX ADMIN — Medication 500000 UNITS: at 17:18

## 2020-01-01 RX ADMIN — PANTOPRAZOLE SODIUM 40 MG: 40 TABLET, DELAYED RELEASE ORAL at 04:48

## 2020-01-01 RX ADMIN — ISOSORBIDE DINITRATE 20 MG: 20 TABLET ORAL at 13:19

## 2020-01-01 RX ADMIN — MECLIZINE HYDROCHLORIDE 25 MG: 12.5 TABLET, FILM COATED ORAL at 08:30

## 2020-01-01 RX ADMIN — LEVOTHYROXINE SODIUM 25 MCG: 25 TABLET ORAL at 05:07

## 2020-01-01 RX ADMIN — SEVELAMER CARBONATE 1600 MG: 800 TABLET, FILM COATED ORAL at 11:12

## 2020-01-01 RX ADMIN — BUSPIRONE HYDROCHLORIDE 5 MG: 5 TABLET ORAL at 21:48

## 2020-01-01 RX ADMIN — GABAPENTIN 300 MG: 300 CAPSULE ORAL at 08:59

## 2020-01-01 RX ADMIN — SODIUM CHLORIDE, SODIUM LACTATE, CALCIUM CHLORIDE, MAGNESIUM CHLORIDE AND DEXTROSE 2000 ML: 1.5; 538; 448; 18.3; 5.08 INJECTION, SOLUTION INTRAPERITONEAL at 17:16

## 2020-01-01 RX ADMIN — SODIUM CHLORIDE, SODIUM LACTATE, CALCIUM CHLORIDE, MAGNESIUM CHLORIDE AND DEXTROSE 2000 ML: 2.5; 538; 448; 18.3; 5.08 INJECTION, SOLUTION INTRAPERITONEAL at 09:55

## 2020-01-01 RX ADMIN — ACETAMINOPHEN 650 MG: 325 TABLET ORAL at 06:39

## 2020-01-01 RX ADMIN — CAPSAICIN: 0.25 CREAM TOPICAL at 08:55

## 2020-01-01 RX ADMIN — PRAVASTATIN SODIUM 40 MG: 40 TABLET ORAL at 21:29

## 2020-01-01 RX ADMIN — CEFEPIME HYDROCHLORIDE 1 G: 1 INJECTION, POWDER, FOR SOLUTION INTRAMUSCULAR; INTRAVENOUS at 21:22

## 2020-01-01 RX ADMIN — ACETAMINOPHEN 650 MG: 325 TABLET ORAL at 10:36

## 2020-01-01 RX ADMIN — ONDANSETRON 4 MG: 2 INJECTION INTRAMUSCULAR; INTRAVENOUS at 15:16

## 2020-01-01 RX ADMIN — MECLIZINE HYDROCHLORIDE 25 MG: 12.5 TABLET, FILM COATED ORAL at 11:43

## 2020-01-01 RX ADMIN — SODIUM CHLORIDE, SODIUM LACTATE, CALCIUM CHLORIDE, MAGNESIUM CHLORIDE AND DEXTROSE 2000 ML: 1.5; 538; 448; 18.3; 5.08 INJECTION, SOLUTION INTRAPERITONEAL at 23:41

## 2020-01-01 RX ADMIN — HEPARIN SODIUM 5000 UNITS: 5000 INJECTION INTRAVENOUS; SUBCUTANEOUS at 06:15

## 2020-01-01 RX ADMIN — MECLIZINE HYDROCHLORIDE 25 MG: 12.5 TABLET, FILM COATED ORAL at 08:54

## 2020-01-01 RX ADMIN — SEVELAMER CARBONATE 1600 MG: 800 TABLET, FILM COATED ORAL at 08:47

## 2020-01-01 RX ADMIN — FAMOTIDINE 10 MG: 20 TABLET ORAL at 09:18

## 2020-01-01 RX ADMIN — SEVELAMER CARBONATE 1600 MG: 800 TABLET, FILM COATED ORAL at 09:23

## 2020-01-01 RX ADMIN — SEVELAMER CARBONATE 1600 MG: 800 TABLET, FILM COATED ORAL at 17:18

## 2020-01-01 RX ADMIN — BUSPIRONE HYDROCHLORIDE 5 MG: 5 TABLET ORAL at 20:51

## 2020-01-01 RX ADMIN — BUSPIRONE HYDROCHLORIDE 5 MG: 5 TABLET ORAL at 19:38

## 2020-01-01 RX ADMIN — SODIUM CHLORIDE, PRESERVATIVE FREE 10 ML: 5 INJECTION INTRAVENOUS at 20:31

## 2020-01-01 RX ADMIN — POLYETHYLENE GLYCOL 3350 17 G: 17 POWDER, FOR SOLUTION ORAL at 08:31

## 2020-01-01 RX ADMIN — ISOSORBIDE DINITRATE 20 MG: 20 TABLET ORAL at 14:19

## 2020-01-01 RX ADMIN — SEVELAMER CARBONATE 1600 MG: 800 TABLET, FILM COATED ORAL at 11:28

## 2020-01-01 RX ADMIN — SODIUM CHLORIDE: 9 INJECTION, SOLUTION INTRAVENOUS at 11:58

## 2020-01-01 RX ADMIN — HEPARIN SODIUM 5000 UNITS: 5000 INJECTION INTRAVENOUS; SUBCUTANEOUS at 15:15

## 2020-01-01 RX ADMIN — ONDANSETRON 4 MG: 2 INJECTION INTRAMUSCULAR; INTRAVENOUS at 08:18

## 2020-01-01 RX ADMIN — Medication 1 TABLET: at 08:54

## 2020-01-01 RX ADMIN — SERTRALINE 25 MG: 50 TABLET, FILM COATED ORAL at 08:30

## 2020-01-01 RX ADMIN — INSULIN LISPRO 2 UNITS: 100 INJECTION, SOLUTION INTRAVENOUS; SUBCUTANEOUS at 11:34

## 2020-01-01 RX ADMIN — LEVOTHYROXINE SODIUM 25 MCG: 25 TABLET ORAL at 04:48

## 2020-01-01 RX ADMIN — SODIUM CHLORIDE, SODIUM LACTATE, CALCIUM CHLORIDE, MAGNESIUM CHLORIDE AND DEXTROSE 2000 ML: 2.5; 538; 448; 18.3; 5.08 INJECTION, SOLUTION INTRAPERITONEAL at 14:13

## 2020-01-01 RX ADMIN — Medication 500000 UNITS: at 20:53

## 2020-01-01 RX ADMIN — SEVELAMER CARBONATE 1600 MG: 800 TABLET, FILM COATED ORAL at 07:59

## 2020-01-01 RX ADMIN — SEVELAMER CARBONATE 1600 MG: 800 TABLET, FILM COATED ORAL at 11:21

## 2020-01-01 RX ADMIN — SODIUM CHLORIDE, SODIUM LACTATE, CALCIUM CHLORIDE, MAGNESIUM CHLORIDE AND DEXTROSE 2000 ML: 1.5; 538; 448; 18.3; 5.08 INJECTION, SOLUTION INTRAPERITONEAL at 00:10

## 2020-01-01 RX ADMIN — MECLIZINE HYDROCHLORIDE 25 MG: 12.5 TABLET, FILM COATED ORAL at 20:30

## 2020-01-01 RX ADMIN — MECLIZINE HYDROCHLORIDE 25 MG: 12.5 TABLET, FILM COATED ORAL at 07:40

## 2020-01-01 RX ADMIN — ISOSORBIDE DINITRATE 20 MG: 20 TABLET ORAL at 20:54

## 2020-01-01 RX ADMIN — BUSPIRONE HYDROCHLORIDE 5 MG: 5 TABLET ORAL at 08:54

## 2020-01-01 RX ADMIN — MECLIZINE HYDROCHLORIDE 25 MG: 12.5 TABLET, FILM COATED ORAL at 09:37

## 2020-01-01 RX ADMIN — INSULIN GLARGINE 4 UNITS: 100 INJECTION, SOLUTION SUBCUTANEOUS at 09:23

## 2020-01-01 RX ADMIN — OXYBUTYNIN CHLORIDE 5 MG: 5 TABLET, EXTENDED RELEASE ORAL at 23:53

## 2020-01-01 RX ADMIN — MECLIZINE HYDROCHLORIDE 25 MG: 12.5 TABLET, FILM COATED ORAL at 08:34

## 2020-01-01 RX ADMIN — HEPARIN SODIUM 5000 UNITS: 5000 INJECTION INTRAVENOUS; SUBCUTANEOUS at 04:45

## 2020-01-01 RX ADMIN — SEVELAMER CARBONATE 1600 MG: 800 TABLET, FILM COATED ORAL at 09:20

## 2020-01-01 RX ADMIN — LEVOTHYROXINE SODIUM 25 MCG: 25 TABLET ORAL at 06:24

## 2020-01-01 RX ADMIN — DICYCLOMINE HYDROCHLORIDE 10 MG: 10 CAPSULE ORAL at 15:59

## 2020-01-01 RX ADMIN — BUSPIRONE HYDROCHLORIDE 5 MG: 5 TABLET ORAL at 22:11

## 2020-01-01 RX ADMIN — GUAIFENESIN AND DEXTROMETHORPHAN 10 ML: 100; 10 SYRUP ORAL at 08:54

## 2020-01-01 RX ADMIN — ISOSORBIDE DINITRATE 20 MG: 20 TABLET ORAL at 08:59

## 2020-01-01 RX ADMIN — SODIUM CHLORIDE: 9 INJECTION, SOLUTION INTRAVENOUS at 15:10

## 2020-01-01 RX ADMIN — Medication 1 TABLET: at 07:52

## 2020-01-01 RX ADMIN — ISOSORBIDE DINITRATE 20 MG: 20 TABLET ORAL at 09:16

## 2020-01-01 RX ADMIN — GABAPENTIN 300 MG: 300 CAPSULE ORAL at 08:43

## 2020-01-01 RX ADMIN — HEPARIN SODIUM 5000 UNITS: 5000 INJECTION INTRAVENOUS; SUBCUTANEOUS at 15:22

## 2020-01-01 RX ADMIN — POTASSIUM CHLORIDE 20 MEQ: 1500 TABLET, EXTENDED RELEASE ORAL at 09:46

## 2020-01-01 RX ADMIN — SODIUM CHLORIDE, PRESERVATIVE FREE 10 ML: 5 INJECTION INTRAVENOUS at 08:45

## 2020-01-01 RX ADMIN — HEPARIN SODIUM 5000 UNITS: 5000 INJECTION INTRAVENOUS; SUBCUTANEOUS at 17:07

## 2020-01-01 RX ADMIN — BUSPIRONE HYDROCHLORIDE 5 MG: 5 TABLET ORAL at 09:24

## 2020-01-01 RX ADMIN — MECLIZINE HYDROCHLORIDE 25 MG: 12.5 TABLET, FILM COATED ORAL at 09:45

## 2020-01-01 RX ADMIN — HEPARIN SODIUM 5000 UNITS: 5000 INJECTION INTRAVENOUS; SUBCUTANEOUS at 22:12

## 2020-01-01 RX ADMIN — PANTOPRAZOLE SODIUM 40 MG: 40 TABLET, DELAYED RELEASE ORAL at 04:09

## 2020-01-01 RX ADMIN — POLYETHYLENE GLYCOL 3350 17 G: 17 POWDER, FOR SOLUTION ORAL at 21:14

## 2020-01-01 RX ADMIN — MECLIZINE HYDROCHLORIDE 25 MG: 12.5 TABLET, FILM COATED ORAL at 17:04

## 2020-01-01 RX ADMIN — ISOSORBIDE DINITRATE 20 MG: 20 TABLET ORAL at 20:34

## 2020-01-01 RX ADMIN — ISOSORBIDE DINITRATE 20 MG: 20 TABLET ORAL at 21:48

## 2020-01-01 RX ADMIN — SEVELAMER CARBONATE 1600 MG: 800 TABLET, FILM COATED ORAL at 07:39

## 2020-01-01 RX ADMIN — ISOSORBIDE DINITRATE 20 MG: 20 TABLET ORAL at 08:47

## 2020-01-01 RX ADMIN — ISOSORBIDE DINITRATE 20 MG: 20 TABLET ORAL at 09:45

## 2020-01-01 RX ADMIN — SODIUM CHLORIDE, SODIUM LACTATE, CALCIUM CHLORIDE, MAGNESIUM CHLORIDE AND DEXTROSE 2000 ML: 2.5; 538; 448; 18.3; 5.08 INJECTION, SOLUTION INTRAPERITONEAL at 15:22

## 2020-01-01 RX ADMIN — DICYCLOMINE HYDROCHLORIDE 10 MG: 10 CAPSULE ORAL at 18:06

## 2020-01-01 RX ADMIN — Medication 1 TABLET: at 08:43

## 2020-01-01 RX ADMIN — MECLIZINE HYDROCHLORIDE 25 MG: 12.5 TABLET, FILM COATED ORAL at 22:11

## 2020-01-01 RX ADMIN — LORAZEPAM 1 MG: 1 TABLET ORAL at 08:46

## 2020-01-01 RX ADMIN — PRAVASTATIN SODIUM 40 MG: 40 TABLET ORAL at 19:39

## 2020-01-01 RX ADMIN — FLUTICASONE PROPIONATE 2 SPRAY: 50 SPRAY, METERED NASAL at 10:29

## 2020-01-01 RX ADMIN — HEPARIN SODIUM 5000 UNITS: 5000 INJECTION INTRAVENOUS; SUBCUTANEOUS at 20:52

## 2020-01-01 RX ADMIN — HEPARIN SODIUM 5000 UNITS: 5000 INJECTION INTRAVENOUS; SUBCUTANEOUS at 04:50

## 2020-01-01 RX ADMIN — OXYBUTYNIN CHLORIDE 5 MG: 5 TABLET, EXTENDED RELEASE ORAL at 08:43

## 2020-01-01 RX ADMIN — SEVELAMER CARBONATE 1600 MG: 800 TABLET, FILM COATED ORAL at 09:13

## 2020-01-01 RX ADMIN — SEVELAMER CARBONATE 1600 MG: 800 TABLET, FILM COATED ORAL at 17:54

## 2020-01-01 RX ADMIN — GABAPENTIN 300 MG: 300 CAPSULE ORAL at 09:16

## 2020-01-01 RX ADMIN — OXYBUTYNIN CHLORIDE 5 MG: 5 TABLET ORAL at 09:20

## 2020-01-01 RX ADMIN — CAPSAICIN: 0.25 CREAM TOPICAL at 22:38

## 2020-01-01 RX ADMIN — ACETAMINOPHEN 650 MG: 325 TABLET ORAL at 21:19

## 2020-01-01 RX ADMIN — GABAPENTIN 300 MG: 300 CAPSULE ORAL at 09:24

## 2020-01-01 RX ADMIN — ACETAMINOPHEN 650 MG: 325 TABLET ORAL at 00:28

## 2020-01-01 RX ADMIN — CEFEPIME HYDROCHLORIDE 1 G: 1 INJECTION, POWDER, FOR SOLUTION INTRAMUSCULAR; INTRAVENOUS at 15:09

## 2020-01-01 RX ADMIN — GABAPENTIN 300 MG: 300 CAPSULE ORAL at 07:40

## 2020-01-01 RX ADMIN — SODIUM CHLORIDE, SODIUM LACTATE, CALCIUM CHLORIDE, MAGNESIUM CHLORIDE AND DEXTROSE 2000 ML: 2.5; 538; 448; 18.3; 5.08 INJECTION, SOLUTION INTRAPERITONEAL at 15:19

## 2020-01-01 RX ADMIN — PRAVASTATIN SODIUM 40 MG: 40 TABLET ORAL at 20:34

## 2020-01-01 RX ADMIN — HEPARIN SODIUM 5000 UNITS: 5000 INJECTION INTRAVENOUS; SUBCUTANEOUS at 21:29

## 2020-01-01 RX ADMIN — INSULIN LISPRO 2 UNITS: 100 INJECTION, SOLUTION INTRAVENOUS; SUBCUTANEOUS at 16:30

## 2020-01-01 RX ADMIN — SEVELAMER CARBONATE 1600 MG: 800 TABLET, FILM COATED ORAL at 11:45

## 2020-01-01 RX ADMIN — POLYETHYLENE GLYCOL 3350 17 G: 17 POWDER, FOR SOLUTION ORAL at 09:21

## 2020-01-01 RX ADMIN — POTASSIUM CHLORIDE 20 MEQ: 1500 TABLET, EXTENDED RELEASE ORAL at 09:38

## 2020-01-01 RX ADMIN — LEVOTHYROXINE SODIUM 25 MCG: 25 TABLET ORAL at 06:15

## 2020-01-01 RX ADMIN — CEFEPIME HYDROCHLORIDE 1 G: 1 INJECTION, POWDER, FOR SOLUTION INTRAMUSCULAR; INTRAVENOUS at 11:52

## 2020-01-01 RX ADMIN — MECLIZINE HYDROCHLORIDE 25 MG: 12.5 TABLET, FILM COATED ORAL at 20:17

## 2020-01-01 RX ADMIN — Medication 500000 UNITS: at 10:30

## 2020-01-01 RX ADMIN — SODIUM CHLORIDE, SODIUM LACTATE, CALCIUM CHLORIDE, MAGNESIUM CHLORIDE AND DEXTROSE 2000 ML: 1.5; 538; 448; 18.3; 5.08 INJECTION, SOLUTION INTRAPERITONEAL at 12:57

## 2020-01-01 RX ADMIN — SODIUM CHLORIDE, SODIUM LACTATE, CALCIUM CHLORIDE, MAGNESIUM CHLORIDE AND DEXTROSE 1500 ML: 1.5; 538; 448; 18.3; 5.08 INJECTION, SOLUTION INTRAPERITONEAL at 11:52

## 2020-01-01 RX ADMIN — ISOSORBIDE DINITRATE 20 MG: 20 TABLET ORAL at 08:28

## 2020-01-01 RX ADMIN — ACETAMINOPHEN 650 MG: 325 TABLET ORAL at 06:10

## 2020-01-01 RX ADMIN — CALCIUM POLYCARBOPHIL 625 MG: 625 TABLET, FILM COATED ORAL at 08:30

## 2020-01-01 RX ADMIN — Medication 2 G: at 17:28

## 2020-01-01 RX ADMIN — SODIUM CHLORIDE, SODIUM LACTATE, CALCIUM CHLORIDE, MAGNESIUM CHLORIDE AND DEXTROSE 2000 ML: 2.5; 538; 448; 18.3; 5.08 INJECTION, SOLUTION INTRAPERITONEAL at 08:25

## 2020-01-01 RX ADMIN — MECLIZINE HYDROCHLORIDE 25 MG: 12.5 TABLET, FILM COATED ORAL at 09:14

## 2020-01-01 RX ADMIN — POTASSIUM CHLORIDE 20 MEQ: 1500 TABLET, EXTENDED RELEASE ORAL at 17:22

## 2020-01-01 RX ADMIN — HEPARIN SODIUM 5000 UNITS: 5000 INJECTION INTRAVENOUS; SUBCUTANEOUS at 15:38

## 2020-01-01 RX ADMIN — SODIUM CHLORIDE, SODIUM LACTATE, CALCIUM CHLORIDE, MAGNESIUM CHLORIDE AND DEXTROSE 2000 ML: 1.5; 538; 448; 18.3; 5.08 INJECTION, SOLUTION INTRAPERITONEAL at 17:53

## 2020-01-01 RX ADMIN — HEPARIN SODIUM 5000 UNITS: 5000 INJECTION INTRAVENOUS; SUBCUTANEOUS at 13:49

## 2020-01-01 RX ADMIN — SODIUM CHLORIDE, SODIUM LACTATE, CALCIUM CHLORIDE, MAGNESIUM CHLORIDE AND DEXTROSE 2000 ML: 1.5; 538; 448; 18.3; 5.08 INJECTION, SOLUTION INTRAPERITONEAL at 11:21

## 2020-01-01 RX ADMIN — SODIUM CHLORIDE, SODIUM LACTATE, CALCIUM CHLORIDE, MAGNESIUM CHLORIDE AND DEXTROSE 2000 ML: 2.5; 538; 448; 18.3; 5.08 INJECTION, SOLUTION INTRAPERITONEAL at 03:13

## 2020-01-01 RX ADMIN — SEVELAMER CARBONATE 1600 MG: 800 TABLET, FILM COATED ORAL at 08:54

## 2020-01-01 RX ADMIN — SODIUM CHLORIDE, PRESERVATIVE FREE 10 ML: 5 INJECTION INTRAVENOUS at 08:27

## 2020-01-01 RX ADMIN — Medication 500000 UNITS: at 21:32

## 2020-01-01 RX ADMIN — FAMOTIDINE 10 MG: 20 TABLET ORAL at 09:14

## 2020-01-01 RX ADMIN — MECLIZINE HYDROCHLORIDE 25 MG: 12.5 TABLET, FILM COATED ORAL at 08:59

## 2020-01-01 RX ADMIN — Medication 1 TABLET: at 09:45

## 2020-01-01 RX ADMIN — Medication 1 TABLET: at 07:40

## 2020-01-01 RX ADMIN — HEPARIN SODIUM 5000 UNITS: 5000 INJECTION INTRAVENOUS; SUBCUTANEOUS at 20:17

## 2020-01-01 RX ADMIN — MECLIZINE HYDROCHLORIDE 25 MG: 12.5 TABLET, FILM COATED ORAL at 14:11

## 2020-01-01 RX ADMIN — SODIUM CHLORIDE, PRESERVATIVE FREE 10 ML: 5 INJECTION INTRAVENOUS at 22:12

## 2020-01-01 RX ADMIN — Medication 500000 UNITS: at 09:16

## 2020-01-01 RX ADMIN — OXYBUTYNIN CHLORIDE 5 MG: 5 TABLET, EXTENDED RELEASE ORAL at 08:19

## 2020-01-01 RX ADMIN — GABAPENTIN 100 MG: 100 CAPSULE ORAL at 08:30

## 2020-01-01 RX ADMIN — SODIUM CHLORIDE: 9 INJECTION, SOLUTION INTRAVENOUS at 06:11

## 2020-01-01 RX ADMIN — PRAVASTATIN SODIUM 40 MG: 40 TABLET ORAL at 20:24

## 2020-01-01 RX ADMIN — HYDRALAZINE HYDROCHLORIDE 100 MG: 50 TABLET, FILM COATED ORAL at 15:15

## 2020-01-01 RX ADMIN — ISOSORBIDE DINITRATE 20 MG: 20 TABLET ORAL at 11:43

## 2020-01-01 RX ADMIN — ISOSORBIDE DINITRATE 20 MG: 20 TABLET ORAL at 14:11

## 2020-01-01 RX ADMIN — ISOSORBIDE DINITRATE 20 MG: 20 TABLET ORAL at 07:52

## 2020-01-01 RX ADMIN — HYDROCODONE BITARTRATE AND ACETAMINOPHEN 1 TABLET: 5; 325 TABLET ORAL at 12:58

## 2020-01-01 RX ADMIN — ONDANSETRON 4 MG: 2 INJECTION INTRAMUSCULAR; INTRAVENOUS at 11:23

## 2020-01-01 RX ADMIN — HEPARIN SODIUM 5000 UNITS: 5000 INJECTION INTRAVENOUS; SUBCUTANEOUS at 13:17

## 2020-01-01 RX ADMIN — SODIUM CHLORIDE, SODIUM LACTATE, CALCIUM CHLORIDE, MAGNESIUM CHLORIDE AND DEXTROSE 1500 ML: 1.5; 538; 448; 18.3; 5.08 INJECTION, SOLUTION INTRAPERITONEAL at 17:23

## 2020-01-01 RX ADMIN — HYDROCODONE BITARTRATE AND ACETAMINOPHEN 1 TABLET: 5; 325 TABLET ORAL at 00:45

## 2020-01-01 RX ADMIN — INSULIN GLARGINE 4 UNITS: 100 INJECTION, SOLUTION SUBCUTANEOUS at 08:42

## 2020-01-01 RX ADMIN — SODIUM CHLORIDE, SODIUM LACTATE, CALCIUM CHLORIDE, MAGNESIUM CHLORIDE AND DEXTROSE 2000 ML: 1.5; 538; 448; 18.3; 5.08 INJECTION, SOLUTION INTRAPERITONEAL at 11:42

## 2020-01-01 RX ADMIN — FLUTICASONE PROPIONATE 2 SPRAY: 50 SPRAY, METERED NASAL at 09:23

## 2020-01-01 RX ADMIN — MECLIZINE HYDROCHLORIDE 25 MG: 12.5 TABLET, FILM COATED ORAL at 11:23

## 2020-01-01 RX ADMIN — Medication 500000 UNITS: at 15:38

## 2020-01-01 RX ADMIN — INSULIN LISPRO 2 UNITS: 100 INJECTION, SOLUTION INTRAVENOUS; SUBCUTANEOUS at 12:10

## 2020-01-01 RX ADMIN — SODIUM CHLORIDE, PRESERVATIVE FREE 10 ML: 5 INJECTION INTRAVENOUS at 21:44

## 2020-01-01 RX ADMIN — MECLIZINE HYDROCHLORIDE 25 MG: 12.5 TABLET, FILM COATED ORAL at 15:09

## 2020-01-01 RX ADMIN — HEPARIN SODIUM 5000 UNITS: 5000 INJECTION INTRAVENOUS; SUBCUTANEOUS at 21:11

## 2020-01-01 RX ADMIN — SODIUM CHLORIDE, PRESERVATIVE FREE 10 ML: 5 INJECTION INTRAVENOUS at 10:30

## 2020-01-01 RX ADMIN — SEVELAMER CARBONATE 1600 MG: 800 TABLET, FILM COATED ORAL at 16:42

## 2020-01-01 RX ADMIN — FLUTICASONE PROPIONATE 2 SPRAY: 50 SPRAY, METERED NASAL at 07:59

## 2020-01-01 RX ADMIN — INSULIN LISPRO 3 UNITS: 100 INJECTION, SOLUTION INTRAVENOUS; SUBCUTANEOUS at 11:54

## 2020-01-01 RX ADMIN — OXYBUTYNIN CHLORIDE 5 MG: 5 TABLET, EXTENDED RELEASE ORAL at 09:45

## 2020-01-01 RX ADMIN — HEPARIN SODIUM 5000 UNITS: 5000 INJECTION INTRAVENOUS; SUBCUTANEOUS at 06:26

## 2020-01-01 RX ADMIN — ACETAMINOPHEN 650 MG: 325 TABLET ORAL at 21:11

## 2020-01-01 RX ADMIN — HEPARIN SODIUM 5000 UNITS: 5000 INJECTION INTRAVENOUS; SUBCUTANEOUS at 13:09

## 2020-01-01 RX ADMIN — MECLIZINE HYDROCHLORIDE 25 MG: 12.5 TABLET, FILM COATED ORAL at 21:32

## 2020-01-01 ASSESSMENT — PAIN DESCRIPTION - DESCRIPTORS
DESCRIPTORS: ACHING;DULL
DESCRIPTORS: ACHING
DESCRIPTORS: ACHING
DESCRIPTORS: ITCHING
DESCRIPTORS: ACHING
DESCRIPTORS: DULL
DESCRIPTORS: ACHING;HEADACHE
DESCRIPTORS: DULL;ACHING
DESCRIPTORS: ACHING

## 2020-01-01 ASSESSMENT — PAIN SCALES - GENERAL
PAINLEVEL_OUTOF10: 6
PAINLEVEL_OUTOF10: 8
PAINLEVEL_OUTOF10: 0
PAINLEVEL_OUTOF10: 8
PAINLEVEL_OUTOF10: 0
PAINLEVEL_OUTOF10: 6
PAINLEVEL_OUTOF10: 0
PAINLEVEL_OUTOF10: 0
PAINLEVEL_OUTOF10: 6
PAINLEVEL_OUTOF10: 10
PAINLEVEL_OUTOF10: 0
PAINLEVEL_OUTOF10: 3
PAINLEVEL_OUTOF10: 8
PAINLEVEL_OUTOF10: 0
PAINLEVEL_OUTOF10: 10
PAINLEVEL_OUTOF10: 0
PAINLEVEL_OUTOF10: 6
PAINLEVEL_OUTOF10: 0
PAINLEVEL_OUTOF10: 5
PAINLEVEL_OUTOF10: 7
PAINLEVEL_OUTOF10: 7
PAINLEVEL_OUTOF10: 10
PAINLEVEL_OUTOF10: 2
PAINLEVEL_OUTOF10: 4
PAINLEVEL_OUTOF10: 8
PAINLEVEL_OUTOF10: 8
PAINLEVEL_OUTOF10: 2
PAINLEVEL_OUTOF10: 0
PAINLEVEL_OUTOF10: 5
PAINLEVEL_OUTOF10: 0
PAINLEVEL_OUTOF10: 6
PAINLEVEL_OUTOF10: 3
PAINLEVEL_OUTOF10: 0
PAINLEVEL_OUTOF10: 4
PAINLEVEL_OUTOF10: 0
PAINLEVEL_OUTOF10: 0
PAINLEVEL_OUTOF10: 7
PAINLEVEL_OUTOF10: 0
PAINLEVEL_OUTOF10: 4
PAINLEVEL_OUTOF10: 8
PAINLEVEL_OUTOF10: 0
PAINLEVEL_OUTOF10: 6
PAINLEVEL_OUTOF10: 6
PAINLEVEL_OUTOF10: 4
PAINLEVEL_OUTOF10: 3
PAINLEVEL_OUTOF10: 0
PAINLEVEL_OUTOF10: 4
PAINLEVEL_OUTOF10: 4
PAINLEVEL_OUTOF10: 0
PAINLEVEL_OUTOF10: 5
PAINLEVEL_OUTOF10: 7
PAINLEVEL_OUTOF10: 4
PAINLEVEL_OUTOF10: 0
PAINLEVEL_OUTOF10: 0

## 2020-01-01 ASSESSMENT — PAIN DESCRIPTION - PAIN TYPE
TYPE: ACUTE PAIN
TYPE: CHRONIC PAIN
TYPE: ACUTE PAIN
TYPE: ACUTE PAIN
TYPE: CHRONIC PAIN
TYPE: ACUTE PAIN
TYPE: CHRONIC PAIN
TYPE: ACUTE PAIN

## 2020-01-01 ASSESSMENT — ENCOUNTER SYMPTOMS
SORE THROAT: 0
SHORTNESS OF BREATH: 1
NAUSEA: 0
COUGH: 1
EYES NEGATIVE: 1
CONSTIPATION: 1
SHORTNESS OF BREATH: 1
VOMITING: 0
ABDOMINAL PAIN: 1
DIARRHEA: 0
COUGH: 1
BACK PAIN: 1
ABDOMINAL PAIN: 0
ALLERGIC/IMMUNOLOGIC NEGATIVE: 1
ABDOMINAL DISTENTION: 1

## 2020-01-01 ASSESSMENT — PAIN DESCRIPTION - PROGRESSION
CLINICAL_PROGRESSION: GRADUALLY WORSENING
CLINICAL_PROGRESSION: NOT CHANGED

## 2020-01-01 ASSESSMENT — PAIN - FUNCTIONAL ASSESSMENT

## 2020-01-01 ASSESSMENT — PAIN DESCRIPTION - LOCATION
LOCATION: BACK;BUTTOCKS
LOCATION: NECK
LOCATION: SHOULDER
LOCATION: ABDOMEN
LOCATION: SHOULDER
LOCATION: BACK
LOCATION: ABDOMEN
LOCATION: BACK
LOCATION: HEAD
LOCATION: SHOULDER
LOCATION: HEAD
LOCATION: BACK

## 2020-01-01 ASSESSMENT — PAIN DESCRIPTION - ONSET
ONSET: ON-GOING

## 2020-01-01 ASSESSMENT — PAIN DESCRIPTION - FREQUENCY
FREQUENCY: CONTINUOUS
FREQUENCY: INTERMITTENT
FREQUENCY: CONTINUOUS

## 2020-01-01 ASSESSMENT — PAIN DESCRIPTION - ORIENTATION
ORIENTATION: LEFT
ORIENTATION: LEFT
ORIENTATION: MID
ORIENTATION: MID
ORIENTATION: LEFT
ORIENTATION: MID
ORIENTATION: LEFT
ORIENTATION: MID
ORIENTATION: LOWER
ORIENTATION: LEFT

## 2020-01-24 ENCOUNTER — HOSPITAL ENCOUNTER (INPATIENT)
Age: 70
LOS: 5 days | Discharge: NURSING, REHAB OR LONG-TERM CARE | DRG: 312 | End: 2020-01-29
Attending: INTERNAL MEDICINE | Admitting: INTERNAL MEDICINE
Payer: MEDICARE

## 2020-01-24 LAB — GLUCOSE BLD-MCNC: 93 MG/DL (ref 70–108)

## 2020-01-24 PROCEDURE — 6370000000 HC RX 637 (ALT 250 FOR IP): Performed by: INTERNAL MEDICINE

## 2020-01-24 PROCEDURE — 6360000002 HC RX W HCPCS: Performed by: INTERNAL MEDICINE

## 2020-01-24 PROCEDURE — 99221 1ST HOSP IP/OBS SF/LOW 40: CPT | Performed by: INTERNAL MEDICINE

## 2020-01-24 PROCEDURE — 99223 1ST HOSP IP/OBS HIGH 75: CPT | Performed by: INTERNAL MEDICINE

## 2020-01-24 PROCEDURE — 1200000003 HC TELEMETRY R&B

## 2020-01-24 PROCEDURE — 36415 COLL VENOUS BLD VENIPUNCTURE: CPT

## 2020-01-24 PROCEDURE — 87040 BLOOD CULTURE FOR BACTERIA: CPT

## 2020-01-24 PROCEDURE — 82948 REAGENT STRIP/BLOOD GLUCOSE: CPT

## 2020-01-24 RX ORDER — LEVOFLOXACIN 5 MG/ML
500 INJECTION, SOLUTION INTRAVENOUS
Status: DISCONTINUED | OUTPATIENT
Start: 2020-01-01 | End: 2020-01-01

## 2020-01-24 RX ORDER — ASCORBIC ACID, THIAMINE MONONITRATE,RIBOFLAVIN, NIACINAMIDE, PYRIDOXINE HYDROCHLORIDE, FOLIC ACID, CYANOCOBALAMIN, BIOTIN, CALCIUM PANTOTHENATE, 100; 1.5; 1.7; 20; 10; 1; 6000; 150000; 5 MG/1; MG/1; MG/1; MG/1; MG/1; MG/1; UG/1; UG/1; MG/1
1 CAPSULE, LIQUID FILLED ORAL DAILY
COMMUNITY

## 2020-01-24 RX ORDER — INSULIN GLARGINE 100 [IU]/ML
4 INJECTION, SOLUTION SUBCUTANEOUS 2 TIMES DAILY
Status: DISCONTINUED | OUTPATIENT
Start: 2020-01-24 | End: 2020-01-01

## 2020-01-24 RX ORDER — DEXTROSE MONOHYDRATE 50 MG/ML
100 INJECTION, SOLUTION INTRAVENOUS PRN
Status: DISCONTINUED | OUTPATIENT
Start: 2020-01-24 | End: 2020-01-01 | Stop reason: HOSPADM

## 2020-01-24 RX ORDER — ACETAMINOPHEN 325 MG/1
650 TABLET ORAL EVERY 6 HOURS PRN
Status: DISCONTINUED | OUTPATIENT
Start: 2020-01-24 | End: 2020-01-01 | Stop reason: HOSPADM

## 2020-01-24 RX ORDER — HYDRALAZINE HYDROCHLORIDE 50 MG/1
100 TABLET, FILM COATED ORAL EVERY 8 HOURS SCHEDULED
Status: DISCONTINUED | OUTPATIENT
Start: 2020-01-24 | End: 2020-01-01

## 2020-01-24 RX ORDER — AMLODIPINE BESYLATE 10 MG/1
10 TABLET ORAL DAILY
Status: DISCONTINUED | OUTPATIENT
Start: 2020-01-01 | End: 2020-01-01

## 2020-01-24 RX ORDER — PRAVASTATIN SODIUM 40 MG
40 TABLET ORAL NIGHTLY
Status: DISCONTINUED | OUTPATIENT
Start: 2020-01-24 | End: 2020-01-01 | Stop reason: HOSPADM

## 2020-01-24 RX ORDER — DOXAZOSIN MESYLATE 1 MG/1
1 TABLET ORAL DAILY
Status: DISCONTINUED | OUTPATIENT
Start: 2020-01-01 | End: 2020-01-01

## 2020-01-24 RX ORDER — DEXTROSE MONOHYDRATE 25 G/50ML
12.5 INJECTION, SOLUTION INTRAVENOUS PRN
Status: DISCONTINUED | OUTPATIENT
Start: 2020-01-24 | End: 2020-01-01 | Stop reason: HOSPADM

## 2020-01-24 RX ORDER — ISOSORBIDE DINITRATE 20 MG/1
20 TABLET ORAL 3 TIMES DAILY
Status: DISCONTINUED | OUTPATIENT
Start: 2020-01-24 | End: 2020-01-01

## 2020-01-24 RX ORDER — FAMOTIDINE 20 MG/1
10 TABLET, FILM COATED ORAL DAILY
Status: DISCONTINUED | OUTPATIENT
Start: 2020-01-01 | End: 2020-01-01 | Stop reason: HOSPADM

## 2020-01-24 RX ORDER — MECLIZINE HCL 12.5 MG/1
25 TABLET ORAL 3 TIMES DAILY
Status: DISCONTINUED | OUTPATIENT
Start: 2020-01-24 | End: 2020-01-01 | Stop reason: HOSPADM

## 2020-01-24 RX ORDER — NICOTINE POLACRILEX 4 MG
15 LOZENGE BUCCAL PRN
Status: DISCONTINUED | OUTPATIENT
Start: 2020-01-24 | End: 2020-01-01 | Stop reason: HOSPADM

## 2020-01-24 RX ORDER — SODIUM CHLORIDE 0.9 % (FLUSH) 0.9 %
10 SYRINGE (ML) INJECTION PRN
Status: DISCONTINUED | OUTPATIENT
Start: 2020-01-24 | End: 2020-01-01 | Stop reason: HOSPADM

## 2020-01-24 RX ORDER — LEVOFLOXACIN 5 MG/ML
500 INJECTION, SOLUTION INTRAVENOUS ONCE
Status: COMPLETED | OUTPATIENT
Start: 2020-01-24 | End: 2020-01-01

## 2020-01-24 RX ORDER — MIDODRINE HYDROCHLORIDE 5 MG/1
5 TABLET ORAL 3 TIMES DAILY PRN
Status: DISCONTINUED | OUTPATIENT
Start: 2020-01-24 | End: 2020-01-01

## 2020-01-24 RX ORDER — POLYETHYLENE GLYCOL 3350 17 G/17G
17 POWDER, FOR SOLUTION ORAL DAILY
COMMUNITY

## 2020-01-24 RX ORDER — ONDANSETRON 2 MG/ML
4 INJECTION INTRAMUSCULAR; INTRAVENOUS EVERY 6 HOURS PRN
Status: DISCONTINUED | OUTPATIENT
Start: 2020-01-24 | End: 2020-01-01 | Stop reason: HOSPADM

## 2020-01-24 RX ORDER — GABAPENTIN 100 MG/1
100 CAPSULE ORAL 3 TIMES DAILY
Status: DISCONTINUED | OUTPATIENT
Start: 2020-01-24 | End: 2020-01-01 | Stop reason: HOSPADM

## 2020-01-24 RX ORDER — LEVOTHYROXINE SODIUM 0.03 MG/1
25 TABLET ORAL DAILY
Status: DISCONTINUED | OUTPATIENT
Start: 2020-01-01 | End: 2020-01-01 | Stop reason: HOSPADM

## 2020-01-24 RX ORDER — SODIUM CHLORIDE 0.9 % (FLUSH) 0.9 %
10 SYRINGE (ML) INJECTION EVERY 12 HOURS SCHEDULED
Status: DISCONTINUED | OUTPATIENT
Start: 2020-01-24 | End: 2020-01-01 | Stop reason: HOSPADM

## 2020-01-24 RX ORDER — SEVELAMER CARBONATE 800 MG/1
1600 TABLET, FILM COATED ORAL
Status: DISCONTINUED | OUTPATIENT
Start: 2020-01-01 | End: 2020-01-01 | Stop reason: HOSPADM

## 2020-01-24 RX ORDER — OXYBUTYNIN CHLORIDE 5 MG/1
5 TABLET ORAL DAILY
Status: DISCONTINUED | OUTPATIENT
Start: 2020-01-01 | End: 2020-01-01 | Stop reason: HOSPADM

## 2020-01-24 RX ORDER — HYDROCODONE BITARTRATE AND ACETAMINOPHEN 5; 325 MG/1; MG/1
1 TABLET ORAL EVERY 6 HOURS PRN
Status: DISCONTINUED | OUTPATIENT
Start: 2020-01-24 | End: 2020-01-01 | Stop reason: HOSPADM

## 2020-01-24 RX ADMIN — MECLIZINE HYDROCHLORIDE 25 MG: 12.5 TABLET, FILM COATED ORAL at 23:55

## 2020-01-24 RX ADMIN — GABAPENTIN 100 MG: 100 CAPSULE ORAL at 23:52

## 2020-01-24 RX ADMIN — LEVOFLOXACIN 500 MG: 5 INJECTION, SOLUTION INTRAVENOUS at 23:54

## 2020-01-24 RX ADMIN — INSULIN GLARGINE 4 UNITS: 100 INJECTION, SOLUTION SUBCUTANEOUS at 23:52

## 2020-01-24 RX ADMIN — PRAVASTATIN SODIUM 40 MG: 40 TABLET ORAL at 23:52

## 2020-01-24 ASSESSMENT — ENCOUNTER SYMPTOMS
COUGH: 0
BACK PAIN: 0
NAUSEA: 0
SHORTNESS OF BREATH: 0
SORE THROAT: 0
ABDOMINAL PAIN: 1
VOMITING: 0

## 2020-01-24 NOTE — PROGRESS NOTES
[place] [facility] Mediate pt Mitzy Barselvin [birth year] pneumonia CAPD from .4 70/38 weakness fatigue last 24 hrs lower abd did right base pneumonia no surg abd lactic 2.2 WBC 10 cultured up urine trace leuk esterace on Cefepime Vanc  % RA 99.5 HR 60's 155/61 18 HX HTN DM tele IP Dr Floyd

## 2020-01-25 NOTE — CONSULTS
[facility]          [facility]        [address]        [facility], [address]        [phone]           Inpatient Initial consult note         1/24/2020 9:43 PM    Patient Name:   Aparna Wade  YOB: 1950  Primary Care Physician:  Ruba Villanueva     History Obtained From:  patient     Consultation requested by : Mohan Gonzalez DO    requested for  : Evaluation of  ESRD Management     History of presentingillness   Aparna Wade is a 81 y.o.   female with Past Medical History as stated below presented with a chief complaint of No chief complaint on file. on 1/24/2020 . Patient presented with chief complaints of weakness and fatigue which started since Wednesday, very severe associated with low blood pressure, running almost less than 70s at the nursing home. Symptoms moderate severity no specific modifying factors has some associated mild lower abdominal discomfort possibly low-grade fever. Patient denies any other symptoms of nausea vomiting  Diarrhea chest pain or shortness of breath at this time. She currently does not appear to be in any acute distress  She has received several fluid boluses and her blood pressure is slightly better around 646 systolic. As per the patient patient dialysis has been going on well.      Past History      Past Medical History:   Diagnosis Date    Adjustment disorder with mixed anxiety and depressed mood     Anemia     Anemia in chronic kidney disease(285.21)     Anxiety     Arthritis     Asthma     Bipolar 1 disorder (HCC)     Bradycardia     CHF (congestive heart failure) (HCC)     Chronic diastolic heart failure (HCC)     Chronic kidney disease, stage III (moderate) (HCC)     CKD (chronic kidney disease)     Stage 3    Depression     Difficulty walking     Dizziness and giddiness     GERD (gastroesophageal reflux disease)     Gout     Headache(784.0)     Hyperkalemia     Hyperlipidemia     Narrative    Not on file     Family History   Problem Relation Age of Onset    High Blood Pressure Mother     Diabetes Mother     Cancer Mother     Heart Attack Mother     Stroke Father     High Blood Pressure Father     Anemia Father      Medications & Allergies      Prior to Admission medications    Medication Sig Start Date End Date Taking?  Authorizing Provider   Fluticasone Propionate (FLONASE NA) by Nasal route    Historical Provider, MD   potassium chloride (KLOR-CON M) 20 MEQ extended release tablet Take 20 mEq by mouth daily    Historical Provider, MD   Calcium Polycarbophil (FIBERCON PO) Take by mouth    Historical Provider, MD   sevelamer (RENVELA) 800 MG tablet Take 2 tablets by mouth 3 times daily (with meals)    Historical Provider, MD   guaiFENesin 200 MG/5ML LIQD Take by mouth    Historical Provider, MD   vitamin D (CHOLECALCIFEROL) 5000 units CAPS capsule Take 5,000 Units by mouth daily    Historical Provider, MD   sertraline (ZOLOFT) 25 MG tablet Take 25 mg by mouth daily    Historical Provider, MD   insulin glargine (LANTUS) 100 UNIT/ML injection vial Inject 4 Units into the skin 2 times daily 6/25/19   Clement Main MD   insulin lispro (HUMALOG) 100 UNIT/ML injection vial Inject 0-6 Units into the skin 3 times daily (with meals) 6/20/19   Cristian Ram MD   glucagon, rDNA, 1 MG injection Inject 1 mg into the muscle as needed for Low blood sugar (Blood glucose less than 70 mg/dL and patient NOT ALERT or NPO and does not have IV access.) 6/20/19 6/14/20  Cristian Ram MD   docusate sodium (COLACE, DULCOLAX) 100 MG CAPS Take 100 mg by mouth 2 times daily 6/20/19   Cristian Ram MD   ondansetron (ZOFRAN-ODT) 4 MG disintegrating tablet Take 4 mg by mouth every 4 hours as needed for Nausea or Vomiting    Historical Provider, MD   famotidine (PEPCID) 10 MG tablet Take 10 mg by mouth 2 times daily    Historical Provider, MD   doxazosin (CARDURA) 1 MG tablet Take 1 tablet by mouth daily oxybutynin  5 mg Oral Daily    pravastatin  40 mg Oral Nightly    [START ON 1/25/2020] sertraline  25 mg Oral Daily    [START ON 1/25/2020] sevelamer  1,600 mg Oral TID WC    sodium chloride flush  10 mL Intravenous 2 times per day    levofloxacin  500 mg Intravenous Q24H     Continuous Infusions:   dextrose       Review of Systems Physical Exam   Review of Systems   Constitutional: Positive for chills, fatigue and fever. HENT: Negative. Negative for sore throat. Respiratory: Negative for cough and shortness of breath. Cardiovascular: Negative for chest pain and leg swelling. Gastrointestinal: Positive for abdominal pain. Negative for nausea and vomiting. Endocrine: Negative. Genitourinary: Negative for dysuria, hematuria and urgency. Musculoskeletal: Negative for back pain and neck pain. Skin: Negative for rash and wound. Neurological: Negative for dizziness, light-headedness and headaches. Psychiatric/Behavioral: Negative for agitation and confusion. Physical Exam  Vitals signs reviewed. Constitutional:       General: She is not in acute distress. HENT:      Right Ear: External ear normal.      Left Ear: External ear normal.      Mouth/Throat:      Mouth: Mucous membranes are dry. Eyes:      Comments: Lids normal appearence   Neck:      Musculoskeletal: Normal range of motion and neck supple. Thyroid: No thyromegaly. Vascular: No JVD. Cardiovascular:      Heart sounds: Normal heart sounds. No murmur. No friction rub. No gallop. Pulmonary:      Effort: Pulmonary effort is normal.      Breath sounds: Normal breath sounds. Chest:      Chest wall: No tenderness. Abdominal:      General: Bowel sounds are normal. There is no distension. Palpations: Abdomen is soft. Tenderness: There is no abdominal tenderness. Skin:     General: Skin is warm and dry. Findings: No erythema or rash.    Neurological:      Mental Status: She is alert and oriented to person, place, and time. There were no vitals filed for this visit. Labs, Radiology and Tests     Labs show BUN of 49, creatinine 7.32, potassium of 4.3  UA slightly cloudy 2+ protein trace leukocyte esterase no WBC    CT scan of the abdomen  IMPRESSION:  1. Trace right pleural effusion with lower lobe atelectasis. 2. Constipation. 3. No acute intrapelvic abnormality. 4. Interval placement of a peritoneal dialysis catheter with small volume of  intra-and pelvic fluid representing the peritoneal dialysate. Assessment    Renal -ESRD on peritoneal dialysis volume status appears reasonable at this time  - Patient did receive 3 L of fluid bolus however not in any congestion at this time  - Hold dialysis for today and reevaluate in the morning  Hypotension unclear etiology we will hold all antihypertensive medications at this time may need to adjust her dry weight. Does not show any evidence of any infection at this time. Francisco Connolly add midodrine if BP continues to be low  Diabetes mellitus  History of pleural effusion  Electrolytes appear to be within normal limits  hyperlipidemia  meds reviewed and D/W patient, DR. Benoit and RN      **This report has been created using voice recognition software. It maycontain minor  errors which are inherent in voice recognition technology. **    Rolando Rivers M.D  [facility].

## 2020-01-25 NOTE — PROGRESS NOTES
[facility]   Nephrology progress note  1/25/2020, 1:34 PM      Pt Name:    Manuela Douglass  MRN:     [id number]     [place]:    1950  Admit Date:    1/24/2020  8:45 PM  Primary Care Physician:  Vlad Craig   Room number  5O-34/899-E    Chief Complaint: Nephrology following for ESRD/PD    Subjective:  Patient seen and examined  No chest pain or shortness of breath  Feels okay    Objective:  24HR INTAKE/OUTPUT:      Intake/Output Summary (Last 24 hours) at 1/25/2020 1334  Last data filed at 1/25/2020 0555  Gross per 24 hour   Intake 289.2 ml   Output --   Net 289.2 ml     I/O last 3 completed shifts: In: 289.2 [P.O.:200; I.V.:89.2]  Out: -   No intake/output data recorded. Admission weight: 173 lb 1.6 oz (78.5 kg)  Wt Readings from Last 3 Encounters:   01/24/20 173 lb 1.6 oz (78.5 kg)   11/26/19 167 lb (75.8 kg)   10/17/19 167 lb (75.8 kg)     Body mass index is 28.81 kg/m².     Physical examination  VITALS:     Vitals:    01/25/20 1200   BP: (!) 153/67   Pulse: 60   Resp: 16   Temp: 97.2 °F (36.2 °C)   SpO2: 98%     General Appearance: alert and cooperative with exam, appears comfortable, no distress  Mouth/Throat: Oral mucosa moist  Neck: No JVD  Lungs: Air entry B/L, no rales, no use of accessory muscles  Heart:  S1, S2 heard  GI: soft, non-tender, no guarding    Lab Data  CBC:   Recent Labs     01/25/20  0557   WBC 10.1   HGB 10.2*   HCT 31.4*        BMP:  Recent Labs     01/25/20  0557      K 4.8      CO2 21*   BUN 53*   CREATININE 7.6*   GLUCOSE 80   CALCIUM 8.9     Hepatic:   Recent Labs     01/25/20  0557   LABALBU 2.7*   AST 23   ALT 19   BILITOT 0.3   ALKPHOS 73         Meds:  Infusion:    sodium chloride 35 mL/hr at 01/25/20 1158    dextrose       Meds:    dianeal lo-ivonne 1.5%  2,000 mL Intraperitoneal Q6H    [START ON 1/26/2020] amLODIPine  5 mg Oral Daily    polycarbophil  625 mg Oral TID    polyethylene glycol  17 g Oral Daily    famotidine  10 mg

## 2020-01-25 NOTE — PROGRESS NOTES
Hospitalist Progress Note      Patient:  Andrew Norman    Unit/Bed:6K-04/004-A  YOB: 1950  MRN: [id number]   Acct: [de-identified]   PCP: [address]  Date of Admission: 1/24/2020    Assessment/Plan:    1. Hypotension: BP has been liable since admission 103/55 to 153/67. BP medications were at higher dosages here in the hospital than reported from the NH. Nephrology believe this hypotension might be from too much CAPD drained off. We might have to change her dry weight. Will start CAPD again on 1/25. IVF @ 35cc/hr. Orthostatic vitals were negative. Midodrine was started. 2. Sinus Bradycardia: HR dropped to 49 while on tele. Previous records show HR in the 60s. Pt is asymptomatic. Will defer EP consult for now. 3. ESRD on CAPD: Nephrology consulted. CAPD restarted this AM.   4. HFpEF: Echo pending. EF has been around 50-55%. Pt has chronic pleural effusions. CXR showed small pleural effusion. 5. Chronic Back Pain: Due to low BP, holding off on home narcotics. 5mg Flexeril started. Heating pad. 6. IDDM: Continue insulin regimen. 7. Chronic Anemia 2/2 CKD: HGb is stable at 10.2  8. Non-Gap Metabolic Acidosis 2/2 CKD: Co2 21. Chief Complaint: Weakness     Initial H and P:-    62-year-old lady with past medical history of ESRD on PD, CHF, depression, chronic low back pain, diabetes, hypertension came to [facility] transferred from [facility] due to hypotension and pneumonia. Patient states that she was feeling weak since yesterday. She lives in a nursing home and she had her vitals taken. Apparently his blood pressure was really low. Due to that she was transferred to the hospital.  Patient herself denies any fevers or chills. No nausea, vomiting, diarrhea. Patient still makes minimal urine. She denies any dysuria urgency or frequency. Denies any chest pain but does have some shortness of breath. Denies any cough.   She is not sure if she had any sick contacts. Denies any myalgias. In ER, she was noted to be febrile 100.4, with a white count of 10.8 with mild lactic acidosis. Her blood pressure continue to be low requiring IV hydration. Chest x-ray showed questionable infiltrate. She was started on IV fluids, IV antibiotics and transferred. Subjective (past 24 hours):   Pt states that she feels weak but overall better than the last few days. It was explained to the patient that I do not think she has pneumonia due to CXR does not show infiltrate, no WBC, no cough, afebrile. Past medical history, family history, social history and allergies reviewed again and is unchanged since admission. ROS Pt denies CP, CAN, abd pain.  Pt admits to some SOB    Medications:  Reviewed    Infusion Medications    sodium chloride 35 mL/hr at 01/25/20 1158    dextrose       Scheduled Medications    dianeal lo-ivonne 1.5%  2,000 mL Intraperitoneal Q6H    [START ON 1/26/2020] amLODIPine  5 mg Oral Daily    polycarbophil  625 mg Oral TID    polyethylene glycol  17 g Oral Daily    famotidine  10 mg Oral Daily    gabapentin  100 mg Oral TID    hydrALAZINE  100 mg Oral 3 times per day    insulin glargine  4 Units Subcutaneous BID    insulin lispro  0-6 Units Subcutaneous TID WC    isosorbide dinitrate  20 mg Oral TID    levothyroxine  25 mcg Oral Daily    meclizine  25 mg Oral TID    oxybutynin  5 mg Oral Daily    pravastatin  40 mg Oral Nightly    sertraline  25 mg Oral Daily    sevelamer  1,600 mg Oral TID WC    sodium chloride flush  10 mL Intravenous 2 times per day     PRN Meds: diphenhydrAMINE, cyclobenzaprine, acetaminophen, glucose, dextrose, glucagon (rDNA), dextrose, sodium chloride flush, magnesium hydroxide, ondansetron, perflutren lipid microspheres, midodrine, HYDROcodone 5 mg - acetaminophen      Intake/Output Summary (Last 24 hours) at 1/25/2020 1322  Last data filed at 1/25/2020 0555  Gross per 24 hour   Intake 289.2 ml   Output Electronically signed by JOHN Gilliland on 1/25/2020 at 1:22 PM

## 2020-01-25 NOTE — PROGRESS NOTES
Pt admitted to  [facility] via direct admit. Complaints: None. IV normal saline infusing into the wrist left, condition patent and no redness at a rate of 125 mls/ hour with about 100 mls in the bag still. IV site free of s/s of infection or infiltration. Vital signs obtained. Assessment and data collection initiated. Two nurse skin assessment performed by Gretta Monaco RN and Darien Johnson RN. Oriented to room. Policies and procedures for  explained. All questions answered with no further questions at this time. Fall prevention and safety brochure discussed with patient. Bed alarm on. Call light in reach.

## 2020-01-25 NOTE — H&P
Hospitalist - History & Physical      Patient: Rosa Moses    Unit/Bed:6K-04/004-A  YOB: 1950  MRN: [id number]   Acct: [de-identified]   PCP: [address]    Date of Service: Pt seen/examined on 01/24/20  and Admitted to Inpatient with expected LOS greater than two midnights due to medical therapy. Chief Complaint:  Weakness    Assessment and Plan:-  1. Hypotension - ?sepsis vs dehydration vs other etiology   - BP is stable at this point. EKG sinus charity, trop negative. Will check limited Echo   - Hold BP medications with parameters   - Midodrine with parameters   - IV antibiotics for ? Pneumonia   - Check UA   - Patient has received 2L IVF. Will avoid further fluids for now to prevent fluid overload    2. ?Pneumonia   - IV antibiotics   - Influenza was negative    3. ESRD on PD   - Nephrology consulted   - Dialysis per nephro    4. Diastolic CHF - compensated   - Check CXR   - Trop was negative   - EKG sinus charity. Limited echo    5. Chronic low back pain   - Norco PRN    6. Hypertension - now hypotensive   - BP meds with parameters    7. DMII   - Continue home insulin with ISS. Monitor BS TID and adjust as needed. History Of Present Illness:    28-year-old lady with past medical history of ESRD on PD, CHF, depression, chronic low back pain, diabetes, hypertension came to [facility] transferred from [facility] due to hypotension and pneumonia. Patient states that she was feeling weak since yesterday. She lives in a nursing home and she had her vitals taken. Apparently his blood pressure was really low. Due to that she was transferred to the hospital.  Patient herself denies any fevers or chills. No nausea, vomiting, diarrhea. Patient still makes minimal urine. She denies any dysuria urgency or frequency. Denies any chest pain but does have some shortness of breath. Denies any cough. She is not sure if she had any sick contacts. Denies any myalgias.   In ER, she was noted to be febrile 100.4, with a white count of 10.8 with mild lactic acidosis. Her blood pressure continue to be low requiring IV hydration. Chest x-ray showed questionable infiltrate. She was started on IV fluids, IV antibiotics and transferred. Past Medical History:        Diagnosis Date    Adjustment disorder with mixed anxiety and depressed mood     Anemia     Anemia in chronic kidney disease(285.21)     Anxiety     Arthritis     Asthma     Bipolar 1 disorder (HCC)     Bradycardia     CHF (congestive heart failure) (HCC)     Chronic diastolic heart failure (HCC)     Chronic kidney disease, stage III (moderate) ([facility])     CKD (chronic kidney disease)     Stage 3    Depression     Difficulty walking     Dizziness and giddiness     GERD (gastroesophageal reflux disease)     Gout     Headache(784.0)     Hyperkalemia     Hyperlipidemia     Hypertension     Hypertensive urgency     Hypothyroid     Hypothyroidism     Low back pain     Major depressive disorder, recurrent, mild ([facility])     Muscle weakness (generalized)     Neurogenic bladder     Pleural effusion 06/16/2019    R pleurex catheter placed    Pneumonia     Seizure ([facility] [address] 01/2018    Type II or unspecified type diabetes mellitus without mention of complication, not stated as uncontrolled     Urine retention        Past Surgical History:        Procedure Laterality Date    BACK SURGERY      CERVICAL FUSION      CHOLECYSTECTOMY      COLONOSCOPY      ENDOSCOPY, COLON, DIAGNOSTIC      FRACTURE SURGERY         Home Medications:   No current facility-administered medications on file prior to encounter.       Current Outpatient Medications on File Prior to Encounter   Medication Sig Dispense Refill    Fluticasone Propionate (FLONASE NA) by Nasal route      potassium chloride (KLOR-CON M) 20 MEQ extended release tablet Take 20 mEq by mouth daily      Calcium Polycarbophil (FIBERCON PO) Take by mouth      sevelamer (RENVELA) 800 MG tablet Take 2 tablets by mouth 3 times daily (with meals)      guaiFENesin 200 MG/5ML LIQD Take by mouth      vitamin D (CHOLECALCIFEROL) 5000 units CAPS capsule Take 5,000 Units by mouth daily      sertraline (ZOLOFT) 25 MG tablet Take 25 mg by mouth daily      insulin glargine (LANTUS) 100 UNIT/ML injection vial Inject 4 Units into the skin 2 times daily 1 vial 1    insulin lispro (HUMALOG) 100 UNIT/ML injection vial Inject 0-6 Units into the skin 3 times daily (with meals) 1 vial 3    glucagon, rDNA, 1 MG injection Inject 1 mg into the muscle as needed for Low blood sugar (Blood glucose less than 70 mg/dL and patient NOT ALERT or NPO and does not have IV access.)      docusate sodium (COLACE, DULCOLAX) 100 MG CAPS Take 100 mg by mouth 2 times daily      ondansetron (ZOFRAN-ODT) 4 MG disintegrating tablet Take 4 mg by mouth every 4 hours as needed for Nausea or Vomiting      famotidine (PEPCID) 10 MG tablet Take 10 mg by mouth 2 times daily      doxazosin (CARDURA) 1 MG tablet Take 1 tablet by mouth daily 30 tablet 3    hydrALAZINE (APRESOLINE) 100 MG tablet Take 1 tablet by mouth every 8 hours 90 tablet 3    oxybutynin (DITROPAN) 5 MG tablet Take 5 mg by mouth daily      meclizine (ANTIVERT) 25 MG tablet Take 25 mg by mouth 3 times daily      gabapentin (NEURONTIN) 100 MG capsule Take 100 mg by mouth 3 times daily.  amLODIPine (NORVASC) 10 MG tablet Take 10 mg by mouth daily      acetaminophen-codeine (TYLENOL #3) 300-30 MG per tablet Take 1 tablet by mouth every 6 hours as needed for Pain.  acetaminophen (TYLENOL) 325 MG tablet Take 650 mg by mouth every 6 hours as needed for Pain      isosorbide dinitrate (ISORDIL) 20 MG tablet Take 1 tablet by mouth 3 times daily  0    levothyroxine (SYNTHROID) 25 MCG tablet Take 25 mcg by mouth Daily      blood glucose test strips (ASCENSIA AUTODISC VI;ONE TOUCH ULTRA TEST VI) strip Patient tests blood sugar three times per day.  Diagnosis DMII E11.9     

## 2020-01-26 PROBLEM — I95.9 HYPOTENSION: Status: ACTIVE | Noted: 2020-01-01

## 2020-01-26 NOTE — PROGRESS NOTES
[facility]   Nephrology progress note  1/26/2020, 1:23 PM      Pt Name:    Manuela Douglass  MRN:     [id number]     [place]:    1950  Admit Date:    1/24/2020  8:45 PM  Primary Care Physician:  Vlad Craig   Room number  2R-84/257-U    Chief Complaint: Nephrology following for ESRD/PD    Subjective:  Patient seen and examined  No chest pain or shortness of breath  Reports some nausea  PD data reviewed  2100 mL removed 6 AM this morning      Objective:  24HR INTAKE/OUTPUT:      Intake/Output Summary (Last 24 hours) at 1/26/2020 1323  Last data filed at 1/26/2020 1230  Gross per 24 hour   Intake 17480.4 ml   Output 7700 ml   Net 3792.4 ml     I/O last 3 completed shifts: In: 9704.4 [P.O.:1280; I.V.:424.4; Other:8000]  Out: 5500 [Other:5500]  I/O this shift:  In: 2268 [P.O.:120; I.V.:148; Other:2000]  Out: 2200 [Other:2200]  Admission weight: 173 lb 1.6 oz (78.5 kg)  Wt Readings from Last 3 Encounters:   01/26/20 179 lb 3.2 oz (81.3 kg)   11/26/19 167 lb (75.8 kg)   10/17/19 167 lb (75.8 kg)     Body mass index is 29.82 kg/m².     Physical examination  VITALS:     Vitals:    01/26/20 1109   BP: 136/65   Pulse: 62   Resp: 18   Temp: 99.1 °F (37.3 °C)   SpO2: 100%     General Appearance: alert and cooperative with exam, no distress  Mouth/Throat: Oral mucosa moist  Neck: No JVD  Lungs: Air entry B/L, no rales, no use of accessory muscles  Heart:  S1, S2 heard  GI: soft, non-tender, no guarding    Lab Data  CBC:   Recent Labs     01/25/20  0557   WBC 10.1   HGB 10.2*   HCT 31.4*        BMP:  Recent Labs     01/25/20  0557 01/26/20  0617    133*   K 4.8 4.4    95*   CO2 21* 22*   BUN 53* 53*   CREATININE 7.6* 7.1*   GLUCOSE 80 102   CALCIUM 8.9 8.9     Hepatic:   Recent Labs     01/25/20  0557   LABALBU 2.7*   AST 23   ALT 19   BILITOT 0.3   ALKPHOS 73         Meds:  Infusion:    sodium chloride 35 mL/hr at 01/25/20 1158    dextrose       Meds:    dianeal lo-ivonne 1.5% 2,000 mL Intraperitoneal Q6H    amLODIPine  5 mg Oral Daily    polycarbophil  625 mg Oral TID    polyethylene glycol  17 g Oral Daily    famotidine  10 mg Oral Daily    gabapentin  100 mg Oral TID    hydrALAZINE  100 mg Oral 3 times per day    insulin glargine  4 Units Subcutaneous BID    insulin lispro  0-6 Units Subcutaneous TID     isosorbide dinitrate  20 mg Oral TID    levothyroxine  25 mcg Oral Daily    meclizine  25 mg Oral TID    oxybutynin  5 mg Oral Daily    pravastatin  40 mg Oral Nightly    sertraline  25 mg Oral Daily    sevelamer  1,600 mg Oral TID     sodium chloride flush  10 mL Intravenous 2 times per day     Meds prn: diphenhydrAMINE, cyclobenzaprine, acetaminophen, glucose, dextrose, glucagon (rDNA), dextrose, sodium chloride flush, magnesium hydroxide, ondansetron, perflutren lipid microspheres, midodrine, HYDROcodone 5 mg - acetaminophen       Impression and Plan:  1. End-stage renal disease on peritoneal dialysis: PD data reviewed, last exchange drained 2100 mL clear yellow fluid. Blood pressure is stable. Continue with low rate IV fluid hydration. Continue with 1.5% dianeal every 6 hours for now. 2.  Hypotension improved: Last blood pressure 136/65, continue normal saline at 35 mL an hour. 3.  Diabetes mellitus-insulin-dependent  4. Anemia of end-stage renal: H&H stable will monitor  5.   Nausea    D/W patient and RN     Harley Hua MD  [facility]

## 2020-01-26 NOTE — PLAN OF CARE
Problem: Falls - Risk of:  Goal: Will remain free from falls  Description  Will remain free from falls  Outcome: Ongoing  Call light within reach. Side rails up x2. Bed alarm on. Non skid slippers available. Problem: Falls - Risk of:  Goal: Absence of physical injury  Description  Absence of physical injury  Outcome: Ongoing    Problem: Risk for Impaired Skin Integrity  Goal: Tissue integrity - skin and mucous membranes  Description  Structural intactness and normal physiological function of skin and  mucous membranes. Outcome: Ongoing  Patient free from skin breakdown. Patient turns self and makes frequent positional changes. Will continue to monitor. Problem: Pain:  Goal: Pain level will decrease  Description  Pain level will decrease  Outcome: Ongoing  Patient free from pain this shift. Pain rated on 0-10 pain rating scale. Will continue to reassess. Problem: Discharge Planning:  Goal: Discharged to appropriate level of care  Description  Discharged to appropriate level of care  Outcome: Ongoing  Patient plans to return to [facility] when medically stable. Problem: Activity:  Goal: Fatigue will decrease  Description  Fatigue will decrease  Outcome: Ongoing  Patient is fatigued/nauseas throughout the shift. Refused medications    Problem: Fluid Volume:  Goal: Will show no signs or symptoms of fluid imbalance  Description  Will show no signs or symptoms of fluid imbalance  Outcome: Ongoing  Patient on CAPD every 6 hours per nephrology. Accurate I&O in progress. Care plan reviewed with patient. Patient verbalize understanding of the plan of care and contribute to goal setting.

## 2020-01-26 NOTE — PROGRESS NOTES
Hospitalist Progress Note      Patient:  Rosa Moses    Unit/Bed:6K-04/004-A  YOB: 1950  MRN: [id number]   Acct: [de-identified]   PCP: [address]  Date of Admission: 1/24/2020    Assessment/Plan:    1. Hypotension: BP has been liable since admission 103/55 to 161/67. BP medications were at higher dosages here in the hospital than reported from the NH. Nephrology believe this hypotension might be from too much CAPD drained off. We might have to change her dry weight. Will start CAPD again on 1/25. IVF @ 35cc/hr. Orthostatic vitals were negative. Midodrine was started. 2. Sinus Bradycardia: HR dropped to 49 while on tele. Previous records show HR in the 60s. Pt is asymptomatic. Cardiology consulted. 3. Abnormal Echo: Echo showed \" There is an independently mobile mass on the ventricular side of the posterior mitral leaflet. \" Cardiology consulted. SOWMYA?  4. Abdominal Pain and Nausea: PPI, Zofran, Bentyl. CT Abd did not show ileus or SBO or CAPD dysfunction. 5. ESRD on CAPD: Nephrology consulted. CAPD restarted this AM. Pt tolerated well. 6. HFpEF: Echo pending. EF has been around 50-55%. Pt has chronic pleural effusions. CXR showed small pleural effusion. 7. Chronic Back Pain: Due to low BP, holding off on home narcotics. 5mg Flexeril started. Heating pad. 8. IDDM: Continue insulin regimen. 9. Chronic Anemia 2/2 CKD: HGb is stable at 10.2  10. Non-Gap Metabolic Acidosis 2/2 CKD: Co2 21. Chief Complaint: Weakness     Initial H and P:-    60-year-old lady with past medical history of ESRD on PD, CHF, depression, chronic low back pain, diabetes, hypertension came to [facility] transferred from [facility] due to hypotension and pneumonia. Patient states that she was feeling weak since yesterday. She lives in a nursing home and she had her vitals taken. Apparently his blood pressure was really low.   Due to that she was transferred to the hospital.  Patient herself denies any fevers or chills. No nausea, vomiting, diarrhea. Patient still makes minimal urine. She denies any dysuria urgency or frequency. Denies any chest pain but does have some shortness of breath. Denies any cough. She is not sure if she had any sick contacts. Denies any myalgias. In ER, she was noted to be febrile 100.4, with a white count of 10.8 with mild lactic acidosis. Her blood pressure continue to be low requiring IV hydration. Chest x-ray showed questionable infiltrate. She was started on IV fluids, IV antibiotics and transferred. 1/25 - Pt states that she feels weak but overall better than the last few days. It was explained to the patient that I do not think she has pneumonia due to CXR does not show infiltrate, no WBC, no cough, afebrile. Subjective (past 24 hours):    Pt states that she is having abdominal pain and nausea. The patient had an ECHO which showed mass on her mitral leaflet. Cardiology will talk to patient about SOWMYA. Past medical history, family history, social history and allergies reviewed again and is unchanged since admission. ROS Pt denies CP, CAN, abd pain.  Pt admits to some SOB    Medications:  Reviewed    Infusion Medications    sodium chloride 35 mL/hr at 01/25/20 1158    dextrose       Scheduled Medications    dicyclomine  10 mg Oral TID AC    dianeal lo-ivonne 1.5%  2,000 mL Intraperitoneal Q6H    amLODIPine  5 mg Oral Daily    polycarbophil  625 mg Oral TID    polyethylene glycol  17 g Oral Daily    famotidine  10 mg Oral Daily    gabapentin  100 mg Oral TID    hydrALAZINE  100 mg Oral 3 times per day    insulin glargine  4 Units Subcutaneous BID    insulin lispro  0-6 Units Subcutaneous TID WC    isosorbide dinitrate  20 mg Oral TID    levothyroxine  25 mcg Oral Daily    meclizine  25 mg Oral TID    oxybutynin  5 mg Oral Daily    pravastatin  40 mg Oral Nightly    sertraline  25 mg Oral Daily    sevelamer Blood culture #1:   Lab Results   Component Value Date    BC No growth-preliminary  01/24/2020     Radiology:  XR CHEST STANDARD (2 VW)   Final Result      1. Blunting of the right costophrenic angle which may represent a small pleural effusion. 2. Cardiomegaly. **This report has been created using voice recognition software. It may contain minor errors which are inherent in voice recognition technology. **      Final report electronically signed by Dr Janet Perkins on 1/25/2020 8:24 AM        Electronically signed by JOHN Castellon on 1/26/2020 at 3:20 PM

## 2020-01-27 NOTE — CONSULTS
mmHg                                                     TV Peak E-Wave: 64.6   MV Deceleration Time: 317                         cm/s   msec                                              TV Peak A-Wave: 89.6                                                     cm/s      MV E' Septal Velocity: 5.4                        TV Peak Gradient: 1.67   cm/s                       AV DVI (Vmax):0.75     mmHg   MV A' Septal Velocity: 8.3   cm/s                                              PV Peak Velocity: 126   MV E' Lateral Velocity:                           cm/s   7.1 cm/s                                          PV Peak Gradient: 6.35   MV A' Lateral Velocity:                           mmHg   11.2 cm/s   E/E' septal: 22.78   E/E' lateral: 17.32     [email or web address] [id number]%[id number]        All labs, EKG's, diagnostic testing and images as well as cardiac cath, stress testing were reviewed during this encounter    Past Medical History:   Diagnosis Date    Adjustment disorder with mixed anxiety and depressed mood     Anemia     Anemia in chronic kidney disease(285.21)     Anxiety     Arthritis     Asthma     Bipolar 1 disorder (HCC)     Bradycardia     CHF (congestive heart failure) (HCC)     Chronic diastolic heart failure (HCC)     Chronic kidney disease with peritoneal dialysis as preferred modality, stage 5 (HCC)     Chronic kidney disease, stage III (moderate) (HCC)     CKD (chronic kidney disease)     Stage 3    Depression     Difficulty walking     Dizziness and giddiness     GERD (gastroesophageal reflux disease)     Gout     Headache(784.0)     Hyperkalemia     Hyperlipidemia     Hypertension     Hypertensive urgency     Hypothyroid     Hypothyroidism     Low back pain     Major depressive disorder, recurrent, mild (HCC)     Muscle weakness (generalized)     Neurogenic bladder     Pleural effusion 06/16/2019    R pleurex catheter placed    Pneumonia     Seizure ([address] 01/2018    Type II or unspecified type diabetes mellitus without mention of complication, not stated as uncontrolled     Urine retention      Past Surgical History:   Procedure Laterality Date    BACK SURGERY      CERVICAL FUSION      CHOLECYSTECTOMY      COLONOSCOPY      ENDOSCOPY, COLON, DIAGNOSTIC      FRACTURE SURGERY       Current Facility-Administered Medications   Medication Dose Route Frequency Provider Last Rate Last Dose    hydrALAZINE (APRESOLINE) tablet 50 mg  50 mg Oral 3 times per day Cary Hernandez MD        heparin (porcine) injection 5,000 Units  5,000 Units Subcutaneous 3 times per day Janet Hubbard MD        dicyclomine (BENTYL) capsule 10 mg  10 mg Oral TID AC JOHN Sequeira   10 mg at 01/27/20 0556    dianeal lo-ivonne 1.5% 2,000 mL  2,000 mL Intraperitoneal Q6H Gwendolyn Jones MD   2,000 mL at 01/27/20 0550    polycarbophil (FIBERCON) tablet 625 mg  625 mg Oral TID JOHN Sequeira   625 mg at 01/27/20 9869    polyethylene glycol (GLYCOLAX) packet 17 g  17 g Oral Daily Gracie Hodge [place]   17 g at 01/27/20 0834    diphenhydrAMINE (BENADRYL) tablet 25 mg  25 mg Oral Q6H PRN JOHN Sequeira        cyclobenzaprine (FLEXERIL) tablet 5 mg  5 mg Oral BID PRN JOHN Sequeira        acetaminophen (TYLENOL) tablet 650 mg  650 mg Oral Q6H PRN Alison Mcintyre MD   650 mg at 01/27/20 0833    famotidine (PEPCID) tablet 10 mg  10 mg Oral Daily Alison Mcintyre MD   10 mg at 01/27/20 0834    gabapentin (NEURONTIN) capsule 100 mg  100 mg Oral TID Alison Mcintyre MD   100 mg at 01/27/20 0204    insulin glargine (LANTUS) injection vial 4 Units  4 Units Subcutaneous BID Alison Mcintyre MD   4 Units at 01/27/20 0827    insulin lispro (HUMALOG) injection vial 0-6 Units  0-6 Units Subcutaneous TID [facility] Alison Mcintyre MD        isosorbide dinitrate (ISORDIL) tablet 20 mg  20 Take 1 capsule by mouth daily   Yes Historical Provider, MD   Insulin Glargine (BASAGLAR KWIKPEN SC) Inject 4 Units into the skin 2 times daily   Yes Historical Provider, MD   insulin aspart (NOVOLOG) 100 UNIT/ML injection vial Inject 0-12 Units into the skin 3 times daily (before meals) Per sliding scale; 140-199= 1 unit, 200-249= 2 units, 250-299= 3 units,  300-349= 4 units, 350-399= 5 units, 400-450= 6 units, 451-500= 10 units, >500 give 12 units & recheck in one hour. Yes Historical Provider, MD   Fluticasone Propionate (FLONASE NA) 2 sprays by Each Nostril route daily    Yes Historical Provider, MD   Calcium Polycarbophil (FIBERCON PO) Take 3 capsules by mouth 3 times daily    Yes Historical Provider, MD   sevelamer (RENVELA) 800 MG tablet Take 2 tablets by mouth 3 times daily (with meals)   Yes Historical Provider, MD   guaiFENesin 200 MG tablet Take by mouth every 4 hours as needed (cough)    Yes Historical Provider, MD   sertraline (ZOLOFT) 25 MG tablet Take 25 mg by mouth daily   Yes Historical Provider, MD   glucagon, rDNA, 1 MG injection Inject 1 mg into the muscle as needed for Low blood sugar (Blood glucose less than 70 mg/dL and patient NOT ALERT or NPO and does not have IV access.) 6/20/19 6/14/20 Yes Diane Kelley MD   ondansetron (ZOFRAN-ODT) 4 MG disintegrating tablet Take 4 mg by mouth every 8 hours as needed for Nausea or Vomiting    Yes Historical Provider, MD   famotidine (PEPCID) 10 MG tablet Take 10 mg by mouth 2 times daily   Yes Historical Provider, MD   hydrALAZINE (APRESOLINE) 100 MG tablet Take 1 tablet by mouth every 8 hours 5/23/19  Yes Christiano Ocampo MD   oxybutynin (DITROPAN) 5 MG tablet Take 5 mg by mouth daily   Yes Historical Provider, MD   meclizine (ANTIVERT) 25 MG tablet Take 25 mg by mouth 3 times daily   Yes Historical Provider, MD   gabapentin (NEURONTIN) 100 MG capsule Take 100 mg by mouth 3 times daily.  7/31/18  Yes Historical Provider, MD   amLODIPine (NORVASC) 10 MG tablet Take 5 mg by mouth daily    Yes Historical Provider, MD   acetaminophen-codeine (TYLENOL #3) 300-30 MG per tablet Take 1 tablet by mouth every 6 hours as needed for Pain. Yes Historical Provider, MD   acetaminophen (TYLENOL) 325 MG tablet Take 650 mg by mouth every 6 hours as needed for Pain   Yes Historical Provider, MD   isosorbide dinitrate (ISORDIL) 20 MG tablet Take 1 tablet by mouth 3 times daily 12/17/18  Yes Angelique Choudhury MD   levothyroxine (SYNTHROID) 25 MCG tablet Take 25 mcg by mouth Daily   Yes Historical Provider, MD   pravastatin (PRAVACHOL) 40 MG tablet Take 40 mg by mouth nightly    Yes Historical Provider, MD   blood glucose test strips (ASCENSIA AUTODISC VI;ONE TOUCH ULTRA TEST VI) strip Patient tests blood sugar three times per day.  Diagnosis DMII E11.9 7/18/18   Historical Provider, MD   Scheduled Meds:   hydrALAZINE  50 mg Oral 3 times per day    heparin (porcine)  5,000 Units Subcutaneous 3 times per day    dicyclomine  10 mg Oral TID AC    dianeal lo-ivonne 1.5%  2,000 mL Intraperitoneal Q6H    polycarbophil  625 mg Oral TID    polyethylene glycol  17 g Oral Daily    famotidine  10 mg Oral Daily    gabapentin  100 mg Oral TID    insulin glargine  4 Units Subcutaneous BID    insulin lispro  0-6 Units Subcutaneous TID WC    isosorbide dinitrate  20 mg Oral TID    levothyroxine  25 mcg Oral Daily    meclizine  25 mg Oral TID    oxybutynin  5 mg Oral Daily    pravastatin  40 mg Oral Nightly    sertraline  25 mg Oral Daily    sevelamer  1,600 mg Oral TID WC    sodium chloride flush  10 mL Intravenous 2 times per day     Continuous Infusions:   dextrose       PRN Meds:.diphenhydrAMINE, cyclobenzaprine, acetaminophen, glucose, dextrose, glucagon (rDNA), dextrose, sodium chloride flush, magnesium hydroxide, ondansetron, perflutren lipid microspheres, HYDROcodone 5 mg - acetaminophen    Allergies   Allergen Reactions    Eggs Or Egg-Derived Products Nausea And Vomiting glaucoma  Ears/Nose/Mouth/Throat:  Denies any chronic sinus/rhinitis, bleeding gums  Cardiovascular:  As described above. Respiratory:  Denies any frequent cough, wheezing or coughing up blood  Genitourinary:  Denies difficulty with urination and kidney stones  Gastrointestinal:  Denies any chronic problems with abdominal pain, nausea, vomiting or diarrhea  Musculoskeletal:  Denies any joint pain, back pain, or difficulty walking  Integumentary:  Denies any rash  Neurological:  No numbness or tingling  Endocrine:  Denies any polydipsia. Hematologic/Lymphatic:  Denies any hemorrhage or lymphatic drainage problems. Labs:  CBC:   Recent Labs     01/25/20  0557   WBC 10.1   HGB 10.2*   HCT 31.4*   .6*        BMP:   Recent Labs     01/25/20  0557 01/26/20  0617    133*   K 4.8 4.4    95*   CO2 21* 22*   BUN 53* 53*   CREATININE 7.6* 7.1*     Accucheck Glucoses:   Recent Labs     01/26/20  1110 01/26/20  1605 01/26/20  1944 01/27/20  0605 01/27/20  1106   POCGLU 100 97 133* 96 104     Cardiac Enzymes: No results for input(s): CKTOTAL, CKMB, CKMBINDEX, TROPONINI in the last 72 hours. PT/INR: No results for input(s): PROTIME, INR in the last 72 hours. APTT: No results for input(s): APTT in the last 72 hours.   Liver Profile:  Lab Results   Component Value Date    AST 23 01/25/2020    ALT 19 01/25/2020    BILIDIR <0.2 07/12/2019    BILITOT 0.3 01/25/2020    ALKPHOS 73 01/25/2020     Lab Results   Component Value Date    CHOL 171 05/20/2019    HDL 66 05/20/2019    TRIG 148 05/20/2019     TSH:   Lab Results   Component Value Date    TSH 9.990 07/12/2019     UA:   Lab Results   Component Value Date    COLORU YELLOW 06/22/2019    PHUR 5.0 06/22/2019    WBCUA 0-2 06/22/2019    RBCUA 5-10 06/22/2019    YEAST NONE SEEN 06/22/2019    BACTERIA NONE 06/22/2019    CLARITYU Clear 03/09/2019    LEUKOCYTESUR NEGATIVE 06/22/2019    UROBILINOGEN 0.2 06/22/2019    BILIRUBINUR NEGATIVE 06/22/2019

## 2020-01-27 NOTE — PROGRESS NOTES
[address] [place] [address]  INPATIENT PHYSICAL THERAPY  EVALUATION  [facility] RENAL TELEMETRY 6K - 6K-04/004-A    Time In: 1329  Time Out: 1401  Timed Code Treatment Minutes: 24 Minutes  Minutes: 32          Date: 2020  Patient Name: Daylin Gonzalez,  Gender:  female        MRN: [id number]  : 1950  (81 y.o.)      Referring Practitioner: Vinay Washington MD  Diagnosis: hypotension  Additional Pertinent Hx: Per EMR: \"81year-old lady with past medical history of ESRD on PD, CHF, depression, chronic low back pain, diabetes, hypertension came to [facility] transferred from [place] [address] due to hypotension and pneumonia. Kayla Rizo states that she was feeling weak since yesterday.  She lives in a nursing home and she had her vitals taken.  Apparently his blood pressure was really low.  Due to that she was transferred to the hospital.  Patient herself denies any fevers or chills.  No nausea, vomiting, diarrhea.  Patient still makes minimal urine.  She denies any dysuria urgency or frequency.  Denies any chest pain but does have some shortness of breath.  Denies any cough.  She is not sure if she had any sick contacts.  Denies any myalgias. \"     Restrictions/Precautions:  Restrictions/Precautions: Fall Risk  Position Activity Restriction  Other position/activity restrictions: peritoneal dialysis    Subjective:  Chart Reviewed: Yes  Patient assessed for rehabilitation services?: Yes  Family / Caregiver Present: No  Subjective: RN approved, PT evaluation. Pt in supine and was agreeable to PT interventions. Pt very motivated and eager for participation. Pt in bedside chair with all needs in reach. Chair alarm. General:  Overall Orientation Status: Within Functional Limits  Follows Commands: Within Functional Limits    Vision: Impaired  Vision Exceptions: Wears glasses at all times    Hearing: Within functional limits         Pain:  Denies.           Social/Functional History:    Type of Home: Facility([facility])  Home Equipment: Rolling walker, Wheelchair-manual                   Ambulation Assistance: Independent  Transfer Assistance: Independent          Additional Comments: Pt reports ambulating 1-2x/week 6ft with AD and assist from staff. Pt reports that is she able to pivot to [facility] without AD. Pt notes I with WC mobility. OBJECTIVE:  Range of Motion:  Right Lower Extremity: WFL  Left Lower Extremity: WFL  Hx of foot drop, bilat: able to achieve foot flat on right LE (hx of AFO)   AFO is at [facility]. Left ankle: notable high arch, increased calf tightness, lacks ~5 deg dorsiflexion    Strength Grossly:   Right Lower Extremity: Impaired - hip/knee: 4/5, ankle: 1/5  Left Lower Extremity: Impaired - hip/knee: 4/5, ankle: 1+/5    Balance:  Static Standing Balance: Contact Guard Assistance, Minimal Assistance  Dynamic Standing Balance: Contact Guard Assistance, Minimal Assistance    Bed Mobility:  Rolling to Right: Moderate Assistance, with head of bed raised, with verbal cues , with increased time for completion, bed rail   Supine to Sit: Moderate Assistance, with head of bed raised, with verbal cues , with increased time for completion, bed rail    Transfers:  Sit to Stand: Moderate Assistance, with increased time for completion, cues for hand placement, to/from chair with arms  Stand to [id number] [facility], with increased time for completion, cues for hand placement, to/from chair without arms    Ambulation:  Minimal Assistance, with cues for safety, with verbal cues , with increased time for completion  Distance: 3ft  Surface: Level Tile  Device:Rolling Walker  Gait Deviations: Forward Flexed Posture, Slow Aurea, Decreased Heel Strike on Right, Decreased Heel Strike Bilaterally and bilat foot drop    Exercise:  Patient was guided in 1 set(s) 10 reps of exercise to both lower extremities. Ankle pumps, Heelslides, Short arc quads, Hip abduction/adduction and Straight leg raises.   Exercises were completed for increased independence

## 2020-01-27 NOTE — CARE COORDINATION
1/27/20, 9:49 AM  Discharge Planning Evaluation  Social work consult received, patient from [facility]. Patient/Family preference is to return to  confirmed with pt that she will return to [facility], pt stated she has been there for over a year. Pt is CAPD pt. The patient's current payor source at the facility is pt is a medicaid bed hold and can return skilled under medicare. They will have a new insurance benefit period. They will not be an insurance precert. Spoke with Delaney Sloan at the facility and they will  hold the patients bed until they are discharged from the hospital.     Anticipated transport plan [facility].

## 2020-01-27 NOTE — CARE COORDINATION
1/27/20, 7:38 AM  DISCHARGE PLANNING EVALUATION:    Deana Hand       Admitted from: outside facility 1/24/2020/ 2045 Hospital day: 3   Location: -04/004-A Reason for admit: Hypotension [I95.9] Status: IP   Admit order signed?: yes  PMH:  has a past medical history of Adjustment disorder with mixed anxiety and depressed mood, Anemia, Anemia in chronic kidney disease(285.21), Anxiety, Arthritis, Asthma, Bipolar 1 disorder ([address] Bradycardia, CHF (congestive heart failure) ([address] Chronic diastolic heart failure ([address] Chronic kidney disease with peritoneal dialysis as preferred modality, stage 5 ([address] Chronic kidney disease, stage III (moderate) ([address] CKD (chronic kidney disease), Depression, Difficulty walking, Dizziness and giddiness, GERD (gastroesophageal reflux disease), Gout, Headache(784.0), Hyperkalemia, Hyperlipidemia, Hypertension, Hypertensive urgency, Hypothyroid, Hypothyroidism, Low back pain, Major depressive disorder, recurrent, mild ([address] Muscle weakness (generalized), Neurogenic bladder, Pleural effusion, Pneumonia, Seizure ([address] Type II or unspecified type diabetes mellitus without mention of complication, not stated as uncontrolled, and Urine retention.   Medications:  Scheduled Meds:   dicyclomine  10 mg Oral TID AC    dianeal lo-ivonne 1.5%  2,000 mL Intraperitoneal Q6H    amLODIPine  5 mg Oral Daily    polycarbophil  625 mg Oral TID    polyethylene glycol  17 g Oral Daily    famotidine  10 mg Oral Daily    gabapentin  100 mg Oral TID    hydrALAZINE  100 mg Oral 3 times per day    insulin glargine  4 Units Subcutaneous BID    insulin lispro  0-6 Units Subcutaneous TID WC    isosorbide dinitrate  20 mg Oral TID    levothyroxine  25 mcg Oral Daily    meclizine  25 mg Oral TID    oxybutynin  5 mg Oral Daily    pravastatin  40 mg Oral Nightly    sertraline  25 mg Oral Daily    sevelamer  1,600 mg Oral TID WC    sodium chloride flush  10 mL Intravenous 2 times per day

## 2020-01-27 NOTE — PROGRESS NOTES
Hx of gout    History of recurrent pleural effusion, nearing Pleurx drain and accidentally dislodged in 2019    History of pneumothorax post procedure    Chronic low back pain Flexeril, prn  Neurontin:       Expected discharge date: 1 to 2 days    Disposition:    [] Home       [] TCU       [] Rehab       [] Psych       [x] SNF       [] [facility]       [] Other-    Chief Complaint: Low blood pressure, weakness, suspected pneumonia    Patient was transferred from [facility] emergency room for further evaluation and treatment. Patient has been living in the nursing home for the last 1 and half year and was started on peritoneal dialysis close to 6 months ago and apparently she has been feeling very weak and has been having problems with blood pressure at least for the last couple of weeks to a month. There was concern if the patient had pneumonia 2 and was transferred for further evaluation. Hospital Course: Patient was admitted to the hospital and pneumonia was ruled out and the patient's blood pressure being little low was suspected to be more from aggressive dialysis for volume overload and also blood pressure medications related. Patient incidentally noticed to have sinus bradycardia with heart rate trending to 49 the lowest.  2D echo showed fairly good EF 50 to 55%, mitral valve was not well-visualized and mild MR was noted and there was suspicion of mobile mass on the ventral side of posterior mitral leaflet. Cardiology consulted for possible SOWMYA. Hydralazine dose decreased to 50 mg every 8 hourly as needed by nephrology on 1/27/19 with hold parameters and cardiologist subsequently decreased further to 25 mg every 8 hourly and stopped Isordil  on 1/27/2019. Tolerating peritoneal dialysis well. Continue PT and OT.   Check orthostatics, increase MiraLAX to twice daily and add Colace    Subjective (past 24 hours):having lighhtheadedness  When standing as per patient, states that she did not intact , except decreased in legs,and able to move all extremities   Extremities - peripheral pulses normal, no pedal edema,  Capillary refill less than 3 sec  Skin - normal coloration and turgor, no rashes      Labs:   Recent Labs     01/25/20  0557   WBC 10.1   HGB 10.2*   HCT 31.4*        Recent Labs     01/25/20  0557 01/26/20  0617    133*   K 4.8 4.4    95*   CO2 21* 22*   BUN 53* 53*   CREATININE 7.6* 7.1*   CALCIUM 8.9 8.9     Recent Labs     01/25/20  0557   AST 23   ALT 19   BILITOT 0.3   ALKPHOS 73     No results for input(s): INR in the last 72 hours. No results for input(s): [place] Magna in the last 72 hours. Microbiology:      Urinalysis:      Lab Results   Component Value Date    NITRU NEGATIVE 06/22/2019    WBCUA 0-2 06/22/2019    BACTERIA NONE 06/22/2019    RBCUA 5-10 06/22/2019    BLOODU NEGATIVE 06/22/2019    GLUCOSEU NEGATIVE 06/22/2019       Radiology:  XR CHEST STANDARD (2 VW)   Final Result      1. Blunting of the right costophrenic angle which may represent a small pleural effusion. 2. Cardiomegaly. **This report has been created using voice recognition software. It may contain minor errors which are inherent in voice recognition technology. **      Final report electronically signed by Dr Linh Haney on 1/25/2020 8:24 AM          DVT prophylaxis: [] Lovenox                                 [] SCDs                                 [x] SQ Heparin                                 [] Encourage ambulation           [] Already on Anticoagulation     Code Status: Full Code    Tele:   [x] yes             [] no    Active Hospital Problems    Diagnosis Date Noted    Hypotension [I95.9] 01/26/2020    ESRD on peritoneal dialysis ([facility] [address] [N18.6, Z99.2]     Nausea [R11.0]        Electronically signed by Sofiya Hammond MD on 1/27/2020 at 11:05 AM

## 2020-01-27 NOTE — CONSULTS
were not well visualized. The aortic valve appears to be trileaflet with good leaflet separation. Tricuspid Valve   Mild tricuspid regurgitation. Pulmonic Valve   The pulmonic valve was not well visualized . Left Atrium   Mildly dilated left atrium. Left Ventricle   Left ventricle size is normal.   Systolic function was low-normal.   Ejection fraction was estimated at 50-55%. There were no regional left   ventricular wall motion abnormalities and wall thickness was within normal   limits. Right Atrium   Right atrial size was normal.      Right Ventricle   The right ventricular size was normal with normal systolic function and   wall thickness. Pericardial Effusion   The pericardium was normal in appearance with no evidence of a pericardial   effusion. Pleural Effusion   Moderate left pleural effusion noted. Aorta / Great Vessels   -Aortic root dimension within normal limits.   -The Pulmonary artery is within normal limits. -IVC size is within normal limits with normal respiratory phasic changes.      M-Mode/2D Measurements & Calculations      LV Diastolic   LV Systolic Dimension:    AV Cusp Separation: 1.4 cmLA   Dimension: 4.8 3.5 cm                    Dimension: 4.5 cmAO Root   cm             LV Volume Diastolic: 913  Dimension: 2.7 cmLA Area: 19.8   LV FS:27.1 %   ml                        cm^2   LV PW          LV Volume Systolic: 55.7   Diastolic: 1.3 ml   cm             LV EDV/LV EDV Index: 108   Septum         ml/55 m^2LV ESV/LV ESV    RV Diastolic Dimension: 3 cm   Diastolic: 0.9 Index: 87.4 ml/26 m^2   cm             EF Calculated: 52.9 %     LA/Aorta: 1.67                                               LA volume/Index: 60.1 ml /31m^2     Doppler Measurements & Calculations      MV Peak E-Wave: 123 cm/s   AV Peak Velocity: 198  LVOT Peak Velocity: 149   MV Peak A-Wave: 122 cm/s   cm/s                   cm/s   MV E/A Ratio: 1.01         AV Peak Gradient:      LVOT mild (HCC)     Muscle weakness (generalized)     Neurogenic bladder     Pleural effusion 06/16/2019    R pleurex catheter placed    Pneumonia     Seizure ([address] 01/2018    Type II or unspecified type diabetes mellitus without mention of complication, not stated as uncontrolled     Urine retention      Past Surgical History:   Procedure Laterality Date    BACK SURGERY      CERVICAL FUSION      CHOLECYSTECTOMY      COLONOSCOPY      ENDOSCOPY, COLON, DIAGNOSTIC      FRACTURE SURGERY       Current Facility-Administered Medications   Medication Dose Route Frequency Provider Last Rate Last Dose    dicyclomine (BENTYL) capsule 10 mg  10 mg Oral TID AC JOHN Sequeira   10 mg at 01/27/20 0556    0.9 % sodium chloride infusion   Intravenous Continuous Laurelyn Landau, MD 35 mL/hr at 01/26/20 2034      dianeal lo-ivonne 1.5% 2,000 mL  2,000 mL Intraperitoneal Q6H Laurelyn Landau, MD   2,000 mL at 01/27/20 0550    polycarbophil (FIBERCON) tablet 625 mg  625 mg Oral TID JOHN Sequeira   625 mg at 01/27/20 1725    polyethylene glycol (GLYCOLAX) packet 17 g  17 g Oral Daily Gracie Hodge [place]   17 g at 01/27/20 0834    diphenhydrAMINE (BENADRYL) tablet 25 mg  25 mg Oral Q6H PRN JOHN Sequeira        cyclobenzaprine (FLEXERIL) tablet 5 mg  5 mg Oral BID PRN JOHN Sequeira        acetaminophen (TYLENOL) tablet 650 mg  650 mg Oral Q6H PRN Donna Bui MD   650 mg at 01/27/20 0833    famotidine (PEPCID) tablet 10 mg  10 mg Oral Daily Donna Bui MD   10 mg at 01/27/20 0834    gabapentin (NEURONTIN) capsule 100 mg  100 mg Oral TID Donna Bui MD   100 mg at 01/27/20 7013    hydrALAZINE (APRESOLINE) tablet 100 mg  100 mg Oral 3 times per day Donna Bui MD   100 mg at 01/27/20 0556    insulin glargine (LANTUS) injection vial 4 Units  4 Units Subcutaneous BID Donna Bui MD   4 Units at 01/27/20 0827    insulin lispro (HUMALOG) injection vial 0-6 Units  0-6 Units Subcutaneous TID  Shelley Garza MD        isosorbide dinitrate (ISORDIL) tablet 20 mg  20 mg Oral TID Shelley Garza MD   20 mg at 01/27/20 7075    levothyroxine (SYNTHROID) tablet 25 mcg  25 mcg Oral Daily Shelley Garza MD   25 mcg at 01/27/20 0556    meclizine (ANTIVERT) tablet 25 mg  25 mg Oral TID Shelley Garza MD   25 mg at 01/27/20 0834    oxybutynin (DITROPAN) tablet 5 mg  5 mg Oral Daily Shelley Garza MD   5 mg at 01/27/20 6450    pravastatin (PRAVACHOL) tablet 40 mg  40 mg Oral Nightly Shelley Garza MD   40 mg at 01/26/20 2030    sertraline (ZOLOFT) tablet 25 mg  25 mg Oral Daily Shelley Garza MD   25 mg at 01/27/20 6205    sevelamer (RENVELA) tablet 1,600 mg  1,600 mg Oral TID  JOHN Sequeira   1,600 mg at 01/27/20 0833    glucose (GLUTOSE) 40 % oral gel 15 g  15 g Oral PRN Shelley Garza MD        dextrose 50 % IV solution  12.5 g Intravenous PRN Shelley Garza MD        glucagon (rDNA) injection 1 mg  1 mg Intramuscular PRN Shelley Garza MD        dextrose 5 % solution  100 mL/hr Intravenous PRN Shelley Garza MD        sodium chloride flush 0.9 % injection 10 mL  10 mL Intravenous 2 times per day Shelley Garza MD   10 mL at 01/26/20 2031    sodium chloride flush 0.9 % injection 10 mL  10 mL Intravenous PRN Shelley Garza MD        magnesium hydroxide (MILK OF MAGNESIA) 400 MG/5ML suspension 30 mL  30 mL Oral Daily PRN Shelley Garza MD        ondansetron [facility]) injection 4 mg  4 mg Intravenous Q6H PRN Shelley Garza MD   4 mg at 01/26/20 1516    perflutren lipid microspheres (DEFINITY) injection 1.65 mg  1.5 mL Intravenous ONCE PRN Shelley Garza MD        HYDROcodone-acetaminophen [facility]) 5-325 MG per tablet 1 tablet  1 tablet Oral Q6H PRN Shelley Garza MD         Prior to Admission medications    Medication Sig Start Date End Date Taking?  Authorizing Provider   polyethylene glycol (GLYCOLAX) powder BILIRUBINUR NEGATIVE 06/22/2019    BILIRUBINUR Negative 03/09/2019    BLOODU NEGATIVE 06/22/2019    GLUCOSEU NEGATIVE 06/22/2019         Physical Exam:  Vitals:    01/27/20 0820   BP: (!) 111/54   Pulse: (!) 48   Resp: 20   Temp: 99.2 °F (37.3 °C)   SpO2: 95%        Intake/Output Summary (Last 24 hours) at 1/27/2020 0915  Last data filed at 1/27/2020 0600  Gross per 24 hour   Intake 9106.53 ml   Output 8700 ml   Net 406.53 ml      General:  No acute distress  Neck: Supple, no JVD, no bruits in carotids  Heart: Regular rhythm normal S1 and S2, no rubs, murmurs or gallops  Lungs: clear to ascultation no rales, wheezes, or rhonchi  Abdomen: positive bowel sounds, soft, non-tender, non-distended, no bruits, no masses. Dialysis site noted - clean, nonerythematous, no signs of infection  Extremities:no clubbing, cyanosis or edema  Neurologic: alert and oriented x 3, cranial nerves 2-12 grossly intact, motor and sensory intact, moving all extremities  Skin: No rashes    Assessment:  Severely calcified posterior leaflet - likely due to ESRD accelerated valvular calcification  ESRD on CAPD  Hypotension secondary to CAPD  HTN  DMII    Plan:  1. Unlikely IE given patient presentations. Blood cultures x 2 to rule out IE  2. SOWMYA in future if cultures positive  3. Hydralazine 25 mg TID from 50 mg TID in light of hypotension    Thank you for allowing us to participate in the care of this patient. Please do not hesitate to call us with questions. Electronically signed by Miguel Potts on 1/27/2020 at 12:19 PM    Interventional Cardiology - [facility]     **This is a Medical/ PA/ APRN Student Note and is charted for educational purposes. The non-physician staff attested note is not to be used for billing purposes or to guide patient care. Please see the physician modifications/ attestation for treatment plan/suggestions. This note has been reviewed and feedback has been provided to the student. **

## 2020-01-28 NOTE — PROGRESS NOTES
05/20/2019       TSH:    Lab Results   Component Value Date    TSH 2.510 01/28/2020         Assessment:    Severely calcified posterior leaflet without MR  Ef 50-55 per echo 1/26/20  ESRD on CAPD  HTN  DM II  Orthostatic hypotension    Plan:    Blood cultures 1/24/20 thus far negative  No clinical syndrome suggestive of IE  Could consider SOWMYA if need further characterization, however, in the setting of ESRD, this is likely related to accelerated valvular calcification and degeneration  Repeat orthostatics every shift - renal to be updated  Stop hydralazine if needed  Cody eagle  Will see prn  F/up dr Grupo Lindsay 2 weeks       Electronically signed by Louis Rose PA-C on 1/28/2020 at 2:48 PM

## 2020-01-28 NOTE — FLOWSHEET NOTE
01/28/20 0142   Provider Notification   Reason for Communication Evaluate   Provider Name Dr. Trey Rivera   Provider Notification Physician   Method of Communication Call   Response See orders   Notification Time 0142     Dr. Trey Rivera called regarding pt having new fibrin in CAPD bag. New order to add 1000 units heparin to 1.5% PD bag once, refer to [facility]

## 2020-01-28 NOTE — CARE COORDINATION
1/28/20, 2:44 PM    DISCHARGE ONGOING EVALUATION:     Loc Antony day: 4  Location: [facility]04/004-A Reason for admit: Hypotension [I95.9]   Treatment Plan of Care: [facility] pending. Echo abnormal but cardio feels could related to ESRD/accelerated valvular calcifiction. Possible SOWMYA depending on BC. Nephrology managing PD.    Barriers to Discharge: medical stability  PCP: [id number]  Readmission Risk Score: 25%

## 2020-01-28 NOTE — PLAN OF CARE
Problem: Falls - Risk of:  Goal: Will remain free from falls  Description  Will remain free from falls  Outcome: Ongoing  Note:   No falls noted this shift. Continue falling star program. Bed alarm on, bed in low position. Call light and personal belongings in reach. Patient uses call light appropriately. Problem: Risk for Impaired Skin Integrity  Goal: Tissue integrity - skin and mucous membranes  Description  Structural intactness and normal physiological function of skin and  mucous membranes. Outcome: Ongoing  Note:   No new signs or symptoms of skin breakdown noted this shift, encouraging patient to turn and reposition self in bed q2h       Problem: Pain:  Goal: Pain level will decrease  Description  Pain level will decrease  Outcome: Ongoing  Note:   Patient states pain to be 7 out of 10 in her abdomen using the 0-10 pain scale. Patient repositioned for comfort. Pain medication given per [facility]        Problem: Discharge Planning:  Goal: Discharged to appropriate level of care  Description  Discharged to appropriate level of care  Outcome: Ongoing  Note:   Patient plans to be discharged back to [facility] when medically table      Problem: Activity:  Goal: Fatigue will decrease  Description  Fatigue will decrease  Outcome: Ongoing  Note:   Patient denies fatigue at this time      Problem: Fluid Volume:  Goal: Will show no signs or symptoms of fluid imbalance  Description  Will show no signs or symptoms of fluid imbalance  Outcome: Ongoing  Note:   Patient has no edema BLE. Patients denies SOB. Patient on CAPD exchages 1.5% 2000ml every 6 hours. Nephrology following    Care plan reviewed with patient and family. Patient and family verbalize understanding of the plan of care and contribute to goal setting.

## 2020-01-28 NOTE — PROGRESS NOTES
ADL Assistance: Needs assistance(pt has mod to max A for bathing, pt reports able to dress, toilet)  Homemaking Assistance: (facility provides)  Ambulation Assistance: Independent  Transfer Assistance: Independent          Additional Comments: Pt reports ambulating 1-2x/week 6ft with AD and assist from staff. Pt reports that is she able to pivot to [facility] without AD. Pt notes I with WC mobility. Cognition/Orientation:  Overall Orientation Status: Within Functional Limits  Cognition Comment: slow proccessing    ADL's:  LE Dressing: Maximum assistance(donning/doffing socks)       Functional Mobility:  Bed mobility  Supine to Sit: Contact guard assistance  Sit to Supine: Minimal assistance  Scooting: Contact guard assistance    Functional Mobility  Functional Mobility Comments: OTR to assess     Balance:  Balance  Sitting Balance: Contact guard assistance(close CGA with moments of min A)  Standing Balance  Comment: Pt sat at EOB ~8 mins with CGA-Min A with increasing symptoms, therefore requiring back to supine, pt tolerated core challenge and posture training fair with no improvement in dizziness and therefore unsafe to attempt functional t/fs    Transfers:  Transfer Comments: OTR to assess       Upper Extremity Assessment:   LUE AROM : WNL  RUE AROM : WNL    LUE Strength  LUE Strength Comment: grossly 4/5  RUE Strength  RUE Strength Comment: grossly 4/5    Sensation  Overall Sensation Status: WFL(pt denies N/T)       Activity Tolerance: Treatment limited secondary to medical complications (free text), Patient Tolerated treatment well  orthostatics    Assessment:  Assessment: Pt would continue to benefit from skilled OT intervention to maximize pt safety and independence with performing self care tasks and functional mobility and to ensure safe transition to the next level of care and return to [facility].     Performance deficits / Impairments: Decreased functional mobility , Decreased ADL status, Decreased strength, Decreased balance, Decreased safe awareness, Decreased endurance, Decreased posture  Prognosis: Fair  REQUIRES OT FOLLOW UP: Yes    Treatment Initiated: Treatment and education initiated within context of evaluation. Evaluation time included review of current medical information, gathering information related to past medical, social and functional history, completion of standardized testing, formal and informal observation of tasks, assessment of data and development of plan of care and goals. Treatment time included skilled education and facilitation of tasks to increase safety and independence with ADL's for improved functional independence and quality of life. Discharge Recommendations:  ECF with OT    Patient Education:  OT Education: OT Role, Plan of Care, Precautions  Patient Education: importance of activity/progression of therapy    Equipment Recommendations:  Equipment Needed: No  Other: defer    Plan:  Times per week: 3-5x  Current Treatment Recommendations: Strengthening, Balance Training, Functional Mobility Training, Endurance Training, Equipment Evaluation, Education, & procurement, Self-Care / ADL, Patient/Caregiver Education & Training, Safety Education & Training    Goals:  Patient goals : To be able to be more IND  Short term goals  Time Frame for Short term goals: 2 weeks  Short term goal 1: Pt to tolerate dynamic sitting EOB >10 mins with 1-2 UE release at SBA in prep for ADL tasks  Short term goal 2: Pt to tolerate 10 reps of BUE strengthening for increasing endurance required for self cares  Short term goal 3: Pt to tolerate further assessment of functional t/fs and mobility by OTR  Long term goals  Time Frame for Long term goals : NA d/t ELOS         Following session, patient left in safe position with all fall risk precautions in place.

## 2020-01-28 NOTE — PROGRESS NOTES
Hospitalist Progress Note    Patient:  Miguel A Montez      Unit/Bed:6K-04/004-A    YOB: 1950    MRN: [id number]       Acct: [de-identified]     PCP: [address]    Date of Admission: 1/24/2020    Assessment/Plan:    Hypotension ? From PD and fluid removal   And weight loss   1/27- decreased hydralazine to 50 mg TID, stop Norvasc that was started yesterday by nephrology and subsequently cardiology decrease the hydralazine further to 25 mg every 8 hourly and stop the Isordil 20 mg 3 times daily. 1/28- BP better today in 120's , orthostatics positive today,    Sinus bradycardia unclear clinical significance, monitor:    improved to 50's    Abnormal 2D echo:  Mobile mass on the posterior mitral leaflet ventral side on 2D echo, incidental finding, no significant murmur, cardiology consulted for possible SOWMYA  - Blood cultures negative done on 1/24/20, -cardiology felt possible valve calcification from accelerated valvular calcification and degeneration     ESRD On PD: Nephrology on board, on peritoneal dialysis,  Continue peritoneal dialysis, 1.5%     hyponatremia secondary to renal failure And dialysis    Denies any coronary artery disease  History of elevated troponin, never had cardiac cath, suspected coronary artery disease    Secondary hyperparathyroidism of renal origin, on Renvela    Normocytic anemia of chronic kidney disease     Essential hypertension: Hydralazine dose decreased to 25 mg p.o. every 8 hourly and Isordil discontinued by cardiology    Dyslipidemia: On statin      Diabetes mellitus insulin dependant, neuropathy, retinopathy, nephropathy:    Lab Results   Component Value Date    LABA1C 6.1 01/28/2020     No results found for: EAG  On Lantus 4 units twice daily and sliding scale insulin  -Lantus changed to once daily     CHF chronic diastolic : No significant volume overload, continue peritoneal dialysis, on hydralazine, Isordil discontinued, EF of 50 to 55%     Hypothyroidism, acquired: On levothyroxine 25 mcg daily  Lab Results   Component Value Date    TSH 2.510 01/28/2020          Bipolar disorder: On Zoloft     Restrictive lung disease suspected secondary to scoliosis, pleural effusion    History of urinary retention, on oxybutynin, makes very little urine sometimes      Hx of gout    History of recurrent pleural effusion, nearing Pleurx drain and accidentally dislodged in 2019    History of pneumothorax post procedure    Chronic low back pain Flexeril, prn  Neurontin:       Expected discharge date: Today versus tomorrow    Disposition:    [] Home       [] TCU       [] Rehab       [] Psych       [x] SNF       [] [place]       [] Other-    Chief Complaint: Low blood pressure, weakness, suspected pneumonia    Patient was transferred from [facility] emergency room for further evaluation and treatment. Patient has been living in the nursing home for the last 1 and half year and was started on peritoneal dialysis close to 6 months ago and apparently she has been feeling very weak and has been having problems with blood pressure at least for the last couple of weeks to a month. There was concern if the patient had pneumonia 2 and was transferred for further evaluation. Hospital Course: Patient was admitted to the hospital and pneumonia was ruled out and the patient's blood pressure being little low was suspected to be more from aggressive dialysis for volume overload and also blood pressure medications related. Patient incidentally noticed to have sinus bradycardia with heart rate trending to 49 the lowest.  2D echo showed fairly good EF 50 to 55%, mitral valve was not well-visualized and mild MR was noted and there was suspicion of mobile mass on the ventral side of posterior mitral leaflet. Cardiology consulted for possible SOWMYA.     Hydralazine dose decreased to 50 mg every 8 hourly as needed by nephrology on 1/27/19 with hold parameters and cardiologist subsequently decreased further to 25 mg every 8 hourly and stopped Isordil  on 1/27/2019. Tolerating peritoneal dialysis well. Continue PT and OT.   Check orthostatics, increase MiraLAX to twice daily and add Colace    1/28-had a good bowel movement, blood pressure better after medications adjusted, in 120s, heart rate better, cardiologist suspected possible accelerated valvular calcification rather than infective endocarditis, blood cultures done on admission negative so far, possible discharge if okay with cardiology and nephrology,   addendum: Orthostatics positive today, patient feeling lightheaded on standing, does not feel comfortable being discharged, will wait for 1 more day to see if blood pressure would be optimized  -Lantus changed to once daily 4 units    Subjective (past 24 hours): Patient feeling a lot better after she had a bowel movement, apparently after standing felt little woozy but no significant lightheadedness or as if she was going to fall, no shortness of breath or abdominal pain or chest pain    Medications:  Reviewed    Infusion Medications    dextrose       Scheduled Medications    heparin (porcine)  5,000 Units Subcutaneous 3 times per day    hydrALAZINE  25 mg Oral 3 times per day    docusate sodium  100 mg Oral BID    polyethylene glycol  17 g Oral BID    dicyclomine  10 mg Oral TID AC    dianeal lo-ivonne 1.5%  2,000 mL Intraperitoneal Q6H    polycarbophil  625 mg Oral TID    famotidine  10 mg Oral Daily    gabapentin  100 mg Oral TID    insulin glargine  4 Units Subcutaneous BID    insulin lispro  0-6 Units Subcutaneous TID WC    levothyroxine  25 mcg Oral Daily    meclizine  25 mg Oral TID    oxybutynin  5 mg Oral Daily    pravastatin  40 mg Oral Nightly    sertraline  25 mg Oral Daily    sevelamer  1,600 mg Oral TID WC    sodium chloride flush  10 mL Intravenous 2 times per day     PRN Meds: bisacodyl, diphenhydrAMINE, cyclobenzaprine, acetaminophen, glucose, costophrenic angle which may represent a small pleural effusion. 2. Cardiomegaly. **This report has been created using voice recognition software. It may contain minor errors which are inherent in voice recognition technology. **      Final report electronically signed by Dr Dyanna Kehr on 1/25/2020 8:24 AM          DVT prophylaxis: [] Lovenox                                 [] SCDs                                 [x] SQ Heparin                                 [] Encourage ambulation           [] Already on Anticoagulation     Code Status: Full Code    Tele:   [x] yes             [] no    Active Hospital Problems    Diagnosis Date Noted    Hypotension [I95.9] 01/26/2020    ESRD on peritoneal dialysis ([facility] [N18.6, Z99.2]     Nausea [R11.0]        Electronically signed by Ranjeet Guy MD on 1/28/2020 at 10:03 AM

## 2020-01-29 NOTE — DISCHARGE SUMMARY
Hospital Medicine Discharge Summary      Patient Identification:   Aparna Wade   : 1950  MRN: [id number]   Account: [de-identified]      Patient's PCP: Ruba Villanueva    Admit Date: 2020     Discharge Date:   2020      Admitting Physician: Anne-Marie Mcclellan MD     Discharge Physician: Hallie Nix MD     Discharge Diagnoses: Active Hospital Problems    Diagnosis Date Noted    DM type 2 with diabetic peripheral neuropathy ([facility] [E11.42]     Diabetic retinopathy of both eyes associated with type [facility] mellitus [facility] [E11.319]     Hypotension [I95.9] 2020    ESRD on peritoneal dialysis ([facility] [N18.6, Z99.2]     Nausea [R11.0]     Type 2 diabetes mellitus with chronic kidney disease on chronic dialysis, with long-term current use of insulin (HCC) [E11.22, N18.6, Z99.2, Z79.4]        The patient was seen and examined on day of discharge and this discharge summary is in conjunction with any daily progress note from day of discharge. Hospital Course:   Aparna Wade is a 81 y.o. female admitted to [facility] on 2020 for evaluation of hypotension. Pt was also seen and followed by cardiology and nephrology services. Pt responded well to medical management, remained clinically stable and was discharged in stable conditions after cleared by consulting services. Assessment/Plan:    Orthostatic Hypotension: DDx: dysautonomia d/t longstanding DM vs polypharmacy. Resolved. Compression stocking applied prior to discharge; hypertensive if at all with BP max 172/77, increased hydralazine to 50 TID from 25 TID, did not resume home Nitrate and CCB, pt will have Op F/U w/ cardiology and PCP and adjust meds accordingly       Sinus bradycardia unclear clinical significance; in 50s. If pt +ve presyncope/syncope, consider Event monitor, consideration of PPM     Abnormal 2D echo:  Mobile mass on the posterior mitral leaflet ventral side on 2D echo, incidental finding, no 2356 01/29/20 0314 01/29/20 0615 01/29/20 0815   BP: 133/61 (!) 144/66 (!) 152/70 (!) 118/56   Pulse: 55 54  58   Resp: 18 16  16   Temp: 99.2 °F (37.3 °C) 98.9 °F (37.2 °C)  98.2 °F (36.8 °C)   TempSrc: Oral Oral  Oral   SpO2: 93% 99%  98%   Weight:       Height:         Weight: Weight: 183 lb (83 kg)     24 hour intake/output:    Intake/Output Summary (Last 24 hours) at 1/29/2020 1019  Last data filed at 1/29/2020 0655  Gross per 24 hour   Intake 8860 ml   Output 8400 ml   Net 460 ml           General appearance: A&O x3, Not ill or toxic, in no apparent distress  HEENT:  NA  EOM intact. Neck: Supple, with full range of motion. No jugular venous distention. Trachea midline. Respiratory:   NL A/E bilat with no adventitious sounds   Cardiovascular:  normal S1/S2 with no murmurs/gallops  Abdomen: Soft, non-tender, non-distended, no rigidity or peritoneal signs  Musculoskeletal: NL symmetrical A/PROM bilat U/L extremities   Skin: No rashes. No edema  Neurologic:  CN II-XII intact. NL symmetrical reflexes. NL gait and stance. NL Cerebellar exam. Power 5/5 all muscle groups U/L extremities. Toes downgoing  Capillary Refill: Brisk,< 3 seconds   Peripheral Pulses: +2 palpable, equal bilaterally              Labs: For convenience and continuity at follow-up the following most recent labs are provided:      CBC:    Lab Results   Component Value Date    WBC 9.3 01/29/2020    HGB 10.0 01/29/2020    HCT 29.6 01/29/2020     01/29/2020       Renal:    Lab Results   Component Value Date     01/29/2020    K 3.6 01/29/2020    K 4.8 01/25/2020    CL 90 01/29/2020    CO2 23 01/29/2020    BUN 41 01/29/2020    CREATININE 6.6 01/29/2020    CALCIUM 8.9 01/29/2020    PHOS 4.6 03/08/2019         Significant Diagnostic Studies    Radiology:   XR CHEST STANDARD (2 VW)   Final Result      1. Blunting of the right costophrenic angle which may represent a small pleural effusion. 2. Cardiomegaly.                **This report has been created using voice recognition software. It may contain minor errors which are inherent in voice recognition technology. **      Final report electronically signed by Dr Cathy Aguilera on 1/25/2020 8:24 AM             Consults:     [id number] WORK  IP CONSULT TO CARDIOLOGY    Disposition:    [] Home       [] TCU       [] Rehab       [] Psych       [x] SNF       [] [place]       [] Other-    Condition at Discharge: Stable    Code Status:  Full Code     Patient Instructions:    Discharge lab work: CBC, BMP in one week  Activity: activity as tolerated  Diet: DIET RENAL;      Follow-up visits:   Judy Quispe  [address] \[facility]\"" [address] [facility]  [address] [facility]  [address] [facility]  [phone]             Discharge Medications:      Evarob Allison   [place] Medication Instructions [id number]    Printed on:01/29/20 1019   Medication Information                      acetaminophen (TYLENOL) 325 MG tablet  Take 650 mg by mouth every 6 hours as needed for Pain             acetaminophen-codeine (TYLENOL #3) 300-30 MG per tablet  Take 1 tablet by mouth every 6 hours as needed (arthritis pain). amLODIPine (NORVASC) 5 MG tablet  Take 5 mg by mouth daily              B Complex-C-Folic Acid (RENAL) 1 MG CAPS  Take 1 capsule by mouth daily             benzonatate (TESSALON) 100 MG capsule  Take 100 mg by mouth every 8 hours as needed for Cough             blood glucose test strips (ASCENSIA AUTODISC VI;ONE TOUCH ULTRA TEST VI) strip  Patient tests blood sugar three times per day. Diagnosis DMII E11.9             famotidine (PEPCID) 10 MG tablet  Take 10 mg by mouth 2 times daily             fluticasone (FLONASE) 50 MCG/ACT nasal spray  2 sprays by Each Nostril route daily              gabapentin (NEURONTIN) 100 MG capsule  Take 100 mg by mouth 3 times daily.              glucagon, rDNA, 1 MG

## 2020-01-29 NOTE — CARE COORDINATION
1/29/20, 8:16 AM    DISCHARGE ONGOING EVALUATION:     Meryl Cote day: 5  [facility] Reason for admit: Hypotension [I95.9]   Treatment Plan of Care: +orthos yesterday. Teds and abd binder have been ordered. Cardio evaluated and signed off with plan for OP f/u.    Barriers to Discharge: possible d/c today if orthos are improved  PCP: Danay Daniel  Readmission Risk Score: 28%

## 2020-01-29 NOTE — PROGRESS NOTES
[facility]    Renal inpatient Progress Note  1/29/2020 10:05 AM      Pt Name:   Daylin Gonzalez  YOB: 1950  Attending:   Maggie Villarreal MD    Chief Complaint:   Daylin Gonzalez is a 81 y.o. female being followed by nephrology for ESRD on PD     Interval History : patient seen and examined by me. feels well. No cp or SOB. Eating and drinking better . Overall doing much  better though . Was orthostatic yesterday      Scheduled Medications :   insulin glargine  4 Units Subcutaneous QAM    heparin (porcine)  5,000 Units Subcutaneous 3 times per day    hydrALAZINE  25 mg Oral 3 times per day    docusate sodium  100 mg Oral BID    polyethylene glycol  17 g Oral BID    dicyclomine  10 mg Oral TID AC    dianeal lo-ivonne 1.5%  2,000 mL Intraperitoneal Q6H    polycarbophil  625 mg Oral TID    famotidine  10 mg Oral Daily    gabapentin  100 mg Oral TID    insulin lispro  0-6 Units Subcutaneous TID WC    levothyroxine  25 mcg Oral Daily    meclizine  25 mg Oral TID    oxybutynin  5 mg Oral Daily    pravastatin  40 mg Oral Nightly    sertraline  25 mg Oral Daily    sevelamer  1,600 mg Oral TID WC    sodium chloride flush  10 mL Intravenous 2 times per day      dextrose          Vitals :  BP (!) 118/56   Pulse 58   Temp 98.2 °F (36.8 °C) (Oral)   Resp 16   Ht 5' 5\" (1.651 m)   Wt 183 lb (83 kg)   SpO2 98%   BMI 30.45 kg/m²     24HR INTAKE/OUTPUT:      Intake/Output Summary (Last 24 hours) at 1/29/2020 1005  Last data filed at 1/29/2020 0655  Gross per 24 hour   Intake 8860 ml   Output 8400 ml   Net 460 ml     Last 3 weights  Wt Readings from Last 3 Encounters:   01/27/20 183 lb (83 kg)   11/26/19 167 lb (75.8 kg)   10/17/19 167 lb (75.8 kg)        Physical Exam :  General Appearance:  Well developed.  No distress  Mouth/Throat:  Oral mucosa moist  Neck:  Supple, no JVD  Lungs:  Breath sounds: clear  Heart[de-identified]  S1,S2 heard  Abdomen:  Soft, non - tender  Musculoskeletal:

## 2020-01-29 NOTE — PROGRESS NOTES
Patient discharged to [facility]. Report called to Vangie Smith nurse. AVS, last dose MAR sent in blue folder with patient. Went over all new medications and follow up appointments.  [facility] picked her up

## 2020-01-29 NOTE — CARE COORDINATION
1/29/20, 1:27 PM    Patient goals/plan/ treatment preferences discussed by  and . Patient goals/plan/ treatment preferences reviewed with patient/ family. Patient/ family verbalize understanding of discharge plan and are in agreement with goal/plan/treatment preferences. Understanding was demonstrated using the teach back method. AVS provided by RN at time of discharge, which includes all necessary medical information pertaining to the patients current course of illness, treatment, post-discharge goals of care, and treatment preferences. Services After Discharge  Services At/After Discharge: [facility], Nursing Services, Skilled Therapy, In ambulette([address])         ABIGAIL notified Pat with [facility] of discharge today. ABIGAIL faxed AVS.  [facility] for transport. [place] [facility] [place]. NOAH Petersen aware.

## 2020-01-29 NOTE — PLAN OF CARE
Problem: Falls - Risk of:  Goal: Will remain free from falls  Description  Will remain free from falls  1/29/2020 0255 by Mary Lou Perez RN  Outcome: Ongoing  Note:   No falls so far this shift, call light and bedside table within reach. Bed in lowest position and alarm on, nonskid socks on bilaterally. Problem: Risk for Impaired Skin Integrity  Goal: Tissue integrity - skin and mucous membranes  Description  Structural intactness and normal physiological function of skin and  mucous membranes. 1/29/2020 0255 by Mary Lou Perez RN  Outcome: Ongoing  Note:   Pt has bruising scattered, redness on coccyx that blanches. Will continue to encourage frequent turning. Problem: Pain:  Goal: Pain level will decrease  Description  Pain level will decrease  1/29/2020 0255 by Mary Lou Perez RN  Outcome: Ongoing  Note:   Pt resting quietly at this time, will continue to assess using 0-10 pain scale. Pt pain goal is 0, which is achievable. Tylenol and norco available prn. Problem: Activity:  Goal: Fatigue will decrease  Description  Fatigue will decrease  1/29/2020 0255 by Mary Lou Perez RN  Outcome: Ongoing  Note:   No increased signs of fatigue, will continue to assess and encourage pt to ambulate when necessary and assist when asked. Problem: Discharge Planning:  Goal: Discharged to appropriate level of care  Description  Discharged to appropriate level of care  1/29/2020 0255 by Mary Lou Perez RN  Outcome: Ongoing  Note:   Pt returning to [facility] upon discharge. Problem: Fluid Volume:  Goal: Will show no signs or symptoms of fluid imbalance  Description  Will show no signs or symptoms of fluid imbalance  1/29/2020 0255 by Mary Lou Perez RN  Outcome: Ongoing  Note:   No signs of increased fluid imbalance at this time, will continue to assess intake and output. CAPD exchanges have been adequate. Care plan reviewed with patient.   Patient verbalize understanding of the

## 2020-01-29 NOTE — FLOWSHEET NOTE
01/28/20 1220   Provider Notification   Reason for Communication Evaluate  (Will patient need SOWMYA? )   Provider Name Corrina Holland   Provider Notification Physician Assistant   Method of Communication Secure Message   Response Waiting for response   Notification Time [phone]     Hospitalist wanted me to ask cardiology if they thought patient would need SOWMYA or not.    1314- Also updated them on patient's orthostatic BP results  See cardiology note

## 2020-02-11 NOTE — PROGRESS NOTES
Follow-up. She denies having any chest pain, SOB or palpitations. She has intermittent dizziness and JAMARI. [facility] PROFESSIONAL SERVICES  [facility] [address]   [address] [address]   Dept: [phone]   Dept Fax: [phone]: [phone]      Chief Complaint   Patient presents with    Follow-Up from Hospital    Hypertension     F/u from hospitalization for hypotension in 82 y/o female patient of Dr. Elissa Hernandez with history of ESRD on PD, HTN, HLD, bradycardia, chronic DCHF, and muscular dystrophy. Hypotension resolved during hospital stay and none since. She resides in ECF with monitoring of vital signs and weight. She denies any recent episodes of CHF and states adjustments have been made to her dialysis to help. Denies chest pain, palpitations, sob, lightheadedness, or syncope. She has some occasional dizziness and intermittent JAMARI but nothing long lasting or worsening. She follows sodium and fluid restriction and gets daily weights. Currently working with therapy and is standing without dizziness.      Cardiologist:  Dr. Sixto Zhao:   No fever, no chills, No fatigue or weight loss  Pulmonary:    No dyspnea, no wheezing  Cardiac:    Denies recent chest pain   GI:     No nausea or vomiting, no abdominal pain  Neuro:    + intermittent dizziness, no light headedness  Musculoskeletal:  No recent active issues  Extremities:   intermittent edema, good peripheral pulses      Past Medical History:   Diagnosis Date    Adjustment disorder with mixed anxiety and depressed mood     Anemia     Anemia in chronic kidney disease(285.21)     Anxiety     Arthritis     Asthma     Bipolar 1 disorder (HCC)     Bradycardia     CHF (congestive heart failure) (HCC)     Chronic diastolic heart failure (HCC)     Chronic kidney disease with peritoneal dialysis as preferred modality, stage 5 (HCC)     Chronic kidney disease, stage III (moderate) (HCC)     CKD fluticasone (FLONASE) 50 MCG/ACT nasal spray 2 sprays by Each Nostril route daily       polycarbophil (FIBERCON) 625 MG tablet Take 3 tablets by mouth 3 times daily (with meals)       sevelamer (RENVELA) 800 MG tablet Take 2 tablets by mouth 3 times daily (with meals)      guaiFENesin 200 MG tablet Take 200 mg by mouth every 4 hours as needed (cough)       glucagon, rDNA, 1 MG injection Inject 1 mg into the muscle as needed for Low blood sugar (Blood glucose less than 70 mg/dL and patient NOT ALERT or NPO and does not have IV access.)      ondansetron (ZOFRAN-ODT) 4 MG disintegrating tablet Take 4 mg by mouth every 8 hours as needed for Nausea or Vomiting       meclizine (ANTIVERT) 25 MG tablet Take 25 mg by mouth 3 times daily      gabapentin (NEURONTIN) 300 MG capsule Take 300 mg by mouth daily.  acetaminophen-codeine (TYLENOL #3) 300-30 MG per tablet Take 1 tablet by mouth every 6 hours as needed (arthritis pain).  acetaminophen (TYLENOL) 325 MG tablet Take 650 mg by mouth every 6 hours as needed for Pain      levothyroxine (SYNTHROID) 25 MCG tablet Take 25 mcg by mouth Daily      blood glucose test strips (ASCENSIA AUTODISC VI;ONE TOUCH ULTRA TEST VI) strip Patient tests blood sugar three times per day. Diagnosis DMII E11.9      pravastatin (PRAVACHOL) 40 MG tablet Take 40 mg by mouth nightly        No current facility-administered medications for this visit.         Social History     Socioeconomic History    Marital status:      Spouse name: None    Number of children: 1    Years of education: None    Highest education level: None   Occupational History    None   Social Needs    Financial resource strain: None    Food insecurity:     Worry: None     Inability: None    Transportation needs:     Medical: None     Non-medical: None   Tobacco Use    Smoking status: Never Smoker    Smokeless tobacco: Never Used   Substance and Sexual Activity    Alcohol use: No    Drug use: ventricular side of the   posterior mitral leaflet. Unsure of the significance. Consider SOWMYA if   clinically indicated. Signature      ----------------------------------------------------------------   Electronically signed by Saira Dominguez MD     Stress test: 9/1019  Negative for ischemia     Diagnosis Orders   1. Hospital discharge follow-up     2. Essential hypertension     3. Familial hypercholesterolemia     4. Mild mitral regurgitation     5. Chronic diastolic (congestive) heart failure (HCC)     6. ESRD on peritoneal dialysis ([facility] [address]     7. Type 2 diabetes mellitus with chronic kidney disease on chronic dialysis, with long-term current use of insulin (HCC)         No orders of the defined types were placed in this encounter. F/U from hospitalization from hypotension: None since. BP controlled. Cardiac wise stable. No chest pain, no sob. ESRD on PD. MD working with therapy. Will discuss independently mobile mass on the ventricular side of the   posterior mitral leaflet found on echo and ? Indication for SOWMYA with Dr. Melva Castro.      Discussed use, benefit, and side effects of prescribed medications. All patient questions answered. Pt voiced understanding. Instructed to continue current medications, diet and exercise. Continue risk factor modification and medical management. Patient agreed with treatment plan. Follow up as directed.     Continue Dr Jono Paniagua current treatment plan  Follow up with Dr Melva Castro as scheduled or sooner if needed

## 2020-02-11 NOTE — TELEPHONE ENCOUNTER
Patient in to see Keyana Johnson in office on 2/11/2020. Echo results  Limited Echo   Technically difficult examination. Left ventricle size is normal.   Systolic function was low-normal.   Ejection fraction was estimated at 50-55%. Calcification of the mitral valve noted. The mitral valve was not well visualized . Mild mitral regurgitation is present. There is an independently mobile mass on the ventricular side of the   posterior mitral leaflet. Unsure of the significance. Consider SHRAVAN if   clinically indicated. No shravan has been ordered. Advice?

## 2020-06-02 NOTE — PROGRESS NOTES
treatment plan. Follow up as directed.     Electronically signedby Darnell Johnson MD on 6/2/2020 at 2:23 PM

## 2020-08-13 PROBLEM — A41.9 SEPSIS (HCC): Status: ACTIVE | Noted: 2020-01-01

## 2020-08-13 NOTE — PROGRESS NOTES
Patient is a 78year old from [facility] ED with Dr Winfred Mcburney transferring. Patient resides in a nursing home and arrived with c/o diarrhea, vomiting, non productive cough, generalized weakness. Patient had a Covid swab done at the nursing home yesterday, results are still pending. Patient is a CAPD patient and c/o lower abdominal tenderness. UA positive for leukocytes and WBC  . CT of abdomen and pelvis showed mild peritoneal fluid, nothing acute. Dr Winfred Mcburney is concerned of possible peritonitis due to the tenderness, although it may be the UTI causing the discomfort. Peritoneal fluid drawn and sent for testing and culture. WBC 22.4 with 79% neutrophils and 2% bands. Afebrile. /71. HR 65. RR 23. SPO2 97% room air. Creatinine 7.94 (last drawn at [facility] 6.6). K+ 5.4. Na+ 128. Patient was given Azactam and Vancomycin as well as 250 ml IV fluid. Patient will be admitted to [facility] inpatient with dx Sepsis under Dr Olivia Agarwal.

## 2020-08-13 NOTE — PROGRESS NOTES
Paged Dr. Gergory Frees to notify him that patient has arrived. This RN is asking for COVID swab as patient's is still pending. Patient is placed in droplet plus isolation upon arrival.    See orders for rapid COVID swab.

## 2020-08-14 NOTE — H&P
Assessment and Plan:        1. Sepsis: body fluid culture pending, blood cultures pending, urine culture pending, chest xray pending, patient was started on vancomycin and azactam at [facility], will substitute azactam with cefepime  2. ESRD on CAPD: consult nephrology  3. Abdominal pain: suspected peritonitis on CAPD  4. DM II on insulin: continue home medications, sliding scale, hypoglycemic treatment orders      CC:  Sepsis  HPI: Patient transferred from [facility] for further evaluation of sepsis. The patient had presented there with abdominal pain and fevers at home. The patient has a history ESRD on CAPD. She also complains of cough for about two weeks that has been getting progressively worse. She states she can not bring anything up with her cough. The patient states the fevers started 2-3 days ago intermittently. ROS: Review of Systems   Constitutional: Positive for fever. HENT: Negative. Eyes: Negative. Respiratory: Positive for cough and shortness of breath. Cardiovascular: Negative. Gastrointestinal: Positive for abdominal pain. Endocrine: Negative. Genitourinary: Negative. Musculoskeletal: Negative. Skin: Negative. Allergic/Immunologic: Negative. Neurological: Negative. Hematological: Negative. Psychiatric/Behavioral: Negative.         PMH:    Past Medical History:   Diagnosis Date    Adjustment disorder with mixed anxiety and depressed mood     Anemia     Anemia in chronic kidney disease(285.21)     Anxiety     Arthritis     Asthma     Bipolar 1 disorder (HCC)     Bradycardia     CHF (congestive heart failure) (HCC)     Chronic diastolic heart failure (HCC)     Chronic kidney disease with peritoneal dialysis as preferred modality, stage 5 (HCC)     Chronic kidney disease, stage III (moderate) ([facility])     CKD (chronic kidney disease)     Stage 3    Depression     Difficulty walking     Dizziness and giddiness     GERD (gastroesophageal reflux disease)     Gout     Headache(784.0)     Hyperkalemia     Hyperlipidemia     Hypertension     Hypertensive urgency     Hypothyroid     Hypothyroidism     Low back pain     Major depressive disorder, recurrent, mild (HCC)     Muscle weakness (generalized)     Neurogenic bladder     Pleural effusion 06/16/2019    R pleurex catheter placed    Pneumonia     Seizure ([facility] [address] 01/2018    Type II or unspecified type diabetes mellitus without mention of complication, not stated as uncontrolled     Urine retention        SHX:    Social History     Socioeconomic History    Marital status:      Spouse name: Not on file    Number of children: 1    Years of education: Not on file    Highest education level: Not on file   Occupational History    Not on file   Social Needs    Financial resource strain: Not on file    Food insecurity     Worry: Not on file     Inability: Not on file   [email or web address] needs     Medical: Not on file     Non-medical: Not on file   Tobacco Use    Smoking status: Never Smoker    Smokeless tobacco: Never Used   Substance and Sexual Activity    Alcohol use: No    Drug use: No    Sexual activity: Not Currently   Lifestyle    Physical activity     Days per week: Not on file     Minutes per session: Not on file    Stress: Not on file   Relationships    Social connections     Talks on phone: Not on file     Gets together: Not on file     Attends [employer] service: Not on file     Active member of club or organization: Not on file     Attends meetings of clubs or organizations: Not on file     Relationship status: Not on file    Intimate partner violence     Fear of current or ex partner: Not on file     Emotionally abused: Not on file     Physically abused: Not on file     Forced sexual activity: Not on file   Other Topics Concern    Not on file   Social History Narrative    Not on file       FHX:   Family History   Problem Relation Age of Onset    High Blood Pressure Mother     Diabetes Mother     Cancer Mother     Heart Attack Mother     Stroke Father     High Blood Pressure Father     Anemia Father        Allergies: Allergies   Allergen Reactions    Aspirin     Naproxen      Per ECF list    Pcn [Penicillins] Hives    Vicodin [Hydrocodone-Acetaminophen] Itching    Vilazodone      Added per ECF allergy list    Wellbutrin [Bupropion] Other (See Comments)     Dreams about mice        Medications:     dextrose        folbee plus  1 tablet Oral Daily    busPIRone  5 mg Oral BID    fluticasone  2 spray Each Nostril Daily    gabapentin  300 mg Oral Daily    [START ON 8/14/2020] insulin lispro  0-12 Units Subcutaneous TID WC    insulin lispro  0-6 Units Subcutaneous Nightly    [START ON 8/14/2020] insulin glargine  4 Units Subcutaneous QAM    isosorbide dinitrate  20 mg Oral TID    [START ON 8/14/2020] levothyroxine  25 mcg Oral Daily    meclizine  25 mg Oral TID    oxybutynin  5 mg Oral Daily    pravastatin  40 mg Oral Nightly    [START ON 8/14/2020] sevelamer  1,600 mg Oral TID     sodium chloride flush  10 mL Intravenous 2 times per day    heparin (porcine)  5,000 Units Subcutaneous 3 times per day    cefepime  2 g Intravenous Q12H    vancomycin (VANCOCIN) intermittent dosing (placeholder)   Other RX Placeholder     glucose, 15 g, PRN  dextrose, 12.5 g, PRN  glucagon (rDNA), 1 mg, PRN  dextrose, 100 mL/hr, PRN  sodium chloride flush, 10 mL, PRN  acetaminophen, 650 mg, Q6H PRN    Or  acetaminophen, 650 mg, Q6H PRN  polyethylene glycol, 17 g, Daily PRN  promethazine, 12.5 mg, Q6H PRN    Or  ondansetron, 4 mg, Q6H PRN      No current facility-administered medications on file prior to encounter.       Current Outpatient Medications on File Prior to Encounter   Medication Sig Dispense Refill    busPIRone (BUSPAR) 5 MG tablet Take 5 mg by mouth 2 times daily      isosorbide dinitrate (ISORDIL) 20 MG tablet Take 20 mg by mouth 3 times daily      hydrALAZINE (APRESOLINE) 50 MG tablet Take 1 tablet by mouth 3 times daily 90 tablet 1    insulin glargine (BASAGLAR KWIKPEN) 100 UNIT/ML injection pen Inject 4 Units into the skin every morning 5 pen 3    oxybutynin (DITROPAN-XL) 5 MG extended release tablet Take 5 mg by mouth daily      benzonatate (TESSALON) 100 MG capsule Take 100 mg by mouth every 8 hours as needed for Cough      polyethylene glycol (GLYCOLAX) powder Take 17 g by mouth daily      B Complex-C-Folic Acid (RENAL) 1 MG CAPS Take 1 capsule by mouth daily      insulin aspart (NOVOLOG) 100 UNIT/ML injection vial Inject 0-12 Units into the skin 3 times daily (before meals) Per sliding scale; 140-199= 1 unit, 200-249= 2 units, 250-299= 3 units,  300-349= 4 units, 350-399= 5 units, 400-450= 6 units, 451-500= 10 units, >500 give 12 units & recheck in one hour.  fluticasone (FLONASE) 50 MCG/ACT nasal spray 2 sprays by Each Nostril route daily       polycarbophil (FIBERCON) 625 MG tablet Take 3 tablets by mouth 3 times daily (with meals)       sevelamer (RENVELA) 800 MG tablet Take 2 tablets by mouth 3 times daily (with meals)      guaiFENesin 200 MG tablet Take 200 mg by mouth every 4 hours as needed (cough)       ondansetron (ZOFRAN-ODT) 4 MG disintegrating tablet Take 4 mg by mouth every 8 hours as needed for Nausea or Vomiting       meclizine (ANTIVERT) 25 MG tablet Take 25 mg by mouth 3 times daily      gabapentin (NEURONTIN) 300 MG capsule Take 300 mg by mouth daily.  acetaminophen-codeine (TYLENOL #3) 300-30 MG per tablet Take 1 tablet by mouth every 6 hours as needed (arthritis pain).  acetaminophen (TYLENOL) 325 MG tablet Take 650 mg by mouth every 6 hours as needed for Pain      levothyroxine (SYNTHROID) 25 MCG tablet Take 25 mcg by mouth Daily      blood glucose test strips (ASCENSIA AUTODISC VI;ONE TOUCH ULTRA TEST VI) strip Patient tests blood sugar three times per day.  Diagnosis DMII E11.9      pravastatin (PRAVACHOL) 40 MG tablet Take 40 mg by mouth nightly          Labs:   Recent Results (from the past 24 hour(s))   COVID-19    Collection Time: 08/13/20  7:30 PM   Result Value Ref Range    SARS-CoV-2, NAAT NOT DETECTED NOT DETECT   Lactic acid, plasma    Collection Time: 08/13/20  9:25 PM   Result Value Ref Range    Lactic Acid 1.2 0.5 - 2.2 mmol/L   Procalcitonin    Collection Time: 08/13/20  9:25 PM   Result Value Ref Range    Procalcitonin 1.75 (H) 0.01 - 0.09 ng/mL   Basic Metabolic Panel w/ Reflex to MG    Collection Time: 08/13/20  9:25 PM   Result Value Ref Range    Sodium 125 (L) 135 - 145 meq/L    Potassium reflex Magnesium 5.8 (H) 3.5 - 5.2 meq/L    Chloride 89 (L) 98 - 111 meq/L    CO2 22 (L) 23 - 33 meq/L    Glucose 70 70 - 108 mg/dL    BUN 59 (H) 7 - 22 mg/dL    CREATININE 7.4 (HH) 0.4 - 1.2 mg/dL    Calcium 9.1 8.5 - 10.5 mg/dL   CBC auto differential    Collection Time: 08/13/20  9:25 PM   Result Value Ref Range    WBC 22.7 (H) 4.8 - 10.8 thou/mm3    RBC 3.51 (L) 4.20 - 5.40 mill/mm3    Hemoglobin 11.5 (L) 12.0 - 16.0 gm/dl    Hematocrit 36.0 (L) 37.0 - 47.0 %    .6 (H) 81.0 - 99.0 fL    MCH 32.8 26.0 - 33.0 pg    MCHC 31.9 (L) 32.2 - 35.5 gm/dl    RDW-CV 13.3 11.5 - 14.5 %    RDW-SD 50.4 (H) 35.0 - 45.0 fL    Platelets 829 765 - 260 thou/mm3    MPV 9.8 9.4 - 12.4 fL    Seg Neutrophils 73.4 %    Lymphocytes 10.9 %    Monocytes 6.0 %    Eosinophils 4.9 %    Basophils 0.7 %    Immature Granulocytes 4.1 %    Platelet Estimate ADEQUATE Adequate    Segs Absolute 16.7 (H) 1.8 - 7.7 thou/mm3    Lymphocytes Absolute 2.5 1.0 - 4.8 thou/mm3    Monocytes Absolute 1.4 (H) 0.4 - 1.3 thou/mm3    Eosinophils Absolute 1.1 (H) 0.0 - 0.4 thou/mm3    Basophils Absolute 0.2 (H) 0.0 - 0.1 thou/mm3    Immature Grans (Abs) 0.93 (H) 0.00 - 0.07 thou/mm3    nRBC 0 /100 wbc    BASOPHILIA 1+ Absent   Anion Gap    Collection Time: 08/13/20  9:25 PM   Result Value Ref Range    Anion Gap 14.0 8.0 - 16.0 meq/L   Glomerular Filtration Rate, Estimated    Collection Time: 08/13/20  9:25 PM   Result Value Ref Range    Est, Glom Filt Rate 5 (A) ml/min/1.73m2   Scan of Blood Smear    Collection Time: 08/13/20  9:25 PM   Result Value Ref Range    SCAN OF BLOOD SMEAR see below    POCT Glucose    Collection Time: 08/14/20 12:34 AM   Result Value Ref Range    POC Glucose 140 (H) 70 - 108 mg/dl   Body fluid cell count with differential    Collection Time: 08/14/20  1:15 AM   Result Value Ref Range    Specimen MUNDO. DIALYSATE     Color COLORLESS     Character, Body Fluid CLEAR     Total Volume Received Body Fluid 80.0 mL ml    Total Nucleated cells Body Fluid 13 0 - 500 /cumm    Body Fluid RBC < 2000 /cumm         Vital Signs: T: 99.4F P: 63 RR: 18 B/P: 128/81: FiO2: Ra: O2 Sat:94%: I/O:     Intake/Output Summary (Last 24 hours) at 8/14/2020 0610  Last data filed at 8/14/2020 0327  Gross per 24 hour   Intake 2522.7 ml   Output 2200 ml   Net 322.7 ml         General:   Chronically ill appearing, no acute distress  HEENT:  normocephalic and atraumatic. No scleral icterus. PEARLA, mucous membranes moist  Neck: supple. Trachea midline. No JVD. Full ROM, no meningismus. Lungs: clear to auscultation, coarse rhonchi. No retractions, no accessory muscle use. Cardiac: RRR, no murmur, 2+ pulses  Abdomen: soft. Diffusely tender. Bowel sounds active  Extremities:  No clubbing, cyanosis x 4, no edema    Vasculature: capillary refill < 3 seconds. Skin:  warm and dry. no visible rashes  Psych:  Alert and oriented x3. Affect appropriate  Lymph:  No supraclavicular adenopathy. Neurologic:  CN II-XII grossly intact. No focal deficit. Data: (All radiographs, tracings, PFTs, and imaging are personally viewed and interpreted unless otherwise noted).     Outside data reviewed        Electronically signed by  Kendal Taylor PA-C

## 2020-08-14 NOTE — PROGRESS NOTES
Pharmacy Renal Adjustment    Beatrice Damon is a 78 y.o. female. Pharmacy renally adjusted the following medications per P&T approved policy: Cefepime    Recent Labs     08/13/20 2125   BUN 59*       Recent Labs     08/13/20 2125   CREATININE 7.4*       Estimated Creatinine Clearance: 8 mL/min (A) (based on SCr of 7.4 mg/dL [facility])). Calculated CrCl: on PD    Height:   Ht Readings from Last 1 Encounters:   08/13/20 5' 6\" (1.676 m)     Weight:  Wt Readings from Last 1 Encounters:   08/13/20 185 lb 14.4 oz (84.3 kg)     Plan: Adjustments based on renal function:          Decrease Cefepime to 1 gram iv daily.     Ed Fink PharmD  8/14/2020   12:13 AM

## 2020-08-14 NOTE — CARE COORDINATION
8/14/20, 10:28 AM EDT  Discharge Planning Evaluation  Social work consult received, patient from [facility]. Patient/Family preference is to return to [facility]. The patient's current payor source at the facility is medicare. Medicare skilled days available: yes  Insurance precert:   no  Spoke with Pat at the facility. Patient bed hold:  Medicaid bed hold  Anticipated transport plan: ambulance  Do they require COVID 19 test to return to F: no  Is there a required time frame which which COVID test needs done: only needed if Patient is on COVID unit or displaying COVID symptoms prior to discharge.

## 2020-08-14 NOTE — PROGRESS NOTES
Pt admitted to  6K18   Complaints: n/v cough. IV site free of s/s of infection or infiltration. Vital signs obtained. Assessment and data collection initiated. Two nurse skin assessment performed by Coleman ZAMORA and Garrison Meyer. Oriented to room. Policies and procedures for 6K explained. Coleman ZAMORA discussed hourly rounding with patient addressing 5 P's. Fall prevention and safety brochure discussed with patient. Bed alarm on. Call light in reach.

## 2020-08-14 NOTE — CONSULTS
[facility]    [address], [address]  [facility]  8/14/2020 11:19 AM    Pt Name:    Marlene Patton  MRN:     [id number]   [id number]  YOB: 1950  Admit Date:    8/13/2020  6:51 PM  Primary Care Physician:  Justin Olea    [facility] Number:   [phone]    Reason for Consult:  End-stage renal disease on peritoneal dialysis  Requesting provider:  Ms. Sean Ricks    History:   The patient is a 78 y.o. white female with history of end-stage renal disease on peritoneal dialysis, gastroesophageal reflux disease, hypertension, dyslipidemia, thyroid disorder, diabetes mellitus who presented with the complaints of abdominal pain associated with fever as well as cough. Denies any alleviating or any aggravating factors. Patient reports associated generalized weakness. Patient reports that she has had symptoms off and on for last 2 weeks. She reports that she lives at the nursing home. She was sent to outside hospital.  She was then transferred from [facility] emergency room. At [facility] emergency room patient was noted to have a white count of 22,000, sodium of 125, potassium of 5.8. Patient is not a very good historian. Nephrology has been asked to see her in consultation for management of end-stage renal disease. At time of my evaluation patient is reporting to be feeling better.     Past Medical History:  Past Medical History:   Diagnosis Date    Adjustment disorder with mixed anxiety and depressed mood     Anemia     Anemia in chronic kidney disease(285.21)     Anxiety     Arthritis     Asthma     Bipolar 1 disorder (HCC)     Bradycardia     CHF (congestive heart failure) (HCC)     Chronic diastolic heart failure (HCC)     Chronic kidney disease with peritoneal dialysis as preferred modality, stage 5 (HCC)     Chronic kidney disease, stage III (moderate) (HCC)     CKD (chronic kidney disease)     Stage 3    Depression     Difficulty walking     Dizziness and giddiness     GERD (gastroesophageal reflux disease)     Gout     Headache(784.0)     Hyperkalemia     Hyperlipidemia     Hypertension     Hypertensive urgency     Hypothyroid     Hypothyroidism     Low back pain     Major depressive disorder, recurrent, mild (HCC)     Muscle weakness (generalized)     Neurogenic bladder     Pleural effusion 06/16/2019    R pleurex catheter placed    Pneumonia     Seizure ([facility] [address] 01/2018    Type II or unspecified type diabetes mellitus without mention of complication, not stated as uncontrolled     Urine retention        Past Surgical History:  Past Surgical History:   Procedure Laterality Date    BACK SURGERY      CERVICAL FUSION      CHOLECYSTECTOMY      COLONOSCOPY      ENDOSCOPY, COLON, DIAGNOSTIC      FRACTURE SURGERY         Family History:  Family History   Problem Relation Age of Onset    High Blood Pressure Mother     Diabetes Mother     Cancer Mother     Heart Attack Mother     Stroke Father     High Blood Pressure Father     Anemia Father        Social History:  Social History     Socioeconomic History    Marital status:      Spouse name: Not on file    Number of children: 1    Years of education: Not on file    Highest education level: Not on file   Occupational History    Not on file   Social Needs    Financial resource strain: Not on file    Food insecurity     Worry: Not on file     Inability: Not on file   [employer] needs     Medical: Not on file     Non-medical: Not on file   Tobacco Use    Smoking status: Never Smoker    Smokeless tobacco: Never Used   Substance and Sexual Activity    Alcohol use: No    Drug use: No    Sexual activity: Not Currently   Lifestyle    Physical activity     Days per week: Not on file     Minutes per session: Not on file    Stress: Not on file   Relationships    Social connections     Talks on phone: Not on file     Gets together: Not on file Attends Anglican service: Not on file     Active member of club or organization: Not on file     Attends meetings of clubs or organizations: Not on file     Relationship status: Not on file    Intimate partner violence     Fear of current or ex partner: Not on file     Emotionally abused: Not on file     Physically abused: Not on file     Forced sexual activity: Not on file   Other Topics Concern    Not on file   Social History Narrative    Not on file       Home Meds:  Prior to Admission medications    Medication Sig Start Date End Date Taking? Authorizing Provider   busPIRone (BUSPAR) 5 MG tablet Take 5 mg by mouth 2 times daily    Historical Provider, MD   isosorbide dinitrate (ISORDIL) 20 MG tablet Take 20 mg by mouth 3 times daily    Historical Provider, MD   hydrALAZINE (APRESOLINE) 50 MG tablet Take 1 tablet by mouth 3 times daily 1/29/20 2/28/20  Ann Marie Park MD   insulin glargine (BASAGLAR KWIKPEN) 100 UNIT/ML injection pen Inject 4 Units into the skin every morning 1/29/20   Ann Marie Park MD   oxybutynin (DITROPAN-XL) 5 MG extended release tablet Take 5 mg by mouth daily    Historical Provider, MD   benzonatate (TESSALON) 100 MG capsule Take 100 mg by mouth every 8 hours as needed for Cough    Historical Provider, MD   polyethylene glycol (GLYCOLAX) powder Take 17 g by mouth daily    Historical Provider, MD   B Complex-C-Folic Acid (RENAL) 1 MG CAPS Take 1 capsule by mouth daily    Historical Provider, MD   insulin aspart (NOVOLOG) 100 UNIT/ML injection vial Inject 0-12 Units into the skin 3 times daily (before meals) Per sliding scale; 140-199= 1 unit, 200-249= 2 units, 250-299= 3 units,  300-349= 4 units, 350-399= 5 units, 400-450= 6 units, 451-500= 10 units, >500 give 12 units & recheck in one hour.     Historical Provider, MD   fluticasone (FLONASE) 50 MCG/ACT nasal spray 2 sprays by Each Nostril route daily     Historical Provider, MD   polycarbophil (FIBERCON) 625 MG tablet Take 3 tablets by mouth 3 times daily (with meals)     Historical Provider, MD   sevelamer (RENVELA) 800 MG tablet Take 2 tablets by mouth 3 times daily (with meals)    Historical Provider, MD   guaiFENesin 200 MG tablet Take 200 mg by mouth every 4 hours as needed (cough)     Historical Provider, MD   ondansetron (ZOFRAN-ODT) 4 MG disintegrating tablet Take 4 mg by mouth every 8 hours as needed for Nausea or Vomiting     Historical Provider, MD   meclizine (ANTIVERT) 25 MG tablet Take 25 mg by mouth 3 times daily    Historical Provider, MD   gabapentin (NEURONTIN) 300 MG capsule Take 300 mg by mouth daily. 7/31/18   Historical Provider, MD   acetaminophen-codeine (TYLENOL #3) 300-30 MG per tablet Take 1 tablet by mouth every 6 hours as needed (arthritis pain). Historical Provider, MD   acetaminophen (TYLENOL) 325 MG tablet Take 650 mg by mouth every 6 hours as needed for Pain    Historical Provider, MD   levothyroxine (SYNTHROID) 25 MCG tablet Take 25 mcg by mouth Daily    Historical Provider, MD   blood glucose test strips (ASCENSIA AUTODISC VI;ONE TOUCH ULTRA TEST VI) strip Patient tests blood sugar three times per day. Diagnosis DMII E11.9 7/18/18   Historical Provider, MD   pravastatin (PRAVACHOL) 40 MG tablet Take 40 mg by mouth nightly     Historical Provider, MD       Review of Systems:  Constitutional: Positive for generalized weakness, cough, shortness of breath and abdominal pain  Positive for fever  Head: Negative for headaches  Eyes: Negative for blurry vision or discharge  Ears: Negative for ear pain or hearing changes  Nose: Negative for runny nose or epistaxis  Respiratory: Negative for shortness of breath. Negative for hemoptysis  Cardiovascular: Negative for chest pain  GI: Negative for nausea, vomiting and diarrhea. Negative for hematochezia and melena.   Positive for abdominal pain  Skin: Negative for rash  Musculoskeletal: Negative for joint pain, moves all ext  Neuro: Negative for numbness or kg/m². Physical Examination:  VITALS:   Vitals:    08/14/20 0332 08/14/20 0500 08/14/20 0841 08/14/20 1118   BP:   129/64 (!) 143/67   Pulse:   58 58   Resp:   18 18   Temp:  99.2 °F (37.3 °C) 98.2 °F (36.8 °C) 98.2 °F (36.8 °C)   TempSrc:  Oral Oral Oral   SpO2: 98%  99% 94%   Weight:       Height:         Weight:   Wt Readings from Last 3 Encounters:   08/13/20 185 lb 14.4 oz (84.3 kg)   06/02/20 186 lb 9.6 oz (84.6 kg)   02/11/20 185 lb 12.8 oz (84.3 kg)     Constitutional and General Appearance: alert and cooperative with exam, appears comfortable, no distress, not diaphoretic  Eyes: no icteric sclera in left eye or right eye,  no pallor conjunctiva in left or right eye, no discharge seen from left eye or right eye  Ears and Nose: normal external appearance of left and right ear. Both ear lobules are nontender to palpation. Normal external appearance of nose. No active drainage from nose. Oral: moist oral mucus membranes  Neck: No jugular venous distention, appears symmetric, good ROM  Lungs: Air entry B/L, no crackles or rales, no use of accessory muscles or labored breathing  Chest: No chest wall tenderness  Heart:  S1, S2  Extremities: no sig LE edema, no tenderness  GI: Soft without any diffuse abdominal tenderness, no guarding no distention  Skin: no rash seen on exposed extremities, warm to touch  Musculo: moves all extremities, no clubbing or cyanosis of digits of either upper extremity. Neuro: no slurred speech, no facial drooping  Psychiatric: Normal mood and affect    Lab Data  CBC:   Recent Labs     08/13/20 2125   WBC 22.7*   HGB 11.5*   HCT 36.0*        BMP:  Recent Labs     08/13/20 2125   *   K 5.8*   CL 89*   CO2 22*   BUN 59*   CREATININE 7.4*   GLUCOSE 70   CALCIUM 9.1     Diagnostics: Chest x-ray shows mild prominence of vascular markings without any pulmonary venous congestion    Old labs and diagnostics reviewed.    Echo: EF 50 to 55%    Impression and Plan:  1. End-stage renal disease on peritoneal dialysis  2. Hyperkalemia  3. Hyponatremia  4. Abdominal pain  5. Leukocytosis  6. Insulin-dependent diabetes mellitus    Patient seen and examined. Physical examination not very impressive for peritonitis. Do not suspect peritonitis at this time but will follow cell count and cultures. Fluid is clear. Patient does have significant leukocytosis of unclear etiology. May need to obtain a CAT scan of the abdomen and pelvis if no improvement. Currently on empiric IV antibiotics. Follow blood cultures. Repeat CBC in a.m. I have started the patient on gentle IV fluid normal saline. Started patient on 1.5% dianeal 2000 mL every 6 hours. Anticipate improvement in potassium. Will repeat labs. Repeat potassium level this evening. Thank you for the consult. Please feel free to call me if you have any questions. Discussed with nursing staff this morning during rounds  Discussed with patient    Addendum  5:50 pm  Peritoneal fluid cell count is 13 and clear.   Not suggestive or diagnostic of peritonitis    Oren Stephens MD  [facility]

## 2020-08-14 NOTE — FLOWSHEET NOTE
08/14/20 1643   Provider Notification   Reason for Communication Review case  (HR low as 53 today, maintaining around 58.  Pt endorses tired)   Provider Name Anya Gay   Provider Notification Advance Practice Clinician (CNS, NP, CNM, CRNA, PA)   Method of Communication Secure Message   Response No new orders  (med review )   Notification Time [id number]

## 2020-08-14 NOTE — PROGRESS NOTES
[hydrocodone-acetaminophen]; Vilazodone; and Wellbutrin [bupropion]     Temp max: 99.4    No results for input(s): BUN in the last 72 hours. No results for input(s): CREATININE in the last 72 hours. No results for input(s): WBC in the last 72 hours. No intake or output data in the 24 hours ending 08/13/20 2135      Ht Readings from Last 1 Encounters:   08/13/20 5' 6\" (1.676 m)        Wt Readings from Last 1 Encounters:   08/13/20 185 lb 14.4 oz (84.3 kg)         Body mass index is 30.01 kg/m². CrCl cannot be calculated (Patient's most recent lab result is older than the maximum 10 days allowed. ). Goal Trough Level: 15 mcg/mL    Assessment/Plan:  Patient received 1 g vanc at outlying facility. Could not confirm if this was in PD fluid or IV. Will check a random level with AM labs to assess. Thank you for the consult. Will continue to follow.

## 2020-08-14 NOTE — PLAN OF CARE
Problem: DISCHARGE BARRIERS  Goal: Patient's continuum of care needs are met  Outcome: Ongoing  Note: Patient return to [facility]. See SW notes 8/14/20.

## 2020-08-14 NOTE — FLOWSHEET NOTE
Pt was asleep but she anointed.       08/14/20 1650   Encounter Summary   Services provided to: Patient   Referral/Consult From: Rounding   [facility] of [facility] Visiting Yes  (8/14 )   Complexity of Encounter Low   Length of Encounter 15 minutes   Routine   Type Initial   Assessment Sleeping   Sacraments   Sacrament of Sick-Anointing Anointed

## 2020-08-14 NOTE — PROGRESS NOTES
Hospitalist Progress Note      Patient:  Emi Kay    Unit/Bed:6K-18/018-A  YOB: 1950  MRN: [id number]   Acct: [de-identified]   PCP: [id number]  Date of Admission: 8/13/2020    Assessment/Plan:    1. Sepsis: Peritoneal fluid clear with cell count of 13, not suggestive or diagnostic of peritonitis. Blood cultures obtained, results pending. CXR performed on 8/14/2020 demonstrate persistent cardiac enlargement with elevation of right diaphragm. Mild prominence of interstitial markings, possible bronchitis. Patient denies any chest pain/shortness of breath. Currently on room air with adequate O2 saturations. Afebrile throughout the morning and afternoon. Leukocytosis noted with WBC count of 22.7. We will continue to treat empirically with cefepime until source is identified. 2. ESRD on CAPD: Started patient on 1.5% dianeal 2000 mL every 6 hours. Nephrology following/managing, appreciate assistance. 3. Abdominal pain: Peritonitis initially suspected but after nephrology evaluated, not suspected at this time. Fluid is clear with a cell count of 13; not suggestive or diagnostic of peritonitis. May need to obtain CT scan of the abdomen and pelvis if improvement does not occur. 4. Hyperkalemia: Potassium level 5.8. Nephrology started patient on 1.5% dianeal 2000 ml every 6 hours with an anticipation of an improvement in potassium. Recheck chemistry this evening. 5. Hyponatremia: Sodium level 125. Nephrology managing. Recheck chemistry in the morning. 6. Type 2 diabetes mellitus: Hemoglobin A1c performed on 1/20/2020 was 6.1, repeat pending. Continue Accu-Cheks before meals and at bedtime with sliding scale insulin coverage (medium dose corrective algorithm). Continue Lantus 4 units every morning. Hypoglycemia protocol in place, maintain carb controlled diet. 7. Hyperlipidemia: History. Continue pravastatin.    8. Obesity: BMI fever,chills, or night sweats. SKIN: No lesions or rashes. HEAD: No headaches or recent injury. EYES: No acute changes in vision, no diplopia or blurred vision. EARS: No hearing loss, no tinnitus. NOSE/THROAT: No rhinorrhea or pharyngitis, no nasal drainage. NECK: No lumps or unusual neck stiffness. PULMONARY: Respirations easy and non-labored, no acute distress. CARDIAC: No chest pain, pressure, Negative for lower leg edema. GI: Abdomen is soft with mild tenderness noted diffusely. PERIPHERAL VASCULAR: No intermittent claudication or unusual leg cramps. MUSCULOSKELETAL: Occasional arthralgias, myalgias. NEUROLOGICAL: Denies any headache, near syncope, seizures or syncope. HEMATOLOGIC:  No unusual bruising or bleeding. PSYCH: Denies any homicidal or suicidial ideations.     Medications:  Reviewed    Infusion Medications    dextrose      sodium chloride 35 mL/hr at 08/14/20 0012     Scheduled Medications    cefepime  1 g Intravenous Q24H    vancomycin  1,250 mg Intravenous Once    folbee plus  1 tablet Oral Daily    busPIRone  5 mg Oral BID    fluticasone  2 spray Each Nostril Daily    gabapentin  300 mg Oral Daily    insulin lispro  0-12 Units Subcutaneous TID WC    insulin lispro  0-6 Units Subcutaneous Nightly    insulin glargine  4 Units Subcutaneous QAM    isosorbide dinitrate  20 mg Oral TID    levothyroxine  25 mcg Oral Daily    meclizine  25 mg Oral TID    oxybutynin  5 mg Oral Daily    pravastatin  40 mg Oral Nightly    sevelamer  1,600 mg Oral TID WC    sodium chloride flush  10 mL Intravenous 2 times per day    heparin (porcine)  5,000 Units Subcutaneous 3 times per day    vancomycin (VANCOCIN) intermittent dosing (placeholder)   Other RX Placeholder    dianeal lo-ivonne 1.5%  2,000 mL Intraperitoneal Q6H     PRN Meds: glucose, dextrose, glucagon (rDNA), dextrose, sodium chloride flush, acetaminophen **OR** acetaminophen, polyethylene glycol, promethazine **OR** Blood culture #2:No results found for: Vicky Hipps    Organism:No results found for: Fosterview      Lab Results   Component Value Date    LABGRAM  08/14/2020     Rare segmented neutrophils observed. No bacteria seen. performed on cytospun specimen        MRSA culture only:No results found for: [facility]    Urine culture:   Lab Results   Component Value Date    LABURIN No growth-preliminary  No growth   12/07/2018       Respiratory culture: No results found for: CULTRESP    Aerobic and Anaerobic :  No results found for: LABAERO  Lab Results   Component Value Date    LABANAE No growth-preliminary  No growth   06/12/2019       Urinalysis:      Lab Results   Component Value Date    NITRU NEGATIVE 06/22/2019    WBCUA 0-2 06/22/2019    BACTERIA NONE 06/22/2019    RBCUA 5-10 06/22/2019    BLOODU NEGATIVE 06/22/2019    GLUCOSEU NEGATIVE 06/22/2019       Radiology:  XR CHEST PORTABLE   Final Result      Persistent cardiac enlargement with elevation of right diaphragm. Mild prominence of interstitial markings, correlate with bronchitis      **This report has been created using voice recognition software. It may contain minor errors which are inherent in voice recognition technology. **      Final report electronically signed by Dr. Dea Salomon on 8/14/2020 3:39 AM        Xr Chest Portable    Result Date: 8/14/2020  PROCEDURE: XR CHEST PORTABLE CLINICAL INFORMATION: sepsis. COMPARISON: January 25, 2020 TECHNIQUE: AP upright view of the chest. FINDINGS: There is stable cardiac enlargement with persistent elevation of the right hemidiaphragm. There is no significant pulmonary venous congestion despite minimal coarseness of interstitial markings. Age-related changes are suspected. Minimal bronchitis is not excluded. The skeleton is negative     Persistent cardiac enlargement with elevation of right diaphragm.  Mild prominence of interstitial markings, correlate with bronchitis **This report has been created using voice recognition software. It may contain minor errors which are inherent in voice recognition technology. ** Final report electronically signed by Dr. Shade Tanner on 8/14/2020 3:39 AM      **This report has been created using voice recognition software. It may contain minor errors which are inherent in voice recognition technology. **  Electronically signed by YESENIA Kendrick CNP on 8/14/2020 at 1:26 PM

## 2020-08-14 NOTE — DISCHARGE INSTR - COC
Continuity of Care Form    Patient Name: Yehuda Jarvis   :  1950  MRN:  [id number]    [address] [place] [address] date:  2020  Discharge date:  20    Code Status Order: Full Code   Advance Directives:   [facility] Documentation     Date/Time Healthcare Directive Type of Healthcare Directive Copy in [address] Agent's Name Healthcare Agent's Phone Number    [phone]  No, patient does not have an advance directive for healthcare treatment -- -- -- -- --          Admitting Physician:  Rosalinda Peñaloza DO  PCP: [address]    Discharging Nurse: [facility] Unit/Room#: 6K-18/018-A  Discharging Unit Phone Number: [phone]    Emergency Contact:   Extended Emergency Contact Information  Primary Emergency Contact: Sravan Best [address] Phone: [phone]  Mobile Phone: [phone]  Relation: Child  Secondary Emergency Contact: rosa [facility] [address] [facility] Phone: [phone]  Relation: Other    Past Surgical History:  Past Surgical History:   Procedure Laterality Date    BACK SURGERY      CERVICAL FUSION      CHOLECYSTECTOMY      COLONOSCOPY      ENDOSCOPY, COLON, DIAGNOSTIC      FRACTURE SURGERY         Immunization History:   Immunization History   Administered Date(s) Administered    Influenza Virus Vaccine 10/24/2019    Influenza Whole 10/10/2018    Influenza, High Dose (Fluzone 65 yrs and older) 2017, 2018    Pneumococcal Conjugate Vaccine 2016    Pneumococcal Polysaccharide ([id number]) 2018    Zoster Live (Zostavax) 2016       Active Problems:  Patient Active Problem List   Diagnosis Code    Chronic kidney disease, stage III (moderate) ([facility]) N18.3    Anemia in chronic renal disease N18.9, D63.1    Type 2 diabetes mellitus with chronic kidney disease on chronic dialysis, with long-term current use of insulin ([facility]) E11.22, N18.6, Z99.2, Z79.4    Chronic kidney disease (CKD), stage IV (severe) ([facility] N18.4    Diabetic nephropathy with proteinuria ([facility]) E11.21    Acute on chronic respiratory failure with hypoxia ([facility]) J96.21    Pulmonary edema with congestive heart failure ([facility]) I50.1    Acute on chronic diastolic CHF (congestive heart failure) ([facility]) I50.33    LOUISE (acute kidney injury) ([facility] N17.9    Uncontrolled type 2 diabetes mellitus with hyperglycemia ([facility]) E11.65    Essential hypertension I10    Hypothyroidism E03.9    Other fluid overload E87.79    ESRD (end stage renal disease) ([facility]) N18.6    Diabetic nephropathy associated with type 2 diabetes mellitus ([facility]) E11.21    Bilateral pleural effusion C54    Metabolic acidosis B38.6    Leukocytosis D72.829    Generalized weakness R53.1    Bipolar 1 disorder ([facility]) F31.9    Mild mitral regurgitation I34.0    Mild tricuspid regurgitation I07.1    Chronic pain syndrome G89.4    Chronic bilateral pleural effusions J90    Chronic kidney disease (CKD), stage V ([facility]) N18.5    (HFpEF) heart failure with preserved ejection fraction ([facility]) I50.30    Atelectasis J98.11    Hyponatremia E87.1    Chronic anemia D64.9    Hyperlipidemia E78.5    Constipation K59.00    Shortness of breath R06.02    Hyperkalemia E87.5    Infiltrate noted on imaging study R93.89    Postprocedural pneumothorax J95.811    Moderate malnutrition ([facility]) E44.0    History of pleural effusion Z87.09    Hypotension I95.9    ESRD on peritoneal dialysis ([facility]) N18.6, Z99.2    Nausea R11.0    DM type 2 with diabetic peripheral neuropathy ([facility]) E11.42    Diabetic retinopathy of both eyes associated with type 2 diabetes mellitus ([facility]) E11.319    Sepsis ([facility] A41.9       Isolation/Infection:   Isolation          No Isolation        Patient Infection Status     Infection Onset Added Last Indicated Last Indicated By Review Planned Expiration Resolved Resolved By    MRSA 01/25/20 01/25/20 01/25/20 MRSA by PCR        Nares 1/2020    Resolved    C-diff Rule Out 08/14/20 08/14/20 08/14/20 C DIFF TOXIN/ANTIGEN (Ordered)   08/17/20 Chuy Ross RN    COVID-19 Rule Out 08/13/20 08/13/20 08/13/20 COVID-19 (Ordered)   08/13/20 Rule-Out Test Resulted          Nurse Assessment:  Last Vital Signs: BP (!) 127/58   Pulse 68   Temp 99.7 °F (37.6 °C) (Oral)   Resp 18   Ht 5' 6\" (1.676 m)   Wt 191 lb 1.6 oz (86.7 kg)   SpO2 91%   BMI 30.84 kg/m²     Last documented pain score (0-10 scale): Pain Level: 0  Last Weight:   Wt Readings from Last 1 Encounters:   08/16/20 191 lb 1.6 oz (86.7 kg)     Mental Status:  oriented, alert, coherent, logical, thought processes intact and able to concentrate and follow conversation    IV Access:  - None    Nursing Mobility/ADLs:  Walking   Assisted  Transfer  Assisted  Bathing  Assisted  Dressing  Assisted  Toileting  Assisted  Feeding  Independent  Med Admin  Assisted  Med Delivery   whole and prefers mixed with applesauce    Wound Care Documentation and Therapy:  Wound 12/12/18 Buttocks Inner;Mid;Left;Right;Medial Non-blanchable purple area to buttocks (Active)   Number of days: 614       Wound 08/13/20 Foot Anterior; Left Stage 3 Pressure (Active)   Wound Pressure Unstageable 08/18/20 0744   Dressing/Treatment Open to air 08/18/20 0744   Wound Assessment Yellow;Clean;Dry;Pink;Red 08/18/20 0744   Drainage Amount None 08/18/20 0744   Tiffanie-wound Assessment Pink 08/18/20 0744   Number of days: 4       Wound 08/14/20 Coccyx Mid Non-blanchable erythema in some areas (Active)   Wound Pressure Stage  1 08/18/20 0744   Dressing/Treatment Open to air;Moisture barrier 08/18/20 0744   Wound Assessment Non-blanchable erythema;Pink 08/18/20 0744   Drainage Amount None 08/18/20 0744   Tiffanie-wound Assessment Blanchable erythema 08/18/20 0744   Number of days: 4        Elimination:  Continence:   · Bowel:  Yes  · Bladder: does not make urine, CAPD dialysis patient  Urinary Catheter: None   Colostomy/Ileostomy/Ileal Conduit: No       Date of Last BM: 8/27/20    Intake/Output Summary (Last 24 hours) at 8/18/2020 0813  Last data filed at 8/18/2020 0452  Gross per 24 hour   Intake 9036 ml   Output 01380 ml   Net -1764 ml     I/O last 3 completed shifts: In: 9036 [P.O.:990; I.V.:46; Other:8000]  Out: 57747 [Other:79827]    Safety Concerns:     History of Falls (last 30 days) and At Risk for Falls    Impairments/Disabilities:      Vision and Hearing    Nutrition Therapy:  Current Nutrition Therapy:   - Oral Diet:  Renal    Routes of Feeding: Oral  Liquids: No Restrictions  Daily Fluid Restriction: yes - amount 1500ml  Last Modified Barium Swallow with Video (Video Swallowing Test): not done    Treatments at the Time of Hospital Discharge:   Respiratory Treatments: see MAR  Oxygen Therapy:  is not on home oxygen therapy. Ventilator:    - No ventilator support    Rehab Therapies: Physical Therapy and Occupational Therapy  Weight Bearing Status/Restrictions: No weight bearing restirctions  Other Medical Equipment (for information only, NOT a DME order):  wheelchair, walker and hospital bed  Other Treatments:   CAPD - dineal 1.5%, 1500 ml. Exchanges every 6 hours.    Patient's personal belongings (please select all that are sent with patient):  None    RN SIGNATURE:  Electronically signed by Austin Potts RN on 8/28/20 at 9:20 AM EDT    CASE MANAGEMENT/SOCIAL WORK SECTION    Inpatient Status Date: 8/13/20    Readmission Risk Assessment Score:  Readmission Risk              Risk of Unplanned Readmission:        21           Discharging to Facility/ Agency   · Name:  [facility]  Address:    [address] [place] [address] [facility] [place] [facility] [address]         Phone: [phone]       Fax: [phone]        ·   ·     Dialysis Facility (if applicable)   · Name:  · Address:  · Dialysis Schedule:  · Phone:  · Fax:    / signature: Electronically signed by Bertrum Cowden, MSW, CHRISTENW on 8/14/20 at 10:27 AM EDT    PHYSICIAN SECTION    Prognosis: Fair    Condition at Discharge: Guarded    Rehab Potential (if transferring to Rehab): Guarded    Recommended Labs or Other Treatments After Discharge: CBC in 1 week    Physician Certification: I certify the above information and transfer of Triny Carbone  is necessary for the continuing treatment of the diagnosis listed and that she requires [place] for greater 30 days.      Update Admission H&P: No change in H&P    PHYSICIAN SIGNATURE:  Electronically signed by Yi Mina MD on 8/28/20 at 12:15 PM EDT

## 2020-08-15 NOTE — PROGRESS NOTES
Renal Progress Note  [facility]    Patient :  Aquiles Vargas; 78 y.o. MRN# [id number]  Location:  [facility]18/018-A  Attending:  YESENIA Ballesteros CNP  Admit Date:  8/13/2020   Hospital Day: 2      Subjective:     Nephrology is following the patient for end-stage renal disease on peritoneal hemodialysis. Patient seen and examined on PD. Data reviewed with RN. Last drain 2200 mL. She is on 1.5% every 6 hours. Her bags are clear. .  She is complaining of a cough however she states it is improving. Chest x-ray was reviewed    Outpatient Medications:     Medications Prior to Admission: busPIRone (BUSPAR) 5 MG tablet, Take 5 mg by mouth 2 times daily  isosorbide dinitrate (ISORDIL) 20 MG tablet, Take 20 mg by mouth 3 times daily  hydrALAZINE (APRESOLINE) 50 MG tablet, Take 1 tablet by mouth 3 times daily  insulin glargine (BASAGLAR KWIKPEN) 100 UNIT/ML injection pen, Inject 4 Units into the skin every morning  oxybutynin (DITROPAN-XL) 5 MG extended release tablet, Take 5 mg by mouth daily  benzonatate (TESSALON) 100 MG capsule, Take 100 mg by mouth every 8 hours as needed for Cough  polyethylene glycol (GLYCOLAX) powder, Take 17 g by mouth daily  B Complex-C-Folic Acid (RENAL) 1 MG CAPS, Take 1 capsule by mouth daily  insulin aspart (NOVOLOG) 100 UNIT/ML injection vial, Inject 0-12 Units into the skin 3 times daily (before meals) Per sliding scale; 140-199= 1 unit, 200-249= 2 units, 250-299= 3 units,  300-349= 4 units, 350-399= 5 units, 400-450= 6 units, 451-500= 10 units, >500 give 12 units & recheck in one hour.   fluticasone (FLONASE) 50 MCG/ACT nasal spray, 2 sprays by Each Nostril route daily   polycarbophil (FIBERCON) 625 MG tablet, Take 3 tablets by mouth 3 times daily (with meals)   sevelamer (RENVELA) 800 MG tablet, Take 2 tablets by mouth 3 times daily (with meals)  guaiFENesin 200 MG tablet, Take 200 mg by mouth every 4 hours as needed (cough)   ondansetron (ZOFRAN-ODT) 4 MG disintegrating tablet, Take 4 mg by mouth every 8 hours as needed for Nausea or Vomiting   meclizine (ANTIVERT) 25 MG tablet, Take 25 mg by mouth 3 times daily  gabapentin (NEURONTIN) 300 MG capsule, Take 300 mg by mouth daily. acetaminophen-codeine (TYLENOL #3) 300-30 MG per tablet, Take 1 tablet by mouth every 6 hours as needed (arthritis pain). acetaminophen (TYLENOL) 325 MG tablet, Take 650 mg by mouth every 6 hours as needed for Pain  levothyroxine (SYNTHROID) 25 MCG tablet, Take 25 mcg by mouth Daily  blood glucose test strips (ASCENSIA AUTODISC VI;ONE TOUCH ULTRA TEST VI) strip, Patient tests blood sugar three times per day. Diagnosis DMII E11.9  pravastatin (PRAVACHOL) 40 MG tablet, Take 40 mg by mouth nightly     Current Medications:     Scheduled Meds:    cefepime  1 g Intravenous Q24H    folbee plus  1 tablet Oral Daily    busPIRone  5 mg Oral BID    fluticasone  2 spray Each Nostril Daily    gabapentin  300 mg Oral Daily    insulin lispro  0-12 Units Subcutaneous TID WC    insulin lispro  0-6 Units Subcutaneous Nightly    insulin glargine  4 Units Subcutaneous QAM    isosorbide dinitrate  20 mg Oral TID    levothyroxine  25 mcg Oral Daily    meclizine  25 mg Oral TID    oxybutynin  5 mg Oral Daily    pravastatin  40 mg Oral Nightly    sevelamer  1,600 mg Oral TID WC    sodium chloride flush  10 mL Intravenous 2 times per day    heparin (porcine)  5,000 Units Subcutaneous 3 times per day    vancomycin (VANCOCIN) intermittent dosing (placeholder)   Other RX Placeholder    dianeal lo-ivonne 1.5%  2,000 mL Intraperitoneal Q6H     Continuous Infusions:    dextrose       PRN Meds:  glucose, dextrose, glucagon (rDNA), dextrose, sodium chloride flush, acetaminophen **OR** acetaminophen, polyethylene glycol, promethazine **OR** ondansetron    Input/Output:       I/O last 3 completed shifts: In: 8782 [P.O.:100; I.V.:682; Other:8000]  Out: 7900 [Other:7900].       Patient Vitals for the past 96 hrs (Last 3 readings):   Weight   08/15/20 0300 187 lb (84.8 kg)   20 1849 185 lb 14.4 oz (84.3 kg)       Vital Signs:   Temperature:  Temp: 97.6 °F (36.4 °C)  TMax:   Temp (24hrs), Av.6 °F (37 °C), Min:97.6 °F (36.4 °C), Max:99.1 °F (37.3 °C)    Respirations:  Resp: 18  Pulse:   Pulse: 69  BP:    BP: (!) 172/74  BP Range: Systolic (90RIH), BPH:004 , Min:125 , DCQ:021       Diastolic (39AMW), GWE:66, Min:58, Max:77      Physical Examination:     General:  Awake, alert, no acute distress  HEENT: NC/AT/ MMM  Chest:               Coarse rhonchi noted  Cardiac:  S1 S2   Abdomen: Soft, non-tender,  Neuro:  No facial droop, No Asterixis  SKIN:  No rashes, good skin turgor. Extremities:  No edema, no cyanosis    Labs:       Recent Labs     08/13/20  2125 08/15/20  0655   WBC 22.7* 18.8*   RBC 3.51* 3.40*   HGB 11.5* 10.9*   HCT 36.0* 34.9*   .6* 102.6*   MCH 32.8 32.1   MCHC 31.9* 31.2*    358   MPV 9.8 10.0      BMP:   Recent Labs     08/13/20  2125 08/14/20  2009 08/15/20  0655   * 128* 129*   K 5.8* 5.3* 4.9   CL 89* 90* 93*   CO2 22* 23 22*   BUN 59* 57* 55*   CREATININE 7.4* 7.5* 6.5*   GLUCOSE 70 111* 97   CALCIUM 9.1 8.6 8.7      Phosphorus:   No results for input(s): PHOS in the last 72 hours. Magnesium:  No results for input(s): MG in the last 72 hours. Albumin:  No results for input(s): LABALBU in the last 72 hours.   BNP:    No results found for: BNP  LUIS:    No results found for: LUIS  SPEP:  Lab Results   Component Value Date    PROT 5.5 2020     UPEP:   No results found for: LABPE  C3:   No results found for: C3  C4:   No results found for: C4  MPO ANCA:   No results found for: MPO  PR3 ANCA:   No results found for: PR3  Anti-GBM:   No results found for: GBMABIGG  Hep BsAg:         Lab Results   Component Value Date    HEPBSAG Negative 2019     Hep C AB:          Lab Results   Component Value Date    HEPCAB Negative 2019       Urinalysis/Chemistries:      Lab Results   Component Value Date    NITRU NEGATIVE 06/22/2019    COLORU COLORLESS 08/14/2020    COLORU YELLOW 06/22/2019    PHUR 5.0 06/22/2019    WBCUA 0-2 06/22/2019    RBCUA 5-10 06/22/2019    YEAST NONE SEEN 06/22/2019    BACTERIA NONE 06/22/2019    CLARITYU Clear 03/09/2019    LEUKOCYTESUR NEGATIVE 06/22/2019    UROBILINOGEN 0.2 06/22/2019    BILIRUBINUR NEGATIVE 06/22/2019    BILIRUBINUR Negative 03/09/2019    BLOODU NEGATIVE 06/22/2019    GLUCOSEU NEGATIVE 06/22/2019    KETUA NEGATIVE 06/22/2019     Urine Sodium:   No results found for: REHANA  Urine Potassium:  No results found for: KUR  Urine Chloride:  No results found for: CLUR  Urine Osmolarity: No results found for: OSMOU  Urine Protein:   No components found for: TOTALPROTEIN, URINE   Urine Creatinine:     Lab Results   Component Value Date    LABCREA 45.0 03/09/2019     Urine Eosinophils:  No components found for: UEOS        Impression and Plan:  1. ESRD on peritoneal dialysis  -continue with 1.5% q 6 hours, no evidence of peritonitis  -can stop IV fluids    2. Hyponatremia: improving  3. Leukocytosis  4. Bronchitis  5. Macrocytic Anemia  6. DM          Please don't hesitate to call with any questions.   Electronically signed by Naveed Angeles DO on 8/15/2020 at 3:26 PM

## 2020-08-15 NOTE — PROGRESS NOTES
Provided patient with incentive spirometer and accapella and educated patient on them. Also educated patient on cough and deep breathing.

## 2020-08-15 NOTE — PLAN OF CARE
Care plan reviewed with patient and son. Patient and son verbalize understanding of the plan of care and contribute to goal setting. Problem: Skin Integrity:  Goal: Will show no infection signs and symptoms  Description: Will show no infection signs and symptoms  8/15/2020 1403 by Stan Holly RN  Outcome: Ongoing  Note: VSS, IV cefepime, IV Vanc  8/15/2020 1359 by Stan Holly RN  Outcome: Ongoing  Goal: Absence of new skin breakdown  Description: Absence of new skin breakdown  8/15/2020 1403 by Stan Holly RN  Outcome: Ongoing  Note: No new signs or symptoms of skin breakdown noted this shift, encouraging patient to turn and reposition self in bed q2h    8/15/2020 1359 by Stan Holly RN  Outcome: Ongoing     Problem: DISCHARGE BARRIERS  Goal: Patient's continuum of care needs are met  8/15/2020 1403 by Stan Holly RN  Outcome: Ongoing  Note: Purposefully hourly rounding     8/15/2020 1359 by Stan Holly RN  Outcome: Ongoing     Problem: Falls - Risk of:  Goal: Will remain free from falls  Description: Will remain free from falls  Outcome: Ongoing  Note: No falls noted this shift. Continue falling star program. Bed alarm on, bed in low position. Call light and personal belongings in reach. Patient uses call light appropriately.        Problem: Nutritional:  Goal: Nutritional status will improve  Description: Nutritional status will improve  Outcome: Ongoing  Note: Dietician following, excellent appetite      Problem: Physical Regulation:  Goal: Will remain free from infection  Description: Will remain free from infection  Outcome: Ongoing  Note: PD fluid negative for infection, WBC trending down, IV cefepime and IV Vanc on, blood cultures pending     Problem: Discharge Planning:  Goal: Discharged to appropriate level of care  Description: Discharged to appropriate level of care  Outcome: Ongoing  Note: Plan to discharge back to [facility]     Problem: Tissue Perfusion - Renal, Altered:  Goal: Electrolytes within specified parameters  Description: Electrolytes within specified parameters  Outcome: Ongoing  Note: K 4.9 today, CAPD patient, monitoring, nephrology following  Goal: Ability to achieve a balanced intake and output will improve  Description: Ability to achieve a balanced intake and output will improve  Outcome: Ongoing  Note: PD 1.5% q6, nephrology following, last exchange 2200mL out

## 2020-08-15 NOTE — PROGRESS NOTES
Hospitalist Progress Note      Patient:  Ary Day    Unit/Bed:6K-18/018-A  YOB: 1950  MRN: [id number]   Acct: [de-identified]   PCP: [id number]  Date of Admission: 8/13/2020    Assessment/Plan:    1. Sepsis: Source likely pulmonary in nature. Peritoneal fluid clear with cell count of 13, not suggestive or diagnostic of peritonitis. Blood cultures obtained, results pending. CXR performed on 8/14/2020 demonstrate persistent cardiac enlargement with elevation of right diaphragm. Mild prominence of interstitial markings, possible bronchitis. Patient denies any chest pain/shortness of breath. Does report that her cough is improving. Currently on room air with adequate O2 saturations. Afebrile for >24 hours. Leukocytosis improving. Continue cefepime (Day #2) for coverage. 2. ESRD on CAPD: Started patient on 1.5% dianeal 2000 mL every 6 hours. Nephrology following/managing, appreciate assistance. 3. Abdominal pain: Peritonitis initially suspected but after nephrology evaluated, not suspected at this time. Fluid is clear with a cell count of 13; not suggestive or diagnostic of peritonitis. May need to obtain CT scan of the abdomen and pelvis if improvement does not occur. 4. Hyperkalemia: Resolved. Potassium level 4.9. Nephrology started patient on 1.5% dianeal 2000 ml every 6 hours with an improvement in potassium. Recheck chemistry this evening. 5. Hyponatremia: Improving. Sodium level 129. Nephrology managing. Recheck chemistry in the morning. 6. Type 2 diabetes mellitus: Hemoglobin A1c performed on 1/20/2020 was 6.1, repeat pending. Continue Accu-Cheks before meals and at bedtime with sliding scale insulin coverage (low-dose corrective algorithm). Continue Lantus 4 units every morning. Hypoglycemia protocol in place, maintain carb controlled diet. 7. Hyperlipidemia: History. Continue pravastatin.    8. Obesity: BMI 30.18.    Chief Complaint: Sepsis    Initial H and P:-    Diane Willson is a 29-year-old  female, lifetime non-smoker, with a medical history of mixed anxiety and depressed mood disorder, anemia, arthritis, asthma, bipolar 1 disorder, bradycardia, congestive heart failure, chronic diastolic heart failure, chronic kidney disease stage V, depression, GERD, gout, history of headaches, hyperlipidemia, hypertension, hypothyroidism, low back pain, generalized muscle weakness, neurogenic bladder, seizure disorder, type 2 diabetes mellitus, urinary retention, and obesity. Patient presented to [facility] from [address] for further evaluation of sepsis. Patient presented to [facility] ER with abdominal pain and fevers at home. Patient is a known ESRD patient on CAPD and also complained of a cough that has been going on for approximately 2 weeks that had been progressively getting worse. Patient also reported that she could not bring anything up when she coughed and stated that her fevers have been going on for approximately 2-3 days intermittently. The hospitalist service was contacted for further evaluation, treatment, and management. Subjective (past 24 hours):   Patient resting in bed at the time of the interview. States that her breathing is doing fine and her cough is getting a little better. She was informed that additional respiratory interventions will be added to her plan of care and she verbalized her understanding. She had no other needs or questions at this time. Past medical history, family history, social history and allergies reviewed again and is unchanged since admission. ROS (14 point review of systems completed. Pertinent positives noted. Otherwise ROS is negative) :  GENERAL: No fever,chills, or night sweats. SKIN: No lesions or rashes. HEAD: No headaches or recent injury. EYES: No acute changes in vision, no diplopia or blurred vision.   EARS: No hearing loss, no tinnitus. NOSE/THROAT: No rhinorrhea or pharyngitis, no nasal drainage. NECK: No lumps or unusual neck stiffness. PULMONARY: Respirations easy and non-labored, no acute distress. CARDIAC: No chest pain, pressure, Negative for lower leg edema. GI: Abdomen is soft and non-tender, non-distended. PERIPHERAL VASCULAR: No intermittent claudication or unusual leg cramps. MUSCULOSKELETAL: Occasional arthralgias, myalgias. NEUROLOGICAL: Denies any headache, near syncope, seizures or syncope. HEMATOLOGIC:  No unusual bruising or bleeding. PSYCH: Denies any homicidal or suicidial ideations.     Medications:  Reviewed    Infusion Medications    dextrose       Scheduled Medications    cefepime  1 g Intravenous Q24H    folbee plus  1 tablet Oral Daily    busPIRone  5 mg Oral BID    fluticasone  2 spray Each Nostril Daily    gabapentin  300 mg Oral Daily    insulin lispro  0-12 Units Subcutaneous TID WC    insulin lispro  0-6 Units Subcutaneous Nightly    insulin glargine  4 Units Subcutaneous QAM    isosorbide dinitrate  20 mg Oral TID    levothyroxine  25 mcg Oral Daily    meclizine  25 mg Oral TID    oxybutynin  5 mg Oral Daily    pravastatin  40 mg Oral Nightly    sevelamer  1,600 mg Oral TID WC    sodium chloride flush  10 mL Intravenous 2 times per day    heparin (porcine)  5,000 Units Subcutaneous 3 times per day    vancomycin (VANCOCIN) intermittent dosing (placeholder)   Other RX Placeholder    dianeal lo-ivonne 1.5%  2,000 mL Intraperitoneal Q6H     PRN Meds: glucose, dextrose, glucagon (rDNA), dextrose, sodium chloride flush, acetaminophen **OR** acetaminophen, polyethylene glycol, promethazine **OR** ondansetron      Intake/Output Summary (Last 24 hours) at 8/15/2020 1532  Last data filed at 8/15/2020 1527  Gross per 24 hour   Intake 9442.23 ml   Output 7900 ml   Net 1542.23 ml       Diet:  DIET RENAL; Daily Fluid Restriction: 1800 ml    Exam:  BP (!) 165/72   Pulse 65   Temp 98.6 °F (37 °C) (Oral)   Resp 18   Ht 5' 6\" (1.676 m)   Wt 187 lb (84.8 kg)   SpO2 92%   BMI 30.18 kg/m²     General appearance: Alert and appropriate, pleasant geriatric female. No apparent distress, appears stated age and cooperative. HEENT: Pupils equal, round, and reactive to light. Conjunctivae/corneas clear. Neck: Supple, with full range of motion. No jugular venous distention. Trachea midline. Respiratory:  Normal respiratory effort. Clear to auscultation, bilaterally without Rales/Wheezes/Rhonchi. Cardiovascular: Regular rate and rhythm with normal S1/S2 without murmurs, rubs or gallops. Abdomen: Soft with mild tenderness noted diffusely; normal bowel sounds appreciated. Musculoskeletal: Passive and active ROM x 4 extremities. Skin: Skin color, texture, turgor normal.  No rashes or lesions. Neurologic:  Neurovascularly intact without any focal sensory/motor deficits. Cranial nerves: II-XII intact, grossly non-focal.  Psychiatric: Alert and oriented to person, place, time, and situation. Thought content appropriate, normal insight  Capillary Refill: Brisk,< 3 seconds   Peripheral Pulses: +2 palpable, equal bilaterally     Labs:   Recent Labs     08/13/20  2125 08/15/20  0655   WBC 22.7* 18.8*   HGB 11.5* 10.9*   HCT 36.0* 34.9*    358     Recent Labs     08/13/20  2125 08/14/20  2009 08/15/20  0655   * 128* 129*   K 5.8* 5.3* 4.9   CL 89* 90* 93*   CO2 22* 23 22*   BUN 59* 57* 55*   CREATININE 7.4* 7.5* 6.5*   CALCIUM 9.1 8.6 8.7     No results for input(s): AST, ALT, BILIDIR, BILITOT, ALKPHOS in the last 72 hours. No results for input(s): INR in the last 72 hours. No results for input(s): Vallarie Brunette in the last 72 hours.     Microbiology:    Blood culture #1:   Lab Results   Component Value Date    BC No growth-preliminary  08/14/2020       Blood culture #2:No results found for: Vero Peacock    Organism:No results found for: Fosterview      Lab Results   Component Value Date    LABGRAM Shiraz on 8/14/2020 3:39 AM      **This report has been created using voice recognition software. It may contain minor errors which are inherent in voice recognition technology. **  Electronically signed by YESENIA Lima CNP on 8/15/2020 at 3:32 PM

## 2020-08-16 NOTE — PROGRESS NOTES
every 8 hours as needed for Nausea or Vomiting   meclizine (ANTIVERT) 25 MG tablet, Take 25 mg by mouth 3 times daily  gabapentin (NEURONTIN) 300 MG capsule, Take 300 mg by mouth daily. acetaminophen-codeine (TYLENOL #3) 300-30 MG per tablet, Take 1 tablet by mouth every 6 hours as needed (arthritis pain). acetaminophen (TYLENOL) 325 MG tablet, Take 650 mg by mouth every 6 hours as needed for Pain  levothyroxine (SYNTHROID) 25 MCG tablet, Take 25 mcg by mouth Daily  blood glucose test strips (ASCENSIA AUTODISC VI;ONE TOUCH ULTRA TEST VI) strip, Patient tests blood sugar three times per day. Diagnosis DMII E11.9  pravastatin (PRAVACHOL) 40 MG tablet, Take 40 mg by mouth nightly     Current Medications:     Scheduled Meds:    dianeal lo-ivonne (ULTRABAG) 2.5%  2,000 mL Intraperitoneal Q6H    insulin lispro  0-6 Units Subcutaneous TID WC    insulin lispro  0-3 Units Subcutaneous Nightly    cefepime  1 g Intravenous Q24H    folbee plus  1 tablet Oral Daily    busPIRone  5 mg Oral BID    fluticasone  2 spray Each Nostril Daily    gabapentin  300 mg Oral Daily    insulin glargine  4 Units Subcutaneous QAM    isosorbide dinitrate  20 mg Oral TID    levothyroxine  25 mcg Oral Daily    meclizine  25 mg Oral TID    oxybutynin  5 mg Oral Daily    pravastatin  40 mg Oral Nightly    sevelamer  1,600 mg Oral TID WC    sodium chloride flush  10 mL Intravenous 2 times per day    heparin (porcine)  5,000 Units Subcutaneous 3 times per day     Continuous Infusions:    dextrose       PRN Meds:  glucose, dextrose, glucagon (rDNA), dextrose, sodium chloride flush, acetaminophen **OR** acetaminophen, polyethylene glycol, promethazine **OR** ondansetron    Input/Output:       I/O last 3 completed shifts: In: 7073.6 [P.O.:460; I.V.:613.6; Other:6000]  Out: 6100 [Other:6100].       Patient Vitals for the past 96 hrs (Last 3 readings):   Weight   08/16/20 0430 191 lb 1.6 oz (86.7 kg)   08/15/20 0300 187 lb (84.8 kg) 20 1849 185 lb 14.4 oz (84.3 kg)       Vital Signs:   Temperature:  Temp: 98.5 °F (36.9 °C)  TMax:   Temp (24hrs), Av.7 °F (37.1 °C), Min:97.7 °F (36.5 °C), Max:99.3 °F (37.4 °C)    Respirations:  Resp: 18  Pulse:   Pulse: 65  BP:    BP: (!) 144/72  BP Range: Systolic (70GSW), FVQ:093 , Min:141 , UWC:489       Diastolic (63ZDZ), DFK:56, Min:64, Max:73      Physical Examination:     General:  Awake, alert, no acute distress  HEENT: NC/AT/ MMM  Chest:               Coarse rhonchi noted  Cardiac:  S1 S2   Abdomen: Soft, non-tender,  Neuro:  No facial droop, No Asterixis  SKIN:  No rashes, good skin turgor. Extremities:  No edema, no cyanosis    Labs:       Recent Labs     08/13/20  2125 08/15/20  0655   WBC 22.7* 18.8*   RBC 3.51* 3.40*   HGB 11.5* 10.9*   HCT 36.0* 34.9*   .6* 102.6*   MCH 32.8 32.1   MCHC 31.9* 31.2*    358   MPV 9.8 10.0      BMP:   Recent Labs     08/14/20  2009 08/15/20  0655 20  0614   * 129* 127*   K 5.3* 4.9 3.7   CL 90* 93* 89*   CO2 23 22* 24   BUN 57* 55* 49*   CREATININE 7.5* 6.5* 6.8*   GLUCOSE 111* 97 89   CALCIUM 8.6 8.7 9.1      Phosphorus:   No results for input(s): PHOS in the last 72 hours. Magnesium:  No results for input(s): MG in the last 72 hours. Albumin:  No results for input(s): LABALBU in the last 72 hours.   BNP:    No results found for: BNP  LUIS:    No results found for: LUIS  SPEP:  Lab Results   Component Value Date    PROT 5.5 2020     UPEP:   No results found for: LABPE  C3:   No results found for: C3  C4:   No results found for: C4  MPO ANCA:   No results found for: MPO  PR3 ANCA:   No results found for: PR3  Anti-GBM:   No results found for: GBMABIGG  Hep BsAg:         Lab Results   Component Value Date    HEPBSAG Negative 2019     Hep C AB:          Lab Results   Component Value Date    HEPCAB Negative 2019       Urinalysis/Chemistries:      Lab Results   Component Value Date    NITRU NEGATIVE 2019 COLORU COLORLESS 08/14/2020    COLORU YELLOW 06/22/2019    PHUR 5.0 06/22/2019    WBCUA 0-2 06/22/2019    RBCUA 5-10 06/22/2019    YEAST NONE SEEN 06/22/2019    BACTERIA NONE 06/22/2019    CLARITYU Clear 03/09/2019    LEUKOCYTESUR NEGATIVE 06/22/2019    UROBILINOGEN 0.2 06/22/2019    BILIRUBINUR NEGATIVE 06/22/2019    BILIRUBINUR Negative 03/09/2019    BLOODU NEGATIVE 06/22/2019    GLUCOSEU NEGATIVE 06/22/2019    KETUA NEGATIVE 06/22/2019     Urine Sodium:   No results found for: REHANA  Urine Potassium:  No results found for: KUR  Urine Chloride:  No results found for: CLUR  Urine Osmolarity: No results found for: OSMOU  Urine Protein:   No components found for: TOTALPROTEIN, URINE   Urine Creatinine:     Lab Results   Component Value Date    LABCREA 45.0 03/09/2019     Urine Eosinophils:  No components found for: UEOS        Impression and Plan:  1. ESRD on peritoneal dialysis  -She has poor  ultrafiltration so will change to 2.5% dextrose every 6 hours.  -no evidence of peritonitis    2. Hyponatremia: Increase fluid restriction to 1.5 L  3. Leukocytosis  4. Bronchitis  5. Macrocytic Anemia  6. DM          Please don't hesitate to call with any questions.   Electronically signed by Froylan Ortiz DO on 8/16/2020 at 1:26 PM

## 2020-08-16 NOTE — PROGRESS NOTES
Pharmacy Vancomycin Consult     Vancomycin Day: 4  Current Dosing: intermittent, PD patient     Date Time Vancomycin dose Vancomycin Random   8/13/2020 1602 1000 mg IV     8/14/2020 0619  15 mcg/ml    8/14/2020 1244 1250 mg     8/16/2020 0614  18.2 mcg/ml   8/16/2020 ~1800 1000 mg                    Temp max:  99.3    Recent Labs     08/15/20  0655 08/16/20  0614   BUN 55* 49*       Recent Labs     08/15/20  0655 08/16/20  0614   CREATININE 6.5* 6.8*       Recent Labs     08/13/20  2125 08/15/20  0655   WBC 22.7* 18.8*         Intake/Output Summary (Last 24 hours) at 8/16/2020 0729  Last data filed at 8/16/2020 0448  Gross per 24 hour   Intake 7073.61 ml   Output 6100 ml   Net 973.61 ml         Cultures/Sensitivites  Date Source Result   08/14/20 BC x2 ngtd   8/14/20 PD fluid ngtd         Ht Readings from Last 1 Encounters:   08/13/20 5' 6\" (1.676 m)        Wt Readings from Last 1 Encounters:   08/16/20 191 lb 1.6 oz (86.7 kg)         Body mass index is 30.84 kg/m². Estimated Creatinine Clearance: 9 mL/min (A) (based on SCr of 6.8 mg/dL [facility])). Random: 18.2    Assessment/Plan:  Vanc 1000mg x1 today        Sumit Hunt, BCPS, BCCCP 8/16/2020 7:46 AM

## 2020-08-16 NOTE — PROGRESS NOTES
Hospitalist Progress Note      Patient:  Agapito Littlejohn    Unit/Bed:6K-18/018-A  YOB: 1950  MRN: [id number]   Acct: [de-identified]   PCP: Kuldeep Lee  Date of Admission: 8/13/2020    Assessment/Plan:    1. Sepsis: Criteria met with leukocytosis and fever, source most likely pulmonary in nature. Peritoneal fluid clear with cell count of 13, not suggestive or diagnostic of peritonitis. CXR performed on 8/14/2020 demonstrate persistent cardiac enlargement with elevation of right diaphragm.  Mild prominence of interstitial markings, possible bronchitis.    · Patient denies any chest pain/shortness of breath. Does report that her cough is improving. Currently on room air with adequate O2 saturations. · Preliminary blood cultures demonstrate no growth. · Afebrile for >24 hours, leukocytosis continues to improve. · Continue incentive spirometry, Acapella, pulmonary toileting, and cefepime (Day #3) for coverage. 2. ESRD on CAPD: Patient noted to have poor ultrafiltration, changed to 2.5 dextrose every 6 hours per nephrology recommendations, appreciate assistance. 3. Abdominal pain: Resolved. Peritonitis initially suspected but after nephrology evaluated, not likely at this time.  Fluid is clear with a cell count of 13; not suggestive or diagnostic of peritonitis. 4. Hyperkalemia: Resolved. Potassium level 3.7. Recheck chemistry this evening. 5. Hyponatremia: Not at goal. Sodium level 127. Increase fluid restriction to 1.5L.  Nephrology managing.  Recheck chemistry in the morning. 6. Type 2 diabetes mellitus: Hemoglobin A1c performed on 1/20/2020 was 6.1, repeat pending.  Continue Accu-Cheks before meals and at bedtime with sliding scale insulin coverage (low-dose corrective algorithm).  Continue Lantus 4 units every morning.  Hypoglycemia protocol in place, maintain carb controlled diet. 7. Hyperlipidemia: History.  Continue pravastatin. 8. Obesity: BMI 30.84. Chief Complaint: Sepsis    Initial H and P:-    Med Hagen a 29-year-old  female, lifetime non-smoker, with a medical history of mixed anxiety and depressed mood disorder, anemia, arthritis, asthma, bipolar 1 disorder, bradycardia, congestive heart failure, chronic diastolic heart failure, chronic kidney disease stage V, depression, GERD, gout, history of headaches, hyperlipidemia, hypertension, hypothyroidism, low back pain, generalized muscle weakness, neurogenic bladder, seizure disorder, type 2 diabetes mellitus, urinary retention, and obesity. Patient presented to [facility] from [address] for further evaluation of sepsis.  Patient presented to [facility] ER with abdominal pain and fevers at home. Mary Anne Dean is a known ESRD patient on CAPD and also complained of a cough that has been going on for approximately 2 weeks that had been progressively getting worse.  Patient also reported that she could not bring anything up when she coughed and stated that her fevers have been going on for approximately 2-3 days intermittently.  The hospitalist service was contacted for further evaluation, treatment, and management. Subjective (past 24 hours):   Patient resting in bed at the time of the interview. States that she is feeling much better today than she did yesterday and denies any physical complaints. She was updated on the current plan of care, verbalized her understanding of antibiotic coverage, she is receptive to the treatment and had no other needs or questions at this time. Past medical history, family history, social history and allergies reviewed again and is unchanged since admission. ROS (14 point review of systems completed. Pertinent positives noted. Otherwise ROS is negative) :  GENERAL: No fever,chills, or night sweats. SKIN: No lesions or rashes. HEAD: No headaches or recent injury.   EYES: No acute changes in vision, no diplopia or blurred vision. EARS: No hearing loss, no tinnitus. NOSE/THROAT: No rhinorrhea or pharyngitis, no nasal drainage. NECK: No lumps or unusual neck stiffness. PULMONARY: Respirations easy and non-labored, no acute distress. CARDIAC: No chest pain, pressure, Negative for lower leg edema. GI: Abdomen is soft and non-tender, non-distended. PERIPHERAL VASCULAR: No intermittent claudication or unusual leg cramps. MUSCULOSKELETAL: Occasional arthralgias, myalgias. NEUROLOGICAL: Denies any headache, near syncope, seizures or syncope. HEMATOLOGIC:  No unusual bruising or bleeding. PSYCH: Denies any homicidal or suicidial ideations.     Medications:  Reviewed    Infusion Medications    dextrose       Scheduled Medications    insulin lispro  0-6 Units Subcutaneous TID WC    insulin lispro  0-3 Units Subcutaneous Nightly    cefepime  1 g Intravenous Q24H    folbee plus  1 tablet Oral Daily    busPIRone  5 mg Oral BID    fluticasone  2 spray Each Nostril Daily    gabapentin  300 mg Oral Daily    insulin glargine  4 Units Subcutaneous QAM    isosorbide dinitrate  20 mg Oral TID    levothyroxine  25 mcg Oral Daily    meclizine  25 mg Oral TID    oxybutynin  5 mg Oral Daily    pravastatin  40 mg Oral Nightly    sevelamer  1,600 mg Oral TID WC    sodium chloride flush  10 mL Intravenous 2 times per day    heparin (porcine)  5,000 Units Subcutaneous 3 times per day    dianeal lo-ivonne 1.5%  2,000 mL Intraperitoneal Q6H     PRN Meds: glucose, dextrose, glucagon (rDNA), dextrose, sodium chloride flush, acetaminophen **OR** acetaminophen, polyethylene glycol, promethazine **OR** ondansetron      Intake/Output Summary (Last 24 hours) at 8/16/2020 0909  Last data filed at 8/16/2020 0804  Gross per 24 hour   Intake 9073.61 ml   Output 8000 ml   Net 1073.61 ml       Diet:  DIET RENAL; Daily Fluid Restriction: 1800 ml    Exam:  BP (!) 170/73   Pulse 59   Temp 99 °F (37.2 °C) (Oral)   Resp 18   Ht 5' 6\" (1.676 m)   Wt 191 lb 1.6 oz (86.7 kg)   SpO2 96%   BMI 30.84 kg/m²     General appearance: Alert and appropriate, pleasant geriatric female. Pinkey Showman apparent distress, appears stated age and cooperative. HEENT: Pupils equal, round, and reactive to light. Conjunctivae/corneas clear. Neck: Supple, with full range of motion. No jugular venous distention. Trachea midline. Respiratory:  Normal respiratory effort. Clear to auscultation, bilaterally without Rales/Wheezes/Rhonchi. Cardiovascular: Regular rate and rhythm with normal S1/S2 without murmurs, rubs or gallops. Abdomen: Soft with mild tenderness noted diffusely; normal bowel sounds appreciated. Musculoskeletal: Passive and active ROM x 4 extremities. Skin: Skin color, texture, turgor normal.  No rashes or lesions. Neurologic:  Neurovascularly intact without any focal sensory/motor deficits. Cranial nerves: II-XII intact, grossly non-focal.  Psychiatric: Alert and oriented to person, place, time, and situation. Thought content appropriate, normal insight  Capillary Refill: Brisk,< 3 seconds   Peripheral Pulses: +2 palpable, equal bilaterally     Labs:   Recent Labs     08/13/20  2125 08/15/20  0655   WBC 22.7* 18.8*   HGB 11.5* 10.9*   HCT 36.0* 34.9*    358     Recent Labs     08/14/20  2009 08/15/20  0655 08/16/20  0614   * 129* 127*   K 5.3* 4.9 3.7   CL 90* 93* 89*   CO2 23 22* 24   BUN 57* 55* 49*   CREATININE 7.5* 6.5* 6.8*   CALCIUM 8.6 8.7 9.1     No results for input(s): AST, ALT, BILIDIR, BILITOT, ALKPHOS in the last 72 hours. No results for input(s): INR in the last 72 hours. No results for input(s): Gricelda Ivory in the last 72 hours.     Microbiology:    Blood culture #1:   Lab Results   Component Value Date    BC No growth-preliminary  08/14/2020       Blood culture #2:No results found for: Bonnie Hodgson    Organism:No results found for: [facility]      Lab Results   Component Value Date    LABGRAM  08/14/2020     Rare segmented neutrophils observed. No bacteria seen. performed on cytospun specimen        MRSA culture only:No results found for: [facility]    Urine culture:   Lab Results   Component Value Date    LABURIN No growth-preliminary  No growth   12/07/2018       Respiratory culture: No results found for: CULTRESP    Aerobic and Anaerobic :  No results found for: LABAERO  Lab Results   Component Value Date    LABANAE No growth-preliminary  No growth   06/12/2019       Urinalysis:      Lab Results   Component Value Date    NITRU NEGATIVE 06/22/2019    WBCUA 0-2 06/22/2019    BACTERIA NONE 06/22/2019    RBCUA 5-10 06/22/2019    BLOODU NEGATIVE 06/22/2019    GLUCOSEU NEGATIVE 06/22/2019       Radiology:  XR CHEST PORTABLE   Final Result      Persistent cardiac enlargement with elevation of right diaphragm. Mild prominence of interstitial markings, correlate with bronchitis      **This report has been created using voice recognition software. It may contain minor errors which are inherent in voice recognition technology. **      Final report electronically signed by Dr. Wolfgang Camejo on 8/14/2020 3:39 AM        Xr Chest Portable    Result Date: 8/14/2020  PROCEDURE: XR CHEST PORTABLE CLINICAL INFORMATION: sepsis. COMPARISON: January 25, 2020 TECHNIQUE: AP upright view of the chest. FINDINGS: There is stable cardiac enlargement with persistent elevation of the right hemidiaphragm. There is no significant pulmonary venous congestion despite minimal coarseness of interstitial markings. Age-related changes are suspected. Minimal bronchitis is not excluded. The skeleton is negative     Persistent cardiac enlargement with elevation of right diaphragm. Mild prominence of interstitial markings, correlate with bronchitis **This report has been created using voice recognition software. It may contain minor errors which are inherent in voice recognition technology. ** Final report electronically signed by Dr. Wolfgang Camejo on 8/14/2020 3:39 AM      **This report has been created using voice recognition software. It may contain minor errors which are inherent in voice recognition technology. **  Electronically signed by YESENIA Ballesteros CNP on 8/16/2020 at 9:09 AM

## 2020-08-17 NOTE — PROGRESS NOTES
Hospitalist Progress Note      Patient:  Parris Oakes      Unit/Bed:6K-18/018-A    YOB: 1950    MRN: [id number]       Acct: [de-identified]     PCP: [address]    Date of Admission: 8/13/2020    Assessment/Plan:    1. Sepsis with leukocytosis, fever -- POA -- source likely pulmonary in origin per CXR 8/14 and repeat 8/17 with mod atelectasis/pneumonia both perihilar regions -- c/s ID 8/17 as WBC trending up again to 21.2 on 8/17 after down to 18.8 on 8/15 despite being on cefepime since 8/13 and given vanc 8/14 x 1 --> ?resume vanc  -- LA normal on admission 8/13 and not trended  -- blood cx at [facility] 8/13 (-) and repeat 8/14 here (-) to date  -- COVID 8/13 (-)  -- peritoneal fluid cx 8/13 at [facility] (-) and repeat here 8/14 (-)  -- u/a 8/13 at [facility] abn with +WBC, Many bacteria but cx results showed \"normal rosa maria\" -- ?repeat  -- CT abd 8/13/2020 at [facility] (-) for acute process thus less likely GI etiology  -- no diarrhea  2. Persistent Nausea-- ?etiology - WBC rising again 8/17 thus ? infectious etiology -- cont prn phenergan po/zofran IV prn -- ?need GI eval - check LFT 8/17 but hx of cholecystecomy  -- ?gastroparesis as has hx but having BM's and no emesis and improves with eating and not worsening  --  ?PUD as improves with eating -->   -- CT at [facility] 8/15/2020 (-) acute process but with fluid in abd with PD; extensive vascular calcifications  -- Tolerating PD w/o abd pain and PD fluid (-) for infxn here and at [facility]  -- u/a at [facility] 8/13 abn but cx with normal rosa maria  -- ?gastroparesis -- has noted hx per chart review  3.  Leukocytosis -- see #1 -->trending up again 8/17 to 21 from 18.8 on 8/15 --> c/s ID 8/17 as WBC trending up despite being on cefepime since 8/13 and vanc given 8/14  -- LA normal on admission 8/13 and not trended  -- blood cx at [facility] 8/13 (-) and repeat 8/14 here (-) to date  -- COVID 8/13 (-)  -- peritoneal fluid cx 8/13 at [facility] (-) and repeat here 8/14 (-)  -- u/a 8/13 at [facility] abn with +WBC, Many bacteria but cx results showed \"normal rosa maria\" -- ?repeat  -- CT abd 8/13/2020 at [facility] (-) for acute process thus less likely GI etiology  -- no diarrhea  4. Probable pneumonia -- Gram (+) vs gram (-) -- at risk for pseudomonas/MRSA as renal pt on PD, ECF pt --  +cough but non-productive, PCT up to 1.75 on 8/13 (baseline in 0.2-0.4's in 2019) -- CXR 8/17 = mod atelectasis/pneumonia both perihilar regions --> cefepime started 8/13 - s/p vanc 8/14 x 1 -> ?resume as WBC trending up -- c/s ID  5. Abdominal pain -- resolved but pt has persistent nausea -- CT at [facility] 8/15/2020 (-) acute process but with fluid in abd with PD; extensive vascular calcifications  -- PD fluid 8/13 at [facility] and repeat again here 8/14 (-) and thus NOT peritonitis   -- hx cholecystectomy  -- check LFT with nausea  6. ESRD on PD -- apprec renal assist -- per note 8/16 pt with poor ultrafiltration thus changed to 2.5% dextrose q6 hrs -- no peritonitis  7. Hyperkalemia -- improved - per renal as HD pt -- monitor  8. Hyponatremia-- asx --chronic -- PD pt thus mgmt per renal -- 1.5L fluid restriction -- up slightly to 130 from 125 on 8/13 -- cont monitor  9. Bilateral pleural effusions -- recurrent and chronic issue -- small left, trace right noted again per CT abd 8/15/2020 at [facility] -- required PleurX drains in past in 2019 -- stable on RA  -- last echo 1/2020  10. Restrictive lung disease -- noted prior admissions - due to scoliosis, pleural effusions --per diagnosis from prior admissions--stable on RA --has been followed by pulmonary in the past with #9  11. Chronic diastolic CHF -- fluid mgmt per renal -- no acute exacerbation -- cont isordil - hydralazine prn PTA -- last echo was limited 1/2020 = EF 50 to 55%, mild MR with independently mobile mass on the ventricular side of posterior mitral leaflet  12. Chronic bradycardia -- avoid BB/CCB -- tele stable - asx -- last TSH 1/2020 WNL  13.  Anemia chronic arthritis, asthma, bipolar 1 disorder, bradycardia, congestive heart failure, chronic diastolic heart failure, chronic kidney disease stage V, depression, GERD, gout, history of headaches, hyperlipidemia, hypertension, hypothyroidism, low back pain, generalized muscle weakness, neurogenic bladder, seizure disorder, type 2 diabetes mellitus, urinary retention, and obesity. Patient presented to [facility] from [address] for further evaluation of sepsis.  Patient presented to [facility] ER with abdominal pain and fevers at home. Neva Clements is a known ESRD patient on CAPD and also complained of a cough that has been going on for approximately 2 weeks that had been progressively getting worse.  Patient also reported that she could not bring anything up when she coughed and stated that her fevers have been going on for approximately 2-3 days intermittently.  The hospitalist service was contacted for further evaluation, treatment, and management. \" At [facility] emergency room patient was noted to have a white count of 22,000, sodium of 125, potassium of 5.8.  -- renal consulted for PD  -- empirically started on cefepime 8/13 (s/p azactam at [facility] 8/13), vanc x 1 on 8/14 for leukocytosis, ?fever - suspect pulm origin with CXR 8/14 with elevated right diaphragm, mild prominence of interstitial markings, correlate with bronchitis -> repeated 8/17 with mod atelectasis/pneumonia both perihilar regions --> WBC rising to 20's from 18 on 8/15    Subjective/HPI:   -- 8/17/2020  --> pt c/o nausea today - no emesis. No abd pain. PD exchanges w/o pain and fluid clear. Eating and phenergan helps the nausea. No f/c. Denies cp, sob. Isaiah po. On RA. Afebrile - Tmax 99.3. Ambulating without difficulty - no lightheaded/dizziness. Last BM 8/15 x 2 - no diarrhea, melena/hematochezia. PMH, SURGICAL HX, FH, SOCIAL HX reviewed and updated as needed.     Medications:  Reviewed    Infusion Medications    dextrose Scheduled Medications    dianeal lo-ivonne (ULTRABAG) 2.5%  2,000 mL Intraperitoneal Q6H    insulin lispro  0-6 Units Subcutaneous TID WC    insulin lispro  0-3 Units Subcutaneous Nightly    cefepime  1 g Intravenous Q24H    folbee plus  1 tablet Oral Daily    busPIRone  5 mg Oral BID    fluticasone  2 spray Each Nostril Daily    gabapentin  300 mg Oral Daily    insulin glargine  4 Units Subcutaneous QAM    isosorbide dinitrate  20 mg Oral TID    levothyroxine  25 mcg Oral Daily    meclizine  25 mg Oral TID    oxybutynin  5 mg Oral Daily    pravastatin  40 mg Oral Nightly    sevelamer  1,600 mg Oral TID WC    sodium chloride flush  10 mL Intravenous 2 times per day    heparin (porcine)  5,000 Units Subcutaneous 3 times per day     PRN Meds: glucose, dextrose, glucagon (rDNA), dextrose, sodium chloride flush, acetaminophen **OR** acetaminophen, polyethylene glycol, promethazine **OR** ondansetron      Intake/Output Summary (Last 24 hours) at 8/17/2020 1441  Last data filed at 8/17/2020 0930  Gross per 24 hour   Intake 07601 ml   Output 9650 ml   Net 896 ml       Diet:  DIET RENAL; Daily Fluid Restriction: 1500 ml  Dietary Nutrition Supplements: Wound Healing Oral Supplement    Exam:  BP (!) 154/67   Pulse 65   Temp 98.7 °F (37.1 °C) (Oral)   Resp 16   Ht 5' 6\" (1.676 m)   Wt 191 lb 1.6 oz (86.7 kg)   SpO2 95%   BMI 30.84 kg/m²     General appearance: No apparent distress, appears older than stated age and cooperative, up in chair holding emesis bag  HEENT: Pupils equal, round, and reactive to light. Conjunctivae/corneas clear. Neck: Supple, with full range of motion. Trachea midline. Respiratory:  Normal respiratory effort. Diminished in bases but clear to auscultation, bilaterally without Rales/Wheezes/Rhonchi. No respiratory distress or accessory muscle use. Cardiovascular: Regular rate and rhythm with ectopy with normal S1/S2 without murmurs, rubs or gallops.     Abdomen: Soft, non-tender, non-distended with normal bowel sounds. No rebound or guarding. +PD catheter in place. Musculoskeletal: No clubbing, cyanosis or edema bilaterally. Full range of motion without deformity. No calf tenderness palpation  Skin: Skin color, texture, turgor normal.  No rashes or lesions. Neurologic:  Neurovascularly intact without any focal sensory/motor deficits. Cranial nerves: II-XII intact, grossly non-focal.  Psychiatric: Alert and oriented, thought content appropriate  Capillary Refill: Brisk,< 3 seconds   Peripheral Pulses: +2 palpable, equal bilaterally       All labs reviewed and interpreted by me:  Labs:   Recent Labs     08/15/20  0655 08/17/20  0744   WBC 18.8* 21.2*   HGB 10.9* 10.5*   HCT 34.9* 31.8*    367     Recent Labs     08/15/20  0655 08/16/20  0614 08/17/20  0604   * 127* 130*   K 4.9 3.7 3.9  3.9   CL 93* 89* 94*   CO2 22* 24 24   BUN 55* 49* 47*   CREATININE 6.5* 6.8* 6.3*   CALCIUM 8.7 9.1 8.6     No results for input(s): AST, ALT, BILIDIR, BILITOT, ALKPHOS in the last 72 hours. No results for input(s): INR in the last 72 hours. No results for input(s): Amilcar Drier in the last 72 hours. Urinalysis:      Lab Results   Component Value Date    NITRU NEGATIVE 06/22/2019    WBCUA 0-2 06/22/2019    BACTERIA NONE 06/22/2019    RBCUA 5-10 06/22/2019    BLOODU NEGATIVE 06/22/2019    GLUCOSEU NEGATIVE 06/22/2019       All radiology images and reports reviewed and interpreted by me:  Radiology:  XR CHEST (2 VW)   Final Result   1. Normal heart size. Moderate chronic elevation right hemidiaphragm. 2. Moderate atelectasis/pneumonia both perihilar regions and lung right hemidiaphragm. 3. Tiny bilateral pleural effusions. **This report has been created using voice recognition software. It may contain minor errors which are inherent in voice recognition technology. **      Final report electronically signed by Dr. Chen Davis on 8/17/2020 1:34 PM XR CHEST PORTABLE   Final Result      Persistent cardiac enlargement with elevation of right diaphragm. Mild prominence of interstitial markings, correlate with bronchitis      **This report has been created using voice recognition software. It may contain minor errors which are inherent in voice recognition technology. **      Final report electronically signed by Dr. Kika Hampton on 8/14/2020 3:39 AM          Diet: DIET RENAL; Daily Fluid Restriction: 1500 ml  Dietary Nutrition Supplements: Wound Healing Oral Supplement    Perez: no    Microbiology:  Blood cx at [facility] 8/13 (-) to date    Peritoneal fluid 8/13 (-) at [facility]    Urine cx 8/13 at [facility] = >100,000 colonies per ml mixed normal urogenital rosa maria - Sensitivity not indicated     Blood cx 8/14 (-) to date    Peritoneal fluid cx 8/14 (-) to date    COVID 8/13 (-)    Tele review last 24 hrs:  SR, frequent PAC    DVT prophylaxis: [] Lovenox                                 [] SCDs                                 [x] SQ Heparin                                 [] Encourage ambulation           [] Already on Anticoagulation     Disposition:    [] Home       [] TCU       [] Rehab       [] Psych       [x] SNF       [] [place]       [] Other-    Code Status: Full Code    PT/OT Eval Status: following      Electronically signed by Tracey Jones MD on 8/17/2020 at 2:41 PM when pt evaluated - final note filed late

## 2020-08-17 NOTE — PROGRESS NOTES
input(s): LABIRONS  XR CHEST PORTABLE   Final Result      Persistent cardiac enlargement with elevation of right diaphragm. Mild prominence of interstitial markings, correlate with bronchitis      **This report has been created using voice recognition software. It may contain minor errors which are inherent in voice recognition technology. **      Final report electronically signed by Dr. Kika Hampton on 8/14/2020 3:39 AM            Objective:   Vitals: BP (!) 140/68   Pulse 57   Temp 99.1 °F (37.3 °C) (Oral)   Resp 16   Ht 5' 6\" (1.676 m)   Wt 191 lb 1.6 oz (86.7 kg)   SpO2 96%   BMI 30.84 kg/m²    Wt Readings from Last 3 Encounters:   08/16/20 191 lb 1.6 oz (86.7 kg)   06/02/20 186 lb 9.6 oz (84.6 kg)   02/11/20 185 lb 12.8 oz (84.3 kg)      24HR INTAKE/OUTPUT:      Intake/Output Summary (Last 24 hours) at 8/17/2020 0859  Last data filed at 8/17/2020 1938  Gross per 24 hour   Intake 8636 ml   Output 6200 ml   Net 2436 ml       Constitutional:  Alert, awake, no apparent distress  Skin: Abdominal area around PD port shows no signs of infection. No swelling, redness, tenderness to palpation. HEENT:Pupils are reactive . Throat is clear . Oral mucosa is moist  Neck:supple with no thyromegaly or carotid bruit  Cardiovascular:  S1, S2 without murmur or rubs  Respiratory:  Clear to ausculation without wheezes, rhonchi or rales. Abdomen: +bs, some distention is present. No tenderness/guarding on palpation. Ext: +1 LE edema  Musculoskeletal:Intact  Neuro:Alert and awake. Well oriented  with no focal deficit. Speech is normal      Electronically signed by Pauline Joe MD on 8/17/2020 at 8:59 AM

## 2020-08-17 NOTE — PLAN OF CARE
Problem: Skin Integrity:  Goal: Absence of new skin breakdown  Description: Absence of new skin breakdown  Outcome: Ongoing  Note: No evidence of any new skin breakdown at this time in the shift. Helping patient turn every two hours        Problem: DISCHARGE BARRIERS  Goal: Patient's continuum of care needs are met  Outcome: Ongoing  Note: All patients questions have been answered fully. No other questions at this time      Problem: Falls - Risk of:  Goal: Will remain free from falls  Description: Will remain free from falls  Outcome: Ongoing  Note: Patient is currently not getting out of bed      Problem: Discharge Planning:  Goal: Discharged to appropriate level of care  Description: Discharged to appropriate level of care  Outcome: Ongoing  Note: Patients case is being reviewed, possible DC today    CARE PLAN REVIEWED WITH PATIENT, PATIENT VERBALIZED UNDERSTANDING OF THE PLAN OF CARE AND CONTRIBUTED TO THE GOAL SETTING.

## 2020-08-17 NOTE — PLAN OF CARE
Problem: Skin Integrity:  Goal: Will show no infection signs and symptoms  Description: Will show no infection signs and symptoms  Outcome: Ongoing  Note: No new breakdown this shift. Pillow support. Encourage repositioning. Goal: Absence of new skin breakdown  Description: Absence of new skin breakdown  8/17/2020 1401 by Wesley Vazquez RN  Outcome: Ongoing  Note: No new breakdown. Barrier cream to buttocks. Heels floated  Turn every two hours and as needed. Problem: DISCHARGE BARRIERS  Goal: Patient's continuum of care needs are met  8/17/2020 1401 by Wesley Vazquez RN  Outcome: Ongoing  Note: Needs are met. Problem: Falls - Risk of:  Goal: Will remain free from falls  Description: Will remain free from falls  8/17/2020 1401 by Wesley Vazquez RN  Outcome: Ongoing  Note: No falls this shift. Call light within reach. Bed and chair alarm in use. Up with 2 assist, walker, and gait belt. Problem: Nutritional:  Goal: Nutritional status will improve  Description: Nutritional status will improve  Outcome: Ongoing  Note: Eating well. Problem: Physical Regulation:  Goal: Will remain free from infection  Description: Will remain free from infection  Outcome: Ongoing  Note: No signs of infection, Beraki consulted. Problem: Discharge Planning:  Goal: Discharged to appropriate level of care  Description: Discharged to appropriate level of care  8/17/2020 1401 by Wesley Vazquez RN  Outcome: Ongoing  Note: Plans for return to [facility]. Problem: Tissue Perfusion - Renal, Altered:  Goal: Electrolytes within specified parameters  Description: Electrolytes within specified parameters  Outcome: Ongoing  Note: WDL.   Goal: Ability to achieve a balanced intake and output will improve  Description: Ability to achieve a balanced intake and output will improve  Outcome: Ongoing     Problem: Pain:  Goal: Pain level will decrease  Description: Pain level will decrease  Outcome: Ongoing  Note: Tylenol for pain. Goal: Control of acute pain  Description: Control of acute pain  Outcome: Ongoing  Goal: Control of chronic pain  Description: Control of chronic pain  Outcome: Ongoing     Care plan reviewed with patient. Patient verbalize understanding of the plan of care and contribute to goal setting.

## 2020-08-17 NOTE — PLAN OF CARE
[facility]  OCCUPATIONAL THERAPY MISSED TREATMENT NOTE  STRZ RENAL TELEMETRY 6K  6K-18/018-A      Date: 2020  Patient Name: Arron Walsh        CSN: [id number]   : 1950  (78 y.o.)  Gender: female   Referring Practitioner: EMRE Mcfadden  Diagnosis: Sepsis         REASON FOR MISSED TREATMENT:  Pt was off the floor getting a chest x-ray earlier in the day. Pt worked with PT this afternoon and was assisted back into bed.   Will retry in the AM.

## 2020-08-17 NOTE — PROGRESS NOTES
Comprehensive Nutrition Assessment    Type and Reason for Visit:  Initial, Positive Nutrition Screen, Wound    Nutrition Recommendations/Plan:   *Started Jose Rafael BID. *Continue current diet and Folbee Plus daily. Nutrition Assessment: Pt. nutritionally compromised AEB wounds. At risk for further nutrition compromise r/t increased nutrient needs for wound healing, underlying medical condition (hx HTN, COPD, CAD) and need for nutrition support. Nutrition recommendations/interventions as per above. Malnutrition Assessment:  Malnutrition Status: At risk for malnutrition (Comment)    Context:  Acute Illness     Findings of the 6 clinical characteristics of malnutrition:  Energy Intake:  1 - 75% or less of estimated energy requirements for 7 or more days  Weight Loss:  No significant weight loss     Body Fat Loss:  No significant body fat loss     Muscle Mass Loss:  No significant muscle mass loss    Fluid Accumulation:  1 - Mild Extremities   Strength:  Not Performed    Estimated Daily Nutrient Needs:  Energy (kcal):  3824-6983 kcal/day (15-18 kcal/kg); Weight Used for Energy Requirements:  (86.7 kg on 8/16)     Protein (g):  83+ g/day (1.4+ g/kg); Weight Used for Protein Requirements:  (IBW 59 kg)          Nutrition Related Findings:  admit d/t sepsis; pt seen; reports decreased appetite over the past few weeks consuming 50% most meals; pt reports po intake has improved since admit consuming 100% meals; +wounds present; pt amenable to Jose Rafael BID; pt denies N/V today but has some nausea yesterday; last BM x2 on 8/15. Labs: BUN 47, Cr 6.3, Na 130, Hg 10.5, HgA1C 6.1% on 1/28/20. Rx includes:  Folbee Plus, Insulin coverage, Renvela, Phenergan PRN and Zofran PRN      Wounds:  Pressure Ulcer, Unstageable(unstageable pressure injury on foot and pressure injury stage 1 on coccyx)       Current Nutrition Therapies:    DIET RENAL; Daily Fluid Restriction: 1500 ml    Anthropometric Measures:  · Height: 5' 6\" (167.6 cm)  · Current Body Weight: 191 lb 1.6 oz (86.7 kg)(816; +2 generalized edema)   · Admission Body Weight: 185 lb 14.4 oz (84.3 kg)(8/13; +2 generalized edema)    · Usual Body Weight: (180-190 lbs per pt report. Per EMR: 186 lb 9.6 oz on 6/2/20; 185 lb 12.8 oz on 2/11/20)     · Ideal Body Weight: 130 lbs  · BMI: 30.9    · BMI Categories: Obese Class 1 (BMI 30.0-34. 9)       Nutrition Diagnosis:   · Increased nutrient needs related to increase demand for energy/nutrients(protein) as evidenced by wounds    Nutrition Interventions:   Food and/or Nutrient Delivery:  Continue Current Diet, Start Oral Nutrition Supplement, Vitamin Supplement  Nutrition Education/Counseling:  Education initiated(Encouraged good intake of protein souces with meals and good blood sugar control to aid in wound healing)   Coordination of Nutrition Care:  Continued Inpatient Monitoring    Goals:  Pt will consume 75% or more of meals during LOS to aid in wound healing       Nutrition Monitoring and Evaluation:   Food/Nutrient Intake Outcomes:  Food and Nutrient Intake, Supplement Intake, Vitamin/Mineral Intake  Physical Signs/Symptoms Outcomes:  Weight, Skin, Nutrition Focused Physical Findings, Fluid Status or Edema, Nausea or Vomiting, GI Status, Biochemical Data     Discharge Planning:    Continue current diet, Continue Oral Nutrition Supplement     Electronically signed by Alivia Hines RD, JANIA on 8/17/20 at 11:35 AM EDT    Contact: [phone]

## 2020-08-17 NOTE — PROGRESS NOTES
[facility]  INPATIENT PHYSICAL THERAPY  EVALUATION  STRZ RENAL TELEMETRY 6K - 6K-18/018-A    Time In: 2179  Time Out: 7821  Timed Code Treatment Minutes: 13 Minutes  Minutes: 28          Date: 2020  Patient Name: Corina Reyes,  Gender:  female        MRN: [id number]  : 1950  (78 y.o.)      Referring Practitioner: YESENIA Sim CNP  Diagnosis: Sepsis  Additional Pertinent Hx: Patient transferred from [facility] for further evaluation of sepsis. The patient had presented there with abdominal pain and fevers at home. The patient has a history ESRD on CAPD. She also complains of cough for about two weeks that has been getting progressively worse. She states she can not bring anything up with her cough. The patient states the fevers started 2-3 days ago intermittently. Restrictions/Precautions:  Restrictions/Precautions: General Precautions, Fall Risk    Subjective:  Chart Reviewed: Yes  Patient assessed for rehabilitation services?: Yes  Subjective: Pt resting in recliner and would like to return to bed. General:  Overall Orientation Status: Within Functional Limits    Vision: Impaired  Vision Exceptions: Wears glasses for reading    Hearing: Within functional limits         Pain: 15/10: back    Social/Functional History:    Type of Home: Facility([facility])  Home Equipment: Rolling walker, Mari Brayan            Additional Comments: Pt reports that she is hoping to be able to move to a facility closer to her son in [place]. OBJECTIVE:  Range of Motion:  Bilateral Lower Extremity: WFL    Strength:  Bilateral Lower Extremity: grossly 3+ to 4-/5    Balance:  Static Sitting Balance:  Supervision  Dynamic Sitting Balance: Contact Guard Assistance  Static Standing Balance: Minimal Assistance, X 2  Dynamic Standing Balance: Minimal Assistance, X 2    Bed Mobility:  Sit to Supine:  Moderate Assistance, for LEs onto the bed     Transfers:  Sit to Stand: Minimal Assistance, X 2  Stand to [facility] 68, X 2    Ambulation:  Minimal Assistance, X 2, with verbal cues   Distance: 3 ft  Surface: Level Tile  Device:Rolling Walker  Gait Deviations:  Slow Aurea, Decreased Step Length Bilaterally, Decreased Gait Speed, Decreased Heel Strike Bilaterally, Unsteady Gait and pt fearful of her knees buckling when standing. Exercise:  Patient was guided in 1 set(s) 5-7 reps of exercise to both lower extremities. Ankle pumps, Glut sets, Quad sets, Heelslides, Hip abduction/adduction, Seated marches and Long arc quads. Exercises were completed for increased independence with functional mobility. Functional Outcome Measures: Completed  -PAC Inpatient Mobility Raw Score : 11  AM-PAC Inpatient T-Scale Score : 33.86    ASSESSMENT:  Activity Tolerance:  Patient tolerance of  treatment: good minus due to pain      Treatment Initiated: Treatment and education initiated within context of evaluation. Evaluation time included review of current medical information, gathering information related to past medical, social and functional history, completion of standardized testing, formal and informal observation of tasks, assessment of data and development of plan of care and goals. Treatment time included skilled education and facilitation of tasks to increase safety and independence with functional mobility for improved independence and quality of life. Assessment: Body structures, Functions, Activity limitations: Decreased functional mobility , Decreased endurance, Decreased balance, Decreased strength, Increased pain  Assessment: Pt is a 79 yo female that is deconditioned and fearful of falling and of knees buckling. Pt participated well with session and is requiring Min A x 2 for sit <> stand and to ambulate 3 ft with walker.  Pt would benefit from continued skilled PT to address strengthening, balance, transfers, and gait training to improve functional

## 2020-08-18 NOTE — PROGRESS NOTES
[address] [address]  INPATIENT PHYSICAL THERAPY  DAILY NOTE  STRZ RENAL TELEMETRY 6K - 6K-18/018-A    Time In: 4180  Time Out: 1427  Timed Code Treatment Minutes: 13 Minutes  Minutes: 13          Date: 2020  Patient Name: Dori Dickinson,  Gender:  female        MRN: [id number]  : 1950  (78 y.o.)     Referring Practitioner: YESENIA Escobedo CNP  Diagnosis: Sepsis  Additional Pertinent Hx: Patient transferred from [facility] for further evaluation of sepsis. The patient had presented there with abdominal pain and fevers at home. The patient has a history ESRD on CAPD. She also complains of cough for about two weeks that has been getting progressively worse. She states she can not bring anything up with her cough. The patient states the fevers started 2-3 days ago intermittently. Prior Level of Function:  Lives With: Alone  Type of Home: Facility  Home Layout: One level  Home Access: Level entry  Home Equipment: Rolling walker, Wheelchair-manual   Bathroom Shower/Tub: Walk-in shower  Bathroom Toilet: Standard  Bathroom Equipment: Grab bars in shower, Grab bars around toilet  Bathroom Accessibility: Accessible    ADL Assistance: Needs assistance  [address]: Needs assistance  Active : No  Additional Comments: Pt reports that she is hoping to be able to move to a facility closer to her son in [place]. Pt had help with doing her self care. She was walking with a rolling walker with help from staff. She reported unsteadiness in her RLE and needing to have more help lately. Restrictions/Precautions:  Restrictions/Precautions: General Precautions, Fall Risk    SUBJECTIVE: pt cooperative and motivated, pt fatigued quickly    PAIN: no c/o pain    OBJECTIVE:  Bed Mobility:  Rolling to Right: Moderate Assistance, X 1   Supine to Sit: Maximum Assistance, X 1  Scooting: Contact Guard Assistance, X 1, fwd in sitting to edge of bed    Transfers:  Sit to Stand:  Moderate Assistance, X 1  Stand to Sit:Moderate Assistance, X 1    Ambulation:  Minimal Assistance, to modAx1  Distance: 3 steps bed to chair  Surface: Level Tile  Device:Rolling Walker  Gait Deviations: Forward Flexed Posture, Slow Aurea, Decreased Step Length Bilaterally, Decreased Heel Strike Bilaterally, Unsteady Gait and pt with bilateral foot drop, pt stated has AFO for RLE but not yet for LLE at SNF, pt stating RLE starts to buckle firs then LLE will buckle with WB activity    Balance:  Static Sitting Balance:  Stand By Assistance, X 1  Static Standing Balance: Minimal Assistance, X 1, at RW, cues for erect posture, 2 trials around10-15 sec    Exercise:  Patient was guided in 1 set(s) 3 reps of exercise to both lower extremities. PROM ankle DF . Exercises were completed for increased independence with functional mobility. Functional Outcome Measures: Completed  AM-PAC Inpatient Mobility Raw Score : 10  AM-PAC Inpatient T-Scale Score : 32.29    ASSESSMENT:  Assessment: Patient progressing toward established goals. Activity Tolerance:  Patient tolerance of  treatment: fair.  Easily SOB and fatigued with WB activity     Equipment Recommendations:Equipment Needed: No  Discharge Recommendations:  Continue to assess pending progress, ECF with PT, Subacute/Skilled Nursing Facility    Plan: Times per week: 3-5x GM  Current Treatment Recommendations: Strengthening, Endurance Training, Balance Training, Functional Mobility Training, Transfer Training, Gait Training, Home Exercise Program, Safety Education & Training, Patient/Caregiver Education & Training    Patient Education  Patient Education: Transfers, Gait    Goals:  Patient goals : get stronger and go back to ECF  Short term goals  Time Frame for Short term goals: at discharge  Short term goal 1: Pt to be SBA for supine <> sit to get in/out of bed  Short term goal 2: Pt to be CGA x 1 for sit <> stand to get up to ambulate  Short term goal 3: Pt to ambulate >15 ft with RW with CGA x 1 to get to bathroom  Long term goals  Time Frame for Long term goals : not set due to short ELOS    Following session, patient left in safe position with all fall risk precautions in place.

## 2020-08-18 NOTE — PROGRESS NOTES
[facility]         Renal Inpatient Follow-Up note         8/18/2020 2:09 PM    Pt Name:   Glenn Elliott  YOB: 1950  Attending:   Felicita De Los Santos MD    Chief Complaint : Glenn Elliott is a 78 y.o. female being followed by nephrology for esrd on PD    Interval History :   Patient seen and examined by me. No distress  Feels well. No cp or SOB. Still she is having some cough     Scheduled Medications :    dianeal lo-ivonne (ULTRABAG) 2.5%  2,000 mL Intraperitoneal Q6H    insulin lispro  0-6 Units Subcutaneous TID WC    insulin lispro  0-3 Units Subcutaneous Nightly    cefepime  1 g Intravenous Q24H    folbee plus  1 tablet Oral Daily    busPIRone  5 mg Oral BID    fluticasone  2 spray Each Nostril Daily    gabapentin  300 mg Oral Daily    insulin glargine  4 Units Subcutaneous QAM    isosorbide dinitrate  20 mg Oral TID    levothyroxine  25 mcg Oral Daily    meclizine  25 mg Oral TID    oxybutynin  5 mg Oral Daily    pravastatin  40 mg Oral Nightly    sevelamer  1,600 mg Oral TID WC    sodium chloride flush  10 mL Intravenous 2 times per day    heparin (porcine)  5,000 Units Subcutaneous 3 times per day      dextrose         Vitals :  BP (!) 117/59   Pulse 68   Temp 97.9 °F (36.6 °C) (Axillary)   Resp 18   Ht 5' 6\" (1.676 m)   Wt 191 lb 1.6 oz (86.7 kg)   SpO2 93%   BMI 30.84 kg/m²     24HR INTAKE/OUTPUT:      Intake/Output Summary (Last 24 hours) at 8/18/2020 1409  Last data filed at 8/18/2020 1150  Gross per 24 hour   Intake 8736 ml   Output 9900 ml   Net -1164 ml     Last 3 weights  Wt Readings from Last 3 Encounters:   08/16/20 191 lb 1.6 oz (86.7 kg)   06/02/20 186 lb 9.6 oz (84.6 kg)   02/11/20 185 lb 12.8 oz (84.3 kg)           Physical Exam :  General Appearance:  Well developed.  No distress  Mouth/Throat:  Oral mucosa moist  Neck:  Supple, no JVD  Lungs:  Breath sounds: clear  Heart[de-identified]  S1,S2 heard  Abdomen:  Soft, non - tender  Musculoskeletal: Edema - none         Last 3 CBC   Recent Labs     08/17/20  0744 08/18/20  0638   WBC 21.2* 24.0*   RBC 3.12* 3.18*   HGB 10.5* 10.4*   HCT 31.8* 31.8*    372     Last 3 CMP  Recent Labs     08/16/20  0614 08/17/20  0604 08/17/20  1612 08/18/20  0638   * 130*  --  129*   K 3.7 3.9  3.9  --  3.8   CL 89* 94*  --  92*   CO2 24 24  --  24   BUN 49* 47*  --  45*   CREATININE 6.8* 6.3*  --  6.4*   CALCIUM 9.1 8.6  --  9.0   LABALBU  --   --  2.3* 2.1*   BILITOT  --   --  0.2* 0.3             ASSESSMENT / Plan   ESRD on PD - volume status is stable. Reviewed PD data which shows  UF 2550 ml and 2300 ml. Continue current PD Rx., change to 1.5% every 2.5 %    Essential Hypertension - fluctuating but stable. Hyponatremia - 2/2 ESRD . Follow for now    Leucocytosis - ? Cause. Seen by ID for a CT scan today  Hx Diabetes Mellitus  D/W patient and RN. LIANNE Augustin D.  [facility].

## 2020-08-18 NOTE — PLAN OF CARE
Problem: Skin Integrity:  Goal: Will show no infection signs and symptoms  Description: Will show no infection signs and symptoms  8/18/2020 0004 by Elis Scruggs RN  Outcome: Ongoing  Note: Afebrile. No signs or symptoms of infection. Will continue to reassess. Problem: DISCHARGE BARRIERS  Goal: Patient's continuum of care needs are met  8/18/2020 0004 by Elis Scruggs RN  Outcome: Ongoing  Note: Patient's care needs are met per patient. Problem: Falls - Risk of:  Goal: Will remain free from falls  Description: Will remain free from falls  8/18/2020 0004 by Elis Scruggs RN  Outcome: Ongoing  Note: Falling star placed outside of patient's room. 2/4 bed rails up. Non-skid socks provided. Call light and personal items with in reach. Bed alarm on. Problem: Nutritional:  Goal: Nutritional status will improve  Description: Nutritional status will improve  8/18/2020 0004 by Elis Scruggs RN  Outcome: Ongoing  Note: Patient on renal diet with 1500 mL fluid restriction. Tolerating well. Will continue to reassess. Problem: Discharge Planning:  Goal: Discharged to appropriate level of care  Description: Discharged to appropriate level of care  8/18/2020 0004 by Elis Scruggs RN  Outcome: Ongoing  Note: Patient plans to discharge to [facility] when medically stable. Problem: Pain:  Goal: Pain level will decrease  Description: Pain level will decrease  8/18/2020 0004 by Elis Scruggs RN  Outcome: Ongoing  Note: Patient rates pain on 0-10 pain scale. States pain is a 0 on 0-10 scale. States goal is 0. Repositioned for comfort. Will continue to reassess. Care plan reviewed with patient. No concerns voiced.

## 2020-08-18 NOTE — PLAN OF CARE
Electrolytes within specified parameters  Outcome: Ongoing  Note: Na 129, on CAPD 2.5% q6, monitoring, nephrology following   Goal: Ability to achieve a balanced intake and output will improve  Description: Ability to achieve a balanced intake and output will improve  Outcome: Ongoing  Note: 2.5% q6, last exchange 2550 out, lungs clear and diminished, patient denies SOB     Problem: Pain:  Description: Pain management should include both nonpharmacologic and pharmacologic interventions. Goal: Pain level will decrease  Description: Pain level will decrease  8/18/2020 1402 by Hugo Arechiga RN  Outcome: Ongoing  Note: Patient denies pain so far this shift, will continue to monitor.    Pain goal 0    Goal: Control of acute pain  Description: Control of acute pain  Outcome: Ongoing  Goal: Control of chronic pain  Description: Control of chronic pain  Outcome: Ongoing

## 2020-08-18 NOTE — CONSULTS
CONSULTATION NOTE :ID       Patient - Mayra Tona,  Age - 78 y.o.    - 1950      Room Number - 6K-18/018-A   MRN -  [id number]   Acct # - [de-identified]  Date of Admission -  2020  6:51 PM  Patient's PCP: Emiliano Watts     Requesting Physician: Chris Garcia MD    REASON FOR CONSULTATION   leukocytosis  CHIEF COMPLAINT   Shortness of breath. HISTORY OF PRESENT ILLNESS       This is a very pleasant 78 y.o. female who was admitted to the hospital with a chief complaints of shortness of breath. She is from [facility]. She has end stage renal disease on PD. She has RA, has limited mobility. She has cough, no chest pain, the PD fluid was negative for infection. She has persistent leukocytosis. She is on iv antibiotics. She had hx of pleural effusion, required pleurex catheter last year. no nigh sweats, no hemoptysis.     PAST MEDICAL  HISTORY       Past Medical History:   Diagnosis Date    Adjustment disorder with mixed anxiety and depressed mood     Anemia     Anemia in chronic kidney disease(285.21)     Anxiety     Arthritis     Asthma     Bipolar 1 disorder (HCC)     Bradycardia     CHF (congestive heart failure) (HCC)     Chronic diastolic heart failure (HCC)     Chronic kidney disease with peritoneal dialysis as preferred modality, stage 5 (HCC)     Chronic kidney disease, stage III (moderate) (HCC)     CKD (chronic kidney disease)     Stage 3    Depression     Difficulty walking     Dizziness and giddiness     GERD (gastroesophageal reflux disease)     Gout     Headache(784.0)     Hyperkalemia     Hyperlipidemia     Hypertension     Hypertensive urgency     Hypothyroid     Hypothyroidism     Low back pain     Major depressive disorder, recurrent, mild (HCC)     Muscle weakness (generalized)     Neurogenic bladder     Pleural effusion 2019    R pleurex catheter placed    Pneumonia     Seizure ([facility] [address] 2018    Type II or unspecified type diabetes mellitus without mention of complication, not stated as uncontrolled     Urine retention        PAST SURGICAL HISTORY     Past Surgical History:   Procedure Laterality Date    BACK SURGERY      CERVICAL FUSION      CHOLECYSTECTOMY      COLONOSCOPY      ENDOSCOPY, COLON, DIAGNOSTIC      FRACTURE SURGERY           MEDICATIONS:       Scheduled Meds:   dianeal lo-ivonne (ULTRABAG) 2.5%  2,000 mL Intraperitoneal Q6H    insulin lispro  0-6 Units Subcutaneous TID WC    insulin lispro  0-3 Units Subcutaneous Nightly    cefepime  1 g Intravenous Q24H    folbee plus  1 tablet Oral Daily    busPIRone  5 mg Oral BID    fluticasone  2 spray Each Nostril Daily    gabapentin  300 mg Oral Daily    insulin glargine  4 Units Subcutaneous QAM    isosorbide dinitrate  20 mg Oral TID    levothyroxine  25 mcg Oral Daily    meclizine  25 mg Oral TID    oxybutynin  5 mg Oral Daily    pravastatin  40 mg Oral Nightly    sevelamer  1,600 mg Oral TID WC    sodium chloride flush  10 mL Intravenous 2 times per day    heparin (porcine)  5,000 Units Subcutaneous 3 times per day     Continuous Infusions:   dextrose       PRN Meds:glucose, dextrose, glucagon (rDNA), dextrose, sodium chloride flush, acetaminophen **OR** acetaminophen, polyethylene glycol, promethazine **OR** ondansetron  Allergies:   ALLERGIES:    Aspirin; Naproxen; Pcn [penicillins]; Vicodin [hydrocodone-acetaminophen]; Vilazodone; and Wellbutrin [bupropion]        SOCIAL HISTORY:     TOBACCO:   reports that she has never smoked. She has never used smokeless tobacco.     ETOH:   reports no history of alcohol use.   Patient currently lives in a nursing home      FAMILY HISTORY:         Problem Relation Age of Onset    High Blood Pressure Mother     Diabetes Mother     Cancer Mother     Heart Attack Mother     Stroke Father     High Blood Pressure Father     Anemia Father        REVIEW OF SYSTEMS:     Constitutional: no fever, no night sweats, + fatigue, no weight loss. Head: no head ache , no head injury, no migranes. Eye: no eye discharge, blurring of vision, no double vision,no eye pain. Ears: no hearing difficulty, no tinnitus  Mouth/throat: no ulceration, dental caries , dysphagia, no hoarseness and voice change  Respiratory: +cough no chest pain,+ shortness of breath,no wheezing  CVS: no palpitation, no chest pain,   GI: no abdominal pain, no nausea , no vomiting, no constipation,no diarrhea. DANILO: no dysuria, frequency and urgency, no hematuria, no kidney stones  Musculoskeletal: no joint pain, swelling , stiffness,  Endocrine: no polyuria, polydipsia, no cold or heat intolerance  Hematology: no anemia, no easy brusing or bleeding, no hx of clotting disorder  Dermatology: no skin rash, no skin lesions, no pruritis,  Neurological:no headaches,no dizziness, no seizure, no numbness. Psychiatry: no depression, no anxiety,no panic attacks, no suicide ideation    PHYSICAL EXAM:     BP (!) 117/59   Pulse 68   Temp 97.9 °F (36.6 °C) (Axillary)   Resp 18   Ht 5' 6\" (1.676 m)   Wt 191 lb 1.6 oz (86.7 kg)   SpO2 93%   BMI 30.84 kg/m²   General apperance:  Awake, alert, chronically sick looking  HEENT: pale conjunctiva, unicteric sclera, moist oral mucosa. Chest: diminished breath sound on both lower lung fields   Cardiovascular:  RRR ,S1S2, no murmur or gallop. Abdomen:  Soft, non tender to palpation. Extremities: deformity of joints, superficial wound on the left dorsum of the foot  Skin:  Warm and dry. CNS:oriented to person place and time.     LABS:     CBC:   Recent Labs     08/17/20  0744 08/18/20  0638   WBC 21.2* 24.0*   HGB 10.5* 10.4*    372     BMP:    Recent Labs     08/16/20  0614 08/17/20  0604 08/18/20  0638   * 130* 129*   K 3.7 3.9  3.9 3.8   CL 89* 94* 92*   CO2 24 24 24   BUN 49* 47* 45*   CREATININE 6.8* 6.3* 6.4*   GLUCOSE 89 160* 147*     Calcium:  Recent Labs     08/18/20  0638   CALCIUM 9.0    Glucose:  Recent Labs     08/17/20  2006 08/18/20  0638 08/18/20  1118   POCGLU 206* 153* 212*     HgbA1C: No results for input(s): LABA1C in the last 72 hours. INR: No results for input(s): INR in the last 72 hours.   Hepatic:   Recent Labs     08/17/20  1612 08/18/20  0638   ALKPHOS 116 101   ALT 20 18   AST 18 17   PROT 5.5* 5.2*   BILITOT 0.2* 0.3   BILIDIR <0.2  --    LABALBU 2.3* 2.1*       Micro:   Lab Results   Component Value Date    BC No growth-preliminary  08/14/2020       Problem list of patient      Patient Active Problem List   Diagnosis Code    Chronic kidney disease, stage III (moderate) ([facility]) N18.3    Anemia in chronic renal disease N18.9, D63.1    Type 2 diabetes mellitus with chronic kidney disease on chronic dialysis, with long-term current use of insulin ([facility]) E11.22, N18.6, Z99.2, Z79.4    Chronic kidney disease (CKD), stage IV (severe) ([facility]) N18.4    Diabetic nephropathy with proteinuria ([facility] [address] E11.21    Acute on chronic respiratory failure with hypoxia ([facility]) J96.21    Pulmonary edema with congestive heart failure ([facility]) I50.1    Acute on chronic diastolic CHF (congestive heart failure) ([facility]) I50.33    LOUISE (acute kidney injury) ([facility] [address] N17.9    Uncontrolled type 2 diabetes mellitus with hyperglycemia ([facility]) E11.65    Essential hypertension I10    Hypothyroidism E03.9    Other fluid overload E87.79    ESRD (end stage renal disease) ([facility]) N18.6    Diabetic nephropathy associated with type 2 diabetes mellitus ([facility]) E11.21    Bilateral pleural effusion F98    Metabolic acidosis U80.7    Leukocytosis D72.829    Generalized weakness R53.1    Bipolar 1 disorder ([facility]) F31.9    Mild mitral regurgitation I34.0    Mild tricuspid regurgitation I07.1    Chronic pain syndrome G89.4    Chronic bilateral pleural effusions J90    Chronic kidney disease (CKD), stage V ([facility]) N18.5    (HFpEF) heart failure with preserved ejection fraction ([facility]) I50.30    Atelectasis J98.11    Hyponatremia E87.1    Chronic anemia D64.9    Hyperlipidemia E78.5    Constipation K59.00    Shortness of breath R06.02    Hyperkalemia E87.5    Infiltrate noted on imaging study R93.89    Postprocedural pneumothorax J95.811    Moderate malnutrition (HCC) E44.0    History of pleural effusion Z87.09    Hypotension I95.9    ESRD on peritoneal dialysis (HCC) N18.6, Z99.2    Nausea R11.0    DM type 2 with diabetic peripheral neuropathy ([facility]) E11.42    Diabetic retinopathy of both eyes associated with type 2 diabetes mellitus (HCC) E11.319    Sepsis ([facility]) A41.9           Impression and Recommendation:   ESRDon PD: her fluid is negative for infection. She has cough and shortness of breath: has abnormal findings on exam: will scan the chest with out contrast   RA  DM  Continue current iv cefepime   Thank you Carlos Alberto Geronimo MD for allowing me to participate in this patient's care.     Saad Felipe MD,FACP 8/18/2020 12:02 PM

## 2020-08-18 NOTE — PROGRESS NOTES
Hospitalist Progress Note      Patient:  Joce Falcon      Unit/Bed:6K-18/018-A    YOB: 1950    MRN: [id number]       Acct: [de-identified]     PCP: Yoon Shipman    Date of Admission: 8/13/2020    Assessment/Plan:  1. Sepsis with leukocytosis, fever -- POA -- source likely pulmonary in origin per CXR 8/14 and repeat 8/17 with mod atelectasis/pneumonia both perihilar regions -- c/s ID 8/17 as WBC trending up again to 21.2 on 8/17 and again 24 on 8/18 -->  after down to 18.8 on 8/15 despite being on cefepime since 8/13 and given vanc 8/14 x 1   -- d/w Dr. Marley Fairly 8/18 and plan for CT chest 8/18 to eval lungs and pleural effusions  --> ?resume vanc  -- LA normal on admission 8/13 and not trended  -- blood cx at [facility] 8/13 (-) and repeat 8/14 here (-) to date  -- COVID 8/13 (-)  -- peritoneal fluid cx 8/13 at [facility] (-) and repeat here 8/14 (-)  -- u/a 8/13 at [facility] abn with +WBC, Many bacteria but cx results showed \"normal rosa maria\" -- ?repeat  -- CT abd 8/13/2020 at [facility] (-) for acute process thus less likely GI etiology  -- no diarrhea  2. Persistent Nausea-- ?etiology - WBC rising again 8/17 thus ? infectious etiology -- cont prn phenergan po/zofran IV prn -- ?need GI eval - checked LFT 8/17 WNL and again WNL 8/18 --> hx of cholecystecomy  -- ?gastroparesis as has hx but having BM's and no emesis and improves with eating and not worsening  --  ?PUD as improves with eating --> ?GI c/s  -- CT at [facility] 8/15/2020 (-) acute process but with fluid in abd with PD; extensive vascular calcifications  -- Tolerating PD w/o abd pain and PD fluid (-) for infxn here and at [facility]  -- u/a at [facility] 8/13 abn but cx with normal rosa maria  -- ?gastroparesis -- has noted hx per chart review  -- checked trop 8/17 to r/o atypical angina as etiology -> trop at baseline   3.  Leukocytosis -- see #1 -->trending up again 8/17 to 21 and again to 24 on 8/18 from 18.8 on 8/15 --> c/s ID 8/17 as WBC trending up despite being on posterior mitral leaflet  10. Chronically elevated troponin -- checked 8/17 with nausea to r/o atypical angina and at baseline 0.048 (0.037 in 7/2019)  -- last echo 1/2020 = EF 50 to 55%, mild MR with independently mobile mass on the ventricular side of posterior mitral leaflet  -- last stress 9/2019 (-) ischemia  11. Restrictive lung disease -- noted prior admissions - due to scoliosis, pleural effusions --per diagnosis from prior admissions--stable on RA --has been followed by pulmonary in the past with #9  12. Chronic diastolic CHF -- fluid mgmt per renal -- no acute exacerbation -- cont isordil - hydralazine prn PTA -- last echo was limited 1/2020 = EF 50 to 55%, mild MR with independently mobile mass on the ventricular side of posterior mitral leaflet; Last stres 9/2019 (-) sichemia  13. Chronic bradycardia -- avoid BB/CCB -- tele stable - asx -- last TSH 1/2020 WNL  14. Anemia chronic disease -- due to CKD -- stable in 10's which is baseline from 1/2020 and improved from 6/2019 in 8's -- asx -- monitor  15. Essential HTN -- cont isordil 20 mg tid -- fluid mgmt with PD per renal -- monitor and adjust prn   16. Type 2 DM -- cont lantus 4 units daily, low SSI -- cont monitor chems -- last A1C 1/2020= 6.1  17. PAD -- extensive aortic calcifications noted per CT abd 8/13/2020 [facility]-- f/u cardio at d/c for further w/u - currently asx -- cont statin - has allergy to ASA  -- consider JUSTUS as outpt  18. HLD -- cont statin  19. Chronic dizziness -- on chronic antivert -- asx  20. Depression/anxiety/bipolar d/o -- cont buspar -- mood stable - monitor  21. Hypothyroidism -- cont home synthroid -- last TSH 1/2020 WNL  22. Obesity Body mass index is 30.57 kg/m². 23. Neurogenic bladder   24. Secondary hyperparathyroidism -- per renal -- cont renvela  25.  Abn mitral valve -- noted on limited echo 1/2020 -> per chart review per cardio note 1/28/2020 \"in the setting of ESRD, this is likely related to accelerated valvular calcification and degeneration\"  26. CBP -- cont neurontin  27. Debility -- from [facility] and to return at d/c - PT/OT following    Dispo  -- 8/18 --> apprec consultants -- WBC cont to rise again today - d/w Dr. Renetta Ricketts and plan for CT chest (hx of pleural effusions) -- ?need vanc - cont cefepime. Continues with nausea - ?etiology - having BM's, LFT WNL and no emesis and improves as day goes on - cont monitor for now. Tongue sore and bitter tasting -> no overt thrush but ?mouth wash if cont to worsen. Fluid and electrolyte mgmt per renal.  BP, HR stable. -- 8/17 --> apprec consultants -- WBC rising again today to 21.1 from 18.8 on 8/15 despite being on cefepime and s/p vanc 8/14 -- ??due to pulm etiology -> c/s ID for assist for further eval.  PD fluid (-) for peritonitis;  Urine cx at [facility] 8/13 with normal rosa maria and no infection. Pt afebrile/low grade temps -- await ID recs for further eval;  Pt cont to c/o nausea but no abd pain -> ?infxn, ? Gastroparesis, ? PUD -> ?GI c/s. Cont monitor lytes, resp status, BP, HR, CBC and await consultant recs. Cont PT/OT      Chief Complaint: abd pain, fever, cough    Hospital Course:   Per Prior note by CNP Fial 8/16/2020 \"Sarah Espinosa is a 57-year-old  female, lifetime non-smoker, with a medical history of mixed anxiety and depressed mood disorder, anemia, arthritis, asthma, bipolar 1 disorder, bradycardia, congestive heart failure, chronic diastolic heart failure, chronic kidney disease stage V, depression, GERD, gout, history of headaches, hyperlipidemia, hypertension, hypothyroidism, low back pain, generalized muscle weakness, neurogenic bladder, seizure disorder, type 2 diabetes mellitus, urinary retention, and obesity.   Patient presented to [facility] from [address] for further evaluation of sepsis.  Patient presented to [facility] ER with abdominal pain and fevers at home. Filipe Service is a known ESRD patient on CAPD and also complained of a cough that has been going on for approximately 2 weeks that had been progressively getting worse.  Patient also reported that she could not bring anything up when she coughed and stated that her fevers have been going on for approximately 2-3 days intermittently.  The hospitalist service was contacted for further evaluation, treatment, and management. \" At [facility] emergency room patient was noted to have a white count of 22,000, sodium of 125, potassium of 5.8.  -- renal consulted for PD  -- empirically started on cefepime 8/13 (s/p azactam at [facility] 8/13), vanc x 1 on 8/14 for leukocytosis, ?fever - suspect pulm origin with CXR 8/14 with elevated right diaphragm, mild prominence of interstitial markings, correlate with bronchitis -> repeated 8/17 with mod atelectasis/pneumonia both perihilar regions --> WBC rising to 20's since 8/17 again from 18 on 8/15  -- ID consulted 8/17 for worsening WBC despite on atbx -> d/w Dr. Jarett Long 8/18 and plan for CT chest as likely resp in nature. Subjective/HPI:   -- 8/18 --> pt again feeling nauseated this am prior to eating - no emesis. Phenergan helps. Trying to eat some breakfast now. PD going well and draining more/better. No abd pain. Denies cp, sob. Still with cough but non-productive. Denies f/c. On RA. Afebrile - Tmax 99.8 last 24 hrs. Overall weak but working with therapies. Last BM 8/17 x 2 - no blood, no diarrhea      PMH, SURGICAL HX, FH, SOCIAL HX reviewed and updated as needed.     Medications:  Reviewed    Infusion Medications    dextrose       Scheduled Medications    dianeal lo-ivonne (ULTRABAG) 2.5%  2,000 mL Intraperitoneal Q6H    insulin lispro  0-6 Units Subcutaneous TID WC    insulin lispro  0-3 Units Subcutaneous Nightly    cefepime  1 g Intravenous Q24H    folbee plus  1 tablet Oral Daily    busPIRone  5 mg Oral BID    fluticasone  2 spray Each Nostril Daily    gabapentin  300 mg Oral Daily    insulin glargine  4 Units Subcutaneous QAM    isosorbide dinitrate  20 mg Oral TID    levothyroxine  25 mcg Oral Daily    meclizine  25 mg Oral TID    oxybutynin  5 mg Oral Daily    pravastatin  40 mg Oral Nightly    sevelamer  1,600 mg Oral TID WC    sodium chloride flush  10 mL Intravenous 2 times per day    heparin (porcine)  5,000 Units Subcutaneous 3 times per day     PRN Meds: glucose, dextrose, glucagon (rDNA), dextrose, sodium chloride flush, acetaminophen **OR** acetaminophen, polyethylene glycol, promethazine **OR** ondansetron      Intake/Output Summary (Last 24 hours) at 8/18/2020 4416  Last data filed at 8/18/2020 3637  Gross per 24 hour   Intake 6856 ml   Output 7350 ml   Net -494 ml       Diet:  DIET RENAL; Daily Fluid Restriction: 1500 ml  Dietary Nutrition Supplements: Wound Healing Oral Supplement    Exam:  BP (!) 127/58   Pulse 68   Temp 99.7 °F (37.6 °C) (Oral)   Resp 18   Ht 5' 6\" (1.676 m)   Wt 191 lb 1.6 oz (86.7 kg)   SpO2 91%   BMI 30.84 kg/m²     General appearance: No apparent distress, appears older than stated age and cooperative. HEENT: Pupils equal, round, and reactive to light. Conjunctivae/corneas clear. No thrush/exudate  Neck: Supple, with full range of motion. Trachea midline. Respiratory:  Normal respiratory effort. Diminished in bases, Clear to auscultation, bilaterally without Rales/Wheezes/Rhonchi. No respiratory distress or accessory muscle use. Cardiovascular: Regular rate and rhythm with normal S1/S2, 2/6 SHANITA, No rubs or gallops. No JVD. Abdomen: Soft, non-tender, non-distended with normal bowel sounds. No rebound or guarding  Musculoskeletal: No clubbing, cyanosis or edema bilaterally. Full range of motion without deformity. No calf tenderness palpation  Skin: Skin color, texture, turgor normal.  No rashes or lesions. Neurologic:  Neurovascularly intact without any focal sensory/motor deficits.  Cranial nerves: II-XII intact, grossly non-focal.  Psychiatric: Alert and oriented, thought content appropriate, normal insight  Capillary Refill: Brisk,< 3 seconds   Peripheral Pulses: +palpable, equal bilaterally       All labs reviewed and interpreted by me:  Labs:   Recent Labs     08/17/20  0744 08/18/20  0638   WBC 21.2* 24.0*   HGB 10.5* 10.4*   HCT 31.8* 31.8*    372     Recent Labs     08/16/20  0614 08/17/20  0604 08/18/20  0638   * 130* 129*   K 3.7 3.9  3.9 3.8   CL 89* 94* 92*   CO2 24 24 24   BUN 49* 47* 45*   CREATININE 6.8* 6.3* 6.4*   CALCIUM 9.1 8.6 9.0     Recent Labs     08/17/20  1612 08/18/20  0638   AST 18 17   ALT 20 18   BILIDIR <0.2  --    BILITOT 0.2* 0.3   ALKPHOS 116 101     No results for input(s): INR in the last 72 hours. Recent Labs     08/17/20  1612   TROPONINT 0.048*       Urinalysis:      Lab Results   Component Value Date    NITRU NEGATIVE 06/22/2019    WBCUA 0-2 06/22/2019    BACTERIA NONE 06/22/2019    RBCUA 5-10 06/22/2019    BLOODU NEGATIVE 06/22/2019    GLUCOSEU NEGATIVE 06/22/2019       All radiology images and reports reviewed and interpreted by me:  Radiology:  XR CHEST (2 VW)   Final Result   1. Normal heart size. Moderate chronic elevation right hemidiaphragm. 2. Moderate atelectasis/pneumonia both perihilar regions and lung right hemidiaphragm. 3. Tiny bilateral pleural effusions. **This report has been created using voice recognition software. It may contain minor errors which are inherent in voice recognition technology. **      Final report electronically signed by Dr. Tyrone Palomares on 8/17/2020 1:34 PM      XR CHEST PORTABLE   Final Result      Persistent cardiac enlargement with elevation of right diaphragm. Mild prominence of interstitial markings, correlate with bronchitis      **This report has been created using voice recognition software. It may contain minor errors which are inherent in voice recognition technology. **      Final report electronically signed by Dr. Deepika Gooden on 8/14/2020 3:39 AM Diet: DIET RENAL; Daily Fluid Restriction: 1500 ml  Dietary Nutrition Supplements: Wound Healing Oral Supplement    Perez: no    Microbiology:  Blood cx at [facility] 8/13 (-) to date                          Peritoneal fluid 8/13 (-) at [facility]                          Urine cx 8/13 at [facility] = >100,000 colonies per ml mixed normal urogenital rosa maria - Sensitivity not indicated                           Blood cx 8/14 (-) to date                          Peritoneal fluid cx 8/14 (-) to date                          COVID 8/13 (-)    Tele review last 24 hrs:  SR, frequent PAC    DVT prophylaxis: [] Lovenox                                 [] SCDs                                 [x] SQ Heparin                                 [] Encourage ambulation           [] Already on Anticoagulation     Disposition:    [] Home       [] TCU       [] Rehab       [] Psych       [x] SNF       [] [place]       [] Other-    Code Status: Full Code    PT/OT Eval Status: following      Electronically signed by Mary Neville MD on 8/18/2020 at 9:37 AM when pt evaluated - final note filed late

## 2020-08-19 PROBLEM — I50.32 CHRONIC DIASTOLIC CHF (CONGESTIVE HEART FAILURE) (HCC): Status: ACTIVE | Noted: 2018-12-07

## 2020-08-19 NOTE — PROGRESS NOTES
Hospitalist Progress Note      Patient:  Trip Koo      Unit/Bed:6K-18/018-A    YOB: 1950    MRN: [id number]       Acct: [de-identified]     PCP: [address]    Date of Admission: 8/13/2020    Assessment/Plan:  1. Sepsis with leukocytosis, fever -- POA -- source likely pulmonary in origin per CXR 8/14 and repeat 8/17 with mod atelectasis/pneumonia both perihilar regions -- c/s ID 8/17 as WBC trending up again to 21.2 on 8/17 and again 24 on 8/18 -->  after down to 18.8 on 8/15 despite being on cefepime since 8/13 and given vanc 8/14 x 1   -- d/w Dr. Cj Brown 8/18 and CT chest 8/18 = small left pleural effusion, some infiltrates seen in right upper and lower lobes, pulmonary artery hypertension, pulmonary nodules 7 mm in right lung and 5 mm in left lung  --> ?resume vanc  -- LA normal on admission 8/13 and not trended  -- blood cx at [facility] 8/13 (-) and repeat 8/14 here (-) to date  -- COVID 8/13 (-)  -- peritoneal fluid cx 8/13 at [facility] (-) and repeat here 8/14 (-)  -- u/a 8/13 at [facility] abn with +WBC, Many bacteria but cx results showed \"normal rosa maria\" -- ?repeat  -- CT abd 8/13/2020 at [facility] (-) for acute process thus less likely GI etiology  -- no diarrhea  2. Persistent Nausea-- ?etiology - WBC rising again 8/17 thus ? infectious etiology -- cont prn phenergan po/zofran IV prn -- ?need GI eval - checked LFT 8/17 WNL and again WNL 8/18 --> hx of cholecystecomy  -- ?gastroparesis as has hx but having BM's and no emesis and improves with eating and not worsening  --  ?PUD as improves with eating --> added PPI 8/19  -- CT at [facility] 8/15/2020 (-) acute process but with fluid in abd with PD; extensive vascular calcifications  -- Tolerating PD w/o abd pain and PD fluid (-) for infxn here and at [facility]  -- u/a at [facility] 8/13 abn but cx with normal rosa maria  -- ?gastroparesis -- has noted hx per chart review  -- checked trop 8/17 to r/o atypical angina as etiology -> trop at baseline   3.  Leukocytosis -- see #1 -->trending up again 8/17 to 21 and again to 24 on 8/18 and 23.5 on 8/19 -->  from 18.8 on 8/15 --> c/s ID 8/17 as WBC trending up despite being on cefepime since 8/13 and vanc given 8/14 --> d/w Dr. Jada Barroso 8/18 and CT chest 8/18 = small left pleural effusion, some infiltrates seen in right upper and lower lobes, pulmonary artery hypertension, pulmonary nodules 7 mm in right lung and 5 mm in left lung thus likely due to pneumonia  -- ?cont vanc  -- LA normal on admission 8/13 and not trended  -- blood cx at [facility] 8/13 (-) and repeat 8/14 here (-) to date  -- COVID 8/13 (-)  -- peritoneal fluid cx 8/13 at [facility] (-) and repeat here 8/14 (-)  -- u/a 8/13 at [facility] abn with +WBC, Many bacteria but cx results showed \"normal rosa maria\" -- ?repeat  -- [facility] process thus less likely GI etiology  -- no diarrhea  4. Probable right-sided pneumonia -- Gram (+) vs gram (-) -- at risk for pseudomonas/MRSA as renal pt on PD, ECF pt --  +cough but non-productive, PCT up to 1.75 on 8/13 (baseline in 0.2-0.4's in 2019) -- CXR 8/17 = mod atelectasis/pneumonia both perihilar regions --> cefepime started 8/13 - s/p vanc 8/14 x 1 -> ?resume as WBC trending up   -- c/s ID apprec and d/w Dr. Jada Barroso 8/18 -- did CT chest 8/18 = small left pleural effusion, some infiltrates seen in right upper and lower lobes, pulmonary artery hypertension, pulmonary nodules 7 mm in right lung and 5 mm in left lung  -- ??need to c/s ST to r/o aspiration  5. Abdominal pain -- resolved -- but pt has persistent nausea -- CT at [facility] 8/15/2020 (-) acute process but with fluid in abd with PD; extensive vascular calcifications  -- PD fluid 8/13 at [facility] and repeat again here 8/14 (-) and thus NOT peritonitis   -- hx cholecystectomy  -- checked LFT 8/17 WNL and again 8/18 WNL  6.  ESRD on PD -- apprec renal assist -- per note 8/16 pt with poor ultrafiltration thus changed to 2.5% dextrose q6 hrs -- no peritonitis -- exchanges improving  7. Hyperkalemia -- improved - per renal as HD pt -- monitor  8. Hyponatremia-- asx --chronic -- PD pt thus mgmt per renal -- 1.5L fluid restriction -- up slightly to 130 on 8/17 and 129 on 8/18 and stable at 129 on 8/19 --> from 125 on 8/13 -- cont monitor -- TSH 1/2020 WNL  9. Bilateral pleural effusions -- recurrent and chronic issue -- small left, trace right noted again per CT abd 8/13/2020 at [facility] -- required PleurX drains in past in 2019 -- stable on RA   -- CT chest 8/18 with small left effusion only -> unlikely contrib to above  -- last echo 1/2020 = EF 50 to 55%, mild MR with independently mobile mass on the ventricular side of posterior mitral leaflet  10. Chronically elevated troponin -- checked 8/17 with nausea to r/o atypical angina and at baseline 0.048 (0.037 in 7/2019) -- no ASA due to allergy -- cont statin -- no BB due to bradycardia  -- last echo 1/2020 = EF 50 to 55%, mild MR with independently mobile mass on the ventricular side of posterior mitral leaflet  -- last stress 9/2019 (-) ischemia  11. Restrictive lung disease -- noted prior admissions - due to scoliosis, pleural effusions --per diagnosis from prior admissions--stable on RA --has been followed by pulmonary in the past with #9  12. Chronic diastolic CHF -- fluid mgmt per renal -- no acute exacerbation -- cont isordil - hydralazine prn PTA -- last echo was limited 1/2020 = EF 50 to 55%, mild MR with independently mobile mass on the ventricular side of posterior mitral leaflet; Last stres 9/2019 (-) sichemia  13. Chronic bradycardia -- avoid BB/CCB -- tele stable - asx -- last TSH 1/2020 WNL  14. Anemia chronic disease -- due to CKD -- stable in 10's which is baseline from 1/2020 and improved from 6/2019 in 8's -- asx -- monitor  15. Essential HTN -- cont isordil 20 mg tid -- fluid mgmt with PD per renal -- monitor and adjust prn   16.  Type 2 DM -- cont lantus 4 units daily, low SSI -- cont monitor chems -- last A1C 1/2020= 6.1  17. Pulmonary nodules 7 mm right lung, 5 mm left lung -- noted per CT chest 8/18 --> f/u outpt pulm and f/u CT as outpt  18. PAD -- extensive aortic calcifications noted per CT abd 8/13/2020 at [facility]-- f/u cardio at d/c for further w/u - currently asx -- cont statin - has allergy to ASA  -- consider JUSTUS as outpt  19. HLD -- cont statin  20. Chronic dizziness -- on chronic antivert -- asx  21. Depression/anxiety/bipolar d/o -- cont buspar -- mood stable - monitor  22. Hypothyroidism -- cont home synthroid -- last TSH 1/2020 WNL  23. Obesity Body mass index is 30.57 kg/m². 24. Neurogenic bladder   25. Secondary hyperparathyroidism -- per renal -- cont renvela  26. Abn mitral valve -- noted on limited echo 1/2020 -> per chart review per cardio note 1/28/2020 \"in the setting of ESRD, this is likely related to accelerated valvular calcification and degeneration\"  27. CBP -- cont neurontin  28. Debility -- from [facility] and to return at d/c - PT/OT following    Dispo  -- 8/19 --> apprec consultants - WBC still up but slightly better - CT with small left effusion but with right upper and lower lung infiltrates thus likely the etiology of infxn - ??c/s ST to r/o aspiration. Renal following for fluid and electrolyte mgmt;  Cont monitor CBC, BP, lytes, fluid status - cont cefepime - ?need to resume vanc - await ID recs. Add PPI for persistent nausea - ?need to have GI eval.  Cont monitor po intake. -- 8/18 --> apprec consultants -- WBC cont to rise again today - d/w Dr. Alina Roberts and plan for CT chest (hx of pleural effusions) -- ?need vanc - cont cefepime. Continues with nausea - ?etiology - having BM's, LFT WNL and no emesis and improves as day goes on - cont monitor for now. Tongue sore and bitter tasting -> no overt thrush but ?mouth wash if cont to worsen. Fluid and electrolyte mgmt per renal.  BP, HR stable.       -- 8/17 --> apprec consultants -- WBC rising again today to 21.1 from 18.8 on 8/15 despite being on cefepime and s/p vanc 8/14 -- ??due to pulm etiology -> c/s ID for assist for further eval.  PD fluid (-) for peritonitis;  Urine cx at [facility] 8/13 with normal rosa maria and no infection. Pt afebrile/low grade temps -- await ID recs for further eval;  Pt cont to c/o nausea but no abd pain -> ?infxn, ? Gastroparesis, ? PUD -> ?GI c/s. Cont monitor lytes, resp status, BP, HR, CBC and await consultant recs. Cont PT/OT      Chief Complaint: abd pain, fever, cough    Hospital Course:   Per Prior note by CNP Fial 8/16/2020 \"Sarah Espinosa is a 29-year-old  female, lifetime non-smoker, with a medical history of mixed anxiety and depressed mood disorder, anemia, arthritis, asthma, bipolar 1 disorder, bradycardia, congestive heart failure, chronic diastolic heart failure, chronic kidney disease stage V, depression, GERD, gout, history of headaches, hyperlipidemia, hypertension, hypothyroidism, low back pain, generalized muscle weakness, neurogenic bladder, seizure disorder, type 2 diabetes mellitus, urinary retention, and obesity. Patient presented to [facility] from [address] for further evaluation of sepsis.  Patient presented to [facility] ER with abdominal pain and fevers at home. Mary Anne Dean is a known ESRD patient on CAPD and also complained of a cough that has been going on for approximately 2 weeks that had been progressively getting worse.  Patient also reported that she could not bring anything up when she coughed and stated that her fevers have been going on for approximately 2-3 days intermittently.  The hospitalist service was contacted for further evaluation, treatment, and management. \" At [facility] emergency room patient was noted to have a white count of 22,000, sodium of 125, potassium of 5.8.  -- renal consulted for PD  -- empirically started on cefepime 8/13 (s/p azactam at [facility] 8/13), vanc x 1 on 8/14 for leukocytosis, ?fever - suspect pulm origin with CXR 8/14 with elevated right diaphragm, mild prominence of interstitial markings, correlate with bronchitis -> repeated 8/17 with mod atelectasis/pneumonia both perihilar regions --> WBC rising to 20's since 8/17 again from 18 on 8/15  -- ID consulted 8/17 for worsening WBC despite on atbx -> d/w Dr. Mica Babb 8/18 and plan for CT chest as likely resp in nature. Subjective/HPI:   -- 8/19/2020  --> pt again feeling nauseated this am prior to eating - no emesis and improved after Phenergan and ate a muffin for breakfast.  No abd pain. Nausea seems worse in am.  PD going well and draining more/better. No abd pain. Denies cp, sob. Still with cough but non-productive - working with IS and acapella. Denies f/c. On RA. Afebrile - Tmax 99.4 last 24 hrs. Overall weak but working with therapies. Last BM 8/18 x 1 - no blood, no diarrhea      PMH, SURGICAL HX, FH, SOCIAL HX reviewed and updated as needed.     Medications:  Reviewed    Infusion Medications    dextrose       Scheduled Medications    [START ON 8/20/2020] pantoprazole  40 mg Oral QAM AC    nystatin  5 mL Oral 4x Daily    dianeal lo-ivonne (ULTRABAG) 2.5%  2,000 mL Intraperitoneal Q6H    insulin lispro  0-6 Units Subcutaneous TID WC    insulin lispro  0-3 Units Subcutaneous Nightly    cefepime  1 g Intravenous Q24H    folbee plus  1 tablet Oral Daily    busPIRone  5 mg Oral BID    fluticasone  2 spray Each Nostril Daily    gabapentin  300 mg Oral Daily    insulin glargine  4 Units Subcutaneous QAM    isosorbide dinitrate  20 mg Oral TID    levothyroxine  25 mcg Oral Daily    meclizine  25 mg Oral TID    oxybutynin  5 mg Oral Daily    pravastatin  40 mg Oral Nightly    sevelamer  1,600 mg Oral TID WC    sodium chloride flush  10 mL Intravenous 2 times per day    heparin (porcine)  5,000 Units Subcutaneous 3 times per day     PRN Meds: glucose, dextrose, glucagon (rDNA), dextrose, sodium chloride flush, acetaminophen **OR** acetaminophen, polyethylene glycol, promethazine **OR** ondansetron      Intake/Output Summary (Last 24 hours) at 8/19/2020 1727  Last data filed at 8/19/2020 1638  Gross per 24 hour   Intake 9170 ml   Output 44474 ml   Net -1330 ml       Diet:  DIET RENAL; Daily Fluid Restriction: 1500 ml  Dietary Nutrition Supplements: Wound Healing Oral Supplement    Exam:  BP (!) 143/63   Pulse 56   Temp 98.4 °F (36.9 °C) (Oral)   Resp 18   Ht 5' 6\" (1.676 m)   Wt 189 lb 6.4 oz (85.9 kg)   SpO2 95%   BMI 30.57 kg/m²     General appearance: No apparent distress, appears older than stated age and cooperative. HEENT: Pupils equal, round, and reactive to light. Conjunctivae/corneas clear. No thrush/exudate  Neck: Supple, with full range of motion. Trachea midline. Respiratory:  Normal respiratory effort. Diminished in bases, Clear to auscultation, bilaterally without Rales/Wheezes/Rhonchi. No respiratory distress or accessory muscle use. Cardiovascular: Regular rate and rhythm with normal S1/S2, 2/6 SHANITA, No rubs or gallops. No JVD. Abdomen: Soft, non-tender, non-distended with normal bowel sounds. No rebound or guarding  Musculoskeletal: No clubbing, cyanosis or edema bilaterally. Full range of motion without deformity. No calf tenderness palpation  Skin: Skin color, texture, turgor normal.  No rashes or lesions. Neurologic:  Neurovascularly intact without any focal sensory/motor deficits.  Cranial nerves: II-XII intact, grossly non-focal.  Psychiatric: Alert and oriented, thought content appropriate, normal insight  Capillary Refill: Brisk,< 3 seconds   Peripheral Pulses: +palpable, equal bilaterally       All labs reviewed and interpreted by me:  Labs:   Recent Labs     08/17/20  0744 08/18/20  0638 08/19/20  0833   WBC 21.2* 24.0* 23.5*   HGB 10.5* 10.4* 10.6*   HCT 31.8* 31.8* 32.5*    372 371     Recent Labs     08/17/20  0604 08/18/20  0638 08/19/20  0833   * 129* 129*   K 3.9  3.9 3.8 3.8   CL 94* 92* 92*   CO2 24 24 25 BUN 47* 45* 48*   CREATININE 6.3* 6.4* 6.4*   CALCIUM 8.6 9.0 9.2     Recent Labs     08/17/20  1612 08/18/20  0638   AST 18 17   ALT 20 18   BILIDIR <0.2  --    BILITOT 0.2* 0.3   ALKPHOS 116 101     No results for input(s): INR in the last 72 hours. Recent Labs     08/17/20  1612   TROPONINT 0.048*       Urinalysis:      Lab Results   Component Value Date    NITRU NEGATIVE 06/22/2019    WBCUA 0-2 06/22/2019    BACTERIA NONE 06/22/2019    RBCUA 5-10 06/22/2019    BLOODU NEGATIVE 06/22/2019    GLUCOSEU NEGATIVE 06/22/2019       All radiology images and reports reviewed and interpreted by me:  Radiology:  CT CHEST WO CONTRAST   Final Result      1. There is a small left-sided pleural effusion. 2. Some infiltrates are seen in the right upper and lower lobes. 3. Dilatation of the main pulmonary artery which may be due to pulmonary arterial hypertension. 4. Pulmonary nodules measuring 7 mm in the right lung and 5 mm in the left lung. Follow-up is recommended in 3-6 months to assess for stability. **This report has been created using voice recognition software. It may contain minor errors which are inherent in voice recognition technology. **      Final report electronically signed by Dr Lemuel Casarez on 8/18/2020 9:58 PM      XR CHEST (2 VW)   Final Result   1. Normal heart size. Moderate chronic elevation right hemidiaphragm. 2. Moderate atelectasis/pneumonia both perihilar regions and lung right hemidiaphragm. 3. Tiny bilateral pleural effusions. **This report has been created using voice recognition software. It may contain minor errors which are inherent in voice recognition technology. **      Final report electronically signed by Dr. Chen Davis on 8/17/2020 1:34 PM      XR CHEST PORTABLE   Final Result      Persistent cardiac enlargement with elevation of right diaphragm.       Mild prominence of interstitial markings, correlate with bronchitis      **This report has been created using voice recognition software. It may contain minor errors which are inherent in voice recognition technology. **      Final report electronically signed by Dr. Amanda Kim on 8/14/2020 3:39 AM          Diet: DIET RENAL; Daily Fluid Restriction: 1500 ml  Dietary Nutrition Supplements: Wound Healing Oral Supplement    Perez: no    Microbiology:  Blood cx at [facility] 8/13 (-) to date                          Peritoneal fluid 8/13 (-) at [facility]                          Urine cx 8/13 at [facility] = >100,000 colonies per ml mixed normal urogenital rosa maria - Sensitivity not indicated                           Blood cx 8/14 (-) to date                          Peritoneal fluid cx 8/14 (-) to date                          COVID 8/13 (-)    Tele review last 24 hrs:  SR, frequent PAC    DVT prophylaxis: [] Lovenox                                 [] SCDs                                 [x] SQ Heparin                                 [] Encourage ambulation           [] Already on Anticoagulation     Disposition:    [] Home       [] TCU       [] Rehab       [] Psych       [x] SNF       [] [place]       [] Other-    Code Status: Full Code    PT/OT Eval Status: following    Electronically signed by Willow Amador MD on 8/19/2020 at 9:50 AM

## 2020-08-19 NOTE — PROGRESS NOTES
Progress note: Infectious diseases    Patient - Alex Sams,  Age - 78 y.o.    - 1950      Room Number - 6K-18/018-A   MRN -  [id number]   Acct # - [de-identified]  Date of Admission -  2020  6:51 PM    SUBJECTIVE:   She has sore tongue, has cough. OBJECTIVE   VITALS    height is 5' 6\" (1.676 m) and weight is 189 lb 6.4 oz (85.9 kg). Her oral temperature is 98.4 °F (36.9 °C). Her blood pressure is 143/63 (abnormal) and her pulse is 56. Her respiration is 18 and oxygen saturation is 95%. Wt Readings from Last 3 Encounters:   20 189 lb 6.4 oz (85.9 kg)   20 186 lb 9.6 oz (84.6 kg)   20 185 lb 12.8 oz (84.3 kg)       I/O (24 Hours)    Intake/Output Summary (Last 24 hours) at 2020 1642  Last data filed at 2020 1638  Gross per 24 hour   Intake 9170 ml   Output 83129 ml   Net -1330 ml       General Appearance  Awake, alert, oriented,  Chronically sick looking. HEENT - normocephalic, atraumatic, pale conjunctiva,  anicteric sclera  Neck - Supple, no mass  Lungs -  Bilateral   air entry, + rhonchi, diminsihed breath sound  Cardiovascular - Heart sounds are normal.     Abdomen - soft, not distended, nontender, PD fluid clear  Neurologic -oriented.   Skin - No bruising or bleeding  Extremities - No edema, no cyanosis, clubbing     MEDICATIONS:      [START ON 2020] pantoprazole  40 mg Oral QAM AC    dianeal lo-ivonne (ULTRABAG) 2.5%  2,000 mL Intraperitoneal Q6H    insulin lispro  0-6 Units Subcutaneous TID WC    insulin lispro  0-3 Units Subcutaneous Nightly    cefepime  1 g Intravenous Q24H    folbee plus  1 tablet Oral Daily    busPIRone  5 mg Oral BID    fluticasone  2 spray Each Nostril Daily    gabapentin  300 mg Oral Daily    insulin glargine  4 Units Subcutaneous QAM    isosorbide dinitrate  20 mg Oral TID    levothyroxine  25 mcg Oral Daily    meclizine  25 mg Oral TID  oxybutynin  5 mg Oral Daily    pravastatin  40 mg Oral Nightly    sevelamer  1,600 mg Oral TID     sodium chloride flush  10 mL Intravenous 2 times per day    heparin (porcine)  5,000 Units Subcutaneous 3 times per day      dextrose       glucose, dextrose, glucagon (rDNA), dextrose, sodium chloride flush, acetaminophen **OR** acetaminophen, polyethylene glycol, promethazine **OR** ondansetron      LABS:     CBC:   Recent Labs     08/17/20  0744 08/18/20  0638 08/19/20  0833   WBC 21.2* 24.0* 23.5*   HGB 10.5* 10.4* 10.6*    372 371     BMP:    Recent Labs     08/17/20  0604 08/18/20  0638 08/19/20  0833   * 129* 129*   K 3.9  3.9 3.8 3.8   CL 94* 92* 92*   CO2 24 24 25   BUN 47* 45* 48*   CREATININE 6.3* 6.4* 6.4*   GLUCOSE 160* 147* 138*     Calcium:  Recent Labs     08/19/20  0833   CALCIUM 9.2     Ionized Calcium:No results for input(s): IONCA in the last 72 hours. Magnesium:No results for input(s): MG in the last 72 hours. Phosphorus:No results for input(s): PHOS in the last 72 hours. BNP:No results for input(s): BNP in the last 72 hours. Glucose:  Recent Labs     08/19/20  0527 08/19/20  1038 08/19/20  1600   POCGLU 185* 160* 169*     HgbA1C: No results for input(s): LABA1C in the last 72 hours. INR: No results for input(s): INR in the last 72 hours. Hepatic:   Recent Labs     08/17/20  1612 08/18/20  0638   ALKPHOS 116 101   ALT 20 18   AST 18 17   PROT 5.5* 5.2*   BILITOT 0.2* 0.3   BILIDIR <0.2  --    LABALBU 2.3* 2.1*     Amylase and Lipase:No results for input(s): LACTA, AMYLASE in the last 72 hours. Lactic Acid: No results for input(s): LACTA in the last 72 hours. Troponin: No results for input(s): CKTOTAL, CKMB, TROPONINI in the last 72 hours. BNP: No results for input(s): BNP in the last 72 hours.     CULTURES:   UA: No results for input(s): Fuad Fay, [address] [place] [address], [place], MUCUS, [address] [facility]voUNC [facility] [address] [facility],  Carlos So Car, BACTERIA, NITRU, Jeronimo [facility] [address], [address] [facility], [address] [place], [place] [address]n, Z87.09    Hypotension I95.9    ESRD on peritoneal dialysis (HCC) N18.6, Z99.2    Nausea R11.0    DM type 2 with diabetic peripheral neuropathy ([facility]) E11.42    Diabetic retinopathy of both eyes associated with type 2 diabetes mellitus ([facility]) E11.319    Sepsis ([facility]) A41.9         ASSESSMENT/PLAN   ESRD on PD  Leukocytosis  Abnormal CT  With infiltrates  Sore tongue with chilosis: will place her on antifungal s/s  HT      Giovanna Resendiz MD, FACP 8/19/2020 4:42 PM

## 2020-08-19 NOTE — PROGRESS NOTES
Renal Progress Note    Assessment and Plan:     # ESRD on PD  -Patient's volume status continuing to be stable. Adequate filtration with PD.  -Patient's labs indicate stable renal function for her baseline    # Hyponatremia    -Sodium continues to be stable for her baseline, 129 today  -Hyponatremia likely secondary to ESRD    # Leukocytosis  -Still unclear of the possible source/cause. CT scan of the chest revealed possible infiltrates, lungs could be source of infection  -No signs or evidence of peritonitis or peritoneal dialysis catheter infection    # Essential Hypertension  -Blood pressure has been fluctuating.   However stable for her baseline considering her ESRD    Patient Active Problem List:     Chronic kidney disease, stage III (moderate) ([facility])     Anemia in chronic renal disease     Type 2 diabetes mellitus with chronic kidney disease on chronic dialysis, with long-term current use of insulin (HCC)     Chronic kidney disease (CKD), stage IV (severe) ([facility])     Diabetic nephropathy with proteinuria (HCC)     Acute on chronic respiratory failure with hypoxia ([facility])     Pulmonary edema with congestive heart failure ([facility])     Acute on chronic diastolic CHF (congestive heart failure) ([address]     LOUISE (acute kidney injury) ([address]     Uncontrolled type 2 diabetes mellitus with hyperglycemia ([address]     Essential hypertension     Hypothyroidism     Other fluid overload     ESRD (end stage renal disease) ([address]     Diabetic nephropathy associated with type 2 diabetes mellitus (HCC)     Bilateral pleural effusion     Metabolic acidosis     Leukocytosis     Generalized weakness     Bipolar 1 disorder (HCC)     Mild mitral regurgitation     Mild tricuspid regurgitation     Chronic pain syndrome     Chronic bilateral pleural effusions     Chronic kidney disease (CKD), stage V (HCC)     (HFpEF) heart failure with preserved ejection fraction (HCC)     Atelectasis     Hyponatremia     Chronic anemia     Hyperlipidemia Constipation     Shortness of breath     Hyperkalemia     Infiltrate noted on imaging study     Postprocedural pneumothorax     Moderate malnutrition (HCC)     History of pleural effusion     Hypotension     ESRD on peritoneal dialysis (HCC)     Nausea     DM type 2 with diabetic peripheral neuropathy (HCC)     Diabetic retinopathy of both eyes associated with type 2 diabetes mellitus ([facility]     Sepsis ([facility]      Subjective:   Admit Date: 8/13/2020    Interval History:     Nephrology is following this patient for ESRD on peritoneal dialysis. Patient states to feeling nauseous this morning, she reports that she usually is nauseous in the mornings. She has vomited a little bit this morning. She denies any other acute symptoms or changes. Medications:   Scheduled Meds:   dianeal lo-ivonne (ULTRABAG) 2.5%  2,000 mL Intraperitoneal Q6H    insulin lispro  0-6 Units Subcutaneous TID WC    insulin lispro  0-3 Units Subcutaneous Nightly    cefepime  1 g Intravenous Q24H    folbee plus  1 tablet Oral Daily    busPIRone  5 mg Oral BID    fluticasone  2 spray Each Nostril Daily    gabapentin  300 mg Oral Daily    insulin glargine  4 Units Subcutaneous QAM    isosorbide dinitrate  20 mg Oral TID    levothyroxine  25 mcg Oral Daily    meclizine  25 mg Oral TID    oxybutynin  5 mg Oral Daily    pravastatin  40 mg Oral Nightly    sevelamer  1,600 mg Oral TID WC    sodium chloride flush  10 mL Intravenous 2 times per day    heparin (porcine)  5,000 Units Subcutaneous 3 times per day     Continuous Infusions:   dextrose         CBC:   Recent Labs     08/17/20  0744 08/18/20  0638   WBC 21.2* 24.0*   HGB 10.5* 10.4*    372     CMP:    Recent Labs     08/17/20  0604 08/18/20  0638   * 129*   K 3.9  3.9 3.8   CL 94* 92*   CO2 24 24   BUN 47* 45*   CREATININE 6.3* 6.4*   GLUCOSE 160* 147*   CALCIUM 8.6 9.0   LABGLOM 7* 6*     Troponin: No results for input(s): TROPONINI in the last 72 hours.   BNP: No results for input(s): BNP in the last 72 hours. INR: No results for input(s): INR in the last 72 hours. Lipids: No results for input(s): CHOL, LDLDIRECT, TRIG, HDL, AMYLASE, LIPASE in the last 72 hours. Liver:   Recent Labs     08/18/20  0638   AST 17   ALT 18   ALKPHOS 101   PROT 5.2*   LABALBU 2.1*   BILITOT 0.3     Iron:  No results for input(s): IRONS, FERRITIN in the last 72 hours. Invalid input(s): LABIRONS  CT CHEST WO CONTRAST   Final Result      1. There is a small left-sided pleural effusion. 2. Some infiltrates are seen in the right upper and lower lobes. 3. Dilatation of the main pulmonary artery which may be due to pulmonary arterial hypertension. 4. Pulmonary nodules measuring 7 mm in the right lung and 5 mm in the left lung. Follow-up is recommended in 3-6 months to assess for stability. **This report has been created using voice recognition software. It may contain minor errors which are inherent in voice recognition technology. **      Final report electronically signed by Dr Jermaine Arzola on 8/18/2020 9:58 PM      XR CHEST (2 VW)   Final Result   1. Normal heart size. Moderate chronic elevation right hemidiaphragm. 2. Moderate atelectasis/pneumonia both perihilar regions and lung right hemidiaphragm. 3. Tiny bilateral pleural effusions. **This report has been created using voice recognition software. It may contain minor errors which are inherent in voice recognition technology. **      Final report electronically signed by Dr. Tyra Suarez on 8/17/2020 1:34 PM      XR CHEST PORTABLE   Final Result      Persistent cardiac enlargement with elevation of right diaphragm. Mild prominence of interstitial markings, correlate with bronchitis      **This report has been created using voice recognition software. It may contain minor errors which are inherent in voice recognition technology. **      Final report electronically signed by Dr. Tesfaye Carballo on 8/14/2020 3:39 AM            Objective:   Vitals: BP (!) 141/66   Pulse 62   Temp 98.8 °F (37.1 °C) (Oral)   Resp 18   Ht 5' 6\" (1.676 m)   Wt 189 lb 6.4 oz (85.9 kg)   SpO2 96%   BMI 30.57 kg/m²    Wt Readings from Last 3 Encounters:   08/19/20 189 lb 6.4 oz (85.9 kg)   06/02/20 186 lb 9.6 oz (84.6 kg)   02/11/20 185 lb 12.8 oz (84.3 kg)      24HR INTAKE/OUTPUT:      Intake/Output Summary (Last 24 hours) at 8/19/2020 4595  Last data filed at 8/19/2020 0507  Gross per 24 hour   Intake 8750 ml   Output 9950 ml   Net -1200 ml       Constitutional:  Alert, awake, no apparent distress  Skin: Abdominal area around PD port continues to show no signs of infection. No swelling, redness, tenderness to palpation. HEENT:Pupils are reactive . Throat is clear . Oral mucosa is moist  Neck: supple with no thyromegaly or carotid bruit  Cardiovascular:  S1, S2 without murmur or rubs  Respiratory:  Clear to ausculation without wheezes, rhonchi or rales. Abdomen: +bs, some distention is present. No tenderness/guarding on palpation. Ext: +1 LE edema  Musculoskeletal:Intact  Neuro:Alert and awake. Well oriented  with no focal deficit. Speech is normal      Electronically signed by Ailyn Thorpe MD on 8/19/2020 at 8:38 AM

## 2020-08-19 NOTE — PLAN OF CARE
Problem: Skin Integrity:  Goal: Will show no infection signs and symptoms  Description: Will show no infection signs and symptoms  8/19/2020 0139 by Janusz Horner RN  Outcome: Ongoing  Note: No sign of infection at this time. Will continue to reassess        Problem: Skin Integrity:  Goal: Absence of new skin breakdown  Description: Absence of new skin breakdown  8/19/2020 0139 by Janusz Horner RN  Outcome: Ongoing     Problem: DISCHARGE BARRIERS  Goal: Patient's continuum of care needs are met  8/19/2020 0139 by Janusz Horner RN  Outcome: Ongoing     Problem: Falls - Risk of:  Goal: Will remain free from falls  Description: Will remain free from falls  8/19/2020 0139 by Janusz Horner RN  Outcome: Ongoing  Note: No falls noted this shift. Fall risk assessment completed. Hourly rounding performed. Bed locked in the lowest position, bed alarm on. Call lights and personal items within reach. Fall sign posted. Problem: Nutritional:  Goal: Nutritional status will improve  Description: Nutritional status will improve  8/19/2020 0139 by Janusz Horner RN  Outcome: Ongoing     Problem: Physical Regulation:  Goal: Will remain free from infection  Description: Will remain free from infection  8/19/2020 0139 by Janusz Horner RN  Outcome: Ongoing     Problem: Discharge Planning:  Goal: Discharged to appropriate level of care  Description: Discharged to appropriate level of care  8/19/2020 0139 by Janusz Horner RN  Note: [facility] Problem: Tissue Perfusion - Renal, Altered:  Goal: Electrolytes within specified parameters  Description: Electrolytes within specified parameters  8/19/2020 0139 by Janusz Horner RN  Outcome: Ongoing     Problem: Pain:  Goal: Pain level will decrease  Description: Pain level will decrease  8/19/2020 0139 by Janusz Horner RN  Outcome: Ongoing  Note: Pain rated pain 5 on a scale of 0-10. Tylenol given per MAR. Will continue to reassess. Care plan reviewed with patient. Patient verbalize understanding of the plan of care and contribute to goal setting.

## 2020-08-19 NOTE — PLAN OF CARE
Care plan reviewed with patient and son. Patient and son verbalize understanding of the plan of care and contribute to goal setting. Problem: Skin Integrity:  Goal: Will show no infection signs and symptoms  Description: Will show no infection signs and symptoms  8/19/2020 1239 by Mayela Garcia RN  Outcome: Ongoing  WBC 23.5, ID following, IV cefepime,  CT chest done looking for source  Goal: Absence of new skin breakdown  Description: Absence of new skin breakdown  8/19/2020 1239 by Mayela Garcia RN  Outcome: Ongoing  Note: No new signs or symptoms of skin breakdown noted this shift, encouraging patient to turn and reposition self in bed q2h    Problem: DISCHARGE BARRIERS  Goal: Patient's continuum of care needs are met  8/19/2020 1239 by Mayela Garcia RN  Outcome: Ongoing  Note: Purposefully hourly rounding     Problem: Falls - Risk of:  Goal: Will remain free from falls  Description: Will remain free from falls  8/19/2020 1239 by Mayela Garcia RN  Outcome: Ongoing  Note: No falls noted this shift. Continue falling star program. Bed alarm on, bed in low position. Call light and personal belongings in reach. Patient uses call light appropriately.     Problem: Nutritional:  Goal: Nutritional status will improve  Description: Nutritional status will improve  8/19/2020 1239 by Mayela Garcia RN  Outcome: Ongoing  Note: Dietician followinng    Problem: Physical Regulation:  Goal: Will remain free from infection  Description: Will remain free from infection  8/19/2020 1239 by Mayela Garcia RN  Outcome: Ongoing  Note: IV cefepime       Problem: Discharge Planning:  Goal: Discharged to appropriate level of care  Description: Discharged to appropriate level of care  8/19/2020 1239 by Mayela Garcia RN  Outcome: Ongoing  Note: Plan to discharge back to [facility]    Problem: Tissue Perfusion - Renal, Altered:  Goal: Electrolytes within specified parameters  Description: Electrolytes within specified parameters  8/19/2020 1239 by Griffin Doan RN  Outcome: Ongoing  Note: CAPD patient, nephrology following    Goal: Ability to achieve a balanced intake and output will improve  Description: Ability to achieve a balanced intake and output will improve  Outcome: Ongoing  Note: CAPD patient, nephrology following, 2700mL out, 2.5% q6     Problem: Pain:  Description: Pain management should include both nonpharmacologic and pharmacologic interventions. Goal: Pain level will decrease  Description: Pain level will decrease  8/19/2020 1239 by Griffin Doan RN  Outcome: Ongoing  Note: Patient rates pain 6/10 in LLQ, no radiation. Repositioning. Tylenol given see MAR with relief.  Pain goal no pain    Goal: Control of acute pain  Description: Control of acute pain  Outcome: Ongoing  Goal: Control of chronic pain  Description: Control of chronic pain  Outcome: Ongoing

## 2020-08-20 NOTE — PROGRESS NOTES
Pharmacy Note  Vancomycin Consult    Duc Argueta is a 78 y.o. female started on Vancomycin for pneumonia, increasing WBC; consult received from Dr. Zay Fink to manage therapy. Also receiving the following antibiotics: Cefepime.     Patient Active Problem List   Diagnosis    Chronic kidney disease, stage III (moderate) (HCC)    Anemia of chronic disease    Type 2 diabetes mellitus with insulin therapy ([address]    Chronic kidney disease (CKD), stage IV (severe) (HCC)    Diabetic nephropathy with proteinuria (HCC)    Acute on chronic respiratory failure with hypoxia (HCC)    Pulmonary edema with congestive heart failure (HCC)    Chronic diastolic CHF (congestive heart failure) ([address]    LOUISE (acute kidney injury) ([address]    Uncontrolled type 2 diabetes mellitus with hyperglycemia (HCC)    Essential hypertension    Hypothyroidism    Other fluid overload    ESRD (end stage renal disease) (HCC)    Diabetic nephropathy associated with type 2 diabetes mellitus (HCC)    Bilateral pleural effusion    Metabolic acidosis    Leukocytosis    Debility    Bipolar 1 disorder (HCC)    Mild mitral regurgitation    Mild tricuspid regurgitation    Chronic pain syndrome    Chronic bilateral pleural effusions    Chronic kidney disease (CKD), stage V (HCC)    (HFpEF) heart failure with preserved ejection fraction (HCC)    Atelectasis    Hyponatremia    Chronic anemia    Hyperlipidemia    Constipation    Shortness of breath    Hyperkalemia    Infiltrate noted on imaging study    Postprocedural pneumothorax    Moderate malnutrition (HCC)    History of pleural effusion    Hypotension    ESRD on peritoneal dialysis (HCC)    Nausea    DM type 2 with diabetic peripheral neuropathy (HCC)    Diabetic retinopathy of both eyes associated with type 2 diabetes mellitus ([address]    Sepsis ([address]    Community acquired pneumonia, bilateral    Generalized abdominal pain    Restrictive lung disease    Chronic sinus bradycardia    PAD (peripheral artery disease) ([facility]    Obesity (BMI 30-39. 9)       Allergies:  Aspirin; Naproxen; Pcn [penicillins]; Vicodin [hydrocodone-acetaminophen]; Vilazodone; and Wellbutrin [bupropion]     Temp max: 99.9    Recent Labs     08/19/20  0833 08/20/20  0845   BUN 48* 48*       Recent Labs     08/19/20  0833 08/20/20  0845   CREATININE 6.4* 7.3*       Recent Labs     08/19/20  0833 08/20/20  0846   WBC 23.5* 26.7*         Intake/Output Summary (Last 24 hours) at 8/20/2020 1054  Last data filed at 8/20/2020 0847  Gross per 24 hour   Intake 6875.15 ml   Output 7950 ml   Net -1074.85 ml       Culture Date      Source                       Results  8/14/20               Blood X 2                   NGTD  8/14/20               PD fluid                      No growth     Ht Readings from Last 1 Encounters:   08/13/20 5' 6\" (1.676 m)        Wt Readings from Last 1 Encounters:   08/20/20 188 lb 9.6 oz (85.5 kg)         Body mass index is 30.44 kg/m². Estimated Creatinine Clearance: 8 mL/min (A) (based on SCr of 7.3 mg/dL [facility])). Goal Trough Level: 15-20 mcg/mL    Assessment/Plan:  Give Vancomycin 15 mg/kg (1250 mg) IV X 1 dose now. Will plan to check vancomycin trough in 48 hours, not ordered yet. Thank you for the consult. Will continue to follow.     Hubert Goyal PharmD 8/20/2020 10:58 AM

## 2020-08-20 NOTE — PLAN OF CARE
Problem: Skin Integrity:  Goal: Will show no infection signs and symptoms  Description: Will show no infection signs and symptoms  8/19/2020 0139 by Ermelinda Tillman RN  Outcome: Ongoing  Note: No sign of infection at this time. Will continue to reassess        Problem: Skin Integrity:  Goal: Absence of new skin breakdown  Description: Absence of new skin breakdown  8/19/2020 0139 by Ermelinda Tillman RN  Outcome: Ongoing     Problem: DISCHARGE BARRIERS  Goal: Patient's continuum of care needs are met  8/19/2020 0139 by Ermelinda Tillman RN  Outcome: Ongoing     Problem: Falls - Risk of:  Goal: Will remain free from falls  Description: Will remain free from falls  8/19/2020 0139 by Ermelinda Tillman RN  Outcome: Ongoing  Note: No falls noted this shift. Fall risk assessment completed. Hourly rounding performed. Bed locked in the lowest position, bed alarm on. Call lights and personal items within reach. Fall sign posted. Problem: Nutritional:  Goal: Nutritional status will improve  Description: Nutritional status will improve  8/19/2020 0139 by Ermelinda Tillman RN  Outcome: Ongoing     Problem: Physical Regulation:  Goal: Will remain free from infection  Description: Will remain free from infection  8/19/2020 0139 by Ermelinda Tillman RN  Outcome: Ongoing     Problem: Discharge Planning:  Goal: Discharged to appropriate level of care  Description: Discharged to appropriate level of care  8/19/2020 0139 by Ermelinda Tillman RN  Note: Pt prefers to be discharged to [facility] with son. Problem: Tissue Perfusion - Renal, Altered:  Goal: Electrolytes within specified parameters  Description: Electrolytes within specified parameters  8/19/2020 0139 by Ermelinda Tillman RN  Outcome: Ongoing     Problem: Pain:  Goal: Pain level will decrease  Description: Pain level will decrease  8/19/2020 0139 by Ermelinda Tillman RN  Outcome: Ongoing  Note: Pain rated pain 4 on a scale of 0-10. Tylenol given per MAR. Will continue to reassess. Care plan reviewed with patient. Patient verbalize understanding of the plan of care and contribute to goal setting.

## 2020-08-20 NOTE — CARE COORDINATION
8/20/20, 4:21 PM EDT    DISCHARGE ONGOING EVALUATION:     Massimo Torres day: 7  Location: [facility]18/018-A Reason for admit: Sepsis [facility]) [A41.9]   Treatment Plan of Care: WBC 26.7, creat 7.3, Na+ 128. Remains on IV maxipime, ID following. Nephrology managing PD.    Barriers to Discharge: not medically ready   PCP: Girish Browne  Readmission Risk Score: 23%  Patient Goals/Plan/Treatment Preferences: return to [facility]

## 2020-08-20 NOTE — PROGRESS NOTES
Progress note: Infectious diseases    Patient - Emi Kay,  Age - 78 y.o.    - 1950      Room Number - 6K-18/018-A   MRN -  [id number]   Acct # - [de-identified]  Date of Admission -  2020  6:51 PM    SUBJECTIVE:   Her tongue feels better, still has high wbc. Has occasional crampy pain  OBJECTIVE   VITALS    height is 5' 6\" (1.676 m) and weight is 188 lb 9.6 oz (85.5 kg). Her oral temperature is 99.9 °F (37.7 °C). Her blood pressure is 137/66 and her pulse is 57. Her respiration is 18 and oxygen saturation is 95%. Wt Readings from Last 3 Encounters:   20 188 lb 9.6 oz (85.5 kg)   20 186 lb 9.6 oz (84.6 kg)   20 185 lb 12.8 oz (84.3 kg)       I/O (24 Hours)    Intake/Output Summary (Last 24 hours) at 2020 1038  Last data filed at 2020 0847  Gross per 24 hour   Intake 8875.15 ml   Output 84264 ml   Net -1774.85 ml       General Appearance  Awake, alert, oriented,  Chronically sick looking. HEENT - normocephalic, atraumatic, pale conjunctiva,  anicteric sclera  Neck - Supple, no mass  Lungs -  Bilateral   air entry, + rhonchi, diminsihed breath sound  Cardiovascular - Heart sounds are normal.     Abdomen - soft, not distended, nontender, PD fluid clear  Neurologic -oriented.   Skin - No bruising or bleeding  Extremities - No edema, no cyanosis, clubbing     MEDICATIONS:      pantoprazole  40 mg Oral QAM AC    nystatin  5 mL Oral 4x Daily    dianeal lo-ivonne (ULTRABAG) 2.5%  2,000 mL Intraperitoneal Q6H    insulin lispro  0-6 Units Subcutaneous TID WC    insulin lispro  0-3 Units Subcutaneous Nightly    cefepime  1 g Intravenous Q24H    folbee plus  1 tablet Oral Daily    busPIRone  5 mg Oral BID    fluticasone  2 spray Each Nostril Daily    gabapentin  300 mg Oral Daily    insulin glargine  4 Units Subcutaneous QAM    isosorbide dinitrate  20 mg Oral TID    levothyroxine UROBILINOGEN, KETUA, LABCAST, LABCASTTY, AMORPHOS in the last 72 hours.     Invalid input(s): CRYSTALS  Micro:   Lab Results   Component Value Date    BC No growth-preliminary  08/14/2020         IMAGING:         Problem list of patient:     Patient Active Problem List   Diagnosis Code    Chronic kidney disease, stage III (moderate) ([facility] N18.3    Anemia of chronic disease D63.8    Type 2 diabetes mellitus with insulin therapy ([address] E11.9, Z79.4    Chronic kidney disease (CKD), stage IV (severe) ([facility] N18.4    Diabetic nephropathy with proteinuria ([facility] E11.21    Acute on chronic respiratory failure with hypoxia ([facility] J96.21    Pulmonary edema with congestive heart failure ([facility]) I50.1    Chronic diastolic CHF (congestive heart failure) ([facility]) I50.32    LOUISE (acute kidney injury) ([facility] N17.9    Uncontrolled type 2 diabetes mellitus with hyperglycemia ([facility]) E11.65    Essential hypertension I10    Hypothyroidism E03.9    Other fluid overload E87.79    ESRD (end stage renal disease) ([facility] N18.6    Diabetic nephropathy associated with type 2 diabetes mellitus ([facility] E11.21    Bilateral pleural effusion W89    Metabolic acidosis X44.2    Leukocytosis D72.829    Debility R53.81    Bipolar 1 disorder ([facility]) F31.9    Mild mitral regurgitation I34.0    Mild tricuspid regurgitation I07.1    Chronic pain syndrome G89.4    Chronic bilateral pleural effusions J90    Chronic kidney disease (CKD), stage V ([facility]) N18.5    (HFpEF) heart failure with preserved ejection fraction ([facility]) I50.30    Atelectasis J98.11    Hyponatremia E87.1    Chronic anemia D64.9    Hyperlipidemia E78.5    Constipation K59.00    Shortness of breath R06.02    Hyperkalemia E87.5    Infiltrate noted on imaging study R93.89    Postprocedural pneumothorax J95.811    Moderate malnutrition ([facility]) E44.0    History of pleural effusion Z87.09    Hypotension I95.9    ESRD on peritoneal dialysis ([facility]) N18.6, Z99.2    Nausea R11.0    DM type 2 with diabetic peripheral neuropathy (HCC) E11.42    Diabetic retinopathy of both eyes associated with type 2 diabetes mellitus ([facility] E11.319    Sepsis [facility] A41.9    Community acquired pneumonia, bilateral J18.9    Generalized abdominal pain R10.84    Restrictive lung disease J98.4    Chronic sinus bradycardia R00.1    PAD (peripheral artery disease) ([facility]) I73.9    Obesity (BMI 30-39. 9) E66.9         ASSESSMENT/PLAN   ESRD on PD  Leukocytosis: worsening;will add vancomycin and do CT scan abdomen and pelvis with out contrast  Abnormal CT  With infiltrates  Sore tongue with chilosis: on antifungal, feeling better         Wily Zaldivar MD, FACP 8/20/2020 10:38 AM

## 2020-08-20 NOTE — PROGRESS NOTES
noted hx per chart review  -- checked trop 8/17 to r/o atypical angina as etiology -> trop at baseline   3. Leukocytosis -- see #1 -->trending up again 8/17 to 21 and again to 24 on 8/18 and 23.5 on 8/19 and repeat 8/20 (p) -->  from 18.8 on 8/15 --> c/s ID 8/17 as WBC trending up despite being on cefepime since 8/13 and vanc given 8/14 x1 --> d/w Dr. Reny Worthington 8/18 and CT chest 8/18 = small left pleural effusion, some infiltrates seen in right upper and lower lobes, pulmonary artery hypertension, pulmonary nodules 7 mm in right lung and 5 mm in left lung thus likely due to pneumonia  -- ?resume vanc  -- LA normal on admission 8/13 and not trended  -- blood cx at [facility] 8/13 (-) and repeat 8/14 here (-) to date  -- COVID 8/13 (-)  -- peritoneal fluid cx 8/13 at [facility] (-) and repeat here 8/14 (-)  -- u/a 8/13 at [facility] abn with +WBC, Many bacteria but cx results showed \"normal rosa maria\" -- ?repeat  -- [facility] thus less likely GI etiology  -- no diarrhea  4. right-sided pneumonia -- Gram (+) vs gram (-) -- at risk for pseudomonas/MRSA as renal pt on PD, ECF pt --  +cough but non-productive, PCT up to 1.75 on 8/13 (baseline in 0.2-0.4's in 2019) -- CXR 8/17 = mod atelectasis/pneumonia both perihilar regions --> cefepime started 8/13 - s/p vanc 8/14 x 1 -> ?resume as WBC trending up   -- c/s ID apprec and d/w Dr. Reny Worthington 8/18 -- did CT chest 8/18 = small left pleural effusion, some infiltrates seen in right upper and lower lobes, pulmonary artery hypertension, pulmonary nodules 7 mm in right lung and 5 mm in left lung  -- ??need to c/s ST to r/o aspiration  5.  Abdominal pain -- was improved but some increase cramping 8/20 - cont monitor -- but pt has persistent nausea -- CT at [facility] 8/15/2020 (-) acute process but with fluid in abd with PD; extensive vascular calcifications  -- PD fluid 8/13 at [facility] and repeat again here 8/14 (-) and thus NOT peritonitis   -- hx cholecystectomy  -- checked LFT 8/17 WNL and again 8/18 WNL  6. ESRD on PD -- apprec renal assist -- per note 8/16 pt with poor ultrafiltration thus changed to 2.5% dextrose q6 hrs -- no peritonitis -- exchanges improving  7. Sore tongue with chilosis -- apprec ID eval and started nystatin mouth wash  8. Hyperkalemia -- improved - per renal as HD pt -- monitor  9. Hyponatremia-- asx --chronic -- PD pt thus mgmt per renal -- 1.5L fluid restriction -- up slightly to 130 on 8/17 and 129 on 8/18 and stable at 129 on 8/19 -> repet 8/20 (P) --> from 125 on 8/13 -- cont monitor -- TSH 1/2020 WNL  10. Bilateral pleural effusions -- recurrent and chronic issue -- small left, trace right noted again per CT abd 8/13/2020 at [facility] -- required PleurX drains in past in 2019 -- stable on RA   -- CT chest 8/18 with small left effusion only -> unlikely contrib to above and unlikely enough to tap  -- last echo 1/2020 = EF 50 to 55%, mild MR with independently mobile mass on the ventricular side of posterior mitral leaflet  11. Chronically elevated troponin -- checked 8/17 with nausea to r/o atypical angina and at baseline 0.048 (0.037 in 7/2019) -- no ASA due to allergy -- cont statin -- no BB due to bradycardia  -- last echo 1/2020 = EF 50 to 55%, mild MR with independently mobile mass on the ventricular side of posterior mitral leaflet  -- last stress 9/2019 (-) ischemia  12. Restrictive lung disease -- noted prior admissions - due to scoliosis, pleural effusions --per diagnosis from prior admissions--stable on RA --has been followed by pulmonary in the past with #9  13. Chronic diastolic CHF -- fluid mgmt per renal -- no acute exacerbation -- cont isordil - hydralazine prn PTA -- last echo was limited 1/2020 = EF 50 to 55%, mild MR with independently mobile mass on the ventricular side of posterior mitral leaflet; Last stres 9/2019 (-) sichemia  14. Chronic bradycardia -- avoid BB/CCB -- tele stable - asx -- last TSH 1/2020 WNL  15.  Anemia chronic disease -- due to CKD -- stable in 10-11's which is baseline from 1/2020 and improved from 6/2019 in 8's -- asx -- monitor  16. Essential HTN -- cont isordil 20 mg tid -- fluid mgmt with PD per renal -- monitor and adjust prn   17. Type 2 DM -- cont lantus 4 units daily, low SSI -- cont monitor chems -- last A1C 1/2020= 6.1  18. Pulmonary nodules 7 mm right lung, 5 mm left lung -- noted per CT chest 8/18 --> f/u outpt pulm and f/u CT as outpt  19. PAD -- extensive aortic calcifications noted per CT abd 8/13/2020 [facility]-- f/u cardio at d/c for further w/u - currently asx -- cont statin - has allergy to ASA  -- consider JUSTUS as outpt  20. HLD -- cont statin  21. Chronic dizziness -- on chronic antivert -- asx  22. Depression/anxiety/bipolar d/o -- cont buspar -- mood stable - monitor  23. Hypothyroidism -- cont home synthroid -- last TSH 1/2020 WNL  24. Obesity Body mass index is 30.57 kg/m². 25. Neurogenic bladder   26. Secondary hyperparathyroidism -- per renal -- cont renvela  27. Abn mitral valve -- noted on limited echo 1/2020 -> per chart review per cardio note 1/28/2020 \"in the setting of ESRD, this is likely related to accelerated valvular calcification and degeneration\"  28. CBP/hx T12 compression fx with vertebroplasty -- cont neurontin  29. Debility -- from ECF and to return at d/c - PT/OT following    Dispo  -- 8/20 --> apprec consultants - pt looks better this am -> CBC (p) and BMP (p) this am -- some abd cramping this am but NO nausea - PPI started yesterday - cont monitor - PD going ok - afeb. Await labs and any further consultants recs. Cont PT/OT - d/c when WBC starts to improve - ?1-2 days    -- 8/19 --> apprec consultants - WBC still up but slightly better - CT with small left effusion but with right upper and lower lung infiltrates thus likely the etiology of infxn - ??c/s ST to r/o aspiration.   Renal following for fluid and electrolyte mgmt;  Cont monitor CBC, BP, lytes, fluid status - cont cefepime - ?need to resume vanc - await ID recs. Add PPI for persistent nausea - ?need to have GI eval.  Cont monitor po intake. -- 8/18 --> apprec consultants -- WBC cont to rise again today - d/w Dr. Sylvia Robison and plan for CT chest (hx of pleural effusions) -- ?need vanc - cont cefepime. Continues with nausea - ?etiology - having BM's, LFT WNL and no emesis and improves as day goes on - cont monitor for now. Tongue sore and bitter tasting -> no overt thrush but ?mouth wash if cont to worsen. Fluid and electrolyte mgmt per renal.  BP, HR stable. -- 8/17 --> apprec consultants -- WBC rising again today to 21.1 from 18.8 on 8/15 despite being on cefepime and s/p vanc 8/14 -- ??due to pulm etiology -> c/s ID for assist for further eval.  PD fluid (-) for peritonitis;  Urine cx at [facility] 8/13 with normal rosa maria and no infection. Pt afebrile/low grade temps -- await ID recs for further eval;  Pt cont to c/o nausea but no abd pain -> ?infxn, ? Gastroparesis, ? PUD -> ?GI c/s. Cont monitor lytes, resp status, BP, HR, CBC and await consultant recs. Cont PT/OT      Chief Complaint: abd pain, fever, cough    Hospital Course:   Per Prior note by CNP Fial 8/16/2020 \"Sarah Espinosa is a 73-year-old  female, lifetime non-smoker, with a medical history of mixed anxiety and depressed mood disorder, anemia, arthritis, asthma, bipolar 1 disorder, bradycardia, congestive heart failure, chronic diastolic heart failure, chronic kidney disease stage V, depression, GERD, gout, history of headaches, hyperlipidemia, hypertension, hypothyroidism, low back pain, generalized muscle weakness, neurogenic bladder, seizure disorder, type 2 diabetes mellitus, urinary retention, and obesity.   Patient presented to [facility] from [address] for further evaluation of sepsis.  Patient presented to [facility] ER with abdominal pain and fevers at home. Shadi Martin is a known ESRD patient on CAPD and also complained of a cough that has been going on for approximately 2 weeks that had been progressively getting worse.  Patient also reported that she could not bring anything up when she coughed and stated that her fevers have been going on for approximately 2-3 days intermittently.  The hospitalist service was contacted for further evaluation, treatment, and management. \" At [facility] emergency room patient was noted to have a white count of 22,000, sodium of 125, potassium of 5.8.  -- renal consulted for PD  -- empirically started on cefepime 8/13 (s/p azactam at [facility] 8/13), vanc x 1 on 8/14 for leukocytosis, ?fever - suspect pulm origin with CXR 8/14 with elevated right diaphragm, mild prominence of interstitial markings, correlate with bronchitis -> repeated 8/17 with mod atelectasis/pneumonia both perihilar regions --> WBC rising to 20's since 8/17 again from 18 on 8/15  -- ID consulted 8/17 for worsening WBC despite on atbx -> d/w Dr. Fuller Cleaves 8/18 and plan for CT chest as likely resp in nature = Some infiltrates are seen in the right upper and lower lobes, small left effusion. -- persistent nausea -> PPI added 8/19      Subjective/HPI:   -- 8/20/2020  --> pt finally NOT nauseated this am and getting ready to eat breakfast.  Looks better and feels better this am but having some abd cramping this am.  Not related to PD - fluid clear. No cp. No sob but still with cough and starting to [place] up\" but not productive. Sore tongue but improving with mouth wash Dr. Fuller Cleaves started. PMH, SURGICAL HX, FH, SOCIAL HX reviewed and updated as needed.     Medications:  Reviewed    Infusion Medications    dextrose       Scheduled Medications    pantoprazole  40 mg Oral QAM AC    nystatin  5 mL Oral 4x Daily    dianeal lo-ivonne (ULTRABAG) 2.5%  2,000 mL Intraperitoneal Q6H    insulin lispro  0-6 Units Subcutaneous TID WC    insulin lispro  0-3 Units Subcutaneous Nightly    cefepime  1 g Intravenous Q24H    folbee plus  1 tablet rashes or lesions. Neurologic:  Neurovascularly intact without any focal sensory/motor deficits. Cranial nerves: II-XII intact, grossly non-focal.  Psychiatric: Alert and oriented, thought content appropriate, normal insight  Capillary Refill: Brisk,< 3 seconds   Peripheral Pulses: +palpable, equal bilaterally       All labs reviewed and interpreted by me:  Labs:   Recent Labs     08/17/20  0744 08/18/20  0638 08/19/20  0833   WBC 21.2* 24.0* 23.5*   HGB 10.5* 10.4* 10.6*   HCT 31.8* 31.8* 32.5*    372 371     Recent Labs     08/18/20  0638 08/19/20  0833   * 129*   K 3.8 3.8   CL 92* 92*   CO2 24 25   BUN 45* 48*   CREATININE 6.4* 6.4*   CALCIUM 9.0 9.2     Recent Labs     08/17/20  1612 08/18/20  0638   AST 18 17   ALT 20 18   BILIDIR <0.2  --    BILITOT 0.2* 0.3   ALKPHOS 116 101     No results for input(s): INR in the last 72 hours. Recent Labs     08/17/20  1612   TROPONINT 0.048*       Urinalysis:      Lab Results   Component Value Date    NITRU NEGATIVE 06/22/2019    WBCUA 0-2 06/22/2019    BACTERIA NONE 06/22/2019    RBCUA 5-10 06/22/2019    BLOODU NEGATIVE 06/22/2019    GLUCOSEU NEGATIVE 06/22/2019       All radiology images and reports reviewed and interpreted by me:  Radiology:  CT CHEST WO CONTRAST   Final Result      1. There is a small left-sided pleural effusion. 2. Some infiltrates are seen in the right upper and lower lobes. 3. Dilatation of the main pulmonary artery which may be due to pulmonary arterial hypertension. 4. Pulmonary nodules measuring 7 mm in the right lung and 5 mm in the left lung. Follow-up is recommended in 3-6 months to assess for stability. **This report has been created using voice recognition software. It may contain minor errors which are inherent in voice recognition technology. **      Final report electronically signed by Dr Marcial Rossi on 8/18/2020 9:58 PM      XR CHEST (2 VW)   Final Result   1. Normal heart size.  Moderate chronic elevation right hemidiaphragm. 2. Moderate atelectasis/pneumonia both perihilar regions and lung right hemidiaphragm. 3. Tiny bilateral pleural effusions. **This report has been created using voice recognition software. It may contain minor errors which are inherent in voice recognition technology. **      Final report electronically signed by Dr. Margaret Haynes on 8/17/2020 1:34 PM      XR CHEST PORTABLE   Final Result      Persistent cardiac enlargement with elevation of right diaphragm. Mild prominence of interstitial markings, correlate with bronchitis      **This report has been created using voice recognition software. It may contain minor errors which are inherent in voice recognition technology. **      Final report electronically signed by Dr. Amber Dumas on 8/14/2020 3:39 AM          Diet: DIET RENAL; Daily Fluid Restriction: 1500 ml  Dietary Nutrition Supplements: Wound Healing Oral Supplement    Perez: no    Microbiology:  Blood cx at [facility] 8/13 (-) to date                          Peritoneal fluid 8/13 (-) at [facility]                          Urine cx 8/13 at [facility] = >100,000 colonies per ml mixed normal urogenital rosa maria - Sensitivity not indicated                           Blood cx 8/14 (-) to date                          Peritoneal fluid cx 8/14 (-) to date                          COVID 8/13 (-)    Tele review last 24 hrs:  SR, frequent PAC    DVT prophylaxis: [] Lovenox                                 [] SCDs                                 [x] SQ Heparin                                 [] Encourage ambulation           [] Already on Anticoagulation     Disposition:    [] Home       [] TCU       [] Rehab       [] Psych       [x] SNF       [] [place]       [] Other-    Code Status: Full Code    PT/OT Eval Status: following    Electronically signed by Chirag Mckeon MD on 8/20/2020 at 7:33 AM when pt evaluated - final note filed late

## 2020-08-20 NOTE — PROGRESS NOTES
Renal Progress Note    Assessment and Plan:     # ESRD on PD  -Patient's volume status continuing to be stable. Adequate filtration with PD.  -Total output 58 L last 24 hours  -Clinically this patient is at her baseline  -Patient's labs indicate stable renal function for her baseline. Vital signs within normal limits for her. # Hyponatremia    -Sodium continues to be stable for her baseline, 128 today  -Hyponatremia likely secondary to ESRD     # Leukocytosis  -Possible etiology is lungs secondary to pneumonia   -ID following, patient is on Vancomycin  -No signs or evidence of peritonitis or peritoneal dialysis catheter infection    # Essential Hypertension  -Blood pressure has been fluctuating.   However stable for her baseline considering her ESRD    Patient Active Problem List:     Chronic kidney disease, stage III (moderate) ([facility])     Anemia in chronic renal disease     Type 2 diabetes mellitus with chronic kidney disease on chronic dialysis, with long-term current use of insulin ([facility])     Chronic kidney disease (CKD), stage IV (severe) ([facility])     Diabetic nephropathy with proteinuria ([facility])     Acute on chronic respiratory failure with hypoxia ([facility])     Pulmonary edema with congestive heart failure ([facility])     Acute on chronic diastolic CHF (congestive heart failure) ([address]     LOUISE (acute kidney injury) ([address]     Uncontrolled type 2 diabetes mellitus with hyperglycemia ([address]     Essential hypertension     Hypothyroidism     Other fluid overload     ESRD (end stage renal disease) ([address]     Diabetic nephropathy associated with type 2 diabetes mellitus (HCC)     Bilateral pleural effusion     Metabolic acidosis     Leukocytosis     Generalized weakness     Bipolar 1 disorder (HCC)     Mild mitral regurgitation     Mild tricuspid regurgitation     Chronic pain syndrome     Chronic bilateral pleural effusions     Chronic kidney disease (CKD), stage V (HCC)     (HFpEF) heart failure with preserved ejection fraction CALCIUM 9.0 9.2   LABGLOM 6* 6*     Troponin: No results for input(s): TROPONINI in the last 72 hours. BNP: No results for input(s): BNP in the last 72 hours. INR: No results for input(s): INR in the last 72 hours. Lipids: No results for input(s): CHOL, LDLDIRECT, TRIG, HDL, AMYLASE, LIPASE in the last 72 hours. Liver:   Recent Labs     08/18/20  0638   AST 17   ALT 18   ALKPHOS 101   PROT 5.2*   LABALBU 2.1*   BILITOT 0.3     Iron:  No results for input(s): IRONS, FERRITIN in the last 72 hours. Invalid input(s): LABIRONS  CT CHEST WO CONTRAST   Final Result      1. There is a small left-sided pleural effusion. 2. Some infiltrates are seen in the right upper and lower lobes. 3. Dilatation of the main pulmonary artery which may be due to pulmonary arterial hypertension. 4. Pulmonary nodules measuring 7 mm in the right lung and 5 mm in the left lung. Follow-up is recommended in 3-6 months to assess for stability. **This report has been created using voice recognition software. It may contain minor errors which are inherent in voice recognition technology. **      Final report electronically signed by Dr Guadalupe Chapman on 8/18/2020 9:58 PM      XR CHEST (2 VW)   Final Result   1. Normal heart size. Moderate chronic elevation right hemidiaphragm. 2. Moderate atelectasis/pneumonia both perihilar regions and lung right hemidiaphragm. 3. Tiny bilateral pleural effusions. **This report has been created using voice recognition software. It may contain minor errors which are inherent in voice recognition technology. **      Final report electronically signed by Dr. Jay on 8/17/2020 1:34 PM      XR CHEST PORTABLE   Final Result      Persistent cardiac enlargement with elevation of right diaphragm. Mild prominence of interstitial markings, correlate with bronchitis      **This report has been created using voice recognition software.  It may contain minor errors which are inherent in voice recognition technology. **      Final report electronically signed by Dr. Amanda Kim on 8/14/2020 3:39 AM            Objective:   Vitals: /66   Pulse 57   Temp 99.9 °F (37.7 °C) (Oral)   Resp 18   Ht 5' 6\" (1.676 m)   Wt 188 lb 9.6 oz (85.5 kg)   SpO2 95%   BMI 30.44 kg/m²    Wt Readings from Last 3 Encounters:   08/20/20 188 lb 9.6 oz (85.5 kg)   06/02/20 186 lb 9.6 oz (84.6 kg)   02/11/20 185 lb 12.8 oz (84.3 kg)      24HR INTAKE/OUTPUT:      Intake/Output Summary (Last 24 hours) at 8/20/2020 0848  Last data filed at 8/20/2020 0847  Gross per 24 hour   Intake 8875.15 ml   Output 34061 ml   Net -1774.85 ml       Constitutional:  Alert, awake, no apparent distress  Skin: Abdominal area around PD port continues to show no signs of infection. No swelling, redness, tenderness to palpation. HEENT:Pupils are reactive . Throat is clear . Oral mucosa is moist  Neck: supple with no thyromegaly or carotid bruit  Cardiovascular:  S1, S2 without murmur or rubs  Respiratory:  Clear to ausculation without wheezes, rhonchi or rales. Abdomen: +bs, some distention is present. No tenderness/guarding on palpation. Ext: trace LE edema  Musculoskeletal:Intact  Neuro:Alert and awake. Well oriented  with no focal deficit. Speech is normal      Electronically signed by Shonda Pond MD on 8/20/2020 at 8:48 AM

## 2020-08-21 NOTE — PROGRESS NOTES
Progress note: Infectious diseases    Patient - Yehuda Jarvis,  Age - 78 y.o.    - 1950      Room Number - 6K-18/018-A   MRN -  [id number]   Acct # - [de-identified]  Date of Admission -  2020  6:51 PM    SUBJECTIVE:   She has low grade fever,  Chronic back pain  CT abdomen :no significant pathology. Has degenerative back with hardware and compression fractures  OBJECTIVE   VITALS    height is 5' 6\" (1.676 m) and weight is 188 lb 9.6 oz (85.5 kg). Her oral temperature is 99.6 °F (37.6 °C). Her blood pressure is 132/61 and her pulse is 72. Her respiration is 16 and oxygen saturation is 100%. Wt Readings from Last 3 Encounters:   20 188 lb 9.6 oz (85.5 kg)   20 186 lb 9.6 oz (84.6 kg)   20 185 lb 12.8 oz (84.3 kg)       I/O (24 Hours)    Intake/Output Summary (Last 24 hours) at 2020 1713  Last data filed at 2020 1155  Gross per 24 hour   Intake 6240 ml   Output 7200 ml   Net -960 ml       General Appearance  Awake, alert, oriented,  Chronically sick looking. HEENT - normocephalic, atraumatic, pale conjunctiva,  anicteric sclera  Neck - Supple, no mass  Lungs -  Bilateral   air entry, + rhonchi, diminsihed breath sound  Cardiovascular - Heart sounds are normal.     Abdomen - soft, not distended, nontender, PD fluid clear  Neurologic -oriented.  Weakness of extremites  Skin - No bruising or bleeding  Extremities - No edema, no cyanosis, clubbing     MEDICATIONS:      dianeal lo-ivonne 1.5%  2,000 mL Intraperitoneal Q6H    vancomycin (VANCOCIN) intermittent dosing (placeholder)   Other RX Placeholder    pantoprazole  40 mg Oral QAM AC    nystatin  5 mL Oral 4x Daily    insulin lispro  0-6 Units Subcutaneous TID WC    insulin lispro  0-3 Units Subcutaneous Nightly    cefepime  1 g Intravenous Q24H    folbee plus  1 tablet Oral Daily    busPIRone  5 mg Oral BID    fluticasone  2 spray Each Nostril Daily    gabapentin  300 mg Oral Daily    insulin glargine  4 Units Subcutaneous QAM    isosorbide dinitrate  20 mg Oral TID    levothyroxine  25 mcg Oral Daily    meclizine  25 mg Oral TID    oxybutynin  5 mg Oral Daily    pravastatin  40 mg Oral Nightly    sevelamer  1,600 mg Oral TID WC    sodium chloride flush  10 mL Intravenous 2 times per day    heparin (porcine)  5,000 Units Subcutaneous 3 times per day      dextrose       glucose, dextrose, glucagon (rDNA), dextrose, sodium chloride flush, acetaminophen **OR** acetaminophen, polyethylene glycol, promethazine **OR** ondansetron      LABS:     CBC:   Recent Labs     08/19/20  0833 08/20/20  0846 08/21/20  0638   WBC 23.5* 26.7* 22.1*   HGB 10.6* 11.5* 10.9*    438* 402*     BMP:    Recent Labs     08/19/20  0833 08/20/20  0845 08/21/20  0638   * 128* 126*   K 3.8 3.5 3.4*   CL 92* 88* 89*   CO2 25 22* 23   BUN 48* 48* 52*   CREATININE 6.4* 7.3* 6.7*   GLUCOSE 138* 162* 157*     Calcium:  Recent Labs     08/21/20  0638   CALCIUM 9.3      Recent Labs     08/21/20  0659 08/21/20  1117 08/21/20  1617   POCGLU 160* 141* 136*     HgbA1C: No results for input(s): LABA1C in the last 72 hours. INR: No results for input(s): INR in the last 72 hours.   Hepatic:   Recent Labs     08/21/20  0638   ALKPHOS 111   ALT 24   AST 19   PROT 5.5*   BILITOT 0.3   LABALBU 2.3*         Problem list of patient:     Patient Active Problem List   Diagnosis Code    Chronic kidney disease, stage III (moderate) (HCC) N18.3    Anemia of chronic disease D63.8    Type 2 diabetes mellitus with insulin therapy ([facility] E11.9, Z79.4    Chronic kidney disease (CKD), stage IV (severe) (HCC) N18.4    Diabetic nephropathy with proteinuria ([facility] E11.21    Acute on chronic respiratory failure with hypoxia ([facility]) J96.21    Pulmonary edema with congestive heart failure ([facility]) I50.1    Chronic diastolic CHF (congestive heart failure) ([facility]) I50.32    LOUISE (acute

## 2020-08-21 NOTE — PROGRESS NOTES
Assessment and Plan:        1. Fever, leukocytosis- ct scans not very revealing nor is exam; on cefepime and vanc  2. ESRD on PD- PD fluid ok; Nephrology following  3. Dm- stable        CC:  fever  HPI:  [place], [place], presented with fever, unclear source. Cultures neg to date. Has persistent leukocytosis in 20s range  ROS (12 point review of systems completed. Pertinent positives noted.  Otherwise ROS is negative) : neg  PMH:  Per HPI  SHX:  [place]  [address] [facility],[address]: Noncontributory    Allergies: See above    Medications:     dextrose        vancomycin (VANCOCIN) intermittent dosing (placeholder)   Other RX Placeholder    dianeal lo-ivonne (ULTRABAG) 2.5%  2,000 mL Intraperitoneal Q12H    dianeal lo-ivonne 1.5%  2,000 mL Intraperitoneal Q12H    pantoprazole  40 mg Oral QAM AC    nystatin  5 mL Oral 4x Daily    insulin lispro  0-6 Units Subcutaneous TID WC    insulin lispro  0-3 Units Subcutaneous Nightly    cefepime  1 g Intravenous Q24H    folbee plus  1 tablet Oral Daily    busPIRone  5 mg Oral BID    fluticasone  2 spray Each Nostril Daily    gabapentin  300 mg Oral Daily    insulin glargine  4 Units Subcutaneous QAM    isosorbide dinitrate  20 mg Oral TID    levothyroxine  25 mcg Oral Daily    meclizine  25 mg Oral TID    oxybutynin  5 mg Oral Daily    pravastatin  40 mg Oral Nightly    sevelamer  1,600 mg Oral TID WC    sodium chloride flush  10 mL Intravenous 2 times per day    heparin (porcine)  5,000 Units Subcutaneous 3 times per day       Vital Signs:   BP (!) 148/65   Pulse 68   Temp 98.4 °F (36.9 °C) (Oral)   Resp 18   Ht 5' 6\" (1.676 m)   Wt 188 lb 9.6 oz (85.5 kg)   SpO2 98%   BMI 30.44 kg/m²      Intake/Output Summary (Last 24 hours) at 8/21/2020 7793  Last data filed at 8/21/2020 045  Gross per 24 hour   Intake 6240 ml   Output 7200 ml   Net -960 ml        Physical Examination: General appearance - chronically ill appearing  Mental status - alert, oriented to person, place, and

## 2020-08-21 NOTE — CARE COORDINATION
8/21/20, 2:20 PM EDT    DISCHARGE ONGOING EVALUATION:     Herbert Blackburn day: 8  Location: [facility]18/018- Reason for admit: Sepsis [facility]) [A41.9]   Treatment Plan of Care: K+ 3.4, creat 6.7, WBC 22.1. Remains on IV ATBs. PD managed by nephrology. ID following.    Barriers to Discharge: not medically ready   PCP: Dario Boyer  Readmission Risk Score: 23%  Patient Goals/Plan/Treatment Preferences: return to [facility]

## 2020-08-21 NOTE — PROGRESS NOTES
Renal Progress Note    Assessment and Plan:     # ESRD on PD  -Patient's volume status continuing to be stable. Adequate filtration with PD.  -Total output 59 L over the last 24 hours  -Clinically this patient is at her baseline  -Patient's labs indicate stable renal function for her baseline. Vital signs within normal limits for her. # Hyponatremia    -Sodium has been slightly downtrending, 126 today  -This is at about the lower end of her baseline sodium level.   We will continue to monitor for now  -Hyponatremia likely secondary to ESRD     # Leukocytosis  -Possible etiology is lungs secondary to pneumonia   -ID following, patient is on Vancomycin  -No signs or evidence of peritonitis or peritoneal dialysis catheter infection    # Essential Hypertension  -Blood pressure has been stable throughout yesterday 8/20  -This morning her BP is 121/63 mmHg   -She is stable, will continue to monitor     Patient Active Problem List:     Chronic kidney disease, stage III (moderate) (formerly [facility])     Anemia in chronic renal disease     Type 2 diabetes mellitus with chronic kidney disease on chronic dialysis, with long-term current use of insulin (formerly [facility])     Chronic kidney disease (CKD), stage IV (severe) (formerly [facility])     Diabetic nephropathy with proteinuria (HCC)     Acute on chronic respiratory failure with hypoxia (HCC)     Pulmonary edema with congestive heart failure (HCC)     Acute on chronic diastolic CHF (congestive heart failure) ([address]     LOUISE (acute kidney injury) ([address]     Uncontrolled type 2 diabetes mellitus with hyperglycemia ([address]     Essential hypertension     Hypothyroidism     Other fluid overload     ESRD (end stage renal disease) ([address]     Diabetic nephropathy associated with type 2 diabetes mellitus (HCC)     Bilateral pleural effusion     Metabolic acidosis     Leukocytosis     Generalized weakness     Bipolar 1 disorder (formerly [facility])     Mild mitral regurgitation     Mild tricuspid regurgitation     Chronic pain syndrome sevelamer  1,600 mg Oral TID     sodium chloride flush  10 mL Intravenous 2 times per day    heparin (porcine)  5,000 Units Subcutaneous 3 times per day     Continuous Infusions:   dextrose         CBC:   Recent Labs     08/19/20  0833 08/20/20  0846 08/21/20  0638   WBC 23.5* 26.7* 22.1*   HGB 10.6* 11.5* 10.9*    438* 402*     CMP:    Recent Labs     08/19/20  0833 08/20/20  0845 08/21/20  0638   * 128* 126*   K 3.8 3.5 3.4*   CL 92* 88* 89*   CO2 25 22* 23   BUN 48* 48* 52*   CREATININE 6.4* 7.3* 6.7*   GLUCOSE 138* 162* 157*   CALCIUM 9.2 9.5 9.3   LABGLOM 6* 6* 6*     Troponin: No results for input(s): TROPONINI in the last 72 hours. BNP: No results for input(s): BNP in the last 72 hours. INR: No results for input(s): INR in the last 72 hours. Lipids: No results for input(s): CHOL, LDLDIRECT, TRIG, HDL, AMYLASE, LIPASE in the last 72 hours. Liver:   Recent Labs     08/21/20  0638   AST 19   ALT 24   ALKPHOS 111   PROT 5.5*   LABALBU 2.3*   BILITOT 0.3     Iron:  No results for input(s): IRONS, FERRITIN in the last 72 hours. Invalid input(s): LABIRONS  CT ABDOMEN PELVIS WO CONTRAST Additional Contrast? None   Final Result       1. Moderate ascites which has increased in the interval since prior CT. 2. Decreased bilateral pleural effusions. 3. 7 mm pulmonary nodule in the right lower lobe has increased in size since 6/7/2016. Consider follow-up CT in 6-12 months to assess stability. 4. Umbilical hernia containing only fat. 5. Moderate retained stool. **This report has been created using voice recognition software. It may contain minor errors which are inherent in voice recognition technology. **      Final report electronically signed by Dr. Silvio Parsons MD on 8/20/2020 1:52 PM      CT CHEST WO CONTRAST   Final Result      1. There is a small left-sided pleural effusion. 2. Some infiltrates are seen in the right upper and lower lobes.    3. Dilatation of the main pulmonary artery which may be due to pulmonary arterial hypertension. 4. Pulmonary nodules measuring 7 mm in the right lung and 5 mm in the left lung. Follow-up is recommended in 3-6 months to assess for stability. **This report has been created using voice recognition software. It may contain minor errors which are inherent in voice recognition technology. **      Final report electronically signed by Dr Gi Wright on 8/18/2020 9:58 PM      XR CHEST (2 VW)   Final Result   1. Normal heart size. Moderate chronic elevation right hemidiaphragm. 2. Moderate atelectasis/pneumonia both perihilar regions and lung right hemidiaphragm. 3. Tiny bilateral pleural effusions. **This report has been created using voice recognition software. It may contain minor errors which are inherent in voice recognition technology. **      Final report electronically signed by Dr. Katherine Lewis on 8/17/2020 1:34 PM      XR CHEST PORTABLE   Final Result      Persistent cardiac enlargement with elevation of right diaphragm. Mild prominence of interstitial markings, correlate with bronchitis      **This report has been created using voice recognition software. It may contain minor errors which are inherent in voice recognition technology. **      Final report electronically signed by Dr. Constantin Vazquez on 8/14/2020 3:39 AM            Objective:   Vitals: /63   Pulse 65   Temp 99 °F (37.2 °C) (Oral)   Resp 16   Ht 5' 6\" (1.676 m)   Wt 188 lb 9.6 oz (85.5 kg)   SpO2 96%   BMI 30.44 kg/m²    Wt Readings from Last 3 Encounters:   08/20/20 188 lb 9.6 oz (85.5 kg)   06/02/20 186 lb 9.6 oz (84.6 kg)   02/11/20 185 lb 12.8 oz (84.3 kg)      24HR INTAKE/OUTPUT:      Intake/Output Summary (Last 24 hours) at 8/21/2020 2675  Last data filed at 8/21/2020 0455  Gross per 24 hour   Intake 8360 ml   Output 9750 ml   Net -1390 ml       Constitutional:  Alert, awake, no apparent distress  Skin: Abdominal area around PD port continues to show no signs of infection. No swelling, redness, tenderness to palpation. HEENT:Pupils are reactive . Throat is clear . Oral mucosa is moist  Neck: supple with no thyromegaly or carotid bruit  Cardiovascular:  S1, S2 without murmur or rubs  Respiratory:  Clear to ausculation without wheezes, rhonchi or rales. Abdomen: +bs, some distention is present. No tenderness/guarding on palpation. Ext: no LE edema  Musculoskeletal:Intact  Neuro:Alert and awake. Well oriented  with no focal deficit. Speech is normal      Electronically signed by Will Molina MD on 8/21/2020 at 7:58 AM

## 2020-08-21 NOTE — PLAN OF CARE
Problem: Skin Integrity:  Goal: Will show no infection signs and symptoms  Description: Will show no infection signs and symptoms  Outcome: Ongoing  Note: No new S/S of infection this shift. Will continue to monitor      Problem: DISCHARGE BARRIERS  Goal: Patient's continuum of care needs are met  Outcome: Ongoing  Note: Patient uses the call light appropriately to address all needs. Patient is rounded on every hour. Problem: Falls - Risk of:  Goal: Will remain free from falls  Description: Will remain free from falls  Outcome: Ongoing  Note: Call light within reach. Side rails up x2. Bed alarm on. Non skid slippers available. Patient is a dependent patient and max assist.       Problem: Physical Regulation:  Goal: Will remain free from infection  Description: Will remain free from infection  Outcome: Ongoing  Note: No S/S of infection this shift, patient has no fever, heart rate is normal. No complaints      Problem: Pain:  Goal: Pain level will decrease  Description: Pain level will decrease  Outcome: Ongoing  Note: Pt has PRN medication available for pain management. CARE PLAN REVIEWED WITH PATIENT, PATIENT VERBALIZED UNDERSTANDING OF THE PLAN OF CARE AND CONTRIBUTED TO THE GOAL SETTING.

## 2020-08-21 NOTE — PROGRESS NOTES
Son christal called this morning and gave the HIPPA code, patient was asleep. Son states he will call back this afternoon.

## 2020-08-22 NOTE — PROGRESS NOTES
Pharmacy Vancomycin Consult     Vancomycin Day: 3 of restart  Current Dosing: intermittent    Temp max:  100.2    Recent Labs     08/20/20  0845 08/21/20  0638   BUN 48* 52*       Recent Labs     08/20/20  0845 08/21/20  0638   CREATININE 7.3* 6.7*       Recent Labs     08/21/20  0638 08/22/20  0638   WBC 22.1* 23.1*         Intake/Output Summary (Last 24 hours) at 8/22/2020 1437  Last data filed at 8/22/2020 1130  Gross per 24 hour   Intake 8643.7 ml   Output 8950 ml   Net -306.3 ml         Ht Readings from Last 1 Encounters:   08/13/20 5' 6\" (1.676 m)        Wt Readings from Last 1 Encounters:   08/22/20 186 lb 11.2 oz (84.7 kg)         Body mass index is 30.13 kg/m². Estimated Creatinine Clearance: 9 mL/min (A) (based on SCr of 6.7 mg/dL [facility])).     Trough: 18.7 mcg/mL    Assessment/Plan:  Will give vancomycin 750mg x 1 dose   Vancomycin for PNA, increasing WBC  Patient on peritoneal dialysis  No new cultures

## 2020-08-22 NOTE — PLAN OF CARE
Problem: Skin Integrity:  Goal: Will show no infection signs and symptoms  Description: Will show no infection signs and symptoms  Outcome: Ongoing  Note: No new skin breakdown noted this shift. Pt being repositioned q2h and PRN. Pillow support provided. Goal: Absence of new skin breakdown  Description: Absence of new skin breakdown  Outcome: Ongoing     Problem: DISCHARGE BARRIERS  Goal: Patient's continuum of care needs are met  Outcome: Ongoing  Note: Pt is active in her plan of care. Plans on returning to [facility] when medically stable. Problem: Falls - Risk of:  Goal: Will remain free from falls  Description: Will remain free from falls  Outcome: Ongoing  Note: No falls this shift. Patient educated on not getting up without help. Falling star protocol in place. Call light in reach. Bed in lowest position. Side rails up x2. Bed alarm set. Patient visually checked on hourly rounds. Problem: Nutritional:  Goal: Nutritional status will improve  Description: Nutritional status will improve  Outcome: Ongoing  Note: Pt tolerating RENAL; Daily Fluid Restriction: 1500 ml diet. Problem: Physical Regulation:  Goal: Will remain free from infection  Description: Will remain free from infection  Outcome: Ongoing  Note: ID on case. Monitoring vital signs. IV antibiotics on MAR. Problem: Discharge Planning:  Goal: Discharged to appropriate level of care  Description: Discharged to appropriate level of care  Outcome: Ongoing     Problem: Tissue Perfusion - Renal, Altered:  Goal: Electrolytes within specified parameters  Description: Electrolytes within specified parameters  Outcome: Ongoing  Note: Results for Nayan Lopez (MRN [id number]) as of 8/21/2020 23:24   Ref.  Range 8/21/2020 06:38   Sodium Latest Ref Range: 135 - 145 meq/L 126 (L)   Potassium Latest Ref Range: 3.5 - 5.2 meq/L 3.4 (L)   Chloride Latest Ref Range: 98 - 111 meq/L 89 (L)   CO2 Latest Ref Range: 23 - 33 meq/L 23   BUN Latest Ref Range: 7 - 22 mg/dL 52 (H)   Creatinine Latest Ref Range: 0.4 - 1.2 mg/dL 6.7 (HH)   Anion Gap Latest Ref Range: 8.0 - 16.0 meq/L 14.0   Est, Glom Filt Rate Latest Units: ml/min/1.73m2 6 (A)   Glucose Latest Ref Range: 70 - 108 mg/dL 157 (H)   Calcium Latest Ref Range: 8.5 - 10.5 mg/dL 9.3     Goal: Ability to achieve a balanced intake and output will improve  Description: Ability to achieve a balanced intake and output will improve  Outcome: Ongoing  Note: CAPD patient. Pt states she no longer produces urine. Problem: Pain:  Goal: Pain level will decrease  Description: Pain level will decrease  Outcome: Ongoing  Note: Pt verbalizes chronic shoulder pain this shift. Pt repositioned for comfort. PRN tylenol on MAR. Goal: Control of acute pain  Description: Control of acute pain  Outcome: Ongoing  Goal: Control of chronic pain  Description: Control of chronic pain  Outcome: Ongoing     Care plan reviewed with patient. Patient verbalizes understanding of the plan of care and contribute to goal setting.

## 2020-08-22 NOTE — PROGRESS NOTES
[facility]         Renal Inpatient Follow-Up note         8/22/2020 2:33 PM    Pt Name:   Trip Koo  YOB: 1950  Attending:   Josephus Habermann, MD    Chief Complaint : Trip Koo is a 78 y.o. female being followed by nephrology for ESRD on PD    Interval History :   Patient seen and examined by me. No distress  Feels better. No cp or SOB. No significant cough     Scheduled Medications :    dianeal lo-ivonne 1.5%  2,000 mL Intraperitoneal Q6H    vancomycin (VANCOCIN) intermittent dosing (placeholder)   Other RX Placeholder    pantoprazole  40 mg Oral QAM AC    nystatin  5 mL Oral 4x Daily    insulin lispro  0-6 Units Subcutaneous TID WC    insulin lispro  0-3 Units Subcutaneous Nightly    cefepime  1 g Intravenous Q24H    folbee plus  1 tablet Oral Daily    busPIRone  5 mg Oral BID    fluticasone  2 spray Each Nostril Daily    gabapentin  300 mg Oral Daily    insulin glargine  4 Units Subcutaneous QAM    isosorbide dinitrate  20 mg Oral TID    levothyroxine  25 mcg Oral Daily    meclizine  25 mg Oral TID    oxybutynin  5 mg Oral Daily    pravastatin  40 mg Oral Nightly    sevelamer  1,600 mg Oral TID WC    sodium chloride flush  10 mL Intravenous 2 times per day    heparin (porcine)  5,000 Units Subcutaneous 3 times per day      dextrose         Vitals :  BP (!) 125/58   Pulse 69   Temp 98.9 °F (37.2 °C) (Oral)   Resp 16   Ht 5' 6\" (1.676 m)   Wt 186 lb 11.2 oz (84.7 kg)   SpO2 97%   BMI 30.13 kg/m²     24HR INTAKE/OUTPUT:      Intake/Output Summary (Last 24 hours) at 8/22/2020 1433  Last data filed at 8/22/2020 1130  Gross per 24 hour   Intake 8643.7 ml   Output 8950 ml   Net -306.3 ml     Last 3 weights  Wt Readings from Last 3 Encounters:   08/22/20 186 lb 11.2 oz (84.7 kg)   06/02/20 186 lb 9.6 oz (84.6 kg)   02/11/20 185 lb 12.8 oz (84.3 kg)           Physical Exam :  General Appearance:  Well developed.  No distress  Mouth/Throat:  Oral mucosa moist  Neck:  Supple, no JVD  Lungs:  Breath sounds: clear  Heart[de-identified]  S1,S2 heard  Abdomen:  Soft, non - tender  Musculoskeletal:  Edema - ESRD on PD         Last 3 CBC   Recent Labs     08/20/20  0846 08/21/20  0638 08/22/20  0638   WBC 26.7* 22.1* 23.1*   RBC 3.51* 3.22* 3.38*   HGB 11.5* 10.9* 11.3*   HCT 35.3* 32.1* 33.7*   * 402* 422*     Last 3 CMP  Recent Labs     08/20/20  0845 08/21/20  0638   * 126*   K 3.5 3.4*   CL 88* 89*   CO2 22* 23   BUN 48* 52*   CREATININE 7.3* 6.7*   CALCIUM 9.5 9.3   LABALBU  --  2.3*   BILITOT  --  0.3             ASSESSMENT / Plan   1 Renal - ESRD on PD, volume status is better  ? Reviewed the PD data, overall doing ok  ? 2150 and 2300 ml UF noted  ? Continue 1.5% every 6 hours    2 Electrolytes - mild hypokalemia - 20 kcl po daily  3 Hyponatremia - due to ESRD . Decreased dialysate to 1.5% . Recheck in AM  4 Anemia of ESRD  5 Leucocytosis - seen by ID, fluctuating being worked up , so far no source found  6 Essential Hypertension  7 Hx of diabetes Mellitus  8 Meds reviewed and discussed with patient    LIANNE Zapien D.  [facility].

## 2020-08-22 NOTE — PROGRESS NOTES
Assessment and Plan:        1. Fever, leukocytosis- ct scans not very revealing nor is exam; on cefepime and vanc. She has had lower back pain which is fairly new. Had prior back surgery. Will do MRI. 2. ESRD on PD- PD fluid ok; Nephrology following  3. Dm- stable        CC:  fever  HPI:  [place], cap[place], presented with fever, unclear source. Cultures neg to date. Has persistent leukocytosis in 20s range  ROS (12 point review of systems completed. Pertinent positives noted.  Otherwise ROS is negative) : neg  PMH:  Per HPI  SHX:  [place]  [address] [address]: Noncontributory    Allergies: See above    Medications:     dextrose        dianeal lo-ivonne 1.5%  2,000 mL Intraperitoneal Q6H    vancomycin (VANCOCIN) intermittent dosing (placeholder)   Other RX Placeholder    pantoprazole  40 mg Oral QAM AC    nystatin  5 mL Oral 4x Daily    insulin lispro  0-6 Units Subcutaneous TID WC    insulin lispro  0-3 Units Subcutaneous Nightly    cefepime  1 g Intravenous Q24H    folbee plus  1 tablet Oral Daily    busPIRone  5 mg Oral BID    fluticasone  2 spray Each Nostril Daily    gabapentin  300 mg Oral Daily    insulin glargine  4 Units Subcutaneous QAM    isosorbide dinitrate  20 mg Oral TID    levothyroxine  25 mcg Oral Daily    meclizine  25 mg Oral TID    oxybutynin  5 mg Oral Daily    pravastatin  40 mg Oral Nightly    sevelamer  1,600 mg Oral TID WC    sodium chloride flush  10 mL Intravenous 2 times per day    heparin (porcine)  5,000 Units Subcutaneous 3 times per day       Vital Signs:   /60   Pulse 62   Temp 99.3 °F (37.4 °C) (Oral)   Resp 18   Ht 5' 6\" (1.676 m)   Wt 188 lb 9.6 oz (85.5 kg)   SpO2 95%   BMI 30.44 kg/m²      Intake/Output Summary (Last 24 hours) at 8/22/2020 0616  Last data filed at 8/22/2020 0435  Gross per 24 hour   Intake 8643.7 ml   Output 9350 ml   Net -706.3 ml        Physical Examination: General appearance - chronically ill appearing  Mental status - alert, oriented to person, place, and time  Neck - supple, no significant adenopathy, no JVD, or carotid bruits  Chest - clear to auscultation, no wheezes, rales or rhonchi, symmetric air entry  Heart - no JVD, short systolic murmur ursb  Abdomen - distended, nontender  Neurological - alert, oriented, normal speech, no focal findings or movement disorder noted  Musculoskeletal - notes some tenderness lumbar area to right of spine  Extremities - no pedal edema noted  Skin - normal coloration and turgor, no rashes, no suspicious skin lesions noted    Data: (All radiographs, tracings, PFTs, and imaging are personally viewed and interpreted unless otherwise noted).     Reviewed labs      Electronically signed by Gilberto Briseno MD on 8/22/2020 at 6:58 AM

## 2020-08-23 NOTE — PROGRESS NOTES
[facility]         Renal Inpatient Follow-Up note         8/23/2020 10:49 AM    Pt Name:   Corina Reyes  YOB: 1950  Attending:   Jonathan Cleary MD    Chief Complaint : Corina Reyes is a 78 y.o. female being followed by nephrology for ESRD on PD    Interval History :   Patient seen and examined by me. No distress  Feels better. No cp or SOB. Still some cough no other compalints. Some nausea     Scheduled Medications :    dianeal lo-ivonne 1.5%  2,000 mL Intraperitoneal Q6H    vancomycin (VANCOCIN) intermittent dosing (placeholder)   Other RX Placeholder    pantoprazole  40 mg Oral QAM AC    nystatin  5 mL Oral 4x Daily    insulin lispro  0-6 Units Subcutaneous TID WC    insulin lispro  0-3 Units Subcutaneous Nightly    cefepime  1 g Intravenous Q24H    folbee plus  1 tablet Oral Daily    busPIRone  5 mg Oral BID    fluticasone  2 spray Each Nostril Daily    gabapentin  300 mg Oral Daily    insulin glargine  4 Units Subcutaneous QAM    isosorbide dinitrate  20 mg Oral TID    levothyroxine  25 mcg Oral Daily    meclizine  25 mg Oral TID    oxybutynin  5 mg Oral Daily    pravastatin  40 mg Oral Nightly    sevelamer  1,600 mg Oral TID WC    sodium chloride flush  10 mL Intravenous 2 times per day    heparin (porcine)  5,000 Units Subcutaneous 3 times per day      dextrose         Vitals :  BP (!) 116/57   Pulse 74   Temp 98.2 °F (36.8 °C) (Oral)   Resp 16   Ht 5' 6\" (1.676 m)   Wt 181 lb 4.8 oz (82.2 kg) Comment: dry weight  SpO2 99% Comment: O2 not needed at this time per O2 protocol.   BMI 29.26 kg/m²     24HR INTAKE/OUTPUT:      Intake/Output Summary (Last 24 hours) at 8/23/2020 1049  Last data filed at 8/23/2020 0706  Gross per 24 hour   Intake 8619.87 ml   Output 9050 ml   Net -430.13 ml     Last 3 weights  Wt Readings from Last 3 Encounters:   08/23/20 181 lb 4.8 oz (82.2 kg)   06/02/20 186 lb 9.6 oz (84.6 kg)   02/11/20 185 lb 12.8 oz (84.3 kg)           Physical Exam :  General Appearance:  Well developed. No distress  Mouth/Throat:  Oral mucosa moist  Neck:  Supple, no JVD  Lungs:  Breath sounds: clear  Heart[de-identified]  S1,S2 heard  Abdomen:  Soft, non - tender, distended  Musculoskeletal:  Edema - ESRD on PD         Last 3 CBC   Recent Labs     08/21/20  0638 08/22/20  0638 08/23/20  0816   WBC 22.1* 23.1* 22.8*   RBC 3.22* 3.38* 3.43*   HGB 10.9* 11.3* 11.4*   HCT 32.1* 33.7* 33.8*   * 422* 446*     Last 3 CMP  Recent Labs     08/21/20  0638 08/23/20  0816   * 124*   K 3.4* 3.3*   CL 89* 86*   CO2 23 21*   BUN 52* 52*   CREATININE 6.7* 7.3*   CALCIUM 9.3 9.0   LABALBU 2.3*  --    BILITOT 0.3  --              ASSESSMENT / Plan   1 Renal - ESRD on PD, volume status is better  ? Reviewed the PD data, overall doing ok  ? 2150 and 2300 ml UF noted  ? on 1.5% every 6 hours. ? Decrease PD volume to 1500 ml for now    2 Electrolytes - mild hypokalemia - increase to 20 BID  3 Hyponatremia - due to ESRD . Decreased dialysate to 1.5% . add salt tabs 1 gram BID  4 Anemia of ESRD  5 Leucocytosis - seen by ID, fluctuating being worked up , so far no source found. Wbc scan later   6 Essential Hypertension  7 Hx of diabetes Mellitus  8 Meds reviewed and discussed with patient    LIANNE Tyler D.  [facility].

## 2020-08-24 PROBLEM — I50.1 PULMONARY EDEMA WITH CONGESTIVE HEART FAILURE (HCC): Status: RESOLVED | Noted: 2018-12-07 | Resolved: 2020-01-01

## 2020-08-24 PROBLEM — I95.9 HYPOTENSION: Status: RESOLVED | Noted: 2020-01-01 | Resolved: 2020-01-01

## 2020-08-24 NOTE — PLAN OF CARE
Problem: Skin Integrity:  Goal: Will show no infection signs and symptoms  Description: Will show no infection signs and symptoms  8/24/2020 1817 by Lauren Terrazas RN  Outcome: Ongoing  Note: Redness to coccyx       Problem: Skin Integrity:  Goal: Absence of new skin breakdown  Description: Absence of new skin breakdown  8/24/2020 1817 by Lauren Terrazas RN  Outcome: Met This Shift     Problem: DISCHARGE BARRIERS  Goal: Patient's continuum of care needs are met  8/24/2020 1817 by Lauren Terrazas RN  Outcome: Ongoing  Note: Patient plans to return to nursing home at discharge. Problem: Falls - Risk of:  Goal: Will remain free from falls  Description: Will remain free from falls  8/24/2020 1817 by Lauren Terrazas RN  Outcome: Ongoing  Note: Bed alarm on, Falling star program in place. Call light within reach. Problem: Nutritional:  Goal: Nutritional status will improve  Description: Nutritional status will improve  8/24/2020 1817 by Lauren Terrazas RN  Outcome: Ongoing  Note: Patient eating some of meals. Problem: Physical Regulation:  Goal: Will remain free from infection  Description: Will remain free from infection  8/24/2020 1817 by Lauren Terrazas RN  Outcome: Met This Shift   Care plan reviewed with patient. Patient verbalize understanding of the plan of care and contribute to goal setting.

## 2020-08-24 NOTE — CARE COORDINATION
8/24/20, 12:14 PM EDT    DISCHARGE ONGOING EVALUATION:     Main [place] day: 11  Location: [facility]18/018-A Reason for admit: Sepsis [facility]) [A41.9]   Treatment Plan of Care: Na+ 123, creat 7.9, WBC 21.3. IV maxipime, IV Vanc, DM management. Indium scan ordered. Nephrology managing PD. ID following, may need work-up for infection of thoracolumbar spine. Barriers to Discharge: not medically stable  PCP: Emiliano Watts  Readmission Risk Score: 25%  Patient Goals/Plan/Treatment Preferences: return to [facility].

## 2020-08-24 NOTE — PLAN OF CARE
Problem: Skin Integrity:  Goal: Will show no infection signs and symptoms  Description: Will show no infection signs and symptoms  Outcome: Ongoing  Note: No new skin breakdown noted. See skin assessment. Repositioning patient q2h and prn       Problem: Skin Integrity:  Goal: Absence of new skin breakdown  Description: Absence of new skin breakdown  Outcome: Ongoing  Note: No new problem areas noted to skin. Problem: DISCHARGE BARRIERS  Goal: Patient's continuum of care needs are met  Outcome: Ongoing  Note: Patient performing ADL's to the best of their ability. Patient encouraged to continue functioning at their highest level of ability in anticipation of discharge. [facility]. Problem: Falls - Risk of:  Goal: Will remain free from falls  Description: Will remain free from falls  Outcome: Ongoing  Note: Patient was reeducated about using the call light appropriately. Pt belongings within reach. Pt cautioned against getting up without nurse or staff in room. Bed is set at lowest position, side rails up as appropriate. Problem: Nutritional:  Goal: Nutritional status will improve  Description: Nutritional status will improve  Outcome: Ongoing  Note: Patient tolerating renal , 1500 mL fluid restriction diet at this time. Problem: Physical Regulation:  Goal: Will remain free from infection  Description: Will remain free from infection  Outcome: Ongoing  Note: Infectious disease on patient case, Idiium scan ordered. IV ATB on case. Problem: Discharge Planning:  Goal: Discharged to appropriate level of care  Description: Discharged to appropriate level of care  Outcome: Ongoing  Note: Patient performing ADL's to the best of their ability. Patient encouraged to continue functioning at their highest level of ability in anticipation of discharge. Care plan reviewed with patient.   Patient verbalize understanding of the plan of care and

## 2020-08-24 NOTE — PROGRESS NOTES
Comprehensive Nutrition Assessment    Type and Reason for Visit:  Reassess    Nutrition Recommendations/Plan: Will change ONS to Glucerna BID. Consider holding MVI d/t nausea. Recommend continue antiemetics to assist with nausea. Nutrition Assessment:    Pt slightly improving from a nutritional standpoint AEB consuming 50-75% of some meals however overall intake appears inadequate. Remains at risk for further nutritional compromise r/t increased nutrient needs for wound healing, CAPD, nausea and underlying medical condition (hx CAD, COPD, DM). Nutrition recommendations/interventions as per above. Malnutrition Assessment:  Malnutrition Status: At risk for malnutrition (Comment)    Context:  Acute Illness     Findings of the 6 clinical characteristics of malnutrition:  Energy Intake:  1 - 75% or less of estimated energy requirements for 7 or more days  Weight Loss:  No significant weight loss     Body Fat Loss:  No significant body fat loss     Muscle Mass Loss:  No significant muscle mass loss    Fluid Accumulation:  1 - Mild Extremities   Strength:  Not Performed    Estimated Daily Nutrient Needs:  Energy (kcal):  9683-4695 kcal/day (15-18 kcal/kg); Weight Used for Energy Requirements:  (86.7 kg on 8/16)     Protein (g):  71-77 gm (1.2-1.3) - PD pt.; Weight Used for Protein Requirements:  (IBW 59 kg)          Nutrition Related Findings:  pt. seen - reports appetite is not the best; c/o nausea with eating; po intake varies 1-25 to 51-75% at meals; 2 BMs noted past 48 hours;  Rx includes NaCl, K CL, Folbee Plus, Renvela, Nystatin, Phenergan and Zofran; CAPD pt; 8/24: Glucose 112; BUN 56, Cr 7.9, Sodium 123; pt. hasn't been drinking Jose Rafael d/t nausea      Wounds:  Pressure Ulcer, Unstageable(unstageable pressure injury on foot and pressure injury stage 1 on coccyx)       Current Nutrition Therapies:    DIET RENAL; Daily Fluid Restriction: 1500 ml  Dietary Nutrition Supplements: Diabetic Oral Supplement    Anthropometric Measures:  · Height: 5' 6\" (167.6 cm)  · Current Body Weight: 183 lb 3.2 oz (83.1 kg)(8/24, bedscale, no edema)   · Admission Body Weight: 185 lb 14.4 oz (84.3 kg)(8/13; +2 generalized edema)    · Usual Body Weight: (180-190 lbs per pt report. Per EMR: 186 lb 9.6 oz on 6/2/20; 185 lb 12.8 oz on 2/11/20)     · Ideal Body Weight: 130 lbs;     · BMI: 29.6  · BMI Categories: Overweight (BMI 25.0-29. 9)       Nutrition Diagnosis:   · Increased nutrient needs related to increase demand for energy/nutrients(protein) as evidenced by wounds, dialysis      Nutrition Interventions:   Food and/or Nutrient Delivery:  Continue Current Diet, Modify Oral Nutrition Supplement  Nutrition Education/Counseling:  Education initiated(Encouraged good intake of protein souces with meals and good blood sugar control to aid in wound healing)   Coordination of Nutrition Care:  Continued Inpatient Monitoring    Goals:  Pt will consume 75% or more of meals during LOS to aid in wound healing       Nutrition Monitoring and Evaluation:   Food/Nutrient Intake Outcomes:  Food and Nutrient Intake, Supplement Intake, Vitamin/Mineral Intake  Physical Signs/Symptoms Outcomes:  Biochemical Data, GI Status, Nausea or Vomiting, Fluid Status or Edema, Nutrition Focused Physical Findings, Skin, Weight     Discharge Planning:     Too soon to determine     Electronically signed by Jaz Treviño RD, LD on 8/24/20 at 3:44 PM EDT    Contact: [phone]

## 2020-08-24 NOTE — PROGRESS NOTES
[facility]  INPATIENT PHYSICAL THERAPY  DAILY NOTE  STRZ RENAL TELEMETRY 6K - 6K-18/018-A  Time In: 2967  Time Out: 1502  Timed Code Treatment Minutes: 26 Minutes  Minutes: 26          Date: 2020  Patient Name: Sanjay Rai,  Gender:  female        MRN: [id number]  : 1950  (78 y.o.)     Referring Practitioner: YESENIA Vidal CNP  Diagnosis: Sepsis  Additional Pertinent Hx: Patient transferred from [facility] for further evaluation of sepsis. The patient had presented there with abdominal pain and fevers at home. The patient has a history ESRD on CAPD. She also complains of cough for about two weeks that has been getting progressively worse. She states she can not bring anything up with her cough. The patient states the fevers started 2-3 days ago intermittently. Prior Level of Function:  Lives With: Alone  Type of Home: Facility  Home Layout: One level  Home Access: Level entry  Home Equipment: Rolling walker, Wheelchair-manual   Bathroom Shower/Tub: Walk-in shower  Bathroom Toilet: Standard  Bathroom Equipment: Grab bars in shower, Grab bars around toilet  Bathroom Accessibility: Accessible    ADL Assistance: Needs assistance  [address]: Needs assistance  Active : No  Additional Comments: Pt reports that she is hoping to be able to move to a facility closer to her son in [place]. Pt had help with doing her self care. She was walking with a rolling walker with help from staff. She reported unsteadiness in her RLE and needing to have more help lately. Restrictions/Precautions:  Restrictions/Precautions: General Precautions, Fall Risk    SUBJECTIVE: Patient in room, agreeable to PT. RN okay with PT session. Pt pleasant and cooperative.      PAIN: no complaints of pain    OBJECTIVE:  Bed Mobility:  Supine to Sit: Moderate Assistance, with head of bed raised, with increased time for completion, assist with trunk    Transfers:  Sit to Stand: Maximum Assistance  Stand to Sit:Minimal Assistance   Increased time for completion, unsteadiness of lower extremities initially upon standing  Verbal cues for hand placement    Ambulation:  Minimal Assistance  Distance: 4'  Surface: Level Tile  Device:Rolling Walker  Gait Deviations: Forward Flexed Posture, Slow Aurea, Decreased Step Length Bilaterally, Decreased Gait Speed, Decreased Heel Strike Bilaterally and Unsteady Gait    Balance:  Static Sitting Balance:  Stand By Assistance  Static Standing Balance: Minimal Assistance, Moderate Assistance  Dynamic Standing Balance: Minimal Assistance    Exercise:  Patient was guided in 1 set(s) 10 reps of exercise to both lower extremities. Seated: long arc quad. Supine: ankle pumps, quad sets, glut sets 5\"x10, hip abduction, heel slide. Increased time to complete, rest breaks between each exercise due to fatigue. Exercises were completed for increased independence with functional mobility. Functional Outcome Measures: Completed  AM-PAC Inpatient Mobility without Stair Climbing Raw Score : 10  AM-PAC Inpatient without Stair Climbing T-Scale Score : 34.07    ASSESSMENT:  Assessment: Patient progressing toward established goals. Activity Tolerance:  Patient tolerance of  treatment: fair.  Fatigues quickly, requiring frequent rest breaks     Equipment Recommendations:Equipment Needed: No  Discharge Recommendations:  Continue to assess pending progress, ECF with PT, Subacute/Skilled Nursing Facility    Plan: Times per week: 3-5x GM  Current Treatment Recommendations: Strengthening, Endurance Training, Balance Training, Functional Mobility Training, Transfer Training, Gait Training, Home Exercise Program, Safety Education & Training, Patient/Caregiver Education & Training    Patient Education  Patient Education: Transfers, Verbal Exercise Instruction,  - Patient Verbalized Understanding, - Patient Requires Continued Education    Goals:  Patient goals : get stronger and go back to ECF  Short term goals  Time Frame for Short term goals: at discharge  Short term goal 1: Pt to be SBA for supine <> sit to get in/out of bed  Short term goal 2: Pt to be CGA x 1 for sit <> stand to get up to ambulate  Short term goal 3: Pt to ambulate >15 ft with RW with CGA x 1 to get to bathroom  Long term goals  Time Frame for Long term goals : not set due to short ELOS    Following session, patient left in safe position with all fall risk precautions in place.

## 2020-08-24 NOTE — PROGRESS NOTES
Daily    gabapentin  300 mg Oral Daily    insulin glargine  4 Units Subcutaneous QAM    isosorbide dinitrate  20 mg Oral TID    levothyroxine  25 mcg Oral Daily    meclizine  25 mg Oral TID    oxybutynin  5 mg Oral Daily    pravastatin  40 mg Oral Nightly    sevelamer  1,600 mg Oral TID WC    sodium chloride flush  10 mL Intravenous 2 times per day    heparin (porcine)  5,000 Units Subcutaneous 3 times per day      dextrose       guaiFENesin-dextromethorphan, glucose, dextrose, glucagon (rDNA), dextrose, sodium chloride flush, acetaminophen **OR** acetaminophen, polyethylene glycol, promethazine **OR** ondansetron      LABS:     CBC:   Recent Labs     08/22/20  0638 08/23/20  0816 08/24/20  0553   WBC 23.1* 22.8* 21.3*   HGB 11.3* 11.4* 9.8*   * 446* 396     BMP:    Recent Labs     08/23/20  0816 08/24/20  0553   * 123*   K 3.3* 3.6   CL 86* 87*   CO2 21* 22*   BUN 52* 56*   CREATININE 7.3* 7.9*   GLUCOSE 117* 112*     Calcium:  Recent Labs     08/24/20  0553   CALCIUM 8.5      Recent Labs     08/23/20  1929 08/24/20  0608 08/24/20  1117   POCGLU 151* 103 254*        Problem list of patient:     Patient Active Problem List   Diagnosis Code    Chronic kidney disease, stage III (moderate) ([facility]) N18.3    Type 2 diabetes mellitus with insulin therapy [facility] E11.9, Z79.4    Chronic kidney disease (CKD), stage IV (severe) ([facility] N18.4    Acute on chronic respiratory failure with hypoxia ([facility]) J96.21    Chronic diastolic CHF (congestive heart failure) ([facility]) I50.32    LOUISE (acute kidney injury) ([facility] N17.9    Essential hypertension I10    Hypothyroidism E03.9    Diabetic nephropathy associated with type 2 diabetes mellitus ([facility]) H53.20    Metabolic acidosis B41.1    Leukocytosis D72.829    Debility R53.81    Bipolar 1 disorder ([facility]) F31.9    Mild mitral regurgitation I34.0    Mild tricuspid regurgitation I07.1    Chronic pain syndrome G89.4    Chronic bilateral pleural effusions J90    (HFpEF) heart failure with preserved ejection fraction (HCC) I50.30    Atelectasis J98.11    Hyponatremia E87.1    Chronic anemia D64.9    Hyperlipidemia E78.5    Constipation K59.00    Shortness of breath R06.02    Hyperkalemia E87.5    Infiltrate noted on imaging study R93.89    Moderate malnutrition (HCC) E44.0    History of pleural effusion Z87.09    ESRD on peritoneal dialysis ([facility] N18.6, Z99.2    DM type 2 with diabetic peripheral neuropathy (HCC) E11.42    Diabetic retinopathy of both eyes associated with type 2 diabetes mellitus (HCC) E11.319    Sepsis ([facility]) A41.9    Generalized abdominal pain R10.84    Restrictive lung disease J98.4    Chronic sinus bradycardia R00.1    PAD (peripheral artery disease) ([facility]) I73.9    Obesity (BMI 30-39. 9) E66.9         ASSESSMENT/PLAN   ESRD on PD  Leukocytosis: source not very clear, indium scan in progress  Degenerative back disease:MRI of spine didn't show abscess      Gay Brown MD, FACP 8/24/2020 2:06 PM

## 2020-08-24 NOTE — PROGRESS NOTES
[facility]         Renal Inpatient Follow-Up note         8/24/2020 10:14 AM    Pt Name:   Aquiles Vargas  YOB: 1950  Attending:   Miah Claudio MD    Chief Complaint : Aquiles Vargas is a 78 y.o. female being followed by nephrology for ESRD on PD    Interval History :   Patient seen and examined by me. No distress  Feels better. No cp or SOB.   Still some cough and nausea     Scheduled Medications :    dianeal lo-ivonne 1.5%  1,500 mL Intraperitoneal Q6H    potassium chloride  20 mEq Oral BID WC    sodium chloride  1 g Oral BID WC    cefepime (MAXIPIME) 1 g IVPB minibag in NS  1 g Intravenous Q12H    vancomycin (VANCOCIN) intermittent dosing (placeholder)   Other RX Placeholder    pantoprazole  40 mg Oral QAM AC    nystatin  5 mL Oral 4x Daily    insulin lispro  0-6 Units Subcutaneous TID WC    insulin lispro  0-3 Units Subcutaneous Nightly    folbee plus  1 tablet Oral Daily    busPIRone  5 mg Oral BID    fluticasone  2 spray Each Nostril Daily    gabapentin  300 mg Oral Daily    insulin glargine  4 Units Subcutaneous QAM    isosorbide dinitrate  20 mg Oral TID    levothyroxine  25 mcg Oral Daily    meclizine  25 mg Oral TID    oxybutynin  5 mg Oral Daily    pravastatin  40 mg Oral Nightly    sevelamer  1,600 mg Oral TID WC    sodium chloride flush  10 mL Intravenous 2 times per day    heparin (porcine)  5,000 Units Subcutaneous 3 times per day      dextrose         Vitals :  BP (!) 108/57   Pulse 67   Temp 98.6 °F (37 °C) (Oral)   Resp 16   Ht 5' 6\" (1.676 m)   Wt 183 lb 3.2 oz (83.1 kg)   SpO2 95%   BMI 29.57 kg/m²     24HR INTAKE/OUTPUT:      Intake/Output Summary (Last 24 hours) at 8/24/2020 1014  Last data filed at 8/24/2020 0641  Gross per 24 hour   Intake 4600 ml   Output 5400 ml   Net -800 ml     Last 3 weights  Wt Readings from Last 3 Encounters:   08/24/20 183 lb 3.2 oz (83.1 kg)   06/02/20 186 lb 9.6 oz (84.6 kg)   02/11/20 185 lb 12.8 oz (84.3 kg)           Physical Exam :  General Appearance:  Well developed. No distress  Mouth/Throat:  Oral mucosa moist  Neck:  Supple, no JVD  Lungs:  Breath sounds: clear  Heart[de-identified]  S1,S2 heard  Abdomen:  Soft, non - tender, distended  Musculoskeletal:  Edema - none         Last 3 CBC   Recent Labs     08/22/20  0638 08/23/20  0816 08/24/20  0553   WBC 23.1* 22.8* 21.3*   RBC 3.38* 3.43* 2.94*   HGB 11.3* 11.4* 9.8*   HCT 33.7* 33.8* 28.7*   * 446* 396     Last 3 CMP  Recent Labs     08/23/20  0816 08/24/20  0553   * 123*   K 3.3* 3.6   CL 86* 87*   CO2 21* 22*   BUN 52* 56*   CREATININE 7.3* 7.9*   CALCIUM 9.0 8.5             ASSESSMENT / Plan   1 Renal - ESRD on PD, volume status is better  ? Reviewed the PD data, overall doing ok  ? 2000,1400 and 2000 ml UF noted  ? on 1.5% every 6 hours. 2 Electrolytes - mild hypokalemia - continue kcl 20 BID  3 Hyponatremia - due to ESRD . Decreased dialysate to 1.5% . Continue salt tabs,increase  2 gram BID  4 Anemia of ESRD  5 Leucocytosis - seen by ID, fluctuating being worked up , so far no source found. Indium scan pending . 6 Essential Hypertension  7 Hx of diabetes Mellitus  8 Meds reviewed and discussed with patient    LIANNE Beaulieu D.  [facility].

## 2020-08-24 NOTE — PROGRESS NOTES
[facility]  OCCUPATIONAL THERAPY MISSED TREATMENT NOTE  STR[facility] RENAL TELEMETRY 6K  6K-18/018-A      Date: 2020  Patient Name: Emi Kay        CSN: [id number]   : 1950  (78 y.o.)  Gender: female   Referring Practitioner: EMRE Nunn  Diagnosis: Sepsis         REASON FOR MISSED TREATMENT: Pt eating lunch tray upon arrival and plesantly requesting more time. Will check back this PM as able.

## 2020-08-25 NOTE — CARE COORDINATION
8/25/20, 3:44 PM EDT    DISCHARGE ONGOING EVALUATION:     Stephanie Span day: 12  Location: [facility]18/018-A Reason for admit: Sepsis [facility]) [A41.9]   Treatment Plan of Care:  Na+ 123, creat 7.5. Nephrology managing PD. ID following, indium scan in progress. Dr. Damien Craig mentions SOWMYA may be needed. Barriers to Discharge: not medically stable  PCP: Tanya Larios  Readmission Risk Score: 25%  Patient Goals/Plan/Treatment Preferences: return to [facility].

## 2020-08-25 NOTE — PROGRESS NOTES
185 lb 12.8 oz (84.3 kg)           Physical Exam :  General Appearance:  Well developed. No distress  Mouth/Throat:  Oral mucosa moist  Neck:  Supple, no JVD  Lungs:  Breath sounds: clear  Heart[de-identified]  S1,S2 heard  Abdomen:  Soft, non - tender, distended  Musculoskeletal:  Edema - none         Last 3 CBC   Recent Labs     08/23/20  0816 08/24/20  0553   WBC 22.8* 21.3*   RBC 3.43* 2.94*   HGB 11.4* 9.8*   HCT 33.8* 28.7*   * 396     Last 3 CMP  Recent Labs     08/23/20  0816 08/24/20  0553 08/25/20  0625   * 123* 123*   K 3.3* 3.6 3.9   CL 86* 87* 89*   CO2 21* 22* 20*   BUN 52* 56* 54*   CREATININE 7.3* 7.9* 7.5*   CALCIUM 9.0 8.5 8.7             ASSESSMENT / Plan   1 Renal - ESRD on PD, volume status is better  ? Reviewed the PD data, overall doing ok  ? 1700,  1750,1700 ml UF noted  ? on 1.5% every 6 hours. 2 Electrolytes - mild hypokalemia - continue kcl 20 BID  3 Hyponatremia - due to ESRD . Decreased dialysate to 1.5% . Continue salt tabs,increase d to 2 gram BID. Also ad NSS @ 50ml/hr  4 Anemia of ESRD  5 Leucocytosis - seen by ID, fluctuating being worked up , so far no source found. Indium scan pending . 6 Essential Hypertension  7 Hx of diabetes Mellitus  8 Meds reviewed and discussed with patient    LIANNE Munoz D.  [facility].

## 2020-08-26 NOTE — FLOWSHEET NOTE
08/26/20 1036   Provider Notification   Reason for Communication Evaluate   Provider Name [place]   Provider Notification Physician   Method of Communication Secure Message   Response Waiting for response   Notification Time 1036     Fibrin in CAPD drain.

## 2020-08-26 NOTE — PROGRESS NOTES
Hospitalist Progress Note    I asked to take care of the patient as of 8/26/2020  Patient:  Stephen Haro    Unit/Bed:6K-18/018-A  YOB: 1950  MRN: [id number]   Acct: [de-identified]   PCP: [id number]  Date of Admission: 8/13/2020    Assessment and Plan:        1. Leukocytosis with segs and immature cells: Indium scan did not reveal any evidence of infection, MRI lumbar spine did not reveal any evidence of infection at this point although revealed degenerative changes throughout the lumbar spine at L2 -3 and L3 -4. CT abdomen/pelvis did not reveal any signs of infection. It showed 7 mm pulmonary nodule in the right lower lobe which increased in size since 6/7/2016. A consider follow-up CT scan in 6 to 12 months as an outpatient. Chest x-ray as of 8/14/2020 shows possible bronchitis. Sedrate (110) 8/23/20, CRP (2.7) 8/22/20, PCT 1.7 8/13/20 last time checked, cultures -ve,   Patient started on cefepime and vancomycin. Infectious disease consulted for further recommendations. Patient not on steroids. White blood cell count on admission 22.8 trends down to 19.3 as of 8/26/2020, we will repeat pct, check peripheral smear and may need bone biopsy. 2. 7 mm pulmonary nodule in the right lower lobe and 5 mm in left lung: This is documented through CT abdomen/pelvis as of 8/20/2020. Recommend a repeat CT as an outpatient in 6 to 12 months. 3. Severe degenerative disease at the level of L2-3 and L3-4: Conservative management for the pain. Use capsaicin and lidocaine cream topically as needed. 4. ESRD on PD- PD fluid ok; Nephrology following  5. T2DM controlled: last A1c 6.1  6. Hyponatremia-  Nephrology managing. 7. Physical deconditioning: may need placement    PMH, PSH, SH, FHX reviewed in chart review snap shot in EPIC    CC:  Leukocytosis    HPI: Initial admission HPI by admitting physician reviewed as below:   Pt in nh, capd, presented with fever, unclear source. Cultures neg to date. Has persistent leukocytosis in 20s range. Note increased no. Of immature cells  For further details and updates please refer to assessment and plan at the beginning of the note. ROS (10 point review of systems completed. Pertinent positives noted. Otherwise ROS is negative)   PMH:  Per HPI and reviewed in the chart  SHX: Reviewed in the chart, also the patient  [id number]: Reviewed in the chart, also with the patient  Allergies: Reviewed in the chart  Medications:     sodium chloride 50 mL/hr at 08/25/20 1510    dextrose        custom dialysis builder   Intraperitoneal Q6H    [START ON 8/27/2020] dianeal lo-ivonne 1.5%  1,500 mL Intraperitoneal Q6H    cefepime (MAXIPIME) 1 g IVPB minibag in NS  1 g Intravenous Q24H    sodium chloride  2 g Oral BID WC    vancomycin (VANCOCIN) intermittent dosing (placeholder)   Other RX Placeholder    pantoprazole  40 mg Oral QAM AC    nystatin  5 mL Oral 4x Daily    insulin lispro  0-6 Units Subcutaneous TID WC    insulin lispro  0-3 Units Subcutaneous Nightly    folbee plus  1 tablet Oral Daily    busPIRone  5 mg Oral BID    fluticasone  2 spray Each Nostril Daily    gabapentin  300 mg Oral Daily    insulin glargine  4 Units Subcutaneous QAM    isosorbide dinitrate  20 mg Oral TID    levothyroxine  25 mcg Oral Daily    meclizine  25 mg Oral TID    oxybutynin  5 mg Oral Daily    pravastatin  40 mg Oral Nightly    sevelamer  1,600 mg Oral TID WC    sodium chloride flush  10 mL Intravenous 2 times per day    heparin (porcine)  5,000 Units Subcutaneous 3 times per day   Subjective 8/26/20: Laying in bed looking ill. Denies any fever, chills, any other constitutional symptoms. We will reorder procalcitonin, peripheral smear to look at the abnormal cells, may need bone biopsy. Infectious disease and nephrology on board.   Nursing notes, labs, vitals reviewed    Vital Signs:   Vitals reviewed and compared with the previous ones  BP (!) 142/55   Pulse 65   Temp 98.3 °F (36.8 °C) (Oral)   Resp 18   Ht 5' 6\" (1.676 m)   Wt 192 lb 4.8 oz (87.2 kg)   SpO2 96%   BMI 31.04 kg/m²      Intake/Output Summary (Last 24 hours) at 8/26/2020 1121  Last data filed at 8/26/2020 1032  Gross per 24 hour   Intake 8265 ml   Output 8500 ml   Net -235 ml        General:   Chronically ill, appears older that her age  [de-identified]:  normocephalic and atraumatic. No scleral icterus. PERRLA. Neck: supple. No thyromegaly. Lungs: clear to auscultation. No abnormal breathing sounds appreciated. Cardiac: S1, S2, RRR without murmur. No JVD. Abdomen: soft. Increased abdominal girth, Nontender. Bowel sounds positive. Extremities:  No clubbing, cyanosis, or edema in all extremities. Paraspinal lumbar muscles tenderness  Vasculature: capillary refill < 3 seconds. Pedal pulses intact bilaterally. Skin:  warm and dry. Not clammy. Psych:  Alert to time, person and place. Communicable. Affect appropriate  Lymph:  No supraclavicular ,and no anterior cervical lymphadenopathy. Neurologic:  No focal deficit. CN II-XII grossly intact. Motor and Sensory capacity intact in all extremities. Labs:   Recent Labs     08/24/20  0553 08/26/20  0612   WBC 21.3* 19.3*   HGB 9.8* 10.3*   HCT 28.7* 32.6*    394     Recent Labs     08/24/20  0553 08/25/20  0625 08/26/20  0612   * 123* 124*   K 3.6 3.9 4.8   CL 87* 89* 95*   CO2 22* 20* 16*   BUN 56* 54* 57*   CREATININE 7.9* 7.5* 7.7*   CALCIUM 8.5 8.7 8.8     No results for input(s): AST, ALT, BILIDIR, BILITOT, ALKPHOS in the last 72 hours. No results for input(s): INR in the last 72 hours. No results for input(s): Doyle Grover in the last 72 hours.     Microbiology:    Blood culture #1:   Lab Results   Component Value Date    BC No growth-preliminary No growth  08/14/2020       Blood culture #2:No results found for: Joseph Kulkarni    Organism:No results found for: [facility] [place]      Lab Results   Component Value Date    LABGRAM  08/14/2020     Rare segmented neutrophils observed. No bacteria seen. performed on cytospun specimen        MRSA culture only:No results found for: [facility]    Urine culture:   Lab Results   Component Value Date    LABURIN No growth-preliminary  No growth   12/07/2018       Respiratory culture: No results found for: CULTRESP    Aerobic and Anaerobic :  No results found for: LABAERO  Lab Results   Component Value Date    LABANAE No growth-preliminary  No growth   06/12/2019       Urinalysis:      Lab Results   Component Value Date    NITRU NEGATIVE 06/22/2019    WBCUA 0-2 06/22/2019    BACTERIA NONE 06/22/2019    RBCUA 5-10 06/22/2019    BLOODU NEGATIVE 06/22/2019    GLUCOSEU NEGATIVE 06/22/2019       Radiology:  MRI LUMBAR SPINE WO CONTRAST   Final Result       1. No MR evidence of infection in the spine at this time. 2. Degenerative changes throughout the lumbar spine as described above. This is most pronounced at the L2-3 and L3-4 levels where there is severe spinal canal stenosis. **This report has been created using voice recognition software. It may contain minor errors which are inherent in voice recognition technology. **      Final report electronically signed by Dr. Angelito Samson on 8/22/2020 10:29 AM      CT ABDOMEN PELVIS WO CONTRAST Additional Contrast? None   Final Result       1. Moderate ascites which has increased in the interval since prior CT. 2. Decreased bilateral pleural effusions. 3. 7 mm pulmonary nodule in the right lower lobe has increased in size since 6/7/2016. Consider follow-up CT in 6-12 months to assess stability. 4. Umbilical hernia containing only fat. 5. Moderate retained stool. **This report has been created using voice recognition software. It may contain minor errors which are inherent in voice recognition technology. **      Final report electronically signed by Dr. Hector Ryan MD on 8/20/2020 1:52 PM      CT CHEST WO CONTRAST   Final Result      1.  There is a small left-sided pleural effusion. 2. Some infiltrates are seen in the right upper and lower lobes. 3. Dilatation of the main pulmonary artery which may be due to pulmonary arterial hypertension. 4. Pulmonary nodules measuring 7 mm in the right lung and 5 mm in the left lung. Follow-up is recommended in 3-6 months to assess for stability. **This report has been created using voice recognition software. It may contain minor errors which are inherent in voice recognition technology. **      Final report electronically signed by Dr Raisa Cardona on 8/18/2020 9:58 PM      XR CHEST (2 VW)   Final Result   1. Normal heart size. Moderate chronic elevation right hemidiaphragm. 2. Moderate atelectasis/pneumonia both perihilar regions and lung right hemidiaphragm. 3. Tiny bilateral pleural effusions. **This report has been created using voice recognition software. It may contain minor errors which are inherent in voice recognition technology. **      Final report electronically signed by Dr. Tamiko Napier on 8/17/2020 1:34 PM      XR CHEST PORTABLE   Final Result      Persistent cardiac enlargement with elevation of right diaphragm. Mild prominence of interstitial markings, correlate with bronchitis      **This report has been created using voice recognition software. It may contain minor errors which are inherent in voice recognition technology. **      Final report electronically signed by Dr. Tye Burnett on 8/14/2020 3:39 AM      NM INFLAMMATORY WBC WHOLE BODY INDIUM 111    (Results Pending)     Xr Chest Portable    Result Date: 8/14/2020  PROCEDURE: XR CHEST PORTABLE CLINICAL INFORMATION: sepsis. COMPARISON: January 25, 2020 TECHNIQUE: AP upright view of the chest. FINDINGS: There is stable cardiac enlargement with persistent elevation of the right hemidiaphragm. There is no significant pulmonary venous congestion despite minimal coarseness of interstitial markings.  Age-related changes are suspected. Minimal bronchitis is not excluded. The skeleton is negative     Persistent cardiac enlargement with elevation of right diaphragm. Mild prominence of interstitial markings, correlate with bronchitis **This report has been created using voice recognition software. It may contain minor errors which are inherent in voice recognition technology. ** Final report electronically signed by Dr. Nika Coleman on 8/14/2020 3:39 AM      Discussed plan with patient and nurse. Patient and nurse verbalized understanding and agree. All questions addressed with concerns. Please excuse my TYPOS!     Electronically signed by Luna Kaufman MD on 8/26/2020 at 11:21 AM

## 2020-08-26 NOTE — CARE COORDINATION
8/26/20, 3:11 PM EDT    DISCHARGE ON Tyra 48 day: 13  Location: [address] [place] [address]-A Reason for admit: Sepsis [facility]) [A41.9]   Procedure:   08/26  tagged Indium scan - negative    Treatment Plan of Care: nephrology following pt on PD, fibrin PD exchanges, creatinine 7.7, Na 124, WBC 19.3  (was 21.3), ID following, on IV Cefepime and Vanco, Tmax 99.5, may need SOWMYA if indium scan neg/hospitalist.  Barriers to Discharge: not medically ready    PCP: Girish Browne  Readmission Risk Score: 26%  Patient Goals/Plan/Treatment Preferences: plans return [facility];  following.

## 2020-08-26 NOTE — PROGRESS NOTES
Progress note: Infectious diseases    Patient - Adis Ashley,  Age - 78 y.o.    - 1950      Room Number - 6K-18/018-A   MRN -  [id number]   Acct # - [de-identified]  Date of Admission -  2020  6:51 PM    SUBJECTIVE:    no new issues. Indium scan didn't show abnormal activity. OBJECTIVE   VITALS    height is 5' 6\" (1.676 m) and weight is 192 lb 4.8 oz (87.2 kg). Her oral temperature is 98.4 °F (36.9 °C). Her blood pressure is 115/65 and her pulse is 58. Her respiration is 18 and oxygen saturation is 96%. Wt Readings from Last 3 Encounters:   20 192 lb 4.8 oz (87.2 kg)   20 186 lb 9.6 oz (84.6 kg)   20 185 lb 12.8 oz (84.3 kg)       I/O (24 Hours)    Intake/Output Summary (Last 24 hours) at 2020 1653  Last data filed at 2020 1638  Gross per 24 hour   Intake 8627.24 ml   Output 8500 ml   Net 127.24 ml       General Appearance  Chronically sick looking  HEENT - normocephalic, atraumatic, pale conjunctiva,  anicteric sclera. Neck - Supple, no mass. Lungs -  Bilateral   air entry, + rhonchi, diminsihed breath sound. Cardiovascular - Heart sounds are normal.     Abdomen - soft, not distended, nontender,    Neurologic -oriented.  Weakness of extremites with contracted hands  Skin - No bruising or bleeding  Extremities - No edema, no cyanosis, clubbing     MEDICATIONS:      custom dialysis builder   Intraperitoneal Q6H    [START ON 2020] dianeal lo-ivonne 1.5%  1,500 mL Intraperitoneal Q6H    [START ON 2020] vancomycin  750 mg Intravenous Once    capsaicin   Topical BID    cefepime (MAXIPIME) 1 g IVPB minibag in NS  1 g Intravenous Q24H    sodium chloride  2 g Oral BID WC    vancomycin (VANCOCIN) intermittent dosing (placeholder)   Other RX Placeholder    pantoprazole  40 mg Oral QAM AC    nystatin  5 mL Oral 4x Daily    insulin lispro  0-6 Units Subcutaneous TID WC    insulin lispro  0-3 Units Subcutaneous Nightly    folbee plus  1 tablet Oral Daily    busPIRone  5 mg Oral BID    fluticasone  2 spray Each Nostril Daily    gabapentin  300 mg Oral Daily    insulin glargine  4 Units Subcutaneous QAM    isosorbide dinitrate  20 mg Oral TID    levothyroxine  25 mcg Oral Daily    meclizine  25 mg Oral TID    oxybutynin  5 mg Oral Daily    pravastatin  40 mg Oral Nightly    sevelamer  1,600 mg Oral TID WC    sodium chloride flush  10 mL Intravenous 2 times per day    heparin (porcine)  5,000 Units Subcutaneous 3 times per day      sodium chloride 50 mL/hr at 08/25/20 1510    dextrose       lidocaine, guaiFENesin-dextromethorphan, glucose, dextrose, glucagon (rDNA), dextrose, sodium chloride flush, acetaminophen **OR** acetaminophen, polyethylene glycol, promethazine **OR** ondansetron      LABS:     CBC:   Recent Labs     08/24/20  0553 08/26/20  0612   WBC 21.3* 19.3*   HGB 9.8* 10.3*    394     BMP:    Recent Labs     08/24/20  0553 08/25/20  0625 08/26/20  0612   * 123* 124*   K 3.6 3.9 4.8   CL 87* 89* 95*   CO2 22* 20* 16*   BUN 56* 54* 57*   CREATININE 7.9* 7.5* 7.7*   GLUCOSE 112* 147* 167*     Calcium:  Recent Labs     08/26/20  0612   CALCIUM 8.8      Recent Labs     08/26/20  0602 08/26/20  1033 08/26/20  1540   POCGLU 184* 245* 106        Problem list of patient:     Patient Active Problem List   Diagnosis Code    Chronic kidney disease, stage III (moderate) ([facility]) N18.3    Type 2 diabetes mellitus with insulin therapy ([facility]) E11.9, Z79.4    Chronic kidney disease (CKD), stage IV (severe) ([facility]) N18.4    Acute on chronic respiratory failure with hypoxia ([facility]) J96.21    Chronic diastolic CHF (congestive heart failure) ([facility]) I50.32    LOUISE (acute kidney injury) ([facility] N17.9    Essential hypertension I10    Hypothyroidism E03.9    Diabetic nephropathy associated with type 2 diabetes mellitus ([facility] B25.74    Metabolic acidosis F37.6    Leukocytosis D72.829    Debility R53.81    Bipolar 1 disorder ([facility]) F31.9    Mild mitral regurgitation I34.0    Mild tricuspid regurgitation I07.1    Chronic pain syndrome G89.4    Chronic bilateral pleural effusions J90    (HFpEF) heart failure with preserved ejection fraction ([facility]) I50.30    Atelectasis J98.11    Hyponatremia E87.1    Chronic anemia D64.9    Hyperlipidemia E78.5    Constipation K59.00    Shortness of breath R06.02    Hyperkalemia E87.5    Infiltrate noted on imaging study R93.89    Moderate malnutrition ([facility]) E44.0    History of pleural effusion Z87.09    ESRD on peritoneal dialysis ([facility] N18.6, Z99.2    DM type 2 with diabetic peripheral neuropathy ([facility]) E11.42    Diabetic retinopathy of both eyes associated with type 2 diabetes mellitus ([facility]) E11.319    Sepsis ([facility]) A41.9    Generalized abdominal pain R10.84    Restrictive lung disease J98.4    Chronic sinus bradycardia R00.1    PAD (peripheral artery disease) ([facility]) I73.9    Obesity (BMI 30-39. 9) E66.9         ASSESSMENT/PLAN   ESRD on PD  Leukocytosis: indium scan didn't show activity has Degenerative back disease:    Will stop antibitiotic and monitor patient    Eric Conner MD, FACP 8/26/2020 4:53 PM

## 2020-08-26 NOTE — PROGRESS NOTES
Pharmacy Vancomycin Consult     Vancomycin Day: 7  Current Dosing: intermittent, CAPD     Temp max:  98.3    Recent Labs     08/25/20  0625 08/26/20  0612   BUN 54* 57*       Recent Labs     08/25/20  0625 08/26/20  0612   CREATININE 7.5* 7.7*       Recent Labs     08/24/20  0553 08/26/20  0612   WBC 21.3* 19.3*         Intake/Output Summary (Last 24 hours) at 8/26/2020 1250  Last data filed at 8/26/2020 1032  Gross per 24 hour   Intake 6240 ml   Output 7000 ml   Net -760 ml         Ht Readings from Last 1 Encounters:   08/13/20 5' 6\" (1.676 m)        Wt Readings from Last 1 Encounters:   08/25/20 192 lb 4.8 oz (87.2 kg)         Body mass index is 31.04 kg/m². Estimated Creatinine Clearance: 8 mL/min (A) (based on SCr of 7.7 mg/dL [facility])). Trough: 17.8 mcg/ml (84 hours after previous vanc dose)    Assessment/Plan:  Vancomycin 750 mg IV X 1 dose 08/27/20 0900. Plan to check trough in 5 days, not ordered yet.  PD continues   Indium scan in process    Elda Aase PharmD 8/26/2020 12:54 PM

## 2020-08-26 NOTE — PROGRESS NOTES
[facility]         Renal Inpatient Follow-Up note         8/26/2020 7:59 AM    Pt Name:   Michael Cota  YOB: 1950  Attending:   Leoncio Hubbard MD    Chief Complaint : Michael Cota is a 78 y.o. female being followed by nephrology for ESRD on PD    Interval History :   Patient seen and examined by me.  No distress  Patient says she is feeling much better denies any chest pain or shortness of breath  Tolerating diet okay  Having some fibrin PD exchanges     Scheduled Medications :    cefepime (MAXIPIME) 1 g IVPB minibag in NS  1 g Intravenous Q24H    sodium chloride  2 g Oral BID WC    dianeal lo-ivonne 1.5%  1,500 mL Intraperitoneal Q6H    potassium chloride  20 mEq Oral BID WC    vancomycin (VANCOCIN) intermittent dosing (placeholder)   Other RX Placeholder    pantoprazole  40 mg Oral QAM AC    nystatin  5 mL Oral 4x Daily    insulin lispro  0-6 Units Subcutaneous TID WC    insulin lispro  0-3 Units Subcutaneous Nightly    folbee plus  1 tablet Oral Daily    busPIRone  5 mg Oral BID    fluticasone  2 spray Each Nostril Daily    gabapentin  300 mg Oral Daily    insulin glargine  4 Units Subcutaneous QAM    isosorbide dinitrate  20 mg Oral TID    levothyroxine  25 mcg Oral Daily    meclizine  25 mg Oral TID    oxybutynin  5 mg Oral Daily    pravastatin  40 mg Oral Nightly    sevelamer  1,600 mg Oral TID WC    sodium chloride flush  10 mL Intravenous 2 times per day    heparin (porcine)  5,000 Units Subcutaneous 3 times per day      sodium chloride 50 mL/hr at 08/25/20 1510    dextrose         Vitals :  BP (!) 134/95   Pulse 50   Temp 97.9 °F (36.6 °C) (Oral)   Resp 18   Ht 5' 6\" (1.676 m)   Wt 192 lb 4.8 oz (87.2 kg)   SpO2 97%   BMI 31.04 kg/m²     24HR INTAKE/OUTPUT:      Intake/Output Summary (Last 24 hours) at 8/26/2020 0759  Last data filed at 8/25/2020 2034  Gross per 24 hour   Intake 3765 ml   Output 3200 ml   Net 565 ml     Last 3 weights  Wt Readings from Last 3 Encounters:   08/25/20 192 lb 4.8 oz (87.2 kg)   06/02/20 186 lb 9.6 oz (84.6 kg)   02/11/20 185 lb 12.8 oz (84.3 kg)           Physical Exam :  General Appearance:  Well developed. No distress  Mouth/Throat:  Oral mucosa moist  Neck:  Supple, no JVD  Lungs:  Breath sounds: clear  Heart[de-identified]  S1,S2 heard  Abdomen:  Soft, non - tender, distended  Musculoskeletal:  Edema - none         Last 3 CBC   Recent Labs     08/23/20  0816 08/24/20  0553 08/26/20  0612   WBC 22.8* 21.3* 19.3*   RBC 3.43* 2.94* 3.13*   HGB 11.4* 9.8* 10.3*   HCT 33.8* 28.7* 32.6*   * 396 394     Last 3 CMP  Recent Labs     08/24/20  0553 08/25/20  0625 08/26/20  0612   * 123* 124*   K 3.6 3.9 4.8   CL 87* 89* 95*   CO2 22* 20* 16*   BUN 56* 54* 57*   CREATININE 7.9* 7.5* 7.7*   CALCIUM 8.5 8.7 8.8             ASSESSMENT / Plan   1 Renal - ESRD on PD, volume status is better  ? Reviewed the PD data, overall doing ok  ? 1700, 1818 50 mL ultrafiltration noted  ? on 1.5% every 6 hours. ? Use 1000 units of heparin per each exchange    2 Electrolytes - mild hypokalemia -potassium chloride 2 is trending up discontinue KCl and monitor  3 Hyponatremia - due to ESRD . Decreased dialysate to 1.5% . Continue salt tabs,increase d to 2 gram BID. Continue normal saline for now  4 Anemia of ESRD  5 Leucocytosis - seen by ID, fluctuating being worked up , so far no source found. Indium scan pending . WBC getting better. On antibiotics  6 Essential Hypertension  7 Hx of diabetes Mellitus  8 Meds reviewed and discussed with patient and nursing staff    LIANNE aGle D.  [facility].

## 2020-08-27 NOTE — PROGRESS NOTES
Progress note: Infectious diseases    Patient - Gardenia Salamanca,  Age - 78 y.o.    - 1950      Room Number - 6K-18/018-A   MRN -  [id number]   Acct # - [de-identified]  Date of Admission -  2020  6:51 PM    SUBJECTIVE:    no new issues. No fever  OBJECTIVE   VITALS    height is 5' 6\" (1.676 m) and weight is 189 lb 6.4 oz (85.9 kg). Her oral temperature is 98.5 °F (36.9 °C). Her blood pressure is 134/65 and her pulse is 69. Her respiration is 18 and oxygen saturation is 94%. Wt Readings from Last 3 Encounters:   20 189 lb 6.4 oz (85.9 kg)   20 186 lb 9.6 oz (84.6 kg)   20 185 lb 12.8 oz (84.3 kg)       I/O (24 Hours)    Intake/Output Summary (Last 24 hours) at 2020 1248  Last data filed at 2020 1032  Gross per 24 hour   Intake 5720.86 ml   Output 4950 ml   Net 770.86 ml       General Appearance  Chronically sick looking  HEENT - normocephalic, atraumatic, pale conjunctiva,  anicteric sclera. Neck - Supple, no mass. Lungs -  Bilateral   air entry, + rhonchi, diminsihed breath sound. Cardiovascular - Heart sounds are normal.     Abdomen - soft, not distended, nontender,    Neurologic -oriented.  Weakness of extremites with contracted hands  Skin - No bruising or bleeding  Extremities - No edema, no cyanosis, clubbing     MEDICATIONS:      dianeal lo-ivonne 1.5%  1,500 mL Intraperitoneal Q6H    capsaicin   Topical BID    sodium chloride  2 g Oral BID WC    pantoprazole  40 mg Oral QAM AC    nystatin  5 mL Oral 4x Daily    insulin lispro  0-6 Units Subcutaneous TID WC    insulin lispro  0-3 Units Subcutaneous Nightly    folbee plus  1 tablet Oral Daily    busPIRone  5 mg Oral BID    fluticasone  2 spray Each Nostril Daily    gabapentin  300 mg Oral Daily    insulin glargine  4 Units Subcutaneous QAM    isosorbide dinitrate  20 mg Oral TID    levothyroxine  25 mcg Oral Daily    meclizine  25 mg Oral TID    oxybutynin  5 mg Oral Daily    pravastatin  40 mg Oral Nightly    sevelamer  1,600 mg Oral TID WC    sodium chloride flush  10 mL Intravenous 2 times per day    heparin (porcine)  5,000 Units Subcutaneous 3 times per day      dextrose       lidocaine, guaiFENesin-dextromethorphan, glucose, dextrose, glucagon (rDNA), dextrose, sodium chloride flush, acetaminophen **OR** acetaminophen, polyethylene glycol, promethazine **OR** ondansetron      LABS:     CBC:   Recent Labs     08/26/20  0612 08/27/20  0641   WBC 19.3* 18.6*   HGB 10.3* 9.9*    391     BMP:    Recent Labs     08/25/20  0625 08/26/20  0612 08/27/20  0641   * 124* 131*   K 3.9 4.8 5.0   CL 89* 95* 98   CO2 20* 16* 21*   BUN 54* 57* 56*   CREATININE 7.5* 7.7* 7.4*   GLUCOSE 147* 167* 131*     Calcium:  Recent Labs     08/27/20  0641   CALCIUM 8.6      Recent Labs     08/26/20  2043 08/27/20  0623 08/27/20  1048   POCGLU 175* 133* 239*        Problem list of patient:     Patient Active Problem List   Diagnosis Code    Chronic kidney disease, stage III (moderate) ([facility]) N18.3    Type 2 diabetes mellitus with insulin therapy ([facility]) E11.9, Z79.4    Chronic kidney disease (CKD), stage IV (severe) ([facility]) N18.4    Acute on chronic respiratory failure with hypoxia ([facility]) J96.21    Chronic diastolic CHF (congestive heart failure) ([facility]) I50.32    LOUISE (acute kidney injury) ([facility] [address] N17.9    Essential hypertension I10    Hypothyroidism E03.9    Diabetic nephropathy associated with type 2 diabetes mellitus ([facility] [address] G79.84    Metabolic acidosis F93.6    Leukocytosis D72.829    Debility R53.81    Bipolar 1 disorder ([facility]) F31.9    Mild mitral regurgitation I34.0    Mild tricuspid regurgitation I07.1    Chronic pain syndrome G89.4    Chronic bilateral pleural effusions J90    (HFpEF) heart failure with preserved ejection fraction ([facility]) I50.30    Atelectasis J98.11    Hyponatremia E87.1    Chronic anemia D64.9    Hyperlipidemia E78.5    Constipation K59.00    Shortness of breath R06.02    Hyperkalemia E87.5    Infiltrate noted on imaging study R93.89    Moderate malnutrition (HCC) E44.0    History of pleural effusion Z87.09    ESRD on peritoneal dialysis ([facility] [address] N18.6, Z99.2    DM type 2 with diabetic peripheral neuropathy (HCC) E11.42    Diabetic retinopathy of both eyes associated with type 2 diabetes mellitus (HCC) E11.319    Sepsis ([facility]) A41.9    Generalized abdominal pain R10.84    Restrictive lung disease J98.4    Chronic sinus bradycardia R00.1    PAD (peripheral artery disease) ([facility]) I73.9    Obesity (BMI 30-39. 9) E66.9         ASSESSMENT/PLAN   ESRD on PD  Degenerative back disease with functional quadriparesis. Leukocytosis: likely reactive, no source of infection identifed.  Antibiotic stopped  Ok with discharge plan to 32 Iraj Richardson MD, [address] 8/27/2020 12:48 PM

## 2020-08-27 NOTE — PROGRESS NOTES
Patient laying in bed comfortably watching television. Patient is alert and oriented x4 at this time. Patient states pain in present in neck, pillows repositioned for comfort. Respirations easy and unlabored. Nothing else is needed at this time. Over bed table and call light are within reach. Bed alarm on.

## 2020-08-27 NOTE — PROGRESS NOTES
PROGRESS NOTE      Patient:  Catherine Coulter      Unit/Bed:6K-18/018-A    YOB: 1950    MRN: [id number]       Acct: [de-identified]     PCP: [address]    Date of Admission: 8/13/2020      Assessment/Plan:    Anticipated Discharge pending    Active Hospital Problems    Diagnosis Date Noted    Generalized abdominal pain [R10.84]     Restrictive lung disease [J98.4]     Chronic sinus bradycardia [R00.1]     PAD (peripheral artery disease) ([facility]) [I73.9]     Obesity (BMI 30-39. 9) [E66.9]     Sepsis ([facility] [address] [A41.9] 08/13/2020    DM type 2 with diabetic peripheral neuropathy ([facility]) [E11.42]     Diabetic retinopathy of both eyes associated with type 2 diabetes mellitus ([facility] [address] [E11.319]     ESRD on peritoneal dialysis ([facility] [address] [N18.6, Z99.2]     Hyponatremia [E87.1]     Debility [R53.81]     Leukocytosis [D72.829]     Diabetic nephropathy associated with type 2 diabetes mellitus ([facility] [address] [E11.21]     Chronic diastolic CHF (congestive heart failure) ([facility] [address] [I50.32] 12/07/2018    Type 2 diabetes mellitus with insulin therapy ([facility] [address] [E11.9, Z79.4]        Leukocytosis with segs and immature cells:   -Likely due to PNA  -WBC improving -- 18.6 today, down from 26.7 on 8/20 (22.8 on admission)  -Pt not on steroids, been afebrile  -Indium scan did not reveal any evidence of infection,   -MRI lumbar spine did not reveal any evidence of infection at this point although revealed degenerative changes throughout the lumbar spine at L2 -3 and L3 -4.    -CT abdomen/pelvis did not reveal any signs of infection --> It showed 7 mm pulmonary nodule in the right lower lobe which increased in size since 6/7/2016. --> consider follow-up CT scan in 6 to 12 months as an outpatient.    -Chest x-ray as of 8/14/2020 shows possible bronchitis.    -ESR (110) 8/23/20, CRP (2.7) 8/22/20,   -PCT improved to 1.10 8/27 from PCT 1.7 8/13/20  -Blood cultures negative --> was started on cefepime and vancomycin.  --> discontinued 8/26  -Infectious disease consulted for further recommendations --> per note 8/27, thinks leukocytosis is reactive and ok to D/C to ECF  -Peripheral smear 8/26 with schistocytes  -WBC in am    7 mm pulmonary nodule in the right lower lobe and 5 mm in left lung:  -Noted on CT abd/pelvis from 8/20  -Recommend outpatient follow up with repeat CT in 6-12 months     Severe degenerative disease at the level of L2-3 and L3-4  -Conservative management for the pain --> capsaicin and lidocaine cream topically PRN     ESRD on PD  -Pt tolerating well, PD fluid okay  -Nephrology following  -BMP in am    T2DM, controlled  -HbA1c 6.1 1/2020  -Blood sugars running 105-245 over last 24 hours  -Continue low dose SSI AC HS and Lantus 4 units daily  -Accuchecks    Hyponatremia, improving    -Due to ESRD -- Nephrology managing  -On sodium chloride tablet BID with meals  -BMP in am    Essential HTN, stable  -Continue home medications and adjust PRN    HLD  -Continue Pravachol    Urinary Retention  -Continue Oxybutynin    Depression and Anxiety  -Continue Buspar    Physical deconditioning  -Pt resides at [facility]  -Continue PT/OT  -May need placement as patient unlikely to go live with son    Chief Complaint: sepsis    Hospital Course: Patient transferred from [facility] on 8/13/2020 for further evaluation of sepsis. The patient had presented there with abdominal pain and fevers at home. The patient has a history ESRD on CAPD. She also complains of cough for about two weeks that has been getting progressively worse. She states she can not bring anything up with her cough. The patient states the fevers started 2-3 days ago intermittently. Subjective:  Pt seen and examined at the bedside. No acute events overnight. Pt reports feeling very sleepy, but otherwise denies any other complaint. States her cough is improving and she is expectorating yellow sputum.  She has not had a bowel movement in 2 days and is feeling somewhat distended, but denies any nausea or abdominal pain. Review of Systems   Constitutional: Negative for chills, fatigue and fever. HENT: Negative for congestion and sore throat. Eyes: Negative for visual disturbance. Respiratory: Positive for cough and shortness of breath. Cardiovascular: Negative for chest pain and palpitations. Gastrointestinal: Positive for abdominal distention and constipation. Negative for abdominal pain, diarrhea, nausea and vomiting. Genitourinary: Negative for dysuria. Musculoskeletal: Positive for back pain. Negative for arthralgias. Skin: Negative for rash. Neurological: Negative for dizziness, light-headedness and headaches.        Medications:  Reviewed    Infusion Medications    sodium chloride 50 mL/hr at 08/27/20 0882    dextrose       Scheduled Medications    dianeal lo-ivonne 1.5%  1,500 mL Intraperitoneal Q6H    capsaicin   Topical BID    sodium chloride  2 g Oral BID WC    pantoprazole  40 mg Oral QAM AC    nystatin  5 mL Oral 4x Daily    insulin lispro  0-6 Units Subcutaneous TID WC    insulin lispro  0-3 Units Subcutaneous Nightly    folbee plus  1 tablet Oral Daily    busPIRone  5 mg Oral BID    fluticasone  2 spray Each Nostril Daily    gabapentin  300 mg Oral Daily    insulin glargine  4 Units Subcutaneous QAM    isosorbide dinitrate  20 mg Oral TID    levothyroxine  25 mcg Oral Daily    meclizine  25 mg Oral TID    oxybutynin  5 mg Oral Daily    pravastatin  40 mg Oral Nightly    sevelamer  1,600 mg Oral TID     sodium chloride flush  10 mL Intravenous 2 times per day    heparin (porcine)  5,000 Units Subcutaneous 3 times per day     PRN Meds: lidocaine, guaiFENesin-dextromethorphan, glucose, dextrose, glucagon (rDNA), dextrose, sodium chloride flush, acetaminophen **OR** acetaminophen, polyethylene glycol, promethazine **OR** ondansetron      Intake/Output Summary (Last 24 hours) at 8/27/2020 0837  Last data filed at 8/27/2020 0430  Gross per 24 hour   Intake 6220.86 ml   Output 5050 ml   Net 1170.86 ml       Diet:  DIET RENAL; Daily Fluid Restriction: 1500 ml  Dietary Nutrition Supplements: Diabetic Oral Supplement    Exam:  BP (!) 143/70   Pulse 62   Temp 98.4 °F (36.9 °C) (Oral)   Resp 18   Ht 5' 6\" (1.676 m)   Wt 189 lb 6.4 oz (85.9 kg)   SpO2 95%   BMI 30.57 kg/m²     Physical Exam  Vitals signs reviewed. Constitutional:       General: She is awake. She is not in acute distress. Appearance: Normal appearance. She is overweight. She is ill-appearing. HENT:      Head: Normocephalic and atraumatic. Right Ear: External ear normal.      Left Ear: External ear normal.      Nose: Nose normal.      Mouth/Throat:      Mouth: Mucous membranes are moist.      Pharynx: Oropharynx is clear. Eyes:      Extraocular Movements: Extraocular movements intact. Conjunctiva/sclera: Conjunctivae normal.      Pupils: Pupils are equal, round, and reactive to light. Neck:      Musculoskeletal: Normal range of motion and neck supple. Cardiovascular:      Rate and Rhythm: Normal rate and regular rhythm. Pulses: Normal pulses. Heart sounds: Normal heart sounds. No murmur. No friction rub. No gallop. Pulmonary:      Effort: Pulmonary effort is normal. No respiratory distress. Breath sounds: Examination of the right-upper field reveals wheezing. Examination of the left-upper field reveals wheezing. Examination of the right-middle field reveals wheezing. Examination of the left-middle field reveals wheezing. Examination of the right-lower field reveals decreased breath sounds. Examination of the left-lower field reveals decreased breath sounds. Decreased breath sounds and wheezing present. No rhonchi or rales. Abdominal:      General: Abdomen is flat. Bowel sounds are normal. There is no distension. Palpations: Abdomen is soft. Tenderness: There is no abdominal tenderness. There is no guarding. Musculoskeletal:      Right lower leg: No edema. Left lower leg: No edema. Skin:     General: Skin is warm and dry. Capillary Refill: Capillary refill takes less than 2 seconds. Neurological:      General: No focal deficit present. Mental Status: She is alert. Motor: Weakness present. Psychiatric:         Mood and Affect: Mood normal.         Behavior: Behavior normal. Behavior is cooperative. Labs:   Recent Labs     08/26/20  0612 08/27/20  0641   WBC 19.3* 18.6*   HGB 10.3* 9.9*   HCT 32.6* 28.4*    391     Recent Labs     08/25/20  0625 08/26/20  0612 08/27/20  0641   * 124* 131*   K 3.9 4.8 5.0   CL 89* 95* 98   CO2 20* 16* 21*   BUN 54* 57* 56*   CREATININE 7.5* 7.7* 7.4*   CALCIUM 8.7 8.8 8.6     No results for input(s): AST, ALT, BILIDIR, BILITOT, ALKPHOS in the last 72 hours. No results for input(s): INR in the last 72 hours. No results for input(s): Amaryllis Goodell in the last 72 hours. Urinalysis:      Lab Results   Component Value Date    NITRU NEGATIVE 06/22/2019    WBCUA 0-2 06/22/2019    BACTERIA NONE 06/22/2019    RBCUA 5-10 06/22/2019    BLOODU NEGATIVE 06/22/2019    GLUCOSEU NEGATIVE 06/22/2019       Radiology:  NM INFLAMMATORY WBC WHOLE BODY INDIUM 111   Final Result      No scintigraphic evidence of infection. Final report electronically signed by Dr. Haley Felder on 8/26/2020 1:09 PM      MRI LUMBAR SPINE WO CONTRAST   Final Result       1. No MR evidence of infection in the spine at this time. 2. Degenerative changes throughout the lumbar spine as described above. This is most pronounced at the L2-3 and L3-4 levels where there is severe spinal canal stenosis. **This report has been created using voice recognition software. It may contain minor errors which are inherent in voice recognition technology. **      Final report electronically signed by Dr. Ken Sam on 8/22/2020 10:29 AM      CT ABDOMEN PELVIS WO CONTRAST Additional Contrast? None   Final Result       1. Moderate ascites which has increased in the interval since prior CT. 2. Decreased bilateral pleural effusions. 3. 7 mm pulmonary nodule in the right lower lobe has increased in size since 6/7/2016. Consider follow-up CT in 6-12 months to assess stability. 4. Umbilical hernia containing only fat. 5. Moderate retained stool. **This report has been created using voice recognition software. It may contain minor errors which are inherent in voice recognition technology. **      Final report electronically signed by Dr. Jarocho Aguero MD on 8/20/2020 1:52 PM      CT CHEST WO CONTRAST   Final Result      1. There is a small left-sided pleural effusion. 2. Some infiltrates are seen in the right upper and lower lobes. 3. Dilatation of the main pulmonary artery which may be due to pulmonary arterial hypertension. 4. Pulmonary nodules measuring 7 mm in the right lung and 5 mm in the left lung. Follow-up is recommended in 3-6 months to assess for stability. **This report has been created using voice recognition software. It may contain minor errors which are inherent in voice recognition technology. **      Final report electronically signed by Dr Tiago Garcia on 8/18/2020 9:58 PM      XR CHEST (2 VW)   Final Result   1. Normal heart size. Moderate chronic elevation right hemidiaphragm. 2. Moderate atelectasis/pneumonia both perihilar regions and lung right hemidiaphragm. 3. Tiny bilateral pleural effusions. **This report has been created using voice recognition software. It may contain minor errors which are inherent in voice recognition technology. **      Final report electronically signed by Dr. Loni Lange on 8/17/2020 1:34 PM      XR CHEST PORTABLE   Final Result      Persistent cardiac enlargement with elevation of right diaphragm.       Mild prominence of interstitial markings, correlate with bronchitis

## 2020-08-27 NOTE — PROGRESS NOTES
Physician Progress Note      Belem Holland  CSN #:                  [id number]  :                       1950  ADMIT DATE:       2020 6:51 PM  [address] [place] DATE:  RESPONDING  PROVIDER #:        Anselmo De La Garza MD          QUERY TEXT:    Dr Kiet Pearl,    Patient admitted for evaluation of Sepsis. Documentation reflects sepsis in   several progress notes-8/15/20 Sepsis: source likely pulmonary in nature. If   possible, please document in the progress notes and discharge summary if   Sepsis was: The medical record reflects the following:  Risk Factors: elderly, diabetes, Hypertension, Hyperlipidemia, CKD, CHF  Clinical Indicators: leukocytosis, fever -- POA -- source likely pulmonary in   origin per CXR  and repeat  with mod atelectasis/pneumonia both   perihilar regions -- c/s ID  as WBC trending up again to 21.2 on    after down to 18.8 on 8/15 despite being on cefepime since  and given vanc   8/14 x 1  Treatment: IV ATB, hospital admission    Yanira Jaramillo, RN, BSN  RN Clinical   (P) [phone] (H) [phone]  Options provided:  -- Sepsis- POA confirmed after study  -- Sepsis ruled out after study  -- Sepsis treated and resolved  -- Other - I will add my own diagnosis  -- Disagree - Not applicable / Not valid  -- Disagree - Clinically unable to determine / Unknown  -- Refer to Clinical Documentation Reviewer    PROVIDER RESPONSE TEXT:    Sepsis- POA confirmed after study. Query created by: Otilia Fontana on 2020 9:44 AM      QUERY TEXT:    Dr Kiet Pearl,    Pt admitted with for evaluation of sepsis. Noted documentation of PNA on   20 by ordered hospitalist consultant.   If possible, please document in   progress notes and discharge summary:    The medical record reflects the following:  Risk Factors: diabetes, Hypertension, Hyperlipidemia, CKD, CHF  Clinical Indicators:  CXR  with mod atelectasis/pneumonia both perihilar   regions --> WBC rising to 20's since 8/17 again from 18. Leukocytosis  Treatment: IV Cefepime, Azactam, Vanc  Dominik Johnson, RN, BSN  RN Clinical   (P) [phone] (F) [phone]  Options provided:  -- PNA confirmed POA  -- PNA confirmed not POA  -- PNA ruled out  -- Other - I will add my own diagnosis  -- Disagree - Not applicable / Not valid  -- Disagree - Clinically unable to determine / Unknown  -- Refer to Clinical Documentation Reviewer    PROVIDER RESPONSE TEXT:    The diagnosis of PNA was confirmed as POA.     Query created by: Raffaele Pagan on 8/26/2020 9:45 AM      Electronically signed by:  Brittany Morgan MD 8/26/2020 8:52 PM

## 2020-08-27 NOTE — PROGRESS NOTES
Assistance    Transfers:  Sit to Stand: Minimal Assistance, Moderate Assistance, X 1, cues for hand placement  Stand to [place] [id number], cues for hand placement    Ambulation:  Contact Guard Assistance, Minimal Assistance, with cues for safety, with increased time for completion  Distance: 6ft  Surface: Level Tile  Device:Rolling Walker  Gait Deviations: Forward Flexed Posture, Slow Aurea, Decreased Step Length Bilaterally, Decreased Gait Speed, Decreased Heel Strike Bilaterally and Unsteady Gait    Exercise:  Patient was guided in 1 set(s) 10 reps of exercise to both lower extremities. Ankle pumps, Glut sets, Quad sets, Heelslides and Hip abduction/adduction. Exercises were completed for increased independence with functional mobility. Functional Outcome Measures: Completed  AM-PAC Inpatient Mobility Raw Score : 14  AM-PAC Inpatient T-Scale Score : 38.1    ASSESSMENT:  Assessment: Patient progressing toward established goals. Activity Tolerance:  Patient tolerance of  treatment: good. Pt tolerated session fairly well, easily fatigued and required freq rest breaks. Pt would benefit from cont skilled PT services to improve functional mobility.   Equipment Recommendations:Equipment Needed: No  Discharge Recommendations:  Continue to assess pending progress, ECF with PT, Subacute/Skilled Nursing Facility    Plan: Times per week: 3-5x GM  Current Treatment Recommendations: Strengthening, Endurance Training, Balance Training, Functional Mobility Training, Transfer Training, Gait Training, Home Exercise Program, Safety Education & Training, Patient/Caregiver Education & Training    Patient Education  Patient Education: Plan of Care, Home Exercise Program, [employer] Mobility, Transfers, Gait, Verbal Exercise Instruction    Goals:  Patient goals : get stronger and go back to ECF  Short term goals  Time Frame for Short term goals: at discharge  Short term goal 1: Pt to be SBA for supine <> sit to get in/out of bed  Short term goal 2: Pt to be CGA x 1 for sit <> stand to get up to ambulate  Short term goal 3: Pt to ambulate >15 ft with RW with CGA x 1 to get to bathroom  Long term goals  Time Frame for Long term goals : not set due to short ELOS    Following session, patient left in safe position with all fall risk precautions in place.

## 2020-08-27 NOTE — PROGRESS NOTES
[facility]         Renal Inpatient Follow-Up note         8/27/2020 9:15 AM    Pt Name:   Arron Walsh  YOB: 1950  Attending:   Jeanette Martinez MD    Chief Complaint : Arrno Walsh is a 78 y.o. female being followed by nephrology for ESRD on PD    Interval History :   Patient seen and examined by me. No distress  Patient says she is feeling much better denies any chest pain or shortness of breath  Tolerating diet okay.  Mild cough persist still     Scheduled Medications :    dianeal lo-ivonne 1.5%  1,500 mL Intraperitoneal Q6H    capsaicin   Topical BID    sodium chloride  2 g Oral BID WC    pantoprazole  40 mg Oral QAM AC    nystatin  5 mL Oral 4x Daily    insulin lispro  0-6 Units Subcutaneous TID WC    insulin lispro  0-3 Units Subcutaneous Nightly    folbee plus  1 tablet Oral Daily    busPIRone  5 mg Oral BID    fluticasone  2 spray Each Nostril Daily    gabapentin  300 mg Oral Daily    insulin glargine  4 Units Subcutaneous QAM    isosorbide dinitrate  20 mg Oral TID    levothyroxine  25 mcg Oral Daily    meclizine  25 mg Oral TID    oxybutynin  5 mg Oral Daily    pravastatin  40 mg Oral Nightly    sevelamer  1,600 mg Oral TID WC    sodium chloride flush  10 mL Intravenous 2 times per day    heparin (porcine)  5,000 Units Subcutaneous 3 times per day      sodium chloride 50 mL/hr at 08/27/20 0659    dextrose         Vitals :  BP (!) 149/66   Pulse 70   Temp 98.9 °F (37.2 °C) (Oral)   Resp 18   Ht 5' 6\" (1.676 m)   Wt 189 lb 6.4 oz (85.9 kg)   SpO2 92%   BMI 30.57 kg/m²     24HR INTAKE/OUTPUT:      Intake/Output Summary (Last 24 hours) at 8/27/2020 0915  Last data filed at 8/27/2020 0430  Gross per 24 hour   Intake 6220.86 ml   Output 5050 ml   Net 1170.86 ml     Last 3 weights  Wt Readings from Last 3 Encounters:   08/27/20 189 lb 6.4 oz (85.9 kg)   06/02/20 186 lb 9.6 oz (84.6 kg)   02/11/20 185 lb 12.8 oz (84.3 kg)           Physical Exam :  General Appearance:  Well developed. No distress  Mouth/Throat:  Oral mucosa moist  Neck:  Supple, no JVD  Lungs:  Breath sounds: clear  Heart[de-identified]  S1,S2 heard  Abdomen:  Soft, non - tender, distended  Musculoskeletal:  Edema - none         Last 3 CBC   Recent Labs     08/26/20  0612 08/27/20  0641   WBC 19.3* 18.6*   RBC 3.13* 2.94*   HGB 10.3* 9.9*   HCT 32.6* 28.4*    391     Last 3 CMP  Recent Labs     08/25/20  0625 08/26/20  0612 08/27/20  0641   * 124* 131*   K 3.9 4.8 5.0   CL 89* 95* 98   CO2 20* 16* 21*   BUN 54* 57* 56*   CREATININE 7.5* 7.7* 7.4*   CALCIUM 8.7 8.8 8.6             ASSESSMENT / Plan   1 Renal - ESRD on PD, volume status is better  ? Reviewed the PD data, overall doing ok  ? 1750, 1500 mL and 1800 mlultrafiltration noted  ? on 1.5% every 6 hours. continue for now.  ? No more fibrin after heparin    2 Electrolytes - WNL, Kcl stopped 8/26/20  3 Hyponatremia - due to ESRD . Decreased dialysate to 1.5% . Continue salt tabs and NSS, it is up to 131 today  4 Anemia of ESRD  5 Leucocytosis - seen by ID, fluctuating being worked up , so far no source found. Indium scan is negative as well. Monitor off abx as per ID  6 Essential Hypertension - stable. 7 Hx of diabetes Mellitus  8 Meds reviewed and discussed with patient and nursing staff    LIANNE Owens D.  [facility].

## 2020-08-28 NOTE — CARE COORDINATION
8/28/20, 12:36 PM EDT    Patient goals/plan/ treatment preferences discussed by  and . Patient goals/plan/ treatment preferences reviewed with patient/ family. Patient/ family verbalize understanding of discharge plan and are in agreement with goal/plan/treatment preferences. Understanding was demonstrated using the teach back method. AVS provided by RN at time of discharge, which includes all necessary medical information pertaining to the patients current course of illness, treatment, post-discharge goals of care, and treatment preferences. IMM Letter  IMM Letter given to Patient/Family/Significant other/Guardian/POA/by[de-identified] Harpreet Dumas CM  IMM Letter date given[de-identified] 08/27/20  IMM Letter time given[de-identified] 1500     Patient return to [facility] skilled medicare today, COVID negative.  ABIGAIL faxed orders to [facility] and transportation forms to [facility] with arranged transport time of 1:00pm.

## 2020-08-28 NOTE — PROGRESS NOTES
Pt left via [facility] in stable condition. Pt's son Angela Ramirez updated on his mother's leaving this facility.

## 2020-08-28 NOTE — PROGRESS NOTES
Attempt made to call pt's son to let him know that discharge is planned for this afternoon. Pt updated.

## 2020-08-28 NOTE — PLAN OF CARE
Problem: Skin Integrity:  Goal: Will show no infection signs and symptoms  Description: Will show no infection signs and symptoms  Outcome: Ongoing  Note: Pt being repositioned q2h and PRN. Pillow support provided. Heels floated off of bed with pillow support. Problem: Skin Integrity:  Goal: Absence of new skin breakdown  Description: Absence of new skin breakdown  Outcome: Ongoing     Problem: DISCHARGE BARRIERS  Goal: Patient's continuum of care needs are met  Outcome: Ongoing  Note: Pt is active in her plan of care. Plans on returning home when medically stable. Problem: Falls - Risk of:  Goal: Will remain free from falls  Description: Will remain free from falls  Outcome: Ongoing  Note: No falls this shift. Patient educated on not getting up without help. Falling star protocol in place. Call light in reach. Bed in lowest position. Side rails up x2. Nonskid footwear on. Bed alarm set. Patient visually checked on hourly rounds. Problem: Nutritional:  Goal: Nutritional status will improve  Description: Nutritional status will improve  Outcome: Ongoing  Note: Pt tolerating RENAL; Daily Fluid Restriction: 1500 ml diet. Problem: Physical Regulation:  Goal: Will remain free from infection  Description: Will remain free from infection  Outcome: Ongoing  Note: Pt afebrile this shift. Problem: Discharge Planning:  Goal: Discharged to appropriate level of care  Description: Discharged to appropriate level of care  Outcome: Ongoing  Note: Pt plans on returning to [facility] when medically stable. Problem: Tissue Perfusion - Renal, Altered:  Goal: Electrolytes within specified parameters  Description: Electrolytes within specified parameters  Outcome: Ongoing  Note: Results for Brittaney Frazier (MRN [id number]) as of 8/27/2020 21:36   Ref.  Range 8/27/2020 06:41   Sodium Latest Ref Range: 135 - 145 meq/L 131 (L)   Potassium Latest Ref Range: 3.5 - 5.2 meq/L 5.0   Chloride Latest Ref Range: 98 - 111 meq/L 98   CO2 Latest Ref Range: 23 - 33 meq/L 21 (L)   BUN Latest Ref Range: 7 - 22 mg/dL 56 (H)   Creatinine Latest Ref Range: 0.4 - 1.2 mg/dL 7.4 (HH)   Anion Gap Latest Ref Range: 8.0 - 16.0 meq/L 12.0   Est, Glom Filt Rate Latest Units: ml/min/1.73m2 5 (A)   Glucose Latest Ref Range: 70 - 108 mg/dL 131 (H)   Calcium Latest Ref Range: 8.5 - 10.5 mg/dL 8.6        Problem: Tissue Perfusion - Renal, Altered:  Goal: Ability to achieve a balanced intake and output will improve  Description: Ability to achieve a balanced intake and output will improve  Outcome: Ongoing  Note: Monitoring intake and output. CAPD patient. Recording output and input. Problem: Pain:  Goal: Pain level will decrease  Description: Pain level will decrease  Outcome: Ongoing  Note: Patient verbalizes pain to shoulder. PRN tylenol on MAR. Pt repositioned for comfort. Problem: Nutrition  Goal: Optimal nutrition therapy  Outcome: Ongoing     Care plan reviewed with patient. Patient verbalizes understanding of the plan of care and contribute to goal setting.

## 2020-08-28 NOTE — PROGRESS NOTES
Progress note: Infectious diseases    Patient - Zachariah Levine,  Age - 78 y.o.    - 1950      Room Number - 6K-18/018-A   MRN -  [id number]   Acct # - [de-identified]  Date of Admission -  2020  6:51 PM    SUBJECTIVE:   No new complaints. OBJECTIVE   VITALS    height is 5' 6\" (1.676 m) and weight is 189 lb 3.2 oz (85.8 kg). Her oral temperature is 99.4 °F (37.4 °C). Her blood pressure is 142/68 (abnormal) and her pulse is 64. Her respiration is 18 and oxygen saturation is 96%. Wt Readings from Last 3 Encounters:   20 189 lb 3.2 oz (85.8 kg)   20 186 lb 9.6 oz (84.6 kg)   20 185 lb 12.8 oz (84.3 kg)       I/O (24 Hours)    Intake/Output Summary (Last 24 hours) at 2020 0947  Last data filed at 2020 0510  Gross per 24 hour   Intake 6560.99 ml   Output 6800 ml   Net -239.01 ml       General Appearance  Chronically sick looking, not in distress. HEENT - normocephalic, atraumatic, pale conjunctiva,  anicteric sclera. Neck - Supple, no mass. Lungs -  Bilateral   air entry, + rhonchi, diminsihed breath sound. Cardiovascular - Heart sounds are normal.     Abdomen - soft, not distended, nontender,    Neurologic -oriented.  Weakness of extremites with contracted hands  Skin - No bruising or bleeding  Extremities - No edema, no cyanosis, clubbing     MEDICATIONS:      dianeal lo-ivonne 1.5%  1,500 mL Intraperitoneal Q6H    capsaicin   Topical BID    sodium chloride  2 g Oral BID WC    pantoprazole  40 mg Oral QAM AC    nystatin  5 mL Oral 4x Daily    insulin lispro  0-6 Units Subcutaneous TID WC    insulin lispro  0-3 Units Subcutaneous Nightly    folbee plus  1 tablet Oral Daily    busPIRone  5 mg Oral BID    fluticasone  2 spray Each Nostril Daily    gabapentin  300 mg Oral Daily    insulin glargine  4 Units Subcutaneous QAM    isosorbide dinitrate  20 mg Oral TID    levothyroxine 25 mcg Oral Daily    meclizine  25 mg Oral TID    oxybutynin  5 mg Oral Daily    pravastatin  40 mg Oral Nightly    sevelamer  1,600 mg Oral TID WC    sodium chloride flush  10 mL Intravenous 2 times per day    heparin (porcine)  5,000 Units Subcutaneous 3 times per day      dextrose       lidocaine, guaiFENesin-dextromethorphan, glucose, dextrose, glucagon (rDNA), dextrose, sodium chloride flush, acetaminophen **OR** acetaminophen, polyethylene glycol, promethazine **OR** ondansetron      LABS:     CBC:   Recent Labs     08/26/20  0612 08/27/20  0641 08/28/20  0611   WBC 19.3* 18.6* 18.7*   HGB 10.3* 9.9* 9.6*    391 413*     BMP:    Recent Labs     08/26/20  0612 08/27/20  0641 08/28/20  0611   * 131* 130*   K 4.8 5.0 4.6   CL 95* 98 97*   CO2 16* 21* 23   BUN 57* 56* 55*   CREATININE 7.7* 7.4* 7.5*   GLUCOSE 167* 131* 117*     Calcium:  Recent Labs     08/28/20  0611   CALCIUM 9.2      Recent Labs     08/27/20  1603 08/27/20  1956 08/28/20  0634   POCGLU 80 119* 122*        Problem list of patient:     Patient Active Problem List   Diagnosis Code    Chronic kidney disease, stage III (moderate) [facility] N18.3    Type 2 diabetes mellitus with insulin therapy ([facility] E11.9, Z79.4    Chronic kidney disease (CKD), stage IV (severe) ([facility] N18.4    Acute on chronic respiratory failure with hypoxia ([facility]) J96.21    Chronic diastolic CHF (congestive heart failure) ([facility]) I50.32    LOUISE (acute kidney injury) ([facility] N17.9    Essential hypertension I10    Hypothyroidism E03.9    Diabetic nephropathy associated with type 2 diabetes mellitus ([facility] C35.35    Metabolic acidosis N91.0    Leukocytosis D72.829    Debility R53.81    Bipolar 1 disorder (HCC) F31.9    Mild mitral regurgitation I34.0    Mild tricuspid regurgitation I07.1    Chronic pain syndrome G89.4    Chronic bilateral pleural effusions J90    (HFpEF) heart failure with preserved ejection fraction (HCC) I50.30    Atelectasis J98.11    Hyponatremia E87.1    Chronic anemia D64.9    Hyperlipidemia E78.5    Constipation K59.00    Shortness of breath R06.02    Hyperkalemia E87.5    Infiltrate noted on imaging study R93.89    Moderate malnutrition (HCC) E44.0    History of pleural effusion Z87.09    ESRD on peritoneal dialysis ([facility] N18.6, Z99.2    DM type 2 with diabetic peripheral neuropathy (HCC) E11.42    Diabetic retinopathy of both eyes associated with type 2 diabetes mellitus (HCC) E11.319    Sepsis (HCC) A41.9    Generalized abdominal pain R10.84    Restrictive lung disease J98.4    Chronic sinus bradycardia R00.1    PAD (peripheral artery disease) ([facility]) I73.9    Obesity (BMI 30-39. 9) E66.9    Pulmonary nodule R91.1    Physical deconditioning R53.81         ASSESSMENT/PLAN   ESRD on PD. Degenerative back disease with functional quadriparesis. Leukocytosis: likely reactive  Ok with discharge plan.   Saad Felipe MD, FACP 8/28/2020 9:47 AM

## 2020-08-28 NOTE — DISCHARGE SUMMARY
DISCHARGE SUMMARY      Patient Identification:   Agapito Littlejohn   : 1950  MRN: [id number]   Account: [de-identified]      Patient's PCP: [facility] Date: 2020     Discharge Date:   2020    Admitting Physician: Awa Mccullough DO     Discharge Physician: Nic Philippe MD     Discharge Diagnoses:    Leukocytosis with segs and immature cells  7mm pulmonary nodule in RLL   5 mm pulmonary nodule in LL  Severe degenerative disease at L2-L3 and L3-L4  ESRD on PD  Type 2 DM with diabetic peripheral neuropathy  Hyponatremia  Essential Hypertension  Hyperlipidemia  Urinary retention  Depression  Anxiety  Physical deconditioning  Debility    The patient was seen and examined on day of discharge and this discharge summary is in conjunction with any daily progress note from day of discharge. Hospital Course:     Aleda Brunner a 77-year-old  female, lifetime non-smoker, with a medical history of mixed anxiety and depressed mood disorder, anemia, arthritis, asthma, bipolar 1 disorder, bradycardia, congestive heart failure, chronic diastolic heart failure, chronic kidney disease stage V, depression, GERD, gout, history of headaches, hyperlipidemia, hypertension, hypothyroidism, low back pain, generalized muscle weakness, neurogenic bladder, seizure disorder, type 2 diabetes mellitus, urinary retention, and obesity.   Patient presented to [facility] from [address] for further evaluation of sepsis.  Patient presented to [facility] ER with abdominal pain and fevers at home. Cinthya Walker is a known ESRD patient on CAPD and also complained of a cough that has been going on for approximately 2 weeks that had been progressively getting worse.  Patient also reported that she could not bring anything up when she coughed and stated that her fevers have been going on for approximately 2-3 days intermittently.  The hospitalist service was contacted for further evaluation, treatment, and management. At [facility] emergency room patient was noted to have a white count of 22,000, sodium of 125, potassium of 5.8.  -- Renal consulted for PD - managed electrolytes and hyponatremia  -- Empirically started on cefepime 8/13 (s/p azactam at [facility] 8/13), vanc x 1 on 8/14 for leukocytosis, ?fever - suspect pulm origin with CXR 8/14 with elevated right diaphragm, mild prominence of interstitial markings, correlate with bronchitis -> repeated 8/17 with mod atelectasis/pneumonia both perihilar regions --> WBC rising to 20's since 8/17 again from 18 on 8/15  -- ID consulted 8/17 for worsening WBC despite antibiotics. Work up did not reveal source. Indium scan negative. Antibiotics discontinued 8/26/2020. Peripheral blood smear did not reveal abnormal leukocytes. Instructed patient to follow up with PCP in 1 week and repeat CBC to trend WBC. Exam:     Vitals:  Vitals:    08/27/20 1900 08/27/20 2306 08/28/20 0402 08/28/20 0830   BP: (!) 147/67 135/62 (!) 157/70 (!) 142/68   Pulse: 64 56 62 64   Resp: 17 18 18 18   Temp: 98.6 °F (37 °C) 98.1 °F (36.7 °C) 98.4 °F (36.9 °C) 99.4 °F (37.4 °C)   TempSrc: Oral Oral Oral Oral   SpO2: 96% 99% 97% 96%   Weight:   189 lb 3.2 oz (85.8 kg)    Height:         Weight: Weight: 189 lb 3.2 oz (85.8 kg)     24 hour intake/output:    Intake/Output Summary (Last 24 hours) at 8/28/2020 1517  Last data filed at 8/28/2020 0510  Gross per 24 hour   Intake 4940.99 ml   Output 5100 ml   Net -159.01 ml       Physical Exam  Vitals signs and nursing note reviewed. Constitutional:       General: She is not in acute distress. Comments: Chronically ill-appearing, appears older than stated age   HENT:      Head: Normocephalic and atraumatic. Right Ear: External ear normal.      Left Ear: External ear normal.      Nose: Nose normal.      Mouth/Throat:      Mouth: Mucous membranes are moist.      Pharynx: Oropharynx is clear.    Eyes:      Extraocular Movements: Extraocular movements intact. Conjunctiva/sclera: Conjunctivae normal.      Pupils: Pupils are equal, round, and reactive to light. Neck:      Musculoskeletal: Normal range of motion and neck supple. Cardiovascular:      Rate and Rhythm: Normal rate and regular rhythm. Pulses: Normal pulses. Heart sounds: No murmur. No friction rub. No gallop. Pulmonary:      Effort: Pulmonary effort is normal. No respiratory distress. Breath sounds: Normal breath sounds. No wheezing, rhonchi or rales. Abdominal:      General: Abdomen is flat. Bowel sounds are normal. There is no distension. Palpations: Abdomen is soft. Tenderness: There is no abdominal tenderness. Comments: PD catheter at umbilicus   Musculoskeletal:      Right lower leg: No edema. Left lower leg: No edema. Comments: TTP to right neck/shoulder   Skin:     General: Skin is warm and dry. Capillary Refill: Capillary refill takes less than 2 seconds. Neurological:      General: No focal deficit present. Mental Status: She is alert and oriented to person, place, and time. Mental status is at baseline. Comments: B/L LE weakness   Psychiatric:         Mood and Affect: Mood normal.         Behavior: Behavior normal.         Thought Content: Thought content normal.           Labs:  For convenience and continuity at follow-up the following most recent labs are provided:      CBC:    Lab Results   Component Value Date    WBC 18.7 08/28/2020    HGB 9.6 08/28/2020    HCT 29.4 08/28/2020     08/28/2020       Renal:    Lab Results   Component Value Date     08/28/2020    K 4.6 08/28/2020    K 3.9 08/17/2020    CL 97 08/28/2020    CO2 23 08/28/2020    BUN 55 08/28/2020    CREATININE 7.5 08/28/2020    CALCIUM 9.2 08/28/2020    PHOS 4.6 03/08/2019         Significant Diagnostic Studies    Radiology:   RADIOLOGY REPORT   Final Result      NM INFLAMMATORY WBC WHOLE BODY INDIUM 111   Final Result      No scintigraphic evidence of infection. Final report electronically signed by Dr. Mariana Cowart on 8/26/2020 1:09 PM      MRI LUMBAR SPINE WO CONTRAST   Final Result       1. No MR evidence of infection in the spine at this time. 2. Degenerative changes throughout the lumbar spine as described above. This is most pronounced at the L2-3 and L3-4 levels where there is severe spinal canal stenosis. **This report has been created using voice recognition software. It may contain minor errors which are inherent in voice recognition technology. **      Final report electronically signed by Dr. Donell Foley on 8/22/2020 10:29 AM      CT ABDOMEN PELVIS WO CONTRAST Additional Contrast? None   Final Result       1. Moderate ascites which has increased in the interval since prior CT. 2. Decreased bilateral pleural effusions. 3. 7 mm pulmonary nodule in the right lower lobe has increased in size since 6/7/2016. Consider follow-up CT in 6-12 months to assess stability. 4. Umbilical hernia containing only fat. 5. Moderate retained stool. **This report has been created using voice recognition software. It may contain minor errors which are inherent in voice recognition technology. **      Final report electronically signed by Dr. Carmella Vinson MD on 8/20/2020 1:52 PM      CT CHEST WO CONTRAST   Final Result      1. There is a small left-sided pleural effusion. 2. Some infiltrates are seen in the right upper and lower lobes. 3. Dilatation of the main pulmonary artery which may be due to pulmonary arterial hypertension. 4. Pulmonary nodules measuring 7 mm in the right lung and 5 mm in the left lung. Follow-up is recommended in 3-6 months to assess for stability. **This report has been created using voice recognition software. It may contain minor errors which are inherent in voice recognition technology. **      Final report electronically signed by Dr Janett Chicas on 8/18/2020 9:58 PM      XR CHEST (2 VW)   Final Result   1. Normal heart size. Moderate chronic elevation right hemidiaphragm. 2. Moderate atelectasis/pneumonia both perihilar regions and lung right hemidiaphragm. 3. Tiny bilateral pleural effusions. **This report has been created using voice recognition software. It may contain minor errors which are inherent in voice recognition technology. **      Final report electronically signed by Dr. Loni Lange on 8/17/2020 1:34 PM      XR CHEST PORTABLE   Final Result      Persistent cardiac enlargement with elevation of right diaphragm. Mild prominence of interstitial markings, correlate with bronchitis      **This report has been created using voice recognition software. It may contain minor errors which are inherent in voice recognition technology. **      Final report electronically signed by Dr. Nika Coleman on 8/14/2020 3:39 AM             Consults:     Jeronimo Robb 574  IP CONSULT TO SOCIAL WORK  IP CONSULT TO INFECTIOUS DISEASES  PHARMACY TO DOSE VANCOMYCIN  IP CONSULT TO PHARMACY    Disposition:    [] Home       [] TCU       [] Rehab       [] Psych       [] SNF       [x] [facility].       [] Other-    Condition at Discharge: Guarded    Code Status:  Prior     Patient Instructions:    Discharge lab work: CBC in 1 week  Activity: activity as tolerated  Diet: No diet orders on file      Follow-up visits:   Jc Ruth  [address] [facility] [address] [facility]  [address] [facility]  [phone]             Discharge Medications:      Tameka Sydney, [address] Medication Instructions AON:[id number]    Printed on:08/28/20 9318   Medication Information                      acetaminophen (TYLENOL) 325 MG tablet  Take 650 mg by mouth every 6 hours as needed for Pain             acetaminophen-codeine (TYLENOL #3) 300-30 MG per tablet  Take 1 tablet by mouth every 6 hours as needed (arthritis pain). B Complex-C-Folic Acid (RENAL) 1 MG CAPS  Take 1 capsule by mouth daily             benzonatate (TESSALON) 100 MG capsule  Take 100 mg by mouth every 8 hours as needed for Cough             blood glucose test strips (ASCENSIA AUTODISC VI;ONE TOUCH ULTRA TEST VI) strip  Patient tests blood sugar three times per day. Diagnosis DMII E11.9             busPIRone (BUSPAR) 5 MG tablet  Take 5 mg by mouth 2 times daily             capsaicin (ZOSTRIX) 0.025 % cream  Apply topically 2 times daily. fluticasone (FLONASE) 50 MCG/ACT nasal spray  2 sprays by Each Nostril route daily              gabapentin (NEURONTIN) 300 MG capsule  Take 300 mg by mouth daily. guaiFENesin 200 MG tablet  Take 200 mg by mouth every 4 hours as needed (cough)              insulin aspart (NOVOLOG) 100 UNIT/ML injection vial  Inject 0-12 Units into the skin 3 times daily (before meals) Per sliding scale; 140-199= 1 unit, 200-249= 2 units, 250-299= 3 units,  300-349= 4 units, 350-399= 5 units, 400-450= 6 units, 451-500= 10 units, >500 give 12 units & recheck in one hour. insulin glargine (BASAGLAR KWIKPEN) 100 UNIT/ML injection pen  Inject 4 Units into the skin every morning             isosorbide dinitrate (ISORDIL) 20 MG tablet  Take 20 mg by mouth 3 times daily             levothyroxine (SYNTHROID) 25 MCG tablet  Take 25 mcg by mouth Daily             lidocaine (XYLOCAINE) 5 % ointment  Apply topically as needed.              meclizine (ANTIVERT) 25 MG tablet  Take 25 mg by mouth 3 times daily             ondansetron (ZOFRAN-ODT) 4 MG disintegrating tablet  Take 4 mg by mouth every 8 hours as needed for Nausea or Vomiting              oxybutynin (DITROPAN-XL) 5 MG extended release tablet  Take 5 mg by mouth daily             polycarbophil (FIBERCON) 625 MG tablet  Take 3 tablets by mouth 3 times daily (with meals) polyethylene glycol (GLYCOLAX) powder  Take 17 g by mouth daily             pravastatin (PRAVACHOL) 40 MG tablet  Take 40 mg by mouth nightly              sevelamer (RENVELA) 800 MG tablet  Take 2 tablets by mouth 3 times daily (with meals)                 Time Spent on discharge is more than 1 hour in the examination, evaluation, counseling and review of medications and discharge plan. Signed: Thank you Kati Aaron for the opportunity to be involved in this patient's care.     Electronically signed by Kayleigh Farley MD on 8/28/2020 at 3:17 PM

## 2020-09-25 NOTE — PROGRESS NOTES
[facility]    Renal inpatient Progress Note  6/9/2019 2:41 PM      Pt Name:   Colonel Sales  YOB: 1950  Attending:   Adiel Cheney MD    Chief Complaint:   Colonel Sales is a 81 y.o. female being followed by nephrology for LOUISE / CKD and SOB     Interval History : patient seen and examined by me. feels well. No cp . SOB is much better. Not much UOP  Had a thoracentesis done again this AM .     Scheduled Medications :   senna  1 tablet Oral Nightly    amLODIPine  10 mg Oral Daily    DULoxetine  30 mg Oral Daily    fluticasone  1 spray Each Nostril Daily    gabapentin  100 mg Oral TID    hydrALAZINE  100 mg Oral Q8H    insulin lispro  0-9 Units Subcutaneous 4x Daily AC & HS    isosorbide dinitrate  20 mg Oral TID    levothyroxine  25 mcg Oral Daily    meclizine  25 mg Oral TID    oxybutynin  5 mg Oral Daily    pravastatin  40 mg Oral Nightly    sodium chloride flush  10 mL Intravenous 2 times per day    heparin (porcine)  5,000 Units Subcutaneous 3 times per day    insulin glargine  20 Units Subcutaneous Nightly      dextrose          Vitals :  BP (!) 154/98   Pulse 75   Temp 97.7 °F (36.5 °C) (Axillary)   Resp 20   Ht 5' 6\" (1.676 m)   Wt 167 lb (75.8 kg)   SpO2 94%   BMI 26.95 kg/m²     24HR INTAKE/OUTPUT:      Intake/Output Summary (Last 24 hours) at 6/9/2019 1441  Last data filed at 6/9/2019 0340  Gross per 24 hour   Intake 710 ml   Output 0 ml   Net 710 ml     Last 3 weights  Wt Readings from Last 3 Encounters:   06/09/19 167 lb (75.8 kg)   05/23/19 171 lb 4.8 oz (77.7 kg)   03/13/19 166 lb 4.8 oz (75.4 kg)        Physical Exam :  General Appearance:  Well developed.  No distress  Mouth/Throat:  Oral mucosa moist  Neck:  Supple, no JVD  Lungs:  Breath sounds: mild rales noted  Heart[de-identified]  S1,S2 heard  Abdomen:  Soft, non - tender  Musculoskeletal:  Edema - none     Last 3 CBC  Recent Labs     06/07/19  0344   WBC 12.6*   RBC 3.11*   HGB 9.4*   HCT 27.9*   PLT 357     Last 3 CMP  Recent Labs     06/07/19  0344 06/08/19  0357 06/09/19  0536    132* 133*   K 4.5 4.2 3.9    98 94*   CO2 21* 20* 21*   BUN 66* 71* 74*   CREATININE 3.8* 4.1* 3.4*   CALCIUM 9.3 8.7 9.3   LABALBU  --   --  3.5   BILITOT  --   --  0.4        Assessment / Plan   Renal - CKD stage 5 - overall renal FX appears stable at baseline  · Volume status also looks well in fact much better than it has been in the past at 165 pounds  · CXR shows may be mild congestion, SOB improved. Creat improved after holding diuretics  · She has had multiple admissions in the past as well for similar complaints  · Spoke with pulmonary the pleural fluid is a transudate and the patient might benefit from dialysis  . She is unable to tolerate diuretics as well. · No ASA/plavix in anticipation for the catheter placement  · Spoke with her about PD as well. She lives in a NH. PD would be an option .       Electrolytes - mild hyponatremia  Essential Hypertension - reasonable control  SOB -  Multifactorial, primarily pleural effusion  And may be mild CHF. bumex on hold. Pleural effusion - S/P thoracentesis and pulmonary has been consulted  Diabetes Mellitus  meds reviewed and D/W patient ,  and nursing staff. LIANNE Patterson D.  [facility]. <<----- Click to add NO significant Past Surgical History

## 2021-01-01 ENCOUNTER — APPOINTMENT (OUTPATIENT)
Dept: GENERAL RADIOLOGY | Age: 71
DRG: 871 | End: 2021-01-01
Attending: INTERNAL MEDICINE
Payer: MEDICARE

## 2021-01-01 ENCOUNTER — APPOINTMENT (OUTPATIENT)
Dept: CT IMAGING | Age: 71
DRG: 871 | End: 2021-01-01
Attending: INTERNAL MEDICINE
Payer: MEDICARE

## 2021-01-01 ENCOUNTER — APPOINTMENT (OUTPATIENT)
Dept: MRI IMAGING | Age: 71
DRG: 871 | End: 2021-01-01
Attending: INTERNAL MEDICINE
Payer: MEDICARE

## 2021-01-01 ENCOUNTER — APPOINTMENT (OUTPATIENT)
Dept: INTERVENTIONAL RADIOLOGY/VASCULAR | Age: 71
DRG: 871 | End: 2021-01-01
Attending: INTERNAL MEDICINE
Payer: MEDICARE

## 2021-01-01 ENCOUNTER — HOSPITAL ENCOUNTER (INPATIENT)
Age: 71
LOS: 7 days | Discharge: NURSING, REHAB OR LONG-TERM CARE | DRG: 871 | End: 2021-01-08
Attending: INTERNAL MEDICINE | Admitting: INTERNAL MEDICINE
Payer: MEDICARE

## 2021-01-01 ENCOUNTER — HOSPITAL ENCOUNTER (INPATIENT)
Age: 71
LOS: 3 days | DRG: 871 | End: 2021-01-25
Attending: INTERNAL MEDICINE | Admitting: INTERNAL MEDICINE
Payer: MEDICARE

## 2021-01-01 VITALS
BODY MASS INDEX: 28.51 KG/M2 | HEIGHT: 66 IN | OXYGEN SATURATION: 99 % | SYSTOLIC BLOOD PRESSURE: 113 MMHG | TEMPERATURE: 97.7 F | WEIGHT: 177.4 LBS | HEART RATE: 96 BPM | DIASTOLIC BLOOD PRESSURE: 69 MMHG | RESPIRATION RATE: 18 BRPM

## 2021-01-01 VITALS
WEIGHT: 181.44 LBS | HEART RATE: 99 BPM | TEMPERATURE: 99.3 F | OXYGEN SATURATION: 79 % | HEIGHT: 66 IN | DIASTOLIC BLOOD PRESSURE: 40 MMHG | SYSTOLIC BLOOD PRESSURE: 61 MMHG | RESPIRATION RATE: 20 BRPM | BODY MASS INDEX: 29.16 KG/M2

## 2021-01-01 DIAGNOSIS — M19.90 ARTHRITIS: ICD-10-CM

## 2021-01-01 DIAGNOSIS — F31.9 BIPOLAR 1 DISORDER (HCC): ICD-10-CM

## 2021-01-01 DIAGNOSIS — A41.9 SEPSIS DUE TO UNDETERMINED ORGANISM (HCC): Primary | ICD-10-CM

## 2021-01-01 LAB
ABO: NORMAL
ACINETOBACTER CALCOACETICUS-BAUMANNII BY PCR: NOT DETECTED
ADENOVIRUS BY PCR: NOT DETECTED
ALBUMIN SERPL-MCNC: 1.1 G/DL (ref 3.5–5.1)
ALBUMIN SERPL-MCNC: 1.6 G/DL (ref 3.5–5.1)
ALBUMIN SERPL-MCNC: 1.7 G/DL (ref 3.5–5.1)
ALBUMIN SERPL-MCNC: 1.7 G/DL (ref 3.5–5.1)
ALBUMIN SERPL-MCNC: 1.9 G/DL (ref 3.5–5.1)
ALBUMIN SERPL-MCNC: 1.9 G/DL (ref 3.5–5.1)
ALBUMIN SERPL-MCNC: 2.3 G/DL (ref 3.5–5.1)
ALBUMIN SERPL-MCNC: 2.4 G/DL (ref 3.5–5.1)
ALBUMIN SERPL-MCNC: 2.4 G/DL (ref 3.5–5.1)
ALBUMIN SERPL-MCNC: 2.6 G/DL (ref 3.5–5.1)
ALBUMIN SERPL-MCNC: 2.9 G/DL (ref 3.5–5.1)
ALBUMIN SERPL-MCNC: 3 G/DL (ref 3.5–5.1)
ALBUMIN SERPL-MCNC: 3.2 G/DL (ref 3.5–5.1)
ALLEN TEST: ABNORMAL
ALP BLD-CCNC: 130 U/L (ref 38–126)
ALP BLD-CCNC: 135 U/L (ref 38–126)
ALP BLD-CCNC: 145 U/L (ref 38–126)
ALP BLD-CCNC: 149 U/L (ref 38–126)
ALP BLD-CCNC: 153 U/L (ref 38–126)
ALP BLD-CCNC: 154 U/L (ref 38–126)
ALP BLD-CCNC: 160 U/L (ref 38–126)
ALP BLD-CCNC: 160 U/L (ref 38–126)
ALP BLD-CCNC: 164 U/L (ref 38–126)
ALP BLD-CCNC: 166 U/L (ref 38–126)
ALP BLD-CCNC: 173 U/L (ref 38–126)
ALP BLD-CCNC: 177 U/L (ref 38–126)
ALP BLD-CCNC: 183 U/L (ref 38–126)
ALP BLD-CCNC: 183 U/L (ref 38–126)
ALP BLD-CCNC: 204 U/L (ref 38–126)
ALT SERPL-CCNC: 154 U/L (ref 11–66)
ALT SERPL-CCNC: 160 U/L (ref 11–66)
ALT SERPL-CCNC: 165 U/L (ref 11–66)
ALT SERPL-CCNC: 170 U/L (ref 11–66)
ALT SERPL-CCNC: 171 U/L (ref 11–66)
ALT SERPL-CCNC: 184 U/L (ref 11–66)
ALT SERPL-CCNC: 207 U/L (ref 11–66)
ALT SERPL-CCNC: 208 U/L (ref 11–66)
ALT SERPL-CCNC: 208 U/L (ref 11–66)
ALT SERPL-CCNC: 223 U/L (ref 11–66)
ALT SERPL-CCNC: 227 U/L (ref 11–66)
ALT SERPL-CCNC: 229 U/L (ref 11–66)
ALT SERPL-CCNC: 28 U/L (ref 11–66)
ALT SERPL-CCNC: 36 U/L (ref 11–66)
ALT SERPL-CCNC: 41 U/L (ref 11–66)
AMMONIA: 25 UMOL/L (ref 11–60)
AMMONIA: 69 UMOL/L (ref 11–60)
AMORPHOUS: ABNORMAL
ANION GAP SERPL CALCULATED.3IONS-SCNC: 10 MEQ/L (ref 8–16)
ANION GAP SERPL CALCULATED.3IONS-SCNC: 11 MEQ/L (ref 8–16)
ANION GAP SERPL CALCULATED.3IONS-SCNC: 12 MEQ/L (ref 8–16)
ANION GAP SERPL CALCULATED.3IONS-SCNC: 12 MEQ/L (ref 8–16)
ANION GAP SERPL CALCULATED.3IONS-SCNC: 16 MEQ/L (ref 8–16)
ANION GAP SERPL CALCULATED.3IONS-SCNC: 16 MEQ/L (ref 8–16)
ANION GAP SERPL CALCULATED.3IONS-SCNC: 21 MEQ/L (ref 8–16)
ANION GAP SERPL CALCULATED.3IONS-SCNC: 24 MEQ/L (ref 8–16)
ANION GAP SERPL CALCULATED.3IONS-SCNC: 24 MEQ/L (ref 8–16)
ANION GAP SERPL CALCULATED.3IONS-SCNC: 27 MEQ/L (ref 8–16)
ANION GAP SERPL CALCULATED.3IONS-SCNC: 28 MEQ/L (ref 8–16)
ANION GAP SERPL CALCULATED.3IONS-SCNC: 8 MEQ/L (ref 8–16)
ANION GAP SERPL CALCULATED.3IONS-SCNC: 8 MEQ/L (ref 8–16)
ANION GAP SERPL CALCULATED.3IONS-SCNC: 9 MEQ/L (ref 8–16)
ANISOCYTOSIS: PRESENT
ANTIBODY SCREEN: NORMAL
APTT: 37.6 SECONDS (ref 22–38)
APTT: > 200 SECONDS (ref 22–38)
AST SERPL-CCNC: 162 U/L (ref 5–40)
AST SERPL-CCNC: 218 U/L (ref 5–40)
AST SERPL-CCNC: 27 U/L (ref 5–40)
AST SERPL-CCNC: 307 U/L (ref 5–40)
AST SERPL-CCNC: 32 U/L (ref 5–40)
AST SERPL-CCNC: 413 U/L (ref 5–40)
AST SERPL-CCNC: 51 U/L (ref 5–40)
AST SERPL-CCNC: 515 U/L (ref 5–40)
AST SERPL-CCNC: 558 U/L (ref 5–40)
AST SERPL-CCNC: 565 U/L (ref 5–40)
AST SERPL-CCNC: 584 U/L (ref 5–40)
AST SERPL-CCNC: 634 U/L (ref 5–40)
AST SERPL-CCNC: 650 U/L (ref 5–40)
AST SERPL-CCNC: 667 U/L (ref 5–40)
AST SERPL-CCNC: 684 U/L (ref 5–40)
BACTERIA: ABNORMAL /HPF
BASE EXCESS (CALCULATED): -13.3 MMOL/L (ref -2.5–2.5)
BASOPHILIA: ABNORMAL
BASOPHILIA: ABNORMAL
BASOPHILS # BLD: 0.3 %
BASOPHILS # BLD: 0.5 %
BASOPHILS # BLD: 0.6 %
BASOPHILS # BLD: 0.7 %
BASOPHILS # BLD: 0.8 %
BASOPHILS # BLD: 0.9 %
BASOPHILS ABSOLUTE: 0.1 THOU/MM3 (ref 0–0.1)
BILIRUB SERPL-MCNC: 0.4 MG/DL (ref 0.3–1.2)
BILIRUB SERPL-MCNC: 0.6 MG/DL (ref 0.3–1.2)
BILIRUB SERPL-MCNC: 0.7 MG/DL (ref 0.3–1.2)
BILIRUB SERPL-MCNC: 0.9 MG/DL (ref 0.3–1.2)
BILIRUB SERPL-MCNC: 1 MG/DL (ref 0.3–1.2)
BILIRUB SERPL-MCNC: 1.4 MG/DL (ref 0.3–1.2)
BILIRUB SERPL-MCNC: 1.4 MG/DL (ref 0.3–1.2)
BILIRUB SERPL-MCNC: 1.7 MG/DL (ref 0.3–1.2)
BILIRUB SERPL-MCNC: 2 MG/DL (ref 0.3–1.2)
BILIRUB SERPL-MCNC: 2.5 MG/DL (ref 0.3–1.2)
BILIRUB SERPL-MCNC: 2.7 MG/DL (ref 0.3–1.2)
BILIRUB SERPL-MCNC: 2.7 MG/DL (ref 0.3–1.2)
BILIRUB SERPL-MCNC: 3.2 MG/DL (ref 0.3–1.2)
BILIRUBIN DIRECT: < 0.2 MG/DL (ref 0–0.3)
BILIRUBIN URINE: NEGATIVE
BLOOD CULTURE, ROUTINE: NORMAL
BLOOD, URINE: ABNORMAL
BODY FLUID CULTURE, STERILE: NORMAL
BODY FLUID RBC: < 2000 /CUMM
BUN BLDV-MCNC: 11 MG/DL (ref 7–22)
BUN BLDV-MCNC: 14 MG/DL (ref 7–22)
BUN BLDV-MCNC: 18 MG/DL (ref 7–22)
BUN BLDV-MCNC: 21 MG/DL (ref 7–22)
BUN BLDV-MCNC: 26 MG/DL (ref 7–22)
BUN BLDV-MCNC: 26 MG/DL (ref 7–22)
BUN BLDV-MCNC: 29 MG/DL (ref 7–22)
BUN BLDV-MCNC: 3 MG/DL (ref 7–22)
BUN BLDV-MCNC: 3 MG/DL (ref 7–22)
BUN BLDV-MCNC: 30 MG/DL (ref 7–22)
BUN BLDV-MCNC: 31 MG/DL (ref 7–22)
BUN BLDV-MCNC: 4 MG/DL (ref 7–22)
BUN BLDV-MCNC: 4 MG/DL (ref 7–22)
BUN BLDV-MCNC: 6 MG/DL (ref 7–22)
BUN BLDV-MCNC: 7 MG/DL (ref 7–22)
BUN BLDV-MCNC: 8 MG/DL (ref 7–22)
BUN BLDV-MCNC: 9 MG/DL (ref 7–22)
CALCIUM IONIZED: 0.89 MMOL/L (ref 1.12–1.32)
CALCIUM IONIZED: 0.91 MMOL/L (ref 1.12–1.32)
CALCIUM IONIZED: 1 MMOL/L (ref 1.12–1.32)
CALCIUM IONIZED: 1.02 MMOL/L (ref 1.12–1.32)
CALCIUM IONIZED: 1.03 MMOL/L (ref 1.12–1.32)
CALCIUM IONIZED: 1.06 MMOL/L (ref 1.12–1.32)
CALCIUM IONIZED: 1.08 MMOL/L (ref 1.12–1.32)
CALCIUM IONIZED: 1.09 MMOL/L (ref 1.12–1.32)
CALCIUM IONIZED: 1.09 MMOL/L (ref 1.12–1.32)
CALCIUM IONIZED: 1.1 MMOL/L (ref 1.12–1.32)
CALCIUM IONIZED: 1.15 MMOL/L (ref 1.12–1.32)
CALCIUM SERPL-MCNC: 7.5 MG/DL (ref 8.5–10.5)
CALCIUM SERPL-MCNC: 7.6 MG/DL (ref 8.5–10.5)
CALCIUM SERPL-MCNC: 7.7 MG/DL (ref 8.5–10.5)
CALCIUM SERPL-MCNC: 7.8 MG/DL (ref 8.5–10.5)
CALCIUM SERPL-MCNC: 8 MG/DL (ref 8.5–10.5)
CALCIUM SERPL-MCNC: 8.3 MG/DL (ref 8.5–10.5)
CALCIUM SERPL-MCNC: 8.5 MG/DL (ref 8.5–10.5)
CALCIUM SERPL-MCNC: 8.7 MG/DL (ref 8.5–10.5)
CALCIUM SERPL-MCNC: 9.2 MG/DL (ref 8.5–10.5)
CALCIUM SERPL-MCNC: 9.3 MG/DL (ref 8.5–10.5)
CALCIUM SERPL-MCNC: 9.4 MG/DL (ref 8.5–10.5)
CALCIUM SERPL-MCNC: 9.7 MG/DL (ref 8.5–10.5)
CASTS 2: ABNORMAL /LPF
CASTS UA: ABNORMAL /LPF
CHARACTER, BODY FLUID: CLEAR
CHARACTER, URINE: ABNORMAL
CHLAMYDIA PNEUMONIAE BY PCR: NOT DETECTED
CHLORIDE BLD-SCNC: 100 MEQ/L (ref 98–111)
CHLORIDE BLD-SCNC: 100 MEQ/L (ref 98–111)
CHLORIDE BLD-SCNC: 101 MEQ/L (ref 98–111)
CHLORIDE BLD-SCNC: 103 MEQ/L (ref 98–111)
CHLORIDE BLD-SCNC: 103 MEQ/L (ref 98–111)
CHLORIDE BLD-SCNC: 105 MEQ/L (ref 98–111)
CHLORIDE BLD-SCNC: 106 MEQ/L (ref 98–111)
CHLORIDE BLD-SCNC: 107 MEQ/L (ref 98–111)
CHLORIDE BLD-SCNC: 107 MEQ/L (ref 98–111)
CHLORIDE BLD-SCNC: 97 MEQ/L (ref 98–111)
CHLORIDE BLD-SCNC: 98 MEQ/L (ref 98–111)
CHLORIDE BLD-SCNC: 99 MEQ/L (ref 98–111)
CO2: 12 MEQ/L (ref 23–33)
CO2: 14 MEQ/L (ref 23–33)
CO2: 18 MEQ/L (ref 23–33)
CO2: 19 MEQ/L (ref 23–33)
CO2: 20 MEQ/L (ref 23–33)
CO2: 22 MEQ/L (ref 23–33)
CO2: 23 MEQ/L (ref 23–33)
CO2: 23 MEQ/L (ref 23–33)
CO2: 24 MEQ/L (ref 23–33)
CO2: 25 MEQ/L (ref 23–33)
CO2: 25 MEQ/L (ref 23–33)
CO2: 26 MEQ/L (ref 23–33)
CO2: 27 MEQ/L (ref 23–33)
CO2: 8 MEQ/L (ref 23–33)
CO2: 9 MEQ/L (ref 23–33)
CO2: 9 MEQ/L (ref 23–33)
COLLECTED BY:: ABNORMAL
COLOR: COLORLESS
COLOR: YELLOW
CORONAVIRUS PCR: NOT DETECTED
CREAT SERPL-MCNC: 0.5 MG/DL (ref 0.4–1.2)
CREAT SERPL-MCNC: 0.6 MG/DL (ref 0.4–1.2)
CREAT SERPL-MCNC: 0.7 MG/DL (ref 0.4–1.2)
CREAT SERPL-MCNC: 0.9 MG/DL (ref 0.4–1.2)
CREAT SERPL-MCNC: 0.9 MG/DL (ref 0.4–1.2)
CREAT SERPL-MCNC: 1.2 MG/DL (ref 0.4–1.2)
CREAT SERPL-MCNC: 1.3 MG/DL (ref 0.4–1.2)
CREAT SERPL-MCNC: 1.6 MG/DL (ref 0.4–1.2)
CREAT SERPL-MCNC: 2.1 MG/DL (ref 0.4–1.2)
CREAT SERPL-MCNC: 2.7 MG/DL (ref 0.4–1.2)
CREAT SERPL-MCNC: 3.5 MG/DL (ref 0.4–1.2)
CREAT SERPL-MCNC: 4.4 MG/DL (ref 0.4–1.2)
CREAT SERPL-MCNC: 5 MG/DL (ref 0.4–1.2)
CREAT SERPL-MCNC: 5.1 MG/DL (ref 0.4–1.2)
CREAT SERPL-MCNC: 5.3 MG/DL (ref 0.4–1.2)
CREAT SERPL-MCNC: 5.6 MG/DL (ref 0.4–1.2)
CREAT SERPL-MCNC: 6.2 MG/DL (ref 0.4–1.2)
CREAT SERPL-MCNC: 6.3 MG/DL (ref 0.4–1.2)
CRYSTALS, UA: ABNORMAL
CRYSTALS, UA: ABNORMAL
DEVICE: ABNORMAL
EKG ATRIAL RATE: 106 BPM
EKG ATRIAL RATE: 122 BPM
EKG P AXIS: 36 DEGREES
EKG P AXIS: 66 DEGREES
EKG P-R INTERVAL: 118 MS
EKG P-R INTERVAL: 132 MS
EKG Q-T INTERVAL: 328 MS
EKG Q-T INTERVAL: 346 MS
EKG QRS DURATION: 84 MS
EKG QRS DURATION: 88 MS
EKG QTC CALCULATION (BAZETT): 459 MS
EKG QTC CALCULATION (BAZETT): 467 MS
EKG R AXIS: -24 DEGREES
EKG R AXIS: -38 DEGREES
EKG T AXIS: 67 DEGREES
EKG T AXIS: 83 DEGREES
EKG VENTRICULAR RATE: 106 BPM
EKG VENTRICULAR RATE: 122 BPM
ENTEROBACTER CLOACAE COMPLEX BY PCR: NOT DETECTED
EOSINOPHIL # BLD: 0.7 %
EOSINOPHIL # BLD: 1.6 %
EOSINOPHIL # BLD: 2.6 %
EOSINOPHIL # BLD: 2.8 %
EOSINOPHIL # BLD: 3.5 %
EOSINOPHIL # BLD: 3.5 %
EOSINOPHILS ABSOLUTE: 0.1 THOU/MM3 (ref 0–0.4)
EOSINOPHILS ABSOLUTE: 0.2 THOU/MM3 (ref 0–0.4)
EOSINOPHILS ABSOLUTE: 0.3 THOU/MM3 (ref 0–0.4)
EOSINOPHILS ABSOLUTE: 0.3 THOU/MM3 (ref 0–0.4)
EOSINOPHILS ABSOLUTE: 0.4 THOU/MM3 (ref 0–0.4)
EOSINOPHILS ABSOLUTE: 0.4 THOU/MM3 (ref 0–0.4)
EPITHELIAL CELLS, UA: ABNORMAL /HPF
ERYTHROCYTE [DISTWIDTH] IN BLOOD BY AUTOMATED COUNT: 15.7 % (ref 11.5–14.5)
ERYTHROCYTE [DISTWIDTH] IN BLOOD BY AUTOMATED COUNT: 15.9 % (ref 11.5–14.5)
ERYTHROCYTE [DISTWIDTH] IN BLOOD BY AUTOMATED COUNT: 16 % (ref 11.5–14.5)
ERYTHROCYTE [DISTWIDTH] IN BLOOD BY AUTOMATED COUNT: 16.1 % (ref 11.5–14.5)
ERYTHROCYTE [DISTWIDTH] IN BLOOD BY AUTOMATED COUNT: 16.2 % (ref 11.5–14.5)
ERYTHROCYTE [DISTWIDTH] IN BLOOD BY AUTOMATED COUNT: 16.3 % (ref 11.5–14.5)
ERYTHROCYTE [DISTWIDTH] IN BLOOD BY AUTOMATED COUNT: 16.8 % (ref 11.5–14.5)
ERYTHROCYTE [DISTWIDTH] IN BLOOD BY AUTOMATED COUNT: 17.4 % (ref 11.5–14.5)
ERYTHROCYTE [DISTWIDTH] IN BLOOD BY AUTOMATED COUNT: 17.5 % (ref 11.5–14.5)
ERYTHROCYTE [DISTWIDTH] IN BLOOD BY AUTOMATED COUNT: 17.6 % (ref 11.5–14.5)
ERYTHROCYTE [DISTWIDTH] IN BLOOD BY AUTOMATED COUNT: 17.7 % (ref 11.5–14.5)
ERYTHROCYTE [DISTWIDTH] IN BLOOD BY AUTOMATED COUNT: 17.7 % (ref 11.5–14.5)
ERYTHROCYTE [DISTWIDTH] IN BLOOD BY AUTOMATED COUNT: 17.9 % (ref 11.5–14.5)
ERYTHROCYTE [DISTWIDTH] IN BLOOD BY AUTOMATED COUNT: 18 % (ref 11.5–14.5)
ERYTHROCYTE [DISTWIDTH] IN BLOOD BY AUTOMATED COUNT: 57.1 FL (ref 35–45)
ERYTHROCYTE [DISTWIDTH] IN BLOOD BY AUTOMATED COUNT: 57.2 FL (ref 35–45)
ERYTHROCYTE [DISTWIDTH] IN BLOOD BY AUTOMATED COUNT: 57.6 FL (ref 35–45)
ERYTHROCYTE [DISTWIDTH] IN BLOOD BY AUTOMATED COUNT: 57.8 FL (ref 35–45)
ERYTHROCYTE [DISTWIDTH] IN BLOOD BY AUTOMATED COUNT: 58.7 FL (ref 35–45)
ERYTHROCYTE [DISTWIDTH] IN BLOOD BY AUTOMATED COUNT: 60.3 FL (ref 35–45)
ERYTHROCYTE [DISTWIDTH] IN BLOOD BY AUTOMATED COUNT: 60.9 FL (ref 35–45)
ERYTHROCYTE [DISTWIDTH] IN BLOOD BY AUTOMATED COUNT: 61.2 FL (ref 35–45)
ERYTHROCYTE [DISTWIDTH] IN BLOOD BY AUTOMATED COUNT: 63 FL (ref 35–45)
ERYTHROCYTE [DISTWIDTH] IN BLOOD BY AUTOMATED COUNT: 64.2 FL (ref 35–45)
ERYTHROCYTE [DISTWIDTH] IN BLOOD BY AUTOMATED COUNT: 65.3 FL (ref 35–45)
ERYTHROCYTE [DISTWIDTH] IN BLOOD BY AUTOMATED COUNT: 65.7 FL (ref 35–45)
ERYTHROCYTE [DISTWIDTH] IN BLOOD BY AUTOMATED COUNT: 66.3 FL (ref 35–45)
ERYTHROCYTE [DISTWIDTH] IN BLOOD BY AUTOMATED COUNT: 66.4 FL (ref 35–45)
ERYTHROCYTE [DISTWIDTH] IN BLOOD BY AUTOMATED COUNT: 66.7 FL (ref 35–45)
ERYTHROCYTE [DISTWIDTH] IN BLOOD BY AUTOMATED COUNT: 68 FL (ref 35–45)
ERYTHROCYTE [DISTWIDTH] IN BLOOD BY AUTOMATED COUNT: 68.1 FL (ref 35–45)
ERYTHROCYTE [DISTWIDTH] IN BLOOD BY AUTOMATED COUNT: 68.2 FL (ref 35–45)
ERYTHROCYTE [DISTWIDTH] IN BLOOD BY AUTOMATED COUNT: 68.6 FL (ref 35–45)
ESCHERICHIA COLI BY PCR: NOT DETECTED
FIBRIN SPLIT PRODUCTS: > 20 MCG/ML
FIBRINOGEN: 87 MG/100ML (ref 155–475)
GFR SERPL CREATININE-BSD FRML MDRD: 10 ML/MIN/1.73M2
GFR SERPL CREATININE-BSD FRML MDRD: 13 ML/MIN/1.73M2
GFR SERPL CREATININE-BSD FRML MDRD: 17 ML/MIN/1.73M2
GFR SERPL CREATININE-BSD FRML MDRD: 23 ML/MIN/1.73M2
GFR SERPL CREATININE-BSD FRML MDRD: 32 ML/MIN/1.73M2
GFR SERPL CREATININE-BSD FRML MDRD: 41 ML/MIN/1.73M2
GFR SERPL CREATININE-BSD FRML MDRD: 45 ML/MIN/1.73M2
GFR SERPL CREATININE-BSD FRML MDRD: 62 ML/MIN/1.73M2
GFR SERPL CREATININE-BSD FRML MDRD: 62 ML/MIN/1.73M2
GFR SERPL CREATININE-BSD FRML MDRD: 7 ML/MIN/1.73M2
GFR SERPL CREATININE-BSD FRML MDRD: 7 ML/MIN/1.73M2
GFR SERPL CREATININE-BSD FRML MDRD: 8 ML/MIN/1.73M2
GFR SERPL CREATININE-BSD FRML MDRD: 83 ML/MIN/1.73M2
GFR SERPL CREATININE-BSD FRML MDRD: 9 ML/MIN/1.73M2
GFR SERPL CREATININE-BSD FRML MDRD: > 90 ML/MIN/1.73M2
GFR SERPL CREATININE-BSD FRML MDRD: > 90 ML/MIN/1.73M2
GLUCOSE BLD-MCNC: 100 MG/DL (ref 70–108)
GLUCOSE BLD-MCNC: 100 MG/DL (ref 70–108)
GLUCOSE BLD-MCNC: 101 MG/DL (ref 70–108)
GLUCOSE BLD-MCNC: 103 MG/DL (ref 70–108)
GLUCOSE BLD-MCNC: 114 MG/DL (ref 70–108)
GLUCOSE BLD-MCNC: 116 MG/DL (ref 70–108)
GLUCOSE BLD-MCNC: 116 MG/DL (ref 70–108)
GLUCOSE BLD-MCNC: 117 MG/DL (ref 70–108)
GLUCOSE BLD-MCNC: 120 MG/DL (ref 70–108)
GLUCOSE BLD-MCNC: 121 MG/DL (ref 70–108)
GLUCOSE BLD-MCNC: 127 MG/DL (ref 70–108)
GLUCOSE BLD-MCNC: 129 MG/DL (ref 70–108)
GLUCOSE BLD-MCNC: 129 MG/DL (ref 70–108)
GLUCOSE BLD-MCNC: 130 MG/DL (ref 70–108)
GLUCOSE BLD-MCNC: 135 MG/DL (ref 70–108)
GLUCOSE BLD-MCNC: 143 MG/DL (ref 70–108)
GLUCOSE BLD-MCNC: 150 MG/DL (ref 70–108)
GLUCOSE BLD-MCNC: 150 MG/DL (ref 70–108)
GLUCOSE BLD-MCNC: 152 MG/DL (ref 70–108)
GLUCOSE BLD-MCNC: 155 MG/DL (ref 70–108)
GLUCOSE BLD-MCNC: 163 MG/DL (ref 70–108)
GLUCOSE BLD-MCNC: 164 MG/DL (ref 70–108)
GLUCOSE BLD-MCNC: 165 MG/DL (ref 70–108)
GLUCOSE BLD-MCNC: 170 MG/DL (ref 70–108)
GLUCOSE BLD-MCNC: 188 MG/DL (ref 70–108)
GLUCOSE BLD-MCNC: 194 MG/DL (ref 70–108)
GLUCOSE BLD-MCNC: 196 MG/DL (ref 70–108)
GLUCOSE BLD-MCNC: 201 MG/DL (ref 70–108)
GLUCOSE BLD-MCNC: 205 MG/DL (ref 70–108)
GLUCOSE BLD-MCNC: 209 MG/DL (ref 70–108)
GLUCOSE BLD-MCNC: 222 MG/DL (ref 70–108)
GLUCOSE BLD-MCNC: 228 MG/DL (ref 70–108)
GLUCOSE BLD-MCNC: 238 MG/DL (ref 70–108)
GLUCOSE BLD-MCNC: 245 MG/DL (ref 70–108)
GLUCOSE BLD-MCNC: 252 MG/DL (ref 70–108)
GLUCOSE BLD-MCNC: 252 MG/DL (ref 70–108)
GLUCOSE BLD-MCNC: 254 MG/DL (ref 70–108)
GLUCOSE BLD-MCNC: 299 MG/DL (ref 70–108)
GLUCOSE BLD-MCNC: 356 MG/DL (ref 70–108)
GLUCOSE BLD-MCNC: 62 MG/DL (ref 70–108)
GLUCOSE BLD-MCNC: 69 MG/DL (ref 70–108)
GLUCOSE BLD-MCNC: 73 MG/DL (ref 70–108)
GLUCOSE BLD-MCNC: 77 MG/DL (ref 70–108)
GLUCOSE BLD-MCNC: 80 MG/DL (ref 70–108)
GLUCOSE BLD-MCNC: 81 MG/DL (ref 70–108)
GLUCOSE BLD-MCNC: 84 MG/DL (ref 70–108)
GLUCOSE BLD-MCNC: 84 MG/DL (ref 70–108)
GLUCOSE BLD-MCNC: 86 MG/DL (ref 70–108)
GLUCOSE BLD-MCNC: 87 MG/DL (ref 70–108)
GLUCOSE BLD-MCNC: 90 MG/DL (ref 70–108)
GLUCOSE BLD-MCNC: 91 MG/DL (ref 70–108)
GLUCOSE BLD-MCNC: 94 MG/DL (ref 70–108)
GLUCOSE BLD-MCNC: 96 MG/DL (ref 70–108)
GLUCOSE URINE: NEGATIVE MG/DL
GLUCOSE, WHOLE BLOOD: 215 MG/DL (ref 70–108)
GLUCOSE, WHOLE BLOOD: 222 MG/DL (ref 70–108)
GLUCOSE, WHOLE BLOOD: 242 MG/DL (ref 70–108)
GRAM STAIN RESULT: NORMAL
HAEMOPHILUS INFLUENZAE BY PCR: NOT DETECTED
HCO3: 12 MMOL/L (ref 23–28)
HCT VFR BLD CALC: 19.3 % (ref 37–47)
HCT VFR BLD CALC: 20.3 % (ref 37–47)
HCT VFR BLD CALC: 22 % (ref 37–47)
HCT VFR BLD CALC: 22.7 % (ref 37–47)
HCT VFR BLD CALC: 23.3 % (ref 37–47)
HCT VFR BLD CALC: 24.2 % (ref 37–47)
HCT VFR BLD CALC: 24.5 % (ref 37–47)
HCT VFR BLD CALC: 24.6 % (ref 37–47)
HCT VFR BLD CALC: 24.7 % (ref 37–47)
HCT VFR BLD CALC: 25.9 % (ref 37–47)
HCT VFR BLD CALC: 26.9 % (ref 37–47)
HCT VFR BLD CALC: 27.3 % (ref 37–47)
HCT VFR BLD CALC: 27.8 % (ref 37–47)
HCT VFR BLD CALC: 29.7 % (ref 37–47)
HCT VFR BLD CALC: 29.7 % (ref 37–47)
HCT VFR BLD CALC: 29.9 % (ref 37–47)
HCT VFR BLD CALC: 30.8 % (ref 37–47)
HCT VFR BLD CALC: 30.8 % (ref 37–47)
HCT VFR BLD CALC: 31.4 % (ref 37–47)
HCT VFR BLD CALC: 31.9 % (ref 37–47)
HCT VFR BLD CALC: 32.2 % (ref 37–47)
HEMOGLOBIN: 10.2 GM/DL (ref 12–16)
HEMOGLOBIN: 10.5 GM/DL (ref 12–16)
HEMOGLOBIN: 10.6 GM/DL (ref 12–16)
HEMOGLOBIN: 6 GM/DL (ref 12–16)
HEMOGLOBIN: 6.6 GM/DL (ref 12–16)
HEMOGLOBIN: 6.9 GM/DL (ref 12–16)
HEMOGLOBIN: 7.1 GM/DL (ref 12–16)
HEMOGLOBIN: 7.4 GM/DL (ref 12–16)
HEMOGLOBIN: 7.6 GM/DL (ref 12–16)
HEMOGLOBIN: 7.6 GM/DL (ref 12–16)
HEMOGLOBIN: 7.7 GM/DL (ref 12–16)
HEMOGLOBIN: 7.9 GM/DL (ref 12–16)
HEMOGLOBIN: 8.2 GM/DL (ref 12–16)
HEMOGLOBIN: 8.8 GM/DL (ref 12–16)
HEMOGLOBIN: 9.1 GM/DL (ref 12–16)
HEMOGLOBIN: 9.5 GM/DL (ref 12–16)
HEMOGLOBIN: 9.8 GM/DL (ref 12–16)
HEMOGLOBIN: 9.8 GM/DL (ref 12–16)
HEMOGLOBIN: 9.9 GM/DL (ref 12–16)
IFIO2: 80
IMMATURE GRANS (ABS): 0.29 THOU/MM3 (ref 0–0.07)
IMMATURE GRANS (ABS): 0.37 THOU/MM3 (ref 0–0.07)
IMMATURE GRANS (ABS): 0.37 THOU/MM3 (ref 0–0.07)
IMMATURE GRANS (ABS): 0.4 THOU/MM3 (ref 0–0.07)
IMMATURE GRANS (ABS): 0.42 THOU/MM3 (ref 0–0.07)
IMMATURE GRANS (ABS): 0.42 THOU/MM3 (ref 0–0.07)
IMMATURE GRANULOCYTES: 1.6 %
IMMATURE GRANULOCYTES: 2.8 %
IMMATURE GRANULOCYTES: 3.3 %
IMMATURE GRANULOCYTES: 3.4 %
IMMATURE GRANULOCYTES: 3.5 %
IMMATURE GRANULOCYTES: 3.6 %
INFLUENZA A BY PCR: NOT DETECTED
INFLUENZA B BY PCR: NOT DETECTED
INR BLD: 1.93 (ref 0.85–1.13)
INR BLD: 3.11 (ref 0.85–1.13)
KETONES, URINE: NEGATIVE
KLEBSIELLA AEROGENES BY PCR: NOT DETECTED
KLEBSIELLA OXYTOCA BY PCR: NOT DETECTED
KLEBSIELLA PNEUMONIAE GROUP BY PCR: NOT DETECTED
LACTIC ACID, SEPSIS: 14.2 MMOL/L (ref 0.5–1.9)
LACTIC ACID, SEPSIS: 17.6 MMOL/L (ref 0.5–1.9)
LACTIC ACID: 14.1 MMOL/L (ref 0.5–2.2)
LACTIC ACID: 2.1 MMOL/L (ref 0.5–2.2)
LACTIC ACID: 4.2 MMOL/L (ref 0.5–2.2)
LACTIC ACID: 9.6 MMOL/L (ref 0.5–2.2)
LD: 851 U/L (ref 100–190)
LEGIONELLA PNEUMOPHILIA BY PCR: NOT DETECTED
LEUKOCYTE ESTERASE, URINE: ABNORMAL
LYMPHOCYTES # BLD: 10.2 %
LYMPHOCYTES # BLD: 12.7 %
LYMPHOCYTES # BLD: 17.7 %
LYMPHOCYTES # BLD: 18 %
LYMPHOCYTES # BLD: 19.4 %
LYMPHOCYTES # BLD: 19.6 %
LYMPHOCYTES ABSOLUTE: 1.7 THOU/MM3 (ref 1–4.8)
LYMPHOCYTES ABSOLUTE: 1.9 THOU/MM3 (ref 1–4.8)
LYMPHOCYTES ABSOLUTE: 2 THOU/MM3 (ref 1–4.8)
LYMPHOCYTES ABSOLUTE: 2.1 THOU/MM3 (ref 1–4.8)
LYMPHOCYTES ABSOLUTE: 2.3 THOU/MM3 (ref 1–4.8)
LYMPHOCYTES ABSOLUTE: 2.3 THOU/MM3 (ref 1–4.8)
MAGNESIUM: 1.5 MG/DL (ref 1.6–2.4)
MAGNESIUM: 1.6 MG/DL (ref 1.6–2.4)
MAGNESIUM: 1.7 MG/DL (ref 1.6–2.4)
MAGNESIUM: 1.7 MG/DL (ref 1.6–2.4)
MAGNESIUM: 1.8 MG/DL (ref 1.6–2.4)
MAGNESIUM: 1.9 MG/DL (ref 1.6–2.4)
MAGNESIUM: 1.9 MG/DL (ref 1.6–2.4)
MAGNESIUM: 2 MG/DL (ref 1.6–2.4)
MAGNESIUM: 2 MG/DL (ref 1.6–2.4)
MAGNESIUM: 2.1 MG/DL (ref 1.6–2.4)
MAGNESIUM: 2.1 MG/DL (ref 1.6–2.4)
MAGNESIUM: 2.4 MG/DL (ref 1.6–2.4)
MAGNESIUM: 2.4 MG/DL (ref 1.6–2.4)
MCH RBC QN AUTO: 31.1 PG (ref 26–33)
MCH RBC QN AUTO: 31.1 PG (ref 26–33)
MCH RBC QN AUTO: 32.1 PG (ref 26–33)
MCH RBC QN AUTO: 32.2 PG (ref 26–33)
MCH RBC QN AUTO: 32.3 PG (ref 26–33)
MCH RBC QN AUTO: 32.4 PG (ref 26–33)
MCH RBC QN AUTO: 32.5 PG (ref 26–33)
MCH RBC QN AUTO: 32.5 PG (ref 26–33)
MCH RBC QN AUTO: 33 PG (ref 26–33)
MCH RBC QN AUTO: 33 PG (ref 26–33)
MCH RBC QN AUTO: 33.2 PG (ref 26–33)
MCH RBC QN AUTO: 33.2 PG (ref 26–33)
MCH RBC QN AUTO: 33.4 PG (ref 26–33)
MCH RBC QN AUTO: 33.4 PG (ref 26–33)
MCH RBC QN AUTO: 33.5 PG (ref 26–33)
MCH RBC QN AUTO: 33.6 PG (ref 26–33)
MCH RBC QN AUTO: 33.6 PG (ref 26–33)
MCH RBC QN AUTO: 33.9 PG (ref 26–33)
MCH RBC QN AUTO: 34.6 PG (ref 26–33)
MCHC RBC AUTO-ENTMCNC: 29 GM/DL (ref 32.2–35.5)
MCHC RBC AUTO-ENTMCNC: 29.3 GM/DL (ref 32.2–35.5)
MCHC RBC AUTO-ENTMCNC: 29.4 GM/DL (ref 32.2–35.5)
MCHC RBC AUTO-ENTMCNC: 29.5 GM/DL (ref 32.2–35.5)
MCHC RBC AUTO-ENTMCNC: 30.1 GM/DL (ref 32.2–35.5)
MCHC RBC AUTO-ENTMCNC: 30.4 GM/DL (ref 32.2–35.5)
MCHC RBC AUTO-ENTMCNC: 31.1 GM/DL (ref 32.2–35.5)
MCHC RBC AUTO-ENTMCNC: 31.7 GM/DL (ref 32.2–35.5)
MCHC RBC AUTO-ENTMCNC: 31.8 GM/DL (ref 32.2–35.5)
MCHC RBC AUTO-ENTMCNC: 31.8 GM/DL (ref 32.2–35.5)
MCHC RBC AUTO-ENTMCNC: 32 GM/DL (ref 32.2–35.5)
MCHC RBC AUTO-ENTMCNC: 32 GM/DL (ref 32.2–35.5)
MCHC RBC AUTO-ENTMCNC: 32.2 GM/DL (ref 32.2–35.5)
MCHC RBC AUTO-ENTMCNC: 32.3 GM/DL (ref 32.2–35.5)
MCHC RBC AUTO-ENTMCNC: 32.5 GM/DL (ref 32.2–35.5)
MCHC RBC AUTO-ENTMCNC: 32.6 GM/DL (ref 32.2–35.5)
MCHC RBC AUTO-ENTMCNC: 32.8 GM/DL (ref 32.2–35.5)
MCHC RBC AUTO-ENTMCNC: 32.9 GM/DL (ref 32.2–35.5)
MCHC RBC AUTO-ENTMCNC: 35.7 GM/DL (ref 32.2–35.5)
MCV RBC AUTO: 100 FL (ref 81–99)
MCV RBC AUTO: 100.8 FL (ref 81–99)
MCV RBC AUTO: 101.9 FL (ref 81–99)
MCV RBC AUTO: 104.2 FL (ref 81–99)
MCV RBC AUTO: 104.9 FL (ref 81–99)
MCV RBC AUTO: 105.1 FL (ref 81–99)
MCV RBC AUTO: 105.6 FL (ref 81–99)
MCV RBC AUTO: 105.7 FL (ref 81–99)
MCV RBC AUTO: 106 FL (ref 81–99)
MCV RBC AUTO: 106.1 FL (ref 81–99)
MCV RBC AUTO: 107.9 FL (ref 81–99)
MCV RBC AUTO: 110.7 FL (ref 81–99)
MCV RBC AUTO: 112.6 FL (ref 81–99)
MCV RBC AUTO: 113.1 FL (ref 81–99)
MCV RBC AUTO: 113.8 FL (ref 81–99)
MCV RBC AUTO: 94.9 FL (ref 81–99)
MCV RBC AUTO: 95.5 FL (ref 81–99)
MCV RBC AUTO: 97.2 FL (ref 81–99)
MCV RBC AUTO: 99.5 FL (ref 81–99)
METAPNEUMOVIRUS BY PCR: NOT DETECTED
MISCELLANEOUS 2: ABNORMAL
MISCELLANEOUS LAB TEST RESULT: ABNORMAL
MODE: ABNORMAL
MONOCYTES # BLD: 5 %
MONOCYTES # BLD: 6.4 %
MONOCYTES # BLD: 6.8 %
MONOCYTES # BLD: 7.2 %
MONOCYTES # BLD: 7.7 %
MONOCYTES # BLD: 8 %
MONOCYTES ABSOLUTE: 0.8 THOU/MM3 (ref 0.4–1.3)
MONOCYTES ABSOLUTE: 0.8 THOU/MM3 (ref 0.4–1.3)
MONOCYTES ABSOLUTE: 0.9 THOU/MM3 (ref 0.4–1.3)
MONONUCLEAR CELLS BODY FLUID: 17.7 %
MORAXELLA CATARRHALIS BY PCR: NOT DETECTED
MRSA SCREEN RT-PCR: POSITIVE
MRSA SCREEN RT-PCR: POSITIVE
MRSA SCREEN: NORMAL
MRSA SCREEN: NORMAL
MUCUS: ABNORMAL
MYCOPLASMA PNEUMONIAE BY PCR: NOT DETECTED
NITRITE, URINE: NEGATIVE
NUCLEATED RED BLOOD CELLS: 0 /100 WBC
O2 SATURATION: 100 %
PARAINFLUENZA VIRUS BY PCR: NOT DETECTED
PATHOLOGIST REVIEW: ABNORMAL
PATHOLOGIST REVIEW: ABNORMAL
PATHOLOGIST REVIEW: NORMAL
PCO2 (TEMP CORRECTED): 24 (ref 35–45)
PCO2: 23 MMHG (ref 35–45)
PH (TEMPERATURE CORRECTED): 7.3 (ref 7.35–7.45)
PH BLOOD GAS: 7.31 (ref 7.35–7.45)
PH UA: 6.5 (ref 5–9)
PHOSPHORUS: 1.6 MG/DL (ref 2.4–4.7)
PHOSPHORUS: 1.7 MG/DL (ref 2.4–4.7)
PHOSPHORUS: 1.9 MG/DL (ref 2.4–4.7)
PHOSPHORUS: 2.2 MG/DL (ref 2.4–4.7)
PHOSPHORUS: 2.2 MG/DL (ref 2.4–4.7)
PHOSPHORUS: 2.3 MG/DL (ref 2.4–4.7)
PHOSPHORUS: 3.6 MG/DL (ref 2.4–4.7)
PHOSPHORUS: 3.6 MG/DL (ref 2.4–4.7)
PHOSPHORUS: 5.3 MG/DL (ref 2.4–4.7)
PLATELET # BLD: 106 THOU/MM3 (ref 130–400)
PLATELET # BLD: 159 THOU/MM3 (ref 130–400)
PLATELET # BLD: 184 THOU/MM3 (ref 130–400)
PLATELET # BLD: 186 THOU/MM3 (ref 130–400)
PLATELET # BLD: 201 THOU/MM3 (ref 130–400)
PLATELET # BLD: 221 THOU/MM3 (ref 130–400)
PLATELET # BLD: 222 THOU/MM3 (ref 130–400)
PLATELET # BLD: 224 THOU/MM3 (ref 130–400)
PLATELET # BLD: 26 THOU/MM3 (ref 130–400)
PLATELET # BLD: 31 THOU/MM3 (ref 130–400)
PLATELET # BLD: 31 THOU/MM3 (ref 130–400)
PLATELET # BLD: 32 THOU/MM3 (ref 130–400)
PLATELET # BLD: 33 THOU/MM3 (ref 130–400)
PLATELET # BLD: 41 THOU/MM3 (ref 130–400)
PLATELET # BLD: 42 THOU/MM3 (ref 130–400)
PLATELET # BLD: 58 THOU/MM3 (ref 130–400)
PLATELET # BLD: 91 THOU/MM3 (ref 130–400)
PLATELET # BLD: 95 THOU/MM3 (ref 130–400)
PLATELET # BLD: 98 THOU/MM3 (ref 130–400)
PLATELET ESTIMATE: ADEQUATE
PLATELET ESTIMATE: ADEQUATE
PMV BLD AUTO: 10.1 FL (ref 9.4–12.4)
PMV BLD AUTO: 10.1 FL (ref 9.4–12.4)
PMV BLD AUTO: 10.3 FL (ref 9.4–12.4)
PMV BLD AUTO: 10.3 FL (ref 9.4–12.4)
PMV BLD AUTO: 10.7 FL (ref 9.4–12.4)
PMV BLD AUTO: 10.7 FL (ref 9.4–12.4)
PMV BLD AUTO: 10.9 FL (ref 9.4–12.4)
PMV BLD AUTO: 11.1 FL (ref 9.4–12.4)
PMV BLD AUTO: 11.1 FL (ref 9.4–12.4)
PMV BLD AUTO: 11.2 FL (ref 9.4–12.4)
PMV BLD AUTO: 11.3 FL (ref 9.4–12.4)
PMV BLD AUTO: 11.5 FL (ref 9.4–12.4)
PMV BLD AUTO: 11.7 FL (ref 9.4–12.4)
PMV BLD AUTO: 11.8 FL (ref 9.4–12.4)
PMV BLD AUTO: 12.1 FL (ref 9.4–12.4)
PO2 (TEMP CORRECTED): 263 (ref 71–104)
PO2: 258 MMHG (ref 71–104)
POLYMORPHONUCLEAR CELLS BODY FLUID: 82.3 %
POTASSIUM REFLEX MAGNESIUM: 2.9 MEQ/L (ref 3.5–5.2)
POTASSIUM REFLEX MAGNESIUM: 3.5 MEQ/L (ref 3.5–5.2)
POTASSIUM REFLEX MAGNESIUM: 3.6 MEQ/L (ref 3.5–5.2)
POTASSIUM REFLEX MAGNESIUM: 3.6 MEQ/L (ref 3.5–5.2)
POTASSIUM REFLEX MAGNESIUM: 3.8 MEQ/L (ref 3.5–5.2)
POTASSIUM REFLEX MAGNESIUM: 3.9 MEQ/L (ref 3.5–5.2)
POTASSIUM REFLEX MAGNESIUM: 4 MEQ/L (ref 3.5–5.2)
POTASSIUM REFLEX MAGNESIUM: 4 MEQ/L (ref 3.5–5.2)
POTASSIUM REFLEX MAGNESIUM: 4.1 MEQ/L (ref 3.5–5.2)
POTASSIUM REFLEX MAGNESIUM: 4.1 MEQ/L (ref 3.5–5.2)
POTASSIUM REFLEX MAGNESIUM: 4.3 MEQ/L (ref 3.5–5.2)
POTASSIUM REFLEX MAGNESIUM: 4.5 MEQ/L (ref 3.5–5.2)
POTASSIUM REFLEX MAGNESIUM: 4.7 MEQ/L (ref 3.5–5.2)
POTASSIUM SERPL-SCNC: 3.3 MEQ/L (ref 3.5–5.2)
POTASSIUM SERPL-SCNC: 3.5 MEQ/L (ref 3.5–5.2)
POTASSIUM SERPL-SCNC: 3.6 MEQ/L (ref 3.5–5.2)
POTASSIUM SERPL-SCNC: 3.7 MEQ/L (ref 3.5–5.2)
POTASSIUM SERPL-SCNC: 3.8 MEQ/L (ref 3.5–5.2)
POTASSIUM SERPL-SCNC: 3.9 MEQ/L (ref 3.5–5.2)
PRO-BNP: ABNORMAL PG/ML (ref 0–900)
PROCALCITONIN: 3.84 NG/ML (ref 0.01–0.09)
PROTEIN UA: >= 300
PROTEUS SPECIES BY PCR: NOT DETECTED
PSEUDOMONAS AERUGINOSA BY PCR: NOT DETECTED
RBC # BLD: 1.82 MILL/MM3 (ref 4.2–5.4)
RBC # BLD: 2.04 MILL/MM3 (ref 4.2–5.4)
RBC # BLD: 2.14 MILL/MM3 (ref 4.2–5.4)
RBC # BLD: 2.2 MILL/MM3 (ref 4.2–5.4)
RBC # BLD: 2.28 MILL/MM3 (ref 4.2–5.4)
RBC # BLD: 2.29 MILL/MM3 (ref 4.2–5.4)
RBC # BLD: 2.4 MILL/MM3 (ref 4.2–5.4)
RBC # BLD: 2.43 MILL/MM3 (ref 4.2–5.4)
RBC # BLD: 2.44 MILL/MM3 (ref 4.2–5.4)
RBC # BLD: 2.47 MILL/MM3 (ref 4.2–5.4)
RBC # BLD: 2.54 MILL/MM3 (ref 4.2–5.4)
RBC # BLD: 2.62 MILL/MM3 (ref 4.2–5.4)
RBC # BLD: 2.76 MILL/MM3 (ref 4.2–5.4)
RBC # BLD: 2.83 MILL/MM3 (ref 4.2–5.4)
RBC # BLD: 2.83 MILL/MM3 (ref 4.2–5.4)
RBC # BLD: 2.93 MILL/MM3 (ref 4.2–5.4)
RBC # BLD: 3.05 MILL/MM3 (ref 4.2–5.4)
RBC # BLD: 3.13 MILL/MM3 (ref 4.2–5.4)
RBC # BLD: 3.13 MILL/MM3 (ref 4.2–5.4)
RBC URINE: ABNORMAL /HPF
REASON FOR REJECTION: NORMAL
REJECTED TEST: NORMAL
RENAL EPITHELIAL, UA: ABNORMAL
RESISTANT GENE CTX-M BY PCR: NORMAL
RESISTANT GENE IMP BY PCR: NORMAL
RESISTANT GENE KPC BY PCR: NORMAL
RESISTANT GENE MECA/C & MREJ BY PCR: NORMAL
RESISTANT GENE NDM BY PCR: NORMAL
RESISTANT GENE OXA-48-LIKE BY PCR: NORMAL
RESISTANT GENE VIM BY PCR: NORMAL
RESPIRATORY CULTURE: NORMAL
RESPIRATORY CULTURE: NORMAL
RESPIRATORY SYNCYTIAL VIRUS BY PCR: NOT DETECTED
REVIEWED BY: NORMAL
RH FACTOR: NORMAL
RHINOVIRUS ENTEROVIRUS PCR: NOT DETECTED
SCAN OF BLOOD SMEAR: NORMAL
SEG NEUTROPHILS: 65.2 %
SEG NEUTROPHILS: 65.5 %
SEG NEUTROPHILS: 67.2 %
SEG NEUTROPHILS: 68.5 %
SEG NEUTROPHILS: 76 %
SEG NEUTROPHILS: 82.2 %
SEGMENTED NEUTROPHILS ABSOLUTE COUNT: 10.1 THOU/MM3 (ref 1.8–7.7)
SEGMENTED NEUTROPHILS ABSOLUTE COUNT: 15.1 THOU/MM3 (ref 1.8–7.7)
SEGMENTED NEUTROPHILS ABSOLUTE COUNT: 7.7 THOU/MM3 (ref 1.8–7.7)
SEGMENTED NEUTROPHILS ABSOLUTE COUNT: 7.7 THOU/MM3 (ref 1.8–7.7)
SEGMENTED NEUTROPHILS ABSOLUTE COUNT: 7.9 THOU/MM3 (ref 1.8–7.7)
SEGMENTED NEUTROPHILS ABSOLUTE COUNT: 7.9 THOU/MM3 (ref 1.8–7.7)
SERRATIA MARCESCENS BY PCR: NOT DETECTED
SET PEEP: 6 MMHG
SET PRESS SUPP: 16 CMH2O
SET RESPIRATORY RATE: 16 BPM
SMEAR REVIEW: NORMAL
SODIUM BLD-SCNC: 129 MEQ/L (ref 135–145)
SODIUM BLD-SCNC: 131 MEQ/L (ref 135–145)
SODIUM BLD-SCNC: 132 MEQ/L (ref 135–145)
SODIUM BLD-SCNC: 133 MEQ/L (ref 135–145)
SODIUM BLD-SCNC: 133 MEQ/L (ref 135–145)
SODIUM BLD-SCNC: 134 MEQ/L (ref 135–145)
SODIUM BLD-SCNC: 135 MEQ/L (ref 135–145)
SODIUM BLD-SCNC: 136 MEQ/L (ref 135–145)
SODIUM BLD-SCNC: 137 MEQ/L (ref 135–145)
SODIUM BLD-SCNC: 137 MEQ/L (ref 135–145)
SODIUM BLD-SCNC: 138 MEQ/L (ref 135–145)
SODIUM BLD-SCNC: 138 MEQ/L (ref 135–145)
SODIUM BLD-SCNC: 139 MEQ/L (ref 135–145)
SODIUM BLD-SCNC: 139 MEQ/L (ref 135–145)
SODIUM BLD-SCNC: 141 MEQ/L (ref 135–145)
SOURCE, BLOOD GAS: ABNORMAL
SOURCE: NORMAL
SPECIFIC GRAVITY, URINE: 1.02 (ref 1–1.03)
SPECIMEN ACCEPTABILITY: NORMAL
SPECIMEN: NORMAL
STAPH AUREUS BY PCR: NOT DETECTED
STREP AGALACTIAE BY PCR: NOT DETECTED
STREP PNEUMONIAE BY PCR: NOT DETECTED
STREP PYOGENES BY PCR: NOT DETECTED
TEMPERATURE: 37.9
TOTAL CK: 853 U/L (ref 30–135)
TOTAL NUCLEATED CELLS BODY FLUID: 18 /CUMM (ref 0–500)
TOTAL PROTEIN: 3.1 G/DL (ref 6.1–8)
TOTAL PROTEIN: 3.2 G/DL (ref 6.1–8)
TOTAL PROTEIN: 3.4 G/DL (ref 6.1–8)
TOTAL PROTEIN: 3.5 G/DL (ref 6.1–8)
TOTAL PROTEIN: 3.5 G/DL (ref 6.1–8)
TOTAL PROTEIN: 3.6 G/DL (ref 6.1–8)
TOTAL PROTEIN: 3.8 G/DL (ref 6.1–8)
TOTAL PROTEIN: 3.8 G/DL (ref 6.1–8)
TOTAL PROTEIN: 4 G/DL (ref 6.1–8)
TOTAL PROTEIN: 4.4 G/DL (ref 6.1–8)
TOTAL PROTEIN: 4.4 G/DL (ref 6.1–8)
TOTAL PROTEIN: 4.5 G/DL (ref 6.1–8)
TOTAL PROTEIN: 4.6 G/DL (ref 6.1–8)
TOTAL VOLUME RECEIVED BODY FLUID: 50 ML
TOXIC GRANULATION: PRESENT
TOXIC GRANULATION: PRESENT
UROBILINOGEN, URINE: 0.2 EU/DL (ref 0–1)
VANCOMYCIN RESISTANT ENTEROCOCCUS: NEGATIVE
VANCOMYCIN RESISTANT ENTEROCOCCUS: POSITIVE
WBC # BLD: 11.2 THOU/MM3 (ref 4.8–10.8)
WBC # BLD: 11.7 THOU/MM3 (ref 4.8–10.8)
WBC # BLD: 11.8 THOU/MM3 (ref 4.8–10.8)
WBC # BLD: 12.1 THOU/MM3 (ref 4.8–10.8)
WBC # BLD: 13.3 THOU/MM3 (ref 4.8–10.8)
WBC # BLD: 18.4 THOU/MM3 (ref 4.8–10.8)
WBC # BLD: 18.7 THOU/MM3 (ref 4.8–10.8)
WBC # BLD: 21.3 THOU/MM3 (ref 4.8–10.8)
WBC # BLD: 21.9 THOU/MM3 (ref 4.8–10.8)
WBC # BLD: 22.3 THOU/MM3 (ref 4.8–10.8)
WBC # BLD: 22.8 THOU/MM3 (ref 4.8–10.8)
WBC # BLD: 25 THOU/MM3 (ref 4.8–10.8)
WBC # BLD: 25.7 THOU/MM3 (ref 4.8–10.8)
WBC # BLD: 26.2 THOU/MM3 (ref 4.8–10.8)
WBC # BLD: 29.4 THOU/MM3 (ref 4.8–10.8)
WBC # BLD: 32.1 THOU/MM3 (ref 4.8–10.8)
WBC # BLD: 32.8 THOU/MM3 (ref 4.8–10.8)
WBC # BLD: 33.4 THOU/MM3 (ref 4.8–10.8)
WBC # BLD: 35.3 THOU/MM3 (ref 4.8–10.8)
WBC UA: > 200 /HPF
YEAST: ABNORMAL

## 2021-01-01 PROCEDURE — 85362 FIBRIN DEGRADATION PRODUCTS: CPT

## 2021-01-01 PROCEDURE — 90945 DIALYSIS ONE EVALUATION: CPT | Performed by: INTERNAL MEDICINE

## 2021-01-01 PROCEDURE — 82948 REAGENT STRIP/BLOOD GLUCOSE: CPT

## 2021-01-01 PROCEDURE — 97129 THER IVNTJ 1ST 15 MIN: CPT

## 2021-01-01 PROCEDURE — 6360000002 HC RX W HCPCS: Performed by: INTERNAL MEDICINE

## 2021-01-01 PROCEDURE — 99232 SBSQ HOSP IP/OBS MODERATE 35: CPT | Performed by: PSYCHIATRY & NEUROLOGY

## 2021-01-01 PROCEDURE — 82947 ASSAY GLUCOSE BLOOD QUANT: CPT

## 2021-01-01 PROCEDURE — 2580000003 HC RX 258: Performed by: INTERNAL MEDICINE

## 2021-01-01 PROCEDURE — 2580000003 HC RX 258: Performed by: NURSE PRACTITIONER

## 2021-01-01 PROCEDURE — 87798 DETECT AGENT NOS DNA AMP: CPT

## 2021-01-01 PROCEDURE — 6360000002 HC RX W HCPCS: Performed by: NURSE PRACTITIONER

## 2021-01-01 PROCEDURE — 36592 COLLECT BLOOD FROM PICC: CPT

## 2021-01-01 PROCEDURE — 02HV33Z INSERTION OF INFUSION DEVICE INTO SUPERIOR VENA CAVA, PERCUTANEOUS APPROACH: ICD-10-PCS | Performed by: INTERNAL MEDICINE

## 2021-01-01 PROCEDURE — 6370000000 HC RX 637 (ALT 250 FOR IP): Performed by: STUDENT IN AN ORGANIZED HEALTH CARE EDUCATION/TRAINING PROGRAM

## 2021-01-01 PROCEDURE — 85025 COMPLETE CBC W/AUTO DIFF WBC: CPT

## 2021-01-01 PROCEDURE — 2500000003 HC RX 250 WO HCPCS: Performed by: NURSE PRACTITIONER

## 2021-01-01 PROCEDURE — 71045 X-RAY EXAM CHEST 1 VIEW: CPT

## 2021-01-01 PROCEDURE — 94003 VENT MGMT INPAT SUBQ DAY: CPT

## 2021-01-01 PROCEDURE — 36430 TRANSFUSION BLD/BLD COMPNT: CPT

## 2021-01-01 PROCEDURE — 37799 UNLISTED PX VASCULAR SURGERY: CPT

## 2021-01-01 PROCEDURE — P9047 ALBUMIN (HUMAN), 25%, 50ML: HCPCS | Performed by: PHYSICIAN ASSISTANT

## 2021-01-01 PROCEDURE — 87106 FUNGI IDENTIFICATION YEAST: CPT

## 2021-01-01 PROCEDURE — 6370000000 HC RX 637 (ALT 250 FOR IP): Performed by: NURSE PRACTITIONER

## 2021-01-01 PROCEDURE — 85027 COMPLETE CBC AUTOMATED: CPT

## 2021-01-01 PROCEDURE — 87631 RESP VIRUS 3-5 TARGETS: CPT

## 2021-01-01 PROCEDURE — P9047 ALBUMIN (HUMAN), 25%, 50ML: HCPCS | Performed by: STUDENT IN AN ORGANIZED HEALTH CARE EDUCATION/TRAINING PROGRAM

## 2021-01-01 PROCEDURE — 2500000003 HC RX 250 WO HCPCS: Performed by: INTERNAL MEDICINE

## 2021-01-01 PROCEDURE — 94761 N-INVAS EAR/PLS OXIMETRY MLT: CPT

## 2021-01-01 PROCEDURE — 90945 DIALYSIS ONE EVALUATION: CPT

## 2021-01-01 PROCEDURE — 99232 SBSQ HOSP IP/OBS MODERATE 35: CPT | Performed by: INTERNAL MEDICINE

## 2021-01-01 PROCEDURE — 82330 ASSAY OF CALCIUM: CPT

## 2021-01-01 PROCEDURE — 84100 ASSAY OF PHOSPHORUS: CPT

## 2021-01-01 PROCEDURE — 6360000002 HC RX W HCPCS: Performed by: STUDENT IN AN ORGANIZED HEALTH CARE EDUCATION/TRAINING PROGRAM

## 2021-01-01 PROCEDURE — 83735 ASSAY OF MAGNESIUM: CPT

## 2021-01-01 PROCEDURE — 80053 COMPREHEN METABOLIC PANEL: CPT

## 2021-01-01 PROCEDURE — 82140 ASSAY OF AMMONIA: CPT

## 2021-01-01 PROCEDURE — 80048 BASIC METABOLIC PNL TOTAL CA: CPT

## 2021-01-01 PROCEDURE — 94760 N-INVAS EAR/PLS OXIMETRY 1: CPT

## 2021-01-01 PROCEDURE — 87541 LEGION PNEUMO DNA AMP PROB: CPT

## 2021-01-01 PROCEDURE — 6370000000 HC RX 637 (ALT 250 FOR IP): Performed by: INTERNAL MEDICINE

## 2021-01-01 PROCEDURE — 83605 ASSAY OF LACTIC ACID: CPT

## 2021-01-01 PROCEDURE — 36415 COLL VENOUS BLD VENIPUNCTURE: CPT

## 2021-01-01 PROCEDURE — 2500000003 HC RX 250 WO HCPCS

## 2021-01-01 PROCEDURE — 87081 CULTURE SCREEN ONLY: CPT

## 2021-01-01 PROCEDURE — 74176 CT ABD & PELVIS W/O CONTRAST: CPT

## 2021-01-01 PROCEDURE — 84145 PROCALCITONIN (PCT): CPT

## 2021-01-01 PROCEDURE — 36556 INSERT NON-TUNNEL CV CATH: CPT

## 2021-01-01 PROCEDURE — 1200000003 HC TELEMETRY R&B

## 2021-01-01 PROCEDURE — 97166 OT EVAL MOD COMPLEX 45 MIN: CPT

## 2021-01-01 PROCEDURE — 99291 CRITICAL CARE FIRST HOUR: CPT | Performed by: INTERNAL MEDICINE

## 2021-01-01 PROCEDURE — 97530 THERAPEUTIC ACTIVITIES: CPT

## 2021-01-01 PROCEDURE — 85730 THROMBOPLASTIN TIME PARTIAL: CPT

## 2021-01-01 PROCEDURE — 0BH17EZ INSERTION OF ENDOTRACHEAL AIRWAY INTO TRACHEA, VIA NATURAL OR ARTIFICIAL OPENING: ICD-10-PCS | Performed by: STUDENT IN AN ORGANIZED HEALTH CARE EDUCATION/TRAINING PROGRAM

## 2021-01-01 PROCEDURE — 87040 BLOOD CULTURE FOR BACTERIA: CPT

## 2021-01-01 PROCEDURE — 99223 1ST HOSP IP/OBS HIGH 75: CPT | Performed by: THORACIC SURGERY (CARDIOTHORACIC VASCULAR SURGERY)

## 2021-01-01 PROCEDURE — 5A1945Z RESPIRATORY VENTILATION, 24-96 CONSECUTIVE HOURS: ICD-10-PCS | Performed by: STUDENT IN AN ORGANIZED HEALTH CARE EDUCATION/TRAINING PROGRAM

## 2021-01-01 PROCEDURE — 92526 ORAL FUNCTION THERAPY: CPT

## 2021-01-01 PROCEDURE — 71250 CT THORAX DX C-: CPT

## 2021-01-01 PROCEDURE — 93005 ELECTROCARDIOGRAM TRACING: CPT | Performed by: STUDENT IN AN ORGANIZED HEALTH CARE EDUCATION/TRAINING PROGRAM

## 2021-01-01 PROCEDURE — 2000000000 HC ICU R&B

## 2021-01-01 PROCEDURE — 2580000003 HC RX 258: Performed by: PHYSICIAN ASSISTANT

## 2021-01-01 PROCEDURE — 87075 CULTR BACTERIA EXCEPT BLOOD: CPT

## 2021-01-01 PROCEDURE — 85014 HEMATOCRIT: CPT

## 2021-01-01 PROCEDURE — 87086 URINE CULTURE/COLONY COUNT: CPT

## 2021-01-01 PROCEDURE — 89050 BODY FLUID CELL COUNT: CPT

## 2021-01-01 PROCEDURE — 84132 ASSAY OF SERUM POTASSIUM: CPT

## 2021-01-01 PROCEDURE — 2500000003 HC RX 250 WO HCPCS: Performed by: STUDENT IN AN ORGANIZED HEALTH CARE EDUCATION/TRAINING PROGRAM

## 2021-01-01 PROCEDURE — 83615 LACTATE (LD) (LDH) ENZYME: CPT

## 2021-01-01 PROCEDURE — 89220 SPUTUM SPECIMEN COLLECTION: CPT

## 2021-01-01 PROCEDURE — 81001 URINALYSIS AUTO W/SCOPE: CPT

## 2021-01-01 PROCEDURE — 6360000002 HC RX W HCPCS: Performed by: PHYSICIAN ASSISTANT

## 2021-01-01 PROCEDURE — 87500 VANOMYCIN DNA AMP PROBE: CPT

## 2021-01-01 PROCEDURE — 85610 PROTHROMBIN TIME: CPT

## 2021-01-01 PROCEDURE — 99221 1ST HOSP IP/OBS SF/LOW 40: CPT | Performed by: PSYCHIATRY & NEUROLOGY

## 2021-01-01 PROCEDURE — 86901 BLOOD TYPING SEROLOGIC RH(D): CPT

## 2021-01-01 PROCEDURE — 82248 BILIRUBIN DIRECT: CPT

## 2021-01-01 PROCEDURE — 85018 HEMOGLOBIN: CPT

## 2021-01-01 PROCEDURE — 36620 INSERTION CATHETER ARTERY: CPT | Performed by: INTERNAL MEDICINE

## 2021-01-01 PROCEDURE — 99222 1ST HOSP IP/OBS MODERATE 55: CPT | Performed by: INTERNAL MEDICINE

## 2021-01-01 PROCEDURE — 82803 BLOOD GASES ANY COMBINATION: CPT

## 2021-01-01 PROCEDURE — 87641 MR-STAPH DNA AMP PROBE: CPT

## 2021-01-01 PROCEDURE — 99239 HOSP IP/OBS DSCHRG MGMT >30: CPT | Performed by: INTERNAL MEDICINE

## 2021-01-01 PROCEDURE — 93925 LOWER EXTREMITY STUDY: CPT

## 2021-01-01 PROCEDURE — 3E1M39Z IRRIGATION OF PERITONEAL CAVITY USING DIALYSATE, PERCUTANEOUS APPROACH: ICD-10-PCS | Performed by: INTERNAL MEDICINE

## 2021-01-01 PROCEDURE — 99233 SBSQ HOSP IP/OBS HIGH 50: CPT | Performed by: INTERNAL MEDICINE

## 2021-01-01 PROCEDURE — 2580000003 HC RX 258: Performed by: STUDENT IN AN ORGANIZED HEALTH CARE EDUCATION/TRAINING PROGRAM

## 2021-01-01 PROCEDURE — 87147 CULTURE TYPE IMMUNOLOGIC: CPT

## 2021-01-01 PROCEDURE — P9016 RBC LEUKOCYTES REDUCED: HCPCS

## 2021-01-01 PROCEDURE — 83010 ASSAY OF HAPTOGLOBIN QUANT: CPT

## 2021-01-01 PROCEDURE — 94002 VENT MGMT INPAT INIT DAY: CPT

## 2021-01-01 PROCEDURE — 51702 INSERT TEMP BLADDER CATH: CPT

## 2021-01-01 PROCEDURE — 86900 BLOOD TYPING SEROLOGIC ABO: CPT

## 2021-01-01 PROCEDURE — 36556 INSERT NON-TUNNEL CV CATH: CPT | Performed by: INTERNAL MEDICINE

## 2021-01-01 PROCEDURE — 87070 CULTURE OTHR SPECIMN AEROBIC: CPT

## 2021-01-01 PROCEDURE — 99231 SBSQ HOSP IP/OBS SF/LOW 25: CPT | Performed by: THORACIC SURGERY (CARDIOTHORACIC VASCULAR SURGERY)

## 2021-01-01 PROCEDURE — 92610 EVALUATE SWALLOWING FUNCTION: CPT

## 2021-01-01 PROCEDURE — 87486 CHLMYD PNEUM DNA AMP PROBE: CPT

## 2021-01-01 PROCEDURE — XW033N5 INTRODUCTION OF MEROPENEM-VABORBACTAM ANTI-INFECTIVE INTO PERIPHERAL VEIN, PERCUTANEOUS APPROACH, NEW TECHNOLOGY GROUP 5: ICD-10-PCS | Performed by: INTERNAL MEDICINE

## 2021-01-01 PROCEDURE — 70551 MRI BRAIN STEM W/O DYE: CPT

## 2021-01-01 PROCEDURE — 87205 SMEAR GRAM STAIN: CPT

## 2021-01-01 PROCEDURE — 86923 COMPATIBILITY TEST ELECTRIC: CPT

## 2021-01-01 PROCEDURE — 1200000000 HC SEMI PRIVATE

## 2021-01-01 PROCEDURE — 92523 SPEECH SOUND LANG COMPREHEN: CPT

## 2021-01-01 PROCEDURE — P9047 ALBUMIN (HUMAN), 25%, 50ML: HCPCS | Performed by: INTERNAL MEDICINE

## 2021-01-01 PROCEDURE — 36620 INSERTION CATHETER ARTERY: CPT

## 2021-01-01 PROCEDURE — 86850 RBC ANTIBODY SCREEN: CPT

## 2021-01-01 PROCEDURE — 99291 CRITICAL CARE FIRST HOUR: CPT | Performed by: NURSE PRACTITIONER

## 2021-01-01 PROCEDURE — 86022 PLATELET ANTIBODIES: CPT

## 2021-01-01 PROCEDURE — 82550 ASSAY OF CK (CPK): CPT

## 2021-01-01 PROCEDURE — 85732 THROMBOPLASTIN TIME PARTIAL: CPT

## 2021-01-01 PROCEDURE — 85385 FIBRINOGEN ANTIGEN: CPT

## 2021-01-01 PROCEDURE — 87581 M.PNEUMON DNA AMP PROBE: CPT

## 2021-01-01 PROCEDURE — 99223 1ST HOSP IP/OBS HIGH 75: CPT | Performed by: INTERNAL MEDICINE

## 2021-01-01 PROCEDURE — 99223 1ST HOSP IP/OBS HIGH 75: CPT | Performed by: NURSE PRACTITIONER

## 2021-01-01 PROCEDURE — 97535 SELF CARE MNGMENT TRAINING: CPT

## 2021-01-01 PROCEDURE — 83880 ASSAY OF NATRIURETIC PEPTIDE: CPT

## 2021-01-01 RX ORDER — NOREPINEPHRINE BIT/0.9 % NACL 16MG/250ML
2-100 INFUSION BOTTLE (ML) INTRAVENOUS CONTINUOUS
Status: DISCONTINUED | OUTPATIENT
Start: 2021-01-01 | End: 2021-01-01

## 2021-01-01 RX ORDER — ALBUMIN (HUMAN) 12.5 G/50ML
25 SOLUTION INTRAVENOUS EVERY 8 HOURS
Status: DISCONTINUED | OUTPATIENT
Start: 2021-01-01 | End: 2021-01-01

## 2021-01-01 RX ORDER — SODIUM CHLORIDE 9 MG/ML
INJECTION, SOLUTION INTRAVENOUS PRN
Status: DISCONTINUED | OUTPATIENT
Start: 2021-01-01 | End: 2021-01-01

## 2021-01-01 RX ORDER — OMEPRAZOLE 20 MG/1
20 CAPSULE, DELAYED RELEASE ORAL DAILY
COMMUNITY

## 2021-01-01 RX ORDER — PANTOPRAZOLE SODIUM 40 MG/1
40 TABLET, DELAYED RELEASE ORAL
Status: DISCONTINUED | OUTPATIENT
Start: 2021-01-01 | End: 2021-01-01 | Stop reason: HOSPADM

## 2021-01-01 RX ORDER — HEPARIN SODIUM 5000 [USP'U]/ML
5000 INJECTION, SOLUTION INTRAVENOUS; SUBCUTANEOUS EVERY 8 HOURS SCHEDULED
Status: DISCONTINUED | OUTPATIENT
Start: 2021-01-01 | End: 2021-01-01 | Stop reason: HOSPADM

## 2021-01-01 RX ORDER — POTASSIUM CHLORIDE 20 MEQ/1
40 TABLET, EXTENDED RELEASE ORAL PRN
Status: DISCONTINUED | OUTPATIENT
Start: 2021-01-01 | End: 2021-01-01

## 2021-01-01 RX ORDER — CHLORHEXIDINE GLUCONATE 0.12 MG/ML
15 RINSE ORAL 2 TIMES DAILY
Status: DISCONTINUED | OUTPATIENT
Start: 2021-01-01 | End: 2021-01-01

## 2021-01-01 RX ORDER — MORPHINE SULFATE 4 MG/ML
4 INJECTION, SOLUTION INTRAMUSCULAR; INTRAVENOUS
Status: DISCONTINUED | OUTPATIENT
Start: 2021-01-01 | End: 2021-01-01 | Stop reason: HOSPADM

## 2021-01-01 RX ORDER — LEVOTHYROXINE SODIUM 0.03 MG/1
25 TABLET ORAL DAILY
Status: DISCONTINUED | OUTPATIENT
Start: 2021-01-01 | End: 2021-01-01 | Stop reason: HOSPADM

## 2021-01-01 RX ORDER — DEXTROSE MONOHYDRATE 50 MG/ML
100 INJECTION, SOLUTION INTRAVENOUS PRN
Status: DISCONTINUED | OUTPATIENT
Start: 2021-01-01 | End: 2021-01-01 | Stop reason: HOSPADM

## 2021-01-01 RX ORDER — PREGABALIN 50 MG/1
50 CAPSULE ORAL DAILY
Status: DISCONTINUED | OUTPATIENT
Start: 2021-01-01 | End: 2021-01-01 | Stop reason: HOSPADM

## 2021-01-01 RX ORDER — ALBUMIN (HUMAN) 12.5 G/50ML
25 SOLUTION INTRAVENOUS ONCE
Status: COMPLETED | OUTPATIENT
Start: 2021-01-01 | End: 2021-01-01

## 2021-01-01 RX ORDER — MEGESTROL ACETATE 40 MG/ML
400 SUSPENSION ORAL DAILY
Status: DISCONTINUED | OUTPATIENT
Start: 2021-01-01 | End: 2021-01-01 | Stop reason: HOSPADM

## 2021-01-01 RX ORDER — 0.9 % SODIUM CHLORIDE 0.9 %
500 INTRAVENOUS SOLUTION INTRAVENOUS ONCE
Status: COMPLETED | OUTPATIENT
Start: 2021-01-01 | End: 2021-01-01

## 2021-01-01 RX ORDER — SODIUM CHLORIDE 0.9 % (FLUSH) 0.9 %
10 SYRINGE (ML) INJECTION EVERY 12 HOURS SCHEDULED
Status: DISCONTINUED | OUTPATIENT
Start: 2021-01-01 | End: 2021-01-01 | Stop reason: HOSPADM

## 2021-01-01 RX ORDER — MINERAL OIL AND WHITE PETROLATUM 150; 830 MG/G; MG/G
OINTMENT OPHTHALMIC PRN
Status: DISCONTINUED | OUTPATIENT
Start: 2021-01-01 | End: 2021-01-01

## 2021-01-01 RX ORDER — ACETAMINOPHEN 325 MG/1
650 TABLET ORAL EVERY 6 HOURS PRN
Status: DISCONTINUED | OUTPATIENT
Start: 2021-01-01 | End: 2021-01-01 | Stop reason: HOSPADM

## 2021-01-01 RX ORDER — POTASSIUM CHLORIDE 29.8 MG/ML
20 INJECTION INTRAVENOUS PRN
Status: DISCONTINUED | OUTPATIENT
Start: 2021-01-01 | End: 2021-01-01

## 2021-01-01 RX ORDER — SODIUM CHLORIDE 1000 MG
1 TABLET, SOLUBLE MISCELLANEOUS 2 TIMES DAILY
Status: ON HOLD | COMMUNITY
End: 2021-01-01 | Stop reason: SDUPTHER

## 2021-01-01 RX ORDER — NOREPINEPHRINE BIT/0.9 % NACL 16MG/250ML
INFUSION BOTTLE (ML) INTRAVENOUS
Status: COMPLETED
Start: 2021-01-01 | End: 2021-01-01

## 2021-01-01 RX ORDER — POLYETHYLENE GLYCOL 3350 17 G/17G
17 POWDER, FOR SOLUTION ORAL DAILY
Status: DISCONTINUED | OUTPATIENT
Start: 2021-01-01 | End: 2021-01-01 | Stop reason: HOSPADM

## 2021-01-01 RX ORDER — ASCORBIC ACID, THIAMINE MONONITRATE,RIBOFLAVIN, NIACINAMIDE, PYRIDOXINE HYDROCHLORIDE, FOLIC ACID, CYANOCOBALAMIN, BIOTIN, CALCIUM PANTOTHENATE, 100; 1.5; 1.7; 20; 10; 1; 6000; 150000; 5 MG/1; MG/1; MG/1; MG/1; MG/1; MG/1; UG/1; UG/1; MG/1
1 CAPSULE, LIQUID FILLED ORAL DAILY
Status: DISCONTINUED | OUTPATIENT
Start: 2021-01-01 | End: 2021-01-01 | Stop reason: CLARIF

## 2021-01-01 RX ORDER — POTASSIUM CHLORIDE 7.45 MG/ML
10 INJECTION INTRAVENOUS PRN
Status: DISCONTINUED | OUTPATIENT
Start: 2021-01-01 | End: 2021-01-01

## 2021-01-01 RX ORDER — CALCIUM POLYCARBOPHIL 625 MG 625 MG/1
1875 TABLET ORAL
Status: DISCONTINUED | OUTPATIENT
Start: 2021-01-01 | End: 2021-01-01 | Stop reason: HOSPADM

## 2021-01-01 RX ORDER — SODIUM CHLORIDE, SODIUM LACTATE, POTASSIUM CHLORIDE, AND CALCIUM CHLORIDE .6; .31; .03; .02 G/100ML; G/100ML; G/100ML; G/100ML
1000 INJECTION, SOLUTION INTRAVENOUS ONCE
Status: COMPLETED | OUTPATIENT
Start: 2021-01-01 | End: 2021-01-01

## 2021-01-01 RX ORDER — PREGABALIN 50 MG/1
50 CAPSULE ORAL DAILY
Status: ON HOLD | COMMUNITY
End: 2021-01-01 | Stop reason: SDUPTHER

## 2021-01-01 RX ORDER — SODIUM CHLORIDE 0.9 % (FLUSH) 0.9 %
10 SYRINGE (ML) INJECTION PRN
Status: DISCONTINUED | OUTPATIENT
Start: 2021-01-01 | End: 2021-01-01 | Stop reason: HOSPADM

## 2021-01-01 RX ORDER — GABAPENTIN 300 MG/1
300 CAPSULE ORAL DAILY
Status: DISCONTINUED | OUTPATIENT
Start: 2021-01-01 | End: 2021-01-01 | Stop reason: HOSPADM

## 2021-01-01 RX ORDER — PROMETHAZINE HYDROCHLORIDE 25 MG/1
12.5 TABLET ORAL EVERY 6 HOURS PRN
Status: DISCONTINUED | OUTPATIENT
Start: 2021-01-01 | End: 2021-01-01 | Stop reason: HOSPADM

## 2021-01-01 RX ORDER — SODIUM CHLORIDE 0.9 % (FLUSH) 0.9 %
10 SYRINGE (ML) INJECTION EVERY 12 HOURS SCHEDULED
Status: DISCONTINUED | OUTPATIENT
Start: 2021-01-01 | End: 2021-01-01 | Stop reason: SDUPTHER

## 2021-01-01 RX ORDER — PRAVASTATIN SODIUM 40 MG
40 TABLET ORAL NIGHTLY
Status: DISCONTINUED | OUTPATIENT
Start: 2021-01-01 | End: 2021-01-01 | Stop reason: HOSPADM

## 2021-01-01 RX ORDER — MEGESTROL ACETATE 40 MG/ML
400 SUSPENSION ORAL DAILY
COMMUNITY

## 2021-01-01 RX ORDER — POTASSIUM CHLORIDE 29.8 MG/ML
20 INJECTION INTRAVENOUS
Status: COMPLETED | OUTPATIENT
Start: 2021-01-01 | End: 2021-01-01

## 2021-01-01 RX ORDER — SODIUM CHLORIDE, SODIUM LACTATE, CALCIUM CHLORIDE, MAGNESIUM CHLORIDE AND DEXTROSE 1.5; 538; 448; 18.3; 5.08 G/100ML; MG/100ML; MG/100ML; MG/100ML; MG/100ML
2000 INJECTION, SOLUTION INTRAPERITONEAL EVERY 6 HOURS
Status: DISCONTINUED | OUTPATIENT
Start: 2021-01-01 | End: 2021-01-01

## 2021-01-01 RX ORDER — ACETAMINOPHEN 650 MG/1
650 SUPPOSITORY RECTAL EVERY 6 HOURS PRN
Status: DISCONTINUED | OUTPATIENT
Start: 2021-01-01 | End: 2021-01-01 | Stop reason: HOSPADM

## 2021-01-01 RX ORDER — KETAMINE HCL IN NACL, ISO-OSM 100MG/10ML
SYRINGE (ML) INJECTION
Status: COMPLETED
Start: 2021-01-01 | End: 2021-01-01

## 2021-01-01 RX ORDER — SODIUM CHLORIDE 1000 MG
2 TABLET, SOLUBLE MISCELLANEOUS 2 TIMES DAILY
Status: DISCONTINUED | OUTPATIENT
Start: 2021-01-01 | End: 2021-01-01 | Stop reason: HOSPADM

## 2021-01-01 RX ORDER — SODIUM CHLORIDE 9 MG/ML
INJECTION, SOLUTION INTRAVENOUS CONTINUOUS
Status: DISCONTINUED | OUTPATIENT
Start: 2021-01-01 | End: 2021-01-01

## 2021-01-01 RX ORDER — LORAZEPAM 2 MG/ML
1 INJECTION INTRAMUSCULAR EVERY 6 HOURS PRN
Status: DISCONTINUED | OUTPATIENT
Start: 2021-01-01 | End: 2021-01-01 | Stop reason: HOSPADM

## 2021-01-01 RX ORDER — OXYBUTYNIN CHLORIDE 5 MG/1
5 TABLET, EXTENDED RELEASE ORAL DAILY
Status: DISCONTINUED | OUTPATIENT
Start: 2021-01-01 | End: 2021-01-01 | Stop reason: HOSPADM

## 2021-01-01 RX ORDER — SODIUM CHLORIDE 1000 MG
2 TABLET, SOLUBLE MISCELLANEOUS 2 TIMES DAILY
DISCHARGE
Start: 2021-01-01

## 2021-01-01 RX ORDER — SODIUM CHLORIDE 1000 MG
1 TABLET, SOLUBLE MISCELLANEOUS 2 TIMES DAILY
Status: DISCONTINUED | OUTPATIENT
Start: 2021-01-01 | End: 2021-01-01

## 2021-01-01 RX ORDER — SODIUM CHLORIDE, SODIUM LACTATE, CALCIUM CHLORIDE, MAGNESIUM CHLORIDE AND DEXTROSE 1.5; 538; 448; 18.3; 5.08 G/100ML; MG/100ML; MG/100ML; MG/100ML; MG/100ML
1000 INJECTION, SOLUTION INTRAPERITONEAL ONCE
Status: COMPLETED | OUTPATIENT
Start: 2021-01-01 | End: 2021-01-01

## 2021-01-01 RX ORDER — MIDODRINE HYDROCHLORIDE 10 MG/1
10 TABLET ORAL
DISCHARGE
Start: 2021-01-01

## 2021-01-01 RX ORDER — NICOTINE POLACRILEX 4 MG
15 LOZENGE BUCCAL PRN
Status: DISCONTINUED | OUTPATIENT
Start: 2021-01-01 | End: 2021-01-01

## 2021-01-01 RX ORDER — FLUTICASONE PROPIONATE 50 MCG
2 SPRAY, SUSPENSION (ML) NASAL DAILY
Status: DISCONTINUED | OUTPATIENT
Start: 2021-01-01 | End: 2021-01-01 | Stop reason: HOSPADM

## 2021-01-01 RX ORDER — SEVELAMER CARBONATE 800 MG/1
1600 TABLET, FILM COATED ORAL
Status: DISCONTINUED | OUTPATIENT
Start: 2021-01-01 | End: 2021-01-01 | Stop reason: HOSPADM

## 2021-01-01 RX ORDER — MORPHINE SULFATE 2 MG/ML
2 INJECTION, SOLUTION INTRAMUSCULAR; INTRAVENOUS
Status: DISCONTINUED | OUTPATIENT
Start: 2021-01-01 | End: 2021-01-01 | Stop reason: HOSPADM

## 2021-01-01 RX ORDER — ONDANSETRON 2 MG/ML
4 INJECTION INTRAMUSCULAR; INTRAVENOUS EVERY 6 HOURS PRN
Status: DISCONTINUED | OUTPATIENT
Start: 2021-01-01 | End: 2021-01-01 | Stop reason: HOSPADM

## 2021-01-01 RX ORDER — SODIUM CHLORIDE 0.9 % (FLUSH) 0.9 %
10 SYRINGE (ML) INJECTION PRN
Status: DISCONTINUED | OUTPATIENT
Start: 2021-01-01 | End: 2021-01-01

## 2021-01-01 RX ORDER — SODIUM CHLORIDE 0.9 % (FLUSH) 0.9 %
10 SYRINGE (ML) INJECTION PRN
Status: DISCONTINUED | OUTPATIENT
Start: 2021-01-01 | End: 2021-01-01 | Stop reason: SDUPTHER

## 2021-01-01 RX ORDER — BUSPIRONE HYDROCHLORIDE 5 MG/1
5 TABLET ORAL 2 TIMES DAILY
Status: DISCONTINUED | OUTPATIENT
Start: 2021-01-01 | End: 2021-01-01 | Stop reason: HOSPADM

## 2021-01-01 RX ORDER — INSULIN GLARGINE 100 [IU]/ML
4 INJECTION, SOLUTION SUBCUTANEOUS EVERY MORNING
Status: DISCONTINUED | OUTPATIENT
Start: 2021-01-01 | End: 2021-01-01

## 2021-01-01 RX ORDER — MIDODRINE HYDROCHLORIDE 5 MG/1
5 TABLET ORAL
Status: DISCONTINUED | OUTPATIENT
Start: 2021-01-01 | End: 2021-01-01

## 2021-01-01 RX ORDER — MECLIZINE HCL 12.5 MG/1
25 TABLET ORAL 3 TIMES DAILY
Status: DISCONTINUED | OUTPATIENT
Start: 2021-01-01 | End: 2021-01-01 | Stop reason: HOSPADM

## 2021-01-01 RX ORDER — ALBUMIN (HUMAN) 12.5 G/50ML
SOLUTION INTRAVENOUS
Status: DISPENSED
Start: 2021-01-01 | End: 2021-01-01

## 2021-01-01 RX ORDER — PROPOFOL 10 MG/ML
5-50 INJECTION, EMULSION INTRAVENOUS
Status: DISCONTINUED | OUTPATIENT
Start: 2021-01-01 | End: 2021-01-01

## 2021-01-01 RX ORDER — SODIUM CHLORIDE 0.9 % (FLUSH) 0.9 %
10 SYRINGE (ML) INJECTION EVERY 12 HOURS SCHEDULED
Status: DISCONTINUED | OUTPATIENT
Start: 2021-01-01 | End: 2021-01-01

## 2021-01-01 RX ORDER — LIDOCAINE HYDROCHLORIDE 10 MG/ML
5 INJECTION, SOLUTION EPIDURAL; INFILTRATION; INTRACAUDAL; PERINEURAL ONCE
Status: DISCONTINUED | OUTPATIENT
Start: 2021-01-01 | End: 2021-01-01 | Stop reason: HOSPADM

## 2021-01-01 RX ORDER — NICOTINE POLACRILEX 4 MG
15 LOZENGE BUCCAL PRN
Status: DISCONTINUED | OUTPATIENT
Start: 2021-01-01 | End: 2021-01-01 | Stop reason: HOSPADM

## 2021-01-01 RX ORDER — MIDODRINE HYDROCHLORIDE 10 MG/1
10 TABLET ORAL ONCE
Status: COMPLETED | OUTPATIENT
Start: 2021-01-01 | End: 2021-01-01

## 2021-01-01 RX ORDER — LEVOTHYROXINE SODIUM 0.03 MG/1
25 TABLET ORAL DAILY
Status: DISCONTINUED | OUTPATIENT
Start: 2021-01-01 | End: 2021-01-01

## 2021-01-01 RX ORDER — MIDODRINE HYDROCHLORIDE 10 MG/1
10 TABLET ORAL
Status: DISCONTINUED | OUTPATIENT
Start: 2021-01-01 | End: 2021-01-01 | Stop reason: HOSPADM

## 2021-01-01 RX ORDER — 0.9 % SODIUM CHLORIDE 0.9 %
2000 INTRAVENOUS SOLUTION INTRAVENOUS ONCE
Status: COMPLETED | OUTPATIENT
Start: 2021-01-01 | End: 2021-01-01

## 2021-01-01 RX ORDER — PREGABALIN 50 MG/1
50 CAPSULE ORAL DAILY
Qty: 5 CAPSULE | Refills: 0 | Status: SHIPPED | OUTPATIENT
Start: 2021-01-01 | End: 2021-01-01

## 2021-01-01 RX ORDER — DEXTROSE MONOHYDRATE 50 MG/ML
100 INJECTION, SOLUTION INTRAVENOUS PRN
Status: DISCONTINUED | OUTPATIENT
Start: 2021-01-01 | End: 2021-01-01

## 2021-01-01 RX ORDER — SODIUM CHLORIDE, SODIUM LACTATE, CALCIUM CHLORIDE, MAGNESIUM CHLORIDE AND DEXTROSE 1.5; 538; 448; 18.3; 5.08 G/100ML; MG/100ML; MG/100ML; MG/100ML; MG/100ML
2000 INJECTION, SOLUTION INTRAPERITONEAL EVERY 8 HOURS
Status: DISCONTINUED | OUTPATIENT
Start: 2021-01-01 | End: 2021-01-01 | Stop reason: HOSPADM

## 2021-01-01 RX ORDER — NOREPINEPHRINE BIT/0.9 % NACL 16MG/250ML
INFUSION BOTTLE (ML) INTRAVENOUS
Status: DISPENSED
Start: 2021-01-01 | End: 2021-01-01

## 2021-01-01 RX ORDER — DEXTROSE MONOHYDRATE 25 G/50ML
12.5 INJECTION, SOLUTION INTRAVENOUS PRN
Status: DISCONTINUED | OUTPATIENT
Start: 2021-01-01 | End: 2021-01-01 | Stop reason: HOSPADM

## 2021-01-01 RX ORDER — GLYCOPYRROLATE 1 MG/5 ML
0.2 SYRINGE (ML) INTRAVENOUS EVERY 4 HOURS PRN
Status: DISCONTINUED | OUTPATIENT
Start: 2021-01-01 | End: 2021-01-01 | Stop reason: HOSPADM

## 2021-01-01 RX ORDER — PRAVASTATIN SODIUM 40 MG
40 TABLET ORAL NIGHTLY
Status: DISCONTINUED | OUTPATIENT
Start: 2021-01-01 | End: 2021-01-01

## 2021-01-01 RX ORDER — MIDODRINE HYDROCHLORIDE 10 MG/1
10 TABLET ORAL
Status: DISCONTINUED | OUTPATIENT
Start: 2021-01-01 | End: 2021-01-01

## 2021-01-01 RX ORDER — ACETAMINOPHEN AND CODEINE PHOSPHATE 300; 30 MG/1; MG/1
1 TABLET ORAL EVERY 6 HOURS PRN
Qty: 30 TABLET | Refills: 0 | Status: SHIPPED | OUTPATIENT
Start: 2021-01-01 | End: 2021-02-07

## 2021-01-01 RX ORDER — DEXTROSE MONOHYDRATE 25 G/50ML
12.5 INJECTION, SOLUTION INTRAVENOUS PRN
Status: DISCONTINUED | OUTPATIENT
Start: 2021-01-01 | End: 2021-01-01

## 2021-01-01 RX ORDER — FOLIC ACID/VIT B COMPLEX AND C 5 MG
1 TABLET ORAL DAILY
Status: DISCONTINUED | OUTPATIENT
Start: 2021-01-01 | End: 2021-01-01 | Stop reason: HOSPADM

## 2021-01-01 RX ORDER — KETAMINE HCL IN NACL, ISO-OSM 100MG/10ML
100 SYRINGE (ML) INJECTION ONCE
Status: COMPLETED | OUTPATIENT
Start: 2021-01-01 | End: 2021-01-01

## 2021-01-01 RX ORDER — MAGNESIUM SULFATE IN WATER 40 MG/ML
2000 INJECTION, SOLUTION INTRAVENOUS PRN
Status: DISCONTINUED | OUTPATIENT
Start: 2021-01-01 | End: 2021-01-01

## 2021-01-01 RX ADMIN — ACETAMINOPHEN 650 MG: 325 TABLET ORAL at 12:56

## 2021-01-01 RX ADMIN — Medication 800 ML/HR: at 00:30

## 2021-01-01 RX ADMIN — Medication 25 MCG/HR: at 22:17

## 2021-01-01 RX ADMIN — Medication 800 ML/HR: at 19:49

## 2021-01-01 RX ADMIN — SODIUM CHLORIDE TAB 1 GM 1 G: 1 TAB at 09:06

## 2021-01-01 RX ADMIN — SODIUM CHLORIDE, SODIUM LACTATE, CALCIUM CHLORIDE, MAGNESIUM CHLORIDE AND DEXTROSE 2000 ML: 1.5; 538; 448; 18.3; 5.08 INJECTION, SOLUTION INTRAPERITONEAL at 06:10

## 2021-01-01 RX ADMIN — ALBUMIN (HUMAN) 25 G: 0.25 INJECTION, SOLUTION INTRAVENOUS at 21:04

## 2021-01-01 RX ADMIN — SODIUM PHOSPHATE, MONOBASIC, MONOHYDRATE 12 MMOL: 276; 142 INJECTION, SOLUTION INTRAVENOUS at 20:22

## 2021-01-01 RX ADMIN — MECLIZINE 25 MG: 12.5 TABLET ORAL at 08:52

## 2021-01-01 RX ADMIN — ACETAMINOPHEN 650 MG: 325 TABLET ORAL at 09:39

## 2021-01-01 RX ADMIN — MIDODRINE HYDROCHLORIDE 5 MG: 5 TABLET ORAL at 09:13

## 2021-01-01 RX ADMIN — BUSPIRONE HYDROCHLORIDE 5 MG: 5 TABLET ORAL at 09:13

## 2021-01-01 RX ADMIN — SODIUM CHLORIDE, PRESERVATIVE FREE 10 ML: 5 INJECTION INTRAVENOUS at 09:39

## 2021-01-01 RX ADMIN — MECLIZINE 25 MG: 12.5 TABLET ORAL at 20:46

## 2021-01-01 RX ADMIN — SODIUM CHLORIDE, PRESERVATIVE FREE 10 ML: 5 INJECTION INTRAVENOUS at 07:34

## 2021-01-01 RX ADMIN — PREGABALIN 50 MG: 50 CAPSULE ORAL at 08:52

## 2021-01-01 RX ADMIN — MIDODRINE HYDROCHLORIDE 10 MG: 10 TABLET ORAL at 22:01

## 2021-01-01 RX ADMIN — SEVELAMER CARBONATE 1600 MG: 800 TABLET, FILM COATED ORAL at 17:04

## 2021-01-01 RX ADMIN — Medication 25 MCG/MIN: at 05:17

## 2021-01-01 RX ADMIN — PRAVASTATIN SODIUM 40 MG: 40 TABLET ORAL at 21:14

## 2021-01-01 RX ADMIN — POTASSIUM BICARBONATE 40 MEQ: 782 TABLET, EFFERVESCENT ORAL at 09:14

## 2021-01-01 RX ADMIN — SODIUM CHLORIDE TAB 1 GM 1 G: 1 TAB at 20:46

## 2021-01-01 RX ADMIN — HEPARIN SODIUM 5000 UNITS: 5000 INJECTION INTRAVENOUS; SUBCUTANEOUS at 20:46

## 2021-01-01 RX ADMIN — SODIUM CHLORIDE: 9 INJECTION, SOLUTION INTRAVENOUS at 14:17

## 2021-01-01 RX ADMIN — MINERAL OIL AND WHITE PETROLATUM: 150; 830 OINTMENT OPHTHALMIC at 16:27

## 2021-01-01 RX ADMIN — HEPARIN SODIUM 5000 UNITS: 5000 INJECTION INTRAVENOUS; SUBCUTANEOUS at 21:21

## 2021-01-01 RX ADMIN — CALCIUM GLUCONATE 2000 MG: 98 INJECTION, SOLUTION INTRAVENOUS at 14:10

## 2021-01-01 RX ADMIN — ALBUMIN (HUMAN) 25 G: 0.25 INJECTION, SOLUTION INTRAVENOUS at 05:21

## 2021-01-01 RX ADMIN — BUSPIRONE HYDROCHLORIDE 5 MG: 5 TABLET ORAL at 20:45

## 2021-01-01 RX ADMIN — SODIUM CHLORIDE, SODIUM LACTATE, CALCIUM CHLORIDE, MAGNESIUM CHLORIDE AND DEXTROSE 2000 ML: 1.5; 538; 448; 18.3; 5.08 INJECTION, SOLUTION INTRAPERITONEAL at 06:09

## 2021-01-01 RX ADMIN — MEGESTROL ACETATE 400 MG: 40 SUSPENSION ORAL at 09:40

## 2021-01-01 RX ADMIN — SODIUM CHLORIDE TAB 1 GM 1 G: 1 TAB at 07:46

## 2021-01-01 RX ADMIN — MEGESTROL ACETATE 400 MG: 40 SUSPENSION ORAL at 08:52

## 2021-01-01 RX ADMIN — LEVOTHYROXINE SODIUM 25 MCG: 25 TABLET ORAL at 09:13

## 2021-01-01 RX ADMIN — SODIUM CHLORIDE, PRESERVATIVE FREE 10 ML: 5 INJECTION INTRAVENOUS at 20:17

## 2021-01-01 RX ADMIN — Medication 100 MG: at 05:18

## 2021-01-01 RX ADMIN — MECLIZINE 25 MG: 12.5 TABLET ORAL at 21:14

## 2021-01-01 RX ADMIN — SEVELAMER CARBONATE 1600 MG: 800 TABLET, FILM COATED ORAL at 08:53

## 2021-01-01 RX ADMIN — PREGABALIN 50 MG: 50 CAPSULE ORAL at 09:06

## 2021-01-01 RX ADMIN — SODIUM CHLORIDE, POTASSIUM CHLORIDE, SODIUM LACTATE AND CALCIUM CHLORIDE 1000 ML: 600; 310; 30; 20 INJECTION, SOLUTION INTRAVENOUS at 07:50

## 2021-01-01 RX ADMIN — HEPARIN SODIUM 5000 UNITS: 5000 INJECTION INTRAVENOUS; SUBCUTANEOUS at 06:21

## 2021-01-01 RX ADMIN — FLUTICASONE PROPIONATE 2 SPRAY: 50 SPRAY, METERED NASAL at 17:20

## 2021-01-01 RX ADMIN — OXYBUTYNIN CHLORIDE 5 MG: 5 TABLET, EXTENDED RELEASE ORAL at 09:13

## 2021-01-01 RX ADMIN — PANTOPRAZOLE SODIUM 40 MG: 40 TABLET, DELAYED RELEASE ORAL at 04:20

## 2021-01-01 RX ADMIN — MIDODRINE HYDROCHLORIDE 10 MG: 10 TABLET ORAL at 16:22

## 2021-01-01 RX ADMIN — Medication 1 TABLET: at 09:12

## 2021-01-01 RX ADMIN — MIDODRINE HYDROCHLORIDE 5 MG: 5 TABLET ORAL at 17:41

## 2021-01-01 RX ADMIN — POTASSIUM CHLORIDE 20 MEQ: 29.8 INJECTION, SOLUTION INTRAVENOUS at 21:02

## 2021-01-01 RX ADMIN — PREGABALIN 50 MG: 50 CAPSULE ORAL at 09:39

## 2021-01-01 RX ADMIN — OXYBUTYNIN CHLORIDE 5 MG: 5 TABLET, EXTENDED RELEASE ORAL at 08:53

## 2021-01-01 RX ADMIN — GABAPENTIN 300 MG: 300 CAPSULE ORAL at 07:34

## 2021-01-01 RX ADMIN — SEVELAMER CARBONATE 1600 MG: 800 TABLET, FILM COATED ORAL at 12:32

## 2021-01-01 RX ADMIN — MIDODRINE HYDROCHLORIDE 10 MG: 10 TABLET ORAL at 11:08

## 2021-01-01 RX ADMIN — MIDODRINE HYDROCHLORIDE 10 MG: 10 TABLET ORAL at 07:33

## 2021-01-01 RX ADMIN — PREGABALIN 50 MG: 50 CAPSULE ORAL at 07:44

## 2021-01-01 RX ADMIN — MEROPENEM 1000 MG: 1 INJECTION, POWDER, FOR SOLUTION INTRAVENOUS at 13:43

## 2021-01-01 RX ADMIN — SODIUM CHLORIDE: 9 INJECTION, SOLUTION INTRAVENOUS at 14:36

## 2021-01-01 RX ADMIN — ALBUMIN (HUMAN) 25 G: 0.25 INJECTION, SOLUTION INTRAVENOUS at 12:54

## 2021-01-01 RX ADMIN — POTASSIUM BICARBONATE 40 MEQ: 782 TABLET, EFFERVESCENT ORAL at 08:52

## 2021-01-01 RX ADMIN — CALCIUM GLUCONATE 1000 MG: 98 INJECTION, SOLUTION INTRAVENOUS at 14:43

## 2021-01-01 RX ADMIN — CALCIUM POLYCARBOPHIL 1875 MG: 625 TABLET, FILM COATED ORAL at 16:46

## 2021-01-01 RX ADMIN — PRAVASTATIN SODIUM 40 MG: 40 TABLET ORAL at 22:04

## 2021-01-01 RX ADMIN — CALCIUM POLYCARBOPHIL 1875 MG: 625 TABLET, FILM COATED ORAL at 09:07

## 2021-01-01 RX ADMIN — MECLIZINE 25 MG: 12.5 TABLET ORAL at 09:06

## 2021-01-01 RX ADMIN — SEVELAMER CARBONATE 1600 MG: 800 TABLET, FILM COATED ORAL at 07:33

## 2021-01-01 RX ADMIN — SODIUM CHLORIDE TAB 1 GM 1 G: 1 TAB at 07:34

## 2021-01-01 RX ADMIN — SODIUM CHLORIDE, PRESERVATIVE FREE 10 ML: 5 INJECTION INTRAVENOUS at 21:14

## 2021-01-01 RX ADMIN — OXYBUTYNIN CHLORIDE 5 MG: 5 TABLET, EXTENDED RELEASE ORAL at 09:40

## 2021-01-01 RX ADMIN — PANTOPRAZOLE SODIUM 40 MG: 40 TABLET, DELAYED RELEASE ORAL at 05:38

## 2021-01-01 RX ADMIN — MEROPENEM 1000 MG: 1 INJECTION, POWDER, FOR SOLUTION INTRAVENOUS at 10:32

## 2021-01-01 RX ADMIN — Medication 1 TABLET: at 07:46

## 2021-01-01 RX ADMIN — BUSPIRONE HYDROCHLORIDE 5 MG: 5 TABLET ORAL at 09:40

## 2021-01-01 RX ADMIN — MEROPENEM 500 MG: 500 INJECTION, POWDER, FOR SOLUTION INTRAVENOUS at 17:57

## 2021-01-01 RX ADMIN — HEPARIN SODIUM 5000 UNITS: 5000 INJECTION INTRAVENOUS; SUBCUTANEOUS at 23:45

## 2021-01-01 RX ADMIN — BUSPIRONE HYDROCHLORIDE 5 MG: 5 TABLET ORAL at 17:21

## 2021-01-01 RX ADMIN — MECLIZINE 25 MG: 12.5 TABLET ORAL at 15:02

## 2021-01-01 RX ADMIN — HEPARIN SODIUM 5000 UNITS: 5000 INJECTION INTRAVENOUS; SUBCUTANEOUS at 15:02

## 2021-01-01 RX ADMIN — HEPARIN SODIUM 5000 UNITS: 5000 INJECTION INTRAVENOUS; SUBCUTANEOUS at 14:21

## 2021-01-01 RX ADMIN — SODIUM CHLORIDE, PRESERVATIVE FREE 10 ML: 5 INJECTION INTRAVENOUS at 20:46

## 2021-01-01 RX ADMIN — MECLIZINE 25 MG: 12.5 TABLET ORAL at 17:41

## 2021-01-01 RX ADMIN — CALCIUM POLYCARBOPHIL 1875 MG: 625 TABLET, FILM COATED ORAL at 17:19

## 2021-01-01 RX ADMIN — SEVELAMER CARBONATE 1600 MG: 800 TABLET, FILM COATED ORAL at 11:45

## 2021-01-01 RX ADMIN — SODIUM CHLORIDE 2000 ML: 9 INJECTION, SOLUTION INTRAVENOUS at 12:50

## 2021-01-01 RX ADMIN — SODIUM BICARBONATE: 84 INJECTION, SOLUTION INTRAVENOUS at 11:05

## 2021-01-01 RX ADMIN — SODIUM CHLORIDE, PRESERVATIVE FREE 10 ML: 5 INJECTION INTRAVENOUS at 07:47

## 2021-01-01 RX ADMIN — SODIUM CHLORIDE, SODIUM LACTATE, CALCIUM CHLORIDE, MAGNESIUM CHLORIDE AND DEXTROSE 2000 ML: 1.5; 538; 448; 18.3; 5.08 INJECTION, SOLUTION INTRAPERITONEAL at 14:49

## 2021-01-01 RX ADMIN — SEVELAMER CARBONATE 1600 MG: 800 TABLET, FILM COATED ORAL at 11:08

## 2021-01-01 RX ADMIN — CALCIUM POLYCARBOPHIL 1875 MG: 625 TABLET, FILM COATED ORAL at 07:33

## 2021-01-01 RX ADMIN — Medication 1 TABLET: at 09:40

## 2021-01-01 RX ADMIN — GABAPENTIN 300 MG: 300 CAPSULE ORAL at 17:20

## 2021-01-01 RX ADMIN — HEPARIN SODIUM 5000 UNITS: 5000 INJECTION INTRAVENOUS; SUBCUTANEOUS at 06:04

## 2021-01-01 RX ADMIN — FAMOTIDINE 20 MG: 10 INJECTION, SOLUTION INTRAVENOUS at 17:00

## 2021-01-01 RX ADMIN — LEVOTHYROXINE SODIUM 25 MCG: 25 TABLET ORAL at 06:40

## 2021-01-01 RX ADMIN — SERTRALINE 50 MG: 50 TABLET, FILM COATED ORAL at 08:53

## 2021-01-01 RX ADMIN — MEROPENEM 1000 MG: 1 INJECTION, POWDER, FOR SOLUTION INTRAVENOUS at 02:03

## 2021-01-01 RX ADMIN — Medication 800 ML/HR: at 10:11

## 2021-01-01 RX ADMIN — SERTRALINE 50 MG: 50 TABLET, FILM COATED ORAL at 07:34

## 2021-01-01 RX ADMIN — Medication 800 ML/HR: at 02:49

## 2021-01-01 RX ADMIN — Medication 50 MEQ: at 07:48

## 2021-01-01 RX ADMIN — SODIUM BICARBONATE: 84 INJECTION, SOLUTION INTRAVENOUS at 12:21

## 2021-01-01 RX ADMIN — SODIUM CHLORIDE, SODIUM LACTATE, CALCIUM CHLORIDE, MAGNESIUM CHLORIDE AND DEXTROSE 2000 ML: 1.5; 538; 448; 18.3; 5.08 INJECTION, SOLUTION INTRAPERITONEAL at 15:48

## 2021-01-01 RX ADMIN — MIDODRINE HYDROCHLORIDE 10 MG: 10 TABLET ORAL at 09:07

## 2021-01-01 RX ADMIN — MIDODRINE HYDROCHLORIDE 10 MG: 10 TABLET ORAL at 17:16

## 2021-01-01 RX ADMIN — LEVOTHYROXINE SODIUM 25 MCG: 25 TABLET ORAL at 05:28

## 2021-01-01 RX ADMIN — SODIUM CHLORIDE, SODIUM LACTATE, CALCIUM CHLORIDE, MAGNESIUM CHLORIDE AND DEXTROSE 2000 ML: 1.5; 538; 448; 18.3; 5.08 INJECTION, SOLUTION INTRAPERITONEAL at 11:43

## 2021-01-01 RX ADMIN — ACETAMINOPHEN 650 MG: 325 TABLET ORAL at 20:45

## 2021-01-01 RX ADMIN — CALCIUM POLYCARBOPHIL 1875 MG: 625 TABLET, FILM COATED ORAL at 17:16

## 2021-01-01 RX ADMIN — GABAPENTIN 300 MG: 300 CAPSULE ORAL at 09:07

## 2021-01-01 RX ADMIN — Medication 800 ML/HR: at 10:14

## 2021-01-01 RX ADMIN — Medication 800 ML/HR: at 17:40

## 2021-01-01 RX ADMIN — CALCIUM GLUCONATE 1000 MG: 98 INJECTION, SOLUTION INTRAVENOUS at 20:22

## 2021-01-01 RX ADMIN — CALCIUM POLYCARBOPHIL 1875 MG: 625 TABLET, FILM COATED ORAL at 11:45

## 2021-01-01 RX ADMIN — SERTRALINE 50 MG: 50 TABLET, FILM COATED ORAL at 08:51

## 2021-01-01 RX ADMIN — INSULIN LISPRO 2 UNITS: 100 INJECTION, SOLUTION INTRAVENOUS; SUBCUTANEOUS at 17:32

## 2021-01-01 RX ADMIN — CALCIUM POLYCARBOPHIL 1875 MG: 625 TABLET, FILM COATED ORAL at 12:16

## 2021-01-01 RX ADMIN — SODIUM CHLORIDE: 9 INJECTION, SOLUTION INTRAVENOUS at 10:30

## 2021-01-01 RX ADMIN — SODIUM CHLORIDE: 9 INJECTION, SOLUTION INTRAVENOUS at 14:44

## 2021-01-01 RX ADMIN — Medication 800 ML/HR: at 13:20

## 2021-01-01 RX ADMIN — POTASSIUM BICARBONATE 20 MEQ: 782 TABLET, EFFERVESCENT ORAL at 07:42

## 2021-01-01 RX ADMIN — LEVOTHYROXINE SODIUM 25 MCG: 25 TABLET ORAL at 14:10

## 2021-01-01 RX ADMIN — BUSPIRONE HYDROCHLORIDE 5 MG: 5 TABLET ORAL at 21:41

## 2021-01-01 RX ADMIN — VASOPRESSIN 0.04 UNITS/MIN: 20 INJECTION INTRAVENOUS at 00:24

## 2021-01-01 RX ADMIN — CEFEPIME 1 G: 1 INJECTION, POWDER, FOR SOLUTION INTRAMUSCULAR; INTRAVENOUS at 15:09

## 2021-01-01 RX ADMIN — Medication 40 MCG/MIN: at 10:59

## 2021-01-01 RX ADMIN — SODIUM CHLORIDE, SODIUM LACTATE, CALCIUM CHLORIDE, MAGNESIUM CHLORIDE AND DEXTROSE 2000 ML: 1.5; 538; 448; 18.3; 5.08 INJECTION, SOLUTION INTRAPERITONEAL at 23:18

## 2021-01-01 RX ADMIN — INSULIN LISPRO 2 UNITS: 100 INJECTION, SOLUTION INTRAVENOUS; SUBCUTANEOUS at 16:43

## 2021-01-01 RX ADMIN — SODIUM CHLORIDE, SODIUM LACTATE, CALCIUM CHLORIDE, MAGNESIUM CHLORIDE AND DEXTROSE 2000 ML: 1.5; 538; 448; 18.3; 5.08 INJECTION, SOLUTION INTRAPERITONEAL at 00:21

## 2021-01-01 RX ADMIN — SODIUM BICARBONATE: 84 INJECTION, SOLUTION INTRAVENOUS at 18:28

## 2021-01-01 RX ADMIN — MECLIZINE 25 MG: 12.5 TABLET ORAL at 14:48

## 2021-01-01 RX ADMIN — Medication 15 ML: at 09:10

## 2021-01-01 RX ADMIN — LEVOTHYROXINE SODIUM 25 MCG: 25 TABLET ORAL at 05:38

## 2021-01-01 RX ADMIN — MECLIZINE 25 MG: 12.5 TABLET ORAL at 20:45

## 2021-01-01 RX ADMIN — MEGESTROL ACETATE 400 MG: 40 SUSPENSION ORAL at 07:33

## 2021-01-01 RX ADMIN — SODIUM BICARBONATE: 84 INJECTION, SOLUTION INTRAVENOUS at 10:02

## 2021-01-01 RX ADMIN — HEPARIN SODIUM 5000 UNITS: 5000 INJECTION INTRAVENOUS; SUBCUTANEOUS at 04:20

## 2021-01-01 RX ADMIN — LEVOTHYROXINE SODIUM 25 MCG: 25 TABLET ORAL at 04:20

## 2021-01-01 RX ADMIN — ALBUMIN (HUMAN) 25 G: 0.25 INJECTION, SOLUTION INTRAVENOUS at 23:49

## 2021-01-01 RX ADMIN — MEROPENEM 1000 MG: 1 INJECTION, POWDER, FOR SOLUTION INTRAVENOUS at 18:04

## 2021-01-01 RX ADMIN — Medication 35 MCG/MIN: at 23:50

## 2021-01-01 RX ADMIN — HEPARIN SODIUM 5000 UNITS: 5000 INJECTION INTRAVENOUS; SUBCUTANEOUS at 21:50

## 2021-01-01 RX ADMIN — FAMOTIDINE 20 MG: 10 INJECTION, SOLUTION INTRAVENOUS at 08:08

## 2021-01-01 RX ADMIN — CEFEPIME 1 G: 1 INJECTION, POWDER, FOR SOLUTION INTRAMUSCULAR; INTRAVENOUS at 17:26

## 2021-01-01 RX ADMIN — SODIUM PHOSPHATE, MONOBASIC, MONOHYDRATE 6 MMOL: 276; 142 INJECTION, SOLUTION INTRAVENOUS at 10:36

## 2021-01-01 RX ADMIN — Medication 800 ML/HR: at 02:41

## 2021-01-01 RX ADMIN — HEPARIN SODIUM 5000 UNITS: 5000 INJECTION INTRAVENOUS; SUBCUTANEOUS at 04:26

## 2021-01-01 RX ADMIN — SEVELAMER CARBONATE 1600 MG: 800 TABLET, FILM COATED ORAL at 16:42

## 2021-01-01 RX ADMIN — PROPOFOL 20 MCG/KG/MIN: 10 INJECTION, EMULSION INTRAVENOUS at 08:10

## 2021-01-01 RX ADMIN — PRAVASTATIN SODIUM 40 MG: 40 TABLET ORAL at 21:50

## 2021-01-01 RX ADMIN — MECLIZINE 25 MG: 12.5 TABLET ORAL at 21:41

## 2021-01-01 RX ADMIN — GABAPENTIN 300 MG: 300 CAPSULE ORAL at 07:46

## 2021-01-01 RX ADMIN — SODIUM CHLORIDE TAB 1 GM 1 G: 1 TAB at 21:14

## 2021-01-01 RX ADMIN — SODIUM CHLORIDE TAB 1 GM 1 G: 1 TAB at 20:45

## 2021-01-01 RX ADMIN — CALCIUM POLYCARBOPHIL 1875 MG: 625 TABLET, FILM COATED ORAL at 11:08

## 2021-01-01 RX ADMIN — CALCIUM POLYCARBOPHIL 1875 MG: 625 TABLET, FILM COATED ORAL at 12:57

## 2021-01-01 RX ADMIN — MECLIZINE 25 MG: 12.5 TABLET ORAL at 21:21

## 2021-01-01 RX ADMIN — Medication 800 ML/HR: at 08:39

## 2021-01-01 RX ADMIN — PANTOPRAZOLE SODIUM 40 MG: 40 TABLET, DELAYED RELEASE ORAL at 05:28

## 2021-01-01 RX ADMIN — MEROPENEM 1000 MG: 1 INJECTION, POWDER, FOR SOLUTION INTRAVENOUS at 10:53

## 2021-01-01 RX ADMIN — LEVOTHYROXINE SODIUM 25 MCG: 25 TABLET ORAL at 06:19

## 2021-01-01 RX ADMIN — MEROPENEM 500 MG: 500 INJECTION, POWDER, FOR SOLUTION INTRAVENOUS at 18:46

## 2021-01-01 RX ADMIN — MECLIZINE 25 MG: 12.5 TABLET ORAL at 13:19

## 2021-01-01 RX ADMIN — PANTOPRAZOLE SODIUM 40 MG: 40 TABLET, DELAYED RELEASE ORAL at 09:12

## 2021-01-01 RX ADMIN — BUSPIRONE HYDROCHLORIDE 5 MG: 5 TABLET ORAL at 07:46

## 2021-01-01 RX ADMIN — CALCIUM POLYCARBOPHIL 1875 MG: 625 TABLET, FILM COATED ORAL at 09:40

## 2021-01-01 RX ADMIN — PRAVASTATIN SODIUM 40 MG: 40 TABLET ORAL at 21:21

## 2021-01-01 RX ADMIN — PROPOFOL 10 MCG/KG/MIN: 10 INJECTION, EMULSION INTRAVENOUS at 06:08

## 2021-01-01 RX ADMIN — PRAVASTATIN SODIUM 40 MG: 40 TABLET ORAL at 20:46

## 2021-01-01 RX ADMIN — POTASSIUM BICARBONATE 20 MEQ: 782 TABLET, EFFERVESCENT ORAL at 07:33

## 2021-01-01 RX ADMIN — MECLIZINE 25 MG: 12.5 TABLET ORAL at 15:57

## 2021-01-01 RX ADMIN — CALCIUM GLUCONATE 1000 MG: 98 INJECTION, SOLUTION INTRAVENOUS at 16:38

## 2021-01-01 RX ADMIN — SODIUM CHLORIDE, SODIUM LACTATE, CALCIUM CHLORIDE, MAGNESIUM CHLORIDE AND DEXTROSE 2000 ML: 1.5; 538; 448; 18.3; 5.08 INJECTION, SOLUTION INTRAPERITONEAL at 17:31

## 2021-01-01 RX ADMIN — ALBUMIN (HUMAN) 25 G: 0.25 INJECTION, SOLUTION INTRAVENOUS at 13:06

## 2021-01-01 RX ADMIN — VASOPRESSIN 0.04 UNITS/MIN: 20 INJECTION INTRAVENOUS at 02:25

## 2021-01-01 RX ADMIN — PRAVASTATIN SODIUM 40 MG: 40 TABLET ORAL at 21:41

## 2021-01-01 RX ADMIN — PANTOPRAZOLE SODIUM 40 MG: 40 TABLET, DELAYED RELEASE ORAL at 06:19

## 2021-01-01 RX ADMIN — Medication 15 ML: at 19:51

## 2021-01-01 RX ADMIN — POTASSIUM CHLORIDE 20 MEQ: 29.8 INJECTION, SOLUTION INTRAVENOUS at 20:15

## 2021-01-01 RX ADMIN — SODIUM CHLORIDE, POTASSIUM CHLORIDE, SODIUM LACTATE AND CALCIUM CHLORIDE 1000 ML: 600; 310; 30; 20 INJECTION, SOLUTION INTRAVENOUS at 06:22

## 2021-01-01 RX ADMIN — CALCIUM POLYCARBOPHIL 1875 MG: 625 TABLET, FILM COATED ORAL at 12:31

## 2021-01-01 RX ADMIN — HEPARIN SODIUM 5000 UNITS: 5000 INJECTION INTRAVENOUS; SUBCUTANEOUS at 15:48

## 2021-01-01 RX ADMIN — Medication 45 MCG/MIN: at 17:04

## 2021-01-01 RX ADMIN — Medication 800 ML/HR: at 08:38

## 2021-01-01 RX ADMIN — CALCIUM GLUCONATE 1000 MG: 98 INJECTION, SOLUTION INTRAVENOUS at 06:57

## 2021-01-01 RX ADMIN — POTASSIUM BICARBONATE 20 MEQ: 782 TABLET, EFFERVESCENT ORAL at 09:06

## 2021-01-01 RX ADMIN — ALBUMIN (HUMAN) 25 G: 0.25 INJECTION, SOLUTION INTRAVENOUS at 19:51

## 2021-01-01 RX ADMIN — Medication 1 TABLET: at 07:33

## 2021-01-01 RX ADMIN — SEVELAMER CARBONATE 1600 MG: 800 TABLET, FILM COATED ORAL at 17:20

## 2021-01-01 RX ADMIN — OXYBUTYNIN CHLORIDE 5 MG: 5 TABLET, EXTENDED RELEASE ORAL at 09:07

## 2021-01-01 RX ADMIN — SODIUM BICARBONATE: 84 INJECTION, SOLUTION INTRAVENOUS at 15:32

## 2021-01-01 RX ADMIN — Medication 1 TABLET: at 08:52

## 2021-01-01 RX ADMIN — MEGESTROL ACETATE 400 MG: 40 SUSPENSION ORAL at 07:42

## 2021-01-01 RX ADMIN — POLYETHYLENE GLYCOL 3350 17 G: 17 POWDER, FOR SOLUTION ORAL at 08:53

## 2021-01-01 RX ADMIN — SODIUM CHLORIDE, PRESERVATIVE FREE 10 ML: 5 INJECTION INTRAVENOUS at 08:09

## 2021-01-01 RX ADMIN — SODIUM CHLORIDE, PRESERVATIVE FREE 10 ML: 5 INJECTION INTRAVENOUS at 21:50

## 2021-01-01 RX ADMIN — MIDODRINE HYDROCHLORIDE 10 MG: 10 TABLET ORAL at 12:15

## 2021-01-01 RX ADMIN — SODIUM CHLORIDE, SODIUM LACTATE, CALCIUM CHLORIDE, MAGNESIUM CHLORIDE AND DEXTROSE 2000 ML: 1.5; 538; 448; 18.3; 5.08 INJECTION, SOLUTION INTRAPERITONEAL at 23:56

## 2021-01-01 RX ADMIN — MEROPENEM 500 MG: 500 INJECTION, POWDER, FOR SOLUTION INTRAVENOUS at 18:21

## 2021-01-01 RX ADMIN — VASOPRESSIN 0.04 UNITS/MIN: 20 INJECTION INTRAVENOUS at 16:34

## 2021-01-01 RX ADMIN — CALCIUM POLYCARBOPHIL 1875 MG: 625 TABLET, FILM COATED ORAL at 08:51

## 2021-01-01 RX ADMIN — SODIUM CHLORIDE TAB 1 GM 1 G: 1 TAB at 21:50

## 2021-01-01 RX ADMIN — Medication 12 MCG/MIN: at 12:21

## 2021-01-01 RX ADMIN — SODIUM CHLORIDE 500 ML: 9 INJECTION, SOLUTION INTRAVENOUS at 15:15

## 2021-01-01 RX ADMIN — SEVELAMER CARBONATE 1600 MG: 800 TABLET, FILM COATED ORAL at 07:45

## 2021-01-01 RX ADMIN — Medication 800 ML/HR: at 07:15

## 2021-01-01 RX ADMIN — HEPARIN SODIUM 5000 UNITS: 5000 INJECTION INTRAVENOUS; SUBCUTANEOUS at 05:28

## 2021-01-01 RX ADMIN — MECLIZINE 25 MG: 12.5 TABLET ORAL at 15:08

## 2021-01-01 RX ADMIN — HEPARIN SODIUM 5000 UNITS: 5000 INJECTION INTRAVENOUS; SUBCUTANEOUS at 15:08

## 2021-01-01 RX ADMIN — CALCIUM POLYCARBOPHIL 1875 MG: 625 TABLET, FILM COATED ORAL at 08:53

## 2021-01-01 RX ADMIN — VASOPRESSIN 0.04 UNITS/MIN: 20 INJECTION INTRAVENOUS at 18:04

## 2021-01-01 RX ADMIN — SODIUM CHLORIDE TAB 1 GM 1 G: 1 TAB at 21:41

## 2021-01-01 RX ADMIN — CALCIUM GLUCONATE 1000 MG: 98 INJECTION, SOLUTION INTRAVENOUS at 03:14

## 2021-01-01 RX ADMIN — SODIUM CHLORIDE, PRESERVATIVE FREE 10 ML: 5 INJECTION INTRAVENOUS at 08:51

## 2021-01-01 RX ADMIN — SODIUM PHOSPHATE, MONOBASIC, MONOHYDRATE 6 MMOL: 276; 142 INJECTION, SOLUTION INTRAVENOUS at 02:49

## 2021-01-01 RX ADMIN — Medication 0.2 MG: at 21:59

## 2021-01-01 RX ADMIN — SODIUM CHLORIDE, SODIUM LACTATE, CALCIUM CHLORIDE, MAGNESIUM CHLORIDE AND DEXTROSE 2000 ML: 1.5; 538; 448; 18.3; 5.08 INJECTION, SOLUTION INTRAPERITONEAL at 17:33

## 2021-01-01 RX ADMIN — BUSPIRONE HYDROCHLORIDE 5 MG: 5 TABLET ORAL at 08:53

## 2021-01-01 RX ADMIN — Medication 800 ML/HR: at 20:02

## 2021-01-01 RX ADMIN — SODIUM CHLORIDE, SODIUM LACTATE, CALCIUM CHLORIDE, MAGNESIUM CHLORIDE AND DEXTROSE 2000 ML: 1.5; 538; 448; 18.3; 5.08 INJECTION, SOLUTION INTRAPERITONEAL at 15:47

## 2021-01-01 RX ADMIN — PANTOPRAZOLE SODIUM 40 MG: 40 TABLET, DELAYED RELEASE ORAL at 06:05

## 2021-01-01 RX ADMIN — MECLIZINE 25 MG: 12.5 TABLET ORAL at 21:51

## 2021-01-01 RX ADMIN — Medication 800 ML/HR: at 17:28

## 2021-01-01 RX ADMIN — LEVOTHYROXINE SODIUM 25 MCG: 25 TABLET ORAL at 06:58

## 2021-01-01 RX ADMIN — CALCIUM GLUCONATE 1000 MG: 98 INJECTION, SOLUTION INTRAVENOUS at 10:36

## 2021-01-01 RX ADMIN — CALCIUM POLYCARBOPHIL 1875 MG: 625 TABLET, FILM COATED ORAL at 17:05

## 2021-01-01 RX ADMIN — PRAVASTATIN SODIUM 40 MG: 40 TABLET ORAL at 20:45

## 2021-01-01 RX ADMIN — SODIUM CHLORIDE, SODIUM LACTATE, CALCIUM CHLORIDE, MAGNESIUM CHLORIDE AND DEXTROSE 1000 ML: 1.5; 538; 448; 18.3; 5.08 INJECTION, SOLUTION INTRAPERITONEAL at 17:53

## 2021-01-01 RX ADMIN — LEVOTHYROXINE SODIUM 25 MCG: 25 TABLET ORAL at 04:25

## 2021-01-01 RX ADMIN — CEFEPIME 1 G: 1 INJECTION, POWDER, FOR SOLUTION INTRAMUSCULAR; INTRAVENOUS at 15:00

## 2021-01-01 RX ADMIN — Medication 800 ML/HR: at 10:12

## 2021-01-01 RX ADMIN — SODIUM CHLORIDE TAB 1 GM 1 G: 1 TAB at 09:13

## 2021-01-01 RX ADMIN — SODIUM CHLORIDE, PRESERVATIVE FREE 10 ML: 5 INJECTION INTRAVENOUS at 09:50

## 2021-01-01 RX ADMIN — MEROPENEM 1000 MG: 1 INJECTION, POWDER, FOR SOLUTION INTRAVENOUS at 04:44

## 2021-01-01 RX ADMIN — BUSPIRONE HYDROCHLORIDE 5 MG: 5 TABLET ORAL at 08:52

## 2021-01-01 RX ADMIN — SODIUM CHLORIDE, SODIUM LACTATE, CALCIUM CHLORIDE, MAGNESIUM CHLORIDE AND DEXTROSE 2000 ML: 1.5; 538; 448; 18.3; 5.08 INJECTION, SOLUTION INTRAPERITONEAL at 06:05

## 2021-01-01 RX ADMIN — SODIUM PHOSPHATE, MONOBASIC, MONOHYDRATE 6 MMOL: 276; 142 INJECTION, SOLUTION INTRAVENOUS at 14:43

## 2021-01-01 RX ADMIN — CALCIUM POLYCARBOPHIL 1875 MG: 625 TABLET, FILM COATED ORAL at 16:22

## 2021-01-01 RX ADMIN — LEVOTHYROXINE SODIUM 25 MCG: 25 TABLET ORAL at 06:04

## 2021-01-01 RX ADMIN — GABAPENTIN 300 MG: 300 CAPSULE ORAL at 09:40

## 2021-01-01 RX ADMIN — HEPARIN SODIUM 5000 UNITS: 5000 INJECTION INTRAVENOUS; SUBCUTANEOUS at 13:10

## 2021-01-01 RX ADMIN — HEPARIN SODIUM 5000 UNITS: 5000 INJECTION INTRAVENOUS; SUBCUTANEOUS at 13:22

## 2021-01-01 RX ADMIN — SODIUM CHLORIDE, PRESERVATIVE FREE 10 ML: 5 INJECTION INTRAVENOUS at 21:41

## 2021-01-01 RX ADMIN — MECLIZINE 25 MG: 12.5 TABLET ORAL at 13:22

## 2021-01-01 RX ADMIN — SODIUM CHLORIDE TAB 1 GM 1 G: 1 TAB at 21:21

## 2021-01-01 RX ADMIN — MECLIZINE 25 MG: 12.5 TABLET ORAL at 07:33

## 2021-01-01 RX ADMIN — HEPARIN SODIUM 5000 UNITS: 5000 INJECTION INTRAVENOUS; SUBCUTANEOUS at 20:45

## 2021-01-01 RX ADMIN — FAMOTIDINE 20 MG: 10 INJECTION, SOLUTION INTRAVENOUS at 09:10

## 2021-01-01 RX ADMIN — CALCIUM GLUCONATE 1000 MG: 98 INJECTION, SOLUTION INTRAVENOUS at 02:49

## 2021-01-01 RX ADMIN — Medication 25 MCG/HR: at 11:16

## 2021-01-01 RX ADMIN — INSULIN LISPRO 6 UNITS: 100 INJECTION, SOLUTION INTRAVENOUS; SUBCUTANEOUS at 13:29

## 2021-01-01 RX ADMIN — Medication 50 MEQ: at 07:49

## 2021-01-01 RX ADMIN — POLYETHYLENE GLYCOL 3350 17 G: 17 POWDER, FOR SOLUTION ORAL at 09:06

## 2021-01-01 RX ADMIN — MECLIZINE 25 MG: 12.5 TABLET ORAL at 09:42

## 2021-01-01 RX ADMIN — SODIUM CHLORIDE, PRESERVATIVE FREE 10 ML: 5 INJECTION INTRAVENOUS at 08:54

## 2021-01-01 RX ADMIN — MECLIZINE 25 MG: 12.5 TABLET ORAL at 14:23

## 2021-01-01 RX ADMIN — Medication 800 ML/HR: at 19:50

## 2021-01-01 RX ADMIN — VASOPRESSIN 0.04 UNITS/MIN: 20 INJECTION INTRAVENOUS at 08:32

## 2021-01-01 RX ADMIN — SERTRALINE 50 MG: 50 TABLET, FILM COATED ORAL at 09:27

## 2021-01-01 RX ADMIN — MIDODRINE HYDROCHLORIDE 10 MG: 10 TABLET ORAL at 08:53

## 2021-01-01 RX ADMIN — ACETAMINOPHEN 650 MG: 325 TABLET ORAL at 05:28

## 2021-01-01 RX ADMIN — ACETAMINOPHEN 650 MG: 325 TABLET ORAL at 15:02

## 2021-01-01 RX ADMIN — GABAPENTIN 300 MG: 300 CAPSULE ORAL at 08:52

## 2021-01-01 RX ADMIN — BUSPIRONE HYDROCHLORIDE 5 MG: 5 TABLET ORAL at 21:21

## 2021-01-01 RX ADMIN — MECLIZINE 25 MG: 12.5 TABLET ORAL at 09:12

## 2021-01-01 RX ADMIN — SODIUM CHLORIDE, SODIUM LACTATE, CALCIUM CHLORIDE, MAGNESIUM CHLORIDE AND DEXTROSE 2000 ML: 1.5; 538; 448; 18.3; 5.08 INJECTION, SOLUTION INTRAPERITONEAL at 06:22

## 2021-01-01 RX ADMIN — SODIUM CHLORIDE, SODIUM LACTATE, CALCIUM CHLORIDE, MAGNESIUM CHLORIDE AND DEXTROSE 2000 ML: 1.5; 538; 448; 18.3; 5.08 INJECTION, SOLUTION INTRAPERITONEAL at 12:49

## 2021-01-01 RX ADMIN — CALCIUM GLUCONATE 1000 MG: 98 INJECTION, SOLUTION INTRAVENOUS at 06:40

## 2021-01-01 RX ADMIN — MIDODRINE HYDROCHLORIDE 10 MG: 10 TABLET ORAL at 16:41

## 2021-01-01 RX ADMIN — Medication 800 ML/HR: at 17:45

## 2021-01-01 RX ADMIN — INSULIN LISPRO 6 UNITS: 100 INJECTION, SOLUTION INTRAVENOUS; SUBCUTANEOUS at 16:48

## 2021-01-01 RX ADMIN — SEVELAMER CARBONATE 1600 MG: 800 TABLET, FILM COATED ORAL at 16:46

## 2021-01-01 RX ADMIN — SEVELAMER CARBONATE 1600 MG: 800 TABLET, FILM COATED ORAL at 16:22

## 2021-01-01 RX ADMIN — Medication 800 ML/HR: at 07:16

## 2021-01-01 RX ADMIN — MIDODRINE HYDROCHLORIDE 5 MG: 5 TABLET ORAL at 12:26

## 2021-01-01 RX ADMIN — MEROPENEM 1000 MG: 1 INJECTION, POWDER, FOR SOLUTION INTRAVENOUS at 17:48

## 2021-01-01 RX ADMIN — SODIUM CHLORIDE 500 ML: 9 INJECTION, SOLUTION INTRAVENOUS at 21:03

## 2021-01-01 RX ADMIN — OXYBUTYNIN CHLORIDE 5 MG: 5 TABLET, EXTENDED RELEASE ORAL at 07:47

## 2021-01-01 RX ADMIN — SERTRALINE 50 MG: 50 TABLET, FILM COATED ORAL at 09:07

## 2021-01-01 RX ADMIN — SODIUM CHLORIDE, SODIUM LACTATE, CALCIUM CHLORIDE, MAGNESIUM CHLORIDE AND DEXTROSE 2000 ML: 1.5; 538; 448; 18.3; 5.08 INJECTION, SOLUTION INTRAPERITONEAL at 23:42

## 2021-01-01 RX ADMIN — Medication 15 ML: at 20:12

## 2021-01-01 RX ADMIN — POTASSIUM BICARBONATE 40 MEQ: 782 TABLET, EFFERVESCENT ORAL at 17:05

## 2021-01-01 RX ADMIN — POTASSIUM CHLORIDE 20 MEQ: 29.8 INJECTION, SOLUTION INTRAVENOUS at 06:57

## 2021-01-01 RX ADMIN — MEROPENEM 500 MG: 500 INJECTION, POWDER, FOR SOLUTION INTRAVENOUS at 18:16

## 2021-01-01 RX ADMIN — DEXTROSE MONOHYDRATE 2 MCG/KG/MIN: 50 INJECTION, SOLUTION INTRAVENOUS at 16:22

## 2021-01-01 RX ADMIN — Medication 15 ML: at 08:08

## 2021-01-01 RX ADMIN — SEVELAMER CARBONATE 1600 MG: 800 TABLET, FILM COATED ORAL at 12:15

## 2021-01-01 RX ADMIN — ALBUMIN (HUMAN) 25 G: 0.25 INJECTION, SOLUTION INTRAVENOUS at 12:35

## 2021-01-01 RX ADMIN — SODIUM CHLORIDE TAB 1 GM 2 G: 1 TAB at 08:52

## 2021-01-01 RX ADMIN — PANTOPRAZOLE SODIUM 40 MG: 40 TABLET, DELAYED RELEASE ORAL at 04:25

## 2021-01-01 RX ADMIN — SERTRALINE 50 MG: 50 TABLET, FILM COATED ORAL at 09:39

## 2021-01-01 RX ADMIN — BUSPIRONE HYDROCHLORIDE 5 MG: 5 TABLET ORAL at 21:50

## 2021-01-01 RX ADMIN — SODIUM CHLORIDE, PRESERVATIVE FREE 10 ML: 5 INJECTION INTRAVENOUS at 09:10

## 2021-01-01 RX ADMIN — SODIUM BICARBONATE: 84 INJECTION, SOLUTION INTRAVENOUS at 04:13

## 2021-01-01 RX ADMIN — INSULIN LISPRO 6 UNITS: 100 INJECTION, SOLUTION INTRAVENOUS; SUBCUTANEOUS at 18:00

## 2021-01-01 RX ADMIN — SODIUM CHLORIDE, PRESERVATIVE FREE 10 ML: 5 INJECTION INTRAVENOUS at 09:14

## 2021-01-01 RX ADMIN — BUSPIRONE HYDROCHLORIDE 5 MG: 5 TABLET ORAL at 09:07

## 2021-01-01 RX ADMIN — Medication 1 TABLET: at 09:07

## 2021-01-01 RX ADMIN — PREGABALIN 50 MG: 50 CAPSULE ORAL at 07:40

## 2021-01-01 RX ADMIN — LORAZEPAM 1 MG: 2 INJECTION INTRAMUSCULAR; INTRAVENOUS at 17:45

## 2021-01-01 RX ADMIN — HEPARIN SODIUM 5000 UNITS: 5000 INJECTION INTRAVENOUS; SUBCUTANEOUS at 13:29

## 2021-01-01 RX ADMIN — SEVELAMER CARBONATE 1600 MG: 800 TABLET, FILM COATED ORAL at 09:07

## 2021-01-01 RX ADMIN — MECLIZINE 25 MG: 12.5 TABLET ORAL at 07:52

## 2021-01-01 RX ADMIN — SEVELAMER CARBONATE 1600 MG: 800 TABLET, FILM COATED ORAL at 13:27

## 2021-01-01 RX ADMIN — CALCIUM POLYCARBOPHIL 1875 MG: 625 TABLET, FILM COATED ORAL at 12:26

## 2021-01-01 RX ADMIN — INSULIN LISPRO 4 UNITS: 100 INJECTION, SOLUTION INTRAVENOUS; SUBCUTANEOUS at 12:23

## 2021-01-01 RX ADMIN — SODIUM CHLORIDE TAB 1 GM 1 G: 1 TAB at 08:52

## 2021-01-01 RX ADMIN — SODIUM CHLORIDE TAB 1 GM 1 G: 1 TAB at 09:39

## 2021-01-01 RX ADMIN — SODIUM CHLORIDE: 9 INJECTION, SOLUTION INTRAVENOUS at 04:18

## 2021-01-01 RX ADMIN — SODIUM BICARBONATE: 84 INJECTION, SOLUTION INTRAVENOUS at 07:34

## 2021-01-01 RX ADMIN — SODIUM CHLORIDE, SODIUM LACTATE, CALCIUM CHLORIDE, MAGNESIUM CHLORIDE AND DEXTROSE 2000 ML: 1.5; 538; 448; 18.3; 5.08 INJECTION, SOLUTION INTRAPERITONEAL at 23:45

## 2021-01-01 RX ADMIN — OXYBUTYNIN CHLORIDE 5 MG: 5 TABLET, EXTENDED RELEASE ORAL at 08:52

## 2021-01-01 RX ADMIN — CALCIUM POLYCARBOPHIL 1875 MG: 625 TABLET, FILM COATED ORAL at 09:13

## 2021-01-01 RX ADMIN — CALCIUM POLYCARBOPHIL 1875 MG: 625 TABLET, FILM COATED ORAL at 07:46

## 2021-01-01 RX ADMIN — SODIUM PHOSPHATE, MONOBASIC, MONOHYDRATE 6 MMOL: 276; 142 INJECTION, SOLUTION INTRAVENOUS at 01:29

## 2021-01-01 RX ADMIN — GABAPENTIN 300 MG: 300 CAPSULE ORAL at 09:13

## 2021-01-01 RX ADMIN — SODIUM BICARBONATE: 84 INJECTION, SOLUTION INTRAVENOUS at 08:25

## 2021-01-01 RX ADMIN — SODIUM BICARBONATE: 84 INJECTION, SOLUTION INTRAVENOUS at 22:12

## 2021-01-01 RX ADMIN — OXYBUTYNIN CHLORIDE 5 MG: 5 TABLET, EXTENDED RELEASE ORAL at 07:33

## 2021-01-01 RX ADMIN — SODIUM CHLORIDE, SODIUM LACTATE, CALCIUM CHLORIDE, MAGNESIUM CHLORIDE AND DEXTROSE 2000 ML: 1.5; 538; 448; 18.3; 5.08 INJECTION, SOLUTION INTRAPERITONEAL at 06:54

## 2021-01-01 RX ADMIN — Medication 800 ML/HR: at 13:19

## 2021-01-01 RX ADMIN — HEPARIN SODIUM 5000 UNITS: 5000 INJECTION INTRAVENOUS; SUBCUTANEOUS at 21:44

## 2021-01-01 RX ADMIN — CALCIUM GLUCONATE 1000 MG: 98 INJECTION, SOLUTION INTRAVENOUS at 22:04

## 2021-01-01 RX ADMIN — CALCIUM POLYCARBOPHIL 1875 MG: 625 TABLET, FILM COATED ORAL at 16:41

## 2021-01-01 RX ADMIN — PREGABALIN 50 MG: 50 CAPSULE ORAL at 09:12

## 2021-01-01 RX ADMIN — CALCIUM POLYCARBOPHIL 1875 MG: 625 TABLET, FILM COATED ORAL at 13:27

## 2021-01-01 RX ADMIN — BUSPIRONE HYDROCHLORIDE 5 MG: 5 TABLET ORAL at 07:34

## 2021-01-01 RX ADMIN — SERTRALINE 50 MG: 50 TABLET, FILM COATED ORAL at 07:47

## 2021-01-01 RX ADMIN — MINERAL OIL AND WHITE PETROLATUM: 150; 830 OINTMENT OPHTHALMIC at 20:31

## 2021-01-01 RX ADMIN — ALBUMIN (HUMAN) 25 G: 0.25 INJECTION, SOLUTION INTRAVENOUS at 20:12

## 2021-01-01 RX ADMIN — SODIUM CHLORIDE, SODIUM LACTATE, CALCIUM CHLORIDE, MAGNESIUM CHLORIDE AND DEXTROSE 2000 ML: 1.5; 538; 448; 18.3; 5.08 INJECTION, SOLUTION INTRAPERITONEAL at 12:31

## 2021-01-01 RX ADMIN — Medication 1 TABLET: at 08:53

## 2021-01-01 RX ADMIN — HEPARIN SODIUM 5000 UNITS: 5000 INJECTION INTRAVENOUS; SUBCUTANEOUS at 05:38

## 2021-01-01 RX ADMIN — HEPARIN SODIUM 5000 UNITS: 5000 INJECTION INTRAVENOUS; SUBCUTANEOUS at 17:21

## 2021-01-01 RX ADMIN — MEGESTROL ACETATE 400 MG: 40 SUSPENSION ORAL at 09:13

## 2021-01-01 RX ADMIN — MIDODRINE HYDROCHLORIDE 10 MG: 10 TABLET ORAL at 13:27

## 2021-01-01 RX ADMIN — CEFEPIME 1 G: 1 INJECTION, POWDER, FOR SOLUTION INTRAMUSCULAR; INTRAVENOUS at 15:05

## 2021-01-01 RX ADMIN — Medication 25 MCG/HR: at 14:06

## 2021-01-01 RX ADMIN — POTASSIUM BICARBONATE 40 MEQ: 782 TABLET, EFFERVESCENT ORAL at 09:40

## 2021-01-01 RX ADMIN — BUSPIRONE HYDROCHLORIDE 5 MG: 5 TABLET ORAL at 21:14

## 2021-01-01 RX ADMIN — ALBUMIN (HUMAN) 25 G: 0.25 INJECTION, SOLUTION INTRAVENOUS at 04:13

## 2021-01-01 RX ADMIN — SODIUM CHLORIDE, PRESERVATIVE FREE 10 ML: 5 INJECTION INTRAVENOUS at 22:13

## 2021-01-01 RX ADMIN — SODIUM CHLORIDE, SODIUM LACTATE, CALCIUM CHLORIDE, MAGNESIUM CHLORIDE AND DEXTROSE 2000 ML: 1.5; 538; 448; 18.3; 5.08 INJECTION, SOLUTION INTRAPERITONEAL at 18:21

## 2021-01-01 RX ADMIN — SODIUM PHOSPHATE, MONOBASIC, MONOHYDRATE 12 MMOL: 276; 142 INJECTION, SOLUTION INTRAVENOUS at 08:25

## 2021-01-01 RX ADMIN — HEPARIN SODIUM 5000 UNITS: 5000 INJECTION INTRAVENOUS; SUBCUTANEOUS at 21:14

## 2021-01-01 ASSESSMENT — PAIN SCALES - WONG BAKER

## 2021-01-01 ASSESSMENT — PULMONARY FUNCTION TESTS
PIF_VALUE: 23
PIF_VALUE: 23
PIF_VALUE: 20
PIF_VALUE: 21
PIF_VALUE: 23
PIF_VALUE: 21
PIF_VALUE: 27
PIF_VALUE: 22
PIF_VALUE: 22
PIF_VALUE: 18

## 2021-01-01 ASSESSMENT — PAIN SCALES - GENERAL
PAINLEVEL_OUTOF10: 1
PAINLEVEL_OUTOF10: 0
PAINLEVEL_OUTOF10: 0
PAINLEVEL_OUTOF10: 4
PAINLEVEL_OUTOF10: 0

## 2021-01-01 NOTE — PLAN OF CARE
Unable to obtain blood from the patient secondary to difficult stick, we only have 1 IV site, patient is having hypotension; I called and spoke to the ICU staff and they are going to place a central line so we have secure access and able to obtain blood

## 2021-01-01 NOTE — H&P
History & Physical        Patient:  Boogie Barrett  YOB: 1950    MRN: [id number]     Acct: [de-identified]    PCP: [address]    Date of Admission: 1/1/2021    Date of Service: Pt seen/examined on 01/01/21  and Admitted to Inpatient with expected LOS greater than two midnights due to medical therapy. ASSESSMENT/PLAN:    1. Fall at [facility]--CT head did not reveal anything acute; will need to monitor for PT/OT  2. Hypotension--patient appears quite dry on exam so we will try some gentle hydration and monitor closely  3. Possible sepsis--we will obtain blood cultures along with a urinalysis and check her PD fluid okay with nephrology; Maxipime 1 g daily secondary to GFR  4. Lactic acidosis--repeat a level here  5. ESRD on PD--consult nephrology  6. Hypokalemia--recheck  7. Diabetes mellitus type 2, uncontrolled--resume her home Lantus, add sliding scale level 2 along with hypoglycemia protocol and monitor  8.  History of seizures      Chief Complaint: Fall at nursing home      History Of Present Illness: 78 y.o. female who presented to [facility] with fall at nursing home; this patient was a transfer in from [address]; patient offers limited information as she is alert to her name and month however she thinks she is at the nursing home; per review of old records the patient got up to go to the bathroom and she sustained a fall, she is on peritoneal dialysis in which she receives daily, she initially complained of pain to her right hip and low back however she denies that now the nursing home states she has had some intermittent confusion; at [address] she was given Maxipime along with vancomycin and potassium; when she presented here she was hypotensive; it was reported her initial blood pressure at [address] was 76/48 and they did give her a fluid challenge which improved her blood pressure; again she looks extremely dry on exam; lab work will be repeated, she will be monitored closely, she is being admitted to the hospital service for further care and evaluation.     Past Medical History:          Diagnosis Date    Adjustment disorder with mixed anxiety and depressed mood     Anemia in chronic kidney disease(285.21)     Anxiety     Arthritis     Asthma     Bipolar 1 disorder (HCC)     Bradycardia     CHF (congestive heart failure) (HCC)     Chronic diastolic heart failure (HCC)     Chronic kidney disease, stage III (moderate) (HCC)     Depression     Difficulty walking     Dizziness and giddiness     GERD (gastroesophageal reflux disease)     Gout     Hyperkalemia     Hyperlipidemia     Hypertension     Hypertensive urgency     Hypothyroidism     Low back pain     Major depressive disorder, recurrent, mild (HCC)     Muscle weakness (generalized)     Neurogenic bladder     Pleural effusion 06/16/2019    R pleurex catheter placed    Pneumonia     Seizure ([address] 01/2018  Type II or unspecified type diabetes mellitus without mention of complication, not stated as uncontrolled     Urine retention        Past Surgical History:          Procedure Laterality Date    BACK SURGERY      CERVICAL FUSION      CHOLECYSTECTOMY      COLONOSCOPY      ENDOSCOPY, COLON, DIAGNOSTIC      FRACTURE SURGERY         Medications Prior to Admission:      Prior to Admission medications    Medication Sig Start Date End Date Taking? Authorizing Provider   pregabalin (LYRICA) 50 MG capsule Take 50 mg by mouth daily.    Yes Historical Provider, MD   Magnesium Chloride-Calcium  MG Take 1 tablet by mouth 2 times daily   Yes Historical Provider, MD   megestrol acetate (MEGACE) 400 MG/10ML SUSP Take 400 mg by mouth daily   Yes Historical Provider, MD   omeprazole (PRILOSEC) 20 MG delayed release capsule Take 20 mg by mouth daily   Yes Historical Provider, MD   sertraline (ZOLOFT) 50 MG tablet Take 50 mg by mouth daily   Yes Historical Provider, MD   sodium chloride 1 g tablet Take 1 g by mouth 2 times daily   Yes Historical Provider, MD   busPIRone (BUSPAR) 5 MG tablet Take 5 mg by mouth 2 times daily   Yes Historical Provider, MD   isosorbide dinitrate (ISORDIL) 20 MG tablet Take 20 mg by mouth 3 times daily   Yes Historical Provider, MD   insulin glargine (BASAGLAR KWIKPEN) 100 UNIT/ML injection pen Inject 4 Units into the skin every morning 1/29/20  Yes Nila Petty MD   oxybutynin (DITROPAN-XL) 5 MG extended release tablet Take 5 mg by mouth daily   Yes Historical Provider, MD   polyethylene glycol (GLYCOLAX) powder Take 17 g by mouth daily   Yes Historical Provider, MD   B Complex-C-Folic Acid (RENAL) 1 MG CAPS Take 1 capsule by mouth daily   Yes Historical Provider, MD acetaminophen (TYLENOL) 325 MG tablet Take 650 mg by mouth every 6 hours as needed for Pain    Historical Provider, MD       Allergies:  Aspirin, Naproxen, Pcn [penicillins], Vicodin [hydrocodone-acetaminophen], Vilazodone, and Wellbutrin [bupropion]    Social History:   reports that she has never smoked. She has never used smokeless tobacco. She reports that she does not drink alcohol or use drugs. Family History:      Positive as follows:        Problem Relation Age of Onset    High Blood Pressure Mother     Diabetes Mother     Cancer Mother     Heart Attack Mother     Stroke Father     High Blood Pressure Father     Anemia Father        REVIEW OF SYSTEMS:     Constitutional: ROS: negative for - chills or fever  Head: no headache, no head injury, no migraine   Eyes ROS: denies blurred/double vision  Ears ROS: no hearing difficulty, no tinnitus  Mouth and Throat ROS: no ulceration, dysphagia, dental caries  Psychological ROS: no depression, no anxiety, no panic attacks, denies suicide/homicide ideation  Endocrine ROS: denies polyuria, polydypsia, no heat or cold intolerance  Respiratory ROS: positive for - shortness of breath  Cardiovascular ROS: no chest pain or dyspnea on exertion  Gastrointestinal ROS: no abdominal pain, change in bowel habits, or black or bloody stools  Genito-Urinary ROS: denies dysuria, frequency, urgency; denies hematuria  Musculoskeletal ROS: negative  Neurological ROS: no syncope, no seizures, no numbness or tingling of hands, no numbness or tingling of feet, no paresis  Dermatology: no skin rash, no eczema  Endocrine: no polyuria, polydypsia, no heat/cold intolerance  Hematology: denies bruising easily, denies bleeding problems, denies clotting disorders    PHYSICAL EXAM:    BP (!) 87/51   Pulse 73   Temp 97.9 °F (36.6 °C) (Oral)   Resp 16   SpO2 95%     General appearance:  No apparent distress, appears older than stated age and cooperative.   Chronically ill-appearing HEENT:  Normal cephalic, atraumatic without obvious deformity. Pupils equal, round, and reactive to light. Conjunctivae/corneas clear. Oral mucous membranes appear extremely dry  Neck: Supple, with full range of motion. No jugular venous distention. Trachea midline. Respiratory:  Normal respiratory effort. Faint rhonchi to auscultation, bilaterally   Cardiovascular:  Regular rate and rhythm with normal S1/S2 without murmurs, rubs or gallops. Abdomen: Soft, non-tender, non-distended with normal bowel sounds. PD cath noted  Musculoskeletal:  No clubbing, cyanosis or edema bilaterally. Full range of motion without deformity. Blister noted to the dorsal aspect of the left foot  Skin: Skin color pink pale. Neurologic:  Neurovascularly intact without any focal sensory/motor deficits. Cranial nerves: II-XII intact, grossly non-focal.  Psychiatric:  Alert and oriented to name, month, she thinks it is 2020 and she thinks she is at [facility], thought content not entirely appropriate  Capillary Refill: Brisk,< 3 seconds   Peripheral Pulses: +2 palpable, equal bilaterally       Labs:     ABG's= pH 7.418, CO2 33, PO2 73.6, bicarb 21.3    CBC: WBC's= 22.4, hemoglobin 11.8, hematocrit 36.2, platelets 995    BMP= glucose 224, BUN 27, creatinine 5.67, potassium 2.2,  calcium 2.1    BNP= 45    Troponin 0 0.08    Lactic acid= 3.4    Hepatic panel: Total bilirubin 0.5, direct bilirubin 0.2, albumin 1.6, alk phos 215, AST 35, ALT 51    Magnesium 2.5    Radiology:     CT head: No acute intracranial abnormality    Thank you THOMAS Rodriguez for the opportunity to be involved in this patient's care.     Electronically signed by YESENIA Call CNP on 1/1/2021 at 1:54 PM

## 2021-01-01 NOTE — CONSULTS
[facility]    [address], [address]  [facility]  1/1/2021 4:10 PM    Pt Name:    Irwin Gosselin  MRN:     [id number]   [id number]  YOB: 1950  Admit Date:    1/1/2021  1:32 PM  Primary Care Physician:  Valerie Jimenez        Reason for Consult:  ESRD on PD    History:   The patient is a 78 y.o. female with hx of ESRD on peritoneal dialysis,  HTN, DM, bipolar disorder, seizures, diastolic CHF was transferred from [facility] after a fall at nursing home. Apparently had a fall when she got up to go to the bathroom. + confusion. Hypotensive on arrival.  K was 2.1 at outside hospital.  WBC count elevated at 22. 4. lactic acid 3.4. UA with 3+ leukocyte esterase. Pt c/o abdominal pain. Nephrology consulted for ESRD management.      Past Medical History:  Past Medical History:   Diagnosis Date    Adjustment disorder with mixed anxiety and depressed mood     Anemia in chronic kidney disease(285.21)     Anxiety     Arthritis     Asthma     Bipolar 1 disorder (HCC)     Bradycardia     CHF (congestive heart failure) (HCC)     Chronic diastolic heart failure (HCC)     Chronic kidney disease, stage III (moderate)     Depression     Difficulty walking     Dizziness and giddiness     GERD (gastroesophageal reflux disease)     Gout     Hyperkalemia     Hyperlipidemia     Hypertension     Hypertensive urgency     Hypothyroidism     Low back pain     Major depressive disorder, recurrent, mild (HCC)     Muscle weakness (generalized)     Neurogenic bladder     Pleural effusion 06/16/2019    R pleurex catheter placed    Pneumonia     Seizure ([address] 01/2018    Type II or unspecified type diabetes mellitus without mention of complication, not stated as uncontrolled     Urine retention        Past Surgical History:  Past Surgical History:   Procedure Laterality Date    BACK SURGERY      CERVICAL FUSION      CHOLECYSTECTOMY Magnesium Chloride-Calcium  MG Take 1 tablet by mouth 2 times daily   Yes Historical Provider, MD   megestrol acetate (MEGACE) 400 MG/10ML SUSP Take 400 mg by mouth daily   Yes Historical Provider, MD   omeprazole (PRILOSEC) 20 MG delayed release capsule Take 20 mg by mouth daily   Yes Historical Provider, MD   sertraline (ZOLOFT) 50 MG tablet Take 50 mg by mouth daily   Yes Historical Provider, MD   sodium chloride 1 g tablet Take 1 g by mouth 2 times daily   Yes Historical Provider, MD   busPIRone (BUSPAR) 5 MG tablet Take 5 mg by mouth 2 times daily   Yes Historical Provider, MD   isosorbide dinitrate (ISORDIL) 20 MG tablet Take 20 mg by mouth 3 times daily   Yes Historical Provider, MD   insulin glargine (BASAGLAR KWIKPEN) 100 UNIT/ML injection pen Inject 4 Units into the skin every morning 1/29/20  Yes Nena Westhope, MD   oxybutynin (DITROPAN-XL) 5 MG extended release tablet Take 5 mg by mouth daily   Yes Historical Provider, MD   polyethylene glycol (GLYCOLAX) powder Take 17 g by mouth daily   Yes Historical Provider, MD   B Complex-C-Folic Acid (RENAL) 1 MG CAPS Take 1 capsule by mouth daily   Yes Historical Provider, MD   insulin aspart (NOVOLOG) 100 UNIT/ML injection vial Inject 0-12 Units into the skin 3 times daily (before meals) Per sliding scale; 140-199= 1 unit, 200-249= 2 units, 250-299= 3 units,  300-349= 4 units, 350-399= 5 units, 400-450= 6 units, 451-500= 10 units, >500 give 12 units & recheck in one hour.    Yes Historical Provider, MD   polycarbophil (FIBERCON) 625 MG tablet Take 3 tablets by mouth 3 times daily (with meals)    Yes Historical Provider, MD   sevelamer (RENVELA) 800 MG tablet Take 2 tablets by mouth 3 times daily (with meals)   Yes Historical Provider, MD   meclizine (ANTIVERT) 25 MG tablet Take 25 mg by mouth 3 times daily   Yes Historical Provider, MD   levothyroxine (SYNTHROID) 25 MCG tablet Take 25 mcg by mouth Daily   Yes Historical Provider, MD blood glucose test strips (ASCENSIA AUTODISC VI;ONE TOUCH ULTRA TEST VI) strip Patient tests blood sugar three times per day. Diagnosis DMII E11.9 7/18/18  Yes Historical Provider, MD   pravastatin (PRAVACHOL) 40 MG tablet Take 40 mg by mouth nightly    Yes Historical Provider, MD   lidocaine (XYLOCAINE) 5 % ointment Apply topically as needed. 8/28/20   Deana Chowdhury MD   benzonatate (TESSALON) 100 MG capsule Take 100 mg by mouth every 8 hours as needed for Cough    Historical Provider, MD   fluticasone (FLONASE) 50 MCG/ACT nasal spray 2 sprays by Each Nostril route daily     Historical Provider, MD   guaiFENesin 200 MG tablet Take 200 mg by mouth every 4 hours as needed (cough)     Historical Provider, MD   ondansetron (ZOFRAN-ODT) 4 MG disintegrating tablet Take 4 mg by mouth every 8 hours as needed for Nausea or Vomiting     Historical Provider, MD   gabapentin (NEURONTIN) 300 MG capsule Take 300 mg by mouth daily. 7/31/18   Historical Provider, MD   acetaminophen-codeine (TYLENOL #3) 300-30 MG per tablet Take 1 tablet by mouth every 6 hours as needed (arthritis pain).      Historical Provider, MD   acetaminophen (TYLENOL) 325 MG tablet Take 650 mg by mouth every 6 hours as needed for Pain    Historical Provider, MD       Review of Systems:  Constitutional: no fever or chills  Head: No headaches  Eyes: no blurry vision, no discharge  Ears: no ear pain or hearing changes  Nose: no runny nose or epistaxis  Respiratory: no shortness of breath or cough or sputum production  Cardiovascular: no chest pain, no edema  GI: no nausea, no vomiting or diarrhea  : denies any hematuria, no flank pain  Skin: no rash  Musculoskeletal: no joint pain, moves all ext  Neuro: no tremor, no slurred speech  Psychiatric: stable mood, no depression or insomnia    Current Meds:  Infusion:    sodium chloride 75 mL/hr at 01/01/21 1436    dextrose       Meds:    busPIRone  5 mg Oral BID    fluticasone  2 spray Each Nostril Daily  gabapentin  300 mg Oral Daily    [START ON 1/2/2021] insulin glargine  4 Units Subcutaneous QAM    [START ON 1/2/2021] levothyroxine  25 mcg Oral Daily    meclizine  25 mg Oral TID    [START ON 1/2/2021] megestrol  400 mg Oral Daily    [START ON 1/2/2021] pantoprazole  40 mg Oral QAM AC    [START ON 1/2/2021] oxybutynin  5 mg Oral Daily    polycarbophil  1,875 mg Oral TID WC    [START ON 1/2/2021] polyethylene glycol  17 g Oral Daily    pravastatin  40 mg Oral Nightly    [START ON 1/2/2021] pregabalin  50 mg Oral Daily    [START ON 1/2/2021] sertraline  50 mg Oral Daily    sevelamer  1,600 mg Oral TID WC    sodium chloride  1 g Oral BID    heparin (porcine)  5,000 Units Subcutaneous 3 times per day    insulin lispro  0-12 Units Subcutaneous TID WC    insulin lispro  0-6 Units Subcutaneous Nightly    [START ON 1/2/2021] folbee plus  1 tablet Oral Daily    cefepime  1 g Intravenous Q24H    sodium chloride  500 mL Intravenous Once    lidocaine 1 % injection  5 mL Intradermal Once    sodium chloride flush  10 mL Intravenous 2 times per day     Meds prn: promethazine **OR** ondansetron, acetaminophen **OR** acetaminophen, glucose, dextrose, glucagon (rDNA), dextrose, sodium chloride flush     Allergies/Intolerances: ALLERGIES: Aspirin, Naproxen, Pcn [penicillins], Vicodin [hydrocodone-acetaminophen], Vilazodone, and Wellbutrin [bupropion]    24HR INTAKE/OUTPUT:  No intake or output data in the 24 hours ending 01/01/21 1610  No intake/output data recorded. No intake/output data recorded. Admission weight: 179 lb 12.8 oz (81.6 kg)  Wt Readings from Last 3 Encounters:   01/01/21 179 lb 12.8 oz (81.6 kg)   08/28/20 189 lb 3.2 oz (85.8 kg)   06/02/20 186 lb 9.6 oz (84.6 kg)     Body mass index is 29.02 kg/m².     Physical Examination:  VITALS:  BP (!) 96/53   Pulse 71   Temp 97.9 °F (36.6 °C) (Oral)   Resp 16   Ht 5' 6\" (1.676 m)   Wt 179 lb 12.8 oz (81.6 kg)   SpO2 99%   BMI 29.02 kg/m² Weight:   Wt Readings from Last 3 Encounters:   01/01/21 179 lb 12.8 oz (81.6 kg)   08/28/20 189 lb 3.2 oz (85.8 kg)   06/02/20 186 lb 9.6 oz (84.6 kg)     Constitutional and General Appearance: awake, alert, chronically ill appearing  Eyes: no icteric sclera, no pallor conjunctiva, no discharge seen from either eye  Ears and Nose: normal external appearance of left and right ear and nose. No active drainage from nose. Oral: dry mucous membranes  Neck: No jugular venous distention, appears symmetric, good ROM  Lungs: Air entry B/L, no crackles or rales, no use of accessory muscles  Heart: regular rate, S1, S2  Extremities: no LE edema  GI: +abdominal pain  Skin: no rash seen on exposed extremities  Musculo: moves all extremities  Neuro: no slurred speech, no facial drooping, no asterixis  Psychiatric: Awake, alert    Lab Data  CBC: No results for input(s): WBC, HGB, HCT, PLT in the last 72 hours. BMP:No results for input(s): NA, K, CL, CO2, BUN, CREATININE, GLUCOSE, CALCIUM, MG in the last 72 hours. PTH: @PTH@  TSH: No results for input(s): TSH in the last 72 hours. HgBa1c: No results for input(s): LABA1C in the last 72 hours. Hepatic: No results for input(s): LABALBU, AST, ALT, ALB, BILITOT, ALKPHOS in the last 72 hours. ABGs: No results found for: PHART, PO2ART, RML3BPX  Troponin: No results for input(s): TROPONINI in the last 72 hours. BNP: No results for input(s): BNP in the last 72 hours. Old labs reviewed. Impression and Plan:  1. End stage renal disease on peritoneal dialysis  2. Hypotension due to volume depletion +/- sepsis  3. Leukocytosis with abd pain. Rule out peritonitis  4. Severe hypokalemia  5. Lactic acidosis  6. S/p fall  7. DM  8. Hx seizures    Patient's abdomen is dry. Will instill 1 L and dwell for 2 hours and send fresh sample for cell count/diff. Hold PD for now due to severe hypokalemia and hypotension  Check repeat labs. Replace potassium. Ok for IV fluids. Start Midodrine. Thank you for allowing me to participate in the care of this patient. Please feel free to call me if you have any questions.      Electronically signed by [id number] Karolina Ponce DO on 1/1/21 at 4:10 PM EST    [facility]

## 2021-01-01 NOTE — PROGRESS NOTES
Patient son Jolinda Goldmann notified of patient plan of care and recent updates. No concerns voiced at this time.

## 2021-01-01 NOTE — PROGRESS NOTES
Patient is a 78year old patient from [facility] ED with Dr Belen Baer transferring. Patient is ESRD on CAPD and resides in a nursing home. Arrived with c/o fall onto buttocks. Upon arrival to their ED BP 76 systolic. Fluids were given and last /68 with 70-80 HR. Lactic resulted at 3.4. UA +UTI. Blood cultures/urine culture sent. Did not send dialysate fluid for culture but abdominal exam reported as benign. Afebrile. ABG reported as being \"good\". K+ 2.1 and other electrolytes normal. CXR normal. Bilateral hip XR and back XR negative for fracture. EKG No acute changes. Head CT negative for acute process. Patient given oral and IV KCL, cefepime and Vancomycin (patient has PCN allergy). Did not do a Covid as patient was positive Covid mid November.  Patient to go to [id number] inpatient dx sepsis under Dr Cleo Flores NP.

## 2021-01-02 NOTE — PLAN OF CARE
Patient stable for transfer from ICU. Patient did not require vasopressors. Patient's blood pressures have improved with fluid bolus and albumin. Patient did have noted acute T12 fracture on CT that was obtained of abdomen pelvis while in the ICU. Orthopedics have been consulted. Plans for brace at this time and follow-up outpatient. Report to Dr. Ann-Marie Barraza     Electronically signed by Rafael Torres CNP on 1/2/2021 at 1:42 PM

## 2021-01-02 NOTE — PROGRESS NOTES
CRITICAL CARE PROGRESS NOTE      Patient:  Chyna Pinedap    Unit/Bed:4D-05/005-A  YOB: 1950  MRN: [id number]   PCP: Vlad Craig  Date of Admission: 2021  Chief Complaint:-Hypotension    Assessment and Plan:    1. Distributive shock: Patient was transferred to the ICU for Levophed, however on reexamination noted mean arterial pressure to be greater than 65. Holding off on starting Levophed at this time repeat labs are pending. INITIAL H AND P AND ICU COURSE:  Shayla Peña is a 61-year-old  female transferred to [facility] ICU on 2021 with reported need of Levophed. Patient has a significant history of lifetime nonsmoker, ESRD=CAPD, Essential HPTN, DMT2, Seizure, GERD, Neurogenic bladder Seizure, Bipolar disorder, Dyslipidemia,anxiety, seizures. Robb Carey is a 78year old patient transferred to [facility] from [facility] ED 2021. Arrived with c/o fall onto buttocks. Patient was hypotensive that was responsive to IV fluid resuscitation. Patient offers limited information. Patient had got up to go to the bathroom and she sustained a fall, she is on peritoneal dialysis in which she receives daily, she initially complained of pain to her right hip and low back however she denies that now. 2021 CVC was placed by the critical care team. Nephrology Dr. Phan Unger did evaluate. PD was placed on hold secondary to hypokalemia and hypotension. Midodrine was started. 2021 Patient was given Albumin X2 and 0.9NS fluid bolus. Hypotension still persists. Patient was transferred to the ICU for Levophed gtt to be started. Past Medical History:  See HPI. Family History: Mother and Father . Social History:  Lifetime nonsmoker, denies any ETOH or illicit drug use. ROS   GENERAL: Positive for fatigue, no fever. SKN: No lesions or rashes.   HEAD: No headaches or recent injury  EYES: No acute changes in vision, no diplopia or blurred vision EARS: No hearing loss, no tinnitus  NOSE/THROAT: No rhinorrhea or pharyngitis, no nasal drainage  NECK: No lumps or unusual neck stiffness  PULMONARY: Positive for hypoxia, no orthopnea or paroxysmal nocturnal dyspnea  CARDIAC: Positive for hypotension, No chest pain, pressure,   GI: Positive for CAPD catheter, No abdominal pain, No history melena or hematochezia, no diarrhea or constipation  : Anuric  PERIPHERAL VASCULAR: No intermittent claudication or unusual leg cramps  MUSCULOSKELETAL: Occasional arthralgias, myalgias  NEUROLOGICAL: No Seizures or Syncope  HEMATOLOGIC:  No unusual bruising or bleeding  PSYCH: No homicidal or suicidal ideations.      Scheduled Meds:   norepinephrine-sodium chloride        busPIRone  5 mg Oral BID    fluticasone  2 spray Each Nostril Daily    gabapentin  300 mg Oral Daily    insulin glargine  4 Units Subcutaneous QAM    levothyroxine  25 mcg Oral Daily    meclizine  25 mg Oral TID    megestrol  400 mg Oral Daily    pantoprazole  40 mg Oral QAM AC    oxybutynin  5 mg Oral Daily    polycarbophil  1,875 mg Oral TID WC    polyethylene glycol  17 g Oral Daily    pravastatin  40 mg Oral Nightly    pregabalin  50 mg Oral Daily    sertraline  50 mg Oral Daily    sevelamer  1,600 mg Oral TID WC    sodium chloride  1 g Oral BID    heparin (porcine)  5,000 Units Subcutaneous 3 times per day    insulin lispro  0-12 Units Subcutaneous TID WC    insulin lispro  0-6 Units Subcutaneous Nightly    folbee plus  1 tablet Oral Daily    cefepime  1 g Intravenous Q24H    lidocaine 1 % injection  5 mL Intradermal Once    sodium chloride flush  10 mL Intravenous 2 times per day    potassium bicarb-citric acid  20 mEq Oral Daily    albumin human        midodrine  10 mg Oral TID WC     Continuous Infusions:   sodium chloride 75 mL/hr at 01/01/21 1436    dextrose         PHYSICAL EXAMINATION: T: 98.3. P:  70. RR:  16. B/P:  90/53. FiO2:  Room air. O2 Sat:  98.  I/O:  pending  Body mass index is 29.02 kg/m². GCS:   13  General:   Pale, acutely ill in appearance  HEENT:  normocephalic and atraumatic. No scleral icterus. PERR  Neck: supple. No Thyromegaly. Lungs: clear to auscultation. No retractions  Cardiac: RRR. No JVD. Abdomen: soft. Nontender, CAPD catheter in place  Extremities:  No clubbing, cyanosis, or edema x 4. Vasculature: capillary refill < 3 seconds. Palpable dorsalis pedis pulses. Skin:  warm and dry. Psych:  Alert and oriented x2  Affect appropriate  Lymph:  No supraclavicular adenopathy. Neurologic:  No focal deficit. No seizures. Data: (All radiographs, tracings, PFTs, and imaging are personally viewed and interpreted unless otherwise noted). Recent Results (from the past 24 hour(s))   Culture, Blood 1    Collection Time: 01/01/21  3:09 PM    Specimen: Blood   Result Value Ref Range    Blood Culture, Routine No growth-preliminary     Body fluid cell count with differential    Collection Time: 01/01/21  3:43 PM   Result Value Ref Range    Specimen MUNDO. DIALYSATE     Color COLORLESS     Character, Body Fluid CLEAR     Total Volume Received Body Fluid 50.0 ml    Total Nucleated cells Body Fluid 18 0 - 500 /cumm    Body Fluid RBC < 2000 /cumm    Polymorphonuclear Cells Body Fluid 82.3 %    Mononuclear Cells Body Fluid 17.7 %    Pathologist Review FREDDY WELDON D.O.     Comprehensive Metabolic Panel w/ Reflex to MG    Collection Time: 01/01/21  5:00 PM   Result Value Ref Range    Glucose 100 70 - 108 mg/dL    CREATININE 5.6 (HH) 0.4 - 1.2 mg/dL    BUN 26 (H) 7 - 22 mg/dL    Sodium 137 135 - 145 meq/L    Potassium reflex Magnesium 2.9 (L) 3.5 - 5.2 meq/L    Chloride 101 98 - 111 meq/L    CO2 24 23 - 33 meq/L    Calcium 9.2 8.5 - 10.5 mg/dL    AST 51 (H) 5 - 40 U/L    Alkaline Phosphatase 177 (H) 38 - 126 U/L    Total Protein 4.6 (L) 6.1 - 8.0 g/dL Alb 1.9 (L) 3.5 - 5.1 g/dL    Total Bilirubin 0.4 0.3 - 1.2 mg/dL    ALT 41 11 - 66 U/L   Lactic acid, plasma    Collection Time: 01/01/21  5:00 PM   Result Value Ref Range    Lactic Acid 2.1 0.5 - 2.2 mmol/L   Magnesium    Collection Time: 01/01/21  5:00 PM   Result Value Ref Range    Magnesium 2.4 1.6 - 2.4 mg/dL   CBC    Collection Time: 01/01/21  5:00 PM   Result Value Ref Range    WBC 22.8 (H) 4.8 - 10.8 thou/mm3    RBC 3.13 (L) 4.20 - 5.40 mill/mm3    Hemoglobin 10.5 (L) 12.0 - 16.0 gm/dl    Hematocrit 31.9 (L) 37.0 - 47.0 %    .9 (H) 81.0 - 99.0 fL    MCH 33.5 (H) 26.0 - 33.0 pg    MCHC 32.9 32.2 - 35.5 gm/dl    RDW-CV 17.7 (H) 11.5 - 14.5 %    RDW-SD 64.2 (H) 35.0 - 45.0 fL    Platelets 426 215 - 881 thou/mm3    MPV 10.1 9.4 - 12.4 fL   Anion Gap    Collection Time: 01/01/21  5:00 PM   Result Value Ref Range    Anion Gap 12.0 8.0 - 16.0 meq/L   Glomerular Filtration Rate, Estimated    Collection Time: 01/01/21  5:00 PM   Result Value Ref Range    Est, Glom Filt Rate 8 (A) ml/min/1.73m2   POCT glucose    Collection Time: 01/01/21  7:43 PM   Result Value Ref Range    POC Glucose 114 (H) 70 - 108 mg/dl   Culture, Bottle, Body Fluid    Collection Time: 01/01/21  8:05 PM    Specimen: Peritoneal Dialysis Fluid   Result Value Ref Range    Body Fluid Culture, Sterile No growth-preliminary     MRSA by PCR    Collection Time: 01/02/21  2:25 AM   Result Value Ref Range    MRSA SCREEN RT-PCR POSITIVE (A)    VRE Screen by PCR    Collection Time: 01/02/21  2:25 AM    Specimen: Rectal Swab   Result Value Ref Range    Vancomycin Resistant Enterococcus NEGATIVE    Basic Metabolic Panel    Collection Time: 01/02/21  3:48 AM   Result Value Ref Range    Sodium 136 135 - 145 meq/L    Potassium 3.5 3.5 - 5.2 meq/L    Chloride 105 98 - 111 meq/L    CO2 22 (L) 23 - 33 meq/L    Glucose 73 70 - 108 mg/dL    BUN 26 (H) 7 - 22 mg/dL    CREATININE 5.0 (HH) 0.4 - 1.2 mg/dL    Calcium 9.4 8.5 - 10.5 mg/dL   CBC Auto Differential Collection Time: 01/02/21  3:48 AM   Result Value Ref Range    WBC 18.4 (H) 4.8 - 10.8 thou/mm3    RBC 2.62 (L) 4.20 - 5.40 mill/mm3    Hemoglobin 8.8 (L) 12.0 - 16.0 gm/dl    Hematocrit 27.3 (L) 37.0 - 47.0 %    .2 (H) 81.0 - 99.0 fL    MCH 33.6 (H) 26.0 - 33.0 pg    MCHC 32.2 32.2 - 35.5 gm/dl    RDW-CV 17.9 (H) 11.5 - 14.5 %    RDW-SD 65.7 (H) 35.0 - 45.0 fL    Platelets 166 921 - 840 thou/mm3    MPV 10.1 9.4 - 12.4 fL    Seg Neutrophils 82.2 %    Lymphocytes 10.2 %    Monocytes 5.0 %    Eosinophils 0.7 %    Basophils 0.3 %    Immature Granulocytes 1.6 %    Platelet Estimate ADEQUATE Adequate    Segs Absolute 15.1 (H) 1.8 - 7.7 thou/mm3    Lymphocytes Absolute 1.9 1.0 - 4.8 thou/mm3    Monocytes Absolute 0.9 0.4 - 1.3 thou/mm3    Eosinophils Absolute 0.1 0.0 - 0.4 thou/mm3    Basophils Absolute 0.1 0.0 - 0.1 thou/mm3    Immature Grans (Abs) 0.29 (H) 0.00 - 0.07 thou/mm3    nRBC 0 /100 wbc    Anisocytosis PRESENT Absent    BASOPHILIA 1+ Absent    Toxic Granulation Present Absent   Anion Gap    Collection Time: 01/02/21  3:48 AM   Result Value Ref Range    Anion Gap 9.0 8.0 - 16.0 meq/L   Glomerular Filtration Rate, Estimated    Collection Time: 01/02/21  3:48 AM   Result Value Ref Range    Est, Glom Filt Rate 9 (A) ml/min/1.73m2   Scan of Blood Smear    Collection Time: 01/02/21  3:48 AM   Result Value Ref Range    SCAN OF BLOOD SMEAR see below    POCT glucose    Collection Time: 01/02/21  9:05 AM   Result Value Ref Range    POC Glucose 69 (L) 70 - 108 mg/dl   Potassium    Collection Time: 01/02/21 11:47 PM   Result Value Ref Range    Potassium 3.5 3.5 - 5.2 meq/L   ? Cardiac Telemetry NSR        Seen with multidisciplinary ICU team yes.   Meets Continued ICU Level Care Criteria:    [x] Yes   [] No - Transfer Planned to listed location:  [] HOSPITALIST CONTACTED-      Case and plan discussed with Dr. Thad Borges    Electronically signed by YESENIA Giles - [facility]  CRITICAL CARE SPECIALIST

## 2021-01-02 NOTE — PROGRESS NOTES
Patient is persistently hypotensive despite fluid resuscitation, additional midodrine, and albumin. Patient to be transferred to ICU for pressure support. CVC was placed during the day for IV access.     Isaiah Rosario PA-C

## 2021-01-02 NOTE — FLOWSHEET NOTE
rounded on unit. Pt was unresponsive. No family was present.  prayed with the pt and offered words of comfort. Spiritual care to continue to provide support and encouragement.        01/02/21 1110   Encounter Summary   Services provided to: Patient   Referral/Consult From: Rounding   Support System Unknown   Continue Visiting Yes  (1/2)   Complexity of Encounter Low   Length of Encounter 15 minutes   Routine   Type Initial   Assessment Unable to respond   Intervention Prayer   Outcome Did not respond

## 2021-01-02 NOTE — PROGRESS NOTES
This nurse notes the [facility]

## 2021-01-02 NOTE — PROCEDURES
Skin prep agent dried: skin prep agent completely dried prior to procedure  Sterile barriers: all five maximum sterile barriers used - cap, mask, sterile gown, sterile gloves, and large sterile sheet  Hand hygiene: hand hygiene performed prior to central venous catheter insertion  Location details: right internal jugular  Patient position: flat  Catheter type: triple lumen  Catheter size: 7 Fr  Pre-procedure: landmarks identified  Ultrasound guidance: yes  Sterile ultrasound techniques: sterile gel and sterile probe covers were used  Number of attempts: 2  Successful placement: yes  Post-procedure: line sutured and dressing applied  Assessment: blood return through all ports,  placement verified by x-ray,  no pneumothorax on x-ray and free fluid flow  Patient tolerance: patient tolerated the procedure well with no immediate complications  Comments: Estimated blood loss 5 to 10 cc          Steve Becker MD  1/1/2021

## 2021-01-02 NOTE — PROGRESS NOTES
Writer transported patient to 4D05 with meds, chart, and all of belongings. Primary nurse voiced no needs.  6K tele box returned to [id number]

## 2021-01-02 NOTE — PROGRESS NOTES
[facility]         Renal Inpatient Follow-Up note         1/2/2021 10:56 AM    Pt Name:   Uriel Gunn  YOB: 1950  Attending:   Damon Shahid MD    Chief Complaint : Uriel Gunn is a 78 y.o. female being followed by nephrology for ESRD on hemodialysis and hypo kalemia    Interval History :   Patient seen and examined by me. No distress  Resting comfortably denies any chest pain no shortness of breath  Not eating or drinking much.      Scheduled Medications :    norepinephrine-sodium chloride        busPIRone  5 mg Oral BID    fluticasone  2 spray Each Nostril Daily    gabapentin  300 mg Oral Daily    insulin glargine  4 Units Subcutaneous QAM    levothyroxine  25 mcg Oral Daily    meclizine  25 mg Oral TID    megestrol  400 mg Oral Daily    pantoprazole  40 mg Oral QAM AC    oxybutynin  5 mg Oral Daily    polycarbophil  1,875 mg Oral TID WC    polyethylene glycol  17 g Oral Daily    pravastatin  40 mg Oral Nightly    pregabalin  50 mg Oral Daily    sertraline  50 mg Oral Daily    sevelamer  1,600 mg Oral TID WC    sodium chloride  1 g Oral BID    heparin (porcine)  5,000 Units Subcutaneous 3 times per day    insulin lispro  0-12 Units Subcutaneous TID WC    insulin lispro  0-6 Units Subcutaneous Nightly    folbee plus  1 tablet Oral Daily    cefepime  1 g Intravenous Q24H    lidocaine 1 % injection  5 mL Intradermal Once    sodium chloride flush  10 mL Intravenous 2 times per day    potassium bicarb-citric acid  20 mEq Oral Daily    midodrine  10 mg Oral TID WC      sodium chloride 75 mL/hr at 01/01/21 1436    dextrose         Vitals :  BP (!) 95/54   Pulse 62   Temp 98.4 °F (36.9 °C) (Oral)   Resp 10   Ht 5' 6\" (1.676 m)   Wt 179 lb 12.8 oz (81.6 kg)   SpO2 99%   BMI 29.02 kg/m²     24HR INTAKE/OUTPUT:      Intake/Output Summary (Last 24 hours) at 1/2/2021 1056  Last data filed at 1/2/2021 0442  Gross per 24 hour   Intake 1000 ml Output 800 ml   Net 200 ml     Last 3 weights  Wt Readings from Last 3 Encounters:   01/01/21 179 lb 12.8 oz (81.6 kg)   08/28/20 189 lb 3.2 oz (85.8 kg)   06/02/20 186 lb 9.6 oz (84.6 kg)           Physical Exam :  General Appearance:  Well developed. No distress  Mouth/Throat:  Oral mucosa moist  Neck:  Supple, no JVD  Lungs:  Breath sounds: clear  Heart[de-identified]  S1,S2 heard  Abdomen:  Soft, non - tender  Musculoskeletal:  Edema -no significant edema noted         Last 3 CBC   Recent Labs     01/01/21  1700 01/02/21  0348   WBC 22.8* 18.4*   RBC 3.13* 2.62*   HGB 10.5* 8.8*   HCT 31.9* 27.3*    201     Last 3 CMP  Recent Labs     01/01/21  1700 01/02/21  0348 01/02/21  2347    136  --    K 2.9* 3.5 3.5    105  --    CO2 24 22*  --    BUN 26* 26*  --    CREATININE 5.6* 5.0*  --    CALCIUM 9.2 9.4  --    LABALBU 1.9*  --   --    BILITOT 0.4  --   --              ASSESSMENT / Plan   1 Renal -ESRD on peritoneal dialysis  ? Volume status appears reasonable, PD on hold at this time. ? No signs of peritonitis  ? Continue IV fluids we will mostly start her on PD tomorrow    2 Electrolytes -hypokalemia much improved, on 20 KCl p.o. daily  3 Hypotension on midodrine doing better  4 Lactic acidosis  5 Status post fall  6 Hx of diabetes mellitus  7 Meds reviewed and discussed with patient and nursing staff    LIANNE Clark D.  [facility].

## 2021-01-02 NOTE — CONSULTS
Inpatient Consultation    [place] (1950)  1/2/2021    Reason for Consult:  T12 VCF  Requesting Physician: Dagmar Jaimes CNP    CHIEF COMPLAINT:  Hypotension    History Obtained From:  Patient and EMR    HISTORY OF PRESENT ILLNESS:                The patient is a 78 y.o. female who presents with above chief complaint. Patient admitted yesterday after being transferred from [facility] after suffering a fall at a nursing home. Patient HPI limited due to patient's mental status. Unsure how patient fell, but states she fell forwards onto her knees. She complaint of new back pain. She is currently on bed pain and complaint of numbness/tingling in feet. CT scan showed an acute T12 VCF. She is currently in ICU for hypotension.      Past Medical History:        Diagnosis Date    Adjustment disorder with mixed anxiety and depressed mood     Anemia in chronic kidney disease(285.21)     Anxiety     Arthritis     Asthma     Bipolar 1 disorder (HCC)     Bradycardia     CHF (congestive heart failure) (HCC)     Chronic diastolic heart failure (HCC)     Chronic kidney disease, stage III (moderate)     Depression     Difficulty walking     Dizziness and giddiness     GERD (gastroesophageal reflux disease)     Gout     Hyperkalemia     Hyperlipidemia     Hypertension     Hypertensive urgency     Hypothyroidism     Low back pain     Major depressive disorder, recurrent, mild (HCC)     Muscle weakness (generalized)     Neurogenic bladder     Pleural effusion 06/16/2019    R pleurex catheter placed    Pneumonia     Seizure ([address] 01/2018    Type II or unspecified type diabetes mellitus without mention of complication, not stated as uncontrolled     Urine retention      Past Surgical History:        Procedure Laterality Date    BACK SURGERY      CERVICAL FUSION      CHOLECYSTECTOMY      COLONOSCOPY      ENDOSCOPY, COLON, DIAGNOSTIC      FRACTURE SURGERY       Current Medications: folbee plus tablet 1 tablet, 1 tablet, Oral, Daily  cefepime (MAXIPIME) 1 g IVPB minibag, 1 g, Intravenous, Q24H  lidocaine PF 1 % injection 5 mL, 5 mL, Intradermal, Once  sodium chloride flush 0.9 % injection 10 mL, 10 mL, Intravenous, 2 times per day  sodium chloride flush 0.9 % injection 10 mL, 10 mL, Intravenous, PRN  potassium bicarb-citric acid (EFFER-K) effervescent tablet 20 mEq, 20 mEq, Oral, Daily  midodrine (PROAMATINE) tablet 10 mg, 10 mg, Oral, TID WC  Allergies:  Aspirin, Naproxen, Pcn [penicillins], Vicodin [hydrocodone-acetaminophen], Vilazodone, and Wellbutrin [bupropion]    Social History:   TOBACCO:   reports that she has never smoked. She has never used smokeless tobacco.  ETOH:   reports no history of alcohol use. DRUGS:   reports no history of drug use. Family History:       Problem Relation Age of Onset    High Blood Pressure Mother     Diabetes Mother     Cancer Mother     Heart Attack Mother     Stroke Father     High Blood Pressure Father     Anemia Father        REVIEW OF SYSTEMS:    Limited due to mental status  See HPI    PHYSICAL EXAM:      CONSTITUTIONAL:  awake and alert, resting in bed. somewhat cooperative with exam  HEAD: atraumatic, normocephalic  LUNGS:  No respiratory distress. CTA bilaterally per H&P  CARDIOVASCULAR:  RRR, no murmur per H&P  ABDOMEN:  Soft, non-distended, non-tender  MUSCULOSKELETAL:  Patient requires 2 to turn in bed, able to move lower extremities, weak with strength testing, patient not fully following commands  NEUROLOGIC:  Awake, alert.  Sensory intact to touch LE    DATA:    CBC:   Lab Results   Component Value Date    WBC 18.4 01/02/2021    RBC 2.62 01/02/2021    HGB 8.8 01/02/2021    HCT 27.3 01/02/2021    .2 01/02/2021    MCH 33.6 01/02/2021    MCHC 32.2 01/02/2021    RDW 13.7 06/07/2016     01/02/2021    MPV 10.1 01/02/2021     WBC:    Lab Results   Component Value Date    WBC 18.4 01/02/2021     Hemoglobin/Hematocrit: Lab Results   Component Value Date    HGB 8.8 01/02/2021    HCT 27.3 01/02/2021     CMP:    Lab Results   Component Value Date     01/02/2021    K 3.5 01/02/2021    K 2.9 01/01/2021     01/02/2021    CO2 22 01/02/2021    BUN 26 01/02/2021    CREATININE 5.0 01/02/2021    LABGLOM 9 01/02/2021    GLUCOSE 73 01/02/2021    PROT 4.6 01/01/2021    LABALBU 1.9 01/01/2021    CALCIUM 9.4 01/02/2021    BILITOT 0.4 01/01/2021    ALKPHOS 177 01/01/2021    AST 51 01/01/2021    ALT 41 01/01/2021         Radiology:   CT Chest 1/2/2021  Impression   Acute T12 vertebral body fracture as described. Atheromatous disease. Pleural effusions. IMPRESSION/RECOMMENDATIONS:    Assessment:   1) T12 VCF     Plan:  1) Discussed w/ Dr. Gavino May. Patient has an acute T12 VCF seen on CT scan. Recommend TLSO brace to be worn when out of bed and ambulating. No surgical intervention planned at this time. Follow up in the office 2-4 weeks at discharge.      Chantelle Boudreaux PA-C

## 2021-01-03 NOTE — PROGRESS NOTES
Hospital Course:  78 y.o. female who presented to [address] [place] [address] with fall at nursing home; this patient was a transfer in from [facility] [facility]; patient offers limited information as she is alert to her name and month however she thinks she is at the nursing home; per review of old records the patient got up to go to the bathroom and she sustained a fall, she is on peritoneal dialysis in which she receives daily, she initially complained of pain to her right hip and low back however she denies that now the nursing home states she has had some intermittent confusion; at [address] [facility] she was given Maxipime along with vancomycin and potassium; when she presented here she was hypotensive; it was reported her initial blood pressure at [facility] [facility] was 76/48 and they did give her a fluid challenge which improved her blood pressure; again she looks extremely dry on exam; lab work will be repeated, she will be monitored closely, she is being admitted to the hospital service for further care and evaluation. Subjective: no acute events overnight. Mental status appears to wax and wane. Pt alert to self, place and date. Tolerating PO intake. No fevers or chills. No chest pain. No sob.        Medications:  Reviewed    Infusion Medications    dextrose       Scheduled Medications    dianeal lo-ivonne 1.5%  2,000 mL Intraperitoneal Q6H    influenza virus vaccine  0.5 mL Intramuscular Prior to discharge    busPIRone  5 mg Oral BID    fluticasone  2 spray Each Nostril Daily    gabapentin  300 mg Oral Daily    levothyroxine  25 mcg Oral Daily    meclizine  25 mg Oral TID    megestrol  400 mg Oral Daily    pantoprazole  40 mg Oral QAM AC    oxybutynin  5 mg Oral Daily    polycarbophil  1,875 mg Oral TID WC    polyethylene glycol  17 g Oral Daily    pravastatin  40 mg Oral Nightly    pregabalin  50 mg Oral Daily    sertraline  50 mg Oral Daily    sevelamer  1,600 mg Oral TID WC  sodium chloride  1 g Oral BID    heparin (porcine)  5,000 Units Subcutaneous 3 times per day    insulin lispro  0-12 Units Subcutaneous TID WC    insulin lispro  0-6 Units Subcutaneous Nightly    folbee plus  1 tablet Oral Daily    cefepime  1 g Intravenous Q24H    lidocaine 1 % injection  5 mL Intradermal Once    sodium chloride flush  10 mL Intravenous 2 times per day    potassium bicarb-citric acid  20 mEq Oral Daily    midodrine  10 mg Oral TID      PRN Meds: promethazine **OR** ondansetron, acetaminophen **OR** acetaminophen, glucose, dextrose, glucagon (rDNA), dextrose, sodium chloride flush      Intake/Output Summary (Last 24 hours) at 1/3/2021 1523  Last data filed at 1/3/2021 1353  Gross per 24 hour   Intake 4115.49 ml   Output 300 ml   Net 3815.49 ml       Diet:  DIET RENAL;    Exam:  BP (!) 124/58   Pulse 66   Temp 97.4 °F (36.3 °C) (Oral)   Resp 18   Ht 5' 6\" (1.676 m)   Wt 180 lb 1.6 oz (81.7 kg)   SpO2 96%   BMI 29.07 kg/m²     General appearance: chronically ill appearing elderly female   HEENT: Pupils equal, round, and reactive to light. Conjunctivae/corneas clear. Neck: Supple, with full range of motion. No jugular venous distention. Trachea midline. Respiratory:  Normal respiratory effort. Clear to auscultation, bilaterally without Rales/Wheezes/Rhonchi. Cardiovascular: Regular rate and rhythm with normal S1/S2 without murmurs, rubs or gallops. Abdomen: Soft, non-tender, non-distended with normal bowel sounds. Musculoskeletal: passive and active ROM x 4 extremities. Blister noted to the dorsal aspect of the left foot  Skin: Skin color, texture, turgor normal.  No rashes or lesions. Neurologic:  Neurovascularly intact without any focal sensory/motor deficits.  Cranial nerves: II-XII intact, grossly non-focal.  Psychiatric: pt had to be reoriented multiple times, however is alert to self, place and date   Capillary Refill: Brisk,< 3 seconds Peripheral Pulses: +2 palpable, equal bilaterally       Labs:   Recent Labs     01/03/21  0608   WBC 13.3*   HGB 9.5*   HCT 29.7*        Recent Labs     01/03/21  0608      K 3.7      CO2 20*   BUN 31*   CREATININE 6.3*   CALCIUM 9.4     Recent Labs     01/01/21  1700   AST 51*   ALT 41   BILITOT 0.4   ALKPHOS 177*     No results for input(s): INR in the last 72 hours. No results for input(s): Barbara Terrence in the last 72 hours. Urinalysis:      Lab Results   Component Value Date    NITRU NEGATIVE 06/22/2019    WBCUA 0-2 06/22/2019    BACTERIA NONE 06/22/2019    RBCUA 5-10 06/22/2019    BLOODU NEGATIVE 06/22/2019    GLUCOSEU NEGATIVE 06/22/2019       Radiology:   All imaging reviewed     Diet: DIET RENAL;      Code Status: Full Code            Electronically signed by Judie Dong MD on 1/3/2021 at 3:23 PM

## 2021-01-03 NOTE — PROGRESS NOTES
This RN called patient's son, Joslyn Sena, to complete MRI screening form for patient. Updated him on plan of care and patient status.

## 2021-01-03 NOTE — PROGRESS NOTES
Wt Readings from Last 3 Encounters:   01/03/21 180 lb 1.6 oz (81.7 kg)   08/28/20 189 lb 3.2 oz (85.8 kg)   06/02/20 186 lb 9.6 oz (84.6 kg)           Physical Exam :  General Appearance:  Well developed. No distress  Mouth/Throat:  Oral mucosa moist  Neck:  Supple, no JVD  Lungs:  Breath sounds: clear  Heart[de-identified]  S1,S2 heard  Abdomen:  Soft, non - tender  Musculoskeletal:  Edema -no significant edema noted         Last 3 CBC   Recent Labs     01/01/21  1700 01/02/21  0348 01/02/21  1505 01/03/21  0608   WBC 22.8* 18.4*  --  13.3*   RBC 3.13* 2.62*  --  2.83*   HGB 10.5* 8.8* 9.9* 9.5*   HCT 31.9* 27.3* 30.8* 29.7*    201  --  221     Last 3 CMP  Recent Labs     01/01/21  1700 01/02/21  0348 01/02/21  2347 01/03/21  0608    136  --  138   K 2.9* 3.5 3.5 3.7    105  --  107   CO2 24 22*  --  20*   BUN 26* 26*  --  31*   CREATININE 5.6* 5.0*  --  6.3*   CALCIUM 9.2 9.4  --  9.4   LABALBU 1.9*  --   --   --    BILITOT 0.4  --   --   --              ASSESSMENT / Plan   1 Renal -ESRD on peritoneal dialysis  ? Volume status appears reasonable, BP is better  ? No signs of peritonitis. Stop IVF and start dianeal 1.5% every 6 hours  ? Follow BMP    2 Electrolytes - WNL  3 Hypotension on midodrine doing better  4 Lactic acidosis  5 Status post fall  6 Hx of diabetes mellitus  7 Leukocytosis not clear etiology currently trending down may be just volume depletion  8 Meds reviewed and discussed with patient and nursing staff    LIANNE Archuleta D.  [facility].

## 2021-01-04 NOTE — CONSULTS
NEUROLOGY CONSULT NOTE      Requesting Physician: Barbara Denny MD    Reason for Consult:  Evaluate for acute encephalopathy    History of Present Illness:  Radha Tay is a 78 y.o. female admitted to [facility] on 1/1/2021.    78year old woman with hx of depression, bipolar, CHF, lives in a nursing home, ESRD on peritoneal dialysis,  she came in 4 days ago c/o right hip and low back pain, but also noted to have intermittent confusion, she went to [facility] and was given cefepime and vanco, as she was found to be in a septic shock. Pt was in the ICU and came to floor yesterday. MRI done and showed no acute changes, but no good explanation of her confusion. Reports no chest pain. No shortness of breath with exertion. Reports no neck pain. No vision changes. No dysphagia. No fever. No rash. No weight loss.     Past Medical History:        Diagnosis Date    Adjustment disorder with mixed anxiety and depressed mood     Anemia in chronic kidney disease(285.21)     Anxiety     Arthritis     Asthma     Bipolar 1 disorder (HCC)     Bradycardia     CHF (congestive heart failure) (HCC)     Chronic diastolic heart failure (HCC)     Chronic kidney disease, stage III (moderate)     Depression     Difficulty walking     Dizziness and giddiness     GERD (gastroesophageal reflux disease)     Gout     Hyperkalemia     Hyperlipidemia     Hypertension     Hypertensive urgency     Hypothyroidism     Low back pain     Major depressive disorder, recurrent, mild (HCC)     Muscle weakness (generalized)     Neurogenic bladder     Pleural effusion 06/16/2019    R pleurex catheter placed    Pneumonia     Seizure ([facility] [address] 01/2018    Type II or unspecified type diabetes mellitus without mention of complication, not stated as uncontrolled     Urine retention            Procedure Laterality Date    BACK SURGERY      CERVICAL FUSION      CHOLECYSTECTOMY      COLONOSCOPY  ENDOSCOPY, COLON, DIAGNOSTIC      FRACTURE SURGERY         Allergies:     Allergies   Allergen Reactions    Aspirin     Naproxen      Per ECF list    Pcn [Penicillins] Hives    Vicodin [Hydrocodone-Acetaminophen] Itching    Vilazodone      Added per ECF allergy list    Wellbutrin [Bupropion] Other (See Comments)     Dreams about mice         Current Medications:       midodrine (PROAMATINE) tablet 5 mg, TID WC      potassium bicarb-citric acid (EFFER-K) effervescent tablet 40 mEq, Daily      dianeal lo-ivonne 1.5% 2,000 mL, Q6H      influenza quadrivalent split vaccine (FLUZONE;FLUARIX;FLULAVAL;AFLURIA) injection 0.5 mL, Prior to discharge      busPIRone (BUSPAR) tablet 5 mg, BID      fluticasone (FLONASE) 50 MCG/ACT nasal spray 2 spray, Daily      gabapentin (NEURONTIN) capsule 300 mg, Daily      levothyroxine (SYNTHROID) tablet 25 mcg, Daily      meclizine (ANTIVERT) tablet 25 mg, TID      megestrol (MEGACE) 40 MG/ML suspension 400 mg, Daily      pantoprazole (PROTONIX) tablet 40 mg, QAM AC      oxybutynin (DITROPAN-XL) extended release tablet 5 mg, Daily      polycarbophil (FIBERCON) tablet 1,875 mg, TID WC      polyethylene glycol (GLYCOLAX) packet 17 g, Daily      pravastatin (PRAVACHOL) tablet 40 mg, Nightly      pregabalin (LYRICA) capsule 50 mg, Daily      sertraline (ZOLOFT) tablet 50 mg, Daily      sevelamer (RENVELA) tablet 1,600 mg, TID WC      sodium chloride tablet 1 g, BID      promethazine (PHENERGAN) tablet 12.5 mg, Q6H PRN    Or      ondansetron (ZOFRAN) injection 4 mg, Q6H PRN      acetaminophen (TYLENOL) tablet 650 mg, Q6H PRN    Or      acetaminophen (TYLENOL) suppository 650 mg, Q6H PRN      heparin (porcine) injection 5,000 Units, 3 times per day      glucose (GLUTOSE) 40 % oral gel 15 g, PRN      dextrose 50 % IV solution, PRN      glucagon (rDNA) injection 1 mg, PRN      dextrose 5 % solution, PRN   insulin lispro (HUMALOG) injection vial 0-12 Units, TID WC      insulin lispro (HUMALOG) injection vial 0-6 Units, Nightly      folbee plus tablet 1 tablet, Daily      cefepime (MAXIPIME) 1 g IVPB minibag, Q24H      lidocaine PF 1 % injection 5 mL, Once      sodium chloride flush 0.9 % injection 10 mL, 2 times per day      sodium chloride flush 0.9 % injection 10 mL, PRN         Social History:  Social History     Tobacco Use   Smoking Status Never Smoker   Smokeless Tobacco Never Used     Social History     Substance and Sexual Activity   Alcohol Use No     Social History     Substance and Sexual Activity   Drug Use No         Family History:       Problem Relation Age of Onset    High Blood Pressure Mother     Diabetes Mother     Cancer Mother     Heart Attack Mother     Stroke Father     High Blood Pressure Father     Anemia Father        Review of Systems:  All systems reviewed and are all negative except what is mentioned in history of present illness. I reviewed the patient PMH,medications, family history, social history. Physical Exam:  /65   Pulse 82   Temp 98.7 °F (37.1 °C) (Oral)   Resp 18   Ht 5' 6\" (1.676 m)   Wt 177 lb (80.3 kg)   SpO2 97%   BMI 28.57 kg/m²  I Body mass index is 28.57 kg/m². I   Wt Readings from Last 1 Encounters:   01/04/21 177 lb (80.3 kg)           General appearance - alert, in no distress, oriented to person, place, and but not time and weight  Mental status -Level of Alertness: awake  Memory: abnormal - poor, does not know why she comes in  Language: normal. Mood is normal  Neck - supple, no neck adenopathy, carotids upstroke normal bilaterally, no carotid bruits. There is no LAP in the neck. There is no thyroid enlargement.      Neurological -   Cranial Ioktll-WS-LRS:   Cranial nerve II: Normal. There is full visual fields  Cranial nerve III: Pupils: equal, round, reactive to light Cranial nerves III, IV, VI: Extraocular Movements: intact   Cranial nerve V: Facial sensation: intact   Cranial nerve VII:Facial strength: intact   Cranial nerve VIII: Hearing: intact  Cranial nerve IX: Palate Elevation intact bilaterally  Cranial nerve XI: Shoulder shrug intact bilaterally  Cranial nerve XII: Tongue midline   neck supple without rigidity    Motor exam is 5/5 in the upper and lower extremities. Normal muscle tone. There is no muscle atrophy. There is no muscle fasciculation   Sensory is intact    Coordination: finger to nose intact  Gait and station not tested  Abnormal movement none. Long tracts are intact    Skin - no rashes or lesions, warm and dry to touch  Superficial temporal artery pulses are normal.   Musculoskeletal: Has no hand arthritis, no limitation of ROM in any of the four extremities, . no joint tenderness, deformity or swelling. There is no leg edema. The Heart was regular in rate and rhythm. No heart murmur  Chest- Clear  Abdomen: soft,      Labs:    CBC:   Recent Labs     01/02/21  0348 01/02/21  1505 01/03/21  0608 01/04/21  0430   WBC 18.4*  --  13.3* 11.2*   HGB 8.8* 9.9* 9.5* 9.8*     --  221 224   .2*  --  104.9* 105.1*   MCH 33.6*  --  33.6* 33.4*   MCHC 32.2  --  32.0* 31.8*     CMP:  Recent Labs     01/02/21  0348 01/02/21  2347 01/03/21  0608 01/04/21  0430     --  138 139   K 3.5 3.5 3.7 3.3*     --  107 107   CO2 22*  --  20* 22*   BUN 26*  --  31* 31*   CREATININE 5.0*  --  6.3* 6.2*   LABGLOM 9*  --  7* 7*   GLUCOSE 73  --  91 127*   CALCIUM 9.4  --  9.4 9.3     Liver:   Recent Labs     01/04/21  0430   AST 32   ALT 36   ALKPHOS 154*   PROT 4.5*   LABALBU 2.3*   BILITOT 0.4     MRI BRAIN:  No results found for this or any previous visit. No results found for this or any previous visit. No results found for this or any previous visit. No results found for this or any previous visit. Results for orders placed during the hospital encounter of 01/01/21   MRI BRAIN WO CONTRAST    Narrative This is a preliminary interpretation. This examination will be officially read on 1/4/2021 by the neuroradiologist.    There is white matter disease however there is no acute infarct. There is also a polyp or mucous retention cyst within the right frontal sinus, mild mucosal thickening within several of the ethmoid air cells and a small focus of increased T2-weighted signal suggesting fluid within the left mastoid air cells. FINAL REPORT:    PROCEDURE: MRI BRAIN WO CONTRAST    INDICATION:  rule out cva. Altered mental status. Fall at nursing home with intermittent spells of confusion. COMPARISON: CT head dated 7/12/2019. TECHNIQUE: Multiplanar and multiple spin echo MRI images were obtained of the brain without contrast.    FINDINGS:  There is stable moderate temporal predominant volume loss which is mildly asymmetrically prominent on the left. There are mild scattered focal areas of T2/FLAIR prolongation in the periventricular, subcortical deep white matter. No intra or extra-axial   mass is identified. No focal areas of restricted diffusion are present. The major vascular flow voids appear patent. The patient is status post bilateral lens extractions. Orbits are otherwise unremarkable. Paranasal sinuses are clear. There is trace fluid in the left mastoid air cells. Impression    1. No evidence of acute intracranial abnormality. 2. Mild severity chronic microvascular angiopathy superimposed on moderate temporal predominant volume loss which is asymmetrically prominent on the left. **This report has been created using voice recognition software. It may contain minor errors which are inherent in voice recognition technology. **    Final report electronically signed by Dr. Opal Grover MD on 1/3/2021 4:37 PM     No results found for this or any previous visit. No results found for this or any previous visit. Results for orders placed during the hospital encounter of 07/12/19   CT HEAD WO CONTRAST    Narrative PROCEDURE: CT HEAD WO CONTRAST    CLINICAL INFORMATION: patient with headache. Altered mental status. COMPARISON: CT of the head dated June 7, 2016. TECHNIQUE: Noncontrast 5 mm axial images were obtained through the brain. Coronal and sagittal reformats were also performed. All CT scans at this facility use dose modulation, iterative reconstruction, and/or weight-based dosing when appropriate to reduce radiation dose to as low as reasonably achievable. FINDINGS:  The images are degraded by motion artifacts which limits detailed evaluation. There is prominence of the sulcal spaces and ventricular system secondary to generalized, age-related parenchymal volume loss. Age-related mineralization is again noted within the bilateral basal ganglia Patchy areas of low-attenuation throughout the   periventricular and deep cerebral white matter appear similar to the prior exam and likely correspond to sequela of chronic microvascular ischemic change. No acute intracranial hemorrhage is seen. No mass, mass effect or extra-axial fluid collection is   identified. The ventricles are midline without evidence of hydrocephalus. The basal cisterns are patent. Atherosclerotic vascular calcifications are present at the skull base. The visualized orbits, temporal bone structures and paranasal sinuses are unremarkable. The calvarium is intact without acute fracture or aggressive, bony destructive process. Impression  No acute intracranial abnormality is identified within the limits of a motion degraded exam. Senescent changes are again noted. If there is continued clinical concern for acute ischemia, MRI is more sensitive and may be considered. **This report has been created using voice recognition software. It may contain minor errors which are inherent in voice recognition technology. **    Final report electronically signed by Dr. Ishaan Banda on 7/13/2019 12:12 PM         We reviewed the patient records and available information in the EHR       Impression:  78year old woman with ESRD on PD, depression, bipolar, came in for septic shock from unknown source. Recommendations:    - it is still unclear where her infection came from  - however, her WBC is normalizing  - patient is still confused and encephalopathic, her baseline was normal  - I suspect may have contributed to it and make worse by the underlying infection  - recommend switching cefepime to another abx, defer to primary team, cefepime induced encephalopathy will take a few days to clear up, will get her an EEG tomorrow to also look into it  - her worsening creatinine could also be a contributing factors  - overall this is a metabolic encephalopathy                                            1. Discussed with primary service. 2. Please call if any questions. Time spent was at least 63 min of time evaluating patient, discussion with staff,  planning for treatment and evaluation. The time was spent examining patient, reviewing the images personally, reviewing the chart, perform high complexity decision making and speaking with the nursing staff regarding recommendations.      Electronically signed by Eliel Moses MD on 1/4/2021 at 3:56 PM    Ken Johns MD  Attending Neurologist/Neurointensivist

## 2021-01-04 NOTE — PROGRESS NOTES
This RN called [facility] asking about patient baseline. They endorse patient has trouble gripping with hands but usually feeds herself. They do endorse intermittent confusion in morning and at night. Tremors, shaking, poor motor control new per [facility] staff. Updated Dr. Leydi Philip, neurology consulted. Consult paged to Dr. Karri Carballo with nuerology.

## 2021-01-04 NOTE — DISCHARGE INSTR - COC
Acute on chronic respiratory failure with hypoxia ([facility]) J96.21    Chronic diastolic CHF (congestive heart failure) ([facility]) I50.32    LOUISE (acute kidney injury) ([facility] [address] N17.9    Essential hypertension I10    Hypothyroidism E03.9    Diabetic nephropathy associated with type 2 diabetes mellitus ([facility]) S89.60    Metabolic acidosis W85.4    Leukocytosis D72.829    Debility R53.81    Bipolar 1 disorder ([facility]) F31.9    Mild mitral regurgitation I34.0    Mild tricuspid regurgitation I07.1    Chronic pain syndrome G89.4    Chronic bilateral pleural effusions J90    (HFpEF) heart failure with preserved ejection fraction ([facility]) I50.30    Atelectasis J98.11    Hyponatremia E87.1    Chronic anemia D64.9    Hyperlipidemia E78.5    Constipation K59.00    Shortness of breath R06.02    Hyperkalemia E87.5    Infiltrate noted on imaging study R93.89    Moderate malnutrition ([facility]) E44.0    History of pleural effusion Z87.09    ESRD on peritoneal dialysis ([facility]) N18.6, Z99.2    DM type 2 with diabetic peripheral neuropathy ([facility]) E11.42    Diabetic retinopathy of both eyes associated with type 2 diabetes mellitus ([facility]) E11.319    Sepsis ([facility]) A41.9    Generalized abdominal pain R10.84    Restrictive lung disease J98.4    Chronic sinus bradycardia R00.1    PAD (peripheral artery disease) ([facility]) I73.9    Obesity (BMI 30-39. 9) E66.9    Pulmonary nodule R91.1    Physical deconditioning R53.81       Isolation/Infection:   Isolation            Contact          Patient Infection Status       Infection Onset Added Last Indicated Last Indicated By Review Planned Expiration Resolved Resolved By    MRSA 01/25/20 01/25/20 01/02/21 MRSA by PCR        Nares 1/2020    Resolved    COVID-19 Rule Out 08/28/20 08/28/20 08/28/20 COVID-19 (Ordered)   08/28/20 Rule-Out Test Resulted    C-diff Rule Out 08/14/20 08/14/20 08/14/20 C DIFF TOXIN/ANTIGEN (Ordered)   08/17/20 Roxy Zimmerman RN COVID-19 Rule Out 08/13/20 08/13/20 08/13/20 COVID-19 (Ordered)   08/13/20 Rule-Out Test Resulted            Nurse Assessment:  Last Vital Signs: /77   Pulse 74   Temp 97.5 °F (36.4 °C) (Axillary)   Resp 20   Ht 5' 6\" (1.676 m)   Wt 177 lb (80.3 kg)   SpO2 98%   BMI 28.57 kg/m²     Last documented pain score (0-10 scale): Pain Level: 0  Last Weight:   Wt Readings from Last 1 Encounters:   01/04/21 177 lb (80.3 kg)     Mental Status:  oriented and alert    IV Access:  - Dialysis Catheter  - site  CAPD catheter, insertion date: 01/01/2020    Nursing Mobility/ADLs:  Walking   Dependent  Transfer  Dependent  Bathing  Dependent  Dressing  Dependent  Toileting  Dependent  Feeding  Dependent  Med Admin  Dependent  Med Delivery   crushed    Wound Care Documentation and Therapy:  Wound 12/12/18 Buttocks Inner;Mid;Left;Right;Medial Non-blanchable purple area to buttocks (Active)   Number of days: 075       Wound 08/13/20 Foot Anterior; Left Stage 3 Pressure (Active)   Wound Etiology Pressure Unstageable 01/04/21 0715   Wound Assessment Dry;Pink/red 01/04/21 0715   Drainage Amount None 01/04/21 0715   Tiffanie-wound Assessment Blanchable erythema 01/04/21 0715   Number of days: 143       Wound 08/14/20 Coccyx Mid Non-blanchable erythema in some areas (Active)   Number of days: 143       Wound 01/01/21 Coccyx Medial (Active)   Wound Etiology Pressure Stage  1 01/04/21 0715   Dressing/Treatment Other (comment) 01/04/21 0715   Wound Assessment Non-blanchable erythema 01/04/21 0715   Drainage Amount None 01/04/21 0715   Tiffanie-wound Assessment Blanchable erythema 01/04/21 0715   Number of days: 2       Wound Heel Bilateral heels non-blanchable erythema (Active)   Wound Etiology Pressure Stage  1 01/04/21 0715   Wound Assessment Non-blanchable erythema 01/04/21 0715   Drainage Amount None 01/04/21 0715   Tiffanie-wound Assessment Blanchable erythema 01/04/21 0715   Number of days: Wound Foot Anterior; Left Anterior left foot red abrasion  (Active)   Wound Etiology Deep tissue/Injury 01/04/21 0715   Wound Assessment Pink/red 01/04/21 0715   Drainage Amount None 01/04/21 0715   Tiffanie-wound Assessment Blanchable erythema 01/04/21 0715   Number of days:         Elimination:  Continence: Bowel: Yes  Bladder: Yes  Urinary Catheter: None   Colostomy/Ileostomy/Ileal Conduit: No       Date of Last BM: 01/08/2021    Intake/Output Summary (Last 24 hours) at 1/4/2021 1127  Last data filed at 1/4/2021 1056  Gross per 24 hour   Intake 8773.32 ml   Output 6400 ml   Net 2373.32 ml     I/O last 3 completed shifts: In: 8673.3 [P.O.:100; I.V.:573.3; Other:8000]  Out: 6400 [Other:6400]    Safety Concerns: At Risk for Falls    Impairments/Disabilities:      None    Nutrition Therapy:  Current Nutrition Therapy:   - Oral Diet:  Dysphagia 1 pureed    Routes of Feeding: Oral  Liquids: Thin Liquids  Daily Fluid Restriction: no  Last Modified Barium Swallow with Video (Video Swallowing Test): done on 01/06/2021/o    Treatments at the Time of Hospital Discharge:   Respiratory Treatments: None  Oxygen Therapy:  is not on home oxygen therapy. Ventilator:    - No ventilator support    Rehab Therapies: Physical Therapy, Occupational Therapy and Speech/Language Therapy  Weight Bearing Status/Restrictions: No weight bearing restirctions  Other Medical Equipment (for information only, NOT a DME order):  hospital bed  Other Treatments: None    Patient's personal belongings (please select all that are sent with patient):  None    RN SIGNATURE:  Electronically signed by Alejandra Boyer RN on 1/8/21 at 8:13 AM EST    CASE MANAGEMENT/SOCIAL WORK SECTION    Inpatient Status Date: 1/1/21    Readmission Risk Assessment Score:  Readmission Risk              Risk of Unplanned Readmission:        29           Discharging to Facility/ Agency   Name: [facility]  Address: [address]   [place], [address]  CLJHN:[phone]

## 2021-01-04 NOTE — CARE COORDINATION
La1/4/21, 2:11 PM EST  Discharge Planning Evaluation  Social work consult received, patient from [facility]. Patient/Family preference is to return to [facility]. The patient's current payor source at the facility is Medicaid. Medicare skilled days available: yes  Insurance precert:  no  Spoke with Francia Perkins at the facility.   Patient bed hold: yes  Anticipated transport plan: ambulance  Do they require COVID 19 test to return to ECF:no  Is there a required time frame which which COVID test needs done:na

## 2021-01-04 NOTE — PROGRESS NOTES
[facility]  SPEECH THERAPY  STRZ RENAL TELEMETRY 6K  Bedside Swallowing Evaluation      SLP Individual Minutes  Time In: 1091  Time Out: 1300  Minutes: 16  Timed Code Treatment Minutes: 0 Minutes       Date: 2021  Patient Name: Yajaira Miller      CSN: [id number]   : 1950  (78 y.o.)  Gender: female   Referring Physician:  Dr. Hudson Cabrera  Diagnosis: Sepsis   Secondary Diagnosis: Dysphagia    History of Present Illness/Injury: Patient admitted to [facility] with above dx. Per H&P, \"76 y.o. female who presented to [facility] with fall at nursing home; this patient was a transfer in from [address]; patient offers limited information as she is alert to her name and month however she thinks she is at the nursing home; per review of old records the patient got up to go to the bathroom and she sustained a fall, she is on peritoneal dialysis in which she receives daily, she initially complained of pain to her right hip and low back however she denies that now the nursing home states she has had some intermittent confusion; at [address] she was given Maxipime along with vancomycin and potassium; when she presented here she was hypotensive; it was reported her initial blood pressure at [address] was 76/48 and they did give her a fluid challenge which improved her blood pressure; again she looks extremely dry on exam; lab work will be repeated, she will be monitored closely, she is being admitted to the hospital service for further care and evaluation. 75/84: Acute Metabolic Encephalopathy: mental status waxes and wanes, has to be redirected to the topic. Unclear what baseline is. Will need to call family and ECF. SLP maurizio.     Rn [address] reporting, \"patient with overt difficulty with lunch tray with inability to effectively chew grape and soft chicken with need to 'spit material back out', pocketing also evident. \" ST to complete BSE + cognitive evaluation and determine safety of PO diet and further evaluate cognition d/t ongoing confusion. Past Medical History:   Diagnosis Date    Adjustment disorder with mixed anxiety and depressed mood     Anemia in chronic kidney disease(285.21)     Anxiety     Arthritis     Asthma     Bipolar 1 disorder (HCC)     Bradycardia     CHF (congestive heart failure) (HCC)     Chronic diastolic heart failure (HCC)     Chronic kidney disease, stage III (moderate)     Depression     Difficulty walking     Dizziness and giddiness     GERD (gastroesophageal reflux disease)     Gout     Hyperkalemia     Hyperlipidemia     Hypertension     Hypertensive urgency     Hypothyroidism     Low back pain     Major depressive disorder, recurrent, mild (HCC)     Muscle weakness (generalized)     Neurogenic bladder     Pleural effusion 06/16/2019    R pleurex catheter placed    Pneumonia     Seizure ([address] 01/2018    Type II or unspecified type diabetes mellitus without mention of complication, not stated as uncontrolled     Urine retention        SUBJECTIVE:  Patient seen at bedside; alert and cooperative provided support and encouragement. Prior to ST attempt, \"patient with calling out/verbalizing nonsensical content\" High level of confusion noted. OBJECTIVE:    Pain:  No pain reported. Current Diet: Regular diet with thin liquids     Respiratory Status:  Independent    Behavioral Observation:  Alert, Confused and Limited Direction Following    Oral Mechanism Evaluation:    *very limited ability to follow 1 step commands to complete OME   Facial / Labial Impaired Generalized weakness   Lingual Impaired Decreased ROM and coordination    Dentition Impaired Edentulous; RN Sheree Read reports \"dentures are at nursing home. \"    Velum Not Tested Unable to visualize d/t limited oral opening    Vocal Quality [facility] [place]    Sensation Not Tested    Cough Not Tested Patient Evaluated Using: Thin liquids via straw, puree, soft solid (cracker piece moistened with pudding)     Oral Phase:  Impaired:  Impaired AP Movement, Lingual Pumping, Impaired Mastication, Reduced Bolus Formation and Impaired Oral Initiation    Pharyngeal Phase: WFL:  Pharyngeal phase appears WFL but cannot rule out pharyngeal phase deficits from a bedside swallowing evaluation alone. Signs and Symptoms of Laryngeal Penetration/Aspiration: No signs/symptoms of aspiration evident in this evaluation, but cannot rule out silent aspiration. Impresssions: Patient presented with at least moderate oral dysphagia. Patient with high level of confusion and poor motor planning noted at bedside. Patient consumed PO trials of thin liquids via straw and puree demonstrating with limited oral mastication/reduced rotary chew, mildly delayed swallow, no s/s of aspiration. Poor oral mastication/bolus formation noted with soft solid trail; eventually cleared provided liquid wash of thin liquids. Patient certainly remains at high risk for aspiration. ST unable to fully r/o aspiration at bedside alone. Recommend puree diet with thin liquids + direct 1:1 feeding, upright position and PO medication CRUSHED in puree. Patient would benefit from skilled ST services to target dysphagia + complete ST/cognitive evaluation when clinically appropriate. *ST attempted ST/cognitive evaluation to follow BSE. Patient able to state first name only provided max cues. Patient able to state month of  only, incorrect response of date and inability to generate correct year in which patient was born. Patient UNABLE to complete basic orientation to current location, month, date, year, MELISSA. Patient UNABLE to count 1-10. Patient able to name 2/5 basic objects in room. Patient stating, Rukhsana Garcia is at [employer]. \"  Patient with notable impaired motor planning of oral structures and 'groping-like mouth motions.' Perseverations also noted within verbal output. MRI completed :   . No evidence of acute intracranial abnormality. 2. Mild severity chronic microvascular angiopathy superimposed on moderate temporal predominant volume loss which is asymmetrically prominent on the left. Patient unable to participate in ongoing evaluation at this time d/t overt confusion and high level of frustration; ST to plan to re-attempt ST/cognitive evaluation  pending patient clinical approprieatness. RECOMMENDATIONS/ASSESSMENT:   Modified Barium Swallow:  MBS is not indicated at this time.   Will recommend as appropriate  Diet Recommendations:  Puree diet with thin liquids   Strategies:  Full Upright Position, Small Bite/Sip, Pulmonary Monitoring and Direct 1:1 assistance with meals/feeding, PO medication CRUSHED in puree, limited distractions, monitor for fatigue   Rehabilitation Potential: fair    EDUCATION:  Learner: Patient  Education:  Reviewed results and recommendations of this evaluation, Reviewed diet and strategies, Reviewed signs, symptoms and risks of aspiration, Reviewed ST goals and Plan of Care, Reviewed recommendations for follow-up and Education Related to Potential Risks and Complications Due to Impairment/Illness/Injury  Evaluation of Education: Verbalizes understanding, Needs further instruction, No evidence of learning and Family not present    PLAN: Speech Therapy evaluation to assess speech, language, cognition and/or voice and Skilled SLP intervention on acute care 3-5 x per week or until goals met and/or pt plateaus in function. Specific interventions for next session may include: dysphagia tx. PATIENT GOAL:    Did not state. Will further assess during treatment.     SHORT TERM GOALS:  Short-term Goals  Timeframe for Short-term Goals: 2 weeks  Goal 1: Patient will consume puree diet with thin liquids without s/s of aspiration in order to safely maintain adequate hydration and nutrition  Goal 2: Patient will complete advanced textures as clinically appropriate/patient requesting without s/s of aspiration in order to safely determine readiness for diet advancement  Goal 3: Should adverse pulmonary concerns/effects arise recommend instrumental evaluation  Goal 4: Complete ST/cognitive evaluation and determine goals and POC as clinically appropriate    LONG TERM GOALS:  No LTGs due to [address]

## 2021-01-04 NOTE — PROGRESS NOTES
Hospitalist Progress Note    Patient:  Olga Ross      Unit/Bed:6K-28/028-A    YOB: 1950    MRN: [id number]       Acct: [de-identified]     PCP: [id number]    Date of Admission: 1/1/2021    Active Hospital Problems    Diagnosis Date Noted    Sepsis ([facility] [address] [A41.9] 08/13/2020       Assessment/Plan:    1. Hypovolemic and Septic Shock (resolved): BP low, transferred to ICU for possible pressor support. Pt responded to IV fluid boluses, didn't require pressors. Negative septic work-up, no clear source identified. CT Chest and A/P negative. Bx NGTD. PD cultures no growth. WBC count elevated 22.8 (however has chronically elevated WBC count), IV Cefepime started. WBC count down-trending. Pt has had similar episodes in the past like these with negative infection work-ups. Pt started on Midodrine 10 TID, will cont IV Cefepime (Day 4). 2. Hypotension: presented with low BP readings on admission, thought to be related to dehydration. Pt responded well to IVF bolus and Midodrine. Improving. Nephrology following. Cont IV ABx. 3. ESRD on PD: volume status improving, IVF stopped today. Start PD today. Nephrology following. 4. Fall with Acute T12 Vertebral Body Fracture: had a fall at [facility]. Orthopedic surgery consulted for fracture and recommend TLSO brace to be worn when out of bed and ambulating. No surgical intervention planned at this time. Follow up in the office 2-4 weeks at discharge    5. Diabetes Mellitus Type 2, Uncontrolled: resume her home Lantus, add sliding scale level 2 along with hypoglycemia protocol and monitor    6. Hypothyroidism: resume Synthroid 25 qd     7. Acute Metabolic Encephalopathy: mental status waxes and wanes, has to be redirected to the topic. Repeats words. Poor arm control? Per ECF this is new. MRI brain no acute CVA. SLP maurizio. Consult Neurology. LFTs WNL. Ammonia WNL.         Chief Complaint: Fall at nursing home Hospital Course:  78 y.o. female who presented to [address] [place] [address] with fall at nursing home; this patient was a transfer in from [facility]; patient offers limited information as she is alert to her name and month however she thinks she is at the nursing home; per review of old records the patient got up to go to the bathroom and she sustained a fall, she is on peritoneal dialysis in which she receives daily, she initially complained of pain to her right hip and low back however she denies that now the nursing home states she has had some intermittent confusion; at [address] [facility] she was given Maxipime along with vancomycin and potassium; when she presented here she was hypotensive; it was reported her initial blood pressure at [facility] [address] [facility] was 76/48 and they did give her a fluid challenge which improved her blood pressure; again she looks extremely dry on exam; lab work will be repeated, she will be monitored closely, she is being admitted to the hospital service for further care and evaluation. Subjective: no acute events overnight. Mental status appears to wax and wane. Pt alert to self and place only, not to year. Pt repeats words. Poor PO intake. No fevers or chills. Pt reports feeling dizzy. No chest pain.        Medications:  Reviewed    Infusion Medications    dextrose       Scheduled Medications    midodrine  5 mg Oral TID WC    potassium bicarb-citric acid  40 mEq Oral Daily    dianeal lo-ivonne 1.5%  2,000 mL Intraperitoneal Q6H    influenza virus vaccine  0.5 mL Intramuscular Prior to discharge    busPIRone  5 mg Oral BID    fluticasone  2 spray Each Nostril Daily    gabapentin  300 mg Oral Daily    levothyroxine  25 mcg Oral Daily    meclizine  25 mg Oral TID    megestrol  400 mg Oral Daily    pantoprazole  40 mg Oral QAM AC    oxybutynin  5 mg Oral Daily    polycarbophil  1,875 mg Oral TID WC    polyethylene glycol  17 g Oral Daily    pravastatin  40 mg Oral Nightly  pregabalin  50 mg Oral Daily    sertraline  50 mg Oral Daily    sevelamer  1,600 mg Oral TID     sodium chloride  1 g Oral BID    heparin (porcine)  5,000 Units Subcutaneous 3 times per day    insulin lispro  0-12 Units Subcutaneous TID     insulin lispro  0-6 Units Subcutaneous Nightly    folbee plus  1 tablet Oral Daily    cefepime  1 g Intravenous Q24H    lidocaine 1 % injection  5 mL Intradermal Once    sodium chloride flush  10 mL Intravenous 2 times per day     PRN Meds: promethazine **OR** ondansetron, acetaminophen **OR** acetaminophen, glucose, dextrose, glucagon (rDNA), dextrose, sodium chloride flush      Intake/Output Summary (Last 24 hours) at 1/4/2021 1426  Last data filed at 1/4/2021 1326  Gross per 24 hour   Intake 8100 ml   Output 8400 ml   Net -300 ml       Diet:  DIET DYSPHAGIA PUREED; Renal    Exam:  /77   Pulse 74   Temp 97.5 °F (36.4 °C) (Axillary)   Resp 20   Ht 5' 6\" (1.676 m)   Wt 177 lb (80.3 kg)   SpO2 98%   BMI 28.57 kg/m²     General appearance: chronically ill appearing elderly female   HEENT: Pupils equal, round, and reactive to light. Conjunctivae/corneas clear. Neck: Supple, with full range of motion. No jugular venous distention. Trachea midline. Respiratory:  Normal respiratory effort. Clear to auscultation, bilaterally without Rales/Wheezes/Rhonchi. Cardiovascular: Regular rate and rhythm with normal S1/S2 without murmurs, rubs or gallops. Abdomen: Soft, non-tender, non-distended with normal bowel sounds. Musculoskeletal: passive and active ROM x 4 extremities. Blister noted to the dorsal aspect of the left foot  Skin: Skin color, texture, turgor normal.  No rashes or lesions. Neurologic:  Neurovascularly intact without any focal sensory/motor deficits. Cranial nerves: II-XII intact, grossly non-focal.  Psychiatric: pt had to be reoriented multiple times, is alert to self and place only.    Capillary Refill: Brisk,< 3 seconds Peripheral Pulses: +2 palpable, equal bilaterally       Labs:   Recent Labs     01/04/21  0430   WBC 11.2*   HGB 9.8*   HCT 30.8*        Recent Labs     01/04/21  0430      K 3.3*      CO2 22*   BUN 31*   CREATININE 6.2*   CALCIUM 9.3     Recent Labs     01/04/21 0430   AST 32   ALT 36   BILIDIR <0.2   BILITOT 0.4   ALKPHOS 154*     No results for input(s): INR in the last 72 hours. No results for input(s): Neldon Docker in the last 72 hours. Urinalysis:      Lab Results   Component Value Date    NITRU NEGATIVE 06/22/2019    WBCUA 0-2 06/22/2019    BACTERIA NONE 06/22/2019    RBCUA 5-10 06/22/2019    BLOODU NEGATIVE 06/22/2019    GLUCOSEU NEGATIVE 06/22/2019       Radiology: All imaging reviewed     Diet: DIET DYSPHAGIA PUREED;  Renal      Code Status: Full Code            Electronically signed by Joe Jose MD on 1/4/2021 at 2:26 PM

## 2021-01-04 NOTE — CARE COORDINATION
DISASTER CHARTING    1/4/21, 8:11 AM EST    DISCHARGE ONGOING EVALUATION:     Clovis Tracy day: 3  Location: 628/028-A Reason for admit: Sepsis [facility]) [A41.9]   Barriers to Discharge: transfer in from [facility], recent fall, low B/P, IVF, on CAPD, Nephrology follows, on Midodrine, K+ 3.3 with replacement as ordered  PCP: Cory Manley  Patient Goals/Plan/Treatment Preferences: from ECF: SW follows.

## 2021-01-04 NOTE — PROGRESS NOTES
[facility]         Renal Inpatient Follow-Up note         1/4/2021 10:30 AM    Pt Name:   Irwin Gosselin  YOB: 1950  Attending:   Skyler Antonio MD    Chief Complaint : Irwin Gosselin is a 78 y.o. female being followed by nephrology for ESRD on hemodialysis and hypokalemia    Interval History :   Patient seen and examined by me. No distress  Says she is feeling ok. denies any chest pain no shortness of breath  Poor appetite.  Feels very weak and tired     Scheduled Medications :    dianeal lo-ivonne 1.5%  2,000 mL Intraperitoneal Q6H    influenza virus vaccine  0.5 mL Intramuscular Prior to discharge    busPIRone  5 mg Oral BID    fluticasone  2 spray Each Nostril Daily    gabapentin  300 mg Oral Daily    levothyroxine  25 mcg Oral Daily    meclizine  25 mg Oral TID    megestrol  400 mg Oral Daily    pantoprazole  40 mg Oral QAM AC    oxybutynin  5 mg Oral Daily    polycarbophil  1,875 mg Oral TID WC    polyethylene glycol  17 g Oral Daily    pravastatin  40 mg Oral Nightly    pregabalin  50 mg Oral Daily    sertraline  50 mg Oral Daily    sevelamer  1,600 mg Oral TID WC    sodium chloride  1 g Oral BID    heparin (porcine)  5,000 Units Subcutaneous 3 times per day    insulin lispro  0-12 Units Subcutaneous TID WC    insulin lispro  0-6 Units Subcutaneous Nightly    folbee plus  1 tablet Oral Daily    cefepime  1 g Intravenous Q24H    lidocaine 1 % injection  5 mL Intradermal Once    sodium chloride flush  10 mL Intravenous 2 times per day    potassium bicarb-citric acid  20 mEq Oral Daily    midodrine  10 mg Oral TID WC      dextrose         Vitals :  BP (!) 175/77   Pulse 75   Temp 98.8 °F (37.1 °C) (Oral)   Resp 18   Ht 5' 6\" (1.676 m)   Wt 177 lb (80.3 kg)   SpO2 99%   BMI 28.57 kg/m²     24HR INTAKE/OUTPUT:      Intake/Output Summary (Last 24 hours) at 1/4/2021 1030  Last data filed at 1/4/2021 0640  Gross per 24 hour

## 2021-01-05 NOTE — PROGRESS NOTES
NEUROLOGY INPATIENT PROGRESS NOTE    Patient- Drake Hills    MRN -  [id number]   Acct # - [de-identified]      - 1950    78 y.o. Subjective: The patient is seen as follow up for metabolic encephalopathy. Patient is alert at this time. She is more oriented and calmer. Objective:   /77   Pulse 87   Temp 99 °F (37.2 °C) (Axillary)   Resp 18   Ht 5' 6\" (1.676 m)   Wt 187 lb 11.2 oz (85.1 kg)   SpO2 97%   BMI 30.30 kg/m²       Intake/Output Summary (Last 24 hours) at 2021 1639  Last data filed at 2021 1335  Gross per 24 hour   Intake 8140 ml   Output 8200 ml   Net -60 ml       Physical Exam:  General:  Awake, laying in bed,  not in distress. Language is intact. AAO x 2, not oriented to time, she still think it is   HEENT: pink conjunctiva, unicteric sclera, moist oral mucosa. There is no neck lymphadenopathy. Neck: There is no carotid bruits. The Neck is supple. Neuro: CN 2-12 grossly intact with no focal deficits. Power 5/5 Throughout symmetric, less shaky today . Long tracts are intact. Cerebellar exam is Intact. Sensory exam is intact to light touch. Gait is not tested. No abnormal movement. Osteo: There is no LROM of any of the 4 extremity joints, no joint tenderness. Leg edema none  Skin: no lesions, no rash, warm and moist to touch. Abdomen is soft , intact bowel sounds. ROS:    Cardiac: no chest pain. No palpitations.   Renal : no flank pain, no hematuria  Skin: no rash    Medications:     midodrine  5 mg Oral TID WC    potassium bicarb-citric acid  40 mEq Oral Daily    meropenem  500 mg Intravenous Q24H    dianeal lo-ivonne 1.5%  2,000 mL Intraperitoneal Q6H    influenza virus vaccine  0.5 mL Intramuscular Prior to discharge    busPIRone  5 mg Oral BID    fluticasone  2 spray Each Nostril Daily    gabapentin  300 mg Oral Daily    levothyroxine  25 mcg Oral Daily    meclizine  25 mg Oral TID  megestrol  400 mg Oral Daily    pantoprazole  40 mg Oral QAM AC    oxybutynin  5 mg Oral Daily    polycarbophil  1,875 mg Oral TID WC    polyethylene glycol  17 g Oral Daily    pravastatin  40 mg Oral Nightly    pregabalin  50 mg Oral Daily    sertraline  50 mg Oral Daily    sevelamer  1,600 mg Oral TID WC    sodium chloride  1 g Oral BID    heparin (porcine)  5,000 Units Subcutaneous 3 times per day    insulin lispro  0-12 Units Subcutaneous TID WC    insulin lispro  0-6 Units Subcutaneous Nightly    folbee plus  1 tablet Oral Daily    lidocaine 1 % injection  5 mL Intradermal Once    sodium chloride flush  10 mL Intravenous 2 times per day       Data:   CBC:   Recent Labs     01/03/21  0608 01/04/21  0430 01/05/21  0600   WBC 13.3* 11.2* 11.7*   HGB 9.5* 9.8* 9.8*    224 186     BMP:    Recent Labs     01/03/21  0608 01/04/21  0430 01/05/21  0600    139 141   K 3.7 3.3* 3.5    107 106   CO2 20* 22* 24   BUN 31* 31* 29*   CREATININE 6.3* 6.2* 5.3*   GLUCOSE 91 127* 103           No results found for this or any previous visit. No results found for this or any previous visit. No results found for this or any previous visit. No results found for this or any previous visit. Results for orders placed during the hospital encounter of 01/01/21   MRI BRAIN WO CONTRAST    Narrative This is a preliminary interpretation. This examination will be officially read on 1/4/2021 by the neuroradiologist.    There is white matter disease however there is no acute infarct. There is also a polyp or mucous retention cyst within the right frontal sinus, mild mucosal thickening within several of the ethmoid air cells and a small focus of increased T2-weighted signal suggesting fluid within the left mastoid air cells. FINAL REPORT:    PROCEDURE: MRI BRAIN WO CONTRAST    INDICATION:  rule out cva. Altered mental status. Fall at nursing home with intermittent spells of confusion. COMPARISON: CT head dated 7/12/2019. TECHNIQUE: Multiplanar and multiple spin echo MRI images were obtained of the brain without contrast.    FINDINGS:  There is stable moderate temporal predominant volume loss which is mildly asymmetrically prominent on the left. There are mild scattered focal areas of T2/FLAIR prolongation in the periventricular, subcortical deep white matter. No intra or extra-axial   mass is identified. No focal areas of restricted diffusion are present. The major vascular flow voids appear patent. The patient is status post bilateral lens extractions. Orbits are otherwise unremarkable. Paranasal sinuses are clear. There is trace fluid in the left mastoid air cells. Impression    1. No evidence of acute intracranial abnormality. 2. Mild severity chronic microvascular angiopathy superimposed on moderate temporal predominant volume loss which is asymmetrically prominent on the left. **This report has been created using voice recognition software. It may contain minor errors which are inherent in voice recognition technology. **    Final report electronically signed by Dr. Ramiro Rose MD on 1/3/2021 4:37 PM     No results found for this or any previous visit. No results found for this or any previous visit. Results for orders placed during the hospital encounter of 07/12/19   CT HEAD WO CONTRAST    Narrative PROCEDURE: CT HEAD WO CONTRAST    CLINICAL INFORMATION: patient with headache. Altered mental status. COMPARISON: CT of the head dated June 7, 2016. TECHNIQUE: Noncontrast 5 mm axial images were obtained through the brain. Coronal and sagittal reformats were also performed. All CT scans at this facility use dose modulation, iterative reconstruction, and/or weight-based dosing when appropriate to reduce radiation dose to as low as reasonably achievable. FINDINGS:  The images are degraded by motion artifacts which limits detailed evaluation.

## 2021-01-05 NOTE — PROGRESS NOTES
Nutrition Related Findings:  Patient seen earlier this morning, confusion noted. Reports poor appetite, didn't eat any breakfast.  Meal intake has been 25% or less of meals since admit. Called ECF, reports pt with very poor appetite/intake for the last 2-4 weeks, was to receive novasource renal shakes but often refused, refused meds at times as well. Megace recentl started at [facility] for appetite stimulation, 400 mg currently. Pt agreed to ONS trail again. Labs: Sodium 141, Potassium 3.5, BUN 29, Creatinine 5.3, Glucose 103, Alk Phos 154. Rx: merrem, CAPD 1.5% (2 liters) every 6 hrs~ 248 kcals daily absorbed dextrose, folbee plus, humalog, sodium bicarbonate, renvela. BM 1/3/20. SLP on case for dysphagia. Pt has not teeth. Wounds:  (stage I coccyx, bilateral heels pressure injury, left foot wounds-red abrasion, stage III)       Current Nutrition Therapies:    DIET DYSPHAGIA PUREED; Renal  Dietary Nutrition Supplements: Diabetic Oral Supplement    Anthropometric Measures:  · Height: 5' 6\" (167.6 cm)  · Current Body Weight: 187 lb 11.2 oz (85.1 kg)(1/5/20 +1 LE edema)   · Admission Body Weight: 179 lb 12.8 oz (81.6 kg)(1/1/20 no edema)    · Usual Body Weight: (Pt unsure. Per EMR: 1/24/20: 183# bedscale, 8/13/20: 189#3. 2oz)     · Ideal Body Weight: 130 lbs;   · BMI: 30.3  · Adjusted Body Weight:  ; No Adjustment   · BMI Categories: Obese Class 1 (BMI 30.0-34. 9)       Nutrition Diagnosis:   · Inadequate oral intake related to inadequate protein-energy intake as evidenced by poor intake prior to admission, intake 0-25%      Nutrition Interventions:   Food and/or Nutrient Delivery:  Continue Current Diet, Start Oral Nutrition Supplement  Nutrition Education/Counseling:  Education not appropriate   Coordination of Nutrition Care:  No recommendation at this time    Goals:  Pt will consume 75% or more of meals during LOS.        Nutrition Monitoring and Evaluation: Behavioral-Environmental Outcomes:  None Identified   Food/Nutrient Intake Outcomes:  Food and Nutrient Intake, Diet Advancement/Tolerance, Supplement Intake, Vitamin/Mineral Intake  Physical Signs/Symptoms Outcomes:  Biochemical Data, GI Status, Fluid Status or Edema, Meal Time Behavior, Nutrition Focused Physical Findings, Skin, Weight     Discharge Planning:     Too soon to determine     Electronically signed by Alley Ling RD, LD on 1/5/21 at 1:31 PM EST    Contact: [phone]

## 2021-01-05 NOTE — PROGRESS NOTES
[facility]           Progress Note    Patient Name:  Faye Tamez  Medical Record Number:  [id number]  YOB: 1950    Date:  1/5/2021  Time:  2:34 PM  Completed By:  Jayce Becker RN    Reason for Palliative Care Evaluation:  Code status/goals of care    Advance Directives:none on file  [] [facility] DNR Form  [] Living Will  [] Medical POA    Current Code Status  [x] Full Resuscitation  [] DNR-Comfort Care-Arrest  [] DNR-Comfort Care  [] Limited   [] No CPR   [] No shock   [] No ET intubation/reintubation   [] No resuscitative medications   [] Other limitation:    Performance Status:  50    Family/Patient Discussion:  Patient is currently confused as noted per primary RN, [address] and per chart review. Plan/Follow-Up:  Phone call made to patient's son Massimo Garza. Massimo Garza states that the patient is not  Mary Slade is patient's ex-) and that he is the only child. Discussion about code status levels and what each level entails. Discussed complications of rib fractures, brain and organ damage associated with resuscitative measures. Massimo Garza states that the patient has always wanted all attempts made to save her life and he believes that full code aligns with the patient's wishes. Updated primary RN, [address]. Please call palliative care if further needs arise.     Jayce Becker RN  1/5/2021,  2:34 PM

## 2021-01-05 NOTE — PROGRESS NOTES
[address] [place] [address]  SPEECH THERAPY  [facility] RENAL TELEMETRY 6K  Speech  Language  Cognitive Evaluation    SLP Individual Minutes  Time In: 8423  Time Out: 6316  Minutes: 23          Date: 2021  Patient Name: Antwan Pastrana      CSN: [id number]   : 1950  (78 y.o.)  Gender: female   Referring Physician:  Dr. Shae Locke  Diagnosis: Sepsis   Secondary Diagnosis: Dysphagia, Cognitive-Linguistic Deficit  Precautions: Fall Risk, Aspiration Risk     History of Present Illness/Injury: Filipe Ybarra is a 78 y. o. female who presented to [address] [place] [address] with fall at nursing home; this patient was a transfer in from [facility]; patient offers limited information as she is alert to her name and month however she thinks she is at the nursing home; per review of old records the patient got up to go to the bathroom and she sustained a fall, she is on peritoneal dialysis in which she receives daily, she initially complained of pain to her right hip and low back however she denies that now the nursing home states she has had some intermittent confusion; at [facility] she was given Maxipime along with vancomycin and potassium; when she presented here she was hypotensive; it was reported her initial blood pressure at [facility] was 76/48 and they did give her a fluid challenge which improved her blood pressure; again she looks extremely dry on exam; lab work will be repeated, she will be monitored closely, she is being admitted to the hospital service for further care and evaluation. ST was consulted to complete skilled speech/language/cog eval this date to evaluate cognitive-linguistic skills and determine appropriate POC to address identified deficits.      Past Medical History:   Diagnosis Date    Adjustment disorder with mixed anxiety and depressed mood     Anemia in chronic kidney disease(285.21)     Anxiety     Arthritis     Asthma     Bipolar 1 disorder (HCC)     Bradycardia  CHF (congestive heart failure) (HCC)     Chronic diastolic heart failure (HCC)     Chronic kidney disease, stage III (moderate)     Depression     Difficulty walking     Dizziness and giddiness     GERD (gastroesophageal reflux disease)     Gout     Hyperkalemia     Hyperlipidemia     Hypertension     Hypertensive urgency     Hypothyroidism     Low back pain     Major depressive disorder, recurrent, mild (HCC)     Muscle weakness (generalized)     Neurogenic bladder     Pleural effusion 06/16/2019    R pleurex catheter placed    Pneumonia     Seizure ([address] 01/2018    Type II or unspecified type diabetes mellitus without mention of complication, not stated as uncontrolled     Urine retention        Pain: Pt with reports of back pain when ST attempted to re-position pt into upright position in bed; however, pt unable to rate pain this date. Subjective:  Lori ZAMORA approved session this date; however, Lori ZAMORA endorses persistent confusion this date. Upon arrival to room, pt sleeping in bed, but easily awakened to name. Pt with poor attention to task, non-sensical speech, anomia, poor direction following, and confusion throughout session. SOCIAL HISTORY:   Living Arrangements: [facility]  Work History: Retired  Education Level: Unknown at this time  Driving Status: Does not drive  Finance Management: Dependent/Unable  Medication Management: Dependent/Unable  ADL's: Assistance Required. Vision Status: Unsure - pt not wearing glasses during session and is a poor historian of social history. Hearing: WFL - no assistive devices during session this date.         ORAL MOTOR:  Facial / Labial Impaired Generalized weakness   Lingual Impaired Decrease ROM and coordination   Dentition Impaired Edentulous - wears dentures   Velum Not Tested Unable to visualize d/t limited oral opening    Vocal Quality WFL    Sensation Not Tested    Cough Not Tested      SPEECH / VOICE: Speech and Voice appear to be grossly intact for basic and complex daily communication    LANGUAGE:  Receptive:  1 Step Commands: 4/4  2 Step Commands: 0/4  Simple Yes/No Questions: 8/8  Complex Yes/No Questions: 6/12  Following Verbal Instructions: 4/10  Following Written Instructions: 0/10  Identify Objects/Pictures: 8/10 in a FO2    Expressive:  Automatic Speech: 2/4  Sentence Completion (automatic): 6/6  Confrontational Naming: 10/10  Sentence Formulation: 5/10 on MAST  Conversational Speech: ~65% intelligibile with anomia, paraphasia, poor thought organizations, and jargon. Paraphasias: Present  Repetition: 6/6 Word Level, 0/4 Sentence Level     COGNITION:  ST unable to complete the MOCA this date d/t decreased ability to participate; however, it is suspected pt has a moderate-severe cognitive-linguistic deficit at this time. Pt will require completion of [address] when appropriate. Orientation: 3/5 Independently, 1/2 with a multiple choice cue in a FO2  Thought Organization: Poor  Attention: Poor - required max verbal and tactile cues to maintain attention to task and follow simple 1-step directions    SWALLOWING:  Current Diet: Puree and Thin liquids  Impaired - Patient presented with at least moderate oral dysphagia. Patient with high level of confusion and poor motor planning noted at bedside. Patient consumed PO trials of thin liquids via straw and puree demonstrating with limited oral mastication/reduced rotary chew, mildly delayed swallow, no s/s of aspiration. Poor oral mastication/bolus formation noted with soft solid trail; eventually cleared provided liquid wash of thin liquids. Patient certainly remains at high risk for aspiration. ST unable to fully r/o aspiration at bedside alone. Recommend puree diet with thin liquids + direct 1:1 feeding, upright position and PO medication CRUSHED in puree.  Patient would benefit from skilled ST services to target dysphagia 1/4/2021 RECOMMENDATIONS/ASSESSMENT:  DIAGNOSTIC IMPRESSIONS:  Pt demonstrated evidence of a suspected moderate-severe cognitive-linguistic impairment characterized by decreased attention, thought organization, orientation, problem solving/reasoning, executive functioning, memory, and safety awareness. ST completed the MAST this date d/t decreased ability to participate in [facility] REHABILITATION. Pt's expressive subscale score was a 29/40 (subtracted 10 from overall score d/t inability to complete written portion this date) with deficits noted in the areas of automatic speech, repetition, and verbal fluency. Pt's receptive subscale scored was a 26/50 with deficits noted in yes/no accuracy, object recognition, following verbal instructions, and reading. Pt's total language score on the MAST was a 55/90. Throughout session, pt with nonsensical speech, jargon, paraphasias, anomia, and overall poor thought organization at the conversational level. Pt would benefit from continued skilled ST services to address aforementioned deficits and completion of the [facility] REHABILITATION in order to communicate basic wants/needs with staff and family as well as complete ADLs with least amount of supervision/assistance upon discharge. Rehabilitation Potential: good    EDUCATION:  Learner: Patient  Education:  Reviewed results and recommendations of this evaluation, Reviewed ST goals and Plan of Care, Reviewed recommendations for follow-up and Education Related to Potential Risks and Complications Due to Impairment/Illness/Injury  Evaluation of Education: No evidence of learning and Family not present    PLAN:  Skilled SLP intervention on acute care 3-5 x per week or until goals met and/or pt plateaus in function. Specific interventions for next session may include: orientation, call light, basic expressive/receptive language tasks. PATIENT GOAL:    Did not state. Will further assess during treatment.     SHORT TERM GOALS:  Short-term Goals Timeframe for Short-term Goals: 2 weeks  Goal 1: Patient will consume puree diet with thin liquids without s/s of aspiration in order to safely maintain adequate hydration and nutrition  Goal 2: Patient will complete advanced textures as clinically appropriate/patient requesting without s/s of aspiration in order to safely determine readiness for diet advancement  Goal 3: Pt will complete basic expressive and receptive language tasks (i.e. simple 1-step directions, reading, yes/no questions, naming tasks) with 75% accuracy given mod cues in order to improve ability to communicate wants/needs with staff  Goal 4: Pt will complete functional carryover tasks (i.e. call light, orientation, 2-3 unit recall, etc) with 60% accuracy given mod cues in order to improve safety and awareness within current environment. Goal 5: Pt will complete attention tasks (sustained and selective) with no more than 5 verbal cues during a 1-3 minute task in order to improve ability to participate in skilled tx session.   Goal 6: Monitor need for formal instrumental assessment to assess pharyngeal swallow function and need for Cog eval (MOCA) as confusion subsides    LONG TERM GOALS:  No long term goals recommended d/t short [address]

## 2021-01-05 NOTE — PROGRESS NOTES
Hospitalist Progress Note    Patient:  Clarissa [facility]      Unit/Bed:6K-28/028-A    YOB: 1950    MRN: [id number]       Acct: [de-identified]     PCP: [id number]    Date of Admission: 1/1/2021    Active Hospital Problems    Diagnosis Date Noted    Sepsis ([facility] [address] [A41.9] 08/13/2020       Assessment/Plan:    1. Hypovolemic and Septic Shock (resolved): BP low, transferred to ICU for possible pressor support. Pt responded to IV fluid boluses, didn't require pressors. Negative septic work-up, no clear source identified. CT Chest and A/P negative. Bx NGTD. PD cultures no growth. WBC count elevated 22.8 (however has chronically elevated WBC count), IV Cefepime started. WBC count down-trending. Pt has had similar episodes in the past like these with negative infection work-ups. Pt started on Midodrine 10 TID, will cont IV Cefepime (Day 4). Changed to IV Meropenem on 1/4.     2. Hypotension: presented with low BP readings on admission, thought to be related to dehydration. Pt responded well to IVF bolus and Midodrine. Improving. Nephrology following. Cont IV ABx. 3. ESRD on PD: volume status improving, IVF stopped today. Start PD today. Nephrology following. 4. Fall with Acute T12 Vertebral Body Fracture: had a fall at [facility]. Orthopedic surgery consulted for fracture and recommend TLSO brace to be worn when out of bed and ambulating. No surgical intervention planned at this time. Follow up in the office 2-4 weeks at discharge    5. Diabetes Mellitus Type 2, Uncontrolled: resume her home Lantus, add sliding scale level 2 along with hypoglycemia protocol and monitor    6. Hypothyroidism: resume Synthroid 25 qd     7. Acute Metabolic Encephalopathy: mental status waxes and wanes, has to be redirected to the topic. Repeats words. Poor arm control. Per ECF this is new. MRI brain no acute CVA. SLP maurizio. Consult Neurology. LFTs WNL. Ammonia WNL. --1/5: Neurology thinks this is Cefepime Induced Encephalopathy, this was stopped yesterday and Meropenem was started. Pt still slightly confused. Can take 3-5 days to clear from system. SLP to see. Chief Complaint: Fall at nursing home    Hospital Course:  78 y.o. female who presented to [address] [place] [address] with fall at nursing home; this patient was a transfer in from [facility]; patient offers limited information as she is alert to her name and month however she thinks she is at the nursing home; per review of old records the patient got up to go to the bathroom and she sustained a fall, she is on peritoneal dialysis in which she receives daily, she initially complained of pain to her right hip and low back however she denies that now the nursing home states she has had some intermittent confusion; at [address] [facility] she was given Maxipime along with vancomycin and potassium; when she presented here she was hypotensive; it was reported her initial blood pressure at [facility] was 76/48 and they did give her a fluid challenge which improved her blood pressure; again she looks extremely dry on exam; lab work will be repeated, she will be monitored closely, she is being admitted to the hospital service for further care and evaluation. Subjective: no acute events overnight. Mental status appears to wax and wane. Poor PO intake, pt reports being very tired and sleepy today. No fevers or chills. No chest pain.        Medications:  Reviewed    Infusion Medications    dextrose       Scheduled Medications    midodrine  5 mg Oral TID WC    potassium bicarb-citric acid  40 mEq Oral Daily    meropenem  500 mg Intravenous Q24H    dianeal lo-ivonne 1.5%  2,000 mL Intraperitoneal Q6H    influenza virus vaccine  0.5 mL Intramuscular Prior to discharge    busPIRone  5 mg Oral BID    fluticasone  2 spray Each Nostril Daily    gabapentin  300 mg Oral Daily    levothyroxine  25 mcg Oral Daily  meclizine  25 mg Oral TID    megestrol  400 mg Oral Daily    pantoprazole  40 mg Oral QAM AC    oxybutynin  5 mg Oral Daily    polycarbophil  1,875 mg Oral TID WC    polyethylene glycol  17 g Oral Daily    pravastatin  40 mg Oral Nightly    pregabalin  50 mg Oral Daily    sertraline  50 mg Oral Daily    sevelamer  1,600 mg Oral TID     sodium chloride  1 g Oral BID    heparin (porcine)  5,000 Units Subcutaneous 3 times per day    insulin lispro  0-12 Units Subcutaneous TID WC    insulin lispro  0-6 Units Subcutaneous Nightly    folbee plus  1 tablet Oral Daily    lidocaine 1 % injection  5 mL Intradermal Once    sodium chloride flush  10 mL Intravenous 2 times per day     PRN Meds: promethazine **OR** ondansetron, acetaminophen **OR** acetaminophen, glucose, dextrose, glucagon (rDNA), dextrose, sodium chloride flush      Intake/Output Summary (Last 24 hours) at 1/5/2021 1550  Last data filed at 1/5/2021 1335  Gross per 24 hour   Intake 8140 ml   Output 8200 ml   Net -60 ml       Diet:  DIET DYSPHAGIA PUREED; Renal  Dietary Nutrition Supplements: Diabetic Oral Supplement    Exam:  /61   Pulse 85   Temp 99.2 °F (37.3 °C) (Axillary)   Resp 18   Ht 5' 6\" (1.676 m)   Wt 187 lb 11.2 oz (85.1 kg)   SpO2 99%   BMI 30.30 kg/m²     General appearance: chronically ill appearing elderly female   HEENT: Pupils equal, round, and reactive to light. Conjunctivae/corneas clear. Neck: Supple, with full range of motion. No jugular venous distention. Trachea midline. Respiratory:  Normal respiratory effort. Clear to auscultation, bilaterally without Rales/Wheezes/Rhonchi. Cardiovascular: Regular rate and rhythm with normal S1/S2 without murmurs, rubs or gallops. Abdomen: Soft, non-tender, non-distended with normal bowel sounds. Musculoskeletal: passive and active ROM x 4 extremities.  Blister noted to the dorsal aspect of the left foot Skin: Skin color, texture, turgor normal.  No rashes or lesions. Neurologic:  Neurovascularly intact without any focal sensory/motor deficits. Cranial nerves: II-XII intact, grossly non-focal.  Psychiatric: pt had to be reoriented multiple times, is alert to self and place only. Capillary Refill: Brisk,< 3 seconds   Peripheral Pulses: +2 palpable, equal bilaterally       Labs:   Recent Labs     01/05/21  0600   WBC 11.7*   HGB 9.8*   HCT 29.9*        Recent Labs     01/05/21  0600      K 3.5      CO2 24   BUN 29*   CREATININE 5.3*   CALCIUM 9.4     Recent Labs     01/04/21  0430   AST 32   ALT 36   BILIDIR <0.2   BILITOT 0.4   ALKPHOS 154*     No results for input(s): INR in the last 72 hours. No results for input(s): Arvind Markie in the last 72 hours. Urinalysis:      Lab Results   Component Value Date    NITRU NEGATIVE 06/22/2019    WBCUA 0-2 06/22/2019    BACTERIA NONE 06/22/2019    RBCUA 5-10 06/22/2019    BLOODU NEGATIVE 06/22/2019    GLUCOSEU NEGATIVE 06/22/2019       Radiology: All imaging reviewed     Diet: DIET DYSPHAGIA PUREED;  Renal  Dietary Nutrition Supplements: Diabetic Oral Supplement      Code Status: Full Code            Electronically signed by Luis Guo MD on 1/5/2021 at 3:50 PM

## 2021-01-05 NOTE — PROGRESS NOTES
[facility]         Renal Inpatient Follow-Up note         1/5/2021 1:14 PM    Pt Name:   Pavithra Rick:   1950  Attending:   Loida Amaya MD    Chief Complaint : Clarissa Encarnacion is a 78 y.o. female being followed by nephrology for ESRD on hemodialysis and hypokalemia    Interval History :   Patient seen and examined by me. No distress  Says she is feeling ok. denies any chest pain no shortness of breath  Poor appetite. Feels very weak and tired. She is trying to eat.      Scheduled Medications :    midodrine  5 mg Oral TID WC    potassium bicarb-citric acid  40 mEq Oral Daily    meropenem  500 mg Intravenous Q24H    dianeal lo-ivonne 1.5%  2,000 mL Intraperitoneal Q6H    influenza virus vaccine  0.5 mL Intramuscular Prior to discharge    busPIRone  5 mg Oral BID    fluticasone  2 spray Each Nostril Daily    gabapentin  300 mg Oral Daily    levothyroxine  25 mcg Oral Daily    meclizine  25 mg Oral TID    megestrol  400 mg Oral Daily    pantoprazole  40 mg Oral QAM AC    oxybutynin  5 mg Oral Daily    polycarbophil  1,875 mg Oral TID WC    polyethylene glycol  17 g Oral Daily    pravastatin  40 mg Oral Nightly    pregabalin  50 mg Oral Daily    sertraline  50 mg Oral Daily    sevelamer  1,600 mg Oral TID WC    sodium chloride  1 g Oral BID    heparin (porcine)  5,000 Units Subcutaneous 3 times per day    insulin lispro  0-12 Units Subcutaneous TID WC    insulin lispro  0-6 Units Subcutaneous Nightly    folbee plus  1 tablet Oral Daily    lidocaine 1 % injection  5 mL Intradermal Once    sodium chloride flush  10 mL Intravenous 2 times per day      dextrose         Vitals :  /61   Pulse 85   Temp 99.2 °F (37.3 °C) (Axillary)   Resp 18   Ht 5' 6\" (1.676 m)   Wt 187 lb 11.2 oz (85.1 kg)   SpO2 99%   BMI 30.30 kg/m²     24HR INTAKE/OUTPUT:      Intake/Output Summary (Last 24 hours) at 1/5/2021 1314  Last data filed at 1/5/2021 7900

## 2021-01-05 NOTE — CARE COORDINATION
DISASTER CHARTING    1/5/21, 8:55 AM EST    DISCHARGE ONGOING EVALUATION:     Deb Schuler day: 4  Location: -28/028-A Reason for admit: Sepsis [facility]) [A41.9]   Barriers to Discharge: Creat 5.3, WBC 11.7. ATB changed to IV merrem (possible cefepime induced encephalopathy). Neuro following, may take a few days to clear up. Nephrol managing dialysis. Ortho signed off, patient to wear TLSO when OOB (spoke with Igor Pavon RN, patient is bed bound). PCP: [address]  Patient Goals/Plan/Treatment Preferences: Return to [facility].

## 2021-01-06 NOTE — PROGRESS NOTES
[address] [address]  INPATIENT SPEECH THERAPY  [facility] RE[facility] TELEMET[facility] 6K  DAILY NOTE    TIME   SLP Individual Minutes  Time In: 7996  Time Out: 2456  Minutes: 23  Timed Code Treatment Minutes: 10 Minutes     Dysphagia tx: 13 minutes  Cognitive tx: 10 minutes    Date: 2021  Patient Name: Clarissa Encarnacion      CSN: [id number]   : 1950  (78 y.o.)  Gender: female   Referring Physician:  Dr. Brijesh Almaguer  Diagnosis: Sepsis  Secondary Diagnosis: Dysphagia, Cognitive/lingustic deficits   Precautions: Aspiration, Fall RIsk   Current Diet: Puree diet with thin liquids  Swallowing Strategies: Direct 1:1 assistance with meals, upright position, small bites/sips  Date of Last MBS: Not Applicable    Pain:  No pain reported. Subjective:  Patient seen at bedside with breakfast tray present, alert and cooperative. Shortened session d/t patient with increasing levels of fatigue.  Patient continues with confusion language at bedside and paraphasias     Short-Term Goals:  SHORT TERM GOAL #1:  Goal 1: Patient will consume puree diet with thin liquids without s/s of aspiration in order to safely maintain adequate hydration and nutrition INTERVENTIONS: Skilled dysphagia treatment completed with puree breakfast tray; x1 PO trial of puree [place]er toast, x1 PO trial of puree fruit, multiple PO trials of orange sherbert, PO intake of thin liquid ensure and thin liquids apple juice via straw. Patient with poor positioning at baseline d/t poor core support and strong lean to right side. Within PO trials patient demonstrated with slightly dis-coordinated but functional oral mastication of puree textures; cues provided to \"continue to chew and swallow. \" Cues assisted to improve timing of the swallow. No s/s of aspiration or oral residue noted following consumption of PO trials. While completing PO trials of consecutive straw drinks of thin liquid juice; patient demonstrated with decreased suck/swallow coordination with x1 instance of anterior spillage from labial seal/oral cavity. ST assisted as appropriate. Recommend continuation of puree diet with thin liquids, upright position, small bites/sips, direct 1:1 assistance, ensure oral cavity is cleared following consumption of PO intake    *Patient reports, \"I like cream of wheat and brown sugar. \" Patient expressed dislike to \"puree fruit and [place]er toast.\"     NOAH Vargas updated     SHORT TERM GOAL #2:  Goal 2: Patient will complete advanced textures as clinically appropriate/patient requesting without s/s of aspiration in order to safely determine readiness for diet advancement  INTERVENTIONS: Patient NOT appropriate for advanced trials at this time d/t current performance with puree, ongoing positioning barriers and poor endurance.      SHORT TERM GOAL #3:  Goal 3: Pt will complete basic expressive and receptive language tasks (i.e. simple 1-step directions, reading, yes/no questions, naming tasks) with 75% accuracy given mod cues in order to improve ability to communicate wants/needs with staff  INTERVENTIONS:   Basic yes/no questions: 3/5 indep, 2/5 min cues Confrontational naming of objects: 3/3 mod cues    Stating function to object: 1/3 min cues, 2/3 mod-max cues     *patient with overt difficulty generating appropriate word choice, formulating cohesive thoughts with evidence of ongoing neologisms, semantic and phonemic paraphasias     *patient with improved functional spontaneous expressive language this session to attempt to express wants/needs; ongoing confusion noted- \"Yes I like cream of wheat. \" \"Is my mom here? \" \"No that is right she is dead. \" \"What should I do? \"     SHORT TERM GOAL #4:  Goal 4: Pt will complete functional carryover tasks (i.e. call light, orientation, 2-3 unit recall, etc) with 60% accuracy given mod cues in order to improve safety and awareness within current environment. INTERVENTIONS: Orientation:   Month-min cues  Date-max cues  MELISSA-max cues  Year-correct provided  FO2 choices + max cues  Location-correct provided FO2 choices + max cues  City-mod cues  Time-patient able to identify \"it is morning. \" Patient unable to identify correct time d/t patient reporting \"I can't see the clock without my glasses. \"     ST reviewed basic daily orientation x2 throughout session to assist with carryover. Call light: patient with no awareness to call light-location, rational or reasons to utilize call light. ST located call light and provided direct intervention/explaination of use. Patient will require ongoing education      SHORT TERM GOAL #5:  Goal 5: Pt will complete attention tasks (sustained and selective) with no more than 5 verbal cues during a 1-3 minute task in order to improve ability to participate in skilled tx session.   INTERVENTIONS: Patient required consistent mod-max cues to maintain attention to task throughout dysphagia and cognitive tx       Long-Term Goals: No LTGs due to short ELOS              EDUCATION:  Learner: Patient Education:  Reviewed results and recommendations of this evaluation, Reviewed diet and strategies, Reviewed signs, symptoms and risks of aspiration, Reviewed ST goals and Plan of Care, Reviewed recommendations for follow-up and Education Related to Potential Risks and Complications Due to Impairment/Illness/Injury  Evaluation of Education: Needs further instruction, No evidence of learning and Family not present    ASSESSMENT/PLAN:  Activity Tolerance:  Patient tolerance of  treatment: fair. Assessment/Plan: Patient progressing toward established goals. Continues to require skilled care of licensed speech pathologist to progress toward achievement of established goals and plan of care. .     Plan for Next Session: Po trials, orientation      MILLICENT Farah 23

## 2021-01-06 NOTE — PROGRESS NOTES
Hospitalist Progress Note    Patient:  Uriel Colon      Unit/Bed:6K-28/028-A    YOB: 1950    MRN: [id number]       Acct: [de-identified]     PCP: [id number]    Date of Admission: 1/1/2021    Active Hospital Problems    Diagnosis Date Noted    Sepsis ([facility] [address] [A41.9] 08/13/2020       Assessment/Plan:    1. Hypovolemic and Septic Shock (resolved): BP low, transferred to ICU for possible pressor support. Pt responded to IV fluid boluses, didn't require pressors. Negative septic work-up, no clear source identified. CT Chest and A/P negative. Bx NGTD. PD cultures no growth. WBC count elevated 22.8 (however has chronically elevated WBC count), IV Cefepime started. WBC count down-trending. Pt has had similar episodes in the past like these with negative infection work-ups. Pt started on Midodrine 10 TID, will cont IV Cefepime (Day 4). Changed to IV Meropenem on 1/4.     2. Hypotension: presented with low BP readings on admission, thought to be related to dehydration. Pt responded well to IVF bolus and Midodrine. Improving. Nephrology following. Cont IV ABx. 3. ESRD on PD: volume status improving, IVF stopped today. Start PD today. Nephrology following. 4. Fall with Acute T12 Vertebral Body Fracture: had a fall at [facility]. Orthopedic surgery consulted for fracture and recommend TLSO brace to be worn when out of bed and ambulating. No surgical intervention planned at this time. Follow up in the office 2-4 weeks at discharge    5. Diabetes Mellitus Type 2, Uncontrolled: resume her home Lantus, add sliding scale level 2 along with hypoglycemia protocol and monitor    6. Hypothyroidism: resume Synthroid 25 qd     7. Acute Metabolic Encephalopathy: mental status waxes and wanes, has to be redirected to the topic. Repeats words. Poor arm control. Per [facility] this is new. MRI brain no acute CVA. SLP maurizio. Consult Neurology. LFTs WNL. Ammonia WNL. --1/5: Neurology thinks this is Cefepime Induced Encephalopathy, this was stopped yesterday and Meropenem was started. Pt still slightly confused. Can take 3-5 days to clear from system. SLP to see. --1/6: mental status improving. Cont IV Meropenem. Neurology still following. Pt should continue to improve over the next few days per Neurology. Chief Complaint: Fall at nursing home    Hospital Course:  78 y.o. female who presented to [address] [place] [address] with fall at nursing home; this patient was a transfer in from [facility]; patient offers limited information as she is alert to her name and month however she thinks she is at the nursing home; per review of old records the patient got up to go to the bathroom and she sustained a fall, she is on peritoneal dialysis in which she receives daily, she initially complained of pain to her right hip and low back however she denies that now the nursing home states she has had some intermittent confusion; at [facility] she was given Maxipime along with vancomycin and potassium; when she presented here she was hypotensive; it was reported her initial blood pressure at [facility] was 76/48 and they did give her a fluid challenge which improved her blood pressure; again she looks extremely dry on exam; lab work will be repeated, she will be monitored closely, she is being admitted to the hospital service for further care and evaluation. Subjective: no acute events overnight. Mental status improving. Pt more awake today. No fevers or chills. No chest pain. Apatite improving.        Medications:  Reviewed    Infusion Medications    dextrose       Scheduled Medications    dianeal lo-ivonne 1.5%  2,000 mL Intraperitoneal Q8H    midodrine  5 mg Oral TID     potassium bicarb-citric acid  40 mEq Oral Daily    meropenem  500 mg Intravenous Q24H    influenza virus vaccine  0.5 mL Intramuscular Prior to discharge    busPIRone  5 mg Oral BID  fluticasone  2 spray Each Nostril Daily    gabapentin  300 mg Oral Daily    levothyroxine  25 mcg Oral Daily    meclizine  25 mg Oral TID    megestrol  400 mg Oral Daily    pantoprazole  40 mg Oral QAM AC    oxybutynin  5 mg Oral Daily    polycarbophil  1,875 mg Oral TID WC    polyethylene glycol  17 g Oral Daily    pravastatin  40 mg Oral Nightly    pregabalin  50 mg Oral Daily    sertraline  50 mg Oral Daily    sevelamer  1,600 mg Oral TID WC    sodium chloride  1 g Oral BID    heparin (porcine)  5,000 Units Subcutaneous 3 times per day    insulin lispro  0-12 Units Subcutaneous TID WC    insulin lispro  0-6 Units Subcutaneous Nightly    folbee plus  1 tablet Oral Daily    lidocaine 1 % injection  5 mL Intradermal Once    sodium chloride flush  10 mL Intravenous 2 times per day     PRN Meds: promethazine **OR** ondansetron, acetaminophen **OR** acetaminophen, glucose, dextrose, glucagon (rDNA), dextrose, sodium chloride flush      Intake/Output Summary (Last 24 hours) at 1/6/2021 1349  Last data filed at 1/6/2021 1104  Gross per 24 hour   Intake 6500 ml   Output 7200 ml   Net -700 ml       Diet:  DIET DYSPHAGIA PUREED; Renal  Dietary Nutrition Supplements: Diabetic Oral Supplement    Exam:  BP 96/60 Comment: manual   Pulse 87   Temp 99.1 °F (37.3 °C) (Axillary)   Resp 17   Ht 5' 6\" (1.676 m)   Wt 183 lb 1.6 oz (83.1 kg)   SpO2 100%   BMI 29.55 kg/m²     General appearance: chronically ill appearing elderly female   HEENT: Pupils equal, round, and reactive to light. Conjunctivae/corneas clear. Neck: Supple, with full range of motion. No jugular venous distention. Trachea midline. Respiratory:  Normal respiratory effort. Clear to auscultation, bilaterally without Rales/Wheezes/Rhonchi. Cardiovascular: Regular rate and rhythm with normal S1/S2 without murmurs, rubs or gallops. Abdomen: Soft, non-tender, non-distended with normal bowel sounds. Musculoskeletal: passive and active ROM x 4 extremities. Blister noted to the dorsal aspect of the left foot  Skin: Skin color, texture, turgor normal.  No rashes or lesions. Neurologic:  Neurovascularly intact without any focal sensory/motor deficits. Cranial nerves: II-XII intact, grossly non-focal.  Psychiatric: mental status improving, alert to self, place. Capillary Refill: Brisk,< 3 seconds   Peripheral Pulses: +2 palpable, equal bilaterally       Labs:   Recent Labs     01/06/21  0500   WBC 11.8*   HGB 10.2*   HCT 32.2*        Recent Labs     01/06/21  0500      K 3.6      CO2 24   BUN 30*   CREATININE 5.1*   CALCIUM 9.7     Recent Labs     01/04/21  0430   AST 32   ALT 36   BILIDIR <0.2   BILITOT 0.4   ALKPHOS 154*     No results for input(s): INR in the last 72 hours. No results for input(s): Coralyn Junior in the last 72 hours. Urinalysis:      Lab Results   Component Value Date    NITRU NEGATIVE 06/22/2019    WBCUA 0-2 06/22/2019    BACTERIA NONE 06/22/2019    RBCUA 5-10 06/22/2019    BLOODU NEGATIVE 06/22/2019    GLUCOSEU NEGATIVE 06/22/2019       Radiology: All imaging reviewed     Diet: DIET DYSPHAGIA PUREED;  Renal  Dietary Nutrition Supplements: Diabetic Oral Supplement      Code Status: Full Code            Electronically signed by Morales Vann MD on 1/6/2021 at 1:49 PM

## 2021-01-06 NOTE — CARE COORDINATION
DISASTER CHARTING    1/6/21, 12:20 PM EST    DISCHARGE ONGOING EVALUATION:     Geovani He day: 5  [facility] Reason for admit: Sepsis [facility]) [A41.9]   Barriers to Discharge: Still with confusion, Dr Mohit Victoria feels can take a few days to clear the cefepime induced encephalopathy. Working with TAHMINA Lai. PCP: [address]  Patient Goals/Plan/Treatment Preferences: Return to [facility] once patient's mental state is improved.

## 2021-01-06 NOTE — PROGRESS NOTES
[facility]         Renal Inpatient Follow-Up note         1/6/2021 8:22 AM    Pt Name:   Clarissa Encarnacion  YOB: 1950  Attending:   Loida Amaya MD    Chief Complaint : Clarissa Encarnacion is a 78 y.o. female being followed by nephrology for ESRD on hemodialysis and hypokalemia    Interval History :   Patient seen and examined by me. No distress  Says she is feeling ok.  denies any chest pain no shortness of breath  Eating and drinking slightly better  Patient had a lot of ultrafiltration last night     Scheduled Medications :    midodrine  5 mg Oral TID WC    potassium bicarb-citric acid  40 mEq Oral Daily    meropenem  500 mg Intravenous Q24H    dianeal lo-ivonne 1.5%  2,000 mL Intraperitoneal Q6H    influenza virus vaccine  0.5 mL Intramuscular Prior to discharge    busPIRone  5 mg Oral BID    fluticasone  2 spray Each Nostril Daily    gabapentin  300 mg Oral Daily    levothyroxine  25 mcg Oral Daily    meclizine  25 mg Oral TID    megestrol  400 mg Oral Daily    pantoprazole  40 mg Oral QAM AC    oxybutynin  5 mg Oral Daily    polycarbophil  1,875 mg Oral TID WC    polyethylene glycol  17 g Oral Daily    pravastatin  40 mg Oral Nightly    pregabalin  50 mg Oral Daily    sertraline  50 mg Oral Daily    sevelamer  1,600 mg Oral TID WC    sodium chloride  1 g Oral BID    heparin (porcine)  5,000 Units Subcutaneous 3 times per day    insulin lispro  0-12 Units Subcutaneous TID WC    insulin lispro  0-6 Units Subcutaneous Nightly    folbee plus  1 tablet Oral Daily    lidocaine 1 % injection  5 mL Intradermal Once    sodium chloride flush  10 mL Intravenous 2 times per day      dextrose         Vitals :  BP (!) 150/72   Pulse 93   Temp 98.6 °F (37 °C) (Oral)   Resp 17   Ht 5' 6\" (1.676 m)   Wt 183 lb 1.6 oz (83.1 kg)   SpO2 100%   BMI 29.55 kg/m²     24HR INTAKE/OUTPUT:      Intake/Output Summary (Last 24 hours) at 1/6/2021 8211 Last data filed at 1/6/2021 0655  Gross per 24 hour   Intake 8430 ml   Output 9000 ml   Net -570 ml     Last 3 weights  Wt Readings from Last 3 Encounters:   01/06/21 183 lb 1.6 oz (83.1 kg)   08/28/20 189 lb 3.2 oz (85.8 kg)   06/02/20 186 lb 9.6 oz (84.6 kg)           Physical Exam :  General Appearance:  Well developed. No distress  Mouth/Throat:  Oral mucosa dry  Neck:  Supple, no JVD  Lungs:  Breath sounds: clear  Heart[de-identified]  S1,S2 heard  Abdomen:  Soft, non - tender  Musculoskeletal:  Edema -no significant edema noted         Last 3 CBC   Recent Labs     01/04/21  0430 01/05/21  0600 01/06/21  0500   WBC 11.2* 11.7* 11.8*   RBC 2.93* 2.83* 3.05*   HGB 9.8* 9.8* 10.2*   HCT 30.8* 29.9* 32.2*    186 184     Last 3 CMP  Recent Labs     01/04/21  0430 01/05/21  0600 01/06/21  0500    141 139   K 3.3* 3.5 3.6    106 103   CO2 22* 24 24   BUN 31* 29* 30*   CREATININE 6.2* 5.3* 5.1*   CALCIUM 9.3 9.4 9.7   LABALBU 2.3*  --   --    BILITOT 0.4  --   --              ASSESSMENT / Plan   1 Renal -ESRD on peritoneal dialysis  ? Volume status appears reasonable, BP is better  ? Reviewed the peritoneal dialysis data has 1900, 2120 900 mL per exchange  ? Decrease exchanges to every 8 hours    2 Electrolytes -hypokalemia on Effer-K 40 mEq p.o. daily  3 Hypotension on midodrine continue midodrine 5 mg 3 times daily  4 Metabolic acidosis-resolved with dialysis  5 Anemia of end-stage renal disease  6 Status post fall  7 Hx of diabetes mellitus  8 Leukocytosis much improved  9 Possible cefepime toxicity  10 Meds reviewed and discussed with patient nursing staff    LIANNE Yuan D.  [facility].

## 2021-01-07 NOTE — PROGRESS NOTES
[address]  PHYSICAL THERAPY MISSED TREATMENT NOTE  [facility] RENAL TELEMETRY 6K    Date: 2021  Patient Name: Alexei Renteria        MRN: [id number]   : 1950  (78 y.o.)  Gender: female                REASON FOR MISSED TREATMENT:  Pt has order for TLSO when up. Brace has not been delivered yet. Will check back tomorrow. Court [address].  Evans Vaughan, [address]

## 2021-01-07 NOTE — PROGRESS NOTES
Called Emma Rosen, , and notified her that patient will most likely be discharged tomorrow per Dr Robyn Rudolph and that she has a TSLO brace ordered per orthopedic surgery for a T12 fracture that she would need prior to discharge as she is now working with PT/OT and oriented. Emma Rosen stated she would work on it and update this RN.

## 2021-01-07 NOTE — CARE COORDINATION
1/7/21, 3:11 PM EST    DISCHARGE PLANNING EVALUATION    SW faxed transport forms and availability is tight for tomorrow tentatively scheduled for 4pm.

## 2021-01-07 NOTE — PROGRESS NOTES
Hospitalist Progress Note    Patient:  Arden Blue      Unit/Bed:6K-28/028-A    YOB: 1950    MRN: [id number]       Acct: [de-identified]     PCP: [id number]    Date of Admission: 1/1/2021    Active Hospital Problems    Diagnosis Date Noted    Sepsis ([facility] [address] [A41.9] 08/13/2020       Assessment/Plan:    1. Hypovolemic and Septic Shock (resolved): BP low, transferred to ICU for possible pressor support. Pt responded to IV fluid boluses, didn't require pressors. Negative septic work-up, no clear source identified. CT Chest and A/P negative. Bx NGTD. PD cultures no growth. WBC count elevated 22.8 (however has chronically elevated WBC count), IV Cefepime started. WBC count down-trending. Pt has had similar episodes in the past like these with negative infection work-ups. Pt started on Midodrine 10 TID, will cont IV Cefepime (Day 4). Changed to IV Meropenem on 1/4, completed 7 days of IV ABx. 2. Hypotension: presented with low BP readings on admission, thought to be related to dehydration. Pt responded well to IVF bolus and Midodrine. Improving. Nephrology following. Cont IV ABx. 3. ESRD on PD: volume status improving, IVF stopped today. Start PD today. Nephrology following. 4. Fall with Acute T12 Vertebral Body Fracture: had a fall at [facility]. Orthopedic surgery consulted for fracture and recommend TLSO brace to be worn when out of bed and ambulating. No surgical intervention planned at this time. Follow up in the office 2-4 weeks at discharge    5. Diabetes Mellitus Type 2, Uncontrolled: resume her home Lantus, add sliding scale level 2 along with hypoglycemia protocol and monitor    6. Hypothyroidism: resume Synthroid 25 qd     7. Acute Metabolic Encephalopathy: mental status waxes and wanes, has to be redirected to the topic. Repeats words. Poor arm control. Per ECF this is new. MRI brain no acute CVA. SLP maurizio. Consult Neurology. LFTs WNL. Ammonia WNL. --1/5: Neurology thinks this is Cefepime Induced Encephalopathy, this was stopped yesterday and Meropenem was started. Pt still slightly confused. Can take 3-5 days to clear from system. SLP to see. --1/6: mental status improving. Cont IV Meropenem. Neurology still following. Pt should continue to improve over the next few days per Neurology. --1/7: mental status almost at baseline, pt doing significantly better. Wants to try regular diet. Completed 7 days course of IV ABx. Plan to get SLP to see today. Hopefully discharge back to [facility] tomorrow       Dispo: plan discharge back to [facility] tomorrow. Pending SLP eval today. Chief Complaint: Fall at nursing home    Hospital Course:  78 y.o. female who presented to [facility] with fall at nursing home; this patient was a transfer in from [facility]; patient offers limited information as she is alert to her name and month however she thinks she is at the nursing home; per review of old records the patient got up to go to the bathroom and she sustained a fall, she is on peritoneal dialysis in which she receives daily, she initially complained of pain to her right hip and low back however she denies that now the nursing home states she has had some intermittent confusion; at [facility] she was given Maxipime along with vancomycin and potassium; when she presented here she was hypotensive; it was reported her initial blood pressure at [facility] was 76/48 and they did give her a fluid challenge which improved her blood pressure; again she looks extremely dry on exam; lab work will be repeated, she will be monitored closely, she is being admitted to the hospital service for further care and evaluation. Subjective: no acute events overnight. Mental status improving and almost back to baseline. No fevers or chills. No chest pain. Apatite improving.        Medications:  Reviewed    Infusion Medications    dextrose Scheduled Medications    meropenem  500 mg Intravenous Q24H    dianeal lo-ivonne 1.5%  2,000 mL Intraperitoneal Q8H    midodrine  10 mg Oral TID WC    potassium bicarb-citric acid  40 mEq Oral Daily    influenza virus vaccine  0.5 mL Intramuscular Prior to discharge    busPIRone  5 mg Oral BID    fluticasone  2 spray Each Nostril Daily    gabapentin  300 mg Oral Daily    levothyroxine  25 mcg Oral Daily    meclizine  25 mg Oral TID    megestrol  400 mg Oral Daily    pantoprazole  40 mg Oral QAM AC    oxybutynin  5 mg Oral Daily    polycarbophil  1,875 mg Oral TID WC    polyethylene glycol  17 g Oral Daily    pravastatin  40 mg Oral Nightly    pregabalin  50 mg Oral Daily    sertraline  50 mg Oral Daily    sevelamer  1,600 mg Oral TID WC    sodium chloride  1 g Oral BID    heparin (porcine)  5,000 Units Subcutaneous 3 times per day    insulin lispro  0-12 Units Subcutaneous TID WC    insulin lispro  0-6 Units Subcutaneous Nightly    folbee plus  1 tablet Oral Daily    lidocaine 1 % injection  5 mL Intradermal Once    sodium chloride flush  10 mL Intravenous 2 times per day     PRN Meds: promethazine **OR** ondansetron, acetaminophen **OR** acetaminophen, glucose, dextrose, glucagon (rDNA), dextrose, sodium chloride flush      Intake/Output Summary (Last 24 hours) at 1/7/2021 1100  Last data filed at 1/7/2021 0743  Gross per 24 hour   Intake 6525.22 ml   Output 5900 ml   Net 625.22 ml       Diet:  DIET DYSPHAGIA PUREED; Renal  Dietary Nutrition Supplements: Diabetic Oral Supplement    Exam:  /63   Pulse 85   Temp 98.5 °F (36.9 °C) (Oral)   Resp 18   Ht 5' 6\" (1.676 m)   Wt 181 lb 3.2 oz (82.2 kg)   SpO2 98%   BMI 29.25 kg/m²     General appearance: chronically ill appearing elderly female   HEENT: Pupils equal, round, and reactive to light. Conjunctivae/corneas clear. Neck: Supple, with full range of motion. No jugular venous distention. Trachea midline. Respiratory:  Normal respiratory effort. Clear to auscultation, bilaterally without Rales/Wheezes/Rhonchi. Cardiovascular: Regular rate and rhythm with normal S1/S2 without murmurs, rubs or gallops. Abdomen: Soft, non-tender, non-distended with normal bowel sounds. Musculoskeletal: passive and active ROM x 4 extremities. Blister noted to the dorsal aspect of the left foot  Skin: Skin color, texture, turgor normal.  No rashes or lesions. Neurologic:  Neurovascularly intact without any focal sensory/motor deficits. Cranial nerves: II-XII intact, grossly non-focal.  Psychiatric: mental status improving, alert to self, place. Capillary Refill: Brisk,< 3 seconds   Peripheral Pulses: +2 palpable, equal bilaterally       Labs:   Recent Labs     01/07/21  1037   WBC 12.1*   HGB 10.6*   HCT 29.7*        Recent Labs     01/06/21  0500      K 3.6      CO2 24   BUN 30*   CREATININE 5.1*   CALCIUM 9.7     No results for input(s): AST, ALT, BILIDIR, BILITOT, ALKPHOS in the last 72 hours. No results for input(s): INR in the last 72 hours. No results for input(s): Varma Hiram in the last 72 hours. Urinalysis:      Lab Results   Component Value Date    NITRU NEGATIVE 06/22/2019    WBCUA 0-2 06/22/2019    BACTERIA NONE 06/22/2019    RBCUA 5-10 06/22/2019    BLOODU NEGATIVE 06/22/2019    GLUCOSEU NEGATIVE 06/22/2019       Radiology: All imaging reviewed     Diet: DIET DYSPHAGIA PUREED;  Renal  Dietary Nutrition Supplements: Diabetic Oral Supplement      Code Status: Full Code            Electronically signed by Anne-Marie Aviles MD on 1/7/2021 at 11:00 AM

## 2021-01-07 NOTE — CARE COORDINATION
1/7/21, 12:50 PM EST  DISCHARGE ON GOING EVALUATION    Pat Arenas day: 6  [facility]A Reason for admit: Sepsis [facility]) [A41.9]   Barriers to Discharge: Resolving confusion from cefepime. PT/OT/ST following. CAPD patient. TLSO brace ordered - called [facility] and faxed order to them, they will be here sometime after 5 pm to fit patient. PCP: Kassandra Higgins  Readmission Risk Score: 30%  Patient Goals/Plan/Treatment Preferences: Plan return to [facility] after mental status clears. SS following.

## 2021-01-07 NOTE — PROGRESS NOTES
[facility]         Renal Inpatient Follow-Up note         1/7/2021 9:15 AM    Pt Name:   Chyna Jackson  YOB: 1950  Attending:   Christina Wan MD    Chief Complaint : Chyna Jackson is a 78 y.o. female being followed by nephrology for ESRD on hemodialysis and hypokalemia    Interval History :   Patient seen and examined by me. No distress  She actually looks remarkably better  Denies any chest pain or shortness of breath. Unable to eat the puréed diet prefers regular diet.   Speech will be seen     Scheduled Medications :    meropenem  500 mg Intravenous Q24H    dianeal lo-ivonne 1.5%  2,000 mL Intraperitoneal Q8H    midodrine  10 mg Oral TID WC    potassium bicarb-citric acid  40 mEq Oral Daily    influenza virus vaccine  0.5 mL Intramuscular Prior to discharge    busPIRone  5 mg Oral BID    fluticasone  2 spray Each Nostril Daily    gabapentin  300 mg Oral Daily    levothyroxine  25 mcg Oral Daily    meclizine  25 mg Oral TID    megestrol  400 mg Oral Daily    pantoprazole  40 mg Oral QAM AC    oxybutynin  5 mg Oral Daily    polycarbophil  1,875 mg Oral TID WC    polyethylene glycol  17 g Oral Daily    pravastatin  40 mg Oral Nightly    pregabalin  50 mg Oral Daily    sertraline  50 mg Oral Daily    sevelamer  1,600 mg Oral TID WC    sodium chloride  1 g Oral BID    heparin (porcine)  5,000 Units Subcutaneous 3 times per day    insulin lispro  0-12 Units Subcutaneous TID WC    insulin lispro  0-6 Units Subcutaneous Nightly    folbee plus  1 tablet Oral Daily    lidocaine 1 % injection  5 mL Intradermal Once    sodium chloride flush  10 mL Intravenous 2 times per day      dextrose         Vitals :  /63   Pulse 85   Temp 98.5 °F (36.9 °C) (Oral)   Resp 18   Ht 5' 6\" (1.676 m)   Wt 181 lb 3.2 oz (82.2 kg)   SpO2 98%   BMI 29.25 kg/m²     24HR INTAKE/OUTPUT:      Intake/Output Summary (Last 24 hours) at 1/7/2021 5692

## 2021-01-07 NOTE — PROGRESS NOTES
. [address], Susan. \Hospital Has a New Name and Outlook.\""  INPATIENT OCCUPATIONAL THERAPY  [facility] RENAL TELEMETRY 6K  EVALUATION    Time:   Time In: 1010  Time Out: 1048  Timed Code Treatment Minutes: 28 Minutes  Minutes: 38          Date: 2021  Patient Name: Kathya New,   Gender: female      MRN: [id number]  : 1950  (78 y.o.)  Referring Practitioner: Oleksandr Bryant MD  Diagnosis: Sepsis  Additional Pertinent Hx: \"76 y.o. female who presented to [address] [place] [address] with fall at nursing home; this patient was a transfer in from [facility] [address] [facility]; patient offers limited information as she is alert to her name and month however she thinks she is at the nursing home; per review of old records the patient got up to go to the bathroom and she sustained a fall, she is on peritoneal dialysis in which she receives daily, she initially complained of pain to her right hip and low back however she denies that now the nursing home states she has had some intermittent confusion; at [address] [facility] she was given Maxipime along with vancomycin and potassium; when she presented here she was hypotensive; it was reported her initial blood pressure at [address] [facility] was 76/48 and they did give her a fluid challenge which improved her blood pressure; again she looks extremely dry on exam; lab work will be repeated, she will be monitored closely, she is being admitted to the hospital service for further care and evaluation. \" Pt with acute T12 VCF seen on CT scan on     Restrictions/Precautions:  Restrictions/Precautions: General Precautions, Fall Risk  Required Braces or Orthoses  Spinal: Thoracic Lumbar Sacral Orthotics(when up)    Subjective  Chart Reviewed: Yes, Orders, Progress Notes, History and Physical  Patient assessed for rehabilitation services?: Yes  Family / Caregiver Present: No    Subjective: RN okayed OT. Pt supine upon arrival and agreeable to OT.     Pain:  Pain Assessment  Patient Currently in Pain: Denies Social/Functional History:  Type of Home: Facility([facility])  Home Equipment: Rolling walker   Bathroom Shower/Tub: Tub/Shower unit, Walk-in shower, Shower chair with back  Bathroom Toilet: Handicap height  Bathroom Equipment: Grab bars in shower  Bathroom Accessibility: Accessible    Receives Help From: (staff at [facility])  ADL Assistance: Needs assistance  Homemaking Assistance: Needs assistance  Ambulation Assistance: Needs assistance  Transfer Assistance: Needs assistance    Additional Comments: pt reports she was using RW for functional mobility and was receving assitance from [employer] for transfers, functional mobility, and ADL tasks    Cognition/Orientation:     Overall Cognitive Status: Exceptions  Cognition Comment: pt disoriented to time, and place    ADL's:  LE Dressing: Dependent/Total(to don socks)  Toileting: Dependent/Total(pt incontinent upon arrival and dependent with hygiene)     Functional Mobility:  Bed mobility  Rolling to Left: Maximum assistance(to adjust bedding and clean incontinence)  Rolling to Right: Moderate assistance(to adjust beddin and clean incontinence)  Supine to Sit: Maximum assistance  Sit to Supine: Maximum assistance  Scooting: Maximal assistance(to sit EOB)  Comment: increased time for completion    Functional Mobility  Functional Mobility Comments: not safe to assess this date     Balance:  Balance  Sitting Balance:  Moderate assistance(to sit EOB for UB assessment)   Pt reporting dizziness when sitting up    Transfers:  Transfer Comments: not safe to assess this date     Upper Extremity Assessment:   LUE AROM : WFL  Left Hand AROM: WFL  Left Hand General AROM: Pt 5th digit is contracted  RUE AROM : WFL  Right Hand AROM: WFL    LUE Strength  L Hand General: 3+/5  RUE Strength  R Hand General: 3+/5    Sensation  Overall Sensation Status: WFL       Activity Tolerance: Patient limited by fatigue       Assessment: Assessment: Pt would benefit from skilled OT intervention to increase safety and independence with ADL tasks and functional mobility to ensure safe transition to next level of care and return to PLOF. Performance deficits / Impairments: Decreased functional mobility , Decreased cognition, Decreased high-level IADLs, Decreased ADL status, Decreased endurance, Decreased strength, Decreased balance, Decreased safe awareness  Prognosis: Fair  REQUIRES OT FOLLOW UP: Yes  Safety Devices in place: Yes    Treatment Initiated: Treatment and education initiated within context of evaluation. Evaluation time included review of current medical information, gathering information related to past medical, social and functional history, completion of standardized testing, formal and informal observation of tasks, assessment of data and development of plan of care and goals. Treatment time included skilled education and facilitation of tasks to increase safety and independence with ADL's for improved functional independence and quality of life. Discharge Recommendations:  Continue to assess pending progress, Subacute/Skilled Nursing Facility    Patient Education:  OT Education: OT Role, Plan of Care    Equipment Recommendations:  Equipment Needed: No    Plan:  Times per week: 5x  Current Treatment Recommendations: Strengthening, Safety Education & Training, Balance Training, Patient/Caregiver Education & Training, Self-Care / ADL, Functional Mobility Training, Endurance Training    Goals:  Patient goals : get stronger  Short term goals  Time Frame for Short term goals: by discharge  Short term goal 1: complete BUE strengthening exercises x 10-15 reps to increase safety and independent with ADL tasks and functional mobility to ensure safe transition to next level of care and return to PLOF.   Short term goal 2: complete static sitting with min A to increase independence with seated ADLs Short term goal 3: tolerate further assessment of functional transfers and functional mobility by OTR  See long-term goal time frame for expected duration of plan of care. If no long-term goals established, a short length of stay is anticipated. Following session, patient left in safe position with all fall risk precautions in place.

## 2021-01-08 NOTE — CARE COORDINATION
1/8/21, 9:56 AM EST    Patient goals/plan/ treatment preferences discussed by  and . Patient goals/plan/ treatment preferences reviewed with patient/ family. Patient/ family verbalize understanding of discharge plan and are in agreement with goal/plan/treatment preferences. Understanding was demonstrated using the teach back method. AVS provided by RN at time of discharge, which includes all necessary medical information pertaining to the patients current course of illness, treatment, post-discharge goals of care, and treatment preferences. Services After Discharge  Services At/After Discharge: PT, OT, Nursing Services, Aide Services([facility], skilled medicare)   IMM Letter  IMM Letter given to Patient/Family/Significant other/Guardian/POA/by[de-identified] Mary Carson CM  IMM Letter date given[de-identified] 01/08/21  IMM Letter time given[de-identified] 21      Patient discharge today to [facility], skilled medicare. Ambulance transport arranged for 4:00pm today. RN updated and blue envelope on chart.

## 2021-01-08 NOTE — FLOWSHEET NOTE
was asked by patient's nurse to stop in and provide emotional and spiritual support to patient as she needed some encouragement. At the time of entry, patient is propped in her bed awake. Patient engaged in conversation and talked about her son who is giving her support and grand children one of whom birth day is coming up and said she hope she would be there. Patient asked for prayer and prayed is offered in respond. Patient thanked me for coming and asked if I can came again soon to visit. I told her I look forward to coming back during my next rounding to visit. [place] service remain available to patient as needed.

## 2021-01-08 NOTE — DISCHARGE INSTR - DIET
? Good nutrition is important when healing from an illness, injury, or surgery. Follow any nutrition recommendations given to you during your hospital stay. ? If you were given an oral nutrition supplement while in the hospital, continue to take this supplement at home. You can take it with meals, in-between meals, and/or before bedtime. These supplements can be purchased at most local grocery stores, pharmacies, and chain [employer]. ? If you have any questions about your diet or nutrition, call the hospital and ask for the dietitian.

## 2021-01-08 NOTE — PROGRESS NOTES
Patient discharged to [facility] via [facility] transport. Report called to [facility]. Transfer packet sent with patient.

## 2021-01-08 NOTE — DISCHARGE SUMMARY
Hospital Medicine Discharge Summary      Patient Identification:   Anita Swartz   : 1950  MRN: [id number]   Account: [de-identified]      Patient's PCP: Cleveland Lamar Date: 2021     Discharge Date:   21    Admitting Physician: Lisbeth Cox MD     Discharge Physician: Nella Hussein MD     Discharge Diagnoses:  Hypovolemic and Septic Shock; Hypotension; ESRD on PD; Cefepime Inducted Encephalopathy; Oral [address] Problems    Diagnosis Date Noted    Sepsis [facility]) [A41.9] 2020       The patient was seen and examined on day of discharge and this discharge summary is in conjunction with any daily progress note from day of discharge. Hospital Course:   Anita Swartz is a 78 y.o. female admitted to [address] [place] [address] on 2021 for weakness, hypotension, and confusion. Pt presented to [facility] ER hypotensive with concern for infection. Pt appeared very dry, was started on IV Vanc and Cefepime, given fluid bolus challenges with improvement in BP and mental status. Pt was also started on Midodrine, pt subsequently developed cefepime induced encephalopathy, which resolved with discontinuing the medication. Pt doing well, tolerating PO intake. Mental status at baseline. Will discharge back to [facility].        Assessment/Plan:     1. Hypovolemic and Septic Shock (resolved): BP low, transferred to ICU for possible pressor support. Pt responded to IV fluid boluses, didn't require pressors. Negative septic work-up, no clear source identified. CT Chest and A/P negative. Bx NGTD. PD cultures no growth. WBC count elevated 22.8 (however has chronically elevated WBC count), IV Cefepime started. WBC count down-trending. Pt has had similar episodes in the past like these with negative infection work-ups. Pt started on Midodrine 10 TID, will cont IV Cefepime (Day 4). Changed to IV Meropenem on , completed 7 days of IV ABx.      2. Hypotension (resolved): presented with low BP readings on admission, thought to be related to dehydration. Pt responded well to IVF bolus and Midodrine. Improving. Nephrology following. Cont IV ABx.      3. ESRD on PD: volume status improving, IVF stopped today. Start PD today. Nephrology following.     4. Fall with Acute T12 Vertebral Body Fracture: had a fall at [facility]. Orthopedic surgery consulted for fracture and recommend TLSO brace to be worn when out of bed and ambulating. No surgical intervention planned at this time. Follow up in the office 2-4 weeks at discharge     5. Diabetes Mellitus Type 2, Uncontrolled: resume her home Lantus, add sliding scale level 2 along with hypoglycemia protocol and monitor     6. Hypothyroidism: resume Synthroid 25 qd      7. Acute Metabolic Encephalopathy (resolved): mental status waxes and wanes, has to be redirected to the topic. Repeats words. Poor arm control. Per [facility] this is new. MRI brain no acute CVA. SLP eval. Consult Neurology. LFTs WNL. Ammonia WNL.    --1/5: Neurology thinks this is Cefepime Induced Encephalopathy, this was stopped yesterday and Meropenem was started. Pt still slightly confused. Can take 3-5 days to clear from system. SLP to see. --1/6: mental status improving. Cont IV Meropenem. Neurology still following. Pt should continue to improve over the next few days per Neurology. --1/7: mental status almost at baseline, pt doing significantly better. Wants to try regular diet. Completed 7 days course of IV ABx. Plan to get SLP to see today. Hopefully discharge back to [facility] tomorrow   --1/8: mental status back to baseline.  Doing well, will discharge back to [facility].          Exam:     Vitals:  Vitals:    01/08/21 0438 01/08/21 0815 01/08/21 0854 01/08/21 1315   BP: 134/70 (!) 123/56  (!) 123/58   Pulse: 79 87  86   Resp: 18 18  18   Temp: 97.5 °F (36.4 °C) 97.6 °F (36.4 °C)  98.4 °F (36.9 °C)   TempSrc: Oral Oral  Oral   SpO2: 99% 100% 99% 99% Weight: 177 lb 6.4 oz (80.5 kg)      Height:         Weight: Weight: 177 lb 6.4 oz (80.5 kg)     24 hour intake/output:    Intake/Output Summary (Last 24 hours) at 1/8/2021 1403  Last data filed at 1/8/2021 0438  Gross per 24 hour   Intake 4600 ml   Output 4800 ml   Net -200 ml         Labs: For convenience and continuity at follow-up the following most recent labs are provided:      CBC:    Lab Results   Component Value Date    WBC 12.1 01/07/2021    HGB 10.6 01/07/2021    HCT 29.7 01/07/2021     01/07/2021       Renal:    Lab Results   Component Value Date     01/08/2021    K 3.8 01/08/2021    K 2.9 01/01/2021    CL 98 01/08/2021    CO2 22 01/08/2021    BUN 31 01/08/2021    CREATININE 5.6 01/08/2021    CALCIUM 9.4 01/08/2021    PHOS 4.6 03/08/2019         Significant Diagnostic Studies    Radiology:   MRI BRAIN WO CONTRAST   Final Result       1. No evidence of acute intracranial abnormality. 2. Mild severity chronic microvascular angiopathy superimposed on moderate temporal predominant volume loss which is asymmetrically prominent on the left. **This report has been created using voice recognition software. It may contain minor errors which are inherent in voice recognition technology. **      Final report electronically signed by Dr. Kat Green MD on 1/3/2021 4:37 PM      CT CHEST WO CONTRAST   Final Result   Acute T12 vertebral body fracture as described. Atheromatous disease. Pleural effusions. This document has been electronically signed by: Merly Calvert MD on    01/02/2021 05:39 AM      All CT scans at this facility use dose modulation, iterative    reconstruction, and/or weight-based   dosing when appropriate to reduce radiation dose to as low as reasonably    achievable. CT ABDOMEN PELVIS WO CONTRAST Additional Contrast? None   Final Result   Free peritoneal air, likely related to peritoneal dialysis. Small volume ascites. No bowel obstruction. This document has been electronically signed by: Saroj Ocampo MD on    01/02/2021 05:46 AM      All CT scans at this facility use dose modulation, iterative    reconstruction, and/or weight-based   dosing when appropriate to reduce radiation dose to as low as reasonably    achievable. XR CHEST PORTABLE   Final Result   1. There is a new right jugular central venous catheter with the distal tip projecting over the distal superior vena cava. 2. Stable elevation of the right hemidiaphragm to the level of the right hilum. The lung volumes are diminished. The right lung base is not evaluated secondary to elevation of the right hemidiaphragm. There is otherwise a small focus of linear platelike    atelectasis at the left lung base. There is no pleural effusion or pulmonary vascular congestion. **This report has been created using voice recognition software. It may contain minor errors which are inherent in voice recognition technology. **      Final report electronically signed by Dr. Angela Miller on 1/1/2021 4:51 PM             Consults:     IP CONSULT TO NEPHROLOGY  IP CONSULT TO CRITICAL CARE  IP CONSULT TO ORTHOPEDIC SURGERY  IP CONSULT TO DIETITIAN  IP CONSULT TO SOCIAL WORK  PALLIATIVE CARE EVAL  IP CONSULT TO NEUROLOGY    Disposition:    [] Home       [] TCU       [] Rehab       [] Psych       [x] SNF       [] [place]       [] Other-    Condition at Discharge: Stable    Code Status:  Full Code     Patient Instructions: Activity: activity as tolerated  Diet: Dietary Nutrition Supplements: Diabetic Oral Supplement  DIET DYSPHAGIA MINCED AND MOIST;  Renal      Follow-up visits:   [facility]  [address] [place] [address]  [phone]    Schedule an appointment as soon as possible for a visit in 1 week           Discharge Medications: Faisal Ortiz   Home Medication Instructions ABN:[id number]    Printed on:01/08/21 1407   Medication Information                      acetaminophen (TYLENOL) 325 MG tablet  Take 650 mg by mouth every 6 hours as needed for Pain             acetaminophen-codeine (TYLENOL #3) 300-30 MG per tablet  Take 1 tablet by mouth every 6 hours as needed (arthritis pain) for up to 30 days. B Complex-C-Folic Acid (RENAL) 1 MG CAPS  Take 1 capsule by mouth daily             benzonatate (TESSALON) 100 MG capsule  Take 100 mg by mouth every 8 hours as needed for Cough             blood glucose test strips (ASCENSIA AUTODISC VI;ONE TOUCH ULTRA TEST VI) strip  Patient tests blood sugar three times per day. Diagnosis DMII E11.9             busPIRone (BUSPAR) 5 MG tablet  Take 5 mg by mouth 2 times daily             fluticasone (FLONASE) 50 MCG/ACT nasal spray  2 sprays by Each Nostril route daily              gabapentin (NEURONTIN) 300 MG capsule  Take 300 mg by mouth daily. guaiFENesin 200 MG tablet  Take 200 mg by mouth every 4 hours as needed (cough)              insulin aspart (NOVOLOG) 100 UNIT/ML injection vial  Inject 0-12 Units into the skin 3 times daily (before meals) Per sliding scale; 140-199= 1 unit, 200-249= 2 units, 250-299= 3 units,  300-349= 4 units, 350-399= 5 units, 400-450= 6 units, 451-500= 10 units, >500 give 12 units & recheck in one hour. insulin glargine (BASAGLAR KWIKPEN) 100 UNIT/ML injection pen  Inject 4 Units into the skin every morning             levothyroxine (SYNTHROID) 25 MCG tablet  Take 25 mcg by mouth Daily             lidocaine (XYLOCAINE) 5 % ointment  Apply topically as needed.              Magnesium Chloride-Calcium  MG  Take 1 tablet by mouth 2 times daily             meclizine (ANTIVERT) 25 MG tablet  Take 25 mg by mouth 3 times daily             megestrol acetate (MEGACE) 400 MG/10ML SUSP  Take 400 mg by mouth daily midodrine (PROAMATINE) 10 MG tablet  Take 1 tablet by mouth 3 times daily (with meals) Hold for SBP >120             nystatin (MYCOSTATIN) 960324 UNIT/ML suspension  Take 5 mLs by mouth 4 times daily for 10 days Retain in mouth as long as possible             omeprazole (PRILOSEC) 20 MG delayed release capsule  Take 20 mg by mouth daily             ondansetron (ZOFRAN-ODT) 4 MG disintegrating tablet  Take 4 mg by mouth every 8 hours as needed for Nausea or Vomiting              oxybutynin (DITROPAN-XL) 5 MG extended release tablet  Take 5 mg by mouth daily             polycarbophil (FIBERCON) 625 MG tablet  Take 3 tablets by mouth 3 times daily (with meals)              polyethylene glycol (GLYCOLAX) powder  Take 17 g by mouth daily             potassium bicarb-citric acid (EFFER-K) 20 MEQ TBEF effervescent tablet  Take 2 tablets by mouth daily             pravastatin (PRAVACHOL) 40 MG tablet  Take 40 mg by mouth nightly              pregabalin (LYRICA) 50 MG capsule  Take 1 capsule by mouth daily for 5 days. sertraline (ZOLOFT) 50 MG tablet  Take 50 mg by mouth daily             sevelamer (RENVELA) 800 MG tablet  Take 2 tablets by mouth 3 times daily (with meals)             sodium chloride 1 g tablet  Take 2 tablets by mouth 2 times daily                 Time Spent on discharge is more than 30 minutes in the examination, evaluation, counseling and review of medications and discharge plan. Signed: Thank you Tiffanie Boogie for the opportunity to be involved in this patient's care.     Electronically signed by Loida Amaya MD on 1/8/2021 at 2:03 PM

## 2021-01-08 NOTE — PROGRESS NOTES
CLINICAL PHARMACY: DISCHARGE MED RECONCILIATION/REVIEW    [facility] ([facility]) Select Patient?: No  Total # of Interventions Recommended: 3 -    - Increased Dose #: 1  - New Order #: 2  Total # Interventions Accepted: 3  Intervention Severity:   - Level 1 Intervention Present?: No   - Level 2 #: 3   - Level 3 #: 0   Time Spent (min): 15    Cj HurleyD, BCPS  1/8/2021  10:36 AM

## 2021-01-08 NOTE — PROGRESS NOTES
[facility]         Renal Inpatient Follow-Up note         1/8/2021 7:35 AM    Pt Name:   Mike Hylton  YOB: 1950  Attending:   Coral Cash MD    Chief Complaint : Mike Hylton is a 78 y.o. female being followed by nephrology for ESRD on hemodialysis and hypokalemia    Interval History :   Patient seen and examined by me. No distress  She actually looks remarkably better  Denies any chest pain or shortness of breath.   Not eating or drinking much     Scheduled Medications :    dianeal lo-ivonne 1.5%  2,000 mL Intraperitoneal Q8H    midodrine  10 mg Oral TID WC    potassium bicarb-citric acid  40 mEq Oral Daily    influenza virus vaccine  0.5 mL Intramuscular Prior to discharge    busPIRone  5 mg Oral BID    fluticasone  2 spray Each Nostril Daily    gabapentin  300 mg Oral Daily    levothyroxine  25 mcg Oral Daily    meclizine  25 mg Oral TID    megestrol  400 mg Oral Daily    pantoprazole  40 mg Oral QAM AC    oxybutynin  5 mg Oral Daily    polycarbophil  1,875 mg Oral TID WC    polyethylene glycol  17 g Oral Daily    pravastatin  40 mg Oral Nightly    pregabalin  50 mg Oral Daily    sertraline  50 mg Oral Daily    sevelamer  1,600 mg Oral TID WC    sodium chloride  1 g Oral BID    heparin (porcine)  5,000 Units Subcutaneous 3 times per day    insulin lispro  0-12 Units Subcutaneous TID WC    insulin lispro  0-6 Units Subcutaneous Nightly    folbee plus  1 tablet Oral Daily    lidocaine 1 % injection  5 mL Intradermal Once    sodium chloride flush  10 mL Intravenous 2 times per day      dextrose         Vitals :  /70   Pulse 79   Temp 97.5 °F (36.4 °C) (Oral)   Resp 18   Ht 5' 6\" (1.676 m)   Wt 177 lb 6.4 oz (80.5 kg)   SpO2 99%   BMI 28.63 kg/m²     24HR INTAKE/OUTPUT:      Intake/Output Summary (Last 24 hours) at 1/8/2021 0765  Last data filed at 1/8/2021 0438  Gross per 24 hour   Intake 6600 ml   Output 6800 ml   Net -200 ml Last 3 weights  Wt Readings from Last 3 Encounters:   01/08/21 177 lb 6.4 oz (80.5 kg)   08/28/20 189 lb 3.2 oz (85.8 kg)   06/02/20 186 lb 9.6 oz (84.6 kg)           Physical Exam :  General Appearance:  Well developed. No distress  Mouth/Throat:  Oral mucosa moist  Neck:  Supple, no JVD  Lungs:  Breath sounds: clear  Heart[de-identified]  S1,S2 heard  Abdomen:  Soft, non - tender  Musculoskeletal:  Edema -no significant edema noted         Last 3 CBC   Recent Labs     01/06/21  0500 01/07/21  1037   WBC 11.8* 12.1*   RBC 3.05* 3.13*   HGB 10.2* 10.6*   HCT 32.2* 29.7*    159     Last 3 CMP  Recent Labs     01/06/21  0500 01/07/21  2222 01/08/21  0346    131* 129*   K 3.6 3.9 3.8    98 98   CO2 24 23 22*   BUN 30* 31* 31*   CREATININE 5.1* 5.6* 5.6*   CALCIUM 9.7 9.4 9.4   LABALBU  --   --  1.9*   BILITOT  --   --  0.4             ASSESSMENT / Plan   1 Renal -ESRD on peritoneal dialysis  ? Volume status appears reasonable, BP is better  ? Reviewed the peritoneal dialysis data has 2400 ml per exchange  ? Continue exchanges every 8 hours  ? Check a BMP in the morning    2 Electrolytes -hypokalemia on Effer-K 40 mEq p.o. daily  3 Hyponatremia -from dialysis,not much fluid intake though. increase salt tabs to2 BID  4 Hypotension on midodrine continue 3 times a day. 5 Metabolic acidosis-resolved with dialysis  6 Anemia of end-stage renal disease  7 Status post fall  8 Hx of diabetes mellitus  9 Leukocytosis much improved  10 Possible cefepime toxicity  11 Meds reviewed and discussed with patient     LIANNE Tejada D.  [facility].

## 2021-01-22 PROBLEM — I95.89 OTHER HYPOTENSION: Status: ACTIVE | Noted: 2020-01-01

## 2021-01-22 PROBLEM — R65.21 SEPTIC SHOCK (HCC): Status: ACTIVE | Noted: 2021-01-01

## 2021-01-22 PROBLEM — A41.9 SEPTIC SHOCK (HCC): Status: ACTIVE | Noted: 2021-01-01

## 2021-01-22 NOTE — CARE COORDINATION
DISASTER CHARTING    1/22/21, 8:57 AM EST    DISCHARGE ONGOING EVALUATION:     Jose Horne day: 0  Location: 4D-18/018-A Reason for admit: Septic shock ([facility] [address] [A41.9, R65.21]   Barriers to Discharge: Presented to [facility] ED after found unresponsive at [facility]. RR 6 and was intubated. CVC placed. WBC 35, ast 175, alt 199, LA 11 at [facility]. PH 7.27, PCO2 21. BP 60/40 and started on levophed drip. Given vancomycin and Invanz. Transferred to [facility] ICU. Nephrology consulted for urgent peritoneal dialysis. TESSIO placed and started on CRRT today. CRRT initiated. Remains on vent w/ETT on AC/PC, peep 6, FIO2 90%, sats 100%. Afebrile. 's. Unable to follow commands. Intensivist and Nephrology following. +MRSA nares. +VRE rectum. CVC. TESSIO. OG to LIWS. PD catheter. Perez. IVF, levo @ 45 mcg/min, diprivan @ 20 mcg/kg/min, bicarb drip, IV albumin Q8H, synthroid, IV merrem, pravastatin, Electrolyte replacement protocols. Received 1L fld bolus. Na+ 134, CO2 9, Creat 4.4 - now 3.5, AG 27 - now 28, Ical 0.89, LA 14.2 - now 17.6, phos 5.3, procal 3.84, ck 853, alb 1.7 - now 1.1, alk phos 183 - now 160, ast 565 - now 634, wbc 35.3, hgb 9.1, plt 106, INR 1.93. Urine with mod leukocytes - sent for culture. Respiratory and blood cultures sent. PCP: Ngozi WEST  Readmission Risk Score: 20%  Patient Goals/Plan/Treatment Preferences: From [facility]. SW consulted.

## 2021-01-22 NOTE — PROGRESS NOTES
Patient arrived to unit from [place] via life flight, bed. Patient transferred to ICU bed and placed on continuous ICU bedside monitor. Patient admitted for Septic shock ([facility] [address] [A41.9, R65.21]. Vitals obtained. See flowsheets. Patient's IV access includes CVC right fem, IO left tibia, and 22g right hand. Current infusions and rates of infusion include LR at 500 ml/hr, Levo at 30 mcg/min. Assessment completed by Annemarie Mcconnell. Two nurse skin assessment completed by Annemarie Mcconnell and [address] [place] [address] RN. See flowsheets for assessment details. Policies and procedures of ICU unable to be explained to patient at this time. Family member(s)/representative(s) present at time of admission include N/A. Patient rights explained to family member(s)/representatives and patient, as able. Patient/patient's family member(s)/representative(s) N/A to have physician notified of their admission. All questions posed by patient's family member(s)/representative(s) and patient answered at this time.

## 2021-01-22 NOTE — CARE COORDINATION
01/22/21 1229   Readmission Assessment   Number of Days since last admission? 8-30 days   Previous Disposition SNF   Who is being Rito Billy   (Info taken from chart - no family present; from SNF)   What was the patient's/caregiver's perception as to why they think they needed to return back to the hospital? Other (Comment)  (Found unresponsive at [facility])   Did you visit your Primary Care Physician after you left the hospital, before you returned this time? No   Why weren't you able to visit your PCP? Did not have an appointment  (Was at SNF)   Did you see a specialist, such as Cardiac, Pulmonary, Orthopedic Physician, etc. after you left the hospital? No   Who advised the patient to return to the hospital? Other (Comment)  (Nursing home staff)   In our efforts to provide the best possible care to you and others like you, can you think of anything that we could have done to help you after you left the hospital the first time, so that you might not have needed to return so soon?  Other (Comment)  (PRISCILA)

## 2021-01-22 NOTE — FLOWSHEET NOTE
01/22/21 1151   Encounter Summary   Services provided to: Patient   Referral/Consult From: [facility]ing   [facility] of [facility] Visiting Yes  (1/22 NR)   Complexity of Encounter Low   Length of Encounter 15 minutes   Routine   Type Initial   Assessment Unable to respond   Sacraments   Sacrament of Sick-Anointing Anointed

## 2021-01-22 NOTE — PROCEDURES
Central Line Placement: Risks and benefits to the procedure were discussed. Alternatives and their risks were discussed as well. Patient was placed in the attention position. Patient was placed in Trendelenburg position. Patient was prepped using Chlorhexidene prep. Patient was draped utilizing sterile gloves, hair net, mask, sterile gown and sterile OR towels. Maximum barrier precautions were utilized. Utilizing the left subclavian approach, patient received 5 cc of 1% lidocaine. Utilizing an introducer needle, it was placed subcutaneously advanced under the clavicle and leveled flat. It was subsequently advanced toward the thoracic inlet, until venous return was obtained. A guide wire was placed through the introducer needle. The introducer needle was subsequently withdrawn leaving the guide wire in place. Utilizing a scalpel, a small incision was made in the skin. A punch dilator was placed over the guide wire and subsequently withdrawn leaving the guide wire in place. A dialysis catheter was subsequently placed over the guide wire. The guide wire was subsequently withdrawn leaving this catheter in place. All ports had good venous return and were subsequently flushed with saline. The catheter was secured using 2.0 silk. Secondary cleansing with chlorhexidine prep was subsequently placed. Tegaderm with bio- patch dressing was utilized for secondary securing and protection. There were no complications. EBL:   Less than 5 mL      Indication:  Renal failure    Electronically signed by Daysi Jeronimo.  Teri Peace MD

## 2021-01-22 NOTE — CARE COORDINATION
La1/22/21, 11:59 AM EST  Discharge Planning Evaluation  Social work consult received, patient from [facility]. Patient/Family preference is to return to [facility]. The patient's current payor source at the facility is Medicaid. Medicare skilled days available: YES  Insurance precert:  NO  Spoke with Edda at the facility. Patient bed hold: Medicaid bed hold. Anticipated transport plan: To be determined. Do they require COVID 19 test to return to [facility]: No, pt was pos for Covid back in November. Is there a required time frame which which COVID test needs done: n/a    SW spoke with pts son Alex over the phone, confirmed pt is a long term resident and will return to [facility] when medically ready.

## 2021-01-22 NOTE — PLAN OF CARE
Problem: Nutrition  Goal: Optimal nutrition therapy  Outcome: Ongoing  Nutrition Problem #1: Inadequate oral intake  Intervention: Food and/or Nutrient Delivery: Continue NPO  Nutritional Goals: TF to provide 75% or more of estimated nutrition needs until able to transition to po diet.

## 2021-01-22 NOTE — DISCHARGE INSTR - COC
F31.9    Mild mitral regurgitation I34.0    Mild tricuspid regurgitation I07.1    Chronic pain syndrome G89.4    Chronic bilateral pleural effusions J90    (HFpEF) heart failure with preserved ejection fraction (HCC) I50.30    Atelectasis J98.11    Hyponatremia E87.1    Chronic anemia D64.9    Hyperlipidemia E78.5    Constipation K59.00    Shortness of breath R06.02    Hyperkalemia E87.5    Infiltrate noted on imaging study R93.89    Moderate malnutrition (HCC) E44.0    History of pleural effusion Z87.09    ESRD on peritoneal dialysis ([facility] [address] N18.6, Z99.2    DM type 2 with diabetic peripheral neuropathy ([facility]) E11.42    Diabetic retinopathy of both eyes associated with type 2 diabetes mellitus (HCC) E11.319    Sepsis ([facility]) A41.9    Generalized abdominal pain R10.84    Restrictive lung disease J98.4    Chronic sinus bradycardia R00.1    PAD (peripheral artery disease) ([facility]) I73.9    Obesity (BMI 30-39. 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        PCR 1/2021    MRSA 01/25/20 01/25/20 01/22/21 MRSA by PCR        Nares 1/2020, 1/2021    Resolved    COVID-19 Rule Out 08/28/20 08/28/20 08/28/20 COVID-19 (Ordered)   08/28/20 Rule-Out Test Resulted    C-diff Rule Out 08/14/20 08/14/20 08/14/20 C DIFF TOXIN/ANTIGEN (Ordered)   08/17/20 Lena Weldon RN    COVID-19 Rule Out 08/13/20 08/13/20 08/13/20 COVID-19 (Ordered)   08/13/20 Rule-Out Test Resulted          Nurse Assessment:  Last Vital Signs: BP (!) 82/53   Pulse 118   Temp 94.6 °F (34.8 °C) (Bladder)   Resp 21   Wt 171 lb 4.8 oz (77.7 kg)   SpO2 100%   BMI 27.65 kg/m²     Last documented pain score (0-10 scale):    Last Weight: Wt Readings from Last 1 Encounters:   01/22/21 171 lb 4.8 oz (77.7 kg)     Mental Status:  {IP PT MENTAL STATUS:70128}    IV Access:  { IMER IV ACCESS:101274920}    Nursing Mobility/ADLs:  Walking   {CHP DME MUEA:896223386}  Transfer  {CHP DME WWSL:427109501}  Bathing  {CHP DME QLPF:260824335}  Dressing  {CHP DME NBZQ:427744293}  Toileting  {P DME LZQP:360643592}  Feeding  {P DME HNCT:071221746}  Med Admin  {P DME ZGCX:657867112}  Med Delivery   { IMER MED Delivery:929241042}    Wound Care Documentation and Therapy:  Wound 12/12/18 Buttocks Inner;Mid;Left;Right;Medial Non-blanchable purple area to buttocks (Active)   Number of days: 771       Wound 08/13/20 Foot Anterior; Left Stage 3 Pressure (Active)   Number of days: 161       Wound 08/14/20 Coccyx Mid Non-blanchable erythema in some areas (Active)   Number of days: 161       Wound 01/01/21 Coccyx Medial (Active)   Wound Etiology Pressure Unstageable 01/22/21 0500   Wound Assessment Non-blanchable erythema 01/22/21 0500   Drainage Amount None 01/22/21 0500   Tiffanie-wound Assessment Blanchable erythema 01/22/21 0500   Number of days: 20       Wound Heel Bilateral heels non-blanchable erythema (Active)   Wound Etiology Pressure Unstageable 01/22/21 0500   Wound Assessment Non-blanchable erythema 01/22/21 0500   Drainage Amount None 01/22/21 0500   Tiffanie-wound Assessment Blanchable erythema 01/22/21 0500   Number of days:        Wound Foot Anterior; Left Anterior left foot red abrasion  (Active)   Wound Etiology Deep tissue/Injury 01/22/21 0500   Wound Assessment Pink/red 01/22/21 0500   Drainage Amount None 01/22/21 0500   Number of days:        Wound 01/22/21 Abdomen Left; Lower Old wound, Scabbing (Active)   Wound Etiology Pressure Unstageable 01/22/21 0500   Wound Assessment Pink/red 01/22/21 0500   Number of days: 0       Wound 01/22/21 Anterior;Right DTI (Active)   Wound Etiology Deep tissue/Injury 01/22/21 0500   Wound Assessment Purple/maroon;Pink/red 21 0500   Tiffanie-wound Assessment Blanchable erythema 21 0500   Number of days: 0        Elimination:  Continence:   · Bowel: {YES / SC:08621}  · Bladder: {YES / IK:62677}  Urinary Catheter: {Urinary Catheter:998130932}   Colostomy/Ileostomy/Ileal Conduit: {YES / OY:70762}       Date of Last BM: ***    Intake/Output Summary (Last 24 hours) at 2021 1205  Last data filed at 2021 1104  Gross per 24 hour   Intake --   Output 16 ml   Net -16 ml     No intake/output data recorded.     Safety Concerns:     [employer] Safety Concerns:658630025}    Impairments/Disabilities:      [employer] Impairments/Disabilities:177764400}    Nutrition Therapy:  Current Nutrition Therapy:   [employer] Diet List:062561171}    Routes of Feeding: {CHP DME Other Feedings:455722437}  Liquids: {Slp liquid thickness:06653}  Daily Fluid Restriction: {CHP DME Yes amt example:673155035}  Last Modified Barium Swallow with Video (Video Swallowing Test): {Done Not Done FPXB:543097629}    Treatments at the Time of Hospital Discharge:   Respiratory Treatments: ***  Oxygen Therapy:  {Therapy; copd oxygen:15816}  Ventilator:    { CC Vent NBIN:886934469}    Rehab Therapies: {THERAPEUTIC INTERVENTION:9219954282}  Weight Bearing Status/Restrictions: [facility] Weight Bearin}  Other Medical Equipment (for information only, NOT a DME order):  {EQUIPMENT:563467973}  Other Treatments: ***    Patient's personal belongings (please select all that are sent with patient):  {CHP DME Belongings:357204521}    RN SIGNATURE:  {Esignature:293304754}    CASE MANAGEMENT/SOCIAL WORK SECTION    Inpatient Status Date: 2021    Readmission Risk Assessment Score:  Readmission Risk              Risk of Unplanned Readmission:        29           Discharging to Facility/ Agency   · Name: [facility]  · [address] [facility] [address] [address]  · Phone: [phone]  · Fax: [phone]    Dialysis Facility (if applicable)   · Name:  · Address:  · Dialysis Schedule:  · Phone:  · Fax:    / signature: Electronically signed by DIANA Saunders on 1/22/21 at 12:07 PM EST    PHYSICIAN SECTION    Prognosis: {Prognosis:9829024525}    Condition at Discharge: David Espinosa Patient Condition:470859777}    Rehab Potential (if transferring to Rehab): {Prognosis:3151816992}    Recommended Labs or Other Treatments After Discharge: ***    Physician Certification: I certify the above information and transfer of Rey Thompson  is necessary for the continuing treatment of the diagnosis listed and that she requires {Admit to Appropriate Level of Care:66103} for {GREATER/LESS:489555269} 30 days.      Update Admission H&P: {[facility]}    PHYSICIAN SIGNATURE:  {Esignature:241224457}

## 2021-01-22 NOTE — CONSULTS
Patient seen and examined again by me. Seen this morning briefly  patient is having dialysis catheter placed at that time  Due to the metabolic abnormalities and elevated lactic acid started her on CVVH  Seen now again  during CVVH tolerating procedure well she is on 1 pressors currently maintaining net positive due to fluid boluses  FiO2 90% however lungs sound clear-unable to read the pulse ox well.   Discussed with the patient's nurse and patient's son in detail  Full consult to follow    Ebony Adame

## 2021-01-22 NOTE — PROGRESS NOTES
Pharmacy Renal Adjustment Per P&T Protocol    Faye Tamze is a 78 y.o. female. Pharmacy has renally adjusted Merrem (for peritoneal dialysis patient) per P&T Protocol. No results for input(s): BUN in the last 72 hours. No results for input(s): CREATININE in the last 72 hours. CrCl cannot be calculated (Patient's most recent lab result is older than the maximum 10 days allowed. ). Height:   Ht Readings from Last 1 Encounters:   01/01/21 5' 6\" (1.676 m)     Weight:  Wt Readings from Last 1 Encounters:   01/08/21 177 lb 6.4 oz (80.5 kg)       Plan: Adjust the following medications based on renal function:           Merrem to be 500mg q24h    Dayana Gutierrez RPh  1/22/2021  5:25 AM

## 2021-01-22 NOTE — PROGRESS NOTES
9470- Dr. Ethan Harvey consulted for nephrology. States patient sees Dr. Hazel Elam and will forward message to him. 0432- 2 amps of Bicarb given per verbal order by Dr. Bina Gonzalez. 2 additional liters of NS infusing at 999 ml/hr. 0852- Dr. Bina Gonzalez and Dr. Jagdeep Mendoza at bedside preparing for tessio insertion.

## 2021-01-22 NOTE — H&P
CRITICAL CARE H & P NOTE      Patient:  Myra Arroyoon    Unit/Bed:4D-18/018-A  YOB: 1950  MRN: [id number]   PCP: 73Ramona Craig  Date of Admission: 1/22/2021  Chief Complaint:-Acute respiratory failure    Assessment and Plan:    1. Acute respiratory failure: Patient was found unresponsive in nursing facility and required immediate intubation after respiratory rate was found to be less than 6. We will continue ventilator lung protective strategies and continue to wean ventilator support. 2. Septic shock: Secondary to urinary tract infection. Continue with antibiotic therapy as well as pressor support. 3. Urinary tract infection: prior hospitalization demonstrated ESBL E. Coli, will continue treating with Merrem that is dosed renally. WBC 35.3. Procalcitonin 3.84. Cultures sent  4. Anion gap metabolic acidosis: Secondary to lactic acidosis. Patient's lactic acid was 17.6. Volume resuscitation provided, continue with antibiotic therapy. We will continue to monitor  5. End-stage renal disease: Patient undergoes chronic peritoneal dialysis, however emergent dialysis catheter was placed as per patient will need emergent CRRT to further filter blood at this time. Nephrology consulted. Dr. Jamia Xiao has already seen patient. 6. Shock Liver: LFTs trending up. Due to hypoperfusion secondary to low blood pressure. We will continue to monitor and provide pressor support  7. Hypoalbuminemia: Albumin 1.1. Albumin 25 g every 8 hours for total of 8 doses. 8. Chronic diastolic congestive heart failure: Last echocardiogram 8/2020 ejection fraction 60% with Myxomatotic degeneration of mitral valve. proBNP 29548.0. Home medications include Isosorbide dinitrate 20 mg daily hold at this time   9. COPD: History of  10. Type 2 diabetes mellitus: Insulin sliding scale Accu-Cheks  11. Bipolar type I: BuSpar 5 mg daily -hold at this time  12.  Hypertension: Home med includes Amlodipine 10 mg daily, hydralazine 50 mg twice daily hold all hypertensive medications at this time due to patient being hypotensive  13. Hyperlipidemia: Home medication includes pravastatin 40 mg daily  14. Chronic anemia: Secondary to end-stage renal disease. Will monitor hemoglobin and transfuse as necessary  15. Neurogenic bladder: Oxybutynin 5 mg daily  16. Hypothyroidism: Continue Synthroid 25 mcg daily  17. GERD:   Home med includes omeprazole 20 mg daily  18. History of seizures: Last seizure 1/2018    INITIAL H AND P AND ICU COURSE:  Presented to [facility]) emergency department via EMS from skilled nursing [facility] after being found unresponsive. [facility] nursing facility found patient with respiratory rate of 6 and was intubated at [facility] nursing [facility]. A left tibial IO was placed and patient was transported to [facility] emergency department. Patient has significant past medical history of COPD, type 2 diabetes, end-stage renal disease, bipolar type I, hypertension, hyperlipidemia, neurogenic bladder, hypothyroidism, GERD, history of seizures, and chronic diastolic congestive heart failure. Patient was recently hospitalized here at [facility] 1/8/2021, patient was admitted for sepsis however source was never identified. Patient was discharged to skilled nursing facility without antibiotics. Per [facility] blood cultures from previous admission patient has ESBL E. coli growing in urine. After arrival to [facility] emergency department a right femoral central line was placed and patient was transferred to [facility] ICU for further management of sepsis and respiratory failure. Upon arrival patient was found to have significantly abnormal appearing urine, tan in color and frothy. Patient started on empiric antibiotics Merrem. Patient does have a history of cefepime induced encephalopathy from prior stay at [facility] per Dr. Ruddy Ray.        Patient in the ICU for further management and care. 21: Spoke with son, Reinaldo Baron, stated that nursing facility contacted him to come see his mother as for he was out of town. He saw his mother Wednesday of this week and noticed she was unable to communicate and calling out for her mother and father with passed away. Patient's son stated that she was very confused and moaning. Patient also expressed that before New Year's Day of this year she was getting progressively worse with confusion and agitation. Son also stated that he would like patient to be full code at this time. Past Medical History: Per HPI  Family History: Mother -high blood pressure, diabetes, cancer, heart attack. Father -stroke, high blood pressure, anemia  Social History: Lifetime non-smoker, no alcohol, no drugs, resides at skilled nursing facility    ROS   Unable to obtain secondary to patient being intubated and require mechanical ventilation    Scheduled Meds:   ketamine        norepinephrine-sodium chloride        sodium chloride flush  10 mL Intravenous 2 times per day    ketamine  100 mg Intravenous Once    meropenem  500 mg Intravenous Q12H     Continuous Infusions:   norepinephrine         PHYSICAL EXAMINATION:  T: 98.8.  P: 110. RR: 22. B/P: 93/64. FiO2: 50. O2 Sat:  97. There is no height or weight on file to calculate BMI.   GCS:  5  General:   Acute on chronically ill female, appear older than stated ages, intubated on mechanical ventilation  HEENT:  normocephalic and atraumatic. No scleral icterus. Pupils are unreactive to light  Neck: supple. No Thyromegaly. Lungs: clear to auscultation. No retractions  Cardiac: Sinus tachycardia 2/6 murmur noted. No JVD. Abdomen: soft. Nontender. Left-sided abdominal wound. Extremities:  No clubbing, cyanosis, or edema x 4. Multiple sores on lower extremities bilaterally. Vasculature: capillary refill < 3 seconds. Faint but palpable dorsalis pedis pulses.   Skin: Cool and dry.  Significant mottling demonstrated on abdomen and lower extremities. Psych: Unable to assess secondary to patient being intubated and on mechanical ventilation  Lymph:  No supraclavicular adenopathy. Neurologic:  No focal deficit. No seizures. Patient does not withdraw to painful stimuli    Data: (All radiographs, tracings, PFTs, and imaging are personally viewed and interpreted unless otherwise noted).  Sodium 137, potassium 4.7, chloride 98, CO2 12, BUN 21, creatinine 4.4, anion gap 27, calcium 8.4   Procalcitonin 3.4   Albumin 1.7, alk phos 183, ALT 20 635, , bilirubin 0.6, total protein 4.4   WBC 35.3, hemoglobin 9.1, hematocrit 31.4, platelets 112   Telemetry shows sinus tachycardia   1/22/21EKG reports: Sinus tachycardia Left axis deviation Pulmonary disease pattern Inferior infarct , age undetermined Abnormal ECG When compared with ECG of 24-AUG-2020 00:17, Premature atrial complexes are no longer Present Ventricular rate has increased BY  61 BPM The axis Shifted left Inferior infarct is now Present    1/22/21 CXR reports Mild progression of the Pleural-parenchymal disease right lower lobe likely pneumonic. Seen with multidisciplinary ICU team.  Meets Continued ICU Level Care Criteria:    [x] Yes   [] No - Transfer Planned to listed location:  [] HOSPITALIST CONTACTED-      Case and plan discussed with Dr. Karlee Pham. Electronically signed by Bob Bond DO  CRITICAL CARE SPECIALIST  Patient seen by me. Case discussed with resident physician. Patient presents with septic shock from urinary tract infection. Suspected ESBL producing organism. Patient started on meropenem. Patient requiring pressors. Appreciate nephrology's assistance. Agree with renal replacement therapy. .  CC time 35 minutes. Time was discontiguous. Time does not include procedures. Time does include my direct assessment of the patient and coordination of care.   Electronically signed by Yanci Reddy MD on 1/22/2021 at 4:05 PM

## 2021-01-22 NOTE — SIGNIFICANT EVENT
Spoke to patient's son, Prasanth Loredo, at 3770 this morning to discuss patient's current status as well as the need for emergent CRRT dialysis. Discussed that patient does have peritoneal dialysis however we need a different form of dialysis that would filter the blood to remove lactic acid quicker than the peritoneal dialysis. At this time patient's son, Prasanth Loredo, was in agreement to proceed with CRRT dialysis. Patient's son Prasanth Loredo will be in today to see his mother and go over goals of care.     Electronically signed by Geno Rocha DO on 1/22/2021 at 9:48 AM

## 2021-01-22 NOTE — FLOWSHEET NOTE
0801 Dialysis catheter insertion in progress. Pt beginging to move upper extremities toward sterile field. Restraints applied. 0810 Propofol started at 20 mcg due to pt movement. 9714 Dialysis catheter in place. 0840 right tibial IO removed. 4145 Assessment completed. 0940 Phlebotomy called to draw blood. 1052 CRRT started. [id number]  Right upper arm BP with questionable accuracy, high diastolic. 1120 Cuff changed to left upper arm. BP continues hypotensive. Dr Tamiko Smith notified. Requested ruby placement. 1155 Pt prepared for ruby placement. [address] in progress. Unable to make adjustments on CRRT at this time. 2600 [place] placement completed per Dr Mel Huang. Dr Mel Huang stated not to start vasopressin or diane at this time. 1 Pt's son to bedside. brief update given. 1300 Dr Tamiko Smith to bedside to update son. Jocelyn 73 Dr Ozuna Glad to bedside. Update given on fluids received and pt not tolerating \"keep even\" on CRRT. Pt actually keeping all fluids at this time. Dr Ozuna Glad in to talk with son. 1418 CRRT with increasing TMP. Blood pump rate increased to 250.     1419 Continued to increased TMP. Blood returned to pt.     1500 CRRT restarted. Pt with left arm movement up toward ETT. No commands follows. Pt with eyes open but does not appear to track or focus. 56 Dr Tamiko Smith notified of decreased in H and H. Plan to recollect. 1620 Lab asked to change result entered at 1215 to be changed to time bllod was drawn (1510). Chemistries not resulted at this time yet. Blood resent for cbc due to decrease. 80 Dr Gypsy White updated on labs, current infusions Orders received. Dr Gypsy White requests for vasopressin to be started. [facility] Dr Mel Huang ok with starting vasopressin.     1903 Resp therapist called to obtain abg.    1915 Report given to Coral Vinosn

## 2021-01-22 NOTE — PROCEDURES
Arterial Line: Risks and benefits to the procedure were discussed. Alternatives and their risks were discussed as well. Indication: Septic shock    Complications: None    EBL: Less than 5 cc    Procedure:  After informed consent was obtained, the left femoral approach was utilized. Arterial pulsation was identified. Patient subsequently was prepped and draped in sterile fashion utilizing chlorhexidine prep, sterile gown, sterile gloves, mask, hair net, and sterile whole-body drape. Patient received 2 cc of local anesthesia with lidocaine. Introducer needle was advanced subcutaneously until arterial flow was obtained. Utilizing modified Seldinger technique, guidewire was placed through the introducer needle. Introducer needle was withdrawn leaving the guidewire in place. Dilator was placed over the guidewire and removed. Arterial catheter was placed into the lumen. Guidewire removed. Secondary cleansing at the site was obtained with chlorhexidine. Catheter was secured to the skin utilizing 2-0 silk. Electronically signed by Micheline Fontana MD.

## 2021-01-22 NOTE — FLOWSHEET NOTE
Pt was sedated but she was anointed.       01/22/21 1151   Encounter Summary   Services provided to: Patient   Referral/Consult From: Rounding   [facility] of [facility] Yes  (1/22 NR)   Complexity of Encounter Low   Length of Encounter 15 minutes   Routine   Type Initial   Assessment Unable to respond   Sacraments   Sacrament of Sick-Anointing Patient requested anointing

## 2021-01-22 NOTE — PROGRESS NOTES
Comprehensive Nutrition Assessment    Type and Reason for Visit:  Initial(vent)    Nutrition Recommendations/Plan:   If u/a to extubate - recommend initiate TF. Suggest Nepro carb steady - start at 10 ml/hr; increase by 10 ml every 4 hours, as tolerated, to goal rate of 40 ml/hr. Free water flush per MD.    Nutrition Assessment:    Pt. nutritionally compromised AEB NPO status d/t intubation, -4.7% weight loss in 3 weeks. At risk for further nutrition compromise r/t respiratory failure, septic shock, UTI and underlying medical condition (hx COVID, ESRD-PD, CHF, COPD, DM). Nutrition recommendations/interventions as per above. Malnutrition Assessment:  Malnutrition Status:  Insufficient data    Context:  Acute Illness     Findings of the 6 clinical characteristics of malnutrition:  Energy Intake:  Unable to assess  Weight Loss:  1 - 5% over 1 month(-4.7% in 3 weeks) -although difficult to evaluate with CHF, CAPD    Body Fat Loss:  Unable to assess     Muscle Mass Loss:  Unable to assess    Fluid Accumulation:  Unable to assess     Strength:  Not Performed    Estimated Daily Nutrient Needs:  Energy (kcal):  8493-2899 kcals (20-25);  Weight Used for Energy Requirements:  (78 kgm 1/22)     Protein (g):  77+ grams  (1.3+) - monitor RRT vs. need to increase; Weight Used for Protein Requirements:  Ideal(59 kgm)        Fluid (ml/day):  per MD; Method Used for Fluid Requirements:  Other (Comment)      Nutrition Related Findings:  Pt. intubated; Diprovan at 9.3 ml/hr (provides pt. with 245 kcals from lipid emulsion); MAP 66; Rx includes Bang, Vasopressin, Levophed, ATB; 1/22: Glucose 356, BUN 18, Cr 3.5, Potassium 4.5, Magnesium 1.6, Phosphorus 5.3; CVVH; +OGT    Wounds:  Multiple, Unstageable(abdomen, coccyx, heel; DTI-anterior; foot)       Current Nutrition Therapies:    No diet orders on file    Anthropometric Measures:  · Height: 5' 6\" (167.6 cm)  · Current Body Weight: 171 lb 4.8 oz (77.7 kg)(1/22 no edema) · Admission Body Weight: 171 lb 4.8 oz (77.7 kg)(1/22 no edema)    · Usual Body Weight: (per EMR: 1/24/20: 183#, 8/13/20: 189# 3.2 oz, 1/1/21: 179# 12.8 oz)     · Ideal Body Weight: 130 lbs;   · BMI: 27.7  · BMI Categories: Overweight (BMI 25.0-29. 9)       Nutrition Diagnosis:   · Inadequate oral intake related to impaired respiratory function as evidenced by NPO or clear liquid status due to medical condition, intubation      Nutrition Interventions:   Food and/or Nutrient Delivery:  Continue NPO  Nutrition Education/Counseling:  Education not appropriate   Coordination of Nutrition Care:  Continue to monitor while inpatient    Goals:  TF to provide 75% or more of estimated nutrition needs until able to transition to po diet. Nutrition Monitoring and Evaluation:   Behavioral-Environmental Outcomes:  None Identified   Food/Nutrient Intake Outcomes:  Diet Advancement/Tolerance, Enteral Nutrition Intake/Tolerance  Physical Signs/Symptoms Outcomes:  Biochemical Data, Chewing or Swallowing, GI Status, Fluid Status or Edema, Hemodynamic Status, Nutrition Focused Physical Findings, Skin, Weight     Discharge Planning:     Too soon to determine     Electronically signed by Kayode Gong RD, LD on 1/22/21 at 2:59 PM EST    Contact: [phone]

## 2021-01-23 NOTE — PROGRESS NOTES
Comprehensive Nutrition Assessment    Type and Reason for Visit:  Reassess, Consult(verbal consult for TF start per Dr Armen Ponce)    Nutrition Recommendations/Plan:   Start Nepro 10-ml/hr & increase by 10ml every 4 hrs as tolerated to goal 40ml/hr. Free water flush per MD.   Nutrition Assessment:     Pt. with no improvement from a nutritional standpoint AEB NPO, TF to start ( Nepro 10ml/hr)/ refeed risk. Remains at risk for further nutritional compromise r/t admit with respiratory failure, septic shock, UTI , CRRT, increased needs for nutrition support, and underlying medical condition (Hx COVID, ESRD-PD, CHF, COPD, DM, Bipolar Type 1, HTN, HLD). Nutrition recommendations/interventions as per above. Malnutrition Assessment:  Malnutrition Status:  Insufficient data    Context:  Acute Illness     Findings of the 6 clinical characteristics of malnutrition:  Energy Intake:  Unable to assess  Weight Loss:  1 - 5% over 1 month(-4.7% in 3 weeks)     Body Fat Loss:  Unable to assess     Muscle Mass Loss:  Unable to assess    Fluid Accumulation:  Unable to assess     Strength:  Not Performed    Estimated Daily Nutrient Needs:  Energy (kcal):  1365-4807 kcals (20-25); Weight Used for Energy Requirements:  (78 kgm 1/22)     Protein (g):  77+ grams  (1.3+) - monitor RRT vs. need to increase; Weight Used for Protein Requirements:  Ideal(59 kgm)        Fluid (ml/day):  per MD; Method Used for Fluid Requirements:  Other (Comment)      Nutrition Related Findings:  Pt intubated. Labs: Ca ion 1.08, Glucose 205, Ca 7.8, Phos 2.2, Alk Phos 130, , hemoglobin 6. Meds: Vasopressin, Fentanyl, Levophed, Bang-Synephrine, Ca replacement protocol, Phos replacement, CRRT. No BM.      Wounds:  Multiple, Unstageable(abdomen, coccyx, heel; DTI-anterior; foot)       Current Nutrition Therapies:    Current Tube Feeding (TF) Orders:  · Feeding Route: Orogastric  · Formula: Renal  · Schedule: Continuous(Nepro to start 10ml/hr 1/23/21)  ·    · Water Flushes: per MD  · Goal TF & Flush Orders Provides: Nepro 40ml/hr to yield 960ml, 78 grams protein, 155 grams CHO, 12 grams fiber, 698 ml water from TF, 1728kcals      Anthropometric Measures:  · Height: 5' 6\" (167.6 cm)  · Current Body Weight: 171 lb 4.8 oz (77.7 kg)(( 1/22) no edema)   · Admission Body Weight: 171 lb 4.8 oz (77.7 kg)(1/22 no edema)    · Usual Body Weight: (per EMR: 1/24/20: 183#, 8/13/20: 189# 3.2 oz, 1/1/21: 179# 12.8 oz)     · Ideal Body Weight: 130 lbs; % Ideal Body Weight 131.8 %   · BMI: 27.7  ·   · BMI Categories: Overweight (BMI 25.0-29. 9)       Nutrition Diagnosis:   · Inadequate oral intake related to impaired respiratory function as evidenced by NPO or clear liquid status due to medical condition, intubation      Nutrition Interventions:   Food and/or Nutrient Delivery:  Start Tube Feeding  Nutrition Education/Counseling:  Education not appropriate   Coordination of Nutrition Care:  Continue to monitor while inpatient, Interdisciplinary Rounds    Goals:  TF to provide 75% or more of estimated nutrition needs until able to transition to po diet. Nutrition Monitoring and Evaluation:   Behavioral-Environmental Outcomes:  None Identified   Food/Nutrient Intake Outcomes:  Diet Advancement/Tolerance, Enteral Nutrition Intake/Tolerance  Physical Signs/Symptoms Outcomes:  Biochemical Data, Chewing or Swallowing, GI Status, Fluid Status or Edema, Hemodynamic Status, Nutrition Focused Physical Findings, Skin, Weight     Discharge Planning:     Too soon to determine     Electronically signed by Ash Montoya RD, LD on 1/23/21 at 10:40 AM EST    Contact: [phone]

## 2021-01-23 NOTE — PROGRESS NOTES
CRITICAL CARE PROGRESS NOTE      Patient:  Michael Looney    Unit/Bed:4D-18/018-A  YOB: 1950  MRN: [id number]   PCP: 73Ramona Craig  Date of Admission: 1/22/2021  Chief Complaint:-Acute respiratory failure    Assessment and Plan:    1. Acute respiratory failure: Patient was found unresponsive in nursing facility and required immediate intubation after respiratory rate was found to be less than 6. We will continue ventilator lung protective strategies and continue to wean ventilator support. ABG performed 1/23/21 demonstrated patient still acidotic with pH of 7.31/ 23/258/12, acidemia due to metabolic process  2. Septic shock: Suspect secondary to urinary tract infection. Continue with antibiotic therapy as well as pressor support. Patient requiring Levophed 25 mcg and Vasopressin 0.04 to maintain MAP > 65. Blood cultures no growth preliminary results x2  3. Urinary tract infection: prior hospitalization demonstrated ESBL E. Coli, will continue treating with Merrem that is dosed renally. WBC 26.2. Lactic acid 9.6. Cultures pending   4. Anion gap metabolic acidosis- improving: Secondary to lactic acidosis. Patient's lactic acid on arrival 17.6. Lactic acid today 9.3. Volume resuscitation provided, continue with antibiotic therapy. Continue bicarb drip we will continue to monitor  5. End-stage renal disease: Patient undergoes chronic peritoneal dialysis, however emergent dialysis catheter was placed as per patient will need emergent CRRT to further filter blood at this time. Nephrology consulted. Dr. Jose Bernabe has already seen patient. 6. Shock Liver: LFTs trending up. Due to hypoperfusion secondary to low blood pressure. We will continue to monitor and provide pressor support  7. Hypoalbuminemia: on arrival Albumin 1.1. Albumin 25 g every 8 hours for total of 8 doses. Albumin rising appropriately currently 2.4  8.  Chronic diastolic congestive heart failure: Last echocardiogram 8/2020 ejection fraction 60% with Myxomatotic degeneration of mitral valve. proBNP 10447.0. Home medications include Isosorbide dinitrate 20 mg daily hold at this time   9. COPD: History of  10. Type 2 diabetes mellitus: Insulin sliding scale Accu-Cheks  11. Bipolar type I: BuSpar 5 mg daily -hold at this time  12. Hypertension: Home med includes Amlodipine 10 mg daily, hydralazine 50 mg twice daily hold all hypertensive medications at this time due to patient being hypotensive  13. Hyperlipidemia: Home medication includes pravastatin 40 mg daily. Hold due to liver injury at this time  14. Chronic anemia: Secondary to end-stage renal disease. Will monitor hemoglobin and transfuse as necessary  15. Neurogenic bladder: Oxybutynin 5 mg daily  16. Hypothyroidism: Continue Synthroid 25 mcg daily  17. GERD:   Home med includes omeprazole 20 mg daily  18. History of seizures: Last seizure 1/2018    INITIAL H AND P AND ICU COURSE:  Presented to [facility] emergency department via EMS from skilled nursing facility after being found unresponsive. Skilled nursing facility found patient with respiratory rate of 6 and was intubated at [facility] nursing facility. A left tibial IO was placed and patient was transported to [facility]) emergency department. Patient has significant past medical history of COPD, type 2 diabetes, end-stage renal disease, bipolar type I, hypertension, hyperlipidemia, neurogenic bladder, hypothyroidism, GERD, history of seizures, and chronic diastolic congestive heart failure. Patient was recently hospitalized here at [facility] 1/8/2021, patient was admitted for sepsis however source was never identified. Patient was discharged to skilled nursing facility without antibiotics. Per [facility] blood cultures from previous admission patient has ESBL E. coli growing in urine.     After arrival to [facility] emergency department a right femoral central line was placed and patient was transferred to [facility] ICU for further management of sepsis and respiratory failure. Upon arrival patient was found to have significantly abnormal appearing urine, tan in color and frothy. Patient started on empiric antibiotics Merrem. Patient does have a history of cefepime induced encephalopathy from prior stay at [facility] per Dr. Vanessa Bah. Patient in the ICU for further management and care. 21: Spoke with son, Bryce Hummel, stated that nursing facility contacted him to come see his mother as for he was out of town. He saw his mother Wednesday of this week and noticed she was unable to communicate and calling out for her mother and father with passed away. Patient's son stated that she was very confused and moaning. Patient also expressed that before New  Day of this year she was getting progressively worse with confusion and agitation. Son also stated that he would like patient to be full code at this time. 21: Patient found to only have minimal gag reflex. Ammonia was drawn slightly elevated at 69. Past Medical History: Per HPI  Family History: Mother -high blood pressure, diabetes, cancer, heart attack.   Father -stroke, high blood pressure, anemia  Social History: Lifetime non-smoker, no alcohol, no drugs, resides at skilled nursing facility    ROS   Unable to obtain secondary to patient being intubated and require mechanical ventilation    Scheduled Meds:   sodium chloride flush  10 mL Intravenous 2 times per day    levothyroxine  25 mcg Per NG tube Daily    pravastatin  40 mg Oral Nightly    meropenem  1,000 mg Intravenous Q8H    albumin human  25 g Intravenous Q8H    chlorhexidine  15 mL Mouth/Throat BID    famotidine (PEPCID) injection  20 mg Intravenous Daily    insulin lispro  0-12 Units Subcutaneous Q6H     Continuous Infusions:   norepinephrine 20 mcg/min (21 0400)    propofol Stopped (21)    vasopressin (Septic Shock) infusion 0.04 Units/min (01/23/21 0024)    phenylephrine (XIOMARA-SYNEPHRINE) 50mg/250mL infusion      sodium chloride 100 mL/hr at 01/22/21 1444    prismaSATE BGK 4/2.5 800 mL/hr (01/23/21 0030)    prismaSol BGK 4/2.5 800 mL/hr (01/23/21 0030)    prismaSol BGK 4/2.5 800 mL/hr (01/23/21 0030)    sodium bicarbonate infusion 125 mL/hr at 01/23/21 0413    dextrose      midazolam      fentaNYL 500 mcg in sodium chloride 0.9% 100 ml infusion 25 mcg/hr (01/22/21 2217)       PHYSICAL EXAMINATION:  T: 98.4.  P: 100. RR: 27. B/P: 130/48. FiO2: 60. O2 Sat:  100. I/O: 10.3L/ 2.2L   Body mass index is 27.65 kg/m². GCS:  3   General:   Acute on chronically ill female, appear older than stated ages, intubated on mechanical ventilation  HEENT:  normocephalic and atraumatic. No scleral icterus. Pupils minimally reactive to light  Neck: supple. No Thyromegaly. Lungs: Diminished bilaterally clear to auscultation. No retractions  Cardiac: Sinus tachycardia 2/6 murmur noted. No JVD. Abdomen: soft. Nontender. Left-sided abdominal wound. Hypoactive bowel sounds  Extremities:  No clubbing, cyanosis, or edema x 4. Multiple sores on lower extremities bilaterally. Vasculature: capillary refill < 3 seconds. Posterior tibialis pulses by Doppler only  Skin: Cool and dry. Significant mottling demonstrated on abdomen and lower extremities. Psych: Unable to assess secondary to patient being intubated and on mechanical ventilation  Lymph:  No supraclavicular adenopathy. Neurologic:  No focal deficit. No seizures. Patient does not withdraw to painful stimuli    Data: (All radiographs, tracings, PFTs, and imaging are personally viewed and interpreted unless otherwise noted).     Sodium 135, potassium 4.1, chloride 100, CO2 14, BUN 9, creatinine 1.6 ionized calcium 1.03, magnesium 1.7, phosphorus 2.3   Albumin 2.3, alk phos 166, , , bilirubin 1.4, total protein 3.8   WBC 26.2, hemoglobin 6.9, hematocrit 22.7, platelets 42   Ammonia 69   Telemetry shows sinus tachycardia   1/22/21EKG reports: Sinus tachycardia Left axis deviation Pulmonary disease pattern Inferior infarct , age undetermined Abnormal ECG When compared with ECG of 24-AUG-2020 00:17, Premature atrial complexes are no longer Present Ventricular rate has increased BY  61 BPM The axis Shifted left Inferior infarct is now Present    1/22/21 CXR reports Mild progression of the Pleural-parenchymal disease right lower lobe likely pneumonic.  1/22/21 ABG 7.31/23/258/12      Seen with multidisciplinary ICU team.  Meets Continued ICU Level Care Criteria:    [x] Yes   [] No - Transfer Planned to listed location:  [] HOSPITALIST CONTACTED-      Case and plan discussed with Dr. Catherine Schrader.     CRITICAL CARE SPECIALIST  Electronically signed by Bob Bond DO on 1/23/2021 at 4:50 AM

## 2021-01-23 NOTE — FLOWSHEET NOTE
[facility] [address] PROGRESS NOTE      Patient: Jann Ignacio  Room #: 4D-18/018-A            YOB: 1950  Age: 78 y.o. Gender: female            Admit Date & Time: 1/22/2021  4:44 AM    Assessment:  Joseph Marshall is a 70-year-old female who was lying in her bed unable to engage in conversation upon entry, with her son Genet Johnston present and standing bedside for support. Karla Mariano shared his mother has [place] in poor health for a couple decades. \" He questioned her current situation possibly being \"futile,\" however desiring to \"honor her wishes\" which he stated was \"full code\" status. Crow Rincon is a woman of [employer] in [facility] [address], and a member of [employer]. She has been anointed by our [address] was experiencing some spiritual struggle during the encounter, asking questions while admitting he was not a \"Druze\" person. Questions included: \"When does the soul depart? \" \"Is she still there? \"  Crow Rincon is sustained through support from her son and family members. Karla Mariano expressed having no other spiritual care needs at this time. He stated \"just keep us in your prayers. \"    Interventions:   was rounding in the unit to provide spiritual care, as well as assess any patient needs present. A listening presence was provided for [facility] to freely engage in conversation, responding to open-ended questions as well as follow-up questions for clarity and greater understanding. * Words of comfort and hope were provided as appropriate. *  provided answers to Andrea's questions during the encounter. Outcomes:  [facility] was very appreciative of the encounter and offer of support. He was grateful for the opportunity to engage in conversation and ask questions in a safe place. He was thankful for the listening presence and words of comfort. Plan:  1. There are no current spiritual care needs for follow-up.   2. Chaplains remain available for ongoing emotional and spiritual support as needed during the continuum of care.     Electronically signed by Lui Taveras on 1/23/2021 at 11:49 AM.  Elan Ricketts  [phone]

## 2021-01-23 NOTE — PROGRESS NOTES
Spoke with christal patients son, discussed no blood flow in left lower leg, dr Kathrine Valdes had seen and patient was not a surgical canidate at this time, patient will need an amputation, discussed blood pre assure medicine, vent support, and sepsis, christal stated he had said his good bye to his mom and was at peace with whatever outcome happens, christal stated he knows his will probably not get better and that his mom wanted to live and be full code. He does not plan on returning to hospital unless his mom would wake. He stated that his dad would help with  decisions. His mom and dad are  but get along very well.

## 2021-01-23 NOTE — PROGRESS NOTES
Renal Progress Note  [facility]    Patient :  Antwan Pastrana; 78 y.o. MRN# [id number]  Location:  [facility]18/018-A  Attending:  Farnaz Olivas MD  Admit Date:  1/22/2021   Hospital Day: 1      Subjective:     Nephrology is following the patient for ESRD currently on CRRT. Patient seen on CRRT. Filter has clotted few times. Currently keeping net even. On levophed at 25 mcg, vasopressin. Patient has diffuse mottling. On vent @ 40% FiO2. Outpatient Medications:     Medications Prior to Admission: midodrine (PROAMATINE) 10 MG tablet, Take 1 tablet by mouth 3 times daily (with meals) Hold for SBP >120  potassium bicarb-citric acid (EFFER-K) 20 MEQ TBEF effervescent tablet, Take 2 tablets by mouth daily  sodium chloride 1 g tablet, Take 2 tablets by mouth 2 times daily  acetaminophen-codeine (TYLENOL #3) 300-30 MG per tablet, Take 1 tablet by mouth every 6 hours as needed (arthritis pain) for up to 30 days. pregabalin (LYRICA) 50 MG capsule, Take 1 capsule by mouth daily for 5 days. Magnesium Chloride-Calcium  MG, Take 1 tablet by mouth 2 times daily  megestrol acetate (MEGACE) 400 MG/10ML SUSP, Take 400 mg by mouth daily  omeprazole (PRILOSEC) 20 MG delayed release capsule, Take 20 mg by mouth daily  sertraline (ZOLOFT) 50 MG tablet, Take 50 mg by mouth daily  lidocaine (XYLOCAINE) 5 % ointment, Apply topically as needed.   busPIRone (BUSPAR) 5 MG tablet, Take 5 mg by mouth 2 times daily  insulin glargine (BASAGLAR KWIKPEN) 100 UNIT/ML injection pen, Inject 4 Units into the skin every morning  oxybutynin (DITROPAN-XL) 5 MG extended release tablet, Take 5 mg by mouth daily  benzonatate (TESSALON) 100 MG capsule, Take 100 mg by mouth every 8 hours as needed for Cough  polyethylene glycol (GLYCOLAX) powder, Take 17 g by mouth daily  B Complex-C-Folic Acid (RENAL) 1 MG CAPS, Take 1 capsule by mouth daily  insulin aspart (NOVOLOG) 100 UNIT/ML injection vial, Inject 0-12 Units into the skin 3 times daily (before meals) Per sliding scale; 140-199= 1 unit, 200-249= 2 units, 250-299= 3 units,  300-349= 4 units, 350-399= 5 units, 400-450= 6 units, 451-500= 10 units, >500 give 12 units & recheck in one hour. fluticasone (FLONASE) 50 MCG/ACT nasal spray, 2 sprays by Each Nostril route daily   polycarbophil (FIBERCON) 625 MG tablet, Take 3 tablets by mouth 3 times daily (with meals)   sevelamer (RENVELA) 800 MG tablet, Take 2 tablets by mouth 3 times daily (with meals)  guaiFENesin 200 MG tablet, Take 200 mg by mouth every 4 hours as needed (cough)   ondansetron (ZOFRAN-ODT) 4 MG disintegrating tablet, Take 4 mg by mouth every 8 hours as needed for Nausea or Vomiting   meclizine (ANTIVERT) 25 MG tablet, Take 25 mg by mouth 3 times daily  gabapentin (NEURONTIN) 300 MG capsule, Take 300 mg by mouth daily. acetaminophen (TYLENOL) 325 MG tablet, Take 650 mg by mouth every 6 hours as needed for Pain  levothyroxine (SYNTHROID) 25 MCG tablet, Take 25 mcg by mouth Daily  blood glucose test strips (ASCENSIA AUTODISC VI;ONE TOUCH ULTRA TEST VI) strip, Patient tests blood sugar three times per day.  Diagnosis DMII E11.9  pravastatin (PRAVACHOL) 40 MG tablet, Take 40 mg by mouth nightly     Current Medications:     Scheduled Meds:    calcium replacement protocol   Other RX Placeholder    phosphorus replacement protocol   Other RX Placeholder    sodium chloride flush  10 mL Intravenous 2 times per day    levothyroxine  25 mcg Per NG tube Daily    [Held by provider] pravastatin  40 mg Oral Nightly    meropenem  1,000 mg Intravenous Q8H    albumin human  25 g Intravenous Q8H    chlorhexidine  15 mL Mouth/Throat BID    famotidine (PEPCID) injection  20 mg Intravenous Daily    insulin lispro  0-12 Units Subcutaneous Q6H     Continuous Infusions:    sodium chloride      norepinephrine 25 mcg/min (01/23/21 0605)    propofol Stopped (01/22/21 2012)    vasopressin (Septic Shock) infusion 0.04 Units/min (01/23/21 0024)    phenylephrine (XIOMARA-SYNEPHRINE) 50mg/250mL infusion      sodium chloride 100 mL/hr at 21 1444    prismaSATE BGK 4/2.5 800 mL/hr (21 0715)    prismaSol BGK 4/2.5 800 mL/hr (21 0716)    prismaSol BGK 4/2.5 800 mL/hr (21 0715)    sodium bicarbonate infusion 125 mL/hr at 21 0413    dextrose      midazolam      fentaNYL 500 mcg in sodium chloride 0.9% 100 ml infusion 25 mcg/hr (21 2217)     PRN Meds:  sodium chloride, sodium chloride flush, potassium chloride, magnesium sulfate, calcium gluconate **OR** calcium gluconate **OR** calcium gluconate **OR** calcium gluconate, sodium phosphate IVPB **OR** sodium phosphate IVPB **OR** sodium phosphate IVPB **OR** sodium phosphate IVPB, artificial tears, glucose, dextrose, glucagon (rDNA), dextrose    Input/Output:       I/O last 3 completed shifts: In: 71202 [I.V.:04436]  Out: 2200 [Urine:40; Emesis/NG output:200].       Patient Vitals for the past 96 hrs (Last 3 readings):   Weight   21 0555 171 lb 4.8 oz (77.7 kg)       Vital Signs:   Temperature:  Temp: 99.3 °F (37.4 °C)  TMax:   Temp (24hrs), Av.3 °F (36.3 °C), Min:93.7 °F (34.3 °C), Max:100.4 °F (38 °C)    Respirations:  Resp: 24  Pulse:   Pulse: 89  BP:    BP: (!) 85/56  BP Range: Systolic (69XNH), GCA:946 , Min:76 , UZM:916       Diastolic (50HBX), DNL:98, Min:44, Max:113      Physical Examination:     General:  Intubated, sedated  HEENT: NC/AT/ MMM +endotracheal tube  Chest:               diminished  Cardiac:  S1 S2   Abdomen: Soft, +PD catheter  Neuro:  sedated  SKIN:  Diffuse mottling up to chest  Extremities:  Right leg edema noted    Labs:       Recent Labs     21  0030 21  0415   WBC 32.1* 29.4* 26.2*   RBC 2.47* 2.28* 2.14*   HGB 8.2* 7.4* 6.9*   HCT 27.8* 24.6* 22.7*   .6* 107.9* 106.1*   MCH 33.2* 32.5 32.2   MCHC 29.5* 30.1* 30.4*   PLT 98* 58* 42*   MPV 11.3 11.1 11.5      BMP:   Recent Labs     21 01/23/21  0030 01/23/21  0415   * 135 135   K 4.3 4.1 4.0   CL 99 100 101   CO2 9* 14* 18*   BUN 11 9 8   CREATININE 2.1* 1.6* 1.3*   GLUCOSE 238* 196* 194*   CALCIUM 7.8* 7.8* 8.0*      Phosphorus:     Recent Labs     01/22/21 1910 01/23/21  0030 01/23/21  0415   PHOS 3.6 2.3* 1.9*     Magnesium:    Recent Labs     01/22/21 1910 01/23/21  0030 01/23/21  0415   MG 1.7 1.7 1.8     Albumin:    Recent Labs     01/22/21 1910 01/23/21  0030 01/23/21  0415   LABALBU 1.7* 2.3* 2.4*     BNP:    No results found for: BNP  LUIS:    No results found for: LUIS  SPEP:  Lab Results   Component Value Date    PROT 3.6 01/23/2021     UPEP:   No results found for: LABPE  C3:   No results found for: C3  C4:   No results found for: C4  MPO ANCA:   No results found for: MPO  PR3 ANCA:   No results found for: PR3  Anti-GBM:   No results found for: GBMABIGG  Hep BsAg:         Lab Results   Component Value Date    HEPBSAG Negative 06/13/2019     Hep C AB:          Lab Results   Component Value Date    HEPCAB Negative 06/13/2019       Urinalysis/Chemistries:      Lab Results   Component Value Date    NITRU NEGATIVE 01/22/2021    COLORU YELLOW 01/22/2021    PHUR 6.5 01/22/2021    WBCUA > 200 01/22/2021    RBCUA OBSCURED 01/22/2021    MUCUS OBSCURED 01/22/2021    YEAST OBSCURED 01/22/2021    BACTERIA OBSCURED 01/22/2021    CLARITYU Clear 03/09/2019    LEUKOCYTESUR MODERATE 01/22/2021    UROBILINOGEN 0.2 01/22/2021    BILIRUBINUR NEGATIVE 01/22/2021    BILIRUBINUR Negative 03/09/2019    BLOODU LARGE 01/22/2021    GLUCOSEU NEGATIVE 01/22/2021    KETUA NEGATIVE 01/22/2021    AMORPHOUS OBSCURED 01/22/2021     Urine Sodium:   No results found for: REHANA  Urine Potassium:  No results found for: KUR  Urine Chloride:  No results found for: CLUR  Urine Osmolarity: No results found for: OSMOU  Urine Protein:   No components found for: TOTALPROTEIN, URINE   Urine Creatinine:     Lab Results   Component Value Date    LABCRE 45.0 03/09/2019 Urine Eosinophils:  No components found for: UEOS        Impression and Plan:  1. ESRD , currently on CRRT  -having some clotting. Filtration fraction is appropriate at 17%. Unable to use heparin due to thrombocytopenia or citrate due to shock liver.  -still very hypotensive, continue net even UF. If FiO2 requirements increase will increase UF    2. Severe lactic acidosis/metabolic acidosis: slightly better but lactic acid still at 9, continue with bicarb drip  3. Septic shock, possibly from UTI  4. Acute hypoxic resp failure on vent  5. Leukocytosis  6. Anemia: receiving PRBC transfusion  7. Thrombocytopenia likely from sepsis  8. DM  9. Diastolic CHF        Please don't hesitate to call with any questions.   Electronically signed by [id number] Karolina Ponce DO on 1/23/2021 at 8:00 AM

## 2021-01-23 NOTE — PLAN OF CARE
Problem: Impaired respiratory status  Goal: Normal spontaneous ventilation  Outcome: Ongoing   Vent setting optimized to achieve target tidal volume, respiratory rate and ideal oxygen saturations. SBT will be performed when appropriate.

## 2021-01-23 NOTE — CONSULTS
CT/CV Surgery Consult Note    2021 4:31 PM    Reason for Consult: Ischemia of the left leg. CC: Ischemia of the left leg. HPI:    Ms. Coni Sharma  is a 78year old female with a PMH including hypertension, hyperlipidemia, chronic anemia, hypothyroidism, history of seizure, bipolar type I, type 2 diabetes mellitus, acute respiratory failure, septic shock, end-stage renal disease, and severe thrombocytopenia. Patient was found unresponsive in nursing facility and required immediate intubation before transfer to here. Currently she is intubated in intensive care unit due to acute respiratory failure and septic shock and receiving hemodialysis. She was found to have no Doppler pulses over left foot. I was asked to evaluate the patient. Laboratory finding shows profound thrombocytopenia with platelet count 39K, protein 3.5, albumin 1.1 initially and improved to 2.3 after albumin infusion, white cell count 25 K, hemoglobin 7.7, hematocrit 24.2, lactic acid level 7.6, and procalcitonin 3.84. There is diffuse ecchymosis on her skin. And no Doppler pulses in left foot. Blood test for HIT panel was just drawn. Patient's is receiving intravenous vasopressin and norepinephrine.       Vital Signs: BP (!) 85/56   Pulse 93   Temp 99.1 °F (37.3 °C) (Bladder)   Resp 24   Ht 5' 6\" (1.676 m)   Wt 171 lb 4.8 oz (77.7 kg)   SpO2 96%   BMI 27.65 kg/m²    Temp (24hrs), Av.1 °F (37.3 °C), Min:97.7 °F (36.5 °C), Max:100.4 °F (38 °C)      PULSE OXIMETRY RANGE: SpO2  Av.8 %  Min: 96 %  Max: 100 %    SUPPLEMENTAL O2:       Labs:   CBC:     Recent Labs     21  0610 21  0610 21  0415 21  0825 21  1257 21  1520   WBC  --    < > 26.2* 25.7* 25.0*  --    HGB  --    < > 6.9* 6.0* 7.7*  --    HCT  --    < > 22.7* 19.3* 24.2*  --    MCV  --    < > 106.1* 106.0* 100.8*  --    PLT  --    < > 42* 41* 33*  --    APTT 37.6  --   --   --   --   --    INR 1.93*  --   --   --   --  3.11* < > = values in this interval not displayed. BMP:   Recent Labs     01/23/21  0415 01/23/21  0825 01/23/21  1257    135 133*   K 4.0 3.9 3.8    100 99   CO2 18* 19* 23   PHOS 1.9* 2.2* 2.3*   BUN 8 7 6*   CREATININE 1.3* 1.2 0.9   MG 1.8 1.8 1.8     Last HgA1C:   Lab Results   Component Value Date    LABA1C 6.1 01/28/2020       Imaging:  CXR: I have reviewed the CXR image. CT chest w/o IV contrast: I have reviewed the CT image.        Intake/Output Summary (Last 24 hours) at 1/23/2021 1631  Last data filed at 1/23/2021 1618  Gross per 24 hour   Intake 6043 ml   Output 4872 ml   Net 1171 ml       Scheduled Meds:    calcium replacement protocol   Other RX Placeholder    phosphorus replacement protocol   Other RX Placeholder    sodium phosphate IVPB  6 mmol Intravenous Once    sodium chloride flush  10 mL Intravenous 2 times per day    levothyroxine  25 mcg Per NG tube Daily    [Held by provider] pravastatin  40 mg Oral Nightly    meropenem  1,000 mg Intravenous Q8H    albumin human  25 g Intravenous Q8H    chlorhexidine  15 mL Mouth/Throat BID    famotidine (PEPCID) injection  20 mg Intravenous Daily    insulin lispro  0-12 Units Subcutaneous Q6H         PastMedical History:  Keyur Vega  has a past medical history of Adjustment disorder with mixed anxiety and depressed mood, Anemia in chronic kidney disease(285.21), Anxiety, Arthritis, Asthma, Bipolar 1 disorder ([address] Bradycardia, CHF (congestive heart failure) ([address] Chronic diastolic heart failure ([address] Chronic kidney disease, stage III (moderate), Depression, Difficulty walking, Dizziness and giddiness, GERD (gastroesophageal reflux disease), Gout, Hyperkalemia, Hyperlipidemia, Hypertension, Hypertensive urgency, Hypothyroidism, Low back pain, Major depressive disorder, recurrent, mild (HCC), Muscle weakness (generalized), Neurogenic bladder, Pleural effusion, Pneumonia, Seizure ([address] Type II or unspecified type diabetes mellitus without mention of complication, not stated as uncontrolled, and Urine retention. Past Surgical History:  The patient  has a past surgical history that includes fracture surgery; Cholecystectomy; cervical fusion; back surgery; Colonoscopy; and Endoscopy, colon, diagnostic. Allergies: The patient is allergic to eggs or egg-derived products; aspirin; naproxen; pcn [penicillins]; vicodin [hydrocodone-acetaminophen]; vilazodone; and wellbutrin [bupropion]. Family History: This patient's family history includes Anemia in her father; Cancer in her mother; Diabetes in her mother; Heart Attack in her mother; High Blood Pressure in her father and mother; Stroke in her father. Social History:  Aleshia Leigh  reports that she has never smoked. She has never used smokeless tobacco. She reports that she does not drink alcohol or use drugs. ROS:  Currently intubated, not responsive. Not able to obtain review of system. Physical Exam:   General appearance:   Intubated. HEENT:  Normal cephalic, atraumatic without obvious deformity. Conjunctivae/corneas clear. Neck: Supple, with full range of motion. No jugular venous distention. Trachea midline. Respiratory:  Intubated and on ventilator. Cardiovascular:  Regular rate and rhythm with normal S1/S2 without murmurs, rubs or gallops. Abdomen: Obese abdomen. Musculoskeletal:   Cold pale left foot. Skin:  Diffuse ecchymosis all of her skin. Neurologic:   Not responsive to verbal commands. Extremities: Multiple punctuated dusky lesions in the fingertips and toes. Doppler exam: Positive monophasic Doppler signal over right posterior tibial artery. No Doppler signal over left posterior tibial artery or dorsalis pedis.      Problem List:  Patient Active Problem List   Diagnosis    Chronic kidney disease, stage III (moderate) ([address]    Type 2 diabetes mellitus with insulin therapy ([address]    Chronic kidney disease (CKD), stage IV (severe) (HCC)    Acute on chronic respiratory failure with hypoxia (HCC)    Chronic diastolic CHF (congestive heart failure) (HCC)    LOU[facility] injury) [facility]    Essential hypertension    Hypothyroidism    Diabetic nephropathy associated with type 2 diabetes mellitus (HCC)    Metabolic acidosis    Leukocytosis    Debility    Bipolar 1 disorder (HCC)    Mild mitral regurgitation    Mild tricuspid regurgitation    Chronic pain syndrome    Chronic bilateral pleural effusions    (HFpEF) heart failure with preserved ejection fraction (HCC)    Atelectasis    Hyponatremia    Chronic anemia    Hyperlipidemia    Constipation    Shortness of breath    Hyperkalemia    Infiltrate noted on imaging study    Moderate malnutrition (HCC)    History of pleural effusion    Other hypotension    ESRD (end stage renal disease) ([facility]    DM type 2 with diabetic peripheral neuropathy (HCC)    Diabetic retinopathy of both eyes associated with type 2 diabetes mellitus (HCC)    Sepsis (HCC)    Generalized abdominal pain    Restrictive lung disease    Chronic sinus bradycardia    PAD (peripheral artery disease) (HCC)    Obesity (BMI 30-39. 9)    Pulmonary nodule    Physical deconditioning    Septic shock (HCC)    Mixed acid base balance disorder       Assessment: Profound septic shock with thrombocytopenia. Acute left leg ischemia. Possibility of heparin-induced thrombocytopenia and thrombosis    Plan: 1/23/21  1. Do not use heparin. 2. Start intravenously argatroban. 3. Surgical risk for thromboembolectomy is prohibitively high. 4. Continue current supportive care. Issues discussed in detail with the patient, who understands and has no further questions. Time spent with patient: 80 minutes, of which more than 50% was spent counseling/coordinating the patient's care.     Juancarlos Aguilera MD

## 2021-01-23 NOTE — PROGRESS NOTES
Pharmacy Argatroban Consult    Hiram Miner is a 78 y.o. female. Pharmacy has been consulted to adjust Argatroban.     Height:   Ht Readings from Last 1 Encounters:   01/22/21 5' 6\" (1.676 m)     Weight:  Wt Readings from Last 1 Encounters:   01/22/21 171 lb 4.8 oz (77.7 kg)       Argatroban Indication: thrombocytopenia/DVT    Goal aPTT: 60-95    Plan:  Rate: 2 mcg/kg/min  Next aPTT: 1900 1/23/21    [place], [place]  1/23/2021  4:09 PM

## 2021-01-24 NOTE — PROGRESS NOTES
Argatroban Consult  Lab Results   Component Value Date    APTT > 200.0 01/23/2021     Lab Results   Component Value Date    HGB 7.1 01/23/2021    HCT 22.0 01/23/2021    PLT 31 01/23/2021    INR 3.11 01/23/2021       Current Rate: 2 mcg/kg/min    Plan:  Hold until aPTT is less than 100.   Next aPTT: 2300 1/23/21    Kendell Bustos RP  1/23/2021  8:57 PM      Kendell Bustos RPh  1/23/2021  8:56 PM

## 2021-01-24 NOTE — PROGRESS NOTES
Argatroban Consult  Lab Results   Component Value Date    APTT > 200.0 01/24/2021     Lab Results   Component Value Date    HGB 6.6 01/23/2021    HCT 20.3 01/23/2021    PLT 31 01/23/2021    INR 3.11 01/23/2021       Current Rate: off since 2055    Plan:  Continue to hold until APTT < 100 (order stopped in [facility] for now and pharmacy to reenter once appropriate)    Dayana Gutierrez RP, BCPS, BCGP  1/24/2021     4:07 AM

## 2021-01-24 NOTE — PROGRESS NOTES
1549 patient extubated and drips stopped except fentanyl, ex  unable to come due weather conditions allergic reaction

## 2021-01-24 NOTE — PROGRESS NOTES
Argatroban Consult  Lab Results   Component Value Date    APTT > 200.0 01/24/2021     Lab Results   Component Value Date    HGB 7.9 01/24/2021    HCT 24.7 01/24/2021    PLT 32 01/24/2021    INR 3.11 01/23/2021       Plan:  Argatroban 2 mcg/kg/min ran 1/23/21 from 1622 until 2055. The drip has been off since that time. Continue to HOLD. Of note, patient is having lab draws every 6 hours. Will try to time APTT with other draws.     Next aPTT: 01/24/21 @ 1800

## 2021-01-24 NOTE — PROGRESS NOTES
0215 - spoke to  regarding 1/23 2350 labs being resulted on 1/24 at 2350. She stated she will change the result date on her end.    12 - spoke with Lydia Virk in lab again. Labs still showing resulted @ 2350 on 1/24. She stated that it was already corrected on her end and there is no other way to change it at this time.

## 2021-01-24 NOTE — PROGRESS NOTES
Sarah Laurent is a 78years old female who was found to be unconscious in the nursing home. She remained intubated. She is not responding to verbal commands or stimuli. Pupils react sluggish. Left foot looks pale. No Doppler signal over dorsalis pedis, posterior tibial, and popliteal artery on the left side. An arterial line is still in place in the left left femoral artery. There are multiple ecchymosis, dusky skin color changes all over her body. Dusky fingertips and tips of toes. Worsening of thrombocytopenia. Assessment: Appears to be worsening of general conditions. Recommendations: Continue current supportive care.

## 2021-01-24 NOTE — FLOWSHEET NOTE
was called in to 4D-18 to speak with pt nurse Sandi Tan, pt  and intensivist. Sarah's son Nicky Jackson has requested a conference with care team,  and , to discuss withdrawal of care, by his request.  organized the team who will participate and on-floor  will carry through with connecting parties for actual call, yet today. 01/24/21 1300   Encounter Summary   Services provided to: Patient   Referral/Consult From: Nursing Supervisor/Manager   Complexity of Encounter High   Length of Encounter 1 hour   Routine   Type Follow up   Assessment Unable to respond   Intervention Facilitated family conference;Contacted support as requested per patient/family request   Who? Pt son, pt nurse, pt drs, priest asiya   Why?  Discuss withdrawal of care   At Request Of Son   Outcome   (Organization done, other  will execute calls)

## 2021-01-24 NOTE — PROGRESS NOTES
Patient:  Triny Pike    Unit/Bed:4D-18/018-A  MRN: [id number]   PCP: [address]  Date of Admission: 1/22/2021    Assessment and Plan(All pulmonary edema, renal failure, PE, and respiratory failure diagnoses are acute in nature unless otherwise specified):        1. Acute respiratory failure: Patient was found unresponsive in nursing facility and required immediate intubation after respiratory rate was found to be less than 6. We will continue ventilator lung protective strategies and continue to wean ventilator support. Patient continues to not arouse even off sedation. 2. Septic shock: Suspect secondary to urinary tract infection. Continue with antibiotic therapy as well as pressor support. Patient requiring Levophed support to maintain MAP > 65. Blood cultures no growth preliminary results x2. On merrem. 3. Urinary tract infection: prior hospitalization demonstrated ESBL E. Coli, will continue treating with Merrem. Cultures pending   4. Acute arterial occlusion: complete occlusion distal arteries to the left common femoral artery. Pale, cool extremity. No improvement today after argatroban started 1/23. Vascular surgery, Dr. Gentry Bartholomew, following. Patient is not a surgical candidate. Arterial line will be removed today from left femoral. Fem-stop will be placed for high bleeding risk given severe thrombocytopenia. 5. Thrombocytopenia: Plt count down to 26 1/24. Associated with elevated INR, low fibrinogen, elevated fibrin split products. Concerning for DIC. HIT also a possibility. CRRT could contribute. Heparin was stopped   6. Anion gap metabolic acidosis- improving: Secondary to lactic acidosis. Patient's lactic acid on arrival 17.6. Lactic acid today 4.2. Volume resuscitation provided, continue with antibiotic therapy. Neph following. On CRRT.   7. End-stage renal disease: Patient undergoes chronic peritoneal dialysis, however emergent dialysis catheter was placed as per patient will need emergent CRRT to further filter blood at this time. Nephrology consulted. Dr. Rizwan Carolina has already seen patient. 8. Shock Liver: LFTs elevated. Due to hypoperfusion secondary to low blood pressure. Possibly DIC contributor. We will continue to monitor and provide pressor support to maintain MAP >65.  9. Altered mental status: Unclear etiology. Head imaging does not appear to have been completed. Patient is unresponsive despite discontinuing of sedatives. Concern for pathologic process related to DIC. Further imaging would not change care at this time. 10. Hypoalbuminemia: on arrival Albumin 1.1. Albumin 25 g every 8 hours for total of 8 doses. Albumin rising appropriately. 11. Chronic diastolic congestive heart failure: Last echocardiogram 8/2020 ejection fraction 60% with Myxomatotic degeneration of mitral valve. proBNP 62064.0. Home medications include Isosorbide dinitrate 20 mg daily hold at this time   12. COPD: History of  13. Type 2 diabetes mellitus: Insulin sliding scale Accu-Cheks  14. Bipolar type I: BuSpar 5 mg daily -hold at this time  15. Hypertension: Home med includes Amlodipine 10 mg daily, hydralazine 50 mg twice daily hold all hypertensive medications at this time due to patient being hypotensive  16. Hyperlipidemia: Home medication includes pravastatin 40 mg daily. Hold due to liver injury at this time  17. Chronic anemia: Secondary to end-stage renal disease. Will monitor hemoglobin and transfuse as necessary  18. Neurogenic bladder: Oxybutynin 5 mg daily  19. Hypothyroidism: Continue Synthroid 25 mcg daily  20. GERD:   Home med includes omeprazole 20 mg daily  21. History of seizures: Last seizure 1/2018      CC:   Acute respiratory failure  HPI: Deborah Espinosa is a 79 y/o female never-smoker w/ PMH of COPD, type 2 diabetes, end-stage renal disease, bipolar type I, hypertension, hyperlipidemia, neurogenic bladder, hypothyroidism, GERD, history of seizures, and chronic diastolic congestive heart failure. Patient was recently hospitalized here at [facility] 1/8/2021, patient was admitted for sepsis however source was never identified. Patient was discharged to skilled nursing facility without antibiotics. Per [facility] blood cultures from previous admission patient has ESBL E. coli growing in urine. She presented to [facility] emergency department via EMS from skilled nursing facility after being found unresponsive. Skilled nursing facility found patient with respiratory rate of 6 and was intubated at [facility] nursing facility. A left tibial IO was placed and patient was transported to [facility] emergency department. After arrival to [facility] emergency department a right femoral central line was placed and patient was transferred to [facility] ICU for further management of sepsis and respiratory failure. Upon arrival patient was found to have significantly abnormal appearing urine, tan in color and frothy. Patient started on empiric antibiotics Merrem. Patient does have a history of cefepime induced encephalopathy from prior stay at [facility] per Dr. Elodia Gitelman.      Patient in the ICU for further management and care. 1/22/21: Spoke with son, Cortez Gamboa, stated that nursing facility contacted him to come see his mother as for he was out of town. He saw his mother Wednesday of this week and noticed she was unable to communicate and calling out for her mother and father who passed away. Patient's son stated that she was very confused and moaning. Patient also expressed that before New Year's Day of this year she was getting progressively worse with confusion and agitation. Son also stated that he would like patient to be full code at this time, in accordance with the patient's wishes.     1/23/21: Patient found to only have minimal gag reflex. Ammonia was drawn slightly elevated at 69.     1/24:  The patient was noted to have loss of pulses over left foot; the leg was pale and cool. She also had diffuse mottling, and ecchymosis throughout all four extremities and abdomen. A doppler ultrasound study was obtained and showed arteries of the left lower extremity appearing to be completely occluded distal to the common femoral artery. Stat consult to cardiovascular surgery was placed. IV argatroban infusion was started. Patient was and remains not a surgical candidate. The patient's son was updated regarding these findings. He verbalized understanding that her prognosis is very poor. He is requesting a call from the hospital's  to discuss the patient's condition and to consider what direction of care to take from here. ROS: Unable to obtain secondary to patient being intubated and require mechanical ventilation    PMH:  Per HPI  SHX:   Lifetime non-smoker, no alcohol, no drugs, resides at skilled nursing facility  [facility]: Mother -high blood pressure, diabetes, cancer, heart attack. Father -stroke, high blood pressure, anemia  Allergies: Eggs or egg-derived products, aspirin, naproxen, penicillins, vicodin, vilazodone, wellbutrin.   Medications:     sodium chloride      sodium chloride      norepinephrine 4 mcg/min (21 0454)    propofol Stopped (21)    vasopressin (Septic Shock) infusion Stopped (21 0426)    phenylephrine (XIOMARA-SYNEPHRINE) 50mg/250mL infusion      sodium chloride 100 mL/hr at 21 1444    prismaSATE BGK 4/2.5 800 mL/hr (21 0839)    prismaSol BGK 4/2.5 800 mL/hr (21 0839)    prismaSol BGK 4/2.5 800 mL/hr (21 7755)    dextrose      midazolam      fentaNYL 500 mcg in sodium chloride 0.9% 100 ml infusion 25 mcg/hr (21 1116)      insulin lispro  0-18 Units Subcutaneous Q6H    sodium phosphate IVPB  12 mmol Intravenous Once    calcium replacement protocol   Other RX Placeholder    phosphorus replacement protocol   Other RX Placeholder    sodium chloride flush  10 mL Intravenous 2 times per day    levothyroxine  25 mcg Per NG tube Daily    [Held by provider] pravastatin  40 mg Oral Nightly    meropenem  1,000 mg Intravenous Q8H    albumin human  25 g Intravenous Q8H    chlorhexidine  15 mL Mouth/Throat BID    famotidine (PEPCID) injection  20 mg Intravenous Daily       Vital Signs:   T: 98.6 (Tmax 99.1 last 24h): P: 90 RR: 16 B/P: 104/42: FiO2: 40%: O2 Sat:100%: I/O: 1825/8308 (-959) GCS: 3  Body mass index is 29.28 kg/m². Michelle Almazan PC: 16/6: TV: 468: RRTotal: 16: Ti:1 sec:    General:   Acute on chronically ill adult female. Appears stated age. HEENT:  normocephalic and atraumatic. No scleral icterus. PERR  Neck: supple. No Thyromegaly. Lungs: clear to auscultation. Normal rate, pattern on ventilator. No retractions  Cardiac: RRR. S1/S2. No murmur. No JVD. Abdomen: soft. Obese. Nontender. Nondistended. Extremities:  No clubbing, cyanosis. Moderate 2+ edema RLE. Cool extremities x4. LLE pale. Vasculature: capillary refill < 3 seconds. Palpable radial pulses bilaterally. Non-palpable, non-dopplerable LLE pulses. Dopplerable RLE popliteal, PT pulses. Skin:  Cool and dry. Sloughing in areas. Diffuse mottling, duskiness, and ecchymosis of all extremities and abdomen. Psych:  Unresponsive (off sedation). Affect appropriate  Lymph:  No supraclavicular adenopathy. Neurologic:  No focal deficit. No seizures. Does not follow commands. Does not arouse. Corneal reflex intact. Very weak, minimal cough reflex. Does not withdraw to vigorous nailbed pressure x4. Data: (All radiographs, tracings, PFTs, and imaging are personally viewed and interpreted unless otherwise noted).  Telemetry: NSR. Rate 90. No ectopy.    Sodium 132 potassium 3.5 chloride 98 CO2 26 BUN 3 creatinine 0.6 ionized calcium 1.10 calcium 8.5 magnesium 1.9 Phos 1.7 glucose 222   Lactic acid 4.2   Albumin 3.2 alk phos 173   bilirubin 2.7 total protein 4.0   WBC 18.7 hemoglobin 7.6 hematocrit 23.3 platelets 26   INR 6.41 fibrinogen 87 fibrin split products >20   Schistocytes <0.5%   APTT >200   Blood cx 1: NGTD   Blood cx 2: NGTD   Molecular pna panel: negative   Urine culture 1/22/2021: Yeast.  CFU [id number].  VL Dup bilateral arterial LE's 1/23/2021 report: The arteries of the left lower extremity appear to be a completely occluded distal to the common femoral artery. Case discussed w/ Dr. Christian Johnson.     Electronically signed by YESENIA Witt-KATIUSKA

## 2021-01-24 NOTE — PROGRESS NOTES
Patient's silver colored ring removed from patient's right ring finger. Placed in patient labeled specimen cup and locked in med cabinet. Will inform son with morning update.

## 2021-01-24 NOTE — PROGRESS NOTES
Argatroban Consult  Lab Results   Component Value Date    APTT > 200.0 01/23/2021     Lab Results   Component Value Date    HGB 6.6 01/24/2021    HCT 20.3 01/24/2021    PLT 31 01/24/2021    INR 3.11 01/23/2021       Current Rate: off currently    Plan:  Continue to hold argatroban until APTT < 100  Next aPTT: 0300    Dayana Gutierrez RP, BCPS, BCGP  1/24/2021     12:49 AM

## 2021-01-24 NOTE — PROGRESS NOTES
Renal Progress Note  [facility]    Patient :  Cristina Cohen; 78 y.o. MRN# [id number]  Location:  4D-18/018-A  Attending:  Calvin Keene MD  Admit Date:  1/22/2021   Hospital Day: 2      Subjective:     Nephrology is following the patient for ESRD currently on CRRT. Patient seen on CRRT. Tolerating ok. Keeping her even. FiO2 40 %. Vasopressor requiring decreasing. Off vasopressin. Levophed is down to 4 mcg. Mottling is worsening. 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 Outpatient Medications:     Medications Prior to Admission: midodrine (PROAMATINE) 10 MG tablet, Take 1 tablet by mouth 3 times daily (with meals) Hold for SBP >120  potassium bicarb-citric acid (EFFER-K) 20 MEQ TBEF effervescent tablet, Take 2 tablets by mouth daily  sodium chloride 1 g tablet, Take 2 tablets by mouth 2 times daily  acetaminophen-codeine (TYLENOL #3) 300-30 MG per tablet, Take 1 tablet by mouth every 6 hours as needed (arthritis pain) for up to 30 days. pregabalin (LYRICA) 50 MG capsule, Take 1 capsule by mouth daily for 5 days. Magnesium Chloride-Calcium  MG, Take 1 tablet by mouth 2 times daily  megestrol acetate (MEGACE) 400 MG/10ML SUSP, Take 400 mg by mouth daily  omeprazole (PRILOSEC) 20 MG delayed release capsule, Take 20 mg by mouth daily  sertraline (ZOLOFT) 50 MG tablet, Take 50 mg by mouth daily  lidocaine (XYLOCAINE) 5 % ointment, Apply topically as needed.   busPIRone (BUSPAR) 5 MG tablet, Take 5 mg by mouth 2 times daily  insulin glargine (BASAGLAR KWIKPEN) 100 UNIT/ML injection pen, Inject 4 Units into the skin every morning  oxybutynin (DITROPAN-XL) 5 MG extended release tablet, Take 5 mg by mouth daily  benzonatate (TESSALON) 100 MG capsule, Take 100 mg by mouth every 8 hours as needed for Cough  polyethylene glycol (GLYCOLAX) powder, Take 17 g by mouth daily  B Complex-C-Folic Acid (RENAL) 1 MG CAPS, Take 1 capsule by mouth daily  insulin aspart (NOVOLOG) 100 UNIT/ML injection vial, Inject 0-12 Units into the skin 3 times daily (before meals) Per sliding scale; 140-199= 1 unit, 200-249= 2 units, 250-299= 3 units,  300-349= 4 units, 350-399= 5 units, 400-450= 6 units, 451-500= 10 units, >500 give 12 units & recheck in one hour. fluticasone (FLONASE) 50 MCG/ACT nasal spray, 2 sprays by Each Nostril route daily   polycarbophil (FIBERCON) 625 MG tablet, Take 3 tablets by mouth 3 times daily (with meals)   sevelamer (RENVELA) 800 MG tablet, Take 2 tablets by mouth 3 times daily (with meals)  guaiFENesin 200 MG tablet, Take 200 mg by mouth every 4 hours as needed (cough)   ondansetron (ZOFRAN-ODT) 4 MG disintegrating tablet, Take 4 mg by mouth every 8 hours as needed for Nausea or Vomiting   meclizine (ANTIVERT) 25 MG tablet, Take 25 mg by mouth 3 times daily  gabapentin (NEURONTIN) 300 MG capsule, Take 300 mg by mouth daily. acetaminophen (TYLENOL) 325 MG tablet, Take 650 mg by mouth every 6 hours as needed for Pain  levothyroxine (SYNTHROID) 25 MCG tablet, Take 25 mcg by mouth Daily  blood glucose test strips (ASCENSIA AUTODISC VI;ONE TOUCH ULTRA TEST VI) strip, Patient tests blood sugar three times per day.  Diagnosis DMII E11.9  pravastatin (PRAVACHOL) 40 MG tablet, Take 40 mg by mouth nightly     Current Medications:     Scheduled Meds:    insulin lispro  0-18 Units Subcutaneous Q6H    sodium phosphate IVPB  12 mmol Intravenous Once    calcium replacement protocol   Other RX Placeholder    phosphorus replacement protocol   Other RX Placeholder    sodium chloride flush  10 mL Intravenous 2 times per day    levothyroxine  25 mcg Per NG tube Daily    [Held by provider] pravastatin  40 mg Oral Nightly    meropenem  1,000 mg Intravenous Q8H    albumin human  25 g Intravenous Q8H    chlorhexidine  15 mL Mouth/Throat BID    famotidine (PEPCID) injection  20 mg Intravenous Daily     Continuous Infusions:    sodium chloride      sodium chloride      norepinephrine 4 mcg/min (21 0454)    propofol Stopped (21)    vasopressin (Septic Shock) infusion Stopped (21)    phenylephrine (XIOMARA-SYNEPHRINE) 50mg/250mL infusion      sodium chloride 100 mL/hr at 21 1444    prismaSATE BGK 4/2.5 800 mL/hr (21 0249)    prismaSol BGK 4/2.5 800 mL/hr (21 024)    prismaSol BGK 4/2.5 800 mL/hr (21 024)    dextrose      midazolam      fentaNYL 500 mcg in sodium chloride 0.9% 100 ml infusion 25 mcg/hr (21 1406)     PRN Meds:  sodium chloride, sodium chloride, sodium chloride flush, potassium chloride, magnesium sulfate, calcium gluconate **OR** calcium gluconate **OR** calcium gluconate **OR** calcium gluconate, sodium phosphate IVPB **OR** sodium phosphate IVPB **OR** sodium phosphate IVPB **OR** sodium phosphate IVPB, artificial tears, glucose, dextrose, glucagon (rDNA), dextrose    Input/Output:       I/O last 3 completed shifts: In: 1 [I.V.:5929; Blood:660]  Out: 7298 [Urine:20].       Patient Vitals for the past 96 hrs (Last 3 readings):   Weight   21 0300 181 lb 7 oz (82.3 kg)   21 0300 183 lb 3.2 oz (83.1 kg)   21 0555 171 lb 4.8 oz (77.7 kg)       Vital Signs:   Temperature:  Temp: 98.8 °F (37.1 °C)  TMax:   Temp (24hrs), Av °F (37.2 °C), Min:98.8 °F (37.1 °C), Max:99.1 °F (37.3 °C)    Respirations:  Resp: 18  Pulse:   Pulse: 82  BP:    BP: (!) 114/50  BP Range: Systolic (20HHG), EWL:218 , Min:114 , RGY:796       Diastolic (88EPD), ROR:80, Min:50, Max:50      Physical Examination:     General:  Intubated, sedated  HEENT: NC/AT/ MMM +endotracheal tube  Chest:               diminished  Cardiac:  S1 S2   Abdomen: Soft, +PD catheter  Neuro:  sedated  SKIN:  Diffuse mottling up to chest  Extremities:  Right leg edema noted    Labs:       Recent Labs     21  1800 21  2350 21  0415 21  0600   WBC 22.3* 21.3*  --  18.7*   RBC 2.20* 2.04*  --  2.44*   HGB 7.1* 6.6* 7.9* 7.6*   HCT 22.0* 20.3* 24.5* 23.3*   .0* 99.5*  --  95.5   MCH 32.3 32.4  --  31.1   MCHC 32.3 32.5  --  32.6   PLT 31* 31*  --  26*   MPV 11.7 11.7  --  11.8      BMP:   Recent Labs     01/23/21  1800 01/23/21  2350 01/24/21  0600   * 133* 132*   K 3.6 3.6 3.5   CL 99 99 98   CO2 25 25 26   BUN 4* 4* 3*   CREATININE 0.9 0.7 0.6   GLUCOSE 222* 228* 222*   CALCIUM 8.0* 8.0* 8.5      Phosphorus:     Recent Labs     01/23/21  1800 01/23/21  2350 01/24/21  0600   PHOS 1.6* 2.3* 1.7*     Magnesium:    Recent Labs     01/23/21  1800 01/23/21  2350 01/24/21  0600   MG 1.8 1.9 1.9     Albumin:    Recent Labs     01/23/21  1800 01/23/21  2350 01/24/21  0600   LABALBU 2.6* 2.9* 3.2*     BNP:    No results found for: BNP  LUIS:    No results found for: LUIS  SPEP:  Lab Results   Component Value Date    PROT 4.0 01/24/2021     UPEP:   No results found for: LABPE  C3:   No results found for: C3  C4:   No results found for: C4  MPO ANCA:   No results found for: MPO  PR3 ANCA:   No results found for: PR3  Anti-GBM:   No results found for: GBMABIGG  Hep BsAg:         Lab Results   Component Value Date    HEPBSAG Negative 06/13/2019     Hep C AB:          Lab Results   Component Value Date    HEPCAB Negative 06/13/2019       Urinalysis/Chemistries:      Lab Results   Component Value Date    NITRU NEGATIVE 01/22/2021    COLORU YELLOW 01/22/2021    PHUR 6.5 01/22/2021    WBCUA > 200 01/22/2021    RBCUA OBSCURED 01/22/2021    MUCUS OBSCURED 01/22/2021    YEAST OBSCURED 01/22/2021    BACTERIA OBSCURED 01/22/2021    CLARITYU Clear 03/09/2019    LEUKOCYTESUR MODERATE 01/22/2021    UROBILINOGEN 0.2 01/22/2021    BILIRUBINUR NEGATIVE 01/22/2021    BILIRUBINUR Negative 03/09/2019    BLOODU LARGE 01/22/2021    GLUCOSEU NEGATIVE 01/22/2021    KETUA NEGATIVE 01/22/2021    AMORPHOUS OBSCURED 01/22/2021     Urine Sodium:   No results found for: REHANA  Urine Potassium:  No results found for:

## 2021-01-24 NOTE — PROGRESS NOTES
conference discussion with son with  . joellen good and myself, son wishes to stop medical treatment at this time and his mother comfortable, crrt stopped, waiting for Alichester ex  to arrive before removing vent.

## 2021-01-24 NOTE — SIGNIFICANT EVENT
A conference call was held at the patient's LNOK's request (Dev Sandhu) between myself, Eric (RN), Father Ryder Orellana, and  Joselito Tomlinson. I updated him on the patient's condition, with very poor prognosis, given septic shock with multisystem organ failure, development of an acute left femoral artery occlusion with patient not a surgical candidate at this point, as well as development of likely DIC, and concerns for possible intracranial pathology from this given unresponsiveness, despite discontinuing sedatives. Tiffany Russ verbalized understanding and all questions were answered. Tiffany Russ discussed the patient's condition with Father Xavier Aragon as well. Tiffany Russ decided that he would like to pursue comfort measures for the patient at this time and pursue a [facility] code status. He verbalized that he will not be able to come visit the patient again, as he lives in [place]. He did give permission to contact the patient's ex-spouse, who reportedly is still close with the patient. Tiffany Russ denies any further needs at this time. After contact of the ex-spouse, he will be coming to visit the patient.

## 2021-01-24 NOTE — FLOWSHEET NOTE
[place] [address] PROGRESS NOTE      Patient: Franca Cooper  Room #: 4D-18/018-A            YOB: 1950  Age: 78 y.o. Gender: female            Admit Date & Time: 1/22/2021  4:44 AM    Assessment:   received an update on this patient from previous . Patient condition was extremely acute, with numerous complicating organ and infection issues.  from previous shift informed  that the patient's son, Wero Cruz, having received a medical update on his mother's (patient's) condition   had requested a conversation with a Alejo Sandoval as his mother and the son are both Buddhist. Interventions:   went to unit to talk with patient nurse and Nurse practitioner managing patient's care.  also called Fr. Sindy Reveles and arranged for him to be in conference call with son right away. Son was called and included in conference call with nurse, nurse practitioner, Fr. Anurag Lopez. Medical staff gave a summary of latest medical status and treatments. Son asked questions for clarity and staff responded. Son then asked Fr. Sindy Reveles if withdrawing care would be a proper decision from the [employer]. Inocencia Caballero responded with assurance of his readiness to withdraw care and commend the mother to God. Outcomes: Son was actively grieving, but was satisfied that this decision was the best.  The son authorized staff to withdraw treatment. Staff present included Nurse Practictioner NOAH Padilla, Fr. Sarah Tirado, and  Shayna Kessler.  offered a closing prayer, which son accepted. Plan: 1. Staff will proceed to make patient patient comfortable while withdrawing other life support measures. Staff is to call son at the patient's passing.      Electronically signed by Tamia Zacarias, on 1/24/2021 at 3:06 PM.  [address] [facility]. [address] [place] [address]  [phone]                 01/24/21 [id number]   Encounter Summary   Services provided to: Family; Patient not available   Referral/Consult From: Physician;Nurse; Other    Support System Children;Family members   Place of [facility] orth[facility]   ([facility])   Continue Visiting   (01/24/2021   Son )   Complexity of Encounter Moderate   Length of Encounter 15 minutes;1 hour   Spiritual Assessment Completed Yes   Routine   Type Follow up   Spiritual/Advent   Type Spiritual struggle   Grief and Life Adjustment   Type End of life   Assessment Approachable;Tearful;Grieving; Anxious; Coping;Peaceful   Intervention Active listening;Nurtured hope;Prayer; End of life care; Facilitated family conference   Outcome Comfort;Expressed gratitude;Engaged in conversation;Expressed feelings/needs/concerns; Less anxious, less agitated;Encouraged; Hopeful

## 2021-01-25 NOTE — SIGNIFICANT EVENT
Handoff provided to hospitalist team, Patricia Napier 6. Please call with any further questions/concerns.

## 2021-01-25 NOTE — PROGRESS NOTES
[address] [place] [address]  Notice of Patient Passing      Patient Name- Valentino Guan Number- [de-identified]   Attending Physician- Amy Mccoy MD    Admitted on-1/22/2021  4:44 AM     On 1/25/2021 at [id number] patient was found in [phone] with:   Absence of vital signs. Absence of neurological response. Confirmed time of death at [id number]. Physician or On-call Physician notified of time of death- yes    Family present at time of death- no   Spiritual care present at time of death- no    Physician was notified and orders were obtained to release the body. Post-Mortem documentation completed; form printed, signed, and given to admitting.     Trey Perera RN Nursing Supervisor/ Manager  1/25/21   1:29 AM

## 2021-01-26 LAB
HAPTOGLOBIN: 77 MG/DL (ref 30–200)
ORGANISM: ABNORMAL
URINE CULTURE REFLEX: ABNORMAL

## 2021-01-26 NOTE — DISCHARGE SUMMARY
Patient  00:48 on 21. Patient  due to complications of septic shock due to UTI due to ESBL E coli. Admission date 21  Electronically signed by Shameka Angeles.  Ailyn Stinson MD.

## 2021-01-27 LAB
BLOOD CULTURE, ROUTINE: NORMAL
BLOOD CULTURE, ROUTINE: NORMAL
ORGANISM: ABNORMAL
URINE CULTURE, ROUTINE: ABNORMAL
URINE CULTURE, ROUTINE: ABNORMAL

## 2021-01-28 LAB
HEPARIN ASSOCIATED AB DETECTION: NORMAL
SEROTONIN RELEASING ASSAY: NORMAL
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
## 2021-11-24 NOTE — PROGRESS NOTES
[facility] Palliative Care           Progress Note    Patient Name:  Betty Atkinson Record Number:  [id number]  [place]:  1950    Date:  6/6/2019  Time:  2:27 PM  Completed By:  Kiko Matos RN    Reason for Palliative Care Evaluation:  Support; goals    Current Issues:  []  Pain  []  Fatigue  []  Nausea  []  Anxiety  []  Depression  [x]  Shortness of Breath:improved after thoracentesis  []  Constipation  []  Appetite  []  Other:    Advance Directives:none on file  [] [facility] DNR Form  [] Living Will  [] Medical POA    Current Code Status  [x] Full Resuscitation  [] DNR-Comfort Care-Arrest  [] DNR-Comfort Care  [] Limited   [] No CPR   [] No shock   [] No ET intubation/reintubation   [] No resuscitative medications   [] Other limitation:    Performance Status:  60    Family/Patient Discussion:  Patient resting in bed with 100% NRM on. Respirations appear fairly easy and patient denies shortness of breath at this time. Patient had a thoracentesis performed a short time ago. Palliative care introduced. Discussion about CHF/CKD as the patient expressed that she did not understand what was causing the fluid buildup. Discussion about sodium intake, daily weight and fluid restriction as means to limit the fluid buildup. Discussion about advance directives and their significance. Patient states that she is  and has 1 child. Discussion about code status levels and what each level entails. Discussed complications of rib fractures, brain and organ damage that is associated with resuscitative measures. Much emotional support provided. Plan/Follow-Up:  At this time, patient wishes to maintain full code status. Patient does not wish to complete advance directives at this time. Updated primary RN, Richi Rodriguez.     Kiko Matos RN  6/6/2019,  2:27 PM Spironolactone Counseling: Patient advised regarding risks of diarrhea, abdominal pain, hyperkalemia, birth defects (for female patients), liver toxicity and renal toxicity. The patient may need blood work to monitor liver and kidney function and potassium levels while on therapy. The patient verbalized understanding of the proper use and possible adverse effects of spironolactone.  All of the patient's questions and concerns were addressed.

## 2024-09-28 NOTE — PROGRESS NOTES
Transfer  Report from [facility]  Referring Physician Dr Fadi Mcdowell  Accepting Physician Norma Toro ACNP for Dr Suzie Wall  Patient Condition:  79 yo brought to ER after being found unresponsive at [facility]. Per squad pt only breathing 6 breaths per minute and was intubated. Upon arrival at ER, patient was responsive to pain only. Pt given fluids and central line placed. Pt is on PD. Labs WBC 19826,   Lactic acid 11 EKG showed non specific changes. B/p was 60/40, so patient placed on levophed at 11 mcg/hr. Also given vanc and invanz for infection. Blood gases pH 7.27 PCO2 21.   Code Status full        Pt accepted on behalf of Dr Arcadio Garibay to 0A44 with diagnosis of Septic shock hard copy, drawn during this pregnancy